# Patient Record
Sex: MALE | Race: WHITE | NOT HISPANIC OR LATINO | Employment: OTHER | URBAN - METROPOLITAN AREA
[De-identification: names, ages, dates, MRNs, and addresses within clinical notes are randomized per-mention and may not be internally consistent; named-entity substitution may affect disease eponyms.]

---

## 2017-02-21 ENCOUNTER — ALLSCRIPTS OFFICE VISIT (OUTPATIENT)
Dept: OTHER | Facility: OTHER | Age: 77
End: 2017-02-21

## 2017-04-11 ENCOUNTER — ALLSCRIPTS OFFICE VISIT (OUTPATIENT)
Dept: OTHER | Facility: OTHER | Age: 77
End: 2017-04-11

## 2017-04-11 DIAGNOSIS — I10 ESSENTIAL (PRIMARY) HYPERTENSION: ICD-10-CM

## 2017-04-11 DIAGNOSIS — E03.9 HYPOTHYROIDISM: ICD-10-CM

## 2017-04-11 DIAGNOSIS — E78.5 HYPERLIPIDEMIA: ICD-10-CM

## 2017-08-18 ENCOUNTER — ALLSCRIPTS OFFICE VISIT (OUTPATIENT)
Dept: OTHER | Facility: OTHER | Age: 77
End: 2017-08-18

## 2017-12-20 ENCOUNTER — ALLSCRIPTS OFFICE VISIT (OUTPATIENT)
Dept: OTHER | Facility: OTHER | Age: 77
End: 2017-12-20

## 2017-12-20 DIAGNOSIS — E78.5 HYPERLIPIDEMIA: ICD-10-CM

## 2017-12-20 DIAGNOSIS — E03.9 HYPOTHYROIDISM: ICD-10-CM

## 2017-12-20 DIAGNOSIS — C61 MALIGNANT NEOPLASM OF PROSTATE (HCC): ICD-10-CM

## 2017-12-20 DIAGNOSIS — F11.90 UNCOMPLICATED OPIOID USE: ICD-10-CM

## 2017-12-20 DIAGNOSIS — I10 ESSENTIAL (PRIMARY) HYPERTENSION: ICD-10-CM

## 2017-12-21 NOTE — PROGRESS NOTES
Assessment   1  Hypertension (401 9) (I10)   2  Chronic narcotic use (305 50) (F11 90)   3  Generalized anxiety disorder (300 02) (F41 1)   4  Hyperlipidemia (272 4) (E78 5)   5  Hypothyroidism (244 9) (E03 9)   6  Osteoarthritis (715 90) (M19 90)   7  Prostate cancer (185) (C61)    Plan   Chronic shoulder pain, Osteoarthritis    · Hydrocodone-Acetaminophen 7 5-325 MG Oral Tablet; take 1 tablet every 6 hours as    needed for pain  Hyperlipidemia    · (1) LIPID PANEL FASTING W DIRECT LDL REFLEX; Status:Active; Requested for:45Sns7975;    · Eat a low fat and low cholesterol diet ; Status:Complete;   Done: 47ION1641  Hypertension    · (1) CBC/PLT/DIFF; Status:Active; Requested for:66Kqw1204;    · (1) COMPREHENSIVE METABOLIC PANEL; Status:Active; Requested for:77Ajf1648;    · (1) MICROALBUMIN CREATININE RATIO, RANDOM URINE; Status:Active; Requested for:10Oze2692;    · Follow-up visit in 4 Months Evaluation and Treatment  Follow-up  Status: Hold For - Scheduling     Requested for: 25Grd3100   · A diet low in sodium and high in potassium, magnesium, and calcium can help your blood pressure ;    Status:Complete;   Done: 82DAH0387   · Begin a limited exercise program ; Status:Complete;   Done: 67TLG3517   · Begin or continue regular aerobic exercise  Gradually work up to at least {count1} sessions of    {dur1} of exercise a week ; Status:Complete;   Done: 68HGK1247  Hypothyroidism    · (1) TSH; Status:Active; Requested for:87Szx1167;   Prostate cancer    · (1) PSA FREE & TOTAL; Status:Active; Requested for:07Tlj6811;   Screening for genitourinary condition    · *VB - Urinary Incontinence Screen (Dx Z13 89 Screen for UI); Status:Complete;   Done: 80PBR3724    11:17AM  SocHx: Chronic narcotic use    · (1) DRUG ABUSE SCREEN, URINE ROUTINE; Status:Active; Requested for:01Phs4902;    · (1) HYDROCODONE AND METABOLITES, URINE; Status:Active;  Requested for:22Vuy0882;     Discussion/Summary   Discussion Summary:    Doing well and will check labs  Continue current treatment  Check UDT  Medication SE Review and Pt Understands Tx: Possible side effects of new medications were reviewed with the patient/guardian today  The treatment plan was reviewed with the patient/guardian  The patient/guardian understands and agrees with the treatment plan      Chief Complaint   Chief Complaint Free Text Note Form: Patient is being seen today for a RTN visit  Patient states he would like a refill on some medications  Chief Complaint Chronic Condition [facility]: Patient is here today for follow up of chronic conditions described in HPI  History of Present Illness   HPI: WM RTC for f/u htn, hyperlipidemia, etc  Doing well and no c/o's  Remains active w/o difficulty  Due for labs  Flu and colon cancer screening up to date  Memory reportedly has been good  Anxiety Disorder (Follow-Up): The patient is being seen for follow-up of generalized anxiety disorder  The patient reports doing well  He has no comorbid illnesses  He has had no significant interval events  Interval symptoms:  stable anxiety,-- stable sleep disruption-- and-- denies depression  Medication(s): benzodiazepines  Medications:  the patient is adherent to his medication regimen, but-- he denies medication side effects  Disease management:  the patient is doing well with his goals  Hypothyroidism (Follow-Up): The patient is being seen for follow-up of Hashimoto's thyroiditis  The patient reports doing well  He has had no significant interval events  The patient is currently asymptomatic  Medications include levothyroxine  Medications:  the patient is adherent to his medication regimen, but-- he denies medication side effects  Disease management:  the patient is doing well with his goals  Due for: thyroid stimulating hormone and lipid profile  Hyperlipidemia (Follow-Up):  The patient states his hyperlipidemia has been under good control since the last visit  Comorbid Illnesses: hypertension  He has no significant interval events  Symptoms: The patient is currently asymptomatic  Associated symptoms include no focal neurologic deficits-- and-- no memory loss  Medications: the patient is adherent with his medication regimen  -- He denies medication side effects  Medication(s): a statin  The patient is doing well with his hyperlipidemia goals  The patient is due for a lipid panel-- and-- liver function tests  Hypertension (Follow-Up): The patient presents for follow-up of essential hypertension  The patient states he has been doing well with his blood pressure control since the last visit  He has no comorbid illnesses  He has no significant interval events  Symptoms: The patient is currently asymptomatic  Associated symptoms include no headache-- and-- no focal neurologic deficits  Home monitoring: The patient checks his blood pressure sporadically  Blood pressure control has been good  Medications: the patient is adherent with his medication regimen  -- He denies medication side effects  Medication(s): a diuretic-- and-- an ACE inhibitor  Disease Management: the patient is doing well with his blood pressure goals  The patient is due for a lipid panel,-- a serum creatinine-- and-- a urine microalbumin  Osteoarthritis (Follow-Up): The patient states his osteoarthritis has been stable since the last visit  He has no comorbid illnesses  He has no complications from osteoarthritis  He has no significant interval events  Symptoms: The patient is currently asymptomatic  Associated symptoms include joint stiffness, but-- no localized joint swelling  Activities: no limitations  Medications: the patient is adherent with his medication regimen  -- He denies medication side effects  Medication(s): acetaminophen,-- an opioid narcotic-- and-- a non-steroidal antiinflammatory agent        Disease Management: the patient is doing well with his osteoarthritis goals  Review of Systems   Complete-Male:      Constitutional: no fever,-- not feeling poorly,-- no chills-- and-- not feeling tired  Cardiovascular: no chest pain,-- no intermittent leg claudication,-- no palpitations-- and-- no extremity edema  Respiratory: no shortness of breath,-- no cough,-- no orthopnea,-- no wheezing,-- no shortness of breath during exertion-- and-- no PND  Gastrointestinal: no abdominal pain,-- no nausea,-- no constipation-- and-- no diarrhea  Genitourinary: no dysuria  Musculoskeletal: No complaints of arthralgia, no myalgias, no joint swelling or stiffness, no limb pain or swelling  Integumentary: No complaints of skin rash or skin lesions, no itching, no skin wound, no dry skin  Neurological: no headache,-- no confusion-- and-- no convulsions  Psychiatric: anxiety-- and-- sleep disturbances, but-- not suicidal-- and-- no depression  Endocrine: No complaints of proptosis, no hot flashes, no muscle weakness, no erectile dysfunction, no deepening of the voice, no feelings of weakness  Hematologic/Lymphatic: No complaints of swollen glands, no swollen glands in the neck, does not bleed easily, no easy bruising  Active Problems   1  Alzheimers disease (331 0) (G30 9)   2  Chronic narcotic use (305 50) (F11 90)   3  Chronic shoulder pain (719 41,338 29) (M25 519,G89 29)   4  Dyshidrosis (705 81) (L30 1)   5  Eczema (692 9) (L30 9)   6  Generalized anxiety disorder (300 02) (F41 1)   7  Hyperlipidemia (272 4) (E78 5)   8  Hypertension (401 9) (I10)   9  Hypothyroidism (244 9) (E03 9)   10  Osteoarthritis (715 90) (M19 90)   11  Prostate cancer (185) (C61)   12  Screening for depression (V79 0) (Z13 89)   13  Screening for genitourinary condition (V81 6) (Z13 89)   14  Screening for neurological condition (V80 09) (Z13 89)    Past Medical History   1  History of Acute frontal sinusitis (461 1) (J01 10)   2   History of Acute upper respiratory infection (465 9) (J06 9)   3  History of Calcaneal spur (726 73) (M77 30)   4  History of bronchitis (V12 69) (Z87 09)   5  History of dermatitis (V13 3) (Z87 2)   6  History of influenza vaccination (V49 89) (Z92 29)   7  History of skin disorder (V13 3) (Z87 2)   8  History of Skin rash (782 1) (R21)  Active Problems And Past Medical History Reviewed: The active problems and past medical history were reviewed and updated today  Surgical History   1  History of Cataract Surgery   2  History of Eye Surgery   3  History of Knee Surgery   4  History of Preventive Medicine Estab Patient Checkup Adult Over 64 (V70 0)   5  History of Prostate Surgery  Surgical History Reviewed: The surgical history was reviewed and updated today  Family History   Brother    1  Family history of Stroke Syndrome (V17 1)  Family History    2  Family history of alcoholism (V17 0) (Z81 1)  Family History Reviewed: The family history was reviewed and updated today  Social History    · Denied: Alcohol Use (History)   · Caffeine Use   · Chronic narcotic use (305 50) (F11 90)   · Former smoker (F08 25) (Z76 895)  Social History Reviewed: The social history was reviewed and updated today  The social history was reviewed and is unchanged  Current Meds    1  ALPRAZolam 0 5 MG Oral Tablet; TAKE 1 TABLET 3 TIMES DAILY AS NEEDED; Last Rx:72Him2073     Ordered   2  Aspirin 81 MG TABS Recorded   3  Hydrocodone-Acetaminophen 7 5-325 MG Oral Tablet; take 1 tablet every 6 hours as needed for     pain; Therapy: 31Edh3025 to (Evaluate:19Swy1822); Last Rx:43Ltt4015 Ordered   4  Levothyroxine Sodium 88 MCG Oral Tablet; TAKE 1 TABLET DAILY; Therapy: 90SMX8540 to (Evaluate:20Llv6148)  Requested for: 99TRV7901; Last Rx:20Uzq0959     Ordered   5  Lisinopril-Hydrochlorothiazide 20-25 MG Oral Tablet; 1 q d;      Therapy: 97CRI8833 to (Evaluate:78Dpo8199)  Requested for: 34QEF9443; Last Rx:19Wfq0231 Ordered   6  Mometasone Furoate 0 1 % External Ointment; APPLY SPARINGLY TO AFFECTED AREA(S) ONCE     DAILY; Therapy: 35Efk0876 to (Evaluate:94Fds9043)  Requested for: 78Vwa8311; Last Rx:22Apr2016     Ordered   7  Multi Vitamin/Minerals Oral Tablet; TAKE 1 TABLET DAILY; Therapy: 11PIY4397 to (Evaluate:48Hrg0197) Recorded   8  Pravastatin Sodium 40 MG Oral Tablet; 1 q d; Therapy: 03LHC9994 to (Evaluate:13Nft1357)  Requested for: 53PPS4059; Last Rx:21Xsj1778     Ordered   9  Triamcinolone Acetonide 0 1 % External Ointment; apply sparingly to affected area tid; Therapy: 64VYC8228 to (Evaluate:39Zci3069) Recorded  Medication List Reviewed: The medication list was reviewed and updated today  Allergies   1  No Known Drug Allergies    Vitals   Vital Signs    Recorded: 20Dec2017 11:12AM   Temperature 97 6 F, Tympanic   Heart Rate 46   Respiration 16   Systolic 913, Sitting   Diastolic 70, Sitting   Height 5 ft 9 in   Weight 196 lb 8 0 oz   BMI Calculated 29 02   BSA Calculated 2 05   O2 Saturation 96     Physical Exam        Constitutional      General appearance: No acute distress, well appearing and well nourished  Eyes      Conjunctiva and lids: No swelling, erythema, or discharge  Pupils and irises: Equal, round and reactive to light  Ears, Nose, Mouth, and Throat      Oropharynx: Normal with no erythema, edema, exudate or lesions  Pulmonary      Respiratory effort: No increased work of breathing or signs of respiratory distress  Auscultation of lungs: Clear to auscultation, equal breath sounds bilaterally, no wheezes, no rales, no rhonci  Cardiovascular      Auscultation of heart: Normal rate and rhythm, normal S1 and S2, without murmurs  Examination of extremities for edema and/or varicosities: Normal        Carotid pulses: Normal        Abdomen      Abdomen: Non-tender, no masses         Musculoskeletal      Gait and station: Normal        Psychiatric Orientation to person, place and time: Normal        Mood and affect: Normal           Results/Data   *VB - Urinary Incontinence Screen (Dx Z13 89 Screen for UI) 59ZHE9930 11:17AM [place]ore[place] Night      Test Name Result Flag Reference   Urinary Incontinence Assessment No UI        Falls Risk Assessment (Dx Z13 89 Screen for Neurologic Disorder) 70UFZ9161 11:16AM User, Ahs      Test Name Result Flag Reference   Falls Risk      No falls in the past year        Health Management   Prostate cancer   (1) PSA (SCREEN) (Dx V76 44 Screen for Prostate Cancer); every 1 year; Last 07ENS8749; Next    Due: 01Jun2016; Overdue  (1) PSA, DIAGNOSTIC (FOLLOW-UP); every 1 year; Last 07EZG1914; Next Due: 03GZW8497; Overdue  Screening for depression   *VB-Depression Screening; every 1 year; Last 99IBN7090; Next Due: 35Mte6014; Active  Health Maintenance   (1) PSA (SCREEN) (Dx V76 44 Screen for Prostate Cancer); every 1 year; Last 97EDK1827; Next    Due: 01Jun2016; Overdue  Medicare Annual Wellness Visit; every 1 year; Next Due: 98MGR2928;  Overdue    Signatures    Electronically signed by : MARY Marrero ; Dec 20 2017 11:46AM EST                       (Author)

## 2018-01-13 VITALS
RESPIRATION RATE: 18 BRPM | SYSTOLIC BLOOD PRESSURE: 130 MMHG | TEMPERATURE: 97.1 F | HEIGHT: 69 IN | WEIGHT: 197 LBS | DIASTOLIC BLOOD PRESSURE: 60 MMHG | BODY MASS INDEX: 29.18 KG/M2 | HEART RATE: 70 BPM

## 2018-01-14 VITALS
BODY MASS INDEX: 28.73 KG/M2 | HEART RATE: 74 BPM | HEIGHT: 69 IN | WEIGHT: 194 LBS | TEMPERATURE: 98.1 F | RESPIRATION RATE: 14 BRPM | SYSTOLIC BLOOD PRESSURE: 134 MMHG | DIASTOLIC BLOOD PRESSURE: 68 MMHG

## 2018-01-15 VITALS
HEART RATE: 68 BPM | BODY MASS INDEX: 29.18 KG/M2 | RESPIRATION RATE: 16 BRPM | DIASTOLIC BLOOD PRESSURE: 70 MMHG | SYSTOLIC BLOOD PRESSURE: 114 MMHG | WEIGHT: 197 LBS | HEIGHT: 69 IN

## 2018-01-23 VITALS
DIASTOLIC BLOOD PRESSURE: 70 MMHG | OXYGEN SATURATION: 96 % | TEMPERATURE: 97.6 F | WEIGHT: 196.5 LBS | BODY MASS INDEX: 29.1 KG/M2 | HEART RATE: 46 BPM | HEIGHT: 69 IN | RESPIRATION RATE: 16 BRPM | SYSTOLIC BLOOD PRESSURE: 110 MMHG

## 2018-03-20 ENCOUNTER — OFFICE VISIT (OUTPATIENT)
Dept: INTERNAL MEDICINE CLINIC | Facility: CLINIC | Age: 78
End: 2018-03-20
Payer: COMMERCIAL

## 2018-03-20 ENCOUNTER — TELEPHONE (OUTPATIENT)
Dept: INTERNAL MEDICINE CLINIC | Facility: CLINIC | Age: 78
End: 2018-03-20

## 2018-03-20 VITALS
HEIGHT: 69 IN | SYSTOLIC BLOOD PRESSURE: 138 MMHG | HEART RATE: 62 BPM | TEMPERATURE: 97.8 F | WEIGHT: 200 LBS | BODY MASS INDEX: 29.62 KG/M2 | RESPIRATION RATE: 16 BRPM | DIASTOLIC BLOOD PRESSURE: 86 MMHG

## 2018-03-20 DIAGNOSIS — F41.9 ANXIETY: Primary | ICD-10-CM

## 2018-03-20 DIAGNOSIS — L30.9 DERMATITIS: ICD-10-CM

## 2018-03-20 PROCEDURE — 99212 OFFICE O/P EST SF 10 MIN: CPT | Performed by: INTERNAL MEDICINE

## 2018-03-20 PROCEDURE — 1101F PT FALLS ASSESS-DOCD LE1/YR: CPT | Performed by: INTERNAL MEDICINE

## 2018-03-20 RX ORDER — ALPRAZOLAM 0.5 MG/1
0.5 TABLET ORAL 3 TIMES DAILY PRN
Qty: 90 TABLET | Refills: 0 | Status: SHIPPED | OUTPATIENT
Start: 2018-03-20 | End: 2018-04-11 | Stop reason: SDUPTHER

## 2018-03-20 RX ORDER — CLOTRIMAZOLE 1 %
CREAM (GRAM) TOPICAL
Qty: 60 G | Refills: 1 | Status: SHIPPED | OUTPATIENT
Start: 2018-03-20 | End: 2018-03-20 | Stop reason: SDUPTHER

## 2018-03-20 RX ORDER — LISINOPRIL AND HYDROCHLOROTHIAZIDE 25; 20 MG/1; MG/1
TABLET ORAL DAILY
COMMUNITY
Start: 2011-07-18 | End: 2018-04-11 | Stop reason: SDUPTHER

## 2018-03-20 RX ORDER — PRAVASTATIN SODIUM 40 MG
TABLET ORAL DAILY
COMMUNITY
Start: 2011-07-18 | End: 2018-04-11 | Stop reason: SDUPTHER

## 2018-03-20 RX ORDER — CLOTRIMAZOLE 1 %
CREAM (GRAM) TOPICAL
Qty: 60 G | Refills: 0 | Status: SHIPPED | OUTPATIENT
Start: 2018-03-20 | End: 2018-04-11

## 2018-03-20 RX ORDER — HYDROCODONE BITARTRATE AND ACETAMINOPHEN 7.5; 325 MG/1; MG/1
1 TABLET ORAL EVERY 6 HOURS PRN
COMMUNITY
Start: 2017-02-21 | End: 2018-04-11 | Stop reason: SDUPTHER

## 2018-03-20 RX ORDER — ALPRAZOLAM 0.5 MG/1
1 TABLET ORAL 3 TIMES DAILY PRN
COMMUNITY
End: 2018-03-20 | Stop reason: SDUPTHER

## 2018-03-20 RX ORDER — LEVOTHYROXINE SODIUM 88 UG/1
1 TABLET ORAL DAILY
COMMUNITY
Start: 2011-07-18 | End: 2018-04-11 | Stop reason: SDUPTHER

## 2018-03-20 NOTE — PATIENT INSTRUCTIONS
Dermatitis   AMBULATORY CARE:   Dermatitis  is skin inflammation  You may have an itchy rash, redness, or swelling  You may also have bumps or blisters that crust over or ooze clear fluid  Call 911 if you have any of the following symptoms of anaphylaxis:   · Sudden trouble breathing    · Throat swelling and tightness    · Dizziness, lightheadedness, fainting, or confusion  Seek care immediately if:   · You develop a fever or have red streaks going up your arm or leg  · Your rash gets more swollen, red, or hot  Contact your healthcare provider if:   · Your skin blisters, oozes white or yellow pus, or has a foul-smelling discharge  · Your rash spreads or does not get better, even after treatment  · You have questions or concerns about your condition or care  Treatment for dermatitis  depends on the cause of your rash  You may need medicines to help decrease itching and inflammation or treat a bacterial infection  They may be given as a topical cream, shot, or a pill  Manage dermatitis:   · Apply a cool compress to your rash  This will help soothe your skin  · Keep your skin moist   Rub unscented cream or lotion on your skin to prevent dryness and itching  Do this right after a lukewarm bath or shower when your skin is still damp  · Avoid skin irritants  Do not use skin irritants, such as makeup, hair products, soaps, and cleansers  Use products that do not contain fragrances or dye  Follow up with your healthcare provider as directed:  Write down your questions so you remember to ask them during your visits  © 2017 [employer] Information is for End User's use only and may not be sold, redistributed or otherwise used for commercial purposes  All illustrations and images included in CareNotes® are the copyrighted property of [employer]  or [address]  The above information is an  only   It is not intended as medical advice for individual conditions or treatments  Talk to your doctor, nurse or pharmacist before following any medical regimen to see if it is safe and effective for you

## 2018-03-20 NOTE — PROGRESS NOTES
Assessment/Plan:    No problem-specific Assessment & Plan notes found for this encounter  Diagnoses and all orders for this visit:    Anxiety  -     ALPRAZolam (XANAX) 0 5 mg tablet; Take 1 tablet (0 5 mg total) by mouth 3 (three) times a day as needed (PRN)    Dermatitis  -     clotrimazole (LOTRIMIN) 1 % cream; Apply to affected area TID  Other orders  -     Discontinue: ALPRAZolam (XANAX) 0 5 mg tablet; Take 1 tablet by mouth 3 (three) times a day as needed  -     aspirin 81 MG tablet; Take by mouth  -     HYDROcodone-acetaminophen (NORCO) 7 5-325 mg per tablet; Take 1 tablet by mouth every 6 (six) hours as needed  -     levothyroxine 88 mcg tablet; Take 1 tablet by mouth daily  -     lisinopril-hydrochlorothiazide (PRINZIDE,ZESTORETIC) 20-25 MG per tablet; Take by mouth daily  -     triamcinolone (KENALOG) 0 1 % ointment; Apply topically  -     Multiple Vitamin (MULTI-VITAMIN DAILY PO); Take 1 tablet by mouth daily  -     pravastatin (PRAVACHOL) 40 mg tablet; Take by mouth daily      A/P: ??cause  Appears more fungal  Will switch to lotrimin and otc moisturizer TID  Reeval in several weeks with his routine  If persists, consider oral sterid wean, bx, or referral back to derm  Subjective:      Patient ID: Jeremy Duong is a 76 y o  male  WM presents with a one year h/o of itching rash that is spreading  Pt states it started on his left hand/thumb  Seen by derm and told to put TCM cream on it  Had been doing that, but remains itchy and always scratching it  Now has lesions on his right leg, feet, etc  No new meds, dietary changes, or exposures  No h/o psoriasis etc        Rash   Pertinent negatives include no cough, diarrhea, fatigue, fever, shortness of breath or vomiting  The following portions of the patient's history were reviewed and updated as appropriate:   He  has no past medical history on file  He There are no active problems to display for this patient      He  has a past surgical history that includes Knee surgery; Prostate surgery; and Vasectomy  His family history includes Alcohol abuse in his father and mother  He  reports that he has quit smoking  He has never used smokeless tobacco  He reports that he does not drink alcohol or use drugs  Current Outpatient Prescriptions   Medication Sig Dispense Refill    ALPRAZolam (XANAX) 0 5 mg tablet Take 1 tablet (0 5 mg total) by mouth 3 (three) times a day as needed (PRN) 90 tablet 0    aspirin 81 MG tablet Take by mouth      HYDROcodone-acetaminophen (NORCO) 7 5-325 mg per tablet Take 1 tablet by mouth every 6 (six) hours as needed      levothyroxine 88 mcg tablet Take 1 tablet by mouth daily      lisinopril-hydrochlorothiazide (PRINZIDE,ZESTORETIC) 20-25 MG per tablet Take by mouth daily      Multiple Vitamin (MULTI-VITAMIN DAILY PO) Take 1 tablet by mouth daily      pravastatin (PRAVACHOL) 40 mg tablet Take by mouth daily      triamcinolone (KENALOG) 0 1 % ointment Apply topically      clotrimazole (LOTRIMIN) 1 % cream Apply to affected area TID  60 g 1     No current facility-administered medications for this visit  No current outpatient prescriptions on file prior to visit  No current facility-administered medications on file prior to visit  He has No Known Allergies       Review of Systems   Constitutional: Negative for activity change, chills, diaphoresis, fatigue and fever  Respiratory: Negative for cough, chest tightness, shortness of breath and wheezing  Cardiovascular: Negative for chest pain, palpitations and leg swelling  Gastrointestinal: Negative for abdominal pain, constipation, diarrhea, nausea and vomiting  Genitourinary: Negative for difficulty urinating, dysuria and frequency  Musculoskeletal: Negative for arthralgias, gait problem and myalgias  Skin: Positive for rash  Neurological: Negative for light-headedness and headaches  Psychiatric/Behavioral: Negative for confusion  The patient is not nervous/anxious  Objective:      /86   Pulse 62   Temp 97 8 °F (36 6 °C) (Tympanic)   Resp 16   Ht 5' 9" (1 753 m)   Wt 90 7 kg (200 lb)   BMI 29 53 kg/m²          Physical Exam   Constitutional: He appears well-developed and well-nourished  No distress  HENT:   Head: Normocephalic and atraumatic  Mouth/Throat: Oropharynx is clear and moist    Eyes: Conjunctivae and EOM are normal  Pupils are equal, round, and reactive to light  Skin: Rash (Semi circular lestions with rough surface, erythema, and papular  No vesicles  Dry ? ?scaly appearence  Involving the  feet,  right thigh, and left hand  ) noted  Nursing note and vitals reviewed

## 2018-03-20 NOTE — TELEPHONE ENCOUNTER
Pt called, Lotrimone cream was sent to [facility] pharm, pt asking if you will resend to [facility]

## 2018-03-26 ENCOUNTER — TELEPHONE (OUTPATIENT)
Dept: INTERNAL MEDICINE CLINIC | Facility: CLINIC | Age: 78
End: 2018-03-26

## 2018-03-26 DIAGNOSIS — L30.9 ECZEMA, UNSPECIFIED TYPE: Primary | ICD-10-CM

## 2018-03-26 RX ORDER — CLOBETASOL PROPIONATE 0.5 MG/G
OINTMENT TOPICAL 2 TIMES DAILY
Qty: 60 G | Refills: 3 | Status: SHIPPED | OUTPATIENT
Start: 2018-03-26 | End: 2018-04-11

## 2018-04-09 PROBLEM — M25.519 CHRONIC SHOULDER PAIN: Status: ACTIVE | Noted: 2017-02-21

## 2018-04-09 PROBLEM — G89.29 CHRONIC SHOULDER PAIN: Status: ACTIVE | Noted: 2017-02-21

## 2018-04-11 ENCOUNTER — OFFICE VISIT (OUTPATIENT)
Dept: INTERNAL MEDICINE CLINIC | Facility: CLINIC | Age: 78
End: 2018-04-11
Payer: COMMERCIAL

## 2018-04-11 VITALS
DIASTOLIC BLOOD PRESSURE: 72 MMHG | HEIGHT: 69 IN | HEART RATE: 55 BPM | OXYGEN SATURATION: 92 % | TEMPERATURE: 97.8 F | SYSTOLIC BLOOD PRESSURE: 128 MMHG | WEIGHT: 202 LBS | RESPIRATION RATE: 18 BRPM | BODY MASS INDEX: 29.92 KG/M2

## 2018-04-11 DIAGNOSIS — L30.9 ECZEMA, UNSPECIFIED TYPE: Primary | ICD-10-CM

## 2018-04-11 DIAGNOSIS — I10 ESSENTIAL HYPERTENSION: ICD-10-CM

## 2018-04-11 DIAGNOSIS — F41.9 ANXIETY: ICD-10-CM

## 2018-04-11 DIAGNOSIS — G89.29 CHRONIC SHOULDER PAIN, UNSPECIFIED LATERALITY: ICD-10-CM

## 2018-04-11 DIAGNOSIS — E03.9 HYPOTHYROIDISM, UNSPECIFIED TYPE: ICD-10-CM

## 2018-04-11 DIAGNOSIS — F41.1 GENERALIZED ANXIETY DISORDER: ICD-10-CM

## 2018-04-11 DIAGNOSIS — M25.519 CHRONIC SHOULDER PAIN, UNSPECIFIED LATERALITY: ICD-10-CM

## 2018-04-11 DIAGNOSIS — E78.5 HYPERLIPIDEMIA, UNSPECIFIED HYPERLIPIDEMIA TYPE: ICD-10-CM

## 2018-04-11 DIAGNOSIS — C61 PROSTATE CANCER (HCC): ICD-10-CM

## 2018-04-11 PROCEDURE — 3074F SYST BP LT 130 MM HG: CPT | Performed by: INTERNAL MEDICINE

## 2018-04-11 PROCEDURE — 99214 OFFICE O/P EST MOD 30 MIN: CPT | Performed by: INTERNAL MEDICINE

## 2018-04-11 PROCEDURE — 3078F DIAST BP <80 MM HG: CPT | Performed by: INTERNAL MEDICINE

## 2018-04-11 RX ORDER — PRAVASTATIN SODIUM 40 MG
40 TABLET ORAL DAILY
Qty: 90 TABLET | Refills: 3 | Status: SHIPPED | OUTPATIENT
Start: 2018-04-11 | End: 2018-11-15 | Stop reason: SDUPTHER

## 2018-04-11 RX ORDER — ALPRAZOLAM 0.5 MG/1
0.5 TABLET ORAL 3 TIMES DAILY PRN
Qty: 90 TABLET | Refills: 0 | Status: SHIPPED | OUTPATIENT
Start: 2018-04-11 | End: 2018-05-29 | Stop reason: SDUPTHER

## 2018-04-11 RX ORDER — LISINOPRIL AND HYDROCHLOROTHIAZIDE 25; 20 MG/1; MG/1
1 TABLET ORAL DAILY
Qty: 90 TABLET | Refills: 3 | Status: SHIPPED | OUTPATIENT
Start: 2018-04-11 | End: 2018-11-15 | Stop reason: SDUPTHER

## 2018-04-11 RX ORDER — HYDROCODONE BITARTRATE AND ACETAMINOPHEN 7.5; 325 MG/1; MG/1
1 TABLET ORAL EVERY 6 HOURS PRN
Qty: 120 TABLET | Refills: 0 | Status: SHIPPED | OUTPATIENT
Start: 2018-04-11 | End: 2018-07-16

## 2018-04-11 RX ORDER — LEVOTHYROXINE SODIUM 88 UG/1
88 TABLET ORAL DAILY
Qty: 90 TABLET | Refills: 3 | Status: SHIPPED | OUTPATIENT
Start: 2018-04-11 | End: 2018-11-15 | Stop reason: SDUPTHER

## 2018-04-11 NOTE — PROGRESS NOTES
Assessment/Plan:    No problem-specific Assessment & Plan notes found for this encounter  Diagnoses and all orders for this visit:    Eczema, unspecified type  -     HYDROcodone-acetaminophen (NORCO) 7 5-325 mg per tablet; Take 1 tablet by mouth every 6 (six) hours as needed for pain Max Daily Amount: 4 tablets    Essential hypertension  -     Comprehensive metabolic panel; Future  -     CBC and differential; Future  -     Microalbumin / creatinine urine ratio; Future  -     lisinopril-hydrochlorothiazide (PRINZIDE,ZESTORETIC) 20-25 MG per tablet; Take 1 tablet by mouth daily    Anxiety  -     ALPRAZolam (XANAX) 0 5 mg tablet; Take 1 tablet (0 5 mg total) by mouth 3 (three) times a day as needed (PRN)    Hypothyroidism, unspecified type  -     TSH, 3rd generation; Future  -     levothyroxine 88 mcg tablet; Take 1 tablet (88 mcg total) by mouth daily    Hyperlipidemia, unspecified hyperlipidemia type  -     LDL cholesterol, direct; Future  -     pravastatin (PRAVACHOL) 40 mg tablet; Take 1 tablet (40 mg total) by mouth daily    Prostate cancer (HCC)  -     PSA, total and free; Future    Generalized anxiety disorder    Chronic shoulder pain, unspecified laterality  -     Toxicology screen, urine    Other orders  -     Cancel: PSA Total, Diagnostic; Future      A/P: Doing well and will check labs  Continue current treatment and continue to decline meds for SDAT  RTC four months for routine  Subjective:      Patient ID: Imani Bryant is a 76 y o  male  WM RTC for f/u htn, hypothyroidism, etc  Doing well and no c/o's  Remains active w/o difficulty and no falls reported  Reports the memory is no worse and is having no problems with ADL's  Pain is controlled with meds  No problems with the prostate cancer  Due for labs, including UDT  Vaccines and colon cancer screen is up to date           The following portions of the patient's history were reviewed and updated as appropriate:   He  has a past medical history of Cancer ([facility] ); Dementia; Disease of thyroid gland; Hypertension; and Skin disorder  He   Patient Active Problem List    Diagnosis Date Noted    Chronic shoulder pain 02/21/2017    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    Eczema 02/11/2015    Alzheimers disease 11/15/2012    Hypothyroidism 08/08/2012    Generalized anxiety disorder 06/05/2012    Hyperlipidemia 06/05/2012    Hypertension 06/05/2012    Osteoarthritis 06/05/2012    Prostate cancer ([facility] ) 06/05/2012     He  has a past surgical history that includes Knee surgery; Prostate surgery; Vasectomy; Cataract extraction; and Eye surgery  His family history includes Alcohol abuse in his family, father, and mother; No Known Problems in his maternal grandfather, maternal grandmother, paternal grandfather, paternal grandmother, and sister; Stroke in his brother  He  reports that he has quit smoking  He has never used smokeless tobacco  He reports that he does not drink alcohol or use drugs  Current Outpatient Prescriptions   Medication Sig Dispense Refill    ALPRAZolam (XANAX) 0 5 mg tablet Take 1 tablet (0 5 mg total) by mouth 3 (three) times a day as needed (PRN) 90 tablet 0    aspirin 81 MG tablet Take by mouth      HYDROcodone-acetaminophen (NORCO) 7 5-325 mg per tablet Take 1 tablet by mouth every 6 (six) hours as needed for pain Max Daily Amount: 4 tablets 120 tablet 0    levothyroxine 88 mcg tablet Take 1 tablet (88 mcg total) by mouth daily 90 tablet 3    lisinopril-hydrochlorothiazide (PRINZIDE,ZESTORETIC) 20-25 MG per tablet Take 1 tablet by mouth daily 90 tablet 3    Multiple Vitamin (MULTI-VITAMIN DAILY PO) Take 1 tablet by mouth daily      pravastatin (PRAVACHOL) 40 mg tablet Take 1 tablet (40 mg total) by mouth daily 90 tablet 3    triamcinolone (KENALOG) 0 1 % ointment Apply topically       No current facility-administered medications for this visit        Current Outpatient Prescriptions on File Prior to Visit Medication Sig    aspirin 81 MG tablet Take by mouth    Multiple Vitamin (MULTI-VITAMIN DAILY PO) Take 1 tablet by mouth daily    triamcinolone (KENALOG) 0 1 % ointment Apply topically    [DISCONTINUED] ALPRAZolam (XANAX) 0 5 mg tablet Take 1 tablet (0 5 mg total) by mouth 3 (three) times a day as needed (PRN)    [DISCONTINUED] clobetasol (TEMOVATE) 0 05 % ointment Apply topically 2 (two) times a day    [DISCONTINUED] clotrimazole (LOTRIMIN) 1 % cream Apply to affected area TID   [DISCONTINUED] HYDROcodone-acetaminophen (NORCO) 7 5-325 mg per tablet Take 1 tablet by mouth every 6 (six) hours as needed    [DISCONTINUED] levothyroxine 88 mcg tablet Take 1 tablet by mouth daily    [DISCONTINUED] lisinopril-hydrochlorothiazide (PRINZIDE,ZESTORETIC) 20-25 MG per tablet Take by mouth daily    [DISCONTINUED] pravastatin (PRAVACHOL) 40 mg tablet Take by mouth daily     No current facility-administered medications on file prior to visit  He has No Known Allergies       Review of Systems   Constitutional: Negative for activity change, chills, diaphoresis, fatigue and fever  Eyes: Negative for visual disturbance  Respiratory: Negative for cough, chest tightness, shortness of breath and wheezing  Cardiovascular: Negative for chest pain, palpitations and leg swelling  Gastrointestinal: Negative for abdominal pain, constipation, diarrhea, nausea and vomiting  Endocrine: Negative for cold intolerance and heat intolerance  Genitourinary: Negative for difficulty urinating, dysuria and frequency  Musculoskeletal: Negative for arthralgias, gait problem and myalgias  Neurological: Negative for dizziness, seizures, syncope, light-headedness and headaches  Psychiatric/Behavioral: Negative for confusion, dysphoric mood and sleep disturbance  The patient is not nervous/anxious            Objective:      /72 (BP Location: Left arm, Patient Position: Sitting, Cuff Size: Adult)   Pulse 55   Temp 97 8 °F (36 6 °C) (Tympanic)   Resp 18   Ht 5' 9" (1 753 m)   Wt 91 6 kg (202 lb)   SpO2 92%   BMI 29 83 kg/m²          Physical Exam   Constitutional: He is oriented to person, place, and time  He appears well-developed and well-nourished  No distress  HENT:   Head: Normocephalic and atraumatic  Mouth/Throat: Oropharynx is clear and moist    Eyes: Conjunctivae and EOM are normal  Pupils are equal, round, and reactive to light  Neck: Neck supple  No JVD present  Cardiovascular: Normal rate and regular rhythm  No murmur heard  Pulmonary/Chest: Effort normal and breath sounds normal  No respiratory distress  He has no wheezes  Abdominal: Soft  Bowel sounds are normal  He exhibits no distension  There is no tenderness  Musculoskeletal: He exhibits no edema  Neurological: He is alert and oriented to person, place, and time  Psychiatric: He has a normal mood and affect  His behavior is normal  Judgment and thought content normal    Nursing note and vitals reviewed

## 2018-04-11 NOTE — PATIENT INSTRUCTIONS
Hypertension   AMBULATORY CARE:   Hypertension  is high blood pressure (BP)  Your BP is the force of your blood moving against the walls of your arteries  Normal BP is less than 120/80  Prehypertension is between 120/80 and 139/89  Hypertension is 140/90 or higher  Hypertension causes your BP to get so high that your heart has to work much harder than normal  This can damage your heart  You can control hypertension with a healthy lifestyle or medicines  A controlled blood pressure helps protect your organs, such as your heart, lungs, brain, and kidneys  Common symptoms include the following:   · Headache     · Blurred vision     · Chest pain     · Dizziness or weakness     · Trouble breathing    · Nosebleeds  Call 911 for any of the following:   · You have discomfort in your chest that feels like squeezing, pressure, fullness, or pain  · You become confused or have difficulty speaking  · You suddenly feel lightheaded or have trouble breathing  · You have pain or discomfort in your back, neck, jaw, stomach, or arm  Seek care immediately if:   · You have a severe headache or vision loss  · You have weakness in an arm or leg  Contact your healthcare provider if:   · You feel faint, dizzy, confused, or drowsy  · You have been taking your BP medicine and your BP is still higher than your healthcare provider says it should be  · You have questions or concerns about your condition or care  Treatment for hypertension  may include medicine to lower your BP and lower your cholesterol level  A low cholesterol level helps prevent heart disease and makes it easier to control your blood pressure  You may also need to make lifestyle changes  Take your medicine exactly as directed  Manage hypertension:  Talk with your healthcare provider about these and other ways to manage hypertension:  · Check your BP at home  Sit and rest for 5 minutes before you take your BP   Extend your arm and support it on a flat surface  Your arm should be at the same level as your heart  Follow the directions that came with your BP monitor  If possible, take at least 2 BP readings each time  Take your BP at least twice a day at the same times each day, such as morning and evening  Keep a record of your BP readings and bring it to your follow-up visits  Ask your healthcare provider what your BP should be  · Limit sodium (salt) as directed  Too much sodium can affect your fluid balance  Check labels to find low-sodium or no-salt-added foods  Some low-sodium foods use potassium salts for flavor  Too much potassium can also cause health problems  Your healthcare provider will tell you how much sodium and potassium are safe for you to have in a day  He or she may recommend that you limit sodium to 2,300 mg a day  · Follow the meal plan recommended by your healthcare provider  A dietitian or your provider can give you more information on low-sodium plans or the DASH (Dietary Approaches to Stop Hypertension) eating plan  The DASH plan is low in sodium, unhealthy fats, and total fat  It is high in potassium, calcium, and fiber  · Exercise to maintain a healthy weight  Exercise at least 30 minutes per day, on most days of the week  This will help decrease your blood pressure  Ask your healthcare provider about the best exercise plan for you  · Decrease stress  This may help lower your BP  Learn ways to relax, such as deep breathing or listening to music  · Limit alcohol  Women should limit alcohol to 1 drink a day  Men should limit alcohol to 2 drinks a day  A drink of alcohol is 12 ounces of beer, 5 ounces of wine, or 1½ ounces of liquor  · Do not smoke  Nicotine and other chemicals in cigarettes and cigars can increase your BP and also cause lung damage  Ask your healthcare provider for information if you currently smoke and need help to quit  E-cigarettes or smokeless tobacco still contain nicotine  Talk to your healthcare provider before you use these products  · Manage any other health conditions you have  Health conditions such as diabetes can increase your risk for hypertension  Follow your healthcare provider's instructions and take all your medicines as directed  Follow up with your healthcare provider as directed: You will need to return to have your BP checked and to have other lab tests done  Write down your questions so you remember to ask them during your visits  © 2017 [employer] Information is for End User's use only and may not be sold, redistributed or otherwise used for commercial purposes  All illustrations and images included in CareNotes® are the copyrighted property of [employer]  or [employer]  The above information is an  only  It is not intended as medical advice for individual conditions or treatments  Talk to your doctor, nurse or pharmacist before following any medical regimen to see if it is safe and effective for you

## 2018-04-12 ENCOUNTER — TRANSCRIBE ORDERS (OUTPATIENT)
Dept: URGENT CARE | Facility: CLINIC | Age: 78
End: 2018-04-12

## 2018-04-12 ENCOUNTER — APPOINTMENT (OUTPATIENT)
Dept: LAB | Facility: CLINIC | Age: 78
End: 2018-04-12
Payer: COMMERCIAL

## 2018-04-12 DIAGNOSIS — C61 PROSTATE CANCER (HCC): ICD-10-CM

## 2018-04-12 DIAGNOSIS — E03.9 HYPOTHYROIDISM, UNSPECIFIED TYPE: ICD-10-CM

## 2018-04-12 DIAGNOSIS — I10 ESSENTIAL HYPERTENSION: ICD-10-CM

## 2018-04-12 DIAGNOSIS — E78.5 HYPERLIPIDEMIA, UNSPECIFIED HYPERLIPIDEMIA TYPE: ICD-10-CM

## 2018-04-12 LAB
ALBUMIN SERPL BCP-MCNC: 3.8 G/DL (ref 3.5–5)
ALP SERPL-CCNC: 64 U/L (ref 46–116)
ALT SERPL W P-5'-P-CCNC: 33 U/L (ref 12–78)
ANION GAP SERPL CALCULATED.3IONS-SCNC: 7 MMOL/L (ref 4–13)
AST SERPL W P-5'-P-CCNC: 25 U/L (ref 5–45)
BASOPHILS # BLD AUTO: 0.03 THOUSANDS/ΜL (ref 0–0.1)
BASOPHILS NFR BLD AUTO: 0 % (ref 0–1)
BILIRUB SERPL-MCNC: 0.74 MG/DL (ref 0.2–1)
BUN SERPL-MCNC: 25 MG/DL (ref 5–25)
CALCIUM SERPL-MCNC: 9.4 MG/DL
CHLORIDE SERPL-SCNC: 112 MMOL/L (ref 100–108)
CO2 SERPL-SCNC: 22 MMOL/L (ref 21–32)
CREAT SERPL-MCNC: 1.23 MG/DL (ref 0.6–1.3)
CREAT UR-MCNC: 162 MG/DL
EOSINOPHIL # BLD AUTO: 0.42 THOUSAND/ΜL (ref 0–0.61)
EOSINOPHIL NFR BLD AUTO: 5 % (ref 0–6)
ERYTHROCYTE [DISTWIDTH] IN BLOOD BY AUTOMATED COUNT: 13.5 % (ref 11.6–15.1)
GFR SERPL CREATININE-BSD FRML MDRD: 55 ML/MIN/1.73SQ M
GLUCOSE P FAST SERPL-MCNC: 91 MG/DL (ref 65–99)
HCT VFR BLD AUTO: 42 % (ref 36.5–49.3)
HGB BLD-MCNC: 14.8 G/DL (ref 12–17)
LDLC SERPL DIRECT ASSAY-MCNC: 99 MG/DL (ref 0–100)
LYMPHOCYTES # BLD AUTO: 2.23 THOUSANDS/ΜL (ref 0.6–4.47)
LYMPHOCYTES NFR BLD AUTO: 27 % (ref 14–44)
MCH RBC QN AUTO: 31.2 PG (ref 26.8–34.3)
MCHC RBC AUTO-ENTMCNC: 35.2 G/DL (ref 31.4–37.4)
MCV RBC AUTO: 89 FL (ref 82–98)
MICROALBUMIN UR-MCNC: 5.8 MG/L (ref 0–20)
MICROALBUMIN/CREAT 24H UR: 4 MG/G CREATININE (ref 0–30)
MONOCYTES # BLD AUTO: 0.63 THOUSAND/ΜL (ref 0.17–1.22)
MONOCYTES NFR BLD AUTO: 8 % (ref 4–12)
NEUTROPHILS # BLD AUTO: 4.84 THOUSANDS/ΜL (ref 1.85–7.62)
NEUTS SEG NFR BLD AUTO: 60 % (ref 43–75)
NRBC BLD AUTO-RTO: 0 /100 WBCS
PLATELET # BLD AUTO: 223 THOUSANDS/UL (ref 149–390)
PMV BLD AUTO: 10.6 FL (ref 8.9–12.7)
POTASSIUM SERPL-SCNC: 4.2 MMOL/L (ref 3.5–5.3)
PROT SERPL-MCNC: 7.3 G/DL (ref 6.4–8.2)
RBC # BLD AUTO: 4.74 MILLION/UL (ref 3.88–5.62)
SODIUM SERPL-SCNC: 141 MMOL/L (ref 136–145)
TSH SERPL DL<=0.05 MIU/L-ACNC: 1.63 UIU/ML (ref 0.36–3.74)
WBC # BLD AUTO: 8.16 THOUSAND/UL (ref 4.31–10.16)

## 2018-04-12 PROCEDURE — 82043 UR ALBUMIN QUANTITATIVE: CPT

## 2018-04-12 PROCEDURE — 36415 COLL VENOUS BLD VENIPUNCTURE: CPT

## 2018-04-12 PROCEDURE — 84443 ASSAY THYROID STIM HORMONE: CPT

## 2018-04-12 PROCEDURE — 84154 ASSAY OF PSA FREE: CPT

## 2018-04-12 PROCEDURE — 82570 ASSAY OF URINE CREATININE: CPT

## 2018-04-12 PROCEDURE — 84153 ASSAY OF PSA TOTAL: CPT

## 2018-04-12 PROCEDURE — 85025 COMPLETE CBC W/AUTO DIFF WBC: CPT

## 2018-04-12 PROCEDURE — 80053 COMPREHEN METABOLIC PANEL: CPT

## 2018-04-12 PROCEDURE — 83721 ASSAY OF BLOOD LIPOPROTEIN: CPT

## 2018-04-13 LAB
PSA FREE MFR SERPL: 6.4 %
PSA FREE SERPL-MCNC: 0.09 NG/ML
PSA SERPL-MCNC: 1.4 NG/ML (ref 0–4)

## 2018-04-19 DIAGNOSIS — R97.20 ELEVATED PSA: Primary | ICD-10-CM

## 2018-05-29 DIAGNOSIS — F41.9 ANXIETY: ICD-10-CM

## 2018-05-29 RX ORDER — ALPRAZOLAM 0.5 MG/1
0.5 TABLET ORAL 3 TIMES DAILY PRN
Qty: 90 TABLET | Refills: 0 | Status: SHIPPED | OUTPATIENT
Start: 2018-05-29 | End: 2018-06-26 | Stop reason: SDUPTHER

## 2018-06-20 ENCOUNTER — OFFICE VISIT (OUTPATIENT)
Dept: UROLOGY | Facility: CLINIC | Age: 78
End: 2018-06-20
Payer: COMMERCIAL

## 2018-06-20 VITALS
HEART RATE: 80 BPM | DIASTOLIC BLOOD PRESSURE: 80 MMHG | BODY MASS INDEX: 29.77 KG/M2 | WEIGHT: 201 LBS | SYSTOLIC BLOOD PRESSURE: 130 MMHG | HEIGHT: 69 IN

## 2018-06-20 DIAGNOSIS — C61 PROSTATE CANCER (HCC): Primary | ICD-10-CM

## 2018-06-20 PROCEDURE — 99204 OFFICE O/P NEW MOD 45 MIN: CPT | Performed by: UROLOGY

## 2018-06-20 NOTE — PROGRESS NOTES
UROLOGY NEW CONSULT NOTE     CHIEF COMPLAINT   Diana Ribera is a 76 y o  male with a complaint of   Chief Complaint   Patient presents with    Prostate Cancer     Prostate Removed in 1997    Elevated PSA     PSA= 1 4 (4/12/18)     History of Present Illness:     76 y o  male s/p prostatectomy in the 1997 in [place]  I have the luxury of the patient's pathology report from his biopsy which demonstrated [place] 6 disease  I do not have his final pathologic evaluation for review  The patient reports he was told that the cancer was confined  Lymph nodes were sampled by his report and were negative as well  Patient had undetectable PSAs following surgery  He did developed urethral stricture disease and required dilations  He has not had any issues in the last 12 years  Patient also developed severe erectile dysfunction and underwent placement of a 3 piece penile implant  He only utilize this device once as he was unhappy with the desired rigidity  PSAs have been followed by the primary care team in recent years  4/12/18 PSA value 1 4  11/22/16 PSA 0 29  10/1/15 PSA 0 13  4/30/14 PSA 0 12  3/29/13 PSA 0 04      Past Medical History:     Past Medical History:   Diagnosis Date    Cancer ([address] )     Dementia     Disease of thyroid gland     Hypertension     Skin disorder     suspect benign, but will need removal and due to location, refer   Last assessed: April 18, 2013       PAST SURGICAL HISTORY:     Past Surgical History:   Procedure Laterality Date    CATARACT EXTRACTION      EYE SURGERY      KNEE SURGERY      PROSTATE SURGERY      VASECTOMY         CURRENT MEDICATIONS:     Current Outpatient Prescriptions   Medication Sig Dispense Refill    ALPRAZolam (XANAX) 0 5 mg tablet Take 1 tablet (0 5 mg total) by mouth 3 (three) times a day as needed (PRN) 90 tablet 0    aspirin 81 MG tablet Take by mouth      levothyroxine 88 mcg tablet Take 1 tablet (88 mcg total) by mouth daily 90 tablet 3    lisinopril-hydrochlorothiazide (PRINZIDE,ZESTORETIC) 20-25 MG per tablet Take 1 tablet by mouth daily 90 tablet 3    Multiple Vitamin (MULTI-VITAMIN DAILY PO) Take 1 tablet by mouth daily      pravastatin (PRAVACHOL) 40 mg tablet Take 1 tablet (40 mg total) by mouth daily 90 tablet 3    triamcinolone (KENALOG) 0 1 % ointment Apply topically      HYDROcodone-acetaminophen (NORCO) 7 5-325 mg per tablet Take 1 tablet by mouth every 6 (six) hours as needed for pain Max Daily Amount: 4 tablets 120 tablet 0     No current facility-administered medications for this visit  ALLERGIES:   No Known Allergies    SOCIAL HISTORY:     Social History     Social History    Marital status: /Civil Union     Spouse name: N/A    Number of children: N/A    Years of education: N/A     Social History Main Topics    Smoking status: Former Smoker    Smokeless tobacco: Never Used    Alcohol use No    Drug use: No      Comment: Chronic Narcotic use noted in "allscripts"     Sexual activity: Not Asked     Other Topics Concern    None     Social History Narrative    Caffeine use        SOCIAL HISTORY:     Family History   Problem Relation Age of Onset    Alcohol abuse Mother     Alcohol abuse Father     No Known Problems Sister     Stroke Brother         syndrome     No Known Problems Maternal Grandmother     No Known Problems Maternal Grandfather     No Known Problems Paternal Grandmother     No Known Problems Paternal Grandfather     Alcohol abuse Family        REVIEW OF SYSTEMS:     Review of Systems   Constitutional: Negative  Respiratory: Negative  Cardiovascular: Negative  Gastrointestinal: Negative  Genitourinary: Negative  Musculoskeletal: Negative  Skin: Negative  Neurological: Negative  Psychiatric/Behavioral: Negative            PHYSICAL EXAM:     /80   Pulse 80   Ht 5' 8 5" (1 74 m)   Wt 91 2 kg (201 lb)   BMI 30 12 kg/m²     General:  Healthy appearing male in no acute distress  They have a normal affect  There is not appear to be any gross neurologic defects or abnormalities  HEENT:  Normocephalic, atraumatic  Neck is supple without any palpable lymphadenopathy  Cardiovascular:  Patient has normal palpable distal radial pulses  There is no significant peripheral edema  No JVD is noted  Respiratory:  Patient has unlabored respirations  There is no audible wheeze or rhonchi  Abdomen:  Abdomen with healed RRP surgical scars  Abdomen is soft and nontender  There is no tympany  Inguinal and umbilical hernia are not appreciated  Genitourinary: Three piece penile implant in place with corporal cylinders palpable and scrotal pump palpable  There is no pain or inflammation around these devices  Musculoskeletal:  Patient does not have significant CVA tenderness in the  flank with palpation or percussion  They full range of motion in all 4 extremities  Strength in all 4 extremities appears congruent  Patient is able to ambulate without assistance or difficulty  Dermatologic:  Patient has no skin abnormalities or rashes  LABS:     CBC:   Lab Results   Component Value Date    WBC 8 16 04/12/2018    HGB 14 8 04/12/2018    HCT 42 0 04/12/2018    MCV 89 04/12/2018     04/12/2018       BMP:   Lab Results   Component Value Date    CALCIUM 9 4 04/12/2018     04/12/2018    K 4 2 04/12/2018    CO2 22 04/12/2018     (H) 04/12/2018    BUN 25 04/12/2018    CREATININE 1 23 04/12/2018 4/12/18 PSA value 1 4  11/22/16 PSA 0 29  10/1/15 PSA 0 13  4/30/14 PSA 0 12  3/29/13 PSA 0 04    IMAGING:     No  imaging    PATHOLOGY:     1997 Report of [place] 6 CaP on biopsy pathology      ASSESSMENT:     76 y o  male with prostate cancer s/p prior prostatectomy with PSA 1 4    PLAN:     The patient has biochemical recurrence of his prostate cancer which has been slowly increasing since 2013 when the PSA was 0 04      There is a question as to whether the recurrence is localized in the deep pelvis where his surgery was performed or has more distant at this time  Given the question of the site of recurrence and the possibility for either localized salvage or systemic therapy, I recommended an AXIUM PET scan  Once the scan is complete, the patient and I will sit down to discuss options for treatment  We briefly discussed salvage radiotherapy for pelvic recurrence or potentially systemic androgen deprivation therapy if the cancer is widespread  We will have to consider the patient's age and comorbidities before determining whether or not proceed with any additional treatment however given the rapid rise between 2016 and now, I am concerned that the prostate cancer would be impact full in the next 2-5 years of his life  Patient will return to clinic in the near future once his PET scan is completed

## 2018-06-26 ENCOUNTER — TELEPHONE (OUTPATIENT)
Dept: INTERNAL MEDICINE CLINIC | Facility: CLINIC | Age: 78
End: 2018-06-26

## 2018-06-26 DIAGNOSIS — F41.9 ANXIETY: ICD-10-CM

## 2018-06-26 RX ORDER — ALPRAZOLAM 0.5 MG/1
0.5 TABLET ORAL 3 TIMES DAILY PRN
Qty: 90 TABLET | Refills: 0 | Status: SHIPPED | OUTPATIENT
Start: 2018-06-26 | End: 2018-07-30 | Stop reason: SDUPTHER

## 2018-07-05 ENCOUNTER — HOSPITAL ENCOUNTER (OUTPATIENT)
Dept: RADIOLOGY | Age: 78
Discharge: HOME | End: 2018-07-05
Payer: COMMERCIAL

## 2018-07-05 VITALS — WEIGHT: 198 LBS | BODY MASS INDEX: 29.67 KG/M2

## 2018-07-05 DIAGNOSIS — C61 PROSTATE CANCER (HCC): ICD-10-CM

## 2018-07-05 PROCEDURE — A9588 FLUCICLOVINE F-18: HCPCS

## 2018-07-05 PROCEDURE — 78815 PET IMAGE W/CT SKULL-THIGH: CPT

## 2018-07-10 ENCOUNTER — OFFICE VISIT (OUTPATIENT)
Dept: UROLOGY | Facility: CLINIC | Age: 78
End: 2018-07-10
Payer: COMMERCIAL

## 2018-07-10 VITALS
WEIGHT: 199 LBS | SYSTOLIC BLOOD PRESSURE: 140 MMHG | RESPIRATION RATE: 20 BRPM | HEART RATE: 60 BPM | DIASTOLIC BLOOD PRESSURE: 70 MMHG | BODY MASS INDEX: 30.16 KG/M2 | HEIGHT: 68 IN

## 2018-07-10 DIAGNOSIS — C61 PROSTATE CANCER (HCC): Primary | ICD-10-CM

## 2018-07-10 DIAGNOSIS — C61 MALIGNANT NEOPLASM OF PROSTATE METASTATIC TO INTRAPELVIC LYMPH NODE (HCC): ICD-10-CM

## 2018-07-10 DIAGNOSIS — C77.5 MALIGNANT NEOPLASM OF PROSTATE METASTATIC TO INTRAPELVIC LYMPH NODE (HCC): ICD-10-CM

## 2018-07-10 PROCEDURE — 99214 OFFICE O/P EST MOD 30 MIN: CPT | Performed by: UROLOGY

## 2018-07-10 NOTE — PROGRESS NOTES
UROLOGY FOLLOW UP NOTE     CHIEF COMPLAINT   Allan Diego is a 76 y o  male with a complaint of   Chief Complaint   Patient presents with    Prostate Cancer     Review Pet Scan     History of Present Illness:     76 y o  male s/p prostatectomy in the 1997 in [place]  I have the luxury of the patient's pathology report from his biopsy which demonstrated [place] 6 disease  I do not have his final pathologic evaluation for review  The patient reports he was told that the cancer was confined  Lymph nodes were sampled by his report and were negative as well  Patient had undetectable PSAs following surgery  He did developed urethral stricture disease and required dilations  He has not had any issues in the last 12 years  Patient also developed severe erectile dysfunction and underwent placement of a 3 piece penile implant  He only utilize this device once as he was unhappy with the desired rigidity  PSAs have been followed by the primary care team in recent years  4/12/18 PSA value 1 4  11/22/16 PSA 0 29  10/1/15 PSA 0 13  4/30/14 PSA 0 12  3/29/13 PSA 0 04    Returns to discuss PET scan  Past Medical History:     Past Medical History:   Diagnosis Date    Cancer ([address] )     Dementia     Disease of thyroid gland     Hypertension     Skin disorder     suspect benign, but will need removal and due to location, refer   Last assessed: April 18, 2013       PAST SURGICAL HISTORY:     Past Surgical History:   Procedure Laterality Date    CATARACT EXTRACTION      EYE SURGERY      KNEE SURGERY      PROSTATE SURGERY      VASECTOMY         CURRENT MEDICATIONS:     Current Outpatient Prescriptions   Medication Sig Dispense Refill    ALPRAZolam (XANAX) 0 5 mg tablet Take 1 tablet (0 5 mg total) by mouth 3 (three) times a day as needed (PRN) 90 tablet 0    aspirin 81 MG tablet Take by mouth      levothyroxine 88 mcg tablet Take 1 tablet (88 mcg total) by mouth daily 90 tablet 3    lisinopril-hydrochlorothiazide (PRINZIDE,ZESTORETIC) 20-25 MG per tablet Take 1 tablet by mouth daily 90 tablet 3    Multiple Vitamin (MULTI-VITAMIN DAILY PO) Take 1 tablet by mouth daily      pravastatin (PRAVACHOL) 40 mg tablet Take 1 tablet (40 mg total) by mouth daily 90 tablet 3    HYDROcodone-acetaminophen (NORCO) 7 5-325 mg per tablet Take 1 tablet by mouth every 6 (six) hours as needed for pain Max Daily Amount: 4 tablets 120 tablet 0    triamcinolone (KENALOG) 0 1 % ointment Apply topically       No current facility-administered medications for this visit  ALLERGIES:   No Known Allergies    SOCIAL HISTORY:     Social History     Social History    Marital status: /Civil Union     Spouse name: N/A    Number of children: N/A    Years of education: N/A     Social History Main Topics    Smoking status: Former Smoker    Smokeless tobacco: Never Used    Alcohol use No    Drug use: No      Comment: Chronic Narcotic use noted in "allscripts"     Sexual activity: Not Asked     Other Topics Concern    None     Social History Narrative    Caffeine use        SOCIAL HISTORY:     Family History   Problem Relation Age of Onset    Alcohol abuse Mother     Alcohol abuse Father     No Known Problems Sister     Stroke Brother         syndrome     No Known Problems Maternal Grandmother     No Known Problems Maternal Grandfather     No Known Problems Paternal Grandmother     No Known Problems Paternal Grandfather     Alcohol abuse Family        REVIEW OF SYSTEMS:     Review of Systems   Constitutional: Negative  Respiratory: Negative  Cardiovascular: Negative  Gastrointestinal: Negative  Genitourinary: Negative  Musculoskeletal: Negative  Skin: Positive for rash (Intrigenous rash in the groin)  Neurological: Negative  Psychiatric/Behavioral: Negative            PHYSICAL EXAM:     /70   Pulse 60   Resp 20   Ht 5' 8" (1 727 m)   Wt 90 3 kg (199 lb)   BMI 30 26 kg/m²     General:  Healthy appearing male in no acute distress  They have a normal affect  There is not appear to be any gross neurologic defects or abnormalities  HEENT:  Normocephalic, atraumatic  Neck is supple without any palpable lymphadenopathy  Cardiovascular:  Patient has normal palpable distal radial pulses  There is no significant peripheral edema  No JVD is noted  Respiratory:  Patient has unlabored respirations  There is no audible wheeze or rhonchi  Abdomen:  Abdomen with healed RRP surgical scars  Abdomen is soft and nontender  There is no tympany  Inguinal and umbilical hernia are not appreciated  Genitourinary: Three piece penile implant in place with corporal cylinders palpable and scrotal pump palpable  There is no pain or inflammation around these devices  I do not palpate any significant lymphadenopathy in the groin bilaterally    Musculoskeletal:  Patient does not have significant CVA tenderness in the  flank with palpation or percussion  They full range of motion in all 4 extremities  Strength in all 4 extremities appears congruent  Patient is able to ambulate without assistance or difficulty  Dermatologic:  Patient has no skin abnormalities or rashes  LABS:     CBC:   Lab Results   Component Value Date    WBC 8 16 2018    HGB 14 8 2018    HCT 42 0 2018    MCV 89 2018     2018       BMP:   Lab Results   Component Value Date    CALCIUM 9 4 2018     2018    K 4 2 2018    CO2 22 2018     (H) 2018    BUN 25 2018    CREATININE 1 23 2018 PSA value 1 4  16 PSA 0 29  10/1/15 PSA 0 13  14 PSA 0 12  3/29/13 PSA 0 04    IMAGIN/5/18  AXUMIN PET/CT SCAN     INDICATION: Recurrent prostate cancer, increasing PSA  C61:  Malignant neoplasm of prostate     MODIFIER: PS      COMPARISON: None     CELL TYPE:  Prostate adenocarcinoma, prostate biopsy [place] 6     TECHNIQUE: 10 8 mCi F-18 Axumin administered IV  Multiplanar attenuation corrected and non-attenuation corrected PET images are available for interpretation, and contiguous, low dose, axial CT sections were obtained from the skull base through the   femurs  Intravenous contrast material was not utilized       FINDINGS:      VISUALIZED BRAIN:   No acute abnormalities are seen      HEAD/NECK:   There is a physiologic distribution of the radiotracer      CT images:  Depressed right lamina papyracea may be related to prior trauma      CHEST:   There is a physiologic distribution of the radiotracer  CT images: Scattered coronary artery calcifications  Heart is enlarged      ABDOMEN:   There is a physiologic distribution of the radiotracer  CT images:  A few small renal cysts      PELVIS:      Focal radiotracer uptake noted in a right pelvic sidewall lymph node, SUV max of 6 5  This lymph node measures 1 5 x 0 9 cm, image 261 series 3      Mild radiotracer uptake in a small left inguinal lymph node, SUV max of 1 7  This lymph node measures 1 3 x 0 6 cm, image 284 series 3      No focal radiotracer uptake at the prostate bed      CT images: Penile prosthesis noted with reservoir  Multiple surgical clips at the prostate bed and along the pelvic sidewalls      OSSEOUS STRUCTURES:  Small focus of radiotracer uptake in the right posterior acetabulum, SUV max of 2 7 cm, see image 268 series 12  No definite lesion seen on limited CT      Otherwise unremarkable  CT images: No significant findings      IMPRESSION:     1  Focal radiotracer uptake in a right pelvic sidewall lymph node compatible with metastasis  2  Mild radiotracer uptake in a small left inguinal lymph node, nonspecific  Recommend continued follow up  3  Small focus of radiotracer uptake in the right posterior acetabulum  No definite lesion here on CT but findings would be concerning for early osseous metastasis      PATHOLOGY:     1997 Report of [place] [facility] on biopsy pathology    ASSESSMENT:     76 y o  male with prostate cancer s/p prior prostatectomy with PSA 1 4    PLAN:     The patient has biochemical recurrence of his prostate cancer which has been slowly increasing since 2013 when the PSA was 0 04  PET scan demonstrates bobby positive and possible early bone metastasis  These appear pelvic  I would be interested to see if the patient would be a candidate for salvage radiotherapy to the pelvis which may include the pelvic lymph node as well as the bony pelvis  To further evaluate this bony lesion, I have ordered a formal bone scan  I have referred the patient to Radiation Oncology  Should the patient not be a candidate for radiation, we will consider androgen deprivation therapy        I will see the patient back in 4-6 weeks

## 2018-07-16 ENCOUNTER — CLINICAL SUPPORT (OUTPATIENT)
Dept: RADIATION ONCOLOGY | Facility: CLINIC | Age: 78
End: 2018-07-16
Payer: COMMERCIAL

## 2018-07-16 ENCOUNTER — RADIATION ONCOLOGY CONSULT (OUTPATIENT)
Dept: RADIATION ONCOLOGY | Facility: CLINIC | Age: 78
End: 2018-07-16
Attending: RADIOLOGY
Payer: COMMERCIAL

## 2018-07-16 VITALS
HEIGHT: 68 IN | TEMPERATURE: 98.7 F | RESPIRATION RATE: 18 BRPM | DIASTOLIC BLOOD PRESSURE: 78 MMHG | OXYGEN SATURATION: 97 % | BODY MASS INDEX: 30.37 KG/M2 | HEART RATE: 55 BPM | SYSTOLIC BLOOD PRESSURE: 142 MMHG | WEIGHT: 200.4 LBS

## 2018-07-16 DIAGNOSIS — C77.5 MALIGNANT NEOPLASM OF PROSTATE METASTATIC TO INTRAPELVIC LYMPH NODE (HCC): Primary | ICD-10-CM

## 2018-07-16 DIAGNOSIS — C61 PROSTATE CANCER (HCC): ICD-10-CM

## 2018-07-16 DIAGNOSIS — C61 MALIGNANT NEOPLASM OF PROSTATE METASTATIC TO INTRAPELVIC LYMPH NODE (HCC): Primary | ICD-10-CM

## 2018-07-16 PROCEDURE — 99215 OFFICE O/P EST HI 40 MIN: CPT | Performed by: RADIOLOGY

## 2018-07-16 NOTE — PROGRESS NOTES
Kell Gaspar  1940  Mr Sharp is a 76 y o  male    Chief Complaint   Patient presents with    Consult     radiation oncology       Cancer Staging  No matching staging information was found for the patient  Patient presents today for radiation oncology consult for recurrent prostate cancer  History of prostatectomy in 1997 in [place], [place] 6 disease  Undetectable PSA's following surgery  PSA's have been followed by his PCP  PSA Trends:  12/10/12  PSA 0 04  2/4/14      PSA 0 12  6/1/15      PSA 0 13  11/21/16  PSA 0 29  4/12/18    PSA 1 4      6/20/18 Urology consult with Dr Magaly Wild  Treatment options discussed, salvage radiotherapy for pelvic recurrence or potentially systemic androgen deprivation therapy if the cancer is widespread  7/5/18 AXUMIN PET/CT SCAN  IMPRESSION:   1  Focal radiotracer uptake in a right pelvic sidewall lymph node compatible with metastasis  2  Mild radiotracer uptake in a small left inguinal lymph node, nonspecific   Recommend continued follow up  3  Small focus of radiotracer uptake in the right posterior acetabulum   No definite lesion here on CT but findings would be concerning for early osseous metastasis  7/10/18 Dr Rajani Robbins f/u  Refer to radiation oncology for evaluation of salvage radiotherapy to the pelvis which may include the pelvic lymph node as well as the bony pelvis  Consider androgen deprivation therapy if not a candidate for radiation therapy  Bone scan ordered  Intermittent left groin soreness over the last 6-9 months        Future Appointments:  7/17/18 Bone scan  8/14/18 Dr Rajani Robbins         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 [facility]    1997 Initial Diagnosis     Prostate cancer         1997 Surgery     Prostatectomy in [place], [place] 6 disease         7/5/2018 Initial Diagnosis     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 Additional evaluation for depression, suicide risk assesment, referral to PCP or psychiatry, medication started:  n/a    Advanced Care Planning for Patients >65 years  Advanced Care Planning Discussed:  yes  Patient named surrogate decision maker or care plan in chart: yes      Health Maintenance   Topic Date Due    SLP PLAN OF CARE  1940    GLAUCOMA SCREENING 65 + YR  10/19/2005    INFLUENZA VACCINE  09/01/2018    Fall Risk  03/20/2019    Depression Screening PHQ-9  03/20/2019    DTaP,Tdap,and Td Vaccines (2 - Td) 05/01/2024    PNEUMOCOCCAL POLYSACCHARIDE VACCINE AGE 72 AND OVER  Completed       Patient Active Problem List   Diagnosis    Alzheimers disease    Chronic shoulder pain    Vesicular palmoplantar eczema    Eczema    Generalized anxiety disorder    Hyperlipidemia    Hypertension    Hypothyroidism    Osteoarthritis    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 ([address] )     Past Medical History:   Diagnosis Date    Cancer ([address] )     Dementia     Disease of thyroid gland     Eczema     Hypertension     Prostate cancer ([address] )     Skin disorder     suspect benign, but will need removal and due to location, refer   Last assessed: April 18, 2013     Past Surgical History:   Procedure Laterality Date    CATARACT EXTRACTION      COLONOSCOPY      EYE SURGERY      KNEE SURGERY      PROSTATE SURGERY      VASECTOMY       Family History   Problem Relation Age of Onset    Alcohol abuse Mother     Alcohol abuse Father     Depression Father     No Known Problems Sister     Stroke Brother         syndrome     No Known Problems Maternal Grandmother     No Known Problems Maternal Grandfather     No Known Problems Paternal Grandmother     No Known Problems Paternal Grandfather     Alcohol abuse Family     Colon cancer Maternal Aunt      Social History     Social History    Marital status: /Civil Union     Spouse name: N/A    Number of children: N/A    Years of education: N/A     Occupational History    Not on file  Social History Main Topics    Smoking status: Former Smoker     Types: Cigars    Smokeless tobacco: Never Used      Comment: smokes 1 cigar every 3 months    Alcohol use No    Drug use: No      Comment: Chronic Narcotic use noted in "allscripts"     Sexual activity: Not on file     Other Topics Concern    Not on file     Social History Narrative    Caffeine use        Current Outpatient Prescriptions:     ALPRAZolam (XANAX) 0 5 mg tablet, Take 1 tablet (0 5 mg total) by mouth 3 (three) times a day as needed (PRN), Disp: 90 tablet, Rfl: 0    aspirin 81 MG tablet, Take by mouth, Disp: , Rfl:     levothyroxine 88 mcg tablet, Take 1 tablet (88 mcg total) by mouth daily, Disp: 90 tablet, Rfl: 3    lisinopril-hydrochlorothiazide (PRINZIDE,ZESTORETIC) 20-25 MG per tablet, Take 1 tablet by mouth daily, Disp: 90 tablet, Rfl: 3    Multiple Vitamin (MULTI-VITAMIN DAILY PO), Take 1 tablet by mouth daily, Disp: , Rfl:     pravastatin (PRAVACHOL) 40 mg tablet, Take 1 tablet (40 mg total) by mouth daily, Disp: 90 tablet, Rfl: 3    triamcinolone (KENALOG) 0 1 % ointment, Apply topically, Disp: , Rfl:     No Known Allergies    Review of Systems:  Review of Systems   Constitutional: Negative  HENT: Negative  Eyes: Negative  Respiratory: Negative  Cardiovascular: Negative  Gastrointestinal: Negative  Endocrine: Negative  Genitourinary: Positive for frequency (every 2 hours)  Nocturia x1   Penile implant  Musculoskeletal: Negative  Skin: Negative  Allergic/Immunologic: Negative  Neurological: Negative  Hematological: Bruises/bleeds easily  Psychiatric/Behavioral: Negative  Vitals:    07/16/18 1341   BP: 142/78   Pulse: 55   Resp: 18   Temp: 98 7 °F (37 1 °C)   TempSrc: Temporal   SpO2: 97%   Weight: 90 9 kg (200 lb 6 4 oz)   Height: 5' 8" (1 727 m)       Pain Score: 0-No pain    Imaging:Nm Pet Ct Skull Base To Mid Thigh    Result Date: 7/5/2018  Narrative: AXUMIN PET/CT SCAN INDICATION: Recurrent prostate cancer, increasing PSA  C61: Malignant neoplasm of prostate MODIFIER: PS COMPARISON: None CELL TYPE:  Prostate adenocarcinoma, prostate biopsy [place] TECHNIQUE: 10 8 mCi F-18 Axumin administered IV  Multiplanar attenuation corrected and non-attenuation corrected PET images are available for interpretation, and contiguous, low dose, axial CT sections were obtained from the skull base through the femurs  Intravenous contrast material was not utilized  FINDINGS: VISUALIZED BRAIN: No acute abnormalities are seen  HEAD/NECK: There is a physiologic distribution of the radiotracer  CT images:  Depressed right lamina papyracea may be related to prior trauma  CHEST: There is a physiologic distribution of the radiotracer  CT images: Scattered coronary artery calcifications  Heart is enlarged  ABDOMEN: There is a physiologic distribution of the radiotracer  CT images:  A few small renal cysts  PELVIS: Focal radiotracer uptake noted in a right pelvic sidewall lymph node, SUV max of 6 5  This lymph node measures 1 5 x 0 9 cm, image 261 series 3  Mild radiotracer uptake in a small left inguinal lymph node, SUV max of 1 7  This lymph node measures 1 3 x 0 6 cm, image 284 series 3  No focal radiotracer uptake at the prostate bed  CT images: Penile prosthesis noted with reservoir  Multiple surgical clips at the prostate bed and along the pelvic sidewalls   OSSEOUS STRUCTURES: Small focus of radiotracer uptake in the right posterior acetabulum, SUV max of 2 7 cm, see image 268 series 12  No definite lesion seen on limited CT  Otherwise unremarkable  CT images: No significant findings  Impression: 1  Focal radiotracer uptake in a right pelvic sidewall lymph node compatible with metastasis  2  Mild radiotracer uptake in a small left inguinal lymph node, nonspecific  Recommend continued follow up  3  Small focus of radiotracer uptake in the right posterior acetabulum  No definite lesion here on CT but findings would be concerning for early osseous metastasis  Workstation performed: [id number]       Teaching:  [facility] RT packet with RT to pelvis education provided, including simulation and daily radiation therapy prep sheet

## 2018-07-16 NOTE — PROGRESS NOTES
Consultation - Radiation Oncology     [facility]:[id number] : 1940  Encounter: [id number]  Patient Information: Jabari Merchant      CHIEF COMPLAINT  Chief Complaint   Patient presents with    Consult     radiation oncology     Cancer Staging  Stage IV, T2 N1 M1b, [place]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         History of Present Illness   Jabari Merchant is a 76y o  year old male who presents today for radiation oncology consult for recurrent prostate cancer       History of prostatectomy in  in [place] by Dr Monica Flores, [place] 6 disease  Undetectable PSA's following surgery  He did not require nor receive any postoperative radiation therapy  He did have a penile prosthesis placed in  and the pump stopped working about 8 years ago  He moved from [place] to [place] 13 years ago and has been following with Dr Abigail Hoffman  PSA's have been followed by Dr Abigail Hoffman       PSA Trends:  12/10/12  PSA 0 04  14      PSA 0 12  6/1/15      PSA 0 13  16  PSA 0 29  18    PSA 1 4     18 Urology consult with Dr Zacarias Hernandez  Treatment options discussed, salvage radiotherapy for pelvic recurrence or potentially systemic androgen deprivation therapy if the cancer is widespread       18 AXUMIN PET/CT SCAN  IMPRESSION:   1  Focal radiotracer uptake in a right pelvic sidewall lymph node compatible with metastasis  2  Mild radiotracer uptake in a small left inguinal lymph node, nonspecific   Recommend continued follow up  3  Small focus of radiotracer uptake in the right posterior acetabulum   No definite lesion here on CT but findings would be concerning for early osseous metastasis      7/10/18 Dr Beck Salcedo f/u  Refer to radiation oncology for evaluation of salvage radiotherapy to the pelvis which may include the pelvic lymph node as well as the bony pelvis     Consider androgen deprivation therapy if not a candidate for radiation therapy     Bone scan ordered      Intermittent left groin soreness over the last 6-9 months  He denies any history of hernia  He denies any trauma or falls  He remains active walking 3 miles a day with his dog and also takes care of his yard including mowing his grass  He is seen with his wife today  He has nocturia once each night  He has no difficulty with any urinary incontinence  He does not have to wear any incontinence garments  He has urgency every 2 hours but this is normal for him because he drinks quite a bit of water daily  He has 5 children with 3 sons who are over 48 and 2 daughters  All of his children are healthy and his sons have had their prostate's checked  He has a twin sister who is alive and well  He has a step brother who  of a CVA  His father  at the age of 45 from suicide  His mother  in her 68Z from complications of alcohol abuse        Future Appointments:  18 Bone scan  18 Dr Dori Lopez      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 ([facility] [address] )     Initial Diagnosis     Prostate cancer          Surgery     Prostatectomy in [place], [place] 6 disease         2018 Initial Diagnosis     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    Additional evaluation for depression, suicide risk assesment, referral to PCP or psychiatry, medication started:  n/a     Advanced Care Planning for Patients >65 years  Advanced Care Planning Discussed:  yes  Patient named surrogate decision maker or care plan in chart: yes    Past Medical History:   Diagnosis Date    Cancer ([facility] [address] [facility]     Dementia     Disease of thyroid gland     Eczema     Hypertension     Prostate cancer ([facility] [address] [facility]     Skin disorder     suspect benign, but will need removal and due to location, refer   Last assessed: April 18, 2013     Past Surgical History:   Procedure Laterality Date    CATARACT EXTRACTION      COLONOSCOPY      EYE SURGERY      KNEE SURGERY      PROSTATE SURGERY      VASECTOMY         Family History   Problem Relation Age of Onset    Alcohol abuse Mother     Alcohol abuse Father     Depression Father     No Known Problems Sister     Stroke Brother         syndrome     No Known Problems Maternal Grandmother     No Known Problems Maternal Grandfather     No Known Problems Paternal Grandmother     No Known Problems Paternal Grandfather     Alcohol abuse Family     Colon cancer Maternal Aunt        Social History   History   Alcohol Use No     History   Drug Use No     Comment: Chronic Narcotic use noted in "allscripts"      History   Smoking Status    Former Smoker    Types: Cigars   Smokeless Tobacco    Never Used     Comment: smokes 1 cigar every 3 months     Meds/Allergies     Current Outpatient Prescriptions:     ALPRAZolam (XANAX) 0 5 mg tablet, Take 1 tablet (0 5 mg total) by mouth 3 (three) times a day as needed (PRN), Disp: 90 tablet, Rfl: 0    aspirin 81 MG tablet, Take by mouth, Disp: , Rfl:     levothyroxine 88 mcg tablet, Take 1 tablet (88 mcg total) by mouth daily, Disp: 90 tablet, Rfl: 3    lisinopril-hydrochlorothiazide (PRINZIDE,ZESTORETIC) 20-25 MG per tablet, Take 1 tablet by mouth daily, Disp: 90 tablet, Rfl: 3    Multiple Vitamin (MULTI-VITAMIN DAILY PO), Take 1 tablet by mouth daily, Disp: , Rfl:     pravastatin (PRAVACHOL) 40 mg tablet, Take 1 tablet (40 mg total) by mouth daily, Disp: 90 tablet, Rfl: 3    triamcinolone (KENALOG) 0 1 % ointment, Apply topically, Disp: , Rfl:   No Known Allergies     Review of Systems  Constitutional: Negative  HENT: Negative  Eyes: Negative  Respiratory: Negative  Cardiovascular: Negative  Gastrointestinal: Negative  Endocrine: Negative  Genitourinary: Positive for frequency (every 2 hours)  Nocturia x1  Penile implant  Musculoskeletal: Negative  Skin: Negative  Allergic/Immunologic: Negative  Neurological: Negative  Hematological: Bruises/bleeds easily  Psychiatric/Behavioral: Negative  OBJECTIVE:   /78   Pulse 55   Temp 98 7 °F (37 1 °C) (Temporal)   Resp 18   Ht 5' 8" (1 727 m)   Wt 90 9 kg (200 lb 6 4 oz)   SpO2 97%   BMI 30 47 kg/m²   Pain Assessment:  0  Performance Status: ECOG/Zubrod/WHO: 0 - Asymptomatic    Physical Exam   Constitutional: He is oriented to person, place, and time  He appears well-developed and well-nourished  No distress  HENT:   Head: Normocephalic and atraumatic  Mouth/Throat: No oropharyngeal exudate  Eyes: Conjunctivae and EOM are normal  Pupils are equal, round, and reactive to light  No scleral icterus  Neck: Normal range of motion  Neck supple  No tracheal deviation present  No thyromegaly present  Cardiovascular: Normal rate, regular rhythm and normal heart sounds  Pulmonary/Chest: Effort normal and breath sounds normal  No respiratory distress  He has no wheezes  He has no rales  He exhibits no tenderness  Abdominal: Soft  Bowel sounds are normal  He exhibits no distension and no mass  There is no tenderness  There is no rebound and no guarding  Genitourinary: Rectum normal  No penile tenderness  Genitourinary Comments: Prostate bed without any nodules  Penile prosthesis intact with scrotal pump/valve and reservior in left inguinal/ lower abdominal wall area  Musculoskeletal: Normal range of motion  He exhibits no edema or tenderness  Lymphadenopathy:     He has no cervical adenopathy  He has no axillary adenopathy  Right: No inguinal and no supraclavicular adenopathy present  Left: No inguinal and no supraclavicular adenopathy present  Neurological: He is alert and oriented to person, place, and time  No cranial nerve deficit  Coordination normal    Skin: Skin is warm and dry  No rash noted  He is not diaphoretic  No erythema  No pallor  Psychiatric: He has a normal mood and affect  His behavior is normal  Judgment and thought content normal    Nursing note and vitals reviewed  RESULTS  Lab Results  No results found for: PSA      Imaging Studies  Nm Pet Ct Skull Base To Mid Thigh    Result Date: 7/5/2018  Narrative: AXUMIN PET/CT SCAN INDICATION: Recurrent prostate cancer, increasing PSA  C61: Malignant neoplasm of prostate MODIFIER: PS COMPARISON: None CELL TYPE:  Prostate adenocarcinoma, prostate biopsy [place] 6 TECHNIQUE: 10 8 mCi F-18 Axumin administered IV  Multiplanar attenuation corrected and non-attenuation corrected PET images are available for interpretation, and contiguous, low dose, axial CT sections were obtained from the skull base through the femurs  Intravenous contrast material was not utilized  FINDINGS: VISUALIZED BRAIN: No acute abnormalities are seen  HEAD/NECK: There is a physiologic distribution of the radiotracer  CT images:  Depressed right lamina papyracea may be related to prior trauma  CHEST: There is a physiologic distribution of the radiotracer  CT images: Scattered coronary artery calcifications  Heart is enlarged  ABDOMEN: There is a physiologic distribution of the radiotracer  CT images:  A few small renal cysts  PELVIS: Focal radiotracer uptake noted in a right pelvic sidewall lymph node, SUV max of 6 5  This lymph node measures 1 5 x 0 9 cm, image 261 series 3  Mild radiotracer uptake in a small left inguinal lymph node, SUV max of 1 7  This lymph node measures 1 3 x 0 6 cm, image 284 series 3  No focal radiotracer uptake at the prostate bed   CT images: Penile prosthesis noted with reservoir  Multiple surgical clips at the prostate bed and along the pelvic sidewalls  OSSEOUS STRUCTURES: Small focus of radiotracer uptake in the right posterior acetabulum, SUV max of 2 7 cm, see image 268 series 12  No definite lesion seen on limited CT  Otherwise unremarkable  CT images: No significant findings  Impression: 1  Focal radiotracer uptake in a right pelvic sidewall lymph node compatible with metastasis  2  Mild radiotracer uptake in a small left inguinal lymph node, nonspecific  Recommend continued follow up  3  Small focus of radiotracer uptake in the right posterior acetabulum  No definite lesion here on CT but findings would be concerning for early osseous metastasis  Workstation performed: [id number]     Pathology: See Above    ASSESSMENT  1  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)  Radiation Simulation Treatment     Cancer Staging  Stage IV, T2 N1 M1b, [place]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      Risk Category: moderate  Bone Scan: yes  Androgen Deprivation Therapy: no      PLAN/DISCUSSION  Orders Placed This Encounter   Procedures    Radiation Simulation Treatment          Ciara Dorado is a 76y o  year old male who presents today for radiation oncology consult for recurrent prostate cancer  He has a history of of prostatectomy in 1997 in [place] by Dr Jose Chavez, for [place] 6 disease  He had undetectable PSA's following surgery  He did not require nor receive any postoperative radiation therapy  He did have a penile prosthesis placed in 2000 and the pump stopped working about 8 years ago  He has had a slowly rising PSA level over the last several years with PSA now 1 4 NG/mL as of April 12, 2018    He was seen by Dr Annika Hawkins who ordered an [address] [address]  [facility] PET-CT scan July 5, 2018 that shows increased uptake in the right pelvic sidewall lymph node consistent with metastatic disease in addition to a small focus of uptake in the right posterior acetabulum that is suspicious for metastasis without any definite lesions seen on CT scan  He has bone scan scheduled for July 17, 2018 for further evaluation  He appears to have 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 involving the right pelvic sidewall lymph node and a suspicious focus in the right posterior acetabulum  There does not appear to be any specific areas of recurrence within the prostate bed and there were no distant sites  He is a good candidate for salvage radiation therapy because all of his disease can be encompassed in a radiation field which would include his whole pelvis including the right pelvic lymphadenopathy, prostate bed and right acetabular region  This would be assuming his bone scan is positive for metastatic disease in the posterior right acetabulum as I suspect this will be  He will return July 19, 2018 for simulation and treatment planning purposes  We also did discussed androgen deprivation therapy but will plan to use this treatment later should he fail radiation therapy  Valerie Sebastian MD  7/16/2018,2:57 PM      Portions of the record may have been created with voice recognition software   Occasional wrong word or "sound a like" substitutions may have occurred due to the inherent limitations of voice recognition software   Read the chart carefully and recognize, using context, where substitutions have occurred

## 2018-07-17 ENCOUNTER — HOSPITAL ENCOUNTER (OUTPATIENT)
Dept: NUCLEAR MEDICINE | Facility: HOSPITAL | Age: 78
Discharge: HOME | End: 2018-07-17
Attending: UROLOGY
Payer: COMMERCIAL

## 2018-07-17 DIAGNOSIS — C61 PROSTATE CANCER (HCC): ICD-10-CM

## 2018-07-17 PROCEDURE — A9503 TC99M MEDRONATE: HCPCS

## 2018-07-17 PROCEDURE — 78306 BONE IMAGING WHOLE BODY: CPT

## 2018-07-19 ENCOUNTER — APPOINTMENT (OUTPATIENT)
Dept: RADIATION ONCOLOGY | Facility: CLINIC | Age: 78
End: 2018-07-19
Attending: RADIOLOGY
Payer: COMMERCIAL

## 2018-07-19 PROCEDURE — 77334 RADIATION TREATMENT AID(S): CPT | Performed by: RADIOLOGY

## 2018-07-30 DIAGNOSIS — F41.9 ANXIETY: ICD-10-CM

## 2018-07-31 ENCOUNTER — TELEPHONE (OUTPATIENT)
Dept: INTERNAL MEDICINE CLINIC | Facility: CLINIC | Age: 78
End: 2018-07-31

## 2018-07-31 PROCEDURE — 77301 RADIOTHERAPY DOSE PLAN IMRT: CPT | Performed by: RADIOLOGY

## 2018-07-31 PROCEDURE — 77300 RADIATION THERAPY DOSE PLAN: CPT | Performed by: RADIOLOGY

## 2018-07-31 PROCEDURE — 77338 DESIGN MLC DEVICE FOR IMRT: CPT | Performed by: RADIOLOGY

## 2018-07-31 RX ORDER — ALPRAZOLAM 0.5 MG/1
0.5 TABLET ORAL 3 TIMES DAILY PRN
Qty: 90 TABLET | Refills: 0 | Status: SHIPPED | OUTPATIENT
Start: 2018-07-31 | End: 2018-11-08

## 2018-07-31 RX ORDER — ALPRAZOLAM 0.5 MG/1
0.5 TABLET ORAL 3 TIMES DAILY PRN
Qty: 90 TABLET | Refills: 0 | Status: SHIPPED | OUTPATIENT
Start: 2018-07-31 | End: 2018-08-27 | Stop reason: SDUPTHER

## 2018-07-31 NOTE — TELEPHONE ENCOUNTER
From: Sally Tidwell  Sent: 7/30/2018 5:03 PM EDT  Subject: Medication Renewal Request    Dexter Sharp would like a refill of the following medications:     ALPRAZolam (XANAX) 0 5 mg tablet STACEY Caal    Preferred pharmacy: [id number]

## 2018-07-31 NOTE — TELEPHONE ENCOUNTER
----- Message from Yousuf Sharp sent at 7/30/2018  5:07 PM EDT -----  Regarding: Prescription Question  Contact: [phone]  Can I get alprazolam 90 pills of  5  at [facility]

## 2018-07-31 NOTE — TELEPHONE ENCOUNTER
From: Diana Ribera  Sent: 7/30/2018 5:02 PM EDT  Subject: Medication Renewal Request    Dexter Sharp would like a refill of the following medications:     ALPRAZolam (XANAX) 0 5 mg tablet Didi Orozco DO]   Patient Comment: Karyle Query    Preferred pharmacy: [id number]

## 2018-08-01 ENCOUNTER — APPOINTMENT (OUTPATIENT)
Dept: RADIATION ONCOLOGY | Facility: CLINIC | Age: 78
End: 2018-08-01
Attending: RADIOLOGY
Payer: COMMERCIAL

## 2018-08-01 PROCEDURE — 77280 THER RAD SIMULAJ FIELD SMPL: CPT | Performed by: RADIOLOGY

## 2018-08-02 DIAGNOSIS — C61 MALIGNANT NEOPLASM OF PROSTATE (HCC): Primary | ICD-10-CM

## 2018-08-02 PROCEDURE — 77385 HB NTSTY MODUL RAD TX DLVR SMPL: CPT | Performed by: RADIOLOGY

## 2018-08-03 PROCEDURE — 77385 HB NTSTY MODUL RAD TX DLVR SMPL: CPT | Performed by: RADIOLOGY

## 2018-08-06 ENCOUNTER — APPOINTMENT (OUTPATIENT)
Dept: LAB | Facility: CLINIC | Age: 78
End: 2018-08-06
Attending: RADIOLOGY
Payer: COMMERCIAL

## 2018-08-06 DIAGNOSIS — C61 MALIGNANT NEOPLASM OF PROSTATE (HCC): ICD-10-CM

## 2018-08-06 LAB
ERYTHROCYTE [DISTWIDTH] IN BLOOD BY AUTOMATED COUNT: 13.9 % (ref 11.6–15.1)
GRANULOCYTES NFR BLD AUTO: 62.3 % (ref 47–80)
GRANULOCYTES NFR BLD: 4.5 THOUSAND/ΜL (ref 1.85–7.82)
HCT VFR BLD AUTO: 43.1 % (ref 36.5–49.3)
HGB BLD-MCNC: 14.4 G/DL (ref 12–17)
LYMPHOCYTES # BLD AUTO: 2.2 THOUSANDS/ΜL (ref 0.6–4.47)
LYMPHOCYTES NFR BLD AUTO: 30 % (ref 14–44)
MCH RBC QN AUTO: 31.5 PG (ref 26.8–34.3)
MCHC RBC AUTO-ENTMCNC: 33.3 G/DL (ref 31.4–37.4)
MCV RBC AUTO: 95 FL (ref 82–98)
MONOCYTES # BLD AUTO: 0.5 THOUSAND/ΜL (ref 0.17–1.22)
MONOCYTES NFR BLD AUTO: 7 % (ref 4–12)
PLATELET # BLD AUTO: 247 THOUSANDS/UL (ref 149–390)
PMV BLD AUTO: 9.5 FL (ref 8.9–12.7)
PSA SERPL-MCNC: 1.7 NG/ML (ref 0–4)
RBC # BLD AUTO: 4.56 MILLION/UL (ref 3.88–5.62)
WBC # BLD AUTO: 7.2 THOUSAND/UL (ref 4.31–10.16)
WBC NRBC COR # BLD: 7.2 THOUSAND/UL (ref 4.31–10.16)

## 2018-08-06 PROCEDURE — 84153 ASSAY OF PSA TOTAL: CPT

## 2018-08-06 PROCEDURE — 85025 COMPLETE CBC W/AUTO DIFF WBC: CPT

## 2018-08-06 PROCEDURE — 77385 HB NTSTY MODUL RAD TX DLVR SMPL: CPT | Performed by: RADIOLOGY

## 2018-08-06 PROCEDURE — 36415 COLL VENOUS BLD VENIPUNCTURE: CPT

## 2018-08-07 PROCEDURE — 77385 HB NTSTY MODUL RAD TX DLVR SMPL: CPT | Performed by: RADIOLOGY

## 2018-08-08 PROCEDURE — 77385 HB NTSTY MODUL RAD TX DLVR SMPL: CPT | Performed by: RADIOLOGY

## 2018-08-08 PROCEDURE — 77336 RADIATION PHYSICS CONSULT: CPT | Performed by: RADIOLOGY

## 2018-08-09 PROCEDURE — 77385 HB NTSTY MODUL RAD TX DLVR SMPL: CPT | Performed by: RADIOLOGY

## 2018-08-10 PROCEDURE — 77385 HB NTSTY MODUL RAD TX DLVR SMPL: CPT | Performed by: RADIOLOGY

## 2018-08-13 PROCEDURE — 77385 HB NTSTY MODUL RAD TX DLVR SMPL: CPT | Performed by: RADIOLOGY

## 2018-08-14 PROCEDURE — 77385 HB NTSTY MODUL RAD TX DLVR SMPL: CPT | Performed by: RADIOLOGY

## 2018-08-15 PROCEDURE — 77336 RADIATION PHYSICS CONSULT: CPT | Performed by: RADIOLOGY

## 2018-08-15 PROCEDURE — 77385 HB NTSTY MODUL RAD TX DLVR SMPL: CPT | Performed by: RADIOLOGY

## 2018-08-16 PROCEDURE — 77385 HB NTSTY MODUL RAD TX DLVR SMPL: CPT | Performed by: RADIOLOGY

## 2018-08-17 PROCEDURE — 77385 HB NTSTY MODUL RAD TX DLVR SMPL: CPT | Performed by: RADIOLOGY

## 2018-08-20 ENCOUNTER — APPOINTMENT (OUTPATIENT)
Dept: LAB | Facility: CLINIC | Age: 78
End: 2018-08-20
Attending: RADIOLOGY
Payer: COMMERCIAL

## 2018-08-20 PROCEDURE — 77385 HB NTSTY MODUL RAD TX DLVR SMPL: CPT | Performed by: RADIOLOGY

## 2018-08-21 PROCEDURE — 77385 HB NTSTY MODUL RAD TX DLVR SMPL: CPT | Performed by: RADIOLOGY

## 2018-08-22 PROCEDURE — 77385 HB NTSTY MODUL RAD TX DLVR SMPL: CPT | Performed by: RADIOLOGY

## 2018-08-22 PROCEDURE — 77336 RADIATION PHYSICS CONSULT: CPT | Performed by: RADIOLOGY

## 2018-08-23 PROCEDURE — 77385 HB NTSTY MODUL RAD TX DLVR SMPL: CPT | Performed by: RADIOLOGY

## 2018-08-24 PROCEDURE — 77385 HB NTSTY MODUL RAD TX DLVR SMPL: CPT | Performed by: RADIOLOGY

## 2018-08-27 DIAGNOSIS — F41.9 ANXIETY: ICD-10-CM

## 2018-08-27 RX ORDER — ALPRAZOLAM 0.5 MG/1
0.5 TABLET ORAL 3 TIMES DAILY PRN
Qty: 90 TABLET | Refills: 0 | Status: SHIPPED | OUTPATIENT
Start: 2018-08-27 | End: 2018-09-28 | Stop reason: SDUPTHER

## 2018-08-28 PROCEDURE — 77385 HB NTSTY MODUL RAD TX DLVR SMPL: CPT | Performed by: RADIOLOGY

## 2018-08-29 PROCEDURE — 77385 HB NTSTY MODUL RAD TX DLVR SMPL: CPT | Performed by: RADIOLOGY

## 2018-08-30 PROCEDURE — 77385 HB NTSTY MODUL RAD TX DLVR SMPL: CPT | Performed by: RADIOLOGY

## 2018-08-30 PROCEDURE — 77336 RADIATION PHYSICS CONSULT: CPT | Performed by: RADIOLOGY

## 2018-08-31 PROCEDURE — 77385 HB NTSTY MODUL RAD TX DLVR SMPL: CPT | Performed by: RADIOLOGY

## 2018-09-04 ENCOUNTER — APPOINTMENT (OUTPATIENT)
Dept: LAB | Facility: CLINIC | Age: 78
End: 2018-09-04
Attending: RADIOLOGY
Payer: COMMERCIAL

## 2018-09-04 ENCOUNTER — RADIATION THERAPY TREATMENT (OUTPATIENT)
Dept: RADIATION ONCOLOGY | Facility: CLINIC | Age: 78
End: 2018-09-04
Attending: RADIOLOGY
Payer: COMMERCIAL

## 2018-09-04 PROCEDURE — 77338 DESIGN MLC DEVICE FOR IMRT: CPT | Performed by: RADIOLOGY

## 2018-09-04 PROCEDURE — 77300 RADIATION THERAPY DOSE PLAN: CPT | Performed by: RADIOLOGY

## 2018-09-04 PROCEDURE — 77385 HB NTSTY MODUL RAD TX DLVR SMPL: CPT | Performed by: RADIOLOGY

## 2018-09-05 PROCEDURE — 77385 HB NTSTY MODUL RAD TX DLVR SMPL: CPT | Performed by: RADIOLOGY

## 2018-09-06 PROCEDURE — 77385 HB NTSTY MODUL RAD TX DLVR SMPL: CPT | Performed by: RADIOLOGY

## 2018-09-07 PROCEDURE — 77336 RADIATION PHYSICS CONSULT: CPT | Performed by: RADIOLOGY

## 2018-09-07 PROCEDURE — 77385 HB NTSTY MODUL RAD TX DLVR SMPL: CPT | Performed by: RADIOLOGY

## 2018-09-10 PROCEDURE — 77385 HB NTSTY MODUL RAD TX DLVR SMPL: CPT | Performed by: RADIOLOGY

## 2018-09-11 PROCEDURE — 77385 HB NTSTY MODUL RAD TX DLVR SMPL: CPT | Performed by: RADIOLOGY

## 2018-09-12 PROCEDURE — 77385 HB NTSTY MODUL RAD TX DLVR SMPL: CPT | Performed by: RADIOLOGY

## 2018-09-13 PROCEDURE — 77385 HB NTSTY MODUL RAD TX DLVR SMPL: CPT | Performed by: RADIOLOGY

## 2018-09-14 PROCEDURE — 77336 RADIATION PHYSICS CONSULT: CPT | Performed by: RADIOLOGY

## 2018-09-14 PROCEDURE — 77385 HB NTSTY MODUL RAD TX DLVR SMPL: CPT | Performed by: RADIOLOGY

## 2018-09-17 ENCOUNTER — APPOINTMENT (OUTPATIENT)
Dept: LAB | Facility: CLINIC | Age: 78
End: 2018-09-17
Attending: RADIOLOGY
Payer: COMMERCIAL

## 2018-09-17 ENCOUNTER — TRANSCRIBE ORDERS (OUTPATIENT)
Dept: LAB | Facility: CLINIC | Age: 78
End: 2018-09-17

## 2018-09-17 DIAGNOSIS — C61 MALIGNANT NEOPLASM OF PROSTATE (HCC): ICD-10-CM

## 2018-09-17 DIAGNOSIS — C61 MALIGNANT NEOPLASM OF PROSTATE (HCC): Primary | ICD-10-CM

## 2018-09-17 LAB
ERYTHROCYTE [DISTWIDTH] IN BLOOD BY AUTOMATED COUNT: 15.1 % (ref 11.6–15.1)
GRANULOCYTES NFR BLD AUTO: 76.3 % (ref 47–80)
GRANULOCYTES NFR BLD: 4.6 THOUSAND/ΜL (ref 1.85–7.82)
HCT VFR BLD AUTO: 41 % (ref 36.5–49.3)
HGB BLD-MCNC: 13 G/DL (ref 12–17)
LYMPHOCYTES # BLD AUTO: 0.9 THOUSANDS/ΜL (ref 0.6–4.47)
LYMPHOCYTES NFR BLD AUTO: 15 % (ref 14–44)
MCH RBC QN AUTO: 30.4 PG (ref 26.8–34.3)
MCHC RBC AUTO-ENTMCNC: 31.8 G/DL (ref 31.4–37.4)
MCV RBC AUTO: 96 FL (ref 82–98)
MONOCYTES # BLD AUTO: 0.5 THOUSAND/ΜL (ref 0.17–1.22)
MONOCYTES NFR BLD AUTO: 9 % (ref 4–12)
PLATELET # BLD AUTO: 197 THOUSANDS/UL (ref 149–390)
PMV BLD AUTO: 7.7 FL (ref 8.9–12.7)
RBC # BLD AUTO: 4.29 MILLION/UL (ref 3.88–5.62)
WBC # BLD AUTO: 6 THOUSAND/UL (ref 4.31–10.16)
WBC NRBC COR # BLD: 6 THOUSAND/UL (ref 4.31–10.16)

## 2018-09-17 PROCEDURE — 36415 COLL VENOUS BLD VENIPUNCTURE: CPT

## 2018-09-17 PROCEDURE — 77385 HB NTSTY MODUL RAD TX DLVR SMPL: CPT | Performed by: RADIOLOGY

## 2018-09-17 PROCEDURE — 85025 COMPLETE CBC W/AUTO DIFF WBC: CPT

## 2018-09-18 PROCEDURE — 77385 HB NTSTY MODUL RAD TX DLVR SMPL: CPT | Performed by: RADIOLOGY

## 2018-09-19 PROCEDURE — 77385 HB NTSTY MODUL RAD TX DLVR SMPL: CPT | Performed by: RADIOLOGY

## 2018-09-20 DIAGNOSIS — C61 MALIGNANT NEOPLASM OF PROSTATE (HCC): Primary | ICD-10-CM

## 2018-09-20 PROCEDURE — 77385 HB NTSTY MODUL RAD TX DLVR SMPL: CPT | Performed by: RADIOLOGY

## 2018-09-21 PROCEDURE — 77385 HB NTSTY MODUL RAD TX DLVR SMPL: CPT | Performed by: RADIOLOGY

## 2018-09-21 PROCEDURE — 77336 RADIATION PHYSICS CONSULT: CPT | Performed by: RADIOLOGY

## 2018-09-24 PROCEDURE — 77385 HB NTSTY MODUL RAD TX DLVR SMPL: CPT | Performed by: RADIOLOGY

## 2018-09-25 PROCEDURE — 77385 HB NTSTY MODUL RAD TX DLVR SMPL: CPT | Performed by: RADIOLOGY

## 2018-09-28 DIAGNOSIS — F41.9 ANXIETY: ICD-10-CM

## 2018-09-28 RX ORDER — ALPRAZOLAM 0.5 MG/1
0.5 TABLET ORAL 3 TIMES DAILY PRN
Qty: 90 TABLET | Refills: 0 | Status: SHIPPED | OUTPATIENT
Start: 2018-09-28 | End: 2018-10-25 | Stop reason: SDUPTHER

## 2018-09-28 NOTE — TELEPHONE ENCOUNTER
From: Stanislaw Pennington  Sent: 9/28/2018 7:29 AM EDT  Subject: Medication Renewal Request    Dexter Sharp would like a refill of the following medications:     ALPRAZolam (XANAX) 0 5 mg tablet Luis Nolasco, DO]    Preferred pharmacy: [id number]

## 2018-10-08 ENCOUNTER — TELEPHONE (OUTPATIENT)
Dept: RADIATION ONCOLOGY | Facility: CLINIC | Age: 78
End: 2018-10-08

## 2018-10-25 DIAGNOSIS — F41.9 ANXIETY: ICD-10-CM

## 2018-10-25 RX ORDER — ALPRAZOLAM 0.5 MG/1
0.5 TABLET ORAL 3 TIMES DAILY PRN
Qty: 90 TABLET | Refills: 0 | Status: SHIPPED | OUTPATIENT
Start: 2018-10-25 | End: 2018-11-25 | Stop reason: SDUPTHER

## 2018-10-25 NOTE — TELEPHONE ENCOUNTER
From: Hudson Gu  Sent: 10/25/2018 9:59 AM EDT  Subject: Medication Renewal Request    Dexter Sharp would like a refill of the following medications:     ALPRAZolam (XANAX) 0 5 mg tablet Onewilfred Timmons DO]   Patient Comment: 90 pills at  05    Preferred pharmacy: [id number]

## 2018-11-01 DIAGNOSIS — L30.9 ECZEMA, UNSPECIFIED TYPE: Primary | ICD-10-CM

## 2018-11-01 RX ORDER — TRIAMCINOLONE ACETONIDE 5 MG/G
CREAM TOPICAL 3 TIMES DAILY
Qty: 454 G | Refills: 0 | Status: SHIPPED | OUTPATIENT
Start: 2018-11-01 | End: 2019-10-14 | Stop reason: SDUPTHER

## 2018-11-05 PROBLEM — Z92.3 HISTORY OF RADIATION THERAPY: Status: ACTIVE | Noted: 2018-11-05

## 2018-11-08 ENCOUNTER — APPOINTMENT (OUTPATIENT)
Dept: LAB | Facility: CLINIC | Age: 78
End: 2018-11-08
Attending: RADIOLOGY
Payer: COMMERCIAL

## 2018-11-08 ENCOUNTER — CLINICAL SUPPORT (OUTPATIENT)
Dept: RADIATION ONCOLOGY | Facility: CLINIC | Age: 78
End: 2018-11-08
Payer: COMMERCIAL

## 2018-11-08 ENCOUNTER — RADIATION ONCOLOGY FOLLOW-UP (OUTPATIENT)
Dept: RADIATION ONCOLOGY | Facility: CLINIC | Age: 78
End: 2018-11-08
Attending: RADIOLOGY
Payer: COMMERCIAL

## 2018-11-08 VITALS
RESPIRATION RATE: 18 BRPM | DIASTOLIC BLOOD PRESSURE: 58 MMHG | BODY MASS INDEX: 30.01 KG/M2 | OXYGEN SATURATION: 96 % | TEMPERATURE: 98.6 F | WEIGHT: 198 LBS | HEIGHT: 68 IN | HEART RATE: 56 BPM | SYSTOLIC BLOOD PRESSURE: 118 MMHG

## 2018-11-08 DIAGNOSIS — C61 MALIGNANT NEOPLASM OF PROSTATE (HCC): ICD-10-CM

## 2018-11-08 DIAGNOSIS — C61 MALIGNANT NEOPLASM OF PROSTATE METASTATIC TO INTRAPELVIC LYMPH NODE (HCC): Primary | ICD-10-CM

## 2018-11-08 DIAGNOSIS — C77.5 MALIGNANT NEOPLASM OF PROSTATE METASTATIC TO INTRAPELVIC LYMPH NODE (HCC): Primary | ICD-10-CM

## 2018-11-08 LAB — PSA SERPL-MCNC: 1.2 NG/ML (ref 0–4)

## 2018-11-08 PROCEDURE — 99214 OFFICE O/P EST MOD 30 MIN: CPT | Performed by: RADIOLOGY

## 2018-11-08 PROCEDURE — 84153 ASSAY OF PSA TOTAL: CPT

## 2018-11-08 NOTE — PROGRESS NOTES
Follow-up - Radiation Oncology   Shaila Lindsey 1940 [de-identified] y o  male [id number]      History of Present Illness   Cancer Staging  Stage IV, T2 N1 M1b, [place]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  Shaila Lindsey is a [de-identified]y o  year old male with a history of recurrent prostate adenocarcinoma  He is status post prostatectomy in 1997 when he lived in [place] and did not require any postoperative radiation therapy  He more recently has had a rising PSA level that went up to 1 4 in April 2018  Axumin PET-CT scan July 5, 2018 showed increased uptake in the right pelvic sidewall lymph node in addition the right posterior acetabulum consistent with metastatic disease  He has a 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 involving a right pelvic lymph node and within the bone  Recommendations were made salvage radiation therapy to the whole pelvis including his right pelvic lymphadenopathy, prostate bed, and right acetabular region  He completed treatment on September 25, 2018 and returns today for follow-up examination  Interval History:  He has no pelvic pain  Fatigue is improving  He feels urination is back to his baseline prior to RT  No diarrhea or loose stools  Urgency with BM's continue, having 2-3 normal stools daily  No incontinence or blood in urine or stools  Patient states radiation treatments worsened eczema on the bilateral upper inner thighs  This has been slowly improving and is now nearly resolved  He has been applying triamcinolone ointment and Eucerin cream daily to rash areas              Lab Results   Component Value Date     PSA 1 7 08/06/2018     PSA 1 4 04/12/2018      Future:  11/12/18 Dr Tiffanie Schwartz  11/15/18  Dr Joana Diego      Clinical Trial: no      Screening  Tobacco  Current tobacco user: yes  If yes, brief counseling provided: No     Hypertension  Hypertension screening performed: yes  Normotensive:  yes  If no, referred to PCP: n/a     Depression Screening  Screened for depression using PHQ-2: yes     Screened for depression using PHQ-9:  no  Screening positive or negative:  negative  If score >4, was any of the following actions taken? 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 ([facility] [address] )         8/2/2018 - 9/25/2018 Radiation     Treatment:  Course: C1 toPelvis, Rt acetabulum & prostate bed     Plan ID Energy Fractions Dose per Fraction (cGy) Dose Correction (cGy) Total Dose Delivered (cGy) Elapsed Days   CD P Bed 10X 12 / 12 180 0 2,160 15   WP_R Acetab 10X 25 / 25 180 0 4,500 36      Treatment dates:  C1: 8/2/2018 - 9/25/2018              Past Medical History:   Diagnosis Date    Cancer ([facility] [address] )     Dementia     Disease of thyroid gland     Eczema     Hypertension     Prostate cancer ([facility] [address] [facility]     Skin disorder     suspect benign, but will need removal and due to location, refer   Last assessed: April 18, 2013     Past Surgical History:   Procedure Laterality Date    CATARACT EXTRACTION      COLONOSCOPY      EYE SURGERY      KNEE SURGERY      PROSTATE SURGERY      VASECTOMY         Social History   History   Alcohol Use No     History   Drug Use No     Comment: Chronic Narcotic use noted in "allscripts"      History   Smoking Status    Former Smoker    Types: Cigars   Smokeless Tobacco    Never Used     Comment: smokes 1 cigar a couple times a month     Meds/Allergies     Current Outpatient Prescriptions:     ALPRAZolam (XANAX) 0 5 mg tablet, Take 1 tablet (0 5 mg total) by mouth 3 (three) times a day as needed (PRN), Disp: 90 tablet, Rfl: 0    aspirin 81 MG tablet, Take by mouth, Disp: , Rfl:     levothyroxine 88 mcg tablet, Take 1 tablet (88 mcg total) by mouth daily, Disp: 90 tablet, Rfl: 3    lisinopril-hydrochlorothiazide (PRINZIDE,ZESTORETIC) 20-25 MG per tablet, Take 1 tablet by mouth daily, Disp: 90 tablet, Rfl: 3    Multiple Vitamin (MULTI-VITAMIN DAILY PO), Take 1 tablet by mouth daily, Disp: , Rfl:     pravastatin (PRAVACHOL) 40 mg tablet, Take 1 tablet (40 mg total) by mouth daily, Disp: 90 tablet, Rfl: 3    triamcinolone (KENALOG) 0 5 % cream, Apply topically 3 (three) times a day, Disp: 454 g, Rfl: 0  No Known Allergies    Review of Systems  Constitutional: Negative  HENT: Negative  Eyes: Negative  Respiratory: Negative  Cardiovascular: Negative  Gastrointestinal:        2-3 stools daily, + urgency with BM's; occasional gas pains prior to having BM   Endocrine: Negative  Genitourinary: Positive for frequency  Nocturia 1-2x  Musculoskeletal: Negative  Skin: Positive for rash (b/l upper inner thighs, with some itching and minor peeling of skin on right upper inner thigh)  Allergic/Immunologic: Negative  Neurological: Positive for headaches (seldom)  Hematological: Bruises/bleeds easily  Psychiatric/Behavioral: Negative  OBJECTIVE:   /58   Pulse 56   Temp 98 6 °F (37 °C)   Resp 18   Ht 5' 8" (1 727 m)   Wt 89 8 kg (198 lb)   SpO2 96%   BMI 30 11 kg/m²   Pain Assessment:  0  ECOG/Zubrod/WHO: 0 - Asymptomatic    Physical Exam   Constitutional: He is oriented to person, place, and time  He appears well-developed and well-nourished  No distress  HENT:   Head: Normocephalic and atraumatic  Mouth/Throat: No oropharyngeal exudate  Eyes: Pupils are equal, round, and reactive to light   Conjunctivae and EOM are normal  No scleral icterus  Neck: Normal range of motion  Neck supple  No tracheal deviation present  No thyromegaly present  Cardiovascular: Normal rate, regular rhythm and normal heart sounds  Pulmonary/Chest: Effort normal and breath sounds normal  No respiratory distress  He has no wheezes  He has no rales  He exhibits no tenderness  Abdominal: Soft  Bowel sounds are normal  He exhibits no distension and no mass  There is no tenderness  Genitourinary:   Genitourinary Comments: Rectal examination deferred at his request since he has an appointment next week with Dr Petra Heredia  Musculoskeletal: Normal range of motion  He exhibits no edema or tenderness  Lymphadenopathy:     He has no cervical adenopathy  Right: No inguinal and no supraclavicular adenopathy present  Left: No inguinal and no supraclavicular adenopathy present  Neurological: He is alert and oriented to person, place, and time  No cranial nerve deficit  Coordination normal    Skin: Skin is warm and dry  No rash noted  He is not diaphoretic  No erythema  No pallor  Psychiatric: He has a normal mood and affect  His behavior is normal  Judgment and thought content normal    Nursing note and vitals reviewed  RESULTS    Lab Results: No results found for this or any previous visit (from the past 672 hour(s))  Imaging Studies:No results found  Nm Bone Scan Whole Body    Result Date: 7/17/2018  BONE SCAN  WHOLE BODY INDICATION:  Elevated PSA  C61: Malignant neoplasm of prostate PREVIOUS FILM CORRELATION:    PET/CT Axumin scan of 7/5/2018 TECHNIQUE:   This study was performed following the intravenous administration of 27 0 mCi Tc-99m labeled MDP  Delayed, anterior and posterior whole body images were acquired, 2-3 hours after radiopharmaceutical administration  Additional delayed static images acquired of the lumbar spine, pelvis and feet   FINDINGS:  Mild fairly symmetric radiotracer uptake at the L4-L5 level likely related to facet arthritic changes  Mild asymmetric focus radiotracer uptake at the right posterior acetabulum  This is in the region of focal radiotracer uptake on prior Axumin PET/CT  This is better noted on the delayed posterior static image of the pelvis rather than the whole body image  Additional scattered foci of uptake along the mid to lower thoracic spine, likely degenerative  Scattered foci of increased radio tracer uptake at the bilateral manubrioclavicular joints, knees, ankles and feet likely related to degenerative changes given the distribution  The renal activity is fairly symmetric  1   Mild asymmetric focus of radiotracer uptake at the right posterior acetabulum  This corresponds to the are of uptake on PET/CT  Findings again are concerning for osseous metastasis  Workstation performed: [id number]     Assessment/Plan:  No orders of the defined types were placed in this encounter  Bryon Ro is a [de-identified]y o  year old male with a history of recurrent prostate adenocarcinoma  He is status post prostatectomy in 1997 when he lived in [place] and did not require any postoperative radiation therapy  He more recently has had a rising PSA level that went up to 1 4 in April 2018  Axumin PET-CT scan July 5, 2018 showed increased uptake in the right pelvic sidewall lymph node in addition the right posterior acetabulum consistent with metastatic disease  This was confirmed on subsequent bone scan performed July 17, 2018  He has a 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 involving a right pelvic lymph node and within the bone  Recommendations were made salvage radiation therapy to the whole pelvis including his right pelvic lymphadenopathy, prostate bed, and right acetabular region  He completed treatment on September 25, 2018 and returns today for follow-up examination  He has recovered well from radiation therapy    He is having no significant gastrointestinal complaints  He has formed bowel movements with 2-3 normal bowel movements a day  PSA level August 6, 2018 was 1 7 NG/mL and this was repeated again today and is pending  He has an appointment next week follow-up with Dr Marlon Durán and will return here follow-up in 6 months  Temi Lehman MD  11/8/2018,3:21 PM    Portions of the record may have been created with voice recognition software   Occasional wrong word or "sound a like" substitutions may have occurred due to the inherent limitations of voice recognition software   Read the chart carefully and recognize, using context, where substitutions have occurred

## 2018-11-08 NOTE — PROGRESS NOTES
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 ([facility] [address] )         8/2/2018 - 9/25/2018 Radiation     Treatment:  Course: C1 toPelvis, Rt acetabulum & prostate bed     Plan ID Energy Fractions Dose per Fraction (cGy) Dose Correction (cGy) Total Dose Delivered (cGy) Elapsed Days   CD P Bed 10X 12 / 12 180 0 2,160 15   WP_R Acetab 10X 25 / 25 180 0 4,500 36      Treatment dates:  C1: 8/2/2018 - 9/25/2018              Clinical Trial: no        Interval History:  Patient returns for first follow up after completing radiation to pelvis, right acetabulum, & prostate bed on 9/25/18  No pelvic pain  Fatigue is improving  He feels urination is back to his baseline prior to RT  No diarrhea or loose stools  Urgency with BM's continue, having 2-3 stools daily  No incontinence or blood in urine or stools  Patient states radiation treatments worsened eczema on b/l upper inner thighs  This has been slowly improving  He has been applying triamcinolone ointment and Eucerin cream daily to rash areas  Lab Results   Component Value Date    PSA 1 7 08/06/2018    PSA 1 4 04/12/2018         Future:  11/15/18  Dr Petra Heredia        Screening  Tobacco  Current tobacco user: yes  If yes, brief counseling provided: No    Hypertension  Hypertension screening performed: yes  Normotensive:  yes  If no, referred to PCP: n/a    Depression Screening  Screened for depression using PHQ-2: yes    Screened for depression using PHQ-9:  no  Screening positive or negative:  negative  If score >4, was any of the following actions taken? Additional evaluation for depression, suicide risk assesment, referral to PCP or psychiatry, medication started:  n/a    Advanced Care Planning for Patients >65 years  Advanced Care Planning Discussed:  yes  Patient named surrogate decision maker or care plan in chart: yes      Health Maintenance   Topic Date Due    SLP PLAN OF CARE  1940    Fall Risk  04/11/2019    Depression Screening PHQ  07/16/2019    DTaP,Tdap,and Td Vaccines (2 - Td) 05/01/2024    INFLUENZA VACCINE  Completed    Pneumococcal PPSV23/PCV13 65+ Years / High and Highest Risk  Completed       Patient Active Problem List   Diagnosis    Chronic shoulder pain    Vesicular palmoplantar eczema    Eczema    Generalized anxiety disorder    Hyperlipidemia    Hypertension    Hypothyroidism    Osteoarthritis    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 ([facility] [address] )    History of radiation therapy     Past Medical History:   Diagnosis Date    Cancer ([facility] [address] )     Dementia     Disease of thyroid gland     Eczema     Hypertension     Prostate cancer ([facility] [address] [facility]     Skin disorder     suspect benign, but will need removal and due to location, refer   Last assessed: April 18, 2013     Past Surgical History:   Procedure Laterality Date    CATARACT EXTRACTION      COLONOSCOPY      EYE SURGERY      KNEE SURGERY      PROSTATE SURGERY      VASECTOMY       Family History   Problem Relation Age of Onset    Alcohol abuse Mother     Alcohol abuse Father     Depression Father     No Known Problems Sister     Stroke Brother         syndrome     No Known Problems Maternal Grandmother     No Known Problems Maternal Grandfather     No Known Problems Paternal Grandmother     No Known Problems Paternal Grandfather     Alcohol abuse Family     Colon cancer Maternal Aunt      Social History     Social History    Marital status: /Civil Union Spouse name: N/A    Number of children: N/A    Years of education: N/A     Occupational History    Not on file  Social History Main Topics    Smoking status: Former Smoker     Types: Cigars    Smokeless tobacco: Never Used      Comment: smokes 1 cigar a couple times a month    Alcohol use No    Drug use: No      Comment: Chronic Narcotic use noted in "allscripts"     Sexual activity: Not on file     Other Topics Concern    Not on file     Social History Narrative    Caffeine use        Current Outpatient Prescriptions:     ALPRAZolam (XANAX) 0 5 mg tablet, Take 1 tablet (0 5 mg total) by mouth 3 (three) times a day as needed (PRN), Disp: 90 tablet, Rfl: 0    aspirin 81 MG tablet, Take by mouth, Disp: , Rfl:     levothyroxine 88 mcg tablet, Take 1 tablet (88 mcg total) by mouth daily, Disp: 90 tablet, Rfl: 3    lisinopril-hydrochlorothiazide (PRINZIDE,ZESTORETIC) 20-25 MG per tablet, Take 1 tablet by mouth daily, Disp: 90 tablet, Rfl: 3    Multiple Vitamin (MULTI-VITAMIN DAILY PO), Take 1 tablet by mouth daily, Disp: , Rfl:     pravastatin (PRAVACHOL) 40 mg tablet, Take 1 tablet (40 mg total) by mouth daily, Disp: 90 tablet, Rfl: 3    triamcinolone (KENALOG) 0 5 % cream, Apply topically 3 (three) times a day, Disp: 454 g, Rfl: 0  No Known Allergies    Review of Systems:  Review of Systems   Constitutional: Negative  HENT: Negative  Eyes: Negative  Respiratory: Negative  Cardiovascular: Negative  Gastrointestinal:        2-3 stools daily, + urgency with BM's; occasional gas pains prior to having BM   Endocrine: Negative  Genitourinary: Positive for frequency  Nocturia 1-2x  Musculoskeletal: Negative  Skin: Positive for rash (b/l upper inner thighs, with some itching and minor peeling of skin on right upper inner thigh)  Allergic/Immunologic: Negative  Neurological: Positive for headaches (seldom)  Hematological: Bruises/bleeds easily     Psychiatric/Behavioral: Negative  Vitals:    11/08/18 1429   BP: 118/58   Pulse: 56   Resp: 18   Temp: 98 6 °F (37 °C)   SpO2: 96%   Weight: 89 8 kg (198 lb)   Height: 5' 8" (1 727 m)            Imaging:No results found

## 2018-11-12 ENCOUNTER — OFFICE VISIT (OUTPATIENT)
Dept: INTERNAL MEDICINE CLINIC | Facility: CLINIC | Age: 78
End: 2018-11-12
Payer: COMMERCIAL

## 2018-11-12 VITALS
HEIGHT: 68 IN | RESPIRATION RATE: 18 BRPM | TEMPERATURE: 98.3 F | SYSTOLIC BLOOD PRESSURE: 122 MMHG | WEIGHT: 198 LBS | BODY MASS INDEX: 30.01 KG/M2 | HEART RATE: 70 BPM | DIASTOLIC BLOOD PRESSURE: 60 MMHG

## 2018-11-12 DIAGNOSIS — M19.91 PRIMARY OSTEOARTHRITIS, UNSPECIFIED SITE: ICD-10-CM

## 2018-11-12 DIAGNOSIS — Z00.00 MEDICARE ANNUAL WELLNESS VISIT, SUBSEQUENT: ICD-10-CM

## 2018-11-12 DIAGNOSIS — C61 MALIGNANT NEOPLASM OF PROSTATE METASTATIC TO INTRAPELVIC LYMPH NODE (HCC): ICD-10-CM

## 2018-11-12 DIAGNOSIS — E03.9 ACQUIRED HYPOTHYROIDISM: ICD-10-CM

## 2018-11-12 DIAGNOSIS — Z13.1 SCREENING FOR DIABETES MELLITUS (DM): ICD-10-CM

## 2018-11-12 DIAGNOSIS — I10 ESSENTIAL HYPERTENSION: Primary | ICD-10-CM

## 2018-11-12 DIAGNOSIS — E78.2 MIXED HYPERLIPIDEMIA: ICD-10-CM

## 2018-11-12 DIAGNOSIS — F41.1 GENERALIZED ANXIETY DISORDER: ICD-10-CM

## 2018-11-12 DIAGNOSIS — C77.5 MALIGNANT NEOPLASM OF PROSTATE METASTATIC TO INTRAPELVIC LYMPH NODE (HCC): ICD-10-CM

## 2018-11-12 PROCEDURE — 1170F FXNL STATUS ASSESSED: CPT | Performed by: INTERNAL MEDICINE

## 2018-11-12 PROCEDURE — 3078F DIAST BP <80 MM HG: CPT | Performed by: INTERNAL MEDICINE

## 2018-11-12 PROCEDURE — 3074F SYST BP LT 130 MM HG: CPT | Performed by: INTERNAL MEDICINE

## 2018-11-12 PROCEDURE — G0439 PPPS, SUBSEQ VISIT: HCPCS | Performed by: INTERNAL MEDICINE

## 2018-11-12 PROCEDURE — 1125F AMNT PAIN NOTED PAIN PRSNT: CPT | Performed by: INTERNAL MEDICINE

## 2018-11-12 PROCEDURE — 3008F BODY MASS INDEX DOCD: CPT | Performed by: INTERNAL MEDICINE

## 2018-11-12 PROCEDURE — 99214 OFFICE O/P EST MOD 30 MIN: CPT | Performed by: INTERNAL MEDICINE

## 2018-11-12 NOTE — PROGRESS NOTES
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)       Other    Generalized anxiety disorder    Hyperlipidemia    Relevant Orders    Lipid Panel with Direct LDL reflex    TSH, 3rd generation with Free T4 reflex      Other Visit Diagnoses     Medicare annual wellness visit, subsequent        Screening for diabetes mellitus (DM)        Relevant Orders    Hemoglobin A1C        Health Maintenance Due   Topic Date Due    SLP PLAN OF CARE  1940         HPI:  Stu Ferreira is a [de-identified] y o  male here for his Subsequent Wellness Visit  Patient Active Problem List   Diagnosis    Chronic shoulder pain    Vesicular palmoplantar eczema    Eczema    Generalized anxiety disorder    Hyperlipidemia    Hypertension    Hypothyroidism    Osteoarthritis    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)    History of radiation therapy     Past Medical History:   Diagnosis Date    Cancer ([facility] [address]     Dementia     Disease of thyroid gland     Eczema     Generalized anxiety disorder     Hypertension     Prostate cancer ([facility] [address] [facility]     Skin disorder     suspect benign, but will need removal and due to location, refer   Last assessed: April 18, 2013     Past Surgical History:   Procedure Laterality Date    CATARACT EXTRACTION      COLONOSCOPY      EYE SURGERY      KNEE SURGERY      PROSTATE SURGERY      VASECTOMY       Family History   Problem Relation Age of Onset    Alcohol abuse Mother     Alcohol abuse Father     Depression Father     No Known Problems Sister     Stroke Brother         syndrome     No Known Problems Maternal Grandmother     No Known Problems Maternal Grandfather     No Known Problems Paternal Grandmother     No Known Problems Paternal Grandfather     Alcohol abuse Family     Colon cancer Maternal Aunt      History   Smoking Status    Former Smoker    Types: Cigars   Smokeless Tobacco    Never Used     Comment: smokes 1 cigar a couple times a month     History   Alcohol Use No      History   Drug Use No     Comment: Chronic Narcotic use noted in "[employer]"        Current Outpatient Prescriptions   Medication Sig Dispense Refill    ALPRAZolam (XANAX) 0 5 mg tablet Take 1 tablet (0 5 mg total) by mouth 3 (three) times a day as needed (PRN) 90 tablet 0    aspirin 81 MG tablet Take by mouth      levothyroxine 88 mcg tablet Take 1 tablet (88 mcg total) by mouth daily 90 tablet 3    lisinopril-hydrochlorothiazide (PRINZIDE,ZESTORETIC) 20-25 MG per tablet Take 1 tablet by mouth daily 90 tablet 3    Multiple Vitamin (MULTI-VITAMIN DAILY PO) Take 1 tablet by mouth daily      pravastatin (PRAVACHOL) 40 mg tablet Take 1 tablet (40 mg total) by mouth daily 90 tablet 3    triamcinolone (KENALOG) 0 5 % cream Apply topically 3 (three) times a day 454 g 0     No current facility-administered medications for this visit  No Known Allergies  Immunization History   Administered Date(s) Administered    Influenza 10/01/2017    Influenza Split High Dose Preservative Free IM 10/18/2016    Influenza TIV (IM) 10/01/2017    Pneumococcal Conjugate 13-Valent 04/11/2017    Pneumococcal Polysaccharide PPV23 01/01/2006    Td (adult), adsorbed 01/01/2000    Tdap 05/01/2014       Patient Care Team:  Brittaney Allen DO as PCP - Brina De La Cruz MD (Radiation Oncology)  Naif Milligan MD (Urology)    Medicare Screening Tests and Risk Assessments:  Geovanni Hensley is here for his Subsequent Wellness visit  Last Medicare Wellness visit information reviewed, patient interviewed and updates made to the record today       Health Risk Assessment:  Patient rates overall health as fair  Patient feels that their physical health rating is Slightly worse  Eyesight was rated as Same  Hearing was rated as Same  Patient feels that their emotional and mental health rating is Same  Pain experienced by patient in the last 7 days has been Some  Patient's pain rating has been 6/10  Patient states that he has experienced no weight loss or gain in last 6 months  Emotional/Mental Health:  Patient has been feeling nervous/anxious  PHQ-9 Depression Screening:    Frequency of the following problems over the past two weeks:      1  Little interest or pleasure in doing things: 0 - not at all      2  Feeling down, depressed, or hopeless: 0 - not at all  PHQ-2 Score: 0          Broken Bones/Falls: Fall Risk Assessment:    In the past year, patient has experienced: No history of falling in past year          Bladder/Bowel:  Patient has not leaked urine accidently in the last six months  Patient reports no loss of bowel control  Immunizations:  Patient has had a flu vaccination within the last year  Patient has received a pneumonia shot  Patient has not received a shingles shot  Patient has received tetanus/diphtheria shot  Home Safety:  Patient does not have trouble with stairs inside or outside of their home  Patient currently reports that there are no safety hazards present in home, working smoke alarms, no working carbon monoxide detectors  Preventative Screenings:   prostate cancer screen performed, colon cancer screen completed, cholesterol screen completed, glaucoma eye exam completed,     Nutrition:  Current diet: Regular and Limited junk food with servings of the following:    Medications:  Patient is currently taking over-the-counter supplements  List of OTC medications includes: Multivitamin Aspirin    Lifestyle Choices:  Patient reports no tobacco use  Patient has smoked or used tobacco in the past   Patient has stopped his tobacco use  Tobacco use quit date: 1986  Patient reports no alcohol use  Patient drives a vehicle  Patient wears seat belt  Current level of exercise of physical activity described by patient as: Walks 2 miles daily, and yard work  Activities of Daily Living:  Can get out of bed by his or her self, able to dress self, able to make own meals, able to do own shopping, able to bathe self, can do own laundry/housekeeping, can manage own money, pay bills and track expenses    Previous Hospitalizations:  No hospitalization or ED visit in past 12 months        Advanced Directives:  Patient has decided on a power of   Patient has spoken to designated power of   Patient has completed advanced directive  Preventative Screening/Counseling:      Cardiovascular:      General: Screening Current      Counseling: Healthy Diet, Healthy Weight, Improve Cholesterol, Improve Blood Pressure and Improve Exercise Tolerance     Due for Labs/Analytes/Optional EKG: Lipid Panel          Diabetes:      General: Risks and Benefits Discussed      Counseling: Healthy Diet, Healthy Weight and Improve Physical Activity      Due for labs: Blood Glucose          Colorectal Cancer:      General: Screening Current      Counseling: high fiber diet          Prostate Cancer:      General: Screening Current          Osteoporosis:      General: Screening Not Indicated          AAA:      General: Screening Not Indicated          Glaucoma:      General: Screening Current          HIV:      General: Screening Not Indicated          Hepatitis C:      General: Screening Not Indicated        Advanced Directives:   Patient has living will for healthcare, has durable POA for healthcare, patient has an advanced directive  Information on ACP and/or AD provided  No 5 wishes given  End of life assessment reviewed with patient  Provider agrees with end of life decisions        Immunizations:      Influenza: Influenza UTD This Year Pneumococcal: Lifetime Vaccine Completed      TDAP: Tdap Vaccine UTD      Other Preventative Counseling (Non-Medicare): Fall Prevention, Increase physical activity, Nutrition Counseling and Car/seat belt/driving safety reviewed      A/P: Doing well and denies any depression despite the recent cancer dx  Feels safe at home and no falls  Continues do drive and uses a seatbelt  Now driving at night again since his cataracts addressed  Diverse diet, but could use more  Fruits and veggies  Has a living will and POA  No DME or referrals needed today  RTC one year for medicare wellness

## 2018-11-12 NOTE — PATIENT INSTRUCTIONS
Chronic Hypertension   AMBULATORY CARE:   Hypertension  is high blood pressure (BP)  Your BP is the force of your blood moving against the walls of your arteries  Normal BP is less than 120/80  Prehypertension is between 120/80 and 139/89  Hypertension is 140/90 or higher  Hypertension causes your BP to get so high that your heart has to work much harder than normal  This can damage your heart  Chronic hypertension is a long-term condition that you can control with a healthy lifestyle or medicines  A controlled blood pressure helps protect your organs, such as your heart, lungs, brain, and kidneys  Common symptoms include the following:   · Headache     · Blurred vision    · Chest pain     · Dizziness or weakness     · Trouble breathing     · Nosebleeds  Call 911 for any of the following:   · You have discomfort in your chest that feels like squeezing, pressure, fullness, or pain  · You become confused or have difficulty speaking  · You suddenly feel lightheaded or have trouble breathing  · You have pain or discomfort in your back, neck, jaw, stomach, or arm  Seek care immediately if:   · You have a severe headache or vision loss  · You have weakness in an arm or leg  Contact your healthcare provider if:   · You feel faint, dizzy, confused, or drowsy  · You have been taking your BP medicine and your BP is still higher than your healthcare provider says it should be  · You have questions or concerns about your condition or care  Treatment for chronic hypertension  may include medicine to lower your BP and lower your cholesterol level  A low cholesterol level helps prevent heart disease and makes it easier to control your blood pressure  Heart disease can make your blood pressure harder to control  You may also need to make lifestyle changes  Take your medicine exactly as directed    Manage chronic hypertension:  Talk with your healthcare provider about these and other ways to manage hypertension:  · Take your BP at home  Sit and rest for 5 minutes before you take your BP  Extend your arm and support it on a flat surface  Your arm should be at the same level as your heart  Follow the directions that came with your BP monitor  If possible, take at least 2 BP readings each time  Take your BP at least twice a day at the same times each day, such as morning and evening  Keep a record of your BP readings and bring it to your follow-up visits  Ask your healthcare provider what your blood pressure should be  · Limit sodium (salt) as directed  Too much sodium can affect your fluid balance  Check labels to find low-sodium or no-salt-added foods  Some low-sodium foods use potassium salts for flavor  Too much potassium can also cause health problems  Your healthcare provider will tell you how much sodium and potassium are safe for you to have in a day  He or she may recommend that you limit sodium to 2,300 mg a day  · Follow the meal plan recommended by your healthcare provider  A dietitian or your provider can give you more information on low-sodium plans or the DASH (Dietary Approaches to Stop Hypertension) eating plan  The DASH plan is low in sodium, unhealthy fats, and total fat  It is high in potassium, calcium, and fiber  · Exercise to maintain a healthy weight  Exercise at least 30 minutes per day, on most days of the week  This will help decrease your blood pressure  Ask about the best exercise plan for you  · Decrease stress  This may help lower your BP  Learn ways to relax, such as deep breathing or listening to music  · Limit alcohol  Women should limit alcohol to 1 drink a day  Men should limit alcohol to 2 drinks a day  A drink of alcohol is 12 ounces of beer, 5 ounces of wine, or 1½ ounces of liquor  · Do not smoke  Nicotine and other chemicals in cigarettes and cigars can increase your BP and also cause lung damage   Ask your healthcare provider for information if you currently smoke and need help to quit  E-cigarettes or smokeless tobacco still contain nicotine  Talk to your healthcare provider before you use these products  Follow up with your healthcare provider as directed: You will need to return to have your BP checked and to have other lab tests done  Write down your questions so you remember to ask them during your visits  © 2017 [facility] Information is for End User's use only and may not be sold, redistributed or otherwise used for commercial purposes  All illustrations and images included in CareNotes® are the copyrighted property of [employer]  or Ramesh Craig  The above information is an  only  It is not intended as medical advice for individual conditions or treatments  Talk to your doctor, nurse or pharmacist before following any medical regimen to see if it is safe and effective for you  Obesity   AMBULATORY CARE:   Obesity  is when your body mass index (BMI) is greater than 30  Your healthcare provider will use your height and weight to measure your BMI  The risks of obesity include  many health problems, such as injuries or physical disability  You may need tests to check for the following:  · Diabetes     · High blood pressure or high cholesterol     · Heart disease     · Gallbladder or liver disease     · Cancer of the colon, breast, prostate, liver, or kidney     · Sleep apnea     · Arthritis or gout  Seek care immediately if:   · You have a severe headache, confusion, or difficulty speaking  · You have weakness on one side of your body  · You have chest pain, sweating, or shortness of breath  Contact your healthcare provider if:   · You have symptoms of gallbladder or liver disease, such as pain in your upper abdomen  · You have knee or hip pain and discomfort while walking       · You have symptoms of diabetes, such as intense hunger and thirst, and frequent urination  · You have symptoms of sleep apnea, such as snoring or daytime sleepiness  · You have questions or concerns about your condition or care  Treatment for obesity  focuses on helping you lose weight to improve your health  Even a small decrease in BMI can reduce the risk for many health problems  Your healthcare provider will help you set a weight-loss goal   · Lifestyle changes  are the first step in treating obesity  These include making healthy food choices and getting regular physical activity  Your healthcare provider may suggest a weight-loss program that involves coaching, education, and therapy  · Medicine  may help you lose weight when it is used with a healthy diet and physical activity  · Surgery  can help you lose weight if you are very obese and have other health problems  There are several types of weight-loss surgery  Ask your healthcare provider for more information  Be successful losing weight:   · Set small, realistic goals  An example of a small goal is to walk for 20 minutes 5 days a week  Anther goal is to lose 5% of your body weight  · Tell friends, family members, and coworkers about your goals  and ask for their support  Ask a friend to lose weight with you, or join a weight-loss support group  · Identify foods or triggers that may cause you to overeat , and find ways to avoid them  Remove tempting high-calorie foods from your home and workplace  Place a bowl of fresh fruit on your kitchen counter  If stress causes you to eat, then find other ways to cope with stress  · Keep a diary to track what you eat and drink  Also write down how many minutes of physical activity you do each day  Weigh yourself once a week and record it in your diary  Eating changes: You will need to eat 500 to 1,000 fewer calories each day than you currently eat to lose 1 to 2 pounds a week  The following changes will help you cut calories:  · Eat smaller portions    Use small plates, no larger than 9 inches in diameter  Fill your plate half full of fruits and vegetables  Measure your food using measuring cups until you know what a serving size looks like  · Eat 3 meals and 1 or 2 snacks each day  Plan your meals in advance  Lisy Laughter and eat at home most of the time  Eat slowly  · Eat fruits and vegetables at every meal   They are low in calories and high in fiber, which makes you feel full  Do not add butter, margarine, or cream sauce to vegetables  Use herbs to season steamed vegetables  · Eat less fat and fewer fried foods  Eat more baked or grilled chicken and fish  These protein sources are lower in calories and fat than red meat  Limit fast food  Dress your salads with olive oil and vinegar instead of bottled dressing  · Limit the amount of sugar you eat  Do not drink sugary beverages  Limit alcohol  Activity changes:  Physical activity is good for your body in many ways  It helps you burn calories and build strong muscles  It decreases stress and depression, and improves your mood  It can also help you sleep better  Talk to your healthcare provider before you begin an exercise program   · Exercise for at least 30 minutes 5 days a week  Start slowly  Set aside time each day for physical activity that you enjoy and that is convenient for you  It is best to do both weight training and an activity that increases your heart rate, such as walking, bicycling, or swimming  · Find ways to be more active  Do yard work and housecleaning  Walk up the stairs instead of using elevators  Spend your leisure time going to events that require walking, such as outdoor festivals or fairs  This extra physical activity can help you lose weight and keep it off  Follow up with your healthcare provider as directed: You may need to meet with a dietitian  Write down your questions so you remember to ask them during your visits     © 2017 [address] Information is for End User's use only and may not be sold, redistributed or otherwise used for commercial purposes  All illustrations and images included in CareNotes® are the copyrighted property of A D A M , Inc  or Ramesh Craig  The above information is an  only  It is not intended as medical advice for individual conditions or treatments  Talk to your doctor, nurse or pharmacist before following any medical regimen to see if it is safe and effective for you  Urinary Incontinence   WHAT YOU NEED TO KNOW:   What is urinary incontinence? Urinary incontinence (UI) is when you lose control of your bladder  What causes UI? UI occurs because your bladder cannot store or empty urine properly  The following are the most common types of UI:  · Stress incontinence  is when you leak urine due to increased bladder pressure  This may happen when you cough, sneeze, or exercise  · Urge incontinence  is when you feel the need to urinate right away and leak urine accidentally  · Mixed incontinence  is when you have both stress and urge UI  What are the signs and symptoms of UI?   · You feel like your bladder does not empty completely when you urinate  · You urinate often and need to urinate immediately  · You leak urine when you sleep, or you wake up with the urge to urinate  · You leak urine when you cough, sneeze, exercise, or laugh  How is UI diagnosed? Your healthcare provider will ask how often you leak urine and whether you have stress or urge symptoms  Tell him which medicines you take, how often you urinate, and how much liquid you drink each day  You may need any of the following tests:  · Urine tests  may show infection or kidney function  · A pelvic exam  may be done to check for blockages  A pelvic exam will also show if your bladder, uterus, or other organs have moved out of place  · An x-ray, ultrasound, or CT  may show problems with parts of your urinary system   You may be given contrast liquid to help your organs show up better in the pictures  Tell the healthcare provider if you have ever had an allergic reaction to contrast liquid  Do not enter the MRI room with anything metal  Metal can cause serious injury  Tell the healthcare provider if you have any metal in or on your body  · A bladder scan  will show how much urine is left in your bladder after you urinate  You will be asked to urinate and then healthcare providers will use a small ultrasound machine to check the urine left in your bladder  · Cystometry  is used to check the function of your urinary system  Your healthcare provider checks the pressure in your bladder while filling it with fluid  Your bladder pressure may also be tested when your bladder is full and while you urinate  How is UI treated? · Medicines  can help strengthen your bladder control  · Electrical stimulation  is used to send a small amount of electrical energy to your pelvic floor muscles  This helps control your bladder function  Electrodes may be placed outside your body or in your rectum  For women, the electrodes may be placed in the vagina  · A bulking agent  may be injected into the wall of your urethra to make it thicker  This helps keep your urethra closed and decreases urine leakage  · Devices  such as a clamp, pessary, or tampon may help stop urine leaks  Ask your healthcare provider for more information about these and other devices  · Surgery  may be needed if other treatments do not work  Several types of surgery can help improve your bladder control  Ask your healthcare provider for more information about the surgery you may need  How can I manage my symptoms? · Do pelvic muscle exercises often  Your pelvic muscles help you stop urinating  Squeeze these muscles tight for 5 seconds, then relax for 5 seconds  Gradually work up to squeezing for 10 seconds  Do 3 sets of 15 repetitions a day, or as directed   This will help strengthen your pelvic muscles and improve bladder control  · A catheter  may be used to help empty your bladder  A catheter is a tiny, plastic tube that is put into your bladder to drain your urine  Your healthcare provider may tell you to use a catheter to prevent your bladder from getting too full and leaking urine  · Keep a UI record  Write down how often you leak urine and how much you leak  Make a note of what you were doing when you leaked urine  · Train your bladder  Go to the bathroom at set times, such as every 2 hours, even if you do not feel the urge to go  You can also try to hold your urine when you feel the urge to go  For example, hold your urine for 5 minutes when you feel the urge to go  As that becomes easier, hold your urine for 10 minutes  · Drink liquids as directed  Ask your healthcare provider how much liquid to drink each day and which liquids are best for you  You may need to limit the amount of liquid you drink to help control your urine leakage  Limit or do not have drinks that contain caffeine or alcohol  Do not drink any liquid right before you go to bed  · Prevent constipation  Eat a variety of high-fiber foods  Good examples are high-fiber cereals, beans, vegetables, and whole-grain breads  Prune juice may help make your bowel movement softer  Walking is the best way to trigger your intestines to have a bowel movement  · Exercise regularly and maintain a healthy weight  Ask your healthcare provider how much you should weigh and about the best exercise plan for you  Weight loss and exercise will decrease pressure on your bladder and help you control your leakage  Ask him to help you create a weight loss plan if you are overweight  When should I seek immediate care? · You have severe pain  · You are confused or cannot think clearly  When should I contact my healthcare provider? · You have a fever  · You see blood in your urine      · You have pain when you urinate  · You have new or worse pain, even after treatment  · Your mouth feels dry or you have vision changes  · Your urine is cloudy or smells bad  · You have questions or concerns about your condition or care  CARE AGREEMENT:   You have the right to help plan your care  Learn about your health condition and how it may be treated  Discuss treatment options with your caregivers to decide what care you want to receive  You always have the right to refuse treatment  The above information is an  only  It is not intended as medical advice for individual conditions or treatments  Talk to your doctor, nurse or pharmacist before following any medical regimen to see if it is safe and effective for you  © 2017 [address] Information is for End User's use only and may not be sold, redistributed or otherwise used for commercial purposes  All illustrations and images included in CareNotes® are the copyrighted property of [employer]  or [employer]  Cigarette Smoking and Your Health   AMBULATORY CARE:   Risks to your health if you smoke:  Nicotine and other chemicals found in tobacco damage every cell in your body  Even if you are a light smoker, you have an increased risk for cancer, heart disease, and lung disease  If you are pregnant or have diabetes, smoking increases your risk for complications  Benefits to your health if you stop smoking:   · You decrease respiratory symptoms such as coughing, wheezing, and shortness of breath  · You reduce your risk for cancers of the lung, mouth, throat, kidney, bladder, pancreas, stomach, and cervix  If you already have cancer, you increase the benefits of chemotherapy  You also reduce your risk for cancer returning or a second cancer from developing  · You reduce your risk for heart disease, blood clots, heart attack, and stroke       · You reduce your risk for lung infections, and diseases such as pneumonia, asthma, chronic bronchitis, and emphysema  · Your circulation improves  More oxygen can be delivered to your body  If you have diabetes, you lower your risk for complications, such as kidney, artery, and eye diseases  You also lower your risk for nerve damage  Nerve damage can lead to amputations, poor vision, and blindness  · You improve your body's ability to heal and to fight infections  Benefits to the health of others if you stop smoking:  Tobacco is harmful to nonsmokers who breathe in your secondhand smoke  The following are ways the health of others around you may improve when you stop smoking:  · You lower the risks for lung cancer and heart disease in nonsmoking adults  · If you are pregnant, you lower the risk for miscarriage, early delivery, low birth weight, and stillbirth  You also lower your baby's risk for SIDS, obesity, developmental delay, and neurobehavioral problems, such as ADHD  · If you have children, you lower their risk for ear infections, colds, pneumonia, bronchitis, and asthma  For more information and support to stop smoking:   · [facility]  Phone: [phone] 598 [phone]  Web Address: www [employer]  Follow up with your healthcare provider as directed:  Write down your questions so you remember to ask them during your visits  © 2017 [address] Information is for End User's use only and may not be sold, redistributed or otherwise used for commercial purposes  All illustrations and images included in CareNotes® are the copyrighted property of [employer]  or Ramesh Craig  The above information is an  only  It is not intended as medical advice for individual conditions or treatments  Talk to your doctor, nurse or pharmacist before following any medical regimen to see if it is safe and effective for you  Fall Prevention   WHAT YOU NEED TO KNOW:   What is fall prevention?   Fall prevention includes ways to make your home and other areas safer  It also includes ways you can move more carefully to prevent a fall  What increases my risk for falls? · Lack of vitamin D    · Not getting enough sleep each night    · Trouble walking or keeping your balance, or foot problems    · Health conditions that cause changes in your blood pressure, vision, or muscle strength and coordination    · Medicines that make you dizzy, weak, or sleepy    · Problems seeing clearly    · Shoes that have high heels or are not supportive    · Tripping hazards, such as items left on the floor, no handrails on the stairs, or broken steps  How can I help protect myself from falls? · Stand or sit up slowly  This may help you keep your balance and prevent falls  If you need to get up during the night, sit up first  Be sure you are fully awake before you stand  Turn on the light before you start walking  Go slowly in case you are still sleepy  Make sure you will not trip over any pets sleeping in the bedroom  · Use assistive devices as directed  Your healthcare provider may suggest that you use a cane or walker to help you keep your balance  You may need to have grab bars put in your bathroom near the toilet or in the shower  · Wear shoes that fit well and have soles that   Wear shoes both inside and outside  Use slippers with good   Do not wear shoes with high heels  · Wear a personal alarm  This is a device that allows you to call 911 if you fall and need help  Ask your healthcare provider for more information  · Stay active  Exercise can help strengthen your muscles and improve your balance  Your healthcare provider may recommend water aerobics or walking  He or she may also recommend physical therapy to improve your coordination  Never start an exercise program without talking to your healthcare provider first      · Manage medical conditions  Keep all appointments with your healthcare providers   Visit your eye doctor as directed  How can I make my home safer? · Add items to prevent falls in the bathroom  Put nonslip strips on your bath or shower floor to prevent you from slipping  Use a bath mat if you do not have carpet in the bathroom  This will prevent you from falling when you step out of the bath or shower  Use a shower seat so you do not need to stand while you shower  Sit on the toilet or a chair in your bathroom to dry yourself and put on clothing  This will prevent you from losing your balance from drying or dressing yourself while you are standing  · Keep paths clear  Remove books, shoes, and other objects from walkways and stairs  Place cords for telephones and lamps out of the way so that you do not need to walk over them  Tape them down if you cannot move them  Remove small rugs  If you cannot remove a rug, secure it with double-sided tape  This will prevent you from tripping  · Install bright lights in your home  Use night lights to help light paths to the bathroom or kitchen  Always turn on the light before you start walking  · Keep items you use often on shelves within reach  Do not use a step stool to help you reach an item  · Paint or place reflective tape on the edges of your stairs  This will help you see the stairs better  Call 911 or have someone else call if:   · You have fallen and are unconscious  · You have fallen and cannot move part of your body  Contact your healthcare provider if:   · You have fallen and have pain or a headache  · You have questions or concerns about your condition or care  CARE AGREEMENT:   You have the right to help plan your care  Learn about your health condition and how it may be treated  Discuss treatment options with your caregivers to decide what care you want to receive  You always have the right to refuse treatment  The above information is an  only   It is not intended as medical advice for individual conditions or treatments  Talk to your doctor, nurse or pharmacist before following any medical regimen to see if it is safe and effective for you  © 2017 [address] Information is for End User's use only and may not be sold, redistributed or otherwise used for commercial purposes  All illustrations and images included in CareNotes® are the copyrighted property of [employer]  or Ramesh Craig  Advance Directives   WHAT YOU NEED TO KNOW:   What are advance directives? Advance directives are legal documents that state your wishes and plans for medical care  These plans are made ahead of time in case you lose your ability to make decisions for yourself  Advance directives can apply to any medical decision, such as the treatments you want, and if you want to donate organs  What are the types of advance directives? There are many types of advance directives, and each state has rules about how to use them  You may choose a combination of any of the following:  · Living will: This is a written record of the treatment you want  You can also choose which treatments you do not want, which to limit, and which to stop at a certain time  This includes surgery, medicine, IV fluid, and tube feedings  · Durable power of  for healthcare [place] [facility]): This is a written record that states who you want to make healthcare choices for you when you are unable to make them for yourself  This person, called a proxy, is usually a family member or a friend  You may choose more than 1 proxy  · Do not resuscitate (DNR) order:  A DNR order is used in case your heart stops beating or you stop breathing  It is a request not to have certain forms of treatment, such as CPR  A DNR order may be included in other types of advance directives  · Medical directive: This covers the care that you want if you are in a coma, near death, or unable to make decisions for yourself   You can list the treatments you want for each condition  Treatment may include pain medicine, surgery, blood transfusions, dialysis, IV or tube feedings, and a ventilator (breathing machine)  · Values history: This document has questions about your views, beliefs, and how you feel and think about life  This information can help others choose the care that you would choose  Why are advance directives important? An advance directive helps you control your care  Although spoken wishes may be used, it is better to have your wishes written down  Spoken wishes can be misunderstood, or not followed  Treatments may be given even if you do not want them  An advance directive may make it easier for your family to make difficult choices about your care  How do I decide what to put in my advance directives? · Make informed decisions:  Make sure you fully understand treatments or care you may receive  Think about the benefits and problems your decisions could cause for you or your family  Talk to healthcare providers if you have concerns or questions before you write down your wishes  You may also want to talk with your Sikhism or , or a   Check your state laws to make sure that what you put in your advance directive is legal      · Sign all forms:  Sign and date your advance directive when you have finished  You may also need 2 witnesses to sign the forms  Witnesses cannot be your doctor or his staff, your spouse, heirs or beneficiaries, people you owe money to, or your chosen proxy  Talk to your family, proxy, and healthcare providers about your advance directive  Give each person a copy, and keep one for yourself in a place you can get to easily  Do not keep it hidden or locked away  · Review and revise your plans: You can revise your advance directive at any time, as long as you are able to make decisions  Review your plan every year, and when there are changes in your life, or your health   When you make changes, let your family, proxy, and healthcare providers know  Give each a new copy  Where can I find more information? · [employer]  [address] , [place] [address]  Phone: [phone]  Phone: [phone]  Web Address: http://www  aafp org  · [address] [facility])  [address], [address]  Phone: [phone]  Phone: [phone]  Web Address: Allison moraes  CARE AGREEMENT:   You have the right to help plan your care  To help with this plan, you must learn about your health condition and treatment options  You must also learn about advance directives and how they are used  Work with your healthcare providers to decide what care will be used to treat you  You always have the right to refuse treatment  The above information is an  only  It is not intended as medical advice for individual conditions or treatments  Talk to your doctor, nurse or pharmacist before following any medical regimen to see if it is safe and effective for you  © 2017 [address] [facility] Information is for End User's use only and may not be sold, redistributed or otherwise used for commercial purposes  All illustrations and images included in CareNotes® are the copyrighted property of A D A M , Inc  or Ramesh Craig

## 2018-11-12 NOTE — PROGRESS NOTES
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)    Mixed hyperlipidemia  -     Lipid Panel with Direct LDL reflex; Future  -     TSH, 3rd generation with Free T4 reflex; Future    Medicare annual wellness visit, subsequent    Screening for diabetes mellitus (DM)  -     Hemoglobin A1C; Future    Other orders  -     Cancel: Comprehensive metabolic panel; Future  -     Cancel: Hemoglobin A1C; Future  -     Cancel: LDL cholesterol, direct; Future  -     Cancel: Triglycerides; Future      A/P: Doing well and tolerating XRT  Will check labs  Placard forms filled out  Continue current treatment and RTC four months for routine  Subjective:      Patient ID: Richardson Farah is a [de-identified] y o  male  WM RTC for f/u Prostate Ca, htn, etc  Doing well and no new issues  Just finished a round of XRT and reports things are going well with PSA falling  Remains active w/o difficulty and no falls  NADIA is good  Denies depression  Some stool frequency due to the XRT  Due for labs and just had a flu vaccine  Needs a parking placard alicia filled out  The following portions of the patient's history were reviewed and updated as appropriate:   He  has a past medical history of Cancer ([address] ); Dementia; Disease of thyroid gland; Eczema; Generalized anxiety disorder; Hypertension; Prostate cancer ([address] ); and Skin disorder    He   Patient Active Problem List    Diagnosis Date Noted    History of radiation therapy 11/05/2018    Chronic shoulder pain 02/21/2017    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    Eczema 02/11/2015    Hypothyroidism 08/08/2012    Generalized anxiety disorder 06/05/2012    Hyperlipidemia 06/05/2012    Hypertension 06/05/2012    Osteoarthritis 06/05/2012    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 ([facility] [address] ) 06/05/2012     He  has a past surgical history that includes Knee surgery; Prostate surgery; Vasectomy; Cataract extraction; Eye surgery; and Colonoscopy  His family history includes Alcohol abuse in his family, father, and mother; Colon cancer in his maternal aunt; Depression in his father; No Known Problems in his maternal grandfather, maternal grandmother, paternal grandfather, paternal grandmother, and sister; Stroke in his brother  He  reports that he has quit smoking  His smoking use included Cigars  He has never used smokeless tobacco  He reports that he does not drink alcohol or use drugs  Current Outpatient Prescriptions   Medication Sig Dispense Refill    ALPRAZolam (XANAX) 0 5 mg tablet Take 1 tablet (0 5 mg total) by mouth 3 (three) times a day as needed (PRN) 90 tablet 0    aspirin 81 MG tablet Take by mouth      levothyroxine 88 mcg tablet Take 1 tablet (88 mcg total) by mouth daily 90 tablet 3    lisinopril-hydrochlorothiazide (PRINZIDE,ZESTORETIC) 20-25 MG per tablet Take 1 tablet by mouth daily 90 tablet 3    Multiple Vitamin (MULTI-VITAMIN DAILY PO) Take 1 tablet by mouth daily      pravastatin (PRAVACHOL) 40 mg tablet Take 1 tablet (40 mg total) by mouth daily 90 tablet 3    triamcinolone (KENALOG) 0 5 % cream Apply topically 3 (three) times a day 454 g 0     No current facility-administered medications for this visit        Current Outpatient Prescriptions on File Prior to Visit   Medication Sig    ALPRAZolam (XANAX) 0 5 mg tablet Take 1 tablet (0 5 mg total) by mouth 3 (three) times a day as needed (PRN)    aspirin 81 MG tablet Take by mouth    levothyroxine 88 mcg tablet Take 1 tablet (88 mcg total) by mouth daily    lisinopril-hydrochlorothiazide (PRINZIDE,ZESTORETIC) 20-25 MG per tablet Take 1 tablet by mouth daily    Multiple Vitamin (MULTI-VITAMIN DAILY PO) Take 1 tablet by mouth daily    pravastatin (PRAVACHOL) 40 mg tablet Take 1 tablet (40 mg total) by mouth daily    triamcinolone (KENALOG) 0 5 % cream Apply topically 3 (three) times a day     No current facility-administered medications on file prior to visit  He has No Known Allergies       Review of Systems   Constitutional: Negative for activity change, chills, diaphoresis, fatigue and fever  HENT: Negative  Eyes: Negative for visual disturbance  Respiratory: Negative for cough, chest tightness, shortness of breath and wheezing  Cardiovascular: Negative for chest pain, palpitations and leg swelling  Gastrointestinal: Negative for abdominal distention, abdominal pain, anal bleeding, blood in stool, constipation, diarrhea, nausea, rectal pain and vomiting  Stool frequency   Endocrine: Negative for cold intolerance and heat intolerance  Genitourinary: Negative for difficulty urinating, dysuria and frequency  Musculoskeletal: Negative for arthralgias, gait problem and myalgias  Neurological: Negative for dizziness, tremors, seizures, syncope, weakness, light-headedness, numbness and headaches  Hematological: Does not bruise/bleed easily  Psychiatric/Behavioral: Negative for confusion and dysphoric mood  The patient is not nervous/anxious  Objective:      /60   Pulse 70   Temp 98 3 °F (36 8 °C) (Tympanic)   Resp 18   Ht 5' 8" (1 727 m)   Wt 89 8 kg (198 lb)   BMI 30 11 kg/m²          Physical Exam   Constitutional: He is oriented to person, place, and time  He appears well-developed and well-nourished  No distress  HENT:   Head: Normocephalic and atraumatic  Mouth/Throat: Oropharynx is clear and moist    Eyes: Pupils are equal, round, and reactive to light  Conjunctivae and EOM are normal    Neck: Neck supple  No JVD present  Cardiovascular: Normal rate, regular rhythm and normal heart sounds      No murmur heard   Pulmonary/Chest: Effort normal and breath sounds normal  No respiratory distress  He has no wheezes  Abdominal: Soft  Bowel sounds are normal  He exhibits no distension  There is no tenderness  Musculoskeletal: He exhibits no edema  Neurological: He is alert and oriented to person, place, and time  Psychiatric: He has a normal mood and affect  His behavior is normal  Judgment and thought content normal    Nursing note and vitals reviewed

## 2018-11-14 ENCOUNTER — APPOINTMENT (OUTPATIENT)
Dept: LAB | Facility: CLINIC | Age: 78
End: 2018-11-14
Payer: COMMERCIAL

## 2018-11-14 DIAGNOSIS — E78.2 MIXED HYPERLIPIDEMIA: ICD-10-CM

## 2018-11-14 DIAGNOSIS — I10 ESSENTIAL HYPERTENSION: ICD-10-CM

## 2018-11-14 DIAGNOSIS — Z13.1 SCREENING FOR DIABETES MELLITUS (DM): ICD-10-CM

## 2018-11-14 LAB
ALBUMIN SERPL BCP-MCNC: 3.6 G/DL (ref 3.5–5)
ALP SERPL-CCNC: 61 U/L (ref 46–116)
ALT SERPL W P-5'-P-CCNC: 37 U/L (ref 12–78)
ANION GAP SERPL CALCULATED.3IONS-SCNC: 6 MMOL/L (ref 4–13)
AST SERPL W P-5'-P-CCNC: 21 U/L (ref 5–45)
BILIRUB SERPL-MCNC: 0.59 MG/DL (ref 0.2–1)
BUN SERPL-MCNC: 20 MG/DL (ref 5–25)
CALCIUM SERPL-MCNC: 8.4 MG/DL (ref 8.3–10.1)
CHLORIDE SERPL-SCNC: 107 MMOL/L (ref 100–108)
CHOLEST SERPL-MCNC: 155 MG/DL (ref 50–200)
CO2 SERPL-SCNC: 26 MMOL/L (ref 21–32)
CREAT SERPL-MCNC: 1.18 MG/DL (ref 0.6–1.3)
EST. AVERAGE GLUCOSE BLD GHB EST-MCNC: 117 MG/DL
GFR SERPL CREATININE-BSD FRML MDRD: 58 ML/MIN/1.73SQ M
GLUCOSE P FAST SERPL-MCNC: 91 MG/DL (ref 65–99)
HBA1C MFR BLD: 5.7 % (ref 4.2–6.3)
HDLC SERPL-MCNC: 45 MG/DL (ref 40–60)
LDLC SERPL CALC-MCNC: 84 MG/DL (ref 0–100)
POTASSIUM SERPL-SCNC: 3.7 MMOL/L (ref 3.5–5.3)
PROT SERPL-MCNC: 7 G/DL (ref 6.4–8.2)
SODIUM SERPL-SCNC: 139 MMOL/L (ref 136–145)
TRIGL SERPL-MCNC: 131 MG/DL
TSH SERPL DL<=0.05 MIU/L-ACNC: 1.3 UIU/ML (ref 0.36–3.74)

## 2018-11-14 PROCEDURE — 36415 COLL VENOUS BLD VENIPUNCTURE: CPT

## 2018-11-14 PROCEDURE — 84443 ASSAY THYROID STIM HORMONE: CPT

## 2018-11-14 PROCEDURE — 80053 COMPREHEN METABOLIC PANEL: CPT

## 2018-11-14 PROCEDURE — 83036 HEMOGLOBIN GLYCOSYLATED A1C: CPT

## 2018-11-14 PROCEDURE — 80061 LIPID PANEL: CPT

## 2018-11-15 ENCOUNTER — OFFICE VISIT (OUTPATIENT)
Dept: UROLOGY | Facility: CLINIC | Age: 78
End: 2018-11-15
Payer: COMMERCIAL

## 2018-11-15 VITALS
HEART RATE: 64 BPM | WEIGHT: 202 LBS | BODY MASS INDEX: 30.71 KG/M2 | DIASTOLIC BLOOD PRESSURE: 60 MMHG | SYSTOLIC BLOOD PRESSURE: 120 MMHG

## 2018-11-15 DIAGNOSIS — C77.5 MALIGNANT NEOPLASM OF PROSTATE METASTATIC TO INTRAPELVIC LYMPH NODE (HCC): Primary | ICD-10-CM

## 2018-11-15 DIAGNOSIS — Z92.3 HISTORY OF RADIATION THERAPY: ICD-10-CM

## 2018-11-15 DIAGNOSIS — C61 MALIGNANT NEOPLASM OF PROSTATE METASTATIC TO INTRAPELVIC LYMPH NODE (HCC): Primary | ICD-10-CM

## 2018-11-15 DIAGNOSIS — E03.9 HYPOTHYROIDISM, UNSPECIFIED TYPE: ICD-10-CM

## 2018-11-15 DIAGNOSIS — E78.5 HYPERLIPIDEMIA, UNSPECIFIED HYPERLIPIDEMIA TYPE: ICD-10-CM

## 2018-11-15 DIAGNOSIS — I10 ESSENTIAL HYPERTENSION: ICD-10-CM

## 2018-11-15 PROCEDURE — 99213 OFFICE O/P EST LOW 20 MIN: CPT | Performed by: UROLOGY

## 2018-11-15 PROCEDURE — 4040F PNEUMOC VAC/ADMIN/RCVD: CPT | Performed by: UROLOGY

## 2018-11-15 NOTE — PROGRESS NOTES
UROLOGY FOLLOW UP NOTE     CHIEF COMPLAINT   Stu Ferreira is a [de-identified] y o  male with a complaint of   Chief Complaint   Patient presents with    Prostate Cancer     PSA 11/8/18  1 2     History of Present Illness:     [de-identified] y o  male s/p prostatectomy in the 1997 in [place]  I have the luxury of the patient's pathology report from his biopsy which demonstrated [place] 6 disease  I do not have his final pathologic evaluation for review  The patient reports he was told that the cancer was confined  Lymph nodes were sampled by his report and were negative as well  Patient had undetectable PSAs following surgery  He did developed urethral stricture disease and required dilations  He has not had any issues in the last 12 years  Patient also developed severe erectile dysfunction and underwent placement of a 3 piece penile implant  He only utilize this device once as he was unhappy with the desired rigidity  PSAs have been followed by the primary care team in recent years  Lab Results   Component Value Date    PSA 1 2 11/08/2018    PSA 1 7 08/06/2018    PSA 1 4 04/12/2018 4/12/18 PSA value 1 4  11/22/16 PSA 0 29  10/1/15 PSA 0 13  4/30/14 PSA 0 12  3/29/13 PSA 0 04    Axumin PET scan was positive for pelvic disease  Patient underwent salvage radiotherapy of the pelvis  This completed 9/25/2018  His PSA trend above  Past Medical History:     Past Medical History:   Diagnosis Date    Cancer ([address] )     Dementia     Disease of thyroid gland     Eczema     Generalized anxiety disorder     Hypertension     Prostate cancer ([address] )     Skin disorder     suspect benign, but will need removal and due to location, refer   Last assessed: April 18, 2013       PAST SURGICAL HISTORY:     Past Surgical History:   Procedure Laterality Date    CATARACT EXTRACTION      COLONOSCOPY      EYE SURGERY      KNEE SURGERY      PROSTATE SURGERY      VASECTOMY         CURRENT MEDICATIONS:     Current Outpatient Prescriptions   Medication Sig Dispense Refill    ALPRAZolam (XANAX) 0 5 mg tablet Take 1 tablet (0 5 mg total) by mouth 3 (three) times a day as needed (PRN) 90 tablet 0    levothyroxine 88 mcg tablet Take 1 tablet (88 mcg total) by mouth daily 90 tablet 3    lisinopril-hydrochlorothiazide (PRINZIDE,ZESTORETIC) 20-25 MG per tablet Take 1 tablet by mouth daily 90 tablet 3    Multiple Vitamin (MULTI-VITAMIN DAILY PO) Take 1 tablet by mouth daily      pravastatin (PRAVACHOL) 40 mg tablet Take 1 tablet (40 mg total) by mouth daily 90 tablet 3    triamcinolone (KENALOG) 0 5 % cream Apply topically 3 (three) times a day 454 g 0    aspirin 81 MG tablet Take by mouth       No current facility-administered medications for this visit  ALLERGIES:   No Known Allergies    SOCIAL HISTORY:     Social History     Social History    Marital status: /Civil Union     Spouse name: N/A    Number of children: N/A    Years of education: N/A     Occupational History    self employed  floor covering      Social History Main Topics    Smoking status: Former Smoker     Types: Cigars    Smokeless tobacco: Never Used      Comment: smokes 1 cigar a couple times a month    Alcohol use No    Drug use: No      Comment: Chronic Narcotic use noted in "allscripts"     Sexual activity: Not Asked     Other Topics Concern    None     Social History Narrative    Caffeine use        SOCIAL HISTORY:     Family History   Problem Relation Age of Onset    Alcohol abuse Mother     Alcohol abuse Father     Depression Father     No Known Problems Sister     Stroke Brother         syndrome     No Known Problems Maternal Grandmother     No Known Problems Maternal Grandfather     No Known Problems Paternal Grandmother     No Known Problems Paternal Grandfather     Alcohol abuse Family     Colon cancer Maternal Aunt        REVIEW OF SYSTEMS:     Review of Systems   Constitutional: Negative      Respiratory: Negative  Cardiovascular: Negative  Gastrointestinal: Negative  Genitourinary: Negative  Musculoskeletal: Negative  Skin: Positive for rash (Intrigenous rash in the groin)  Neurological: Negative  Psychiatric/Behavioral: Negative  PHYSICAL EXAM:     /60   Pulse 64   Wt 91 6 kg (202 lb)   BMI 30 71 kg/m²     General:  Healthy appearing male in no acute distress  They have a normal affect  There is not appear to be any gross neurologic defects or abnormalities  HEENT:  Normocephalic, atraumatic  Neck is supple without any palpable lymphadenopathy  Cardiovascular:  Patient has normal palpable distal radial pulses  There is no significant peripheral edema  No JVD is noted  Respiratory:  Patient has unlabored respirations  There is no audible wheeze or rhonchi  Abdomen:  Abdomen with healed RRP surgical scars  Abdomen is soft and nontender  There is no tympany  Inguinal and umbilical hernia are not appreciated  Genitourinary: Three piece penile implant in place with corporal cylinders palpable and scrotal pump palpable  There is no pain or inflammation around these devices  I do not palpate any significant lymphadenopathy in the groin bilaterally    Musculoskeletal:  Patient does not have significant CVA tenderness in the  flank with palpation or percussion  They full range of motion in all 4 extremities  Strength in all 4 extremities appears congruent  Patient is able to ambulate without assistance or difficulty  Dermatologic:  Patient has no skin abnormalities or rashes        LABS:     CBC:   Lab Results   Component Value Date    WBC 6 00 09/17/2018    HGB 13 0 09/17/2018    HCT 41 0 09/17/2018    MCV 96 09/17/2018     09/17/2018       BMP:   Lab Results   Component Value Date    CALCIUM 8 4 11/14/2018    K 3 7 11/14/2018    CO2 26 11/14/2018     11/14/2018    BUN 20 11/14/2018    CREATININE 1 18 11/14/2018     Lab Results   Component Value Date PSA 1 2 2018    PSA 1 7 2018    PSA 1 4 2018 PSA value 1 4  16 PSA 0 29  10/1/15 PSA 0 13  14 PSA 0 12  3/29/13 PSA 0 04    IMAGIN/5/18  AXUMIN PET/CT SCAN     INDICATION: Recurrent prostate cancer, increasing PSA  C61: Malignant neoplasm of prostate     MODIFIER: PS      COMPARISON: None     CELL TYPE:  Prostate adenocarcinoma, prostate biopsy [place] [facility]     TECHNIQUE: 10 8 mCi F-18 Axumin administered IV  Multiplanar attenuation corrected and non-attenuation corrected PET images are available for interpretation, and contiguous, low dose, axial CT sections were obtained from the skull base through the   femurs  Intravenous contrast material was not utilized       FINDINGS:      VISUALIZED BRAIN:   No acute abnormalities are seen      HEAD/NECK:   There is a physiologic distribution of the radiotracer      CT images:  Depressed right lamina papyracea may be related to prior trauma      CHEST:   There is a physiologic distribution of the radiotracer  CT images: Scattered coronary artery calcifications  Heart is enlarged      ABDOMEN:   There is a physiologic distribution of the radiotracer  CT images:  A few small renal cysts      PELVIS:      Focal radiotracer uptake noted in a right pelvic sidewall lymph node, SUV max of 6 5  This lymph node measures 1 5 x 0 9 cm, image 261 series 3      Mild radiotracer uptake in a small left inguinal lymph node, SUV max of 1 7  This lymph node measures 1 3 x 0 6 cm, image 284 series 3      No focal radiotracer uptake at the prostate bed      CT images: Penile prosthesis noted with reservoir  Multiple surgical clips at the prostate bed and along the pelvic sidewalls      OSSEOUS STRUCTURES:  Small focus of radiotracer uptake in the right posterior acetabulum, SUV max of 2 7 cm, see image 268 series 12  No definite lesion seen on limited CT      Otherwise unremarkable    CT images: No significant findings      IMPRESSION:     1  Focal radiotracer uptake in a right pelvic sidewall lymph node compatible with metastasis  2  Mild radiotracer uptake in a small left inguinal lymph node, nonspecific  Recommend continued follow up  3  Small focus of radiotracer uptake in the right posterior acetabulum  No definite lesion here on CT but findings would be concerning for early osseous metastasis  PATHOLOGY:     1997 Report of [place] [facility] CaP on biopsy pathology    ASSESSMENT:     [de-identified] y o  male with prostate cancer s/p prior prostatectomy with PSA 1 4 and positive PET scan, now s/p salvage XRT to pelvis    PLAN:     Patient is now approximately 6 weeks out from his salvage radiation therapy  We will continue to follow for a more dramatic decrease in the patient's PSA  Patient may ultimately need additional imaging if the PSA does not respond as we had hoped  May have to consider androgen deprivation therapy in the future      Return with PSA in 3 months

## 2018-11-19 RX ORDER — PRAVASTATIN SODIUM 40 MG
40 TABLET ORAL DAILY
Qty: 90 TABLET | Refills: 0 | Status: SHIPPED | OUTPATIENT
Start: 2018-11-19 | End: 2019-02-22 | Stop reason: SDUPTHER

## 2018-11-19 RX ORDER — LISINOPRIL AND HYDROCHLOROTHIAZIDE 25; 20 MG/1; MG/1
1 TABLET ORAL DAILY
Qty: 90 TABLET | Refills: 0 | Status: SHIPPED | OUTPATIENT
Start: 2018-11-19 | End: 2019-02-22 | Stop reason: SDUPTHER

## 2018-11-19 RX ORDER — LEVOTHYROXINE SODIUM 88 UG/1
88 TABLET ORAL DAILY
Qty: 90 TABLET | Refills: 0 | Status: SHIPPED | OUTPATIENT
Start: 2018-11-19 | End: 2019-02-22 | Stop reason: SDUPTHER

## 2018-11-19 NOTE — TELEPHONE ENCOUNTER
From: Berto Yen  Sent: 11/15/2018 7:30 AM EST  Subject: Medication Renewal Request    Dexter Sharp would like a refill of the following medications:     levothyroxine 88 mcg tablet Giovanna Ambrose DO]     lisinopril-hydrochlorothiazide (PRINZIDE,ZESTORETIC) 20-25 MG per tablet Giovanna Ambrose DO]     pravastatin (PRAVACHOL) 40 mg tablet Giovanna Ambrose DO]    Preferred pharmacy: [id number]

## 2018-11-25 DIAGNOSIS — F41.9 ANXIETY: ICD-10-CM

## 2018-11-26 RX ORDER — ALPRAZOLAM 0.5 MG/1
0.5 TABLET ORAL 3 TIMES DAILY PRN
Qty: 90 TABLET | Refills: 0 | Status: SHIPPED | OUTPATIENT
Start: 2018-11-26 | End: 2018-12-22 | Stop reason: SDUPTHER

## 2018-11-26 NOTE — TELEPHONE ENCOUNTER
From: Juanjose Hernandez  Sent: 11/25/2018 4:40 PM EST  Subject: Medication Renewal Request    Dexter Sharp would like a refill of the following medications:     ALPRAZolam (XANAX) 0 5 mg tablet Jl Mariano DO]    Preferred pharmacy: [id number]

## 2018-12-22 DIAGNOSIS — F41.9 ANXIETY: ICD-10-CM

## 2018-12-24 RX ORDER — ALPRAZOLAM 0.5 MG/1
0.5 TABLET ORAL 3 TIMES DAILY PRN
Qty: 90 TABLET | Refills: 0 | Status: SHIPPED | OUTPATIENT
Start: 2018-12-24 | End: 2019-01-24 | Stop reason: SDUPTHER

## 2018-12-24 NOTE — TELEPHONE ENCOUNTER
From: Sy Lincoln  Sent: 12/22/2018 11:08 AM EST  Subject: Medication Renewal Request    Dexter Sharp would like a refill of the following medications:     ALPRAZolam (XANAX) 0 5 mg tablet STACEY Fisher    Preferred pharmacy: [facility]

## 2019-01-17 ENCOUNTER — TRANSCRIBE ORDERS (OUTPATIENT)
Dept: LAB | Facility: CLINIC | Age: 79
End: 2019-01-17

## 2019-01-17 ENCOUNTER — APPOINTMENT (OUTPATIENT)
Dept: LAB | Facility: CLINIC | Age: 79
End: 2019-01-17
Payer: COMMERCIAL

## 2019-01-17 DIAGNOSIS — C61 MALIGNANT NEOPLASM OF PROSTATE METASTATIC TO INTRAPELVIC LYMPH NODE (HCC): ICD-10-CM

## 2019-01-17 DIAGNOSIS — C77.5 MALIGNANT NEOPLASM OF PROSTATE METASTATIC TO INTRAPELVIC LYMPH NODE (HCC): ICD-10-CM

## 2019-01-17 LAB — PSA SERPL-MCNC: 0.4 NG/ML (ref 0–4)

## 2019-01-17 PROCEDURE — 84153 ASSAY OF PSA TOTAL: CPT

## 2019-01-17 PROCEDURE — 36415 COLL VENOUS BLD VENIPUNCTURE: CPT

## 2019-01-24 DIAGNOSIS — F41.9 ANXIETY: ICD-10-CM

## 2019-01-24 RX ORDER — ALPRAZOLAM 0.5 MG/1
0.5 TABLET ORAL 3 TIMES DAILY PRN
Qty: 90 TABLET | Refills: 0 | Status: SHIPPED | OUTPATIENT
Start: 2019-01-24 | End: 2019-02-22 | Stop reason: SDUPTHER

## 2019-01-24 NOTE — TELEPHONE ENCOUNTER
From: Socorro Branch  Sent: 1/24/2019 8:21 AM EST  Subject: Medication Renewal Request    Dexter Sharp would like a refill of the following medications:     ALPRAZolam (XANAX) 0 5 mg tablet Brittaney Allen, DO]    Preferred pharmacy: [id number]

## 2019-02-07 ENCOUNTER — APPOINTMENT (OUTPATIENT)
Dept: RADIOLOGY | Facility: CLINIC | Age: 79
End: 2019-02-07
Payer: COMMERCIAL

## 2019-02-07 ENCOUNTER — TRANSCRIBE ORDERS (OUTPATIENT)
Dept: RADIOLOGY | Facility: CLINIC | Age: 79
End: 2019-02-07

## 2019-02-07 ENCOUNTER — OFFICE VISIT (OUTPATIENT)
Dept: INTERNAL MEDICINE CLINIC | Facility: CLINIC | Age: 79
End: 2019-02-07
Payer: COMMERCIAL

## 2019-02-07 VITALS
DIASTOLIC BLOOD PRESSURE: 56 MMHG | HEART RATE: 50 BPM | WEIGHT: 203 LBS | HEIGHT: 68 IN | BODY MASS INDEX: 30.77 KG/M2 | SYSTOLIC BLOOD PRESSURE: 130 MMHG | TEMPERATURE: 98.6 F | OXYGEN SATURATION: 97 % | RESPIRATION RATE: 16 BRPM

## 2019-02-07 DIAGNOSIS — M25.572 ACUTE LEFT ANKLE PAIN: ICD-10-CM

## 2019-02-07 DIAGNOSIS — M25.572 ACUTE LEFT ANKLE PAIN: Primary | ICD-10-CM

## 2019-02-07 PROCEDURE — 99214 OFFICE O/P EST MOD 30 MIN: CPT | Performed by: PHYSICIAN ASSISTANT

## 2019-02-07 PROCEDURE — 73610 X-RAY EXAM OF ANKLE: CPT

## 2019-02-07 PROCEDURE — 1160F RVW MEDS BY RX/DR IN RCRD: CPT | Performed by: PHYSICIAN ASSISTANT

## 2019-02-07 RX ORDER — DICLOFENAC SODIUM 75 MG/1
75 TABLET, DELAYED RELEASE ORAL 2 TIMES DAILY
Qty: 60 TABLET | Refills: 0 | Status: SHIPPED | OUTPATIENT
Start: 2019-02-07 | End: 2019-11-15

## 2019-02-07 NOTE — PROGRESS NOTES
Assessment/Plan:    Acute left ankle pain  Xray ordered  Diclofenac given  Change tx prn xray results       Diagnoses and all orders for this visit:    Acute left ankle pain  -     XR ankle 3+ vw left; Future  -     diclofenac (VOLTAREN) 75 mg EC tablet; Take 1 tablet (75 mg total) by mouth 2 (two) times a day          Subjective:      Patient ID: Richardson Farah is a [de-identified] y o  male  Ankle Injury    The incident occurred more than 1 week ago  The injury mechanism was an inversion injury (Pt fell on the ice last week)  The pain is present in the left ankle  The pain is at a severity of 6/10  The pain is moderate  The pain has been constant since onset  Associated symptoms include an inability to bear weight  Pertinent negatives include no loss of motion, loss of sensation, numbness or tingling  He reports no foreign bodies present  The symptoms are aggravated by weight bearing  He has tried acetaminophen for the symptoms  The treatment provided no relief  The following portions of the patient's history were reviewed and updated as appropriate:   He  has a past medical history of Cancer ([address] ); Dementia; Disease of thyroid gland; Eczema; Generalized anxiety disorder; Hypertension; Prostate cancer ([address] ); and Skin disorder  He   Patient Active Problem List    Diagnosis Date Noted    Acute left ankle pain 02/07/2019    History of radiation therapy 11/05/2018    Chronic shoulder pain 02/21/2017    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    Eczema 02/11/2015    Hypothyroidism 08/08/2012    Generalized anxiety disorder 06/05/2012    Hyperlipidemia 06/05/2012    Hypertension 06/05/2012    Osteoarthritis 06/05/2012    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 ([address] ) 06/05/2012     He  has a past surgical history that includes Knee surgery; Prostate surgery; Vasectomy; Cataract extraction; Eye surgery; and Colonoscopy    His family history includes Alcohol abuse in his family, father, and mother; Colon cancer in his maternal aunt; Depression in his father; No Known Problems in his maternal grandfather, maternal grandmother, paternal grandfather, paternal grandmother, and sister; Stroke in his brother  He  reports that he has quit smoking  His smoking use included Cigars  He has never used smokeless tobacco  He reports that he does not drink alcohol or use drugs  Current Outpatient Prescriptions   Medication Sig Dispense Refill    ALPRAZolam (XANAX) 0 5 mg tablet Take 1 tablet (0 5 mg total) by mouth 3 (three) times a day as needed (PRN) 90 tablet 0    levothyroxine 88 mcg tablet Take 1 tablet (88 mcg total) by mouth daily 90 tablet 0    lisinopril-hydrochlorothiazide (PRINZIDE,ZESTORETIC) 20-25 MG per tablet Take 1 tablet by mouth daily 90 tablet 0    Multiple Vitamin (MULTI-VITAMIN DAILY PO) Take 1 tablet by mouth daily      pravastatin (PRAVACHOL) 40 mg tablet Take 1 tablet (40 mg total) by mouth daily 90 tablet 0    triamcinolone (KENALOG) 0 5 % cream Apply topically 3 (three) times a day 454 g 0    diclofenac (VOLTAREN) 75 mg EC tablet Take 1 tablet (75 mg total) by mouth 2 (two) times a day 60 tablet 0     No current facility-administered medications for this visit        Current Outpatient Prescriptions on File Prior to Visit   Medication Sig    ALPRAZolam (XANAX) 0 5 mg tablet Take 1 tablet (0 5 mg total) by mouth 3 (three) times a day as needed (PRN)    levothyroxine 88 mcg tablet Take 1 tablet (88 mcg total) by mouth daily    lisinopril-hydrochlorothiazide (PRINZIDE,ZESTORETIC) 20-25 MG per tablet Take 1 tablet by mouth daily    Multiple Vitamin (MULTI-VITAMIN DAILY PO) Take 1 tablet by mouth daily    pravastatin (PRAVACHOL) 40 mg tablet Take 1 tablet (40 mg total) by mouth daily    triamcinolone (KENALOG) 0 5 % cream Apply topically 3 (three) times a day    [DISCONTINUED] aspirin 81 MG tablet Take by mouth     No current facility-administered medications on file prior to visit  He has No Known Allergies       Review of Systems   Constitutional: Negative for chills and fever  HENT: Negative for congestion, ear pain, hearing loss, postnasal drip, rhinorrhea, sinus pain, sinus pressure, sore throat and trouble swallowing  Eyes: Negative for pain and visual disturbance  Respiratory: Negative for cough, chest tightness, shortness of breath and wheezing  Cardiovascular: Negative  Negative for chest pain, palpitations and leg swelling  Gastrointestinal: Negative for abdominal pain, blood in stool, constipation, diarrhea, nausea and vomiting  Endocrine: Negative for cold intolerance, heat intolerance, polydipsia, polyphagia and polyuria  Genitourinary: Negative for difficulty urinating, dysuria, flank pain and urgency  Musculoskeletal: Positive for arthralgias  Negative for back pain, gait problem and myalgias  Skin: Negative for rash  Allergic/Immunologic: Negative  Neurological: Negative for dizziness, tingling, weakness, light-headedness, numbness and headaches  Hematological: Negative  Psychiatric/Behavioral: Negative for behavioral problems, dysphoric mood and sleep disturbance  The patient is not nervous/anxious  Objective:      /56 (BP Location: Left arm, Patient Position: Sitting, Cuff Size: Large)   Pulse (!) 50   Temp 98 6 °F (37 °C)   Resp 16   Ht 5' 8" (1 727 m)   Wt 92 1 kg (203 lb)   SpO2 97%   BMI 30 87 kg/m²          Physical Exam   Constitutional: He is oriented to person, place, and time  He appears well-developed and well-nourished  No distress  HENT:   Head: Normocephalic and atraumatic  Right Ear: External ear normal    Left Ear: External ear normal    Nose: Nose normal    Mouth/Throat: Oropharynx is clear and moist  No oropharyngeal exudate  Eyes: Pupils are equal, round, and reactive to light  Conjunctivae and EOM are normal  Right eye exhibits no discharge  Left eye exhibits no discharge   No scleral icterus  Neck: Normal range of motion  Neck supple  No thyromegaly present  Cardiovascular: Normal rate, regular rhythm and normal heart sounds  Exam reveals no gallop and no friction rub  No murmur heard  Pulmonary/Chest: Effort normal and breath sounds normal  No respiratory distress  He has no wheezes  He has no rales  Abdominal: Soft  Bowel sounds are normal  He exhibits no distension  There is no tenderness  Musculoskeletal: Normal range of motion  He exhibits no edema or deformity  Left ankle: Tenderness  Neurological: He is alert and oriented to person, place, and time  No cranial nerve deficit  Skin: Skin is warm and dry  He is not diaphoretic  Psychiatric: He has a normal mood and affect   His behavior is normal  Judgment and thought content normal

## 2019-02-22 DIAGNOSIS — I10 ESSENTIAL HYPERTENSION: ICD-10-CM

## 2019-02-22 DIAGNOSIS — E03.9 HYPOTHYROIDISM, UNSPECIFIED TYPE: ICD-10-CM

## 2019-02-22 DIAGNOSIS — F41.9 ANXIETY: ICD-10-CM

## 2019-02-22 DIAGNOSIS — E78.5 HYPERLIPIDEMIA, UNSPECIFIED HYPERLIPIDEMIA TYPE: ICD-10-CM

## 2019-02-25 RX ORDER — PRAVASTATIN SODIUM 40 MG
40 TABLET ORAL DAILY
Qty: 90 TABLET | Refills: 1 | Status: SHIPPED | OUTPATIENT
Start: 2019-02-25 | End: 2019-03-21 | Stop reason: SDUPTHER

## 2019-02-25 RX ORDER — ALPRAZOLAM 0.5 MG/1
0.5 TABLET ORAL 3 TIMES DAILY PRN
Qty: 90 TABLET | Refills: 0 | Status: SHIPPED | OUTPATIENT
Start: 2019-02-25 | End: 2019-03-21 | Stop reason: SDUPTHER

## 2019-02-25 RX ORDER — LISINOPRIL AND HYDROCHLOROTHIAZIDE 25; 20 MG/1; MG/1
1 TABLET ORAL DAILY
Qty: 90 TABLET | Refills: 1 | Status: SHIPPED | OUTPATIENT
Start: 2019-02-25 | End: 2019-03-21 | Stop reason: SDUPTHER

## 2019-02-25 RX ORDER — LEVOTHYROXINE SODIUM 88 UG/1
88 TABLET ORAL DAILY
Qty: 90 TABLET | Refills: 1 | Status: SHIPPED | OUTPATIENT
Start: 2019-02-25 | End: 2019-03-21 | Stop reason: SDUPTHER

## 2019-03-12 ENCOUNTER — OFFICE VISIT (OUTPATIENT)
Dept: INTERNAL MEDICINE CLINIC | Facility: CLINIC | Age: 79
End: 2019-03-12
Payer: COMMERCIAL

## 2019-03-12 VITALS
TEMPERATURE: 98.7 F | DIASTOLIC BLOOD PRESSURE: 60 MMHG | HEART RATE: 70 BPM | WEIGHT: 199 LBS | RESPIRATION RATE: 14 BRPM | SYSTOLIC BLOOD PRESSURE: 122 MMHG | HEIGHT: 68 IN | BODY MASS INDEX: 30.16 KG/M2

## 2019-03-12 DIAGNOSIS — E78.2 MIXED HYPERLIPIDEMIA: ICD-10-CM

## 2019-03-12 DIAGNOSIS — M19.91 PRIMARY OSTEOARTHRITIS, UNSPECIFIED SITE: ICD-10-CM

## 2019-03-12 DIAGNOSIS — E03.9 ACQUIRED HYPOTHYROIDISM: ICD-10-CM

## 2019-03-12 DIAGNOSIS — E66.9 OBESITY (BMI 30.0-34.9): ICD-10-CM

## 2019-03-12 DIAGNOSIS — I10 ESSENTIAL HYPERTENSION: Primary | ICD-10-CM

## 2019-03-12 DIAGNOSIS — F41.1 GENERALIZED ANXIETY DISORDER: ICD-10-CM

## 2019-03-12 PROBLEM — M25.572 ACUTE LEFT ANKLE PAIN: Status: RESOLVED | Noted: 2019-02-07 | Resolved: 2019-03-12

## 2019-03-12 PROCEDURE — 1036F TOBACCO NON-USER: CPT | Performed by: INTERNAL MEDICINE

## 2019-03-12 PROCEDURE — 3078F DIAST BP <80 MM HG: CPT | Performed by: INTERNAL MEDICINE

## 2019-03-12 PROCEDURE — 3074F SYST BP LT 130 MM HG: CPT | Performed by: INTERNAL MEDICINE

## 2019-03-12 PROCEDURE — 99214 OFFICE O/P EST MOD 30 MIN: CPT | Performed by: INTERNAL MEDICINE

## 2019-03-12 NOTE — PROGRESS NOTES
Assessment/Plan:    No problem-specific Assessment & Plan notes found for this encounter  Diagnoses and all orders for this visit:    Essential hypertension    Acquired hypothyroidism    Primary osteoarthritis, unspecified site    Generalized anxiety disorder    Mixed hyperlipidemia    Obesity (BMI 30 0-34  9)      A/P: Doing well except for the stools  No diarrhea, but more frequency and most likely a complication of the XRT  Will continue current treatment and pt encouraged to call onc or  about the stools  Continue current treatment and RTC four months for routine  Subjective:      Patient ID: Marylee Perking is a [de-identified] y o  male  WM RTC for f/u htn, hyperlipidemia, etc  Doing well except frequent stooling since receiving XRT for prostate cancer  Remains active otherwise and no falls  NADIA and DJD pain is controlled  Labs and vaccines are up to date  The following portions of the patient's history were reviewed and updated as appropriate:   He  has a past medical history of Cancer ([facility] [address] ), Dementia, Disease of thyroid gland, Eczema, Generalized anxiety disorder, Hypertension, Prostate cancer ([facility] [address] ), and Skin disorder  He   Patient Active Problem List    Diagnosis Date Noted    History of radiation therapy 11/05/2018    Chronic shoulder pain 02/21/2017    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    Eczema 02/11/2015    Hypothyroidism 08/08/2012    Generalized anxiety disorder 06/05/2012    Hyperlipidemia 06/05/2012    Hypertension 06/05/2012    Osteoarthritis 06/05/2012    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 ([facility] [address] ) 06/05/2012     He  has a past surgical history that includes Knee surgery; Prostate surgery; Vasectomy; Cataract extraction; Eye surgery; and Colonoscopy    His family history includes Alcohol abuse in his family, father, and mother; Colon cancer in his maternal aunt; Depression in his father; No Known Problems in his maternal grandfather, maternal grandmother, paternal grandfather, paternal grandmother, and sister; Stroke in his brother  He  reports that he has quit smoking  His smoking use included cigars  He has never used smokeless tobacco  He reports that he does not drink alcohol or use drugs  Current Outpatient Medications   Medication Sig Dispense Refill    ALPRAZolam (XANAX) 0 5 mg tablet Take 1 tablet (0 5 mg total) by mouth 3 (three) times a day as needed (PRN) 90 tablet 0    diclofenac (VOLTAREN) 75 mg EC tablet Take 1 tablet (75 mg total) by mouth 2 (two) times a day 60 tablet 0    levothyroxine 88 mcg tablet Take 1 tablet (88 mcg total) by mouth daily 90 tablet 1    lisinopril-hydrochlorothiazide (PRINZIDE,ZESTORETIC) 20-25 MG per tablet Take 1 tablet by mouth daily 90 tablet 1    Multiple Vitamin (MULTI-VITAMIN DAILY PO) Take 1 tablet by mouth daily      pravastatin (PRAVACHOL) 40 mg tablet Take 1 tablet (40 mg total) by mouth daily 90 tablet 1    triamcinolone (KENALOG) 0 5 % cream Apply topically 3 (three) times a day 454 g 0     No current facility-administered medications for this visit  Current Outpatient Medications on File Prior to Visit   Medication Sig    ALPRAZolam (XANAX) 0 5 mg tablet Take 1 tablet (0 5 mg total) by mouth 3 (three) times a day as needed (PRN)    diclofenac (VOLTAREN) 75 mg EC tablet Take 1 tablet (75 mg total) by mouth 2 (two) times a day    levothyroxine 88 mcg tablet Take 1 tablet (88 mcg total) by mouth daily    lisinopril-hydrochlorothiazide (PRINZIDE,ZESTORETIC) 20-25 MG per tablet Take 1 tablet by mouth daily    Multiple Vitamin (MULTI-VITAMIN DAILY PO) Take 1 tablet by mouth daily    pravastatin (PRAVACHOL) 40 mg tablet Take 1 tablet (40 mg total) by mouth daily    triamcinolone (KENALOG) 0 5 % cream Apply topically 3 (three) times a day     No current facility-administered medications on file prior to visit  He has No Known Allergies       Review of Systems   Constitutional: Negative for activity change, chills, diaphoresis, fatigue and fever  HENT: Negative  Eyes: Negative for visual disturbance  Respiratory: Negative for cough, chest tightness, shortness of breath and wheezing  Cardiovascular: Negative for chest pain, palpitations and leg swelling  Gastrointestinal: Negative for abdominal pain, constipation, diarrhea, nausea and vomiting  Frequent stools   Endocrine: Negative for cold intolerance and heat intolerance  Genitourinary: Negative for difficulty urinating, dysuria and frequency  Musculoskeletal: Positive for arthralgias  Negative for gait problem and myalgias  Neurological: Negative for dizziness, seizures, syncope, weakness, light-headedness and headaches  Psychiatric/Behavioral: Negative for confusion, dysphoric mood and sleep disturbance  The patient is nervous/anxious  Objective:      /60   Pulse 70   Temp 98 7 °F (37 1 °C) (Tympanic)   Resp 14   Ht 5' 8" (1 727 m)   Wt 90 3 kg (199 lb)   BMI 30 26 kg/m²          Physical Exam   Constitutional: He is oriented to person, place, and time  He appears well-developed and well-nourished  No distress  HENT:   Head: Normocephalic and atraumatic  Mouth/Throat: Oropharynx is clear and moist    Eyes: Pupils are equal, round, and reactive to light  Conjunctivae and EOM are normal    Neck: Normal range of motion  Neck supple  No JVD present  Cardiovascular: Normal rate, regular rhythm and normal heart sounds  Pulmonary/Chest: Effort normal and breath sounds normal  No respiratory distress  He has no wheezes  He has no rales  Abdominal: Soft  He exhibits no distension  There is no tenderness  Musculoskeletal: He exhibits no edema  Neurological: He is alert and oriented to person, place, and time  Psychiatric: He has a normal mood and affect  His behavior is normal  Judgment and thought content normal    Nursing note and vitals reviewed  BMI Counseling:  Body mass index is 30 26 kg/m²  Discussed the patient's BMI with him  The BMI is above average  BMI counseling and education was provided to the patient  Nutrition recommendations include decreasing overall calorie intake and 3-5 servings of fruits/vegetables daily  Exercise recommendations include moderate aerobic physical activity for 150 minutes/week

## 2019-03-12 NOTE — PATIENT INSTRUCTIONS
Chronic Hypertension   AMBULATORY CARE:   Hypertension  is high blood pressure (BP)  Your BP is the force of your blood moving against the walls of your arteries  Normal BP is less than 120/80  Prehypertension is between 120/80 and 139/89  Hypertension is 140/90 or higher  Hypertension causes your BP to get so high that your heart has to work much harder than normal  This can damage your heart  Chronic hypertension is a long-term condition that you can control with a healthy lifestyle or medicines  A controlled blood pressure helps protect your organs, such as your heart, lungs, brain, and kidneys  Common symptoms include the following:   · Headache     · Blurred vision    · Chest pain     · Dizziness or weakness     · Trouble breathing     · Nosebleeds  Call 911 for any of the following:   · You have discomfort in your chest that feels like squeezing, pressure, fullness, or pain  · You become confused or have difficulty speaking  · You suddenly feel lightheaded or have trouble breathing  · You have pain or discomfort in your back, neck, jaw, stomach, or arm  Seek care immediately if:   · You have a severe headache or vision loss  · You have weakness in an arm or leg  Contact your healthcare provider if:   · You feel faint, dizzy, confused, or drowsy  · You have been taking your BP medicine and your BP is still higher than your healthcare provider says it should be  · You have questions or concerns about your condition or care  Treatment for chronic hypertension  may include medicine to lower your BP and lower your cholesterol level  A low cholesterol level helps prevent heart disease and makes it easier to control your blood pressure  Heart disease can make your blood pressure harder to control  You may also need to make lifestyle changes  Take your medicine exactly as directed    Manage chronic hypertension:  Talk with your healthcare provider about these and other ways to manage hypertension:  · Take your BP at home  Sit and rest for 5 minutes before you take your BP  Extend your arm and support it on a flat surface  Your arm should be at the same level as your heart  Follow the directions that came with your BP monitor  If possible, take at least 2 BP readings each time  Take your BP at least twice a day at the same times each day, such as morning and evening  Keep a record of your BP readings and bring it to your follow-up visits  Ask your healthcare provider what your blood pressure should be  · Limit sodium (salt) as directed  Too much sodium can affect your fluid balance  Check labels to find low-sodium or no-salt-added foods  Some low-sodium foods use potassium salts for flavor  Too much potassium can also cause health problems  Your healthcare provider will tell you how much sodium and potassium are safe for you to have in a day  He or she may recommend that you limit sodium to 2,300 mg a day  · Follow the meal plan recommended by your healthcare provider  A dietitian or your provider can give you more information on low-sodium plans or the DASH (Dietary Approaches to Stop Hypertension) eating plan  The DASH plan is low in sodium, unhealthy fats, and total fat  It is high in potassium, calcium, and fiber  · Exercise to maintain a healthy weight  Exercise at least 30 minutes per day, on most days of the week  This will help decrease your blood pressure  Ask about the best exercise plan for you  · Decrease stress  This may help lower your BP  Learn ways to relax, such as deep breathing or listening to music  · Limit alcohol  Women should limit alcohol to 1 drink a day  Men should limit alcohol to 2 drinks a day  A drink of alcohol is 12 ounces of beer, 5 ounces of wine, or 1½ ounces of liquor  · Do not smoke  Nicotine and other chemicals in cigarettes and cigars can increase your BP and also cause lung damage   Ask your healthcare provider for information if you currently smoke and need help to quit  E-cigarettes or smokeless tobacco still contain nicotine  Talk to your healthcare provider before you use these products  Follow up with your healthcare provider as directed: You will need to return to have your BP checked and to have other lab tests done  Write down your questions so you remember to ask them during your visits  © 2017 [employer] Information is for End User's use only and may not be sold, redistributed or otherwise used for commercial purposes  All illustrations and images included in CareNotes® are the copyrighted property of [employer]  or Ramesh Craig  The above information is an  only  It is not intended as medical advice for individual conditions or treatments  Talk to your doctor, nurse or pharmacist before following any medical regimen to see if it is safe and effective for you  Obesity   AMBULATORY CARE:   Obesity  is when your body mass index (BMI) is greater than 30  Your healthcare provider will use your height and weight to measure your BMI  The risks of obesity include  many health problems, such as injuries or physical disability  You may need tests to check for the following:  · Diabetes     · High blood pressure or high cholesterol     · Heart disease     · Gallbladder or liver disease     · Cancer of the colon, breast, prostate, liver, or kidney     · Sleep apnea     · Arthritis or gout  Seek care immediately if:   · You have a severe headache, confusion, or difficulty speaking  · You have weakness on one side of your body  · You have chest pain, sweating, or shortness of breath  Contact your healthcare provider if:   · You have symptoms of gallbladder or liver disease, such as pain in your upper abdomen  · You have knee or hip pain and discomfort while walking  · You have symptoms of diabetes, such as intense hunger and thirst, and frequent urination  · You have symptoms of sleep apnea, such as snoring or daytime sleepiness  · You have questions or concerns about your condition or care  Treatment for obesity  focuses on helping you lose weight to improve your health  Even a small decrease in BMI can reduce the risk for many health problems  Your healthcare provider will help you set a weight-loss goal   · Lifestyle changes  are the first step in treating obesity  These include making healthy food choices and getting regular physical activity  Your healthcare provider may suggest a weight-loss program that involves coaching, education, and therapy  · Medicine  may help you lose weight when it is used with a healthy diet and physical activity  · Surgery  can help you lose weight if you are very obese and have other health problems  There are several types of weight-loss surgery  Ask your healthcare provider for more information  Be successful losing weight:   · Set small, realistic goals  An example of a small goal is to walk for 20 minutes 5 days a week  Anther goal is to lose 5% of your body weight  · Tell friends, family members, and coworkers about your goals  and ask for their support  Ask a friend to lose weight with you, or join a weight-loss support group  · Identify foods or triggers that may cause you to overeat , and find ways to avoid them  Remove tempting high-calorie foods from your home and workplace  Place a bowl of fresh fruit on your kitchen counter  If stress causes you to eat, then find other ways to cope with stress  · Keep a diary to track what you eat and drink  Also write down how many minutes of physical activity you do each day  Weigh yourself once a week and record it in your diary  Eating changes: You will need to eat 500 to 1,000 fewer calories each day than you currently eat to lose 1 to 2 pounds a week  The following changes will help you cut calories:  · Eat smaller portions    Use small plates, no larger than 9 inches in diameter  Fill your plate half full of fruits and vegetables  Measure your food using measuring cups until you know what a serving size looks like  · Eat 3 meals and 1 or 2 snacks each day  Plan your meals in advance  Sudhir Blankenship and eat at home most of the time  Eat slowly  · Eat fruits and vegetables at every meal   They are low in calories and high in fiber, which makes you feel full  Do not add butter, margarine, or cream sauce to vegetables  Use herbs to season steamed vegetables  · Eat less fat and fewer fried foods  Eat more baked or grilled chicken and fish  These protein sources are lower in calories and fat than red meat  Limit fast food  Dress your salads with olive oil and vinegar instead of bottled dressing  · Limit the amount of sugar you eat  Do not drink sugary beverages  Limit alcohol  Activity changes:  Physical activity is good for your body in many ways  It helps you burn calories and build strong muscles  It decreases stress and depression, and improves your mood  It can also help you sleep better  Talk to your healthcare provider before you begin an exercise program   · Exercise for at least 30 minutes 5 days a week  Start slowly  Set aside time each day for physical activity that you enjoy and that is convenient for you  It is best to do both weight training and an activity that increases your heart rate, such as walking, bicycling, or swimming  · Find ways to be more active  Do yard work and housecleaning  Walk up the stairs instead of using elevators  Spend your leisure time going to events that require walking, such as outdoor festivals or fairs  This extra physical activity can help you lose weight and keep it off  Follow up with your healthcare provider as directed: You may need to meet with a dietitian  Write down your questions so you remember to ask them during your visits     © 2017 [employer] Information is for End User's use only and may not be sold, redistributed or otherwise used for commercial purposes  All illustrations and images included in CareNotes® are the copyrighted property of [employer]  or [employer]amesh [employer]  The above information is an  only  It is not intended as medical advice for individual conditions or treatments  Talk to your doctor, nurse or pharmacist before following any medical regimen to see if it is safe and effective for you  Low Fat Diet   AMBULATORY CARE:   A low-fat diet  is an eating plan that is low in total fat, unhealthy fat, and cholesterol  You may need to follow a low-fat diet if you have trouble digesting or absorbing fat  You may also need to follow this diet if you have high cholesterol  You can also lower your cholesterol by increasing the amount of fiber in your diet  Soluble fiber is a type of fiber that helps to decrease cholesterol levels  Different types of fat in food:   · Limit unhealthy fats  A diet that is high in cholesterol, saturated fat, and trans fat may cause unhealthy cholesterol levels  Unhealthy cholesterol levels increase your risk of heart disease  ¨ Cholesterol:  Limit intake of cholesterol to less than 200 mg per day  Cholesterol is found in meat, eggs, and dairy  ¨ Saturated fat:  Limit saturated fat to less than 7% of your total daily calories  Ask your dietitian how many calories you need each day  Saturated fat is found in butter, cheese, ice cream, whole milk, and palm oil  Saturated fat is also found in meat, such as beef, pork, chicken skin, and processed meats  Processed meats include sausage, hot dogs, and bologna  ¨ Trans fat:  Avoid trans fat as much as possible  Trans fat is used in fried and baked foods  Foods that say trans fat free on the label may still have up to 0 5 grams of trans fat per serving  · Include healthy fats  Replace foods that are high in saturated and trans fat with foods high in healthy fats  This may help to decrease high cholesterol levels  ¨ Monounsaturated fats: These are found in avocados, nuts, and vegetable oils, such as olive, canola, and sunflower oil  ¨ Polyunsaturated fats: These can be found in vegetable oils, such as soybean or corn oil  Omega-3 fats can help to decrease the risk of heart disease  Omega-3 fats are found in fish, such as salmon, herring, trout, and tuna  Omega-3 fats can also be found in plant foods, such as walnuts, flaxseed, soybeans, and canola oil    Foods to limit or avoid:   · Grains:      ¨ Snacks that are made with partially hydrogenated oils, such as chips, regular crackers, and butter-flavored popcorn    ¨ High-fat baked goods, such as biscuits, croissants, doughnuts, pies, cookies, and pastries    · Dairy:      ¨ Whole milk, 2% milk, and yogurt and ice cream made with whole milk    ¨ Half and half creamer, heavy cream, and whipping cream    ¨ Cheese, cream cheese, and sour cream    · Meats and proteins:      ¨ High-fat cuts of meat (T-bone steak, regular hamburger, and ribs)    ¨ Fried meat, poultry (turkey and chicken), and fish    ¨ Poultry (chicken and turkey) with skin    ¨ Cold cuts (salami or bologna), hot dogs, pineda, and sausage    ¨ Whole eggs and egg yolks    · Vegetables and fruits with added fat:      ¨ Fried vegetables or vegetables in butter or high-fat sauces, such as cream or cheese sauces    ¨ Fried fruit or fruit served with butter or cream    · Fats:      ¨ Butter, stick margarine, and shortening    ¨ Coconut, palm oil, and palm kernel oil  Foods to include:   · Grains:      ¨ Whole-grain breads, cereals, pasta, and brown rice    ¨ Low-fat crackers and pretzels    · Vegetables and fruits:      ¨ Fresh, frozen, or canned vegetables (no salt or low-sodium)    ¨ Fresh, frozen, dried, or canned fruit (canned in light syrup or fruit juice)    ¨ Avocado    · Low-fat dairy products:      ¨ Nonfat (skim) or 1% milk    ¨ Nonfat or low-fat cheese, yogurt, and cottage cheese    · Meats and proteins:      ¨ Chicken or turkey with no skin    ¨ Baked or broiled fish    ¨ Lean beef and pork (loin, round, extra lean hamburger)    ¨ Beans and peas, unsalted nuts, soy products    ¨ Egg whites and substitutes    ¨ Seeds and nuts    · Fats:      ¨ Unsaturated oil, such as canola, olive, peanut, soybean, or sunflower oil    ¨ Soft or liquid margarine and vegetable oil spread    ¨ Low-fat salad dressing  Other ways to decrease fat:   · Read food labels before you buy foods  Choose foods that have less than 30% of calories from fat  Choose low-fat or fat-free dairy products  Remember that fat free does not mean calorie free  These foods still contain calories, and too many calories can lead to weight gain  · Trim fat from meat and avoid fried food  Trim all visible fat from meat before you cook it  Remove the skin from poultry  Do not wang meat, fish, or poultry  Bake, roast, boil, or broil these foods instead  Avoid fried foods  Eat a baked potato instead of [place] fries  Steam vegetables instead of sautéing them in butter  · Add less fat to foods  Use imitation pineda bits on salads and baked potatoes instead of regular pineda bits  Use fat-free or low-fat salad dressings instead of regular dressings  Use low-fat or nonfat butter-flavored topping instead of regular butter or margarine on popcorn and other foods  Ways to decrease fat in recipes:  Replace high-fat ingredients with low-fat or nonfat ones  This may cause baked goods to be drier than usual  You may need to use nonfat cooking spray on pans to prevent food from sticking  You also may need to change the amount of other ingredients, such as water, in the recipe  Try the following:  · Use low-fat or light margarine instead of regular margarine or shortening  · Use lean ground turkey breast or chicken, or lean ground beef (less than 5% fat) instead of hamburger       · Add 1 teaspoon of canola oil to 8 ounces of skim milk instead of using cream or half and half  · Use grated zucchini, carrots, or apples in breads instead of coconut  · Use blenderized, low-fat cottage cheese, plain tofu, or low-fat ricotta cheese instead of cream cheese  · Use 1 egg white and 1 teaspoon of canola oil, or use ¼ cup (2 ounces) of fat-free egg substitute instead of a whole egg  · Replace half of the oil that is called for in a recipe with applesauce when you bake  Use 3 tablespoons of cocoa powder and 1 tablespoon of canola oil instead of a square of baking chocolate  How to increase fiber:  Eat enough high-fiber foods to get 20 to 30 grams of fiber every day  Slowly increase your fiber intake to avoid stomach cramps, gas, and other problems  · Eat 3 ounces of whole-grain foods each day  An ounce is about 1 slice of bread  Eat whole-grain breads, such as whole-wheat bread  Whole wheat, whole-wheat flour, or other whole grains should be listed as the first ingredient on the food label  Replace white flour with whole-grain flour or use half of each in recipes  Whole-grain flour is heavier than white flour, so you may have to add more yeast or baking powder  · Eat a high-fiber cereal for breakfast   Oatmeal is a good source of soluble fiber  Look for cereals that have bran or fiber in the name  Choose whole-grain products, such as brown rice, barley, and whole-wheat pasta  · Eat more beans, peas, and lentils  For example, add beans to soups or salads  Eat at least 5 cups of fruits and vegetables each day  Eat fruits and vegetables with the peel because the peel is high in fiber  © 2017 [employer]  Information is for End User's use only and may not be sold, redistributed or otherwise used for commercial purposes  All illustrations and images included in CareNotes® are the copyrighted property of [employer]  or Ramesh Craig  The above information is an  only   It is not intended as medical advice for individual conditions or treatments  Talk to your doctor, nurse or pharmacist before following any medical regimen to see if it is safe and effective for you  Heart Healthy Diet   AMBULATORY CARE:   A heart healthy diet  is an eating plan low in total fat, unhealthy fats, and sodium (salt)  A heart healthy diet helps decrease your risk for heart disease and stroke  Limit the amount of fat you eat to 25% to 35% of your total daily calories  Limit sodium to less than 2,300 mg each day  Healthy fats:  Healthy fats can help improve cholesterol levels  The risk for heart disease is decreased when cholesterol levels are normal  Choose healthy fats, such as the following:  · Unsaturated fat  is found in foods such as soybean, canola, olive, corn, and safflower oils  It is also found in soft tub margarine that is made with liquid vegetable oil  · Omega-3 fat  is found in certain fish, such as salmon, tuna, and trout, and in walnuts and flaxseed  Unhealthy fats:  Unhealthy fats can cause unhealthy cholesterol levels in your blood and increase your risk of heart disease  Limit unhealthy fats, such as the following:  · Cholesterol  is found in animal foods, such as eggs and lobster, and in dairy products made from whole milk  Limit cholesterol to less than 300 milligrams (mg) each day  You may need to limit cholesterol to 200 mg each day if you have heart disease  · Saturated fat  is found in meats, such as pineda and hamburger  It is also found in chicken or turkey skin, whole milk, and butter  Limit saturated fat to less than 7% of your total daily calories  Limit saturated fat to less than 6% if you have heart disease or are at increased risk for it  · Trans fat  is found in packaged foods, such as potato chips and cookies  It is also in hard margarine, some fried foods, and shortening  Avoid trans fats as much as possible    Heart healthy foods and drinks to include:  Ask your dietitian or healthcare provider how many servings to have from each of the following food groups:  · Grains:      ¨ Whole-wheat breads, cereals, and pastas, and brown rice    ¨ Low-fat, low-sodium crackers and chips    · Vegetables:      ¨ Broccoli, green beans, green peas, and spinach    ¨ Collards, kale, and lima beans    ¨ Carrots, sweet potatoes, tomatoes, and peppers    ¨ Canned vegetables with no salt added    · Fruits:      ¨ Bananas, peaches, pears, and pineapple    ¨ Grapes, raisins, and dates    ¨ Oranges, tangerines, grapefruit, orange juice, and grapefruit juice    ¨ Apricots, mangoes, melons, and papaya    ¨ Raspberries and strawberries    ¨ Canned fruit with no added sugar    · Low-fat dairy products:      ¨ Nonfat (skim) milk, 1% milk, and low-fat almond, cashew, or soy milks fortified with calcium    ¨ Low-fat cheese, regular or frozen yogurt, and cottage cheese    · Meats and proteins , such as lean cuts of beef and pork (loin, leg, round), skinless chicken and turkey, legumes, soy products, egg whites, and nuts  Foods and drinks to limit or avoid:  Ask your dietitian or healthcare provider about these and other foods that are high in unhealthy fat, sodium, and sugar:  · Snack or packaged foods , such as frozen dinners, cookies, macaroni and cheese, and cereals with more than 300 mg of sodium per serving    · Canned or dry mixes  for cakes, soups, sauces, or gravies    · Vegetables with added sodium , such as instant potatoes, vegetables with added sauces, or regular canned vegetables    · Other foods high in sodium , such as ketchup, barbecue sauce, salad dressing, pickles, olives, soy sauce, and miso    · High-fat dairy foods  such as whole or 2% milk, cream cheese, or sour cream, and cheeses     · High-fat protein foods  such as high-fat cuts of beef (T-bone steaks, ribs), chicken or turkey with skin, and organ meats, such as liver    · Cured or smoked meats , such as hot dogs, pineda, and sausage    · Unhealthy fats and oils , such as butter, stick margarine, shortening, and cooking oils such as coconut or palm oil    · Food and drinks high in sugar , such as soft drinks (soda), sports drinks, sweetened tea, candy, cake, cookies, pies, and doughnuts  Other diet guidelines to follow:   · Eat more foods containing omega-3 fats  Eat fish high in omega-3 fats at least 2 times a week  · Limit alcohol  Too much alcohol can damage your heart and raise your blood pressure  Women should limit alcohol to 1 drink a day  Men should limit alcohol to 2 drinks a day  A drink of alcohol is 12 ounces of beer, 5 ounces of wine, or 1½ ounces of liquor  · Choose low-sodium foods  High-sodium foods can lead to high blood pressure  Add little or no salt to food you prepare  Use herbs and spices in place of salt  · Eat more fiber  to help lower cholesterol levels  Eat at least 5 servings of fruits and vegetables each day  Eat 3 ounces of whole-grain foods each day  Legumes (beans) are also a good source of fiber  Lifestyle guidelines:   · Do not smoke  Nicotine and other chemicals in cigarettes and cigars can cause lung and heart damage  Ask your healthcare provider for information if you currently smoke and need help to quit  E-cigarettes or smokeless tobacco still contain nicotine  Talk to your healthcare provider before you use these products  · Exercise regularly  to help you maintain a healthy weight and improve your blood pressure and cholesterol levels  Ask your healthcare provider about the best exercise plan for you  Do not start an exercise program without asking your healthcare provider  Follow up with your healthcare provider as directed:  Write down your questions so you remember to ask them during your visits  © 2017 [employer] Information is for End User's use only and may not be sold, redistributed or otherwise used for commercial purposes   All illustrations and images included in CareNotes® are the copyrighted property of [employer]  or Ramesh Craig  The above information is an  only  It is not intended as medical advice for individual conditions or treatments  Talk to your doctor, nurse or pharmacist before following any medical regimen to see if it is safe and effective for you

## 2019-03-19 ENCOUNTER — TELEPHONE (OUTPATIENT)
Dept: INTERNAL MEDICINE CLINIC | Facility: CLINIC | Age: 79
End: 2019-03-19

## 2019-03-21 DIAGNOSIS — F41.9 ANXIETY: ICD-10-CM

## 2019-03-21 DIAGNOSIS — E78.5 HYPERLIPIDEMIA, UNSPECIFIED HYPERLIPIDEMIA TYPE: ICD-10-CM

## 2019-03-21 DIAGNOSIS — I10 ESSENTIAL HYPERTENSION: ICD-10-CM

## 2019-03-21 DIAGNOSIS — E03.9 HYPOTHYROIDISM, UNSPECIFIED TYPE: ICD-10-CM

## 2019-03-21 RX ORDER — ALPRAZOLAM 0.5 MG/1
0.5 TABLET ORAL 3 TIMES DAILY PRN
Qty: 90 TABLET | Refills: 0 | Status: SHIPPED | OUTPATIENT
Start: 2019-03-21 | End: 2019-04-22 | Stop reason: SDUPTHER

## 2019-03-21 RX ORDER — PRAVASTATIN SODIUM 40 MG
40 TABLET ORAL DAILY
Qty: 90 TABLET | Refills: 1 | Status: SHIPPED | OUTPATIENT
Start: 2019-03-21 | End: 2019-08-17 | Stop reason: SDUPTHER

## 2019-03-21 RX ORDER — LISINOPRIL AND HYDROCHLOROTHIAZIDE 25; 20 MG/1; MG/1
1 TABLET ORAL DAILY
Qty: 90 TABLET | Refills: 1 | Status: SHIPPED | OUTPATIENT
Start: 2019-03-21 | End: 2019-08-17 | Stop reason: SDUPTHER

## 2019-03-21 RX ORDER — LEVOTHYROXINE SODIUM 88 UG/1
88 TABLET ORAL DAILY
Qty: 90 TABLET | Refills: 1 | Status: SHIPPED | OUTPATIENT
Start: 2019-03-21 | End: 2019-08-17 | Stop reason: SDUPTHER

## 2019-04-22 DIAGNOSIS — F41.9 ANXIETY: ICD-10-CM

## 2019-04-22 RX ORDER — ALPRAZOLAM 0.5 MG/1
0.5 TABLET ORAL 3 TIMES DAILY PRN
Qty: 90 TABLET | Refills: 0 | Status: SHIPPED | OUTPATIENT
Start: 2019-04-22 | End: 2019-05-19 | Stop reason: SDUPTHER

## 2019-05-16 ENCOUNTER — APPOINTMENT (OUTPATIENT)
Dept: LAB | Facility: CLINIC | Age: 79
End: 2019-05-16
Attending: RADIOLOGY
Payer: COMMERCIAL

## 2019-05-16 ENCOUNTER — CLINICAL SUPPORT (OUTPATIENT)
Dept: RADIATION ONCOLOGY | Facility: CLINIC | Age: 79
End: 2019-05-16
Attending: RADIOLOGY
Payer: COMMERCIAL

## 2019-05-16 VITALS
BODY MASS INDEX: 29.86 KG/M2 | HEART RATE: 54 BPM | DIASTOLIC BLOOD PRESSURE: 66 MMHG | HEIGHT: 68 IN | TEMPERATURE: 99 F | WEIGHT: 197 LBS | SYSTOLIC BLOOD PRESSURE: 124 MMHG | RESPIRATION RATE: 18 BRPM

## 2019-05-16 DIAGNOSIS — C77.5 MALIGNANT NEOPLASM OF PROSTATE METASTATIC TO INTRAPELVIC LYMPH NODE (HCC): Primary | ICD-10-CM

## 2019-05-16 DIAGNOSIS — C77.5 MALIGNANT NEOPLASM OF PROSTATE METASTATIC TO INTRAPELVIC LYMPH NODE (HCC): ICD-10-CM

## 2019-05-16 DIAGNOSIS — C61 MALIGNANT NEOPLASM OF PROSTATE METASTATIC TO INTRAPELVIC LYMPH NODE (HCC): Primary | ICD-10-CM

## 2019-05-16 DIAGNOSIS — C61 MALIGNANT NEOPLASM OF PROSTATE METASTATIC TO INTRAPELVIC LYMPH NODE (HCC): ICD-10-CM

## 2019-05-16 LAB — PSA SERPL-MCNC: 0.2 NG/ML (ref 0–4)

## 2019-05-16 PROCEDURE — 77300 RADIATION THERAPY DOSE PLAN: CPT | Performed by: RADIOLOGY

## 2019-05-16 PROCEDURE — 84153 ASSAY OF PSA TOTAL: CPT

## 2019-05-16 PROCEDURE — 99211 OFF/OP EST MAY X REQ PHY/QHP: CPT | Performed by: RADIOLOGY

## 2019-05-16 PROCEDURE — 77338 DESIGN MLC DEVICE FOR IMRT: CPT | Performed by: RADIOLOGY

## 2019-05-16 PROCEDURE — 77301 RADIOTHERAPY DOSE PLAN IMRT: CPT | Performed by: RADIOLOGY

## 2019-05-19 DIAGNOSIS — F41.9 ANXIETY: ICD-10-CM

## 2019-05-20 RX ORDER — ALPRAZOLAM 0.5 MG/1
0.5 TABLET ORAL 3 TIMES DAILY PRN
Qty: 90 TABLET | Refills: 0 | Status: SHIPPED | OUTPATIENT
Start: 2019-05-20 | End: 2019-06-20 | Stop reason: SDUPTHER

## 2019-06-20 DIAGNOSIS — F41.9 ANXIETY: ICD-10-CM

## 2019-06-20 RX ORDER — ALPRAZOLAM 0.5 MG/1
0.5 TABLET ORAL 3 TIMES DAILY PRN
Qty: 90 TABLET | Refills: 0 | Status: SHIPPED | OUTPATIENT
Start: 2019-06-20 | End: 2019-07-18 | Stop reason: SDUPTHER

## 2019-06-26 ENCOUNTER — OFFICE VISIT (OUTPATIENT)
Dept: INTERNAL MEDICINE CLINIC | Facility: CLINIC | Age: 79
End: 2019-06-26
Payer: COMMERCIAL

## 2019-06-26 VITALS
DIASTOLIC BLOOD PRESSURE: 66 MMHG | HEIGHT: 68 IN | RESPIRATION RATE: 16 BRPM | BODY MASS INDEX: 29.83 KG/M2 | HEART RATE: 44 BPM | OXYGEN SATURATION: 97 % | SYSTOLIC BLOOD PRESSURE: 138 MMHG | TEMPERATURE: 98.8 F | WEIGHT: 196.8 LBS

## 2019-06-26 DIAGNOSIS — Z92.3 HISTORY OF RADIATION THERAPY: ICD-10-CM

## 2019-06-26 DIAGNOSIS — E03.9 ACQUIRED HYPOTHYROIDISM: ICD-10-CM

## 2019-06-26 DIAGNOSIS — F41.1 GENERALIZED ANXIETY DISORDER: ICD-10-CM

## 2019-06-26 DIAGNOSIS — C61 MALIGNANT NEOPLASM OF PROSTATE METASTATIC TO INTRAPELVIC LYMPH NODE (HCC): ICD-10-CM

## 2019-06-26 DIAGNOSIS — I10 ESSENTIAL HYPERTENSION: Primary | ICD-10-CM

## 2019-06-26 DIAGNOSIS — C77.5 MALIGNANT NEOPLASM OF PROSTATE METASTATIC TO INTRAPELVIC LYMPH NODE (HCC): ICD-10-CM

## 2019-06-26 DIAGNOSIS — M19.91 PRIMARY OSTEOARTHRITIS, UNSPECIFIED SITE: ICD-10-CM

## 2019-06-26 DIAGNOSIS — R15.9 INCONTINENCE OF FECES, UNSPECIFIED FECAL INCONTINENCE TYPE: ICD-10-CM

## 2019-06-26 DIAGNOSIS — E78.2 MIXED HYPERLIPIDEMIA: ICD-10-CM

## 2019-06-26 PROCEDURE — 1036F TOBACCO NON-USER: CPT | Performed by: INTERNAL MEDICINE

## 2019-06-26 PROCEDURE — 99213 OFFICE O/P EST LOW 20 MIN: CPT | Performed by: INTERNAL MEDICINE

## 2019-06-26 PROCEDURE — 3075F SYST BP GE 130 - 139MM HG: CPT | Performed by: INTERNAL MEDICINE

## 2019-06-26 PROCEDURE — 3078F DIAST BP <80 MM HG: CPT | Performed by: INTERNAL MEDICINE

## 2019-06-26 PROCEDURE — 1160F RVW MEDS BY RX/DR IN RCRD: CPT | Performed by: INTERNAL MEDICINE

## 2019-07-01 ENCOUNTER — APPOINTMENT (OUTPATIENT)
Dept: LAB | Facility: CLINIC | Age: 79
End: 2019-07-01
Payer: COMMERCIAL

## 2019-07-01 DIAGNOSIS — I10 ESSENTIAL HYPERTENSION: ICD-10-CM

## 2019-07-01 DIAGNOSIS — E78.2 MIXED HYPERLIPIDEMIA: ICD-10-CM

## 2019-07-01 DIAGNOSIS — E03.9 ACQUIRED HYPOTHYROIDISM: ICD-10-CM

## 2019-07-01 LAB
ALBUMIN SERPL BCP-MCNC: 4 G/DL (ref 3.5–5)
ALP SERPL-CCNC: 64 U/L (ref 46–116)
ALT SERPL W P-5'-P-CCNC: 32 U/L (ref 12–78)
ANION GAP SERPL CALCULATED.3IONS-SCNC: 4 MMOL/L (ref 4–13)
AST SERPL W P-5'-P-CCNC: 18 U/L (ref 5–45)
BILIRUB SERPL-MCNC: 0.64 MG/DL (ref 0.2–1)
BUN SERPL-MCNC: 22 MG/DL (ref 5–25)
CALCIUM SERPL-MCNC: 9.1 MG/DL (ref 8.3–10.1)
CHLORIDE SERPL-SCNC: 108 MMOL/L (ref 100–108)
CO2 SERPL-SCNC: 29 MMOL/L (ref 21–32)
CREAT SERPL-MCNC: 1.18 MG/DL (ref 0.6–1.3)
CREAT UR-MCNC: 83.3 MG/DL
GFR SERPL CREATININE-BSD FRML MDRD: 58 ML/MIN/1.73SQ M
GLUCOSE P FAST SERPL-MCNC: 95 MG/DL (ref 65–99)
LDLC SERPL DIRECT ASSAY-MCNC: 91 MG/DL (ref 0–100)
MICROALBUMIN UR-MCNC: 8.9 MG/L (ref 0–20)
MICROALBUMIN/CREAT 24H UR: 11 MG/G CREATININE (ref 0–30)
POTASSIUM SERPL-SCNC: 4.6 MMOL/L (ref 3.5–5.3)
PROT SERPL-MCNC: 7.3 G/DL (ref 6.4–8.2)
SODIUM SERPL-SCNC: 141 MMOL/L (ref 136–145)
TRIGL SERPL-MCNC: 102 MG/DL
TSH SERPL DL<=0.05 MIU/L-ACNC: 3.06 UIU/ML (ref 0.36–3.74)

## 2019-07-01 PROCEDURE — 82043 UR ALBUMIN QUANTITATIVE: CPT | Performed by: INTERNAL MEDICINE

## 2019-07-01 PROCEDURE — 80053 COMPREHEN METABOLIC PANEL: CPT

## 2019-07-01 PROCEDURE — 83721 ASSAY OF BLOOD LIPOPROTEIN: CPT

## 2019-07-01 PROCEDURE — 84443 ASSAY THYROID STIM HORMONE: CPT

## 2019-07-01 PROCEDURE — 82570 ASSAY OF URINE CREATININE: CPT | Performed by: INTERNAL MEDICINE

## 2019-07-01 PROCEDURE — 84478 ASSAY OF TRIGLYCERIDES: CPT

## 2019-07-01 PROCEDURE — 36415 COLL VENOUS BLD VENIPUNCTURE: CPT

## 2019-07-18 ENCOUNTER — OFFICE VISIT (OUTPATIENT)
Dept: UROLOGY | Facility: CLINIC | Age: 79
End: 2019-07-18
Payer: COMMERCIAL

## 2019-07-18 VITALS
SYSTOLIC BLOOD PRESSURE: 122 MMHG | BODY MASS INDEX: 30.31 KG/M2 | HEIGHT: 68 IN | HEART RATE: 52 BPM | WEIGHT: 200 LBS | DIASTOLIC BLOOD PRESSURE: 70 MMHG

## 2019-07-18 DIAGNOSIS — F41.9 ANXIETY: ICD-10-CM

## 2019-07-18 DIAGNOSIS — R32 URINARY INCONTINENCE, UNSPECIFIED TYPE: Primary | ICD-10-CM

## 2019-07-18 LAB — POST-VOID RESIDUAL VOLUME, ML POC: 46 ML

## 2019-07-18 PROCEDURE — 51798 US URINE CAPACITY MEASURE: CPT | Performed by: PHYSICIAN ASSISTANT

## 2019-07-18 PROCEDURE — 99214 OFFICE O/P EST MOD 30 MIN: CPT | Performed by: PHYSICIAN ASSISTANT

## 2019-07-18 RX ORDER — ALPRAZOLAM 0.5 MG/1
0.5 TABLET ORAL 3 TIMES DAILY PRN
Qty: 90 TABLET | Refills: 0 | Status: SHIPPED | OUTPATIENT
Start: 2019-07-18 | End: 2019-08-17 | Stop reason: SDUPTHER

## 2019-07-18 RX ORDER — OXYBUTYNIN CHLORIDE 10 MG/1
10 TABLET, EXTENDED RELEASE ORAL
Qty: 30 TABLET | Refills: 2 | Status: SHIPPED | OUTPATIENT
Start: 2019-07-18 | End: 2019-11-15

## 2019-07-18 NOTE — PROGRESS NOTES
7/18/2019      Chief Complaint   Patient presents with    Prostate Cancer       Assessment and Plan    [de-identified] y o  male managed by Dr Robert Medina    1  Prostate Cancer [place] 6  - s/p prostatectomy 1997  - PSA 1 4 last year, positive Auximin PET scan 2018  - now s/p salvage XRT to pelvis completed September 2018  - PSA now 0 2 (5/16/19)  - repeat PSA in 6 months (oncology ordering q 6 months)    2  Urinary incontinence  - new development few months s/p radiation  - nighttime incontinence  - daytime frequency q2 hours  - fecal urgency w/wo incontinence at times  - PVR 46 mL  - start oxybutynin 10mg ER dosing and side effect profile reviewed  - FU 2 months with PVR        History of Present Illness  Darío Evans is a [de-identified] y o  male here for evaluation of prostate cancer follow-up  Diagnosis of [place] six prostate cancer status post prostatectomy 1997  His PSAs were undetectable for many years postoperatively  More recently in 2018 PSA had risen between 1 2-1 7, it ultimately underwent Auximen PET scan last fall which was positive for pelvic disease  Patient underwent salvage radiation therapy to the pelvis which he completed September 2018  He was seen November in follow-up with a PSA of 1 2  Presents today for follow-up with a PSA now 0 2 showing good response  Postprostatectomy erectile dysfunction status post IPP  Patient is not utilize the device  Urethral stricture disease status post dilatations and past with no recurrent issues over the past 12 years  Starting few months following completion of radiation he has in his urinary urgency, frequency q 2 hours during the day and few episodes of urinary incontinence overnight  No daytime incontinence  He also reports chronic diarrhea and rare fecal incontinence  He has reviewed these with Radiation Oncology and is aware that these were potential adverse effects from the radiation  He is interested in OAB treatment options      5/16/19 PSA 0 2  11/8/18 PSA 1 2  Salvage XRT pelvis September 2018 8/6/18 PSA 1 7  4/12/18 PSA 1 4  11/22/16 PSA 0 29  10/1/15 PSA 0 13  4/30/14 PSA 0 12  3/29/13 PSA 0 04  Prostatectomy 1997    Review of Systems   Constitutional: Negative for activity change, appetite change, chills, fever and unexpected weight change  HENT: Negative  Respiratory: Negative  Negative for shortness of breath  Cardiovascular: Negative  Negative for chest pain  Gastrointestinal: Positive for diarrhea  Negative for abdominal pain, constipation, nausea, rectal pain and vomiting  Endocrine: Negative  Genitourinary: Positive for frequency and urgency  Negative for decreased urine volume, difficulty urinating, dysuria, flank pain, hematuria and testicular pain  Musculoskeletal: Negative for back pain and gait problem  Skin: Negative  Negative for rash and wound  Allergic/Immunologic: Negative  Neurological: Negative  Hematological: Negative for adenopathy  Does not bruise/bleed easily  Past Medical History  Past Medical History:   Diagnosis Date    [facility] )     Dementia     Disease of thyroid gland     Eczema     Generalized anxiety disorder     Hypertension     Prostate cancer ([facility] )     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)     Skin disorder     suspect benign, but will need removal and due to location, refer   Last assessed: April 18, 2013     Past Social History  Past Surgical History:   Procedure Laterality Date    CATARACT EXTRACTION      COLONOSCOPY      EYE SURGERY      KNEE SURGERY      PROSTATE SURGERY      VASECTOMY       Social History     Tobacco Use   Smoking Status Former Smoker    Types: Cigars   Smokeless Tobacco Never Used   Tobacco Comment    smokes 1 cigar a couple times a month     Past Family History  Family History   Problem Relation Age of Onset    Alcohol abuse Mother     Alcohol abuse Father     Depression Father     No Known Problems Sister  Stroke Brother         syndrome     No Known Problems Maternal Grandmother     No Known Problems Maternal Grandfather     No Known Problems Paternal Grandmother     No Known Problems Paternal Grandfather     Alcohol abuse Family     Colon cancer Maternal Aunt      Past Social history  Social History     Socioeconomic History    Marital status: /Civil Union     Spouse name: Not on file    Number of children: Not on file    Years of education: Not on file    Highest education level: Not on file   Occupational History    Occupation: self employed  floor covering   Social Needs    Financial resource strain: Not on file    Food insecurity:     Worry: Not on file     Inability: Not on file    Transportation needs:     Medical: Not on file     Non-medical: Not on file   Tobacco Use    Smoking status: Former Smoker     Types: Cigars    Smokeless tobacco: Never Used    Tobacco comment: smokes 1 cigar a couple times a month   Substance and Sexual Activity    Alcohol use: No    Drug use: No     Comment: Chronic Narcotic use noted in "allscripts"     Sexual activity: Not on file   Lifestyle    Physical activity:     Days per week: Not on file     Minutes per session: Not on file    Stress: Not on file   Relationships    Social connections:     Talks on phone: Not on file     Gets together: Not on file     Attends [employer] service: Not on file     Active member of club or organization: Not on file     Attends meetings of clubs or organizations: Not on file     Relationship status: Not on file    Intimate partner violence:     Fear of current or ex partner: Not on file     Emotionally abused: Not on file     Physically abused: Not on file     Forced sexual activity: Not on file   Other Topics Concern    Not on file   Social History Narrative    Caffeine use      Current Medications  Current Outpatient Medications   Medication Sig Dispense Refill    ALPRAZolam (XANAX) 0 5 mg tablet Take 1 tablet (0 5 mg total) by mouth 3 (three) times a day as needed (PRN) 90 tablet 0    diclofenac (VOLTAREN) 75 mg EC tablet Take 1 tablet (75 mg total) by mouth 2 (two) times a day 60 tablet 0    levothyroxine 88 mcg tablet Take 1 tablet (88 mcg total) by mouth daily 90 tablet 1    lisinopril-hydrochlorothiazide (PRINZIDE,ZESTORETIC) 20-25 MG per tablet Take 1 tablet by mouth daily 90 tablet 1    Multiple Vitamin (MULTI-VITAMIN DAILY PO) Take 1 tablet by mouth daily      pravastatin (PRAVACHOL) 40 mg tablet Take 1 tablet (40 mg total) by mouth daily 90 tablet 1    triamcinolone (KENALOG) 0 5 % cream Apply topically 3 (three) times a day 454 g 0    oxybutynin (DITROPAN-XL) 10 MG 24 hr tablet Take 1 tablet (10 mg total) by mouth daily at bedtime for 30 days 30 tablet 2     No current facility-administered medications for this visit  Allergies  No Known Allergies      The following portions of the patient's history were reviewed and updated as appropriate: allergies, current medications, past medical history, past social history, past surgical history and problem list       Vitals  Vitals:    07/18/19 1259   BP: 122/70   Pulse: (!) 52   Weight: 90 7 kg (200 lb)   Height: 5' 8" (1 727 m)       Physical Exam   Constitutional: He is oriented to person, place, and time  He appears well-developed and well-nourished  No distress  HENT:   Head: Normocephalic and atraumatic  Pulmonary/Chest: Effort normal    Musculoskeletal: He exhibits no edema  Neurological: He is alert and oriented to person, place, and time  Gait normal    Skin: Skin is warm and dry  He is not diaphoretic  Psychiatric: He has a normal mood and affect  His speech is normal and behavior is normal    Nursing note and vitals reviewed          Results  Recent Results (from the past 1 hour(s))   POCT Measure PVR    Collection Time: 07/18/19  1:20 PM   Result Value Ref Range    POST-VOID RESIDUAL VOLUME, ML POC 46 mL   ]  Lab Results   Component Value Date    PSA 0 2 05/16/2019    PSA 0 4 01/17/2019    PSA 1 2 11/08/2018    PSA 1 7 08/06/2018     Lab Results   Component Value Date    CALCIUM 9 1 07/01/2019    K 4 6 07/01/2019    CO2 29 07/01/2019     07/01/2019    BUN 22 07/01/2019    CREATININE 1 18 07/01/2019     Lab Results   Component Value Date    WBC 6 00 09/17/2018    HGB 13 0 09/17/2018    HCT 41 0 09/17/2018    MCV 96 09/17/2018     09/17/2018         Orders  Orders Placed This Encounter   Procedures    POCT Measure PVR

## 2019-08-17 DIAGNOSIS — I10 ESSENTIAL HYPERTENSION: ICD-10-CM

## 2019-08-17 DIAGNOSIS — E78.5 HYPERLIPIDEMIA, UNSPECIFIED HYPERLIPIDEMIA TYPE: ICD-10-CM

## 2019-08-17 DIAGNOSIS — F41.9 ANXIETY: ICD-10-CM

## 2019-08-17 DIAGNOSIS — E03.9 HYPOTHYROIDISM, UNSPECIFIED TYPE: ICD-10-CM

## 2019-08-19 ENCOUNTER — TELEPHONE (OUTPATIENT)
Dept: INTERNAL MEDICINE CLINIC | Facility: CLINIC | Age: 79
End: 2019-08-19

## 2019-08-19 DIAGNOSIS — E03.9 HYPOTHYROIDISM, UNSPECIFIED TYPE: ICD-10-CM

## 2019-08-19 RX ORDER — ALPRAZOLAM 0.5 MG/1
0.5 TABLET ORAL 3 TIMES DAILY PRN
Qty: 90 TABLET | Refills: 0 | Status: SHIPPED | OUTPATIENT
Start: 2019-08-19 | End: 2019-09-16 | Stop reason: SDUPTHER

## 2019-08-19 RX ORDER — LISINOPRIL AND HYDROCHLOROTHIAZIDE 25; 20 MG/1; MG/1
1 TABLET ORAL DAILY
Qty: 90 TABLET | Refills: 1 | Status: SHIPPED | OUTPATIENT
Start: 2019-08-19 | End: 2019-11-13 | Stop reason: SDUPTHER

## 2019-08-19 RX ORDER — LEVOTHYROXINE SODIUM 88 UG/1
88 TABLET ORAL DAILY
Qty: 90 TABLET | Refills: 1 | Status: SHIPPED | OUTPATIENT
Start: 2019-08-19 | End: 2019-08-19 | Stop reason: SDUPTHER

## 2019-08-19 RX ORDER — PRAVASTATIN SODIUM 40 MG
40 TABLET ORAL DAILY
Qty: 90 TABLET | Refills: 1 | Status: SHIPPED | OUTPATIENT
Start: 2019-08-19 | End: 2019-11-13 | Stop reason: SDUPTHER

## 2019-08-19 RX ORDER — LEVOTHYROXINE SODIUM 88 UG/1
88 TABLET ORAL DAILY
Qty: 90 TABLET | Refills: 1 | Status: SHIPPED | OUTPATIENT
Start: 2019-08-19 | End: 2020-05-13 | Stop reason: SDUPTHER

## 2019-08-19 NOTE — TELEPHONE ENCOUNTER
Pt states he called in for levothyroxine and it went to [facility] but would like for it to go to [facility] because it is cheaper there  Could you please redo for pt?  Thanks

## 2019-09-03 ENCOUNTER — APPOINTMENT (EMERGENCY)
Dept: CT IMAGING | Facility: HOSPITAL | Age: 79
End: 2019-09-03
Payer: COMMERCIAL

## 2019-09-03 ENCOUNTER — HOSPITAL ENCOUNTER (EMERGENCY)
Facility: HOSPITAL | Age: 79
Discharge: HOME | End: 2019-09-03
Attending: EMERGENCY MEDICINE | Admitting: EMERGENCY MEDICINE
Payer: COMMERCIAL

## 2019-09-03 ENCOUNTER — APPOINTMENT (EMERGENCY)
Dept: NON INVASIVE DIAGNOSTICS | Facility: HOSPITAL | Age: 79
End: 2019-09-03
Payer: COMMERCIAL

## 2019-09-03 VITALS
TEMPERATURE: 98.9 F | HEIGHT: 69 IN | DIASTOLIC BLOOD PRESSURE: 65 MMHG | HEART RATE: 66 BPM | SYSTOLIC BLOOD PRESSURE: 136 MMHG | OXYGEN SATURATION: 93 % | WEIGHT: 195.94 LBS | RESPIRATION RATE: 17 BRPM | BODY MASS INDEX: 29.02 KG/M2

## 2019-09-03 DIAGNOSIS — M54.16 LUMBAR RADICULOPATHY, ACUTE: Primary | ICD-10-CM

## 2019-09-03 DIAGNOSIS — R26.9 GAIT ABNORMALITY: ICD-10-CM

## 2019-09-03 LAB
ALBUMIN SERPL BCP-MCNC: 3.9 G/DL (ref 3.5–5)
ALP SERPL-CCNC: 64 U/L (ref 46–116)
ALT SERPL W P-5'-P-CCNC: 35 U/L (ref 12–78)
ANION GAP SERPL CALCULATED.3IONS-SCNC: 8 MMOL/L (ref 4–13)
AST SERPL W P-5'-P-CCNC: 41 U/L (ref 5–45)
BACTERIA UR QL AUTO: ABNORMAL /HPF
BASOPHILS # BLD AUTO: 0.04 THOUSANDS/ΜL (ref 0–0.1)
BASOPHILS NFR BLD AUTO: 1 % (ref 0–1)
BILIRUB SERPL-MCNC: 0.7 MG/DL (ref 0.2–1)
BILIRUB UR QL STRIP: NEGATIVE
BUN SERPL-MCNC: 19 MG/DL (ref 5–25)
CALCIUM SERPL-MCNC: 9.3 MG/DL (ref 8.3–10.1)
CHLORIDE SERPL-SCNC: 103 MMOL/L (ref 100–108)
CLARITY UR: CLEAR
CO2 SERPL-SCNC: 30 MMOL/L (ref 21–32)
COLOR UR: YELLOW
CREAT SERPL-MCNC: 1.26 MG/DL (ref 0.6–1.3)
EOSINOPHIL # BLD AUTO: 0.12 THOUSAND/ΜL (ref 0–0.61)
EOSINOPHIL NFR BLD AUTO: 2 % (ref 0–6)
ERYTHROCYTE [DISTWIDTH] IN BLOOD BY AUTOMATED COUNT: 13.9 % (ref 11.6–15.1)
GFR SERPL CREATININE-BSD FRML MDRD: 54 ML/MIN/1.73SQ M
GLUCOSE SERPL-MCNC: 107 MG/DL (ref 65–140)
GLUCOSE UR STRIP-MCNC: NEGATIVE MG/DL
HCT VFR BLD AUTO: 43 % (ref 36.5–49.3)
HGB BLD-MCNC: 14.3 G/DL (ref 12–17)
HGB UR QL STRIP.AUTO: ABNORMAL
IMM GRANULOCYTES # BLD AUTO: 0.03 THOUSAND/UL (ref 0–0.2)
IMM GRANULOCYTES NFR BLD AUTO: 0 % (ref 0–2)
KETONES UR STRIP-MCNC: NEGATIVE MG/DL
LEUKOCYTE ESTERASE UR QL STRIP: NEGATIVE
LYMPHOCYTES # BLD AUTO: 0.91 THOUSANDS/ΜL (ref 0.6–4.47)
LYMPHOCYTES NFR BLD AUTO: 12 % (ref 14–44)
MCH RBC QN AUTO: 31.4 PG (ref 26.8–34.3)
MCHC RBC AUTO-ENTMCNC: 33.3 G/DL (ref 31.4–37.4)
MCV RBC AUTO: 95 FL (ref 82–98)
MONOCYTES # BLD AUTO: 0.6 THOUSAND/ΜL (ref 0.17–1.22)
MONOCYTES NFR BLD AUTO: 8 % (ref 4–12)
NEUTROPHILS # BLD AUTO: 5.88 THOUSANDS/ΜL (ref 1.85–7.62)
NEUTS SEG NFR BLD AUTO: 77 % (ref 43–75)
NITRITE UR QL STRIP: NEGATIVE
NON-SQ EPI CELLS URNS QL MICRO: ABNORMAL /HPF
NRBC BLD AUTO-RTO: 0 /100 WBCS
PH UR STRIP.AUTO: 6 [PH]
PLATELET # BLD AUTO: 228 THOUSANDS/UL (ref 149–390)
PMV BLD AUTO: 10.2 FL (ref 8.9–12.7)
POTASSIUM SERPL-SCNC: 5 MMOL/L (ref 3.5–5.3)
PROT SERPL-MCNC: 7.6 G/DL (ref 6.4–8.2)
PROT UR STRIP-MCNC: NEGATIVE MG/DL
RBC # BLD AUTO: 4.55 MILLION/UL (ref 3.88–5.62)
RBC #/AREA URNS AUTO: ABNORMAL /HPF
SODIUM SERPL-SCNC: 141 MMOL/L (ref 136–145)
SP GR UR STRIP.AUTO: 1.01 (ref 1–1.03)
UROBILINOGEN UR QL STRIP.AUTO: 0.2 E.U./DL
WBC # BLD AUTO: 7.58 THOUSAND/UL (ref 4.31–10.16)
WBC #/AREA URNS AUTO: ABNORMAL /HPF

## 2019-09-03 PROCEDURE — 99285 EMERGENCY DEPT VISIT HI MDM: CPT | Performed by: EMERGENCY MEDICINE

## 2019-09-03 PROCEDURE — 81001 URINALYSIS AUTO W/SCOPE: CPT | Performed by: EMERGENCY MEDICINE

## 2019-09-03 PROCEDURE — 74177 CT ABD & PELVIS W/CONTRAST: CPT

## 2019-09-03 PROCEDURE — G8978 MOBILITY CURRENT STATUS: HCPCS | Performed by: PHYSICAL THERAPIST

## 2019-09-03 PROCEDURE — 96374 THER/PROPH/DIAG INJ IV PUSH: CPT

## 2019-09-03 PROCEDURE — 97162 PT EVAL MOD COMPLEX 30 MIN: CPT | Performed by: PHYSICAL THERAPIST

## 2019-09-03 PROCEDURE — 85025 COMPLETE CBC W/AUTO DIFF WBC: CPT | Performed by: EMERGENCY MEDICINE

## 2019-09-03 PROCEDURE — G8980 MOBILITY D/C STATUS: HCPCS | Performed by: PHYSICAL THERAPIST

## 2019-09-03 PROCEDURE — 99284 EMERGENCY DEPT VISIT MOD MDM: CPT

## 2019-09-03 PROCEDURE — 93971 EXTREMITY STUDY: CPT

## 2019-09-03 PROCEDURE — 36415 COLL VENOUS BLD VENIPUNCTURE: CPT | Performed by: EMERGENCY MEDICINE

## 2019-09-03 PROCEDURE — G8979 MOBILITY GOAL STATUS: HCPCS | Performed by: PHYSICAL THERAPIST

## 2019-09-03 PROCEDURE — 80053 COMPREHEN METABOLIC PANEL: CPT | Performed by: EMERGENCY MEDICINE

## 2019-09-03 PROCEDURE — 96375 TX/PRO/DX INJ NEW DRUG ADDON: CPT

## 2019-09-03 PROCEDURE — 97140 MANUAL THERAPY 1/> REGIONS: CPT | Performed by: PHYSICAL THERAPIST

## 2019-09-03 PROCEDURE — 93971 EXTREMITY STUDY: CPT | Performed by: SURGERY

## 2019-09-03 RX ORDER — FAMOTIDINE 20 MG/1
20 TABLET, FILM COATED ORAL ONCE
Status: COMPLETED | OUTPATIENT
Start: 2019-09-03 | End: 2019-09-03

## 2019-09-03 RX ORDER — METHYLPREDNISOLONE 4 MG/1
TABLET ORAL
Qty: 21 TABLET | Refills: 0 | Status: SHIPPED | OUTPATIENT
Start: 2019-09-03 | End: 2019-11-15

## 2019-09-03 RX ORDER — ONDANSETRON 2 MG/ML
4 INJECTION INTRAMUSCULAR; INTRAVENOUS ONCE
Status: COMPLETED | OUTPATIENT
Start: 2019-09-03 | End: 2019-09-03

## 2019-09-03 RX ORDER — PREDNISONE 20 MG/1
40 TABLET ORAL ONCE
Status: COMPLETED | OUTPATIENT
Start: 2019-09-03 | End: 2019-09-03

## 2019-09-03 RX ORDER — KETOROLAC TROMETHAMINE 30 MG/ML
15 INJECTION, SOLUTION INTRAMUSCULAR; INTRAVENOUS ONCE
Status: COMPLETED | OUTPATIENT
Start: 2019-09-03 | End: 2019-09-03

## 2019-09-03 RX ORDER — FENTANYL CITRATE 50 UG/ML
25 INJECTION, SOLUTION INTRAMUSCULAR; INTRAVENOUS ONCE
Status: COMPLETED | OUTPATIENT
Start: 2019-09-03 | End: 2019-09-03

## 2019-09-03 RX ORDER — FAMOTIDINE 40 MG/1
40 TABLET, FILM COATED ORAL DAILY
Qty: 30 TABLET | Refills: 0 | Status: SHIPPED | OUTPATIENT
Start: 2019-09-03 | End: 2019-11-15

## 2019-09-03 RX ADMIN — IOHEXOL 100 ML: 350 INJECTION, SOLUTION INTRAVENOUS at 09:42

## 2019-09-03 RX ADMIN — ONDANSETRON 4 MG: 2 INJECTION INTRAMUSCULAR; INTRAVENOUS at 09:10

## 2019-09-03 RX ADMIN — FENTANYL CITRATE 25 MCG: 50 INJECTION, SOLUTION INTRAMUSCULAR; INTRAVENOUS at 09:13

## 2019-09-03 RX ADMIN — PREDNISONE 40 MG: 20 TABLET ORAL at 11:42

## 2019-09-03 RX ADMIN — KETOROLAC TROMETHAMINE 15 MG: 30 INJECTION, SOLUTION INTRAMUSCULAR; INTRAVENOUS at 11:22

## 2019-09-03 RX ADMIN — FAMOTIDINE 20 MG: 20 TABLET ORAL at 11:42

## 2019-09-03 NOTE — PHYSICAL THERAPY NOTE
Physical Therapy Evaluation and Treatment    Patient Name: Nu BURGESS Date: 9/3/2019     Problem List  Patient Active Problem List   Diagnosis    Chronic shoulder pain    Vesicular palmoplantar eczema    Eczema    Generalized anxiety disorder    Hyperlipidemia    Hypertension    Hypothyroidism    Osteoarthritis    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 ([facility] )    History of radiation therapy        Past Medical History  Past Medical History:   Diagnosis Date    Cancer ([facility] )     Dementia     Disease of thyroid gland     Eczema     Generalized anxiety disorder     Hypertension     Prostate cancer ([facility] )     Prostate cancer metastatic to intrapelvic lymph node ([facility] )     Skin disorder     suspect benign, but will need removal and due to location, refer  Last assessed: April 18, 2013        Past Surgical History  Past Surgical History:   Procedure Laterality Date    CATARACT EXTRACTION      COLONOSCOPY      EYE SURGERY      KNEE SURGERY      PROSTATE SURGERY      VASECTOMY             09/03/19 1345   Note Type   Note type Eval/Treat   Pain Assessment   Pain Score Worst Possible Pain   Pain Location Groin  (radiating to foot)   Pain Orientation Left   Home Living   Type of Home House   Home Layout Two level;Bed/bath upstairs;Stairs to enter without rails  (3 YE, 13 steps with HR to 2nd, full bath 1st)   Bathroom Accessibility Accessible   Home Equipment Cane   Prior Function   Level of [place] Independent with ADLs and functional mobility  ((I) ambulation no A  D )   Lives With Spouse   ADL Assistance Independent   IADLs Independent   Falls in the last 6 months 1 to 4   Comments (I) driving   Restrictions/Precautions   Weight Bearing Precautions Per Order No   Other Precautions Pain; Fall Risk   General   Family/Caregiver Present No   Cognition   Arousal/Participation Alert   Orientation Level Oriented X4   Following Commands Follows all commands and directions without difficulty   RLE Assessment   RLE Assessment WFL  (hip flex 4/5, knee 4+/5 ankle 4+/5)   LLE Assessment   LLE Assessment WFL  (hip flex 4- limited by pain, knee 4+/5 ankle 4+/5)   Coordination   Movements are Fluid and Coordinated 1  (forward flexed posture in standing)   Sensation WFL   Light Touch   RLE Light Touch Grossly intact   LLE Light Touch Grossly intact   Bed Mobility   Supine to Sit 7  Independent   Sit to Supine 7  Independent   Additional Comments Pt with leg length discrepancy and pelvic malrotation L LE greater than R  Pt with forward flexed posture in standing and in sitting  Transfers   Sit to Stand 7  Independent   Stand to Sit 7  Independent   Stand pivot 7  Independent   Additional Comments no difficulty with transfers or ambulation despite pain  Gait is with limp but no L LE buckling present   Ambulation/Elevation   Gait pattern Antalgic;Decreased L stance   Gait Assistance 7  Independent   Assistive Device None   Distance 200ft no A D (I) no LOB or L LE buckling but gait is with limp   Balance   Static Sitting Good   Dynamic Sitting Good   Static Standing Good   Dynamic Standing Fair +   Ambulatory Fair +   Endurance Deficit   Endurance Deficit Yes   Endurance Deficit Description limited ambulation and activity tolerance due to radicular symptoms   Activity Tolerance   Activity Tolerance Patient limited by pain   Nurse Made Aware nurse made aware of recommendation of outpatient PT  Pt and nurse provided with phone number for Physical Therapy at [phone]   Assessment   Prognosis Good   Problem List Decreased strength;Decreased endurance; Impaired balance;Decreased mobility;Pain   Assessment Pt is an [de-identified]year old male presenting to [address] [place] [address] with onset of L groin pain with radicular symptoms to L foot with 2 falls in the last 2 days   Pt seen for moderate complexity PT evaluation and found to have a leg length discrepancy and pelvis malrotation  Pt also with radicular pain to L foot which responds to extension exercises  Pt was able to perform all bed mobility transfers and ambulation at an (I) level without L LE buckling or LOB  Pt will be safe to return home but is in need of continued PT in Outpatient PT for lumbar radiculopathy and to fully  correct leg length discrepancy  Goals   Patient Goals To decrease pain   STG Expiration Date 09/03/19   Short Term Goal #1 Correct leg length discrepany to decrease pain to 2/10 at worst   Short Term Goal #2 Pt will ambulate 450ft no A  D  with normal gait pattern (I) and will ascend/descend 11 steps with HR modified (I) with reciprocal pattern   Treatment Day 1   Plan   Treatment/Interventions LE strengthening/ROM; Therapeutic exercise; Endurance training;Patient/family training;Bed mobility;Gait training;Elevations; Functional transfer training   PT Frequency One time visit   Recommendation   Recommendation Outpatient PT   PT - OK to Discharge Yes     PHYSICAL THERAPY TREATMENT NOTE    Time In: 13:25    Time Out: 13:40  Total Time: 15 min  MRN: [id number]    S: Pt notes pain is 10/10 at rest and during ambulation L groin radiating down to foot  O: Therapeutic Activities:   Muscle Energy Technique for leg length correction: x3, 1/2 inch leg length discrepancy decreased to 1/8 inch  Gait: 200ft no A D  (I) with decreased limp and more upright posture post leg length correction        A: Pt with decreased pain following muscle energy technique and standing extension x 15-20 reps  Improve posture during ambulation following leg length correction  Instructed pt in prone prop exercise and to continue standing extension to centralize symptoms  Pt being discharged home but recommend outpatient PT      P: Pt being discharged  Instructed pt to call family physician for script for outpatient PT   Pt provided with phone number for physical therapy at [phone] [place] office  Patient seated at edge of stretcher with bell in reach; patient positioned with all needs, including call bell, within reach      Esthela Campos PT, MPT

## 2019-09-03 NOTE — ED NOTES
Dr Ordaz notified of pt's increased pain at this time   Medications to be ordered      Michelle Joshua RN  09/03/19 [id number]

## 2019-09-03 NOTE — ED PROVIDER NOTES
History  Chief Complaint   Patient presents with    Groin Pain     left groin pain since sunday  Left leg has become week fell on buttocks this am       49-year-old male presents with 2 day history of left posterior iliac crest pain wrapping to the left inguinal region and down his anterior leg he denies prior history of this problem he woke up with it; He has sustained 2 falls to his buttocks once socks he was sitting on a low stool he stood up his left leg gave out he fell to his buttocks and again this morning while walking his dog  His left leg gave out and he landed on this buttock sitting on a curb  Patient is not anticoagulated  He denies any hip knee or ankle pain  He has had no lower extremity edema no prior history of DVT or PE  He denies low back pain he has no testicular pain or flank pain he has had no fever or chills  He felt a little constipated 2 days ago any took some milk of magnesia which cleared amount  Ever since radiation therapy for his prostate cancer last September he has occasional incontinence of urine and on rare occasion bowel incontinence but he has had no escalation of symptoms  He denies any numbness or paresthesias  He is most bothered by pain radiating from the left inguinal crease anteriorly down to his foot patient denies any dysuria increased urinary frequency or gross hematuria he has had no chest pain shortness of breath or lightheadedness  No headache or neck pain  Prior to Admission Medications   Prescriptions Last Dose Informant Patient Reported? Taking?    ALPRAZolam (XANAX) 0 5 mg tablet 9/2/2019 at Unknown time  No Yes   Sig: Take 1 tablet (0 5 mg total) by mouth 3 (three) times a day as needed (PRN)   Multiple Vitamin (MULTI-VITAMIN DAILY PO) 9/2/2019 at Unknown time Self Yes Yes   Sig: Take 1 tablet by mouth daily   diclofenac (VOLTAREN) 75 mg EC tablet   No No   Sig: Take 1 tablet (75 mg total) by mouth 2 (two) times a day   levothyroxine 88 mcg tablet 9/2/2019 at Unknown time  No Yes   Sig: Take 1 tablet (88 mcg total) by mouth daily   lisinopril-hydrochlorothiazide (PRINZIDE,ZESTORETIC) 20-25 MG per tablet 9/2/2019 at Unknown time  No Yes   Sig: Take 1 tablet by mouth daily   oxybutynin (DITROPAN-XL) 10 MG 24 hr tablet   No No   Sig: Take 1 tablet (10 mg total) by mouth daily at bedtime for 30 days   pravastatin (PRAVACHOL) 40 mg tablet 9/2/2019 at Unknown time  No Yes   Sig: Take 1 tablet (40 mg total) by mouth daily   triamcinolone (KENALOG) 0 5 % cream 9/3/2019 at Unknown time Self No Yes   Sig: Apply topically 3 (three) times a day      Facility-Administered Medications: None       Past Medical History:   Diagnosis Date    Cancer ([facility] )     Dementia     Disease of thyroid gland     Eczema     Generalized anxiety disorder     Hypertension     Prostate cancer ([facility] )     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)     Skin disorder     suspect benign, but will need removal and due to location, refer  Last assessed: April 18, 2013       Past Surgical History:   Procedure Laterality Date    CATARACT EXTRACTION      COLONOSCOPY      EYE SURGERY      KNEE SURGERY      PROSTATE SURGERY      VASECTOMY         Family History   Problem Relation Age of Onset    Alcohol abuse Mother     Alcohol abuse Father     Depression Father     No Known Problems Sister     Stroke Brother         syndrome     No Known Problems Maternal Grandmother     No Known Problems Maternal Grandfather     No Known Problems Paternal Grandmother     No Known Problems Paternal Grandfather     Alcohol abuse Family     Colon cancer Maternal Aunt      I have reviewed and agree with the history as documented      Social History     Tobacco Use    Smoking status: Former Smoker     Types: Cigars    Smokeless tobacco: Never Used    Tobacco comment: smokes 1 cigar a couple times a month   Substance Use Topics    Alcohol use: No    Drug use: No     Comment: Chronic Narcotic use noted in "[employer]"         Review of Systems   Constitutional: Positive for activity change  Negative for appetite change, chills, diaphoresis and fever  HENT: Negative for congestion, ear pain, rhinorrhea, sneezing and sore throat  Eyes: Negative for discharge  Respiratory: Negative for cough and shortness of breath  Cardiovascular: Negative for chest pain and leg swelling  Gastrointestinal: Negative for abdominal pain, blood in stool, diarrhea, nausea and vomiting  Endocrine: Negative for polyuria  Genitourinary: Negative for difficulty urinating, dysuria, frequency and urgency  Musculoskeletal: Positive for arthralgias (posterior illiac crest wrapping to left inguinal region)  Negative for back pain, myalgias, neck pain and neck stiffness  Skin: Negative for rash  Neurological: Positive for weakness (leg leg)  Negative for dizziness, speech difficulty and numbness  Hematological: Negative for adenopathy  Psychiatric/Behavioral: Negative for confusion  All other systems reviewed and are negative  Physical Exam  Physical Exam   Constitutional: He is oriented to person, place, and time  He appears well-developed and well-nourished  No distress  HENT:   Head: Normocephalic and atraumatic  Right Ear: External ear normal    Left Ear: External ear normal    Nose: Nose normal    Mouth/Throat: Oropharynx is clear and moist    Eyes: Pupils are equal, round, and reactive to light  Conjunctivae and EOM are normal  Right eye exhibits no discharge  Left eye exhibits no discharge  No scleral icterus  3 mm equal   Neck: Normal range of motion  Neck supple  Cardiovascular: Normal rate, regular rhythm, normal heart sounds and intact distal pulses  Pulmonary/Chest: Effort normal and breath sounds normal  No respiratory distress  He exhibits no tenderness  Abdominal: Soft  Bowel sounds are normal  He exhibits no distension and no mass  There is no tenderness   There is no rebound and no guarding  Back: no mildline or CVA tenderness   Genitourinary: Rectal exam shows guaiac negative stool  Genitourinary Comments:  exam chaperoned by Mary Kay ANDERSON; circumcised male  There is no testicular tenderness patient does have an implant in the left hemiscrotum there is no inguinal hernia bilaterally rectal normal tone perianal sensation intact prostate is not palpated brown stool on glove heme-negative controls intact   Musculoskeletal: Normal range of motion  He exhibits no edema, tenderness or deformity  Lymphadenopathy:     He has no cervical adenopathy  Neurological: He is alert and oriented to person, place, and time  He displays normal reflexes  No cranial nerve deficit or sensory deficit  He exhibits normal muscle tone  Coordination normal    Straight leg raise and contralateral leg raise negative bilaterally DTRs 2+ patellar 1+ ankle jerk toes are downgoing bilaterally light touch is intact throughout the lower extremities; motor 5/5 to the proximal distal musculature great toe dorsiflexion and plantar and dorsiflexion are intact and symmetric bilaterally   Skin: Skin is warm and dry  Capillary refill takes less than 2 seconds  He is not diaphoretic  Psychiatric: He has a normal mood and affect  Nursing note and vitals reviewed        Vital Signs  ED Triage Vitals   Temperature Pulse Respirations Blood Pressure SpO2   09/03/19 0830 09/03/19 0830 09/03/19 0830 09/03/19 0833 09/03/19 0830   98 9 °F (37 2 °C) 60 18 (!) 180/79 94 %      Temp Source Heart Rate Source Patient Position - Orthostatic VS BP Location FiO2 (%)   09/03/19 0830 09/03/19 0830 09/03/19 0830 09/03/19 0830 --   Temporal Monitor Sitting Left arm       Pain Score       09/03/19 0830       Worst Possible Pain           Vitals:    09/03/19 1130 09/03/19 1200 09/03/19 1300 09/03/19 1430   BP: (!) 174/83 (!) 175/77 (!) 171/80 136/65   Pulse: 60 63 61 66   Patient Position - Orthostatic VS: Sitting Sitting Sitting Sitting         Visual Acuity      ED Medications  Medications   ondansetron (ZOFRAN) injection 4 mg (4 mg Intravenous Given 9/3/19 0910)   fentanyl citrate (PF) 100 MCG/2ML 25 mcg (25 mcg Intravenous Given 9/3/19 0913)   iohexol (OMNIPAQUE) 350 MG/ML injection (SINGLE-DOSE) 100 mL (100 mL Intravenous Given 9/3/19 0942)   ketorolac (TORADOL) injection 15 mg (15 mg Intravenous Given 9/3/19 1122)   famotidine (PEPCID) tablet 20 mg (20 mg Oral Given 9/3/19 1142)   predniSONE tablet 40 mg (40 mg Oral Given 9/3/19 1142)       Diagnostic Studies  Results Reviewed     Procedure Component Value Units Date/Time    Comprehensive metabolic panel [[id number]] Collected:  09/03/19 0900    Lab Status:  Final result Specimen:  Blood from Arm, Left Updated:  09/03/19 0930     Sodium 141 mmol/L      Potassium 5 0 mmol/L      Chloride 103 mmol/L      CO2 30 mmol/L      ANION GAP 8 mmol/L      BUN 19 mg/dL      Creatinine 1 26 mg/dL      Glucose 107 mg/dL      Calcium 9 3 mg/dL      AST 41 U/L      ALT 35 U/L      Alkaline Phosphatase 64 U/L      Total Protein 7 6 g/dL      Albumin 3 9 g/dL      Total Bilirubin 0 70 mg/dL      eGFR 54 ml/min/1 73sq m     Narrative:       [place] guidelines for Chronic Kidney Disease (CKD):     Stage 1 with normal or high GFR (GFR > 90 mL/min/1 73 square meters)    Stage 2 Mild CKD (GFR = 60-89 mL/min/1 73 square meters)    Stage 3A Moderate CKD (GFR = 45-59 mL/min/1 73 square meters)    Stage 3B Moderate CKD (GFR = 30-44 mL/min/1 73 square meters)    Stage 4 Severe CKD (GFR = 15-29 mL/min/1 73 square meters)    Stage 5 End Stage CKD (GFR <15 mL/min/1 73 square meters)  Note: GFR calculation is accurate only with a steady state creatinine    Urine Microscopic [[id number]]  (Abnormal) Collected:  09/03/19 0903    Lab Status:  Final result Specimen:  Urine, Clean Catch Updated:  09/03/19 0928     RBC, UA 1-2 /hpf      WBC, UA None Seen /hpf      Epithelial Cells None Seen /hpf      Bacteria, UA None Seen /hpf     CBC and differential [[id number]]  (Abnormal) Collected:  09/03/19 0900    Lab Status:  Final result Specimen:  Blood from Arm, Left Updated:  09/03/19 0919     WBC 7 58 Thousand/uL      RBC 4 55 Million/uL      Hemoglobin 14 3 g/dL      Hematocrit 43 0 %      MCV 95 fL      MCH 31 4 pg      MCHC 33 3 g/dL      RDW 13 9 %      MPV 10 2 fL      Platelets 880 Thousands/uL      nRBC 0 /100 WBCs      Neutrophils Relative 77 %      Immat GRANS % 0 %      Lymphocytes Relative 12 %      Monocytes Relative 8 %      Eosinophils Relative 2 %      Basophils Relative 1 %      Neutrophils Absolute 5 88 Thousands/µL      Immature Grans Absolute 0 03 Thousand/uL      Lymphocytes Absolute 0 91 Thousands/µL      Monocytes Absolute 0 60 Thousand/µL      Eosinophils Absolute 0 12 Thousand/µL      Basophils Absolute 0 04 Thousands/µL     UA w Reflex to Microscopic w Reflex to Culture [[id number]]  (Abnormal) Collected:  09/03/19 0903    Lab Status:  Final result Specimen:  Urine, Clean Catch Updated:  09/03/19 0917     Color, UA Yellow     Clarity, UA Clear     Specific Gravity, UA 1 015     pH, UA 6 0     Leukocytes, UA Negative     Nitrite, UA Negative     Protein, UA Negative mg/dl      Glucose, UA Negative mg/dl      Ketones, UA Negative mg/dl      Urobilinogen, UA 0 2 E U /dl      Bilirubin, UA Negative     Blood, UA Moderate                 VAS lower limb venous duplex study, unilateral/limited   Final Result by Kaitlin Sullivan MD (09/03 1045)      CT abdomen pelvis with contrast   Final Result by Harjit Adler MD (09/03 1031)      No evidence of acute abnormality in the abdomen or pelvis              Workstation performed: [id number]                    Procedures  Procedures       ED Course  ED Course as of Sep 03 1757   Tue Sep 03, 2019   1014 Prelime venous doppler u/s Left lower extremtity negative for DVT      1135 Reviewed CT studies recommend follow up with pain mangement/spine for further eval      1255 PT/OT contacted by RN will be down after lunch      1311 PT evaluating patient      1422 PT recommended cane that he has at home as needed and outpt PT  Will have patient followup with spine and pain mangement                                  [facility] [place]  Number of Diagnoses or Management Options  Gait abnormality:   Lumbar radiculopathy, acute:   Diagnosis management comments: Mdm:  78-year-old male with prior history of metastatic prostate cancer presents with new onset left leg weakness and and radicular pain L1-l3; no clinical evidence of a cauda equina syndrome  Will proceed with CT abdomen pelvis to evaluate for fracture;  treat pain  eval for DVT the left lower extremity  Disposition  Final diagnoses:   Lumbar radiculopathy, acute   Gait abnormality     Time reflects when diagnosis was documented in both MDM as applicable and the Disposition within this note     Time User Action Codes Description Comment    9/3/2019 11:39 AM Geovanna Elidia Add [M54 16] Lumbar radiculopathy, acute     9/3/2019 11:39 AM Geovanna Elidia Add [R26 9] Gait abnormality       ED Disposition     ED Disposition Condition Date/Time Comment    Discharge Stable Tue Sep 3, 2019  2:27 PM Dexter Sharp discharge to home/self care              Follow-up Information     Follow up With Specialties Details Why Contact Info    Aria Orozco MD Pain Medicine Call in 1 day recheck of leg pain and weakness followp within 1 week [address] [place] [address] [phone] [id number] [facility] DO Internal Medicine Go in 1 week recheck of symptoms [address] [facility] [address] [facility]  [phone]      call Magui Ferguson [phone] to set up appt              Discharge Medication List as of 9/3/2019  2:33 PM      START taking these medications    Details   famotidine (PEPCID) 40 MG tablet Take 1 tablet (40 mg total) by mouth daily, Starting Tue 9/3/2019, Print methylprednisolone (MEDROL) 4 mg tablet Take as directed with food, Print         CONTINUE these medications which have NOT CHANGED    Details   ALPRAZolam (XANAX) 0 5 mg tablet Take 1 tablet (0 5 mg total) by mouth 3 (three) times a day as needed (PRN), Starting Mon 8/19/2019, Normal      levothyroxine 88 mcg tablet Take 1 tablet (88 mcg total) by mouth daily, Starting Mon 8/19/2019, Normal      lisinopril-hydrochlorothiazide (PRINZIDE,ZESTORETIC) 20-25 MG per tablet Take 1 tablet by mouth daily, Starting Mon 8/19/2019, Normal      Multiple Vitamin (MULTI-VITAMIN DAILY PO) Take 1 tablet by mouth daily, Starting Thu 11/15/2012, Historical Med      pravastatin (PRAVACHOL) 40 mg tablet Take 1 tablet (40 mg total) by mouth daily, Starting Mon 8/19/2019, Normal      triamcinolone (KENALOG) 0 5 % cream Apply topically 3 (three) times a day, Starting Th 11/1/2018, Normal      diclofenac (VOLTAREN) 75 mg EC tablet Take 1 tablet (75 mg total) by mouth 2 (two) times a day, Starting Thu 2/7/2019, Normal      oxybutynin (DITROPAN-XL) 10 MG 24 hr tablet Take 1 tablet (10 mg total) by mouth daily at bedtime for 30 days, Starting u 7/18/2019, Until Sat 8/17/2019, Normal           Outpatient Discharge Orders   PT eval and treat   Standing Status: Future Standing Exp   Date: 09/03/20       ED Provider  Electronically Signed by           Tim Ryan MD  09/03/19 [id number]

## 2019-09-03 NOTE — ED NOTES
Inpatient PT/OT at bedside and recommending outpatient rehab  Janisin Reusing notified       Lexi Mullins RN  09/03/19 1725

## 2019-09-03 NOTE — DISCHARGE INSTRUCTIONS
Aleve 1-2 tabs twice daily with food OR ibuprofen 200-800mg every 8 hours with food as needed for pain   Famotidine (pecid) 2-20mg daily OR ranitidine (Zantac) 2-150mg twice daily to cut stomach acid or fill script  Medrol Dose Moy: take entire row of steriods for a particular day at one sitting  take with food   finish entire course   Use cane as directed by PT  Call PT to set up appointment

## 2019-09-16 DIAGNOSIS — F41.9 ANXIETY: ICD-10-CM

## 2019-09-16 RX ORDER — ALPRAZOLAM 0.5 MG/1
0.5 TABLET ORAL 3 TIMES DAILY PRN
Qty: 90 TABLET | Refills: 0 | Status: SHIPPED | OUTPATIENT
Start: 2019-09-16 | End: 2019-10-14 | Stop reason: SDUPTHER

## 2019-09-16 NOTE — TELEPHONE ENCOUNTER
Scheduled Medication Review:  Pt's scheduled medication use was reviewed by myself/staff via the [employer] website  Pt's use has been found to be appropriate w/o any concerns for misuse by the patient  Pt's current conditions require continued scheduled medication use at this time  Future review for continued appropriate medication use and misuse will continue

## 2019-10-14 ENCOUNTER — TELEPHONE (OUTPATIENT)
Dept: INTERNAL MEDICINE CLINIC | Facility: CLINIC | Age: 79
End: 2019-10-14

## 2019-10-14 DIAGNOSIS — F41.9 ANXIETY: ICD-10-CM

## 2019-10-14 DIAGNOSIS — L30.9 ECZEMA, UNSPECIFIED TYPE: ICD-10-CM

## 2019-10-14 RX ORDER — TRIAMCINOLONE ACETONIDE 5 MG/G
CREAM TOPICAL 3 TIMES DAILY
Qty: 454 G | Refills: 0 | Status: SHIPPED | OUTPATIENT
Start: 2019-10-14 | End: 2019-10-17

## 2019-10-14 RX ORDER — ALPRAZOLAM 0.5 MG/1
0.5 TABLET ORAL 3 TIMES DAILY PRN
Qty: 90 TABLET | Refills: 0 | Status: SHIPPED | OUTPATIENT
Start: 2019-10-14 | End: 2019-11-13 | Stop reason: SDUPTHER

## 2019-10-14 NOTE — TELEPHONE ENCOUNTER
Scheduled Medication Review:  Pt's scheduled medication use was reviewed by myself/staff via the [employer] website  Pt's use has been found to be appropriate w/o any concerns for misuse by the patient  Pt's current conditions require continued scheduled medication use at this time  Future review for continued appropriate medication use and misuse will continue

## 2019-10-16 DIAGNOSIS — L30.9 ECZEMA, UNSPECIFIED TYPE: Primary | ICD-10-CM

## 2019-10-16 RX ORDER — TRIAMCINOLONE ACETONIDE 1 MG/G
CREAM TOPICAL 2 TIMES DAILY
Qty: 454 G | Refills: 1 | Status: SHIPPED | OUTPATIENT
Start: 2019-10-16 | End: 2019-10-17 | Stop reason: SDUPTHER

## 2019-10-17 DIAGNOSIS — L30.9 ECZEMA, UNSPECIFIED TYPE: ICD-10-CM

## 2019-10-17 RX ORDER — TRIAMCINOLONE ACETONIDE 1 MG/G
CREAM TOPICAL 2 TIMES DAILY
Qty: 454 G | Refills: 1 | Status: SHIPPED | OUTPATIENT
Start: 2019-10-17 | End: 2020-12-14 | Stop reason: SDUPTHER

## 2019-11-13 DIAGNOSIS — F41.9 ANXIETY: ICD-10-CM

## 2019-11-13 DIAGNOSIS — I10 ESSENTIAL HYPERTENSION: ICD-10-CM

## 2019-11-13 DIAGNOSIS — E78.5 HYPERLIPIDEMIA, UNSPECIFIED HYPERLIPIDEMIA TYPE: ICD-10-CM

## 2019-11-13 RX ORDER — PRAVASTATIN SODIUM 40 MG
40 TABLET ORAL DAILY
Qty: 90 TABLET | Refills: 0 | Status: SHIPPED | OUTPATIENT
Start: 2019-11-13 | End: 2020-01-14 | Stop reason: SDUPTHER

## 2019-11-13 RX ORDER — LISINOPRIL AND HYDROCHLOROTHIAZIDE 25; 20 MG/1; MG/1
1 TABLET ORAL DAILY
Qty: 90 TABLET | Refills: 0 | Status: SHIPPED | OUTPATIENT
Start: 2019-11-13 | End: 2020-01-27 | Stop reason: SDUPTHER

## 2019-11-13 RX ORDER — ALPRAZOLAM 0.5 MG/1
0.5 TABLET ORAL 3 TIMES DAILY PRN
Qty: 90 TABLET | Refills: 0 | Status: SHIPPED | OUTPATIENT
Start: 2019-11-13 | End: 2019-12-14 | Stop reason: SDUPTHER

## 2019-11-13 NOTE — TELEPHONE ENCOUNTER
Scheduled Medication Review:  Pt's scheduled medication use was reviewed by myself/staff via the [employer] website  Pt's use has been found to be appropriate w/o any concerns for misuse by the patient  Pt's current conditions require continued scheduled medication use at this time  Future review for continued appropriate medication use and misuse will continue

## 2019-11-15 ENCOUNTER — OFFICE VISIT (OUTPATIENT)
Dept: INTERNAL MEDICINE CLINIC | Facility: CLINIC | Age: 79
End: 2019-11-15
Payer: COMMERCIAL

## 2019-11-15 VITALS
TEMPERATURE: 97.9 F | WEIGHT: 200 LBS | DIASTOLIC BLOOD PRESSURE: 64 MMHG | BODY MASS INDEX: 29.62 KG/M2 | SYSTOLIC BLOOD PRESSURE: 126 MMHG | HEART RATE: 60 BPM | HEIGHT: 69 IN

## 2019-11-15 DIAGNOSIS — M19.91 PRIMARY OSTEOARTHRITIS, UNSPECIFIED SITE: ICD-10-CM

## 2019-11-15 DIAGNOSIS — R15.2 RECTAL URGENCY: ICD-10-CM

## 2019-11-15 DIAGNOSIS — Z13.31 NEGATIVE DEPRESSION SCREENING: ICD-10-CM

## 2019-11-15 DIAGNOSIS — C61 MALIGNANT NEOPLASM OF PROSTATE METASTATIC TO INTRAPELVIC LYMPH NODE (HCC): ICD-10-CM

## 2019-11-15 DIAGNOSIS — E03.9 ACQUIRED HYPOTHYROIDISM: ICD-10-CM

## 2019-11-15 DIAGNOSIS — E78.2 MIXED HYPERLIPIDEMIA: ICD-10-CM

## 2019-11-15 DIAGNOSIS — F41.1 GENERALIZED ANXIETY DISORDER: ICD-10-CM

## 2019-11-15 DIAGNOSIS — K62.5 BRBPR (BRIGHT RED BLOOD PER RECTUM): ICD-10-CM

## 2019-11-15 DIAGNOSIS — I10 ESSENTIAL HYPERTENSION: Primary | ICD-10-CM

## 2019-11-15 DIAGNOSIS — Z92.3 HISTORY OF RADIATION THERAPY: ICD-10-CM

## 2019-11-15 DIAGNOSIS — C77.5 MALIGNANT NEOPLASM OF PROSTATE METASTATIC TO INTRAPELVIC LYMPH NODE (HCC): ICD-10-CM

## 2019-11-15 DIAGNOSIS — Z00.00 MEDICARE ANNUAL WELLNESS VISIT, SUBSEQUENT: ICD-10-CM

## 2019-11-15 PROCEDURE — 1101F PT FALLS ASSESS-DOCD LE1/YR: CPT | Performed by: INTERNAL MEDICINE

## 2019-11-15 PROCEDURE — 99214 OFFICE O/P EST MOD 30 MIN: CPT | Performed by: INTERNAL MEDICINE

## 2019-11-15 PROCEDURE — 3725F SCREEN DEPRESSION PERFORMED: CPT | Performed by: INTERNAL MEDICINE

## 2019-11-15 PROCEDURE — 1160F RVW MEDS BY RX/DR IN RCRD: CPT | Performed by: INTERNAL MEDICINE

## 2019-11-15 PROCEDURE — G0439 PPPS, SUBSEQ VISIT: HCPCS | Performed by: INTERNAL MEDICINE

## 2019-11-15 NOTE — PROGRESS NOTES
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)       Other    Generalized anxiety disorder    Hyperlipidemia    History of radiation therapy    Relevant Orders    Ambulatory referral to Gastroenterology      Other Visit Diagnoses     Negative depression screening        Medicare annual wellness visit, subsequent        BRBPR (bright red blood per rectum)        Relevant Orders    CBC and differential    Comprehensive metabolic panel    Protime-INR    APTT    Ambulatory referral to Gastroenterology    Rectal urgency        Relevant Orders    CBC and differential    Comprehensive metabolic panel    Protime-INR    APTT    Ambulatory referral to Gastroenterology           Preventive health issues were discussed with patient, and age appropriate screening tests were ordered as noted in patient's After Visit Summary  Personalized health advice and appropriate referrals for health education or preventive services given if needed, as noted in patient's After Visit Summary       History of Present Illness:     Patient presents for Medicare Annual Wellness visit    Patient Care Team:  Tammie Fisher DO as PCP - Kristy Grady MD (Radiation Oncology)  Shani Soto MD (Urology)     Problem List:     Patient Active Problem List   Diagnosis    Chronic shoulder pain    Vesicular palmoplantar eczema    Eczema    Generalized anxiety disorder    Hyperlipidemia    Hypertension    Hypothyroidism    Osteoarthritis    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     Disease of thyroid gland     Eczema     Generalized anxiety disorder     Hypertension     Prostate cancer ([facility] )     Prostate cancer metastatic to intrapelvic lymph node ([facility] )     Skin disorder     suspect benign, but will need removal and due to location, refer   Last assessed: April 18, 2013     Past Surgical History:   Procedure Laterality Date    CATARACT EXTRACTION      COLONOSCOPY      EYE SURGERY      KNEE SURGERY      PROSTATE SURGERY      VASECTOMY        Family History:     Family History   Problem Relation Age of Onset    Alcohol abuse Mother     Alcohol abuse Father     Depression Father     No Known Problems Sister     Stroke Brother         syndrome     No Known Problems Maternal Grandmother     No Known Problems Maternal Grandfather     No Known Problems Paternal Grandmother     No Known Problems Paternal Grandfather     Alcohol abuse Family     Colon cancer Maternal Aunt       Social History:     Social History     Socioeconomic History    Marital status: /Civil Union     Spouse name: None    Number of children: None    Years of education: None    Highest education level: None   Occupational History    Occupation: self employed  floor covering   Social Needs    Financial resource strain: None    Food insecurity:     Worry: None     Inability: None    Transportation needs:     Medical: None     Non-medical: None   Tobacco Use    Smoking status: Former Smoker     Types: Cigars    Smokeless tobacco: Never Used    Tobacco comment: smokes 1 cigar a couple times a month   Substance and Sexual Activity    Alcohol use: No    Drug use: No     Comment: Chronic Narcotic use noted in "[employer]scripts"     Sexual activity: None   Lifestyle    Physical activity:     Days per week: None     Minutes per session: None    Stress: None   Relationships    Social connections:     Talks on phone: None     Gets together: None     Attends Anabaptist service: None     Active member of club or organization: None     Attends meetings of clubs or organizations: None     Relationship status: None    Intimate partner violence:     Fear of current or ex partner: None     Emotionally abused: None     Physically abused: None     Forced sexual activity: None   Other Topics Concern    None   Social History Narrative    Caffeine use        Medications and Allergies:     Current Outpatient Medications   Medication Sig Dispense Refill    ALPRAZolam (XANAX) 0 5 mg tablet Take 1 tablet (0 5 mg total) by mouth 3 (three) times a day as needed (PRN) 90 tablet 0    levothyroxine 88 mcg tablet Take 1 tablet (88 mcg total) by mouth daily 90 tablet 1    lisinopril-hydrochlorothiazide (PRINZIDE,ZESTORETIC) 20-25 MG per tablet Take 1 tablet by mouth daily 90 tablet 0    Multiple Vitamin (MULTI-VITAMIN DAILY PO) Take 1 tablet by mouth daily      pravastatin (PRAVACHOL) 40 mg tablet Take 1 tablet (40 mg total) by mouth daily 90 tablet 0    triamcinolone (KENALOG) 0 1 % cream Apply topically 2 (two) times a day 454 g 1     No current facility-administered medications for this visit  No Known Allergies   Immunizations:     Immunization History   Administered Date(s) Administered    INFLUENZA 10/01/2017, 10/08/2018, 10/15/2019    Influenza Split High Dose Preservative Free IM 10/18/2016    Influenza TIV (IM) 10/01/2017    Pneumococcal Conjugate 13-Valent 04/11/2017    Pneumococcal Polysaccharide PPV23 01/01/2006    Td (adult), adsorbed 01/01/2000    Tdap 05/01/2014      Health Maintenance: There are no preventive care reminders to display for this patient  There are no preventive care reminders to display for this patient  Medicare Health Risk Assessment:     /64   Pulse 60   Temp 97 9 °F (36 6 °C) (Tympanic)   Ht 5' 8 5" (1 74 m)   Wt 90 7 kg (200 lb)   BMI 29 97 kg/m²      Tom Mera is here for his Subsequent Wellness visit   Last Medicare Wellness visit information reviewed, patient interviewed and updates made to the record today  Health Risk Assessment:   Patient rates overall health as good  Patient feels that their physical health rating is same  Eyesight was rated as same  Hearing was rated as same  Patient feels that their emotional and mental health rating is same  Pain experienced in the last 7 days has been none  Patient states that he has experienced no weight loss or gain in last 6 months  Depression Screening:   PHQ-2 Score: 0      Fall Risk Screening: In the past year, patient has experienced: no history of falling in past year      Home Safety:  Patient does not have trouble with stairs inside or outside of their home  Patient has working smoke alarms and has no working carbon monoxide detector  Home safety hazards include: none  Nutrition:   Current diet is Regular and Limited junk food  Medications:   Patient is currently taking over-the-counter supplements  OTC medications include: see medication list  Patient is able to manage medications  Activities of Daily Living (ADLs)/Instrumental Activities of Daily Living (IADLs):   Walk and transfer into and out of bed and chair?: Yes  Dress and groom yourself?: Yes    Bathe or shower yourself?: Yes    Feed yourself? Yes  Do your laundry/housekeeping?: Yes  Manage your money, pay your bills and track your expenses?: Yes  Make your own meals?: Yes    Do your own shopping?: Yes    Previous Hospitalizations:   Any hospitalizations or ED visits within the last 12 months?: No      Advance Care Planning:   Living will: Yes    Durable POA for healthcare:  Yes    Advanced directive: Yes    Advanced directive counseling given: Yes    Five wishes given: No    Patient declined ACP directive: No    End of Life Decisions reviewed with patient: Yes    Provider agrees with end of life decisions: Yes      Cognitive Screening:   Provider or family/friend/caregiver concerned regarding cognition?: No    PREVENTIVE SCREENINGS      Cardiovascular Screening:    General: History Lipid Disorder and Screening Current      Diabetes Screening:     General: Screening Current      Colorectal Cancer Screening:     General: Screening Current      Prostate Cancer Screening:    General: History Prostate Cancer and Screening Not Indicated      Osteoporosis Screening:    General: Screening Not Indicated      Abdominal Aortic Aneurysm (AAA) Screening:    Risk factors include: tobacco use        General: Screening Not Indicated      Lung Cancer Screening:     General: Screening Not Indicated      Hepatitis C Screening:    General: Screening Not Indicated    Other Counseling Topics:   Car/seat belt/driving safety and regular weightbearing exercise  A/P: Doing well and no falls reported  Denies depression and feels safe at home  Diverse diet  No problems operating a MV and uses seat belts  Has a living will and POA  No DME or referrals needed today  RTC one year for medicare wellness       Sue Ocampo, DO

## 2019-11-15 NOTE — PATIENT INSTRUCTIONS

## 2019-11-15 NOTE — PROGRESS NOTES
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)       Other    Generalized anxiety disorder    Hyperlipidemia    History of radiation therapy    Relevant Orders    Ambulatory referral to Gastroenterology      Other Visit Diagnoses     Negative depression screening        Medicare annual wellness visit, subsequent        BRBPR (bright red blood per rectum)        Relevant Orders    CBC and differential    Comprehensive metabolic panel    Protime-INR    APTT    Ambulatory referral to Gastroenterology    Rectal urgency        Relevant Orders    CBC and differential    Comprehensive metabolic panel    Protime-INR    APTT    Ambulatory referral to Gastroenterology           Diagnoses and all orders for this visit:    Essential hypertension    Acquired hypothyroidism    Primary osteoarthritis, unspecified site    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)    Generalized anxiety disorder    Mixed hyperlipidemia    Negative depression screening    Medicare annual wellness visit, subsequent    BRBPR (bright red blood per rectum)  -     CBC and differential; Future  -     Comprehensive metabolic panel; Future  -     Protime-INR; Future  -     APTT; Future  -     Ambulatory referral to Gastroenterology; Future    Rectal urgency  -     CBC and differential; Future  -     Comprehensive metabolic panel; Future  -     Protime-INR; Future  -     APTT; Future  -     Ambulatory referral to Gastroenterology; Future    History of radiation therapy  -     Ambulatory referral to Gastroenterology; Future        No problem-specific Assessment & Plan notes found for this encounter  A/P: Doing ok except for the rectal issues   Most likely radiation proctitis, but since worsening, need to r/o other causes and most likely needs a colonoscopy and will check labs  ?trial of rectal steroids of any benefit  Continue current treatment otherwise  RTC three weeks for f/u  Subjective:      Patient ID: Yenni Rico is a 78 y o  male  WM RTC with his wife for f/u HTN, prostate cancer s/p XRT, etc  Doing ok, but c/o 14 months of BRBPR  Started since taking XRT  Now notes increase frequency of episodes with more severe  No CP, SOB, palpitations, or lightheadedness  No pain  No melena, hematochezia, etc No mucus  Some rectal urgency  Remains active otherwise and no falls  NO stroke like events  NADIA is good as long as he takes his medicine  Labs are up to date and already had his flu shot  The following portions of the patient's history were reviewed and updated as appropriate:   He has a past medical history of Cancer ([facility] [address] ), Dementia ([facility] [address] ), Disease of thyroid gland, Eczema, Generalized anxiety disorder, Hypertension, Prostate cancer ([facility] [address] ), Prostate cancer metastatic to intrapelvic lymph node ([facility] [address] ), and Skin disorder  ,  does not have any pertinent problems on file  ,   has a past surgical history that includes Knee surgery; Prostate surgery; Vasectomy; Cataract extraction; Eye surgery; and Colonoscopy  ,  family history includes Alcohol abuse in his family, father, and mother; Colon cancer in his maternal aunt; Depression in his father; No Known Problems in his maternal grandfather, maternal grandmother, paternal grandfather, paternal grandmother, and sister; Stroke in his brother  ,   reports that he has quit smoking  His smoking use included cigars  He has never used smokeless tobacco  He reports that he does not drink alcohol or use drugs  ,  has No Known Allergies     Current Outpatient Medications   Medication Sig Dispense Refill    ALPRAZolam (XANAX) 0 5 mg tablet Take 1 tablet (0 5 mg total) by mouth 3 (three) times a day as needed (PRN) 90 tablet 0    levothyroxine 88 mcg tablet Take 1 tablet (88 mcg total) by mouth daily 90 tablet 1    lisinopril-hydrochlorothiazide (PRINZIDE,ZESTORETIC) 20-25 MG per tablet Take 1 tablet by mouth daily 90 tablet 0    Multiple Vitamin (MULTI-VITAMIN DAILY PO) Take 1 tablet by mouth daily      pravastatin (PRAVACHOL) 40 mg tablet Take 1 tablet (40 mg total) by mouth daily 90 tablet 0    triamcinolone (KENALOG) 0 1 % cream Apply topically 2 (two) times a day 454 g 1     No current facility-administered medications for this visit  Review of Systems   Constitutional: Negative for activity change, chills, diaphoresis, fatigue and fever  HENT: Negative  Eyes: Negative for visual disturbance  Respiratory: Negative for cough, chest tightness, shortness of breath and wheezing  Cardiovascular: Negative for chest pain, palpitations and leg swelling  Gastrointestinal: Positive for anal bleeding  Negative for abdominal distention, abdominal pain, blood in stool, constipation, diarrhea, nausea, rectal pain and vomiting  Endocrine: Negative for cold intolerance and heat intolerance  Genitourinary: Negative for difficulty urinating, dysuria and frequency  Musculoskeletal: Negative for arthralgias, gait problem and myalgias  Neurological: Negative for dizziness, seizures, syncope, weakness, light-headedness and headaches  Psychiatric/Behavioral: Negative for confusion, dysphoric mood and sleep disturbance  The patient is nervous/anxious  PHQ-9 Depression Screening    PHQ-9:    Frequency of the following problems over the past two weeks:       Little interest or pleasure in doing things:  0 - not at all  Feeling down, depressed, or hopeless:  0 - not at all  PHQ-2 Score:  0        Objective:  Vitals:    11/15/19 1531   BP: 126/64   Pulse: 60   Temp: 97 9 °F (36 6 °C)   TempSrc: Tympanic   Weight: 90 7 kg (200 lb)   Height: 5' 8 5" (1 74 m)     Body mass index is 29 97 kg/m²       Physical Exam Constitutional: He is oriented to person, place, and time  He appears well-developed and well-nourished  No distress  HENT:   Head: Normocephalic and atraumatic  Mouth/Throat: Oropharynx is clear and moist    Eyes: Pupils are equal, round, and reactive to light  Conjunctivae and EOM are normal    Neck: Neck supple  No JVD present  Cardiovascular: Normal rate, regular rhythm and normal heart sounds  Pulmonary/Chest: Effort normal and breath sounds normal  No respiratory distress  He has no wheezes  He has no rales  Abdominal: Soft  Bowel sounds are normal  He exhibits no distension  There is no tenderness  Musculoskeletal: He exhibits no edema  Neurological: He is alert and oriented to person, place, and time  Psychiatric: He has a normal mood and affect  His behavior is normal  Judgment and thought content normal    Nursing note and vitals reviewed

## 2019-11-18 ENCOUNTER — APPOINTMENT (OUTPATIENT)
Dept: LAB | Facility: CLINIC | Age: 79
End: 2019-11-18
Payer: COMMERCIAL

## 2019-11-18 DIAGNOSIS — R15.2 RECTAL URGENCY: ICD-10-CM

## 2019-11-18 DIAGNOSIS — K62.5 BRBPR (BRIGHT RED BLOOD PER RECTUM): ICD-10-CM

## 2019-11-18 LAB
ALBUMIN SERPL BCP-MCNC: 3.8 G/DL (ref 3.5–5)
ALP SERPL-CCNC: 62 U/L (ref 46–116)
ALT SERPL W P-5'-P-CCNC: 31 U/L (ref 12–78)
ANION GAP SERPL CALCULATED.3IONS-SCNC: 6 MMOL/L (ref 4–13)
APTT PPP: 32 SECONDS (ref 23–37)
AST SERPL W P-5'-P-CCNC: 22 U/L (ref 5–45)
BASOPHILS # BLD AUTO: 0.03 THOUSANDS/ΜL (ref 0–0.1)
BASOPHILS NFR BLD AUTO: 0 % (ref 0–1)
BILIRUB SERPL-MCNC: 0.73 MG/DL (ref 0.2–1)
BUN SERPL-MCNC: 20 MG/DL (ref 5–25)
CALCIUM SERPL-MCNC: 9.1 MG/DL (ref 8.3–10.1)
CHLORIDE SERPL-SCNC: 109 MMOL/L (ref 100–108)
CO2 SERPL-SCNC: 25 MMOL/L (ref 21–32)
CREAT SERPL-MCNC: 1.21 MG/DL (ref 0.6–1.3)
EOSINOPHIL # BLD AUTO: 0.13 THOUSAND/ΜL (ref 0–0.61)
EOSINOPHIL NFR BLD AUTO: 2 % (ref 0–6)
ERYTHROCYTE [DISTWIDTH] IN BLOOD BY AUTOMATED COUNT: 13.2 % (ref 11.6–15.1)
GFR SERPL CREATININE-BSD FRML MDRD: 56 ML/MIN/1.73SQ M
GLUCOSE P FAST SERPL-MCNC: 107 MG/DL (ref 65–99)
HCT VFR BLD AUTO: 42.6 % (ref 36.5–49.3)
HGB BLD-MCNC: 14.2 G/DL (ref 12–17)
IMM GRANULOCYTES # BLD AUTO: 0.02 THOUSAND/UL (ref 0–0.2)
IMM GRANULOCYTES NFR BLD AUTO: 0 % (ref 0–2)
INR PPP: 1.08 (ref 0.84–1.19)
LYMPHOCYTES # BLD AUTO: 0.85 THOUSANDS/ΜL (ref 0.6–4.47)
LYMPHOCYTES NFR BLD AUTO: 12 % (ref 14–44)
MCH RBC QN AUTO: 30.9 PG (ref 26.8–34.3)
MCHC RBC AUTO-ENTMCNC: 33.3 G/DL (ref 31.4–37.4)
MCV RBC AUTO: 93 FL (ref 82–98)
MONOCYTES # BLD AUTO: 0.6 THOUSAND/ΜL (ref 0.17–1.22)
MONOCYTES NFR BLD AUTO: 9 % (ref 4–12)
NEUTROPHILS # BLD AUTO: 5.25 THOUSANDS/ΜL (ref 1.85–7.62)
NEUTS SEG NFR BLD AUTO: 77 % (ref 43–75)
NRBC BLD AUTO-RTO: 0 /100 WBCS
PLATELET # BLD AUTO: 244 THOUSANDS/UL (ref 149–390)
PMV BLD AUTO: 10.5 FL (ref 8.9–12.7)
POTASSIUM SERPL-SCNC: 3.8 MMOL/L (ref 3.5–5.3)
PROT SERPL-MCNC: 7.2 G/DL (ref 6.4–8.2)
PROTHROMBIN TIME: 13.6 SECONDS (ref 11.6–14.5)
RBC # BLD AUTO: 4.6 MILLION/UL (ref 3.88–5.62)
SODIUM SERPL-SCNC: 140 MMOL/L (ref 136–145)
WBC # BLD AUTO: 6.88 THOUSAND/UL (ref 4.31–10.16)

## 2019-11-18 PROCEDURE — 85730 THROMBOPLASTIN TIME PARTIAL: CPT

## 2019-11-18 PROCEDURE — 85610 PROTHROMBIN TIME: CPT

## 2019-11-18 PROCEDURE — 36415 COLL VENOUS BLD VENIPUNCTURE: CPT

## 2019-11-18 PROCEDURE — 80053 COMPREHEN METABOLIC PANEL: CPT

## 2019-11-18 PROCEDURE — 85025 COMPLETE CBC W/AUTO DIFF WBC: CPT

## 2019-11-21 ENCOUNTER — CLINICAL SUPPORT (OUTPATIENT)
Dept: RADIATION ONCOLOGY | Facility: CLINIC | Age: 79
End: 2019-11-21
Attending: RADIOLOGY
Payer: COMMERCIAL

## 2019-11-21 ENCOUNTER — APPOINTMENT (OUTPATIENT)
Dept: LAB | Facility: CLINIC | Age: 79
End: 2019-11-21
Attending: RADIOLOGY
Payer: COMMERCIAL

## 2019-11-21 VITALS
RESPIRATION RATE: 18 BRPM | WEIGHT: 203.4 LBS | DIASTOLIC BLOOD PRESSURE: 60 MMHG | BODY MASS INDEX: 30.13 KG/M2 | HEART RATE: 64 BPM | TEMPERATURE: 98.5 F | OXYGEN SATURATION: 97 % | SYSTOLIC BLOOD PRESSURE: 140 MMHG | HEIGHT: 69 IN

## 2019-11-21 DIAGNOSIS — C77.5 MALIGNANT NEOPLASM OF PROSTATE METASTATIC TO INTRAPELVIC LYMPH NODE (HCC): ICD-10-CM

## 2019-11-21 DIAGNOSIS — C77.5 MALIGNANT NEOPLASM OF PROSTATE METASTATIC TO INTRAPELVIC LYMPH NODE (HCC): Primary | ICD-10-CM

## 2019-11-21 DIAGNOSIS — C61 MALIGNANT NEOPLASM OF PROSTATE (HCC): ICD-10-CM

## 2019-11-21 DIAGNOSIS — C61 MALIGNANT NEOPLASM OF PROSTATE METASTATIC TO INTRAPELVIC LYMPH NODE (HCC): Primary | ICD-10-CM

## 2019-11-21 DIAGNOSIS — C61 MALIGNANT NEOPLASM OF PROSTATE METASTATIC TO INTRAPELVIC LYMPH NODE (HCC): ICD-10-CM

## 2019-11-21 LAB — PSA SERPL-MCNC: <0.1 NG/ML (ref 0–4)

## 2019-11-21 PROCEDURE — 99211 OFF/OP EST MAY X REQ PHY/QHP: CPT | Performed by: RADIOLOGY

## 2019-11-21 PROCEDURE — 84153 ASSAY OF PSA TOTAL: CPT

## 2019-11-21 NOTE — PROGRESS NOTES
Follow-up - Radiation Oncology   Grazyna Velasco 1940 78 y o  male [id number]      History of Present Illness   Cancer Staging  See Below    Grazyna Velasco is a 78y o  year old male with a history of recurrent prostate adenocarcinoma  He is status post prostatectomy in 1997 when he lived in [place] and did not require any postoperative radiation therapy  He more recently has had a rising PSA level that went up to 1 4 in April 2018   Axumin PET-CT scan July 5, 2018 showed increased uptake in the right pelvic sidewall lymph node in addition the right posterior acetabulum consistent with metastatic disease  This was confirmed on subsequent bone scan performed July 17, 2018    He has a 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 involving a right pelvic lymph node and within the bone  Recommendations were made salvage radiation therapy to the whole pelvis including his right pelvic lymphadenopathy, prostate bed, and right acetabular region  [facility] completed treatment on September 25, 2018 and returns today for follow-up  He was last seen 5/16/2019, and at that time he was doing well, and PSA was 0 2    Interval History:   7/18/19 he returned to f/u with urology  He had reported recent development of nighttime incontinence, daytime frequent q2hrs, and fecal urgency w incontinence at times  He was prescribed Oxybutynin  He was scheduled for f/u with Urology on 9/20/19, but pt cancelled his appt, and has not rescheduled  No new PSA's have been done      Component      Latest Ref Rng & Units 11/8/2018 1/17/2019 5/16/2019   PSA, Total      0 0 - 4 0 ng/mL       1 2       0 4        0 2     He is seen today with his wife  He reports normal bowel movements other than he has been seeing a small amount of blood when he wipes over the last several weeks  He had a negative colonoscopy per his report 10 years ago  He reports a good urinary stream   He has stable nocturia x2    He is active and goes for a daily morning walk for 2 miles  11/27/19 Gastroenterology consultation to consider colonoscopy  Historical Information      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 ([facility] [address] )    1997 Initial Diagnosis     Prostate cancer      1997 Surgery     [place]statectomy in [place], [place] 6 disease      7/5/2018 Initial Diagnosis     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 ([facility] [address] )      8/2/2018 - 9/25/2018 Radiation     Treatment:  Course: C1 toPelvis, Rt acetabulum & prostate bed     Plan ID Energy Fractions Dose per Fraction (cGy) Dose Correction (cGy) Total Dose Delivered (cGy) Elapsed Days   CD P Bed 10X 12 / 12 180 0 2,160 15   WP_R Acetab 10X 25 / 25 180 0 4,500 36      Treatment dates:  C1: 8/2/2018 - 9/25/2018           Past Medical History:   Diagnosis Date    Cancer [facility] [address] [facility]     Dementia ([facility] [address] [facility]     Disease of thyroid gland     Eczema     Generalized anxiety disorder     Hypertension     [facility]     Prostate cancer metastatic to intrapelvic lymph node ([facility]     Skin disorder     suspect benign, but will need removal and due to location, refer   Last assessed: April 18, 2013     Past Surgical History:   Procedure Laterality Date    CATARACT EXTRACTION      COLONOSCOPY      EYE SURGERY      KNEE SURGERY      PROSTATE SURGERY      VASECTOMY         Social History   Social History     Substance and Sexual Activity   Alcohol Use No     Social History     Substance and Sexual Activity   Drug Use No    Comment: Chronic Narcotic use noted in "allscripts"      Social History     Tobacco Use   Smoking Status Former Smoker    Types: Cigars   Smokeless Tobacco Never Used   Tobacco Comment    smokes 1 cigar a couple times a month       Meds/Allergies     Current Outpatient Medications:     ALPRAZolam (XANAX) 0 5 mg tablet, Take 1 tablet (0 5 mg total) by mouth 3 (three) times a day as needed (PRN), Disp: 90 tablet, Rfl: 0   levothyroxine 88 mcg tablet, Take 1 tablet (88 mcg total) by mouth daily, Disp: 90 tablet, Rfl: 1    lisinopril-hydrochlorothiazide (PRINZIDE,ZESTORETIC) 20-25 MG per tablet, Take 1 tablet by mouth daily, Disp: 90 tablet, Rfl: 0    Multiple Vitamin (MULTI-VITAMIN DAILY PO), Take 1 tablet by mouth daily, Disp: , Rfl:     pravastatin (PRAVACHOL) 40 mg tablet, Take 1 tablet (40 mg total) by mouth daily, Disp: 90 tablet, Rfl: 0    triamcinolone (KENALOG) 0 1 % cream, Apply topically 2 (two) times a day, Disp: 454 g, Rfl: 1  No Known Allergies    Review of Systems   Constitutional: Negative  HENT: Negative  Eyes: Negative  Respiratory: Negative  Cardiovascular: Negative  Gastrointestinal: Positive for blood in stool (reports 1-2 x week blood on toliet paper, occasionally in toliet), constipation (occasional) and diarrhea (occasional)  Urge incontinence with bowels, colonoscopy 11/27/19   Endocrine: Negative  Genitourinary: Positive for urgency  Nocturia x 2, reports good stream   Musculoskeletal: Negative  Skin: Negative          eczema   Allergic/Immunologic: Negative  Neurological: Negative  Hematological: Negative  Psychiatric/Behavioral: Negative         OBJECTIVE:   /60   Pulse 64   Temp 98 5 °F (36 9 °C)   Resp 18   Ht 5' 8 5" (1 74 m)   Wt 92 3 kg (203 lb 6 4 oz)   SpO2 97%   BMI 30 48 kg/m²   Pain Assessment:  0  ECOG/Zubrod/WHO: 0 - Asymptomatic    Physical Exam  Constitutional: He is oriented to person, place, and time  He appears well-developed and well-nourished  No distress  HENT:   Head: Normocephalic and atraumatic  Mouth/Throat: No oropharyngeal exudate  Eyes: Pupils are equal, round, and reactive to light  Conjunctivae and EOM are normal  No scleral icterus  Neck: Normal range of motion  Neck supple  No tracheal deviation present  No thyromegaly present  Cardiovascular: Normal rate, regular rhythm and normal heart sounds     Pulmonary/Chest: Effort normal and breath sounds normal  No respiratory distress  He has no wheezes  He has no rales  He exhibits no tenderness  Abdominal: Soft  Bowel sounds are normal  He exhibits no distension and no mass  There is no tenderness  Genitourinary:   Genitourinary Comments: Rectal examination deferred    Musculoskeletal: Normal range of motion  He exhibits no edema or tenderness  Lymphadenopathy:     He has no cervical adenopathy         Right: No inguinal and no supraclavicular adenopathy present         Left: No inguinal and no supraclavicular adenopathy present  Neurological: He is alert and oriented to person, place, and time  No cranial nerve deficit  Coordination normal    Skin: Skin is warm and dry  No rash noted  He is not diaphoretic  No erythema  No pallor  Psychiatric: He has a normal mood and affect   His behavior is normal  Judgment and thought content normal    Nursing note and vitals reviewed      RESULTS    Lab Results:   Recent Results (from the past 672 hour(s))   CBC and differential    Collection Time: 11/18/19  8:14 AM   Result Value Ref Range    WBC 6 88 4 31 - 10 16 Thousand/uL    RBC 4 60 3 88 - 5 62 Million/uL    Hemoglobin 14 2 12 0 - 17 0 g/dL    Hematocrit 42 6 36 5 - 49 3 %    MCV 93 82 - 98 fL    MCH 30 9 26 8 - 34 3 pg    MCHC 33 3 31 4 - 37 4 g/dL    RDW 13 2 11 6 - 15 1 %    MPV 10 5 8 9 - 12 7 fL    Platelets 998 690 - 166 Thousands/uL    nRBC 0 /100 WBCs    Neutrophils Relative 77 (H) 43 - 75 %    Immat GRANS % 0 0 - 2 %    Lymphocytes Relative 12 (L) 14 - 44 %    Monocytes Relative 9 4 - 12 %    Eosinophils Relative 2 0 - 6 %    Basophils Relative 0 0 - 1 %    Neutrophils Absolute 5 25 1 85 - 7 62 Thousands/µL    Immature Grans Absolute 0 02 0 00 - 0 20 Thousand/uL    Lymphocytes Absolute 0 85 0 60 - 4 47 Thousands/µL    Monocytes Absolute 0 60 0 17 - 1 22 Thousand/µL    Eosinophils Absolute 0 13 0 00 - 0 61 Thousand/µL    Basophils Absolute 0 03 0 00 - 0 10 Thousands/µL   Comprehensive metabolic panel    Collection Time: 11/18/19  8:14 AM   Result Value Ref Range    Sodium 140 136 - 145 mmol/L    Potassium 3 8 3 5 - 5 3 mmol/L    Chloride 109 (H) 100 - 108 mmol/L    CO2 25 21 - 32 mmol/L    ANION GAP 6 4 - 13 mmol/L    BUN 20 5 - 25 mg/dL    Creatinine 1 21 0 60 - 1 30 mg/dL    Glucose, Fasting 107 (H) 65 - 99 mg/dL    Calcium 9 1 8 3 - 10 1 mg/dL    AST 22 5 - 45 U/L    ALT 31 12 - 78 U/L    Alkaline Phosphatase 62 46 - 116 U/L    Total Protein 7 2 6 4 - 8 2 g/dL    Albumin 3 8 3 5 - 5 0 g/dL    Total Bilirubin 0 73 0 20 - 1 00 mg/dL    eGFR 56 ml/min/1 73sq m   Protime-INR    Collection Time: 11/18/19  8:14 AM   Result Value Ref Range    Protime 13 6 11 6 - 14 5 seconds    INR 1 08 0 84 - 1 19   APTT    Collection Time: 11/18/19  8:14 AM   Result Value Ref Range    PTT 32 23 - 37 seconds       Imaging Studies:No results found  Assessment/Plan:  Orders Placed This Encounter   Procedures    PSA Total, Diagnostic         Mari is a 78y o  year old male with a history of recurrent prostate Darreldianna Gonsalezmendel is status post prostatectomy in 1997 when he lived in [place] and did not require any postoperative radiation therapy  Christen Hunter more recently has had a rising PSA level that went up to 1 4 in April 2018   Axumin PET-CT scan July 5, 2018 showed increased uptake in the right pelvic sidewall lymph node in addition the right posterior acetabulum consistent with metastatic disease  This was confirmed on subsequent bone scan performed July 17, 2018    He has a 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 involving a right pelvic lymph node and within the bone  Recommendations were made salvage radiation therapy to the whole pelvis including his right pelvic lymphadenopathy, prostate bed, and right acetabular region  Christen Hunter completed treatment on September 25, 2018 and returns today for follow-up examination       He is doing well  He has no clinical or biochemical evidence of any recurrent disease  PSA level was 0 4 NG/mL on January 17, 2019  PSA level decreased further to 0 2 NG/mL on May 16, 2019  His PSA level performed today was less than 0 1 NG/mL  We are pleased with his good clinical and biochemical response to treatment  He is having a small amount of rectal bleeding with wiping and is scheduled for consultation next week with gastroenterology to consider repeat colonoscopy which was last done 10 years ago  He will return for follow-up in 6 months with a repeat PSA level  Manasa Terrazas MD  11/21/2019,3:08 PM    Portions of the record may have been created with voice recognition software   Occasional wrong word or "sound a like" substitutions may have occurred due to the inherent limitations of voice recognition software   Read the chart carefully and recognize, using context, where substitutions have occurred

## 2019-11-21 NOTE — PROGRESS NOTES
Tomy Lee 1940 is a 78 y o  male     Follow up visit     Tomy Lee is a 78 y o  male with a history of recurrent prostate adenocarcinoma  He is status post prostatectomy in 1997 when he lived in [place] and did not require any postoperative radiation therapy  He more recently has had a rising PSA level that went up to 1 4 in April 2018   Axumin PET-CT scan July 5, 2018 showed increased uptake in the right pelvic sidewall lymph node in addition the right posterior acetabulum consistent with metastatic disease  This was confirmed on subsequent bone scan performed July 17, 2018    He has a 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 involving a right pelvic lymph node and within the bone  Recommendations were made salvage radiation therapy to the whole pelvis including his right pelvic lymphadenopathy, prostate bed, and right acetabular region  [facility] Ovens completed treatment on September 25, 2018 and returns today for follow-up  He was last seen 5/16/2019, and at that time he was doing well, and PSA was 0 2    7/18/19 he returned to f/u with urology  He had reported recent development of nighttime incontinence, daytime frequent q2hrs, and fecal urgency w incontinence at times  He was prescribed Oxybutynin  he was scheduled for f/u with Urology on 9/20/19, but pt cancelled his appt, and has not rescheduled  No new PSA's have been done      Component      Latest Ref Rng & Units 11/8/2018 1/17/2019 5/16/2019   PSA, Total      0 0 - 4 0 ng/mL 1 2 0 4 0 2            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 ([facility] [address] )    1997 Initial Diagnosis     Prostate cancer      1997 Surgery     Prostatectomy in [place], [place] 6 disease      7/5/2018 Initial Diagnosis     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 ([facility] [address] )      8/2/2018 - 9/25/2018 Radiation     Treatment:  Course: C1 toPelvis, Rt acetabulum & prostate bed     Plan ID Energy Fractions Dose per Fraction (cGy) Dose Correction (cGy) Total Dose Delivered (cGy) Elapsed Days   CD P Bed 10X 12 / 12 180 0 2,160 15   WP_R Acetab 10X 25 / 25 180 0 4,500 36      Treatment dates:  C1: 8/2/2018 - 9/25/2018           Clinical Trial: no      Health Maintenance   Topic Date Due    SLP PLAN OF CARE  1940    BMI: Followup Plan  03/12/2020    Fall Risk  11/15/2020    Depression Screening PHQ  11/15/2020    Medicare Annual Wellness Visit (AWV)  11/15/2020    BMI: Adult  11/15/2020    DTaP,Tdap,and Td Vaccines (2 - Td) 05/01/2024    Influenza Vaccine  Completed    Pneumococcal Vaccine: 65+ Years  Completed    Pneumococcal Vaccine: Pediatrics (0 to 5 Years) and At-Risk Patients (6 to 59 Years)  Aged Out    HIB Vaccine  Aged Out    Hepatitis B Vaccine  Aged Out    IPV Vaccine  Aged Out    Hepatitis A Vaccine  Aged Out    Meningococcal ACWY Vaccine  Aged Out    HPV Vaccine  Aged Out       Patient Active Problem List   Diagnosis    Chronic shoulder pain    Vesicular palmoplantar eczema    Eczema    Generalized anxiety disorder    Hyperlipidemia    Hypertension    Hypothyroidism    Osteoarthritis    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 ([address]    History of radiation therapy     Past Medical History:   Diagnosis Date    Cancer ([address]     Dementia ([address]     Disease of thyroid gland     Eczema     Generalized anxiety disorder     Hypertension     Prostate cancer ([address]     Prostate cancer metastatic to intrapelvic lymph node ([address]     Skin disorder     suspect benign, but will need removal and due to location, refer   Last assessed: April 18, 2013     Past Surgical History:   Procedure Laterality Date    CATARACT EXTRACTION      COLONOSCOPY      EYE SURGERY      KNEE SURGERY      PROSTATE SURGERY      VASECTOMY       Family History   Problem Relation Age of Onset    Alcohol abuse Mother     Alcohol abuse Father     Depression Father    Esequiel No Known Problems Sister     Stroke Brother         syndrome     No Known Problems Maternal Grandmother     No Known Problems Maternal Grandfather     No Known Problems Paternal Grandmother     No Known Problems Paternal Grandfather     Alcohol abuse Family     Colon cancer Maternal Aunt      Social History     Socioeconomic History    Marital status: /Civil Union     Spouse name: Not on file    Number of children: Not on file    Years of education: Not on file    Highest education level: Not on file   Occupational History    Occupation: self employed  floor covering   Social Needs    Financial resource strain: Not on file    Food insecurity:     Worry: Not on file     Inability: Not on file    Transportation needs:     Medical: Not on file     Non-medical: Not on file   Tobacco Use    Smoking status: Former Smoker     Types: Cigars    Smokeless tobacco: Never Used    Tobacco comment: smokes 1 cigar a couple times a month   Substance and Sexual Activity    Alcohol use: No    Drug use: No     Comment: Chronic Narcotic use noted in "allscripts"     Sexual activity: Not on file   Lifestyle    Physical activity:     Days per week: Not on file     Minutes per session: Not on file    Stress: Not on file   Relationships    Social connections:     Talks on phone: Not on file     Gets together: Not on file     Attends [employer]ical service: Not on file     Active member of club or organization: Not on file     Attends meetings of clubs or organizations: Not on file     Relationship status: Not on file    Intimate partner violence:     Fear of current or ex partner: Not on file     Emotionally abused: Not on file     Physically abused: Not on file     Forced sexual activity: Not on file   Other Topics Concern    Not on file   Social History Narrative    Caffeine use        Current Outpatient Medications:     ALPRAZolam (XANAX) 0 5 mg tablet, Take 1 tablet (0 5 mg total) by mouth 3 (three) times a day as needed (PRN), Disp: 90 tablet, Rfl: 0    levothyroxine 88 mcg tablet, Take 1 tablet (88 mcg total) by mouth daily, Disp: 90 tablet, Rfl: 1    lisinopril-hydrochlorothiazide (PRINZIDE,ZESTORETIC) 20-25 MG per tablet, Take 1 tablet by mouth daily, Disp: 90 tablet, Rfl: 0    Multiple Vitamin (MULTI-VITAMIN DAILY PO), Take 1 tablet by mouth daily, Disp: , Rfl:     pravastatin (PRAVACHOL) 40 mg tablet, Take 1 tablet (40 mg total) by mouth daily, Disp: 90 tablet, Rfl: 0    triamcinolone (KENALOG) 0 1 % cream, Apply topically 2 (two) times a day, Disp: 454 g, Rfl: 1  No Known Allergies    Review of Systems:  Review of Systems   Constitutional: Negative  HENT: Negative  Eyes: Negative  Respiratory: Negative  Cardiovascular: Negative  Gastrointestinal: Positive for blood in stool (reports 1-2 x week blood on toliet paper, occasionally in toliet), constipation (occasional) and diarrhea (occasional)  Urge incontinence with bowels, colonoscopy 11/27/19   Endocrine: Negative  Genitourinary: Positive for urgency  Nocturia x 2, reports good stream   Musculoskeletal: Negative  Skin: Negative          eczema   Allergic/Immunologic: Negative  Neurological: Negative  Hematological: Negative  Psychiatric/Behavioral: Negative  Vitals:    11/21/19 1405   BP: 140/60   Pulse: 64   Resp: 18   Temp: 98 5 °F (36 9 °C)   SpO2: 97%   Weight: 92 3 kg (203 lb 6 4 oz)   Height: 5' 8 5" (1 74 m)        Pain assessment:0    Pain Score: 0-No pain      Imaging:No results found

## 2019-12-14 DIAGNOSIS — F41.9 ANXIETY: ICD-10-CM

## 2019-12-16 RX ORDER — ALPRAZOLAM 0.5 MG/1
0.5 TABLET ORAL 3 TIMES DAILY PRN
Qty: 90 TABLET | Refills: 0 | Status: SHIPPED | OUTPATIENT
Start: 2019-12-16 | End: 2020-01-14 | Stop reason: SDUPTHER

## 2019-12-16 NOTE — TELEPHONE ENCOUNTER
Scheduled Medication Review:  Pt's scheduled medication use was reviewed by myself/staff via the [employer] website  Pt's use has been found to be appropriate w/o any concerns for misuse by the patient  Pt's current conditions require continued scheduled medication use at this time  Future review for continued appropriate medication use and misuse will continue

## 2020-01-06 ENCOUNTER — OFFICE VISIT (OUTPATIENT)
Dept: INTERNAL MEDICINE CLINIC | Facility: CLINIC | Age: 80
End: 2020-01-06
Payer: COMMERCIAL

## 2020-01-06 VITALS
DIASTOLIC BLOOD PRESSURE: 60 MMHG | OXYGEN SATURATION: 97 % | HEIGHT: 69 IN | HEART RATE: 52 BPM | SYSTOLIC BLOOD PRESSURE: 130 MMHG | TEMPERATURE: 97.9 F | WEIGHT: 200 LBS | BODY MASS INDEX: 29.62 KG/M2 | RESPIRATION RATE: 16 BRPM

## 2020-01-06 DIAGNOSIS — R15.2 RECTAL URGENCY: ICD-10-CM

## 2020-01-06 DIAGNOSIS — D12.6 TUBULAR ADENOMA OF COLON: ICD-10-CM

## 2020-01-06 DIAGNOSIS — K62.5 BRBPR (BRIGHT RED BLOOD PER RECTUM): Primary | ICD-10-CM

## 2020-01-06 PROCEDURE — 1160F RVW MEDS BY RX/DR IN RCRD: CPT | Performed by: INTERNAL MEDICINE

## 2020-01-06 PROCEDURE — 99213 OFFICE O/P EST LOW 20 MIN: CPT | Performed by: INTERNAL MEDICINE

## 2020-01-06 RX ORDER — MESALAMINE 1000 MG/1
1000 SUPPOSITORY RECTAL AS NEEDED
Refills: 1 | COMMUNITY
Start: 2019-11-26

## 2020-01-06 NOTE — PROGRESS NOTES
Assessment/Plan:  Problem List Items Addressed This Visit     None      Visit Diagnoses     BRBPR (bright red blood per rectum)    -  Primary    Rectal urgency        Tubular adenoma of colon               Diagnoses and all orders for this visit:    BRBPR (bright red blood per rectum)    Rectal urgency    Tubular adenoma of colon    Other orders  -     mesalamine (CANASA) 1,000 mg suppository        No problem-specific Assessment & Plan notes found for this encounter  A/P: Doing little better  Appreciate GI input  Will continue current treatment and monitor  RTC in three months for routine  Subjective:      Patient ID: Leah Mariscal is a 78 y o  male  WM RTC for f/u BRBPR and rectal frequency felt to be due to XRT  Was started on suppositories and seen by GI  Underwent successful colonoscopy and found to have tubular adenoma and proctitis  Now on meds and feeling a little better  No more bleeding and urgency slightly better  No c/o's otherwise  The following portions of the patient's history were reviewed and updated as appropriate:   He has a past medical history of Cancer ([facility] [address] Dementia [address] [address] [facility] Disease of thyroid gland, Eczema, Generalized anxiety disorder, Hypertension, Prostate cancer ([facility] [address] Prostate cancer metastatic to intrapelvic lymph node ([facility] [address] and Skin disorder  ,  does not have any pertinent problems on file  ,   has a past surgical history that includes Knee surgery; Prostate surgery; Vasectomy; Cataract extraction; Eye surgery; and Colonoscopy  ,  family history includes Alcohol abuse in his family, father, and mother; Colon cancer in his maternal aunt; Depression in his father; No Known Problems in his maternal grandfather, maternal grandmother, paternal grandfather, paternal grandmother, and sister; Stroke in his brother  ,   reports that he has quit smoking  His smoking use included cigars   He has never used smokeless tobacco  He reports that he does not drink alcohol or use drugs ,  has No Known Allergies     Current Outpatient Medications   Medication Sig Dispense Refill    ALPRAZolam (XANAX) 0 5 mg tablet Take 1 tablet (0 5 mg total) by mouth 3 (three) times a day as needed (PRN) 90 tablet 0    levothyroxine 88 mcg tablet Take 1 tablet (88 mcg total) by mouth daily 90 tablet 1    lisinopril-hydrochlorothiazide (PRINZIDE,ZESTORETIC) 20-25 MG per tablet Take 1 tablet by mouth daily 90 tablet 0    mesalamine (CANASA) 1,000 mg suppository   1    Multiple Vitamin (MULTI-VITAMIN DAILY PO) Take 1 tablet by mouth daily      pravastatin (PRAVACHOL) 40 mg tablet Take 1 tablet (40 mg total) by mouth daily 90 tablet 0    triamcinolone (KENALOG) 0 1 % cream Apply topically 2 (two) times a day 454 g 1     No current facility-administered medications for this visit  Review of Systems   Constitutional: Negative for activity change, chills, diaphoresis, fatigue and fever  Respiratory: Negative for cough, chest tightness, shortness of breath and wheezing  Cardiovascular: Negative for chest pain, palpitations and leg swelling  Gastrointestinal: Negative for abdominal pain, constipation, diarrhea, nausea and vomiting  Genitourinary: Negative for difficulty urinating, dysuria and frequency  Musculoskeletal: Negative for arthralgias, gait problem and myalgias  Neurological: Negative for dizziness, seizures, syncope, weakness, light-headedness and headaches  Psychiatric/Behavioral: Negative for confusion, dysphoric mood and sleep disturbance  The patient is not nervous/anxious  PHQ-9 Depression Screening    PHQ-9:    Frequency of the following problems over the past two weeks:             Objective:  Vitals:    01/06/20 1413   BP: 130/60   Pulse: (!) 52   Resp: 16   Temp: 97 9 °F (36 6 °C)   TempSrc: Tympanic   SpO2: 97%   Weight: 90 7 kg (200 lb)   Height: 5' 8 5" (1 74 m)     Body mass index is 29 97 kg/m²       Physical Exam   Constitutional: He is oriented to person, place, and time  He appears well-developed and well-nourished  No distress  HENT:   Head: Normocephalic and atraumatic  Mouth/Throat: Oropharynx is clear and moist    Eyes: Pupils are equal, round, and reactive to light  Conjunctivae and EOM are normal    Cardiovascular: Normal rate, regular rhythm and normal heart sounds  Pulmonary/Chest: Effort normal and breath sounds normal  No respiratory distress  He has no wheezes  He has no rales  Abdominal: Soft  Bowel sounds are normal  He exhibits no distension  There is no tenderness  Neurological: He is alert and oriented to person, place, and time  Psychiatric: He has a normal mood and affect  His behavior is normal  Judgment and thought content normal    Nursing note and vitals reviewed

## 2020-01-14 DIAGNOSIS — F41.9 ANXIETY: ICD-10-CM

## 2020-01-14 DIAGNOSIS — E78.5 HYPERLIPIDEMIA, UNSPECIFIED HYPERLIPIDEMIA TYPE: ICD-10-CM

## 2020-01-14 RX ORDER — ALPRAZOLAM 0.5 MG/1
0.5 TABLET ORAL 3 TIMES DAILY PRN
Qty: 90 TABLET | Refills: 0 | Status: SHIPPED | OUTPATIENT
Start: 2020-01-14 | End: 2020-01-14 | Stop reason: SDUPTHER

## 2020-01-14 RX ORDER — PRAVASTATIN SODIUM 40 MG
40 TABLET ORAL DAILY
Qty: 90 TABLET | Refills: 1 | Status: SHIPPED | OUTPATIENT
Start: 2020-01-14 | End: 2020-04-14 | Stop reason: SDUPTHER

## 2020-01-14 RX ORDER — ALPRAZOLAM 0.5 MG/1
0.5 TABLET ORAL 3 TIMES DAILY PRN
Qty: 90 TABLET | Refills: 0 | Status: SHIPPED | OUTPATIENT
Start: 2020-01-14 | End: 2020-02-14 | Stop reason: SDUPTHER

## 2020-01-14 NOTE — TELEPHONE ENCOUNTER
Scheduled Medication Review:  Pt's scheduled medication use was reviewed by myself/staff via the [employer] website  Pt's use has been found to be appropriate w/o any concerns for misuse by the patient  Pt's current conditions require continued scheduled medication use at this time  Future review for continued appropriate medication use and misuse will continue

## 2020-01-21 ENCOUNTER — OFFICE VISIT (OUTPATIENT)
Dept: INTERNAL MEDICINE CLINIC | Facility: CLINIC | Age: 80
End: 2020-01-21
Payer: COMMERCIAL

## 2020-01-21 VITALS
HEART RATE: 54 BPM | OXYGEN SATURATION: 98 % | HEIGHT: 69 IN | BODY MASS INDEX: 29.38 KG/M2 | RESPIRATION RATE: 18 BRPM | TEMPERATURE: 99 F | SYSTOLIC BLOOD PRESSURE: 136 MMHG | DIASTOLIC BLOOD PRESSURE: 70 MMHG | WEIGHT: 198.4 LBS

## 2020-01-21 DIAGNOSIS — E66.3 OVERWEIGHT (BMI 25.0-29.9): ICD-10-CM

## 2020-01-21 DIAGNOSIS — M54.42 ACUTE LEFT-SIDED LOW BACK PAIN WITH LEFT-SIDED SCIATICA: Primary | ICD-10-CM

## 2020-01-21 PROCEDURE — 96372 THER/PROPH/DIAG INJ SC/IM: CPT | Performed by: INTERNAL MEDICINE

## 2020-01-21 PROCEDURE — 1036F TOBACCO NON-USER: CPT | Performed by: INTERNAL MEDICINE

## 2020-01-21 PROCEDURE — 1160F RVW MEDS BY RX/DR IN RCRD: CPT | Performed by: INTERNAL MEDICINE

## 2020-01-21 PROCEDURE — 99213 OFFICE O/P EST LOW 20 MIN: CPT | Performed by: INTERNAL MEDICINE

## 2020-01-21 RX ORDER — PREDNISONE 10 MG/1
TABLET ORAL
Qty: 50 TABLET | Refills: 0 | Status: SHIPPED | OUTPATIENT
Start: 2020-01-21 | End: 2020-07-08

## 2020-01-21 RX ORDER — DEXAMETHASONE SODIUM PHOSPHATE 4 MG/ML
4 INJECTION, SOLUTION INTRA-ARTICULAR; INTRALESIONAL; INTRAMUSCULAR; INTRAVENOUS; SOFT TISSUE ONCE
Status: COMPLETED | OUTPATIENT
Start: 2020-01-21 | End: 2020-01-21

## 2020-01-21 RX ORDER — CYCLOBENZAPRINE HCL 10 MG
10 TABLET ORAL 3 TIMES DAILY PRN
Qty: 30 TABLET | Refills: 0 | Status: SHIPPED | OUTPATIENT
Start: 2020-01-21 | End: 2020-07-08

## 2020-01-21 RX ADMIN — DEXAMETHASONE SODIUM PHOSPHATE 4 MG: 4 INJECTION, SOLUTION INTRA-ARTICULAR; INTRALESIONAL; INTRAMUSCULAR; INTRAVENOUS; SOFT TISSUE at 15:34

## 2020-01-21 NOTE — PATIENT INSTRUCTIONS
Sciatica   WHAT YOU NEED TO KNOW:   What is sciatica? Sciatica is a condition that causes pain along your sciatic nerve  The sciatic nerve runs from your spine through both sides of your buttocks  It then runs down the back of your thigh, into your lower leg and foot  Any place along your sciatic nerve may be compressed, inflamed, irritated, or stretched and cause symptoms  What causes sciatica? Sciatica may be related to certain activities, poor posture, and physical or psychological stress  Any of the following may cause or increase your risk of sciatica:  · Disc problems:  A slipped disc (soft cushion in between the bones of the spine) is the most common cause of sciatica  The disc may press on the sciatic nerve  One bone in your spine may slip over another, or you may have narrowing of the spinal column  · Muscle injury: This may happen after you twist or lift a heavy object  Swelling from sprained or irritated muscles in the buttocks, thighs, or legs press on the sciatic nerve  · Obesity or pregnancy:  Extra weight increases pressure on your back and legs  · Trauma:  Direct blows on the buttocks, thighs, or legs, car accidents, or falls may injure the sciatic nerve  · Diseases of the spine:  Arthritis, osteoporosis, cancer, or infection of the spine may also affect the sciatic nerve  What are the signs and symptoms of sciatica? The symptoms of sciatic may be short-term or long-term:  · Pain that goes from the lower back into your buttocks and down the back of your thigh    · Numbness or tingling in your buttocks and legs    · Muscle weakness, difficulty moving or controlling your leg or foot    · Leg pain that increases with standing, sitting, or squatting  How is sciatica diagnosed? Your healthcare provider will ask about other health conditions you may have  He may ask you about your job, history of back pain, diseases, or surgeries you have had   He will examine you and move your legs to see what increases pain  You may also need any of the following:  · X-rays: This is a picture of the bones and tissues in your back, hip, thigh, or leg  This test may show other problems, such as fractures (broken bones)  · CT scan: This test is also called a CAT scan  An x-ray machine uses a computer to take pictures of your hips, thighs, and legs  The pictures may show your sciatic nerve, muscles, and blood vessels  You may be given a dye before the pictures are taken to help healthcare providers see the pictures better  Tell the healthcare provider if you have ever had an allergic reaction to contrast dye  · MRI:  This scan uses powerful magnets and a computer to take pictures of your hips, thighs, and legs  An MRI may show damaged nerves, muscles, bones, and blood vessels  You may be given dye to help the pictures show up better  Tell the healthcare provider if you have ever had an allergic reaction to contrast dye  Do not enter the MRI room with anything metal  Metal can cause serious injury  Tell the healthcare provider if you have any metal in or on your body  · An electromyography (EMG)  test measures the electrical activity of your muscles at rest and with movement  · Nerve conduction tests: These tests check how surface nerves and related muscles respond to stimulation  Electrodes with wires or tiny needles are placed on certain areas, such as the buttocks and legs  How is sciatica treated? · NSAIDs:  These medicines decrease swelling and pain  NSAIDs are available without a doctor's order  Ask your healthcare provider which medicine is right for you  Ask how much to take and when to take it  Take as directed  NSAIDs can cause stomach bleeding or kidney problems if not taken correctly  · Acetaminophen: This medicine decreases pain  Acetaminophen is available without a doctor's order  Ask how much to take and how often to take it  Follow directions   Acetaminophen can cause liver damage if not taken correctly  · Muscle relaxers  help decrease pain and muscle spasms  · Epidural steroid medicine: This may include both an anesthetic (numbing medicine) and a steroid, which may decrease swelling and relieve pain  It is given as a shot close to the spine in the area where you have pain  · Chemonucleolysis: This is an injection given into the damaged disc to soften or shrink the disc  · Surgery: This may be done to correct problems such as a damaged disc, or a tumor in your spine  It may be done to decrease the pressure on the sciatic nerve  Healthcare providers may also release the muscle that may be pressing into your sciatic nerve  How can I help manage sciatica? · Ultrasound therapy: This is a machine that uses sound waves to decrease pain  Topical medicines may be added to help decrease pain and inflammation  · Physical therapy:  A physical therapist teaches you exercises to help improve movement and strength, and to decrease pain  An occupational therapist teaches you skills to help with your daily activities  · Assistive devices: You may need to wear back support, such as a back brace  You may need crutches, a cane, or a walker to decrease stress on your lower back and leg muscles  Ask your healthcare provider for more information about assistive devices and how to use them correctly  How can sciatica be prevented? · Avoid pressure on your back and legs:  Do not  lift heavy objects, or stand or sit for long periods of time  · Lift objects safely:  Keep your back straight and bend your knees when you  an object  Do not bend or twist your back when you lift  · Maintain a healthy weight:  Ask your healthcare provider how much you should weigh  Ask him to help you create a weight loss plan if you are overweight  · Exercise:  Ask your healthcare provider about the best stretching, warmup, and exercise plan for you    What are the risks of sciatica? An epidural steroid injection can lead to pain disorders or paralysis if it is placed incorrectly  It may also cause headaches, leg pain, and blockage of blood flow to the spinal cord  Surgery may cause you to bleed or get an infection  If not treated, your muscles and nerves may become damaged permanently  You may have decreased strength  You may not be able to move your leg or control when you urinate or have bowel movements  When should I contact my healthcare provider? · You have pain in your lower back at night or when resting  · You have pain in your lower back with numbness below the knee  · You have weakness in one leg only  · You have questions or concerns about your condition or care  When should I seek immediate care or call 911? · You have trouble holding back your urine or bowel movements  · You have weakness in both legs  · You have numbness in your groin or buttocks  CARE AGREEMENT:   You have the right to help plan your care  Learn about your health condition and how it may be treated  Discuss treatment options with your caregivers to decide what care you want to receive  You always have the right to refuse treatment  The above information is an  only  It is not intended as medical advice for individual conditions or treatments  Talk to your doctor, nurse or pharmacist before following any medical regimen to see if it is safe and effective for you  © 2017 [facility] [facility] Information is for End User's use only and may not be sold, redistributed or otherwise used for commercial purposes  All illustrations and images included in CareNotes® are the copyrighted property of [employer]  or Ramesh Craig      Weight Management   AMBULATORY CARE:   Why it is important to manage your weight:  Being overweight increases your risk of health conditions such as heart disease, high blood pressure, type 2 diabetes, and certain types of cancer  It can also increase your risk for osteoarthritis, sleep apnea, and other respiratory problems  Aim for a slow, steady weight loss  Even a small amount of weight loss can lower your risk of health problems  How to lose weight safely:  A safe and healthy way to lose weight is to eat fewer calories and get regular exercise  You can lose up about 1 pound a week by decreasing the number of calories you eat by 500 calories each day  You can decrease calories by eating smaller portion sizes or by cutting out high-calorie foods  Read labels to find out how many calories are in the foods you eat  You can also burn calories with exercise such as walking, swimming, or biking  You will be more likely to keep weight off if you make these changes part of your lifestyle  Healthy meal plan for weight management:  A healthy meal plan includes a variety of foods, contains fewer calories, and helps you stay healthy  A healthy meal plan includes the following:  · Eat whole-grain foods more often  A healthy meal plan should contain fiber  Fiber is the part of grains, fruits, and vegetables that is not broken down by your body  Whole-grain foods are healthy and provide extra fiber in your diet  Some examples of whole-grain foods are whole-wheat breads and pastas, oatmeal, brown rice, and bulgur  · Eat a variety of vegetables every day  Include dark, leafy greens such as spinach, kale, michel greens, and mustard greens  Eat yellow and orange vegetables such as carrots, sweet potatoes, and winter squash  · Eat a variety of fruits every day  Choose fresh or canned fruit (canned in its own juice or light syrup) instead of juice  Fruit juice has very little or no fiber  · Eat low-fat dairy foods  Drink fat-free (skim) milk or 1% milk  Eat fat-free yogurt and low-fat cottage cheese  Try low-fat cheeses such as mozzarella and other reduced-fat cheeses  · Choose meat and other protein foods that are low in fat  Choose beans or other legumes such as split peas or lentils  Choose fish, skinless poultry (chicken or turkey), or lean cuts of red meat (beef or pork)  Before you cook meat or poultry, cut off any visible fat  · Use less fat and oil  Try baking foods instead of frying them  Add less fat, such as margarine, sour cream, regular salad dressing and mayonnaise to foods  Eat fewer high-fat foods  Some examples of high-fat foods include french fries, doughnuts, ice cream, and cakes  · Eat fewer sweets  Limit foods and drinks that are high in sugar  This includes candy, cookies, regular soda, and sweetened drinks  Ways to decrease calories:   · Eat smaller portions  ¨ Use a small plate with smaller servings  ¨ Do not eat second helpings  ¨ When you eat at a restaurant, ask for a box and place half of your meal in the box before you eat  ¨ Share an entrée with someone else  · Replace high-calorie snacks with healthy, low-calorie snacks  ¨ Choose fresh fruit, vegetables, fat-free rice cakes, or air-popped popcorn instead of potato chips, nuts, or chocolate  ¨ Choose water or calorie-free drinks instead of soda or sweetened drinks  · Eat regular meals  Skipping meals can lead to overeating later in the day  Eat a healthy snack in place of a meal if you do not have time to eat a regular meal      · Do not shop for groceries when you are hungry  You may be more likely to make unhealthy food choices  Take a grocery list of healthy foods and shop after you have eaten  Exercise:  Exercise at least 30 minutes per day on most days of the week  Some examples of exercise include walking, biking, dancing, and swimming  You can also fit in more physical activity by taking the stairs instead of the elevator or parking farther away from stores  Ask your healthcare provider about the best exercise plan for you     Other things to consider as you try to lose weight:   · Be aware of situations that may give you the urge to overeat, such as eating while watching television  Find ways to avoid these situations  For example, read a book, go for a walk, or do crafts  · Meet with a weight loss support group or friends who are also trying to lose weight  This may help you stay motivated to continue working on your weight loss goals  © 2017 [employer] [employer] Information is for End User's use only and may not be sold, redistributed or otherwise used for commercial purposes  All illustrations and images included in CareNotes® are the copyrighted property of [employer]  or Ramesh Craig  The above information is an  only  It is not intended as medical advice for individual conditions or treatments  Talk to your doctor, nurse or pharmacist before following any medical regimen to see if it is safe and effective for you  Low Fat Diet   AMBULATORY CARE:   A low-fat diet  is an eating plan that is low in total fat, unhealthy fat, and cholesterol  You may need to follow a low-fat diet if you have trouble digesting or absorbing fat  You may also need to follow this diet if you have high cholesterol  You can also lower your cholesterol by increasing the amount of fiber in your diet  Soluble fiber is a type of fiber that helps to decrease cholesterol levels  Different types of fat in food:   · Limit unhealthy fats  A diet that is high in cholesterol, saturated fat, and trans fat may cause unhealthy cholesterol levels  Unhealthy cholesterol levels increase your risk of heart disease  ¨ Cholesterol:  Limit intake of cholesterol to less than 200 mg per day  Cholesterol is found in meat, eggs, and dairy  ¨ Saturated fat:  Limit saturated fat to less than 7% of your total daily calories  Ask your dietitian how many calories you need each day  Saturated fat is found in butter, cheese, ice cream, whole milk, and palm oil   Saturated fat is also found in meat, such as beef, pork, chicken skin, and processed meats  Processed meats include sausage, hot dogs, and bologna  ¨ Trans fat:  Avoid trans fat as much as possible  Trans fat is used in fried and baked foods  Foods that say trans fat free on the label may still have up to 0 5 grams of trans fat per serving  · Include healthy fats  Replace foods that are high in saturated and trans fat with foods high in healthy fats  This may help to decrease high cholesterol levels  ¨ Monounsaturated fats: These are found in avocados, nuts, and vegetable oils, such as olive, canola, and sunflower oil  ¨ Polyunsaturated fats: These can be found in vegetable oils, such as soybean or corn oil  Omega-3 fats can help to decrease the risk of heart disease  Omega-3 fats are found in fish, such as salmon, herring, trout, and tuna  Omega-3 fats can also be found in plant foods, such as walnuts, flaxseed, soybeans, and canola oil    Foods to limit or avoid:   · Grains:      ¨ Snacks that are made with partially hydrogenated oils, such as chips, regular crackers, and butter-flavored popcorn    ¨ High-fat baked goods, such as biscuits, croissants, doughnuts, pies, cookies, and pastries    · Dairy:      ¨ Whole milk, 2% milk, and yogurt and ice cream made with whole milk    ¨ Half and half creamer, heavy cream, and whipping cream    ¨ Cheese, cream cheese, and sour cream    · Meats and proteins:      ¨ High-fat cuts of meat (T-bone steak, regular hamburger, and ribs)    ¨ Fried meat, poultry (turkey and chicken), and fish    ¨ Poultry (chicken and turkey) with skin    ¨ Cold cuts (salami or bologna), hot dogs, pineda, and sausage    ¨ Whole eggs and egg yolks    · Vegetables and fruits with added fat:      ¨ Fried vegetables or vegetables in butter or high-fat sauces, such as cream or cheese sauces    ¨ Fried fruit or fruit served with butter or cream    · Fats:      ¨ Butter, stick margarine, and shortening    ¨ Coconut, palm oil, and palm kernel oil  Foods to include:   · Grains:      ¨ Whole-grain breads, cereals, pasta, and brown rice    ¨ Low-fat crackers and pretzels    · Vegetables and fruits:      ¨ Fresh, frozen, or canned vegetables (no salt or low-sodium)    ¨ Fresh, frozen, dried, or canned fruit (canned in light syrup or fruit juice)    ¨ Avocado    · Low-fat dairy products:      ¨ Nonfat (skim) or 1% milk    ¨ Nonfat or low-fat cheese, yogurt, and cottage cheese    · Meats and proteins:      ¨ Chicken or turkey with no skin    ¨ Baked or broiled fish    ¨ Lean beef and pork (loin, round, extra lean hamburger)    ¨ Beans and peas, unsalted nuts, soy products    ¨ Egg whites and substitutes    ¨ Seeds and nuts    · Fats:      ¨ Unsaturated oil, such as canola, olive, peanut, soybean, or sunflower oil    ¨ Soft or liquid margarine and vegetable oil spread    ¨ Low-fat salad dressing  Other ways to decrease fat:   · Read food labels before you buy foods  Choose foods that have less than 30% of calories from fat  Choose low-fat or fat-free dairy products  Remember that fat free does not mean calorie free  These foods still contain calories, and too many calories can lead to weight gain  · Trim fat from meat and avoid fried food  Trim all visible fat from meat before you cook it  Remove the skin from poultry  Do not wang meat, fish, or poultry  Bake, roast, boil, or broil these foods instead  Avoid fried foods  Eat a baked potato instead of [place] fries  Steam vegetables instead of sautéing them in butter  · Add less fat to foods  Use imitation pineda bits on salads and baked potatoes instead of regular pineda bits  Use fat-free or low-fat salad dressings instead of regular dressings  Use low-fat or nonfat butter-flavored topping instead of regular butter or margarine on popcorn and other foods  Ways to decrease fat in recipes:  Replace high-fat ingredients with low-fat or nonfat ones   This may cause baked goods to be drier than usual  You may need to use nonfat cooking spray on pans to prevent food from sticking  You also may need to change the amount of other ingredients, such as water, in the recipe  Try the following:  · Use low-fat or light margarine instead of regular margarine or shortening  · Use lean ground turkey breast or chicken, or lean ground beef (less than 5% fat) instead of hamburger  · Add 1 teaspoon of canola oil to 8 ounces of skim milk instead of using cream or half and half  · Use grated zucchini, carrots, or apples in breads instead of coconut  · Use blenderized, low-fat cottage cheese, plain tofu, or low-fat ricotta cheese instead of cream cheese  · Use 1 egg white and 1 teaspoon of canola oil, or use ¼ cup (2 ounces) of fat-free egg substitute instead of a whole egg  · Replace half of the oil that is called for in a recipe with applesauce when you bake  Use 3 tablespoons of cocoa powder and 1 tablespoon of canola oil instead of a square of baking chocolate  How to increase fiber:  Eat enough high-fiber foods to get 20 to 30 grams of fiber every day  Slowly increase your fiber intake to avoid stomach cramps, gas, and other problems  · Eat 3 ounces of whole-grain foods each day  An ounce is about 1 slice of bread  Eat whole-grain breads, such as whole-wheat bread  Whole wheat, whole-wheat flour, or other whole grains should be listed as the first ingredient on the food label  Replace white flour with whole-grain flour or use half of each in recipes  Whole-grain flour is heavier than white flour, so you may have to add more yeast or baking powder  · Eat a high-fiber cereal for breakfast   Oatmeal is a good source of soluble fiber  Look for cereals that have bran or fiber in the name  Choose whole-grain products, such as brown rice, barley, and whole-wheat pasta  · Eat more beans, peas, and lentils  For example, add beans to soups or salads  Eat at least 5 cups of fruits and vegetables each day   Eat fruits and vegetables with the peel because the peel is high in fiber  © 2017 [address] [employer] Information is for End User's use only and may not be sold, redistributed or otherwise used for commercial purposes  All illustrations and images included in CareNotes® are the copyrighted property of [employer]  or Ramesh Craig  The above information is an  only  It is not intended as medical advice for individual conditions or treatments  Talk to your doctor, nurse or pharmacist before following any medical regimen to see if it is safe and effective for you  Heart Healthy Diet   AMBULATORY CARE:   A heart healthy diet  is an eating plan low in total fat, unhealthy fats, and sodium (salt)  A heart healthy diet helps decrease your risk for heart disease and stroke  Limit the amount of fat you eat to 25% to 35% of your total daily calories  Limit sodium to less than 2,300 mg each day  Healthy fats:  Healthy fats can help improve cholesterol levels  The risk for heart disease is decreased when cholesterol levels are normal  Choose healthy fats, such as the following:  · Unsaturated fat  is found in foods such as soybean, canola, olive, corn, and safflower oils  It is also found in soft tub margarine that is made with liquid vegetable oil  · Omega-3 fat  is found in certain fish, such as salmon, tuna, and trout, and in walnuts and flaxseed  Unhealthy fats:  Unhealthy fats can cause unhealthy cholesterol levels in your blood and increase your risk of heart disease  Limit unhealthy fats, such as the following:  · Cholesterol  is found in animal foods, such as eggs and lobster, and in dairy products made from whole milk  Limit cholesterol to less than 300 milligrams (mg) each day  You may need to limit cholesterol to 200 mg each day if you have heart disease  · Saturated fat  is found in meats, such as pineda and hamburger   It is also found in chicken or turkey skin, whole milk, and butter  Limit saturated fat to less than 7% of your total daily calories  Limit saturated fat to less than 6% if you have heart disease or are at increased risk for it  · Trans fat  is found in packaged foods, such as potato chips and cookies  It is also in hard margarine, some fried foods, and shortening  Avoid trans fats as much as possible    Heart healthy foods and drinks to include:  Ask your dietitian or healthcare provider how many servings to have from each of the following food groups:  · Grains:      ¨ Whole-wheat breads, cereals, and pastas, and brown rice    ¨ Low-fat, low-sodium crackers and chips    · Vegetables:      ¨ Broccoli, green beans, green peas, and spinach    ¨ Collards, kale, and lima beans    ¨ Carrots, sweet potatoes, tomatoes, and peppers    ¨ Canned vegetables with no salt added    · Fruits:      ¨ Bananas, peaches, pears, and pineapple    ¨ Grapes, raisins, and dates    ¨ Oranges, tangerines, grapefruit, orange juice, and grapefruit juice    ¨ Apricots, mangoes, melons, and papaya    ¨ Raspberries and strawberries    ¨ Canned fruit with no added sugar    · Low-fat dairy products:      ¨ Nonfat (skim) milk, 1% milk, and low-fat almond, cashew, or soy milks fortified with calcium    ¨ Low-fat cheese, regular or frozen yogurt, and cottage cheese    · Meats and proteins , such as lean cuts of beef and pork (loin, leg, round), skinless chicken and turkey, legumes, soy products, egg whites, and nuts  Foods and drinks to limit or avoid:  Ask your dietitian or healthcare provider about these and other foods that are high in unhealthy fat, sodium, and sugar:  · Snack or packaged foods , such as frozen dinners, cookies, macaroni and cheese, and cereals with more than 300 mg of sodium per serving    · Canned or dry mixes  for cakes, soups, sauces, or gravies    · Vegetables with added sodium , such as instant potatoes, vegetables with added sauces, or regular canned vegetables    · Other foods high in sodium , such as ketchup, barbecue sauce, salad dressing, pickles, olives, soy sauce, and miso    · High-fat dairy foods  such as whole or 2% milk, cream cheese, or sour cream, and cheeses     · High-fat protein foods  such as high-fat cuts of beef (T-bone steaks, ribs), chicken or turkey with skin, and organ meats, such as liver    · Cured or smoked meats , such as hot dogs, pineda, and sausage    · Unhealthy fats and oils , such as butter, stick margarine, shortening, and cooking oils such as coconut or palm oil    · Food and drinks high in sugar , such as soft drinks (soda), sports drinks, sweetened tea, candy, cake, cookies, pies, and doughnuts  Other diet guidelines to follow:   · Eat more foods containing omega-3 fats  Eat fish high in omega-3 fats at least 2 times a week  · Limit alcohol  Too much alcohol can damage your heart and raise your blood pressure  Women should limit alcohol to 1 drink a day  Men should limit alcohol to 2 drinks a day  A drink of alcohol is 12 ounces of beer, 5 ounces of wine, or 1½ ounces of liquor  · Choose low-sodium foods  High-sodium foods can lead to high blood pressure  Add little or no salt to food you prepare  Use herbs and spices in place of salt  · Eat more fiber  to help lower cholesterol levels  Eat at least 5 servings of fruits and vegetables each day  Eat 3 ounces of whole-grain foods each day  Legumes (beans) are also a good source of fiber  Lifestyle guidelines:   · Do not smoke  Nicotine and other chemicals in cigarettes and cigars can cause lung and heart damage  Ask your healthcare provider for information if you currently smoke and need help to quit  E-cigarettes or smokeless tobacco still contain nicotine  Talk to your healthcare provider before you use these products  · Exercise regularly  to help you maintain a healthy weight and improve your blood pressure and cholesterol levels   Ask your healthcare provider about the best exercise plan for you  Do not start an exercise program without asking your healthcare provider  Follow up with your healthcare provider as directed:  Write down your questions so you remember to ask them during your visits  © 2017 [address] [employer] Information is for End User's use only and may not be sold, redistributed or otherwise used for commercial purposes  All illustrations and images included in CareNotes® are the copyrighted property of [employer]  or Ramesh Craig  The above information is an  only  It is not intended as medical advice for individual conditions or treatments  Talk to your doctor, nurse or pharmacist before following any medical regimen to see if it is safe and effective for you  Weight Management   AMBULATORY CARE:   Why it is important to manage your weight:  Being overweight increases your risk of health conditions such as heart disease, high blood pressure, type 2 diabetes, and certain types of cancer  It can also increase your risk for osteoarthritis, sleep apnea, and other respiratory problems  Aim for a slow, steady weight loss  Even a small amount of weight loss can lower your risk of health problems  How to lose weight safely:  A safe and healthy way to lose weight is to eat fewer calories and get regular exercise  You can lose up about 1 pound a week by decreasing the number of calories you eat by 500 calories each day  You can decrease calories by eating smaller portion sizes or by cutting out high-calorie foods  Read labels to find out how many calories are in the foods you eat  You can also burn calories with exercise such as walking, swimming, or biking  You will be more likely to keep weight off if you make these changes part of your lifestyle  Healthy meal plan for weight management:  A healthy meal plan includes a variety of foods, contains fewer calories, and helps you stay healthy   A healthy meal plan includes the following:  · Eat whole-grain foods more often  A healthy meal plan should contain fiber  Fiber is the part of grains, fruits, and vegetables that is not broken down by your body  Whole-grain foods are healthy and provide extra fiber in your diet  Some examples of whole-grain foods are whole-wheat breads and pastas, oatmeal, brown rice, and bulgur  · Eat a variety of vegetables every day  Include dark, leafy greens such as spinach, kale, michel greens, and mustard greens  Eat yellow and orange vegetables such as carrots, sweet potatoes, and winter squash  · Eat a variety of fruits every day  Choose fresh or canned fruit (canned in its own juice or light syrup) instead of juice  Fruit juice has very little or no fiber  · Eat low-fat dairy foods  Drink fat-free (skim) milk or 1% milk  Eat fat-free yogurt and low-fat cottage cheese  Try low-fat cheeses such as mozzarella and other reduced-fat cheeses  · Choose meat and other protein foods that are low in fat  Choose beans or other legumes such as split peas or lentils  Choose fish, skinless poultry (chicken or turkey), or lean cuts of red meat (beef or pork)  Before you cook meat or poultry, cut off any visible fat  · Use less fat and oil  Try baking foods instead of frying them  Add less fat, such as margarine, sour cream, regular salad dressing and mayonnaise to foods  Eat fewer high-fat foods  Some examples of high-fat foods include french fries, doughnuts, ice cream, and cakes  · Eat fewer sweets  Limit foods and drinks that are high in sugar  This includes candy, cookies, regular soda, and sweetened drinks  Ways to decrease calories:   · Eat smaller portions  ¨ Use a small plate with smaller servings  ¨ Do not eat second helpings  ¨ When you eat at a restaurant, ask for a box and place half of your meal in the box before you eat  ¨ Share an entrée with someone else      · Replace high-calorie snacks with healthy, low-calorie snacks  ¨ Choose fresh fruit, vegetables, fat-free rice cakes, or air-popped popcorn instead of potato chips, nuts, or chocolate  ¨ Choose water or calorie-free drinks instead of soda or sweetened drinks  · Eat regular meals  Skipping meals can lead to overeating later in the day  Eat a healthy snack in place of a meal if you do not have time to eat a regular meal      · Do not shop for groceries when you are hungry  You may be more likely to make unhealthy food choices  Take a grocery list of healthy foods and shop after you have eaten  Exercise:  Exercise at least 30 minutes per day on most days of the week  Some examples of exercise include walking, biking, dancing, and swimming  You can also fit in more physical activity by taking the stairs instead of the elevator or parking farther away from stores  Ask your healthcare provider about the best exercise plan for you  Other things to consider as you try to lose weight:   · Be aware of situations that may give you the urge to overeat, such as eating while watching television  Find ways to avoid these situations  For example, read a book, go for a walk, or do crafts  · Meet with a weight loss support group or friends who are also trying to lose weight  This may help you stay motivated to continue working on your weight loss goals  © 2017 [address] [employer] Information is for End User's use only and may not be sold, redistributed or otherwise used for commercial purposes  All illustrations and images included in CareNotes® are the copyrighted property of [employer]  or Ramesh Craig  The above information is an  only  It is not intended as medical advice for individual conditions or treatments  Talk to your doctor, nurse or pharmacist before following any medical regimen to see if it is safe and effective for you      Low Fat Diet   AMBULATORY CARE:   A low-fat diet  is an eating plan that is low in total fat, unhealthy fat, and cholesterol  You may need to follow a low-fat diet if you have trouble digesting or absorbing fat  You may also need to follow this diet if you have high cholesterol  You can also lower your cholesterol by increasing the amount of fiber in your diet  Soluble fiber is a type of fiber that helps to decrease cholesterol levels  Different types of fat in food:   · Limit unhealthy fats  A diet that is high in cholesterol, saturated fat, and trans fat may cause unhealthy cholesterol levels  Unhealthy cholesterol levels increase your risk of heart disease  ¨ Cholesterol:  Limit intake of cholesterol to less than 200 mg per day  Cholesterol is found in meat, eggs, and dairy  ¨ Saturated fat:  Limit saturated fat to less than 7% of your total daily calories  Ask your dietitian how many calories you need each day  Saturated fat is found in butter, cheese, ice cream, whole milk, and palm oil  Saturated fat is also found in meat, such as beef, pork, chicken skin, and processed meats  Processed meats include sausage, hot dogs, and bologna  ¨ Trans fat:  Avoid trans fat as much as possible  Trans fat is used in fried and baked foods  Foods that say trans fat free on the label may still have up to 0 5 grams of trans fat per serving  · Include healthy fats  Replace foods that are high in saturated and trans fat with foods high in healthy fats  This may help to decrease high cholesterol levels  ¨ Monounsaturated fats: These are found in avocados, nuts, and vegetable oils, such as olive, canola, and sunflower oil  ¨ Polyunsaturated fats: These can be found in vegetable oils, such as soybean or corn oil  Omega-3 fats can help to decrease the risk of heart disease  Omega-3 fats are found in fish, such as salmon, herring, trout, and tuna  Omega-3 fats can also be found in plant foods, such as walnuts, flaxseed, soybeans, and canola oil    Foods to limit or avoid:   · Grains: ¨ Snacks that are made with partially hydrogenated oils, such as chips, regular crackers, and butter-flavored popcorn    ¨ High-fat baked goods, such as biscuits, croissants, doughnuts, pies, cookies, and pastries    · Dairy:      ¨ Whole milk, 2% milk, and yogurt and ice cream made with whole milk    ¨ Half and half creamer, heavy cream, and whipping cream    ¨ Cheese, cream cheese, and sour cream    · Meats and proteins:      ¨ High-fat cuts of meat (T-bone steak, regular hamburger, and ribs)    ¨ Fried meat, poultry (turkey and chicken), and fish    ¨ Poultry (chicken and turkey) with skin    ¨ Cold cuts (salami or bologna), hot dogs, pineda, and sausage    ¨ Whole eggs and egg yolks    · Vegetables and fruits with added fat:      ¨ Fried vegetables or vegetables in butter or high-fat sauces, such as cream or cheese sauces    ¨ Fried fruit or fruit served with butter or cream    · Fats:      ¨ Butter, stick margarine, and shortening    ¨ Coconut, palm oil, and palm kernel oil  Foods to include:   · Grains:      ¨ Whole-grain breads, cereals, pasta, and brown rice    ¨ Low-fat crackers and pretzels    · Vegetables and fruits:      ¨ Fresh, frozen, or canned vegetables (no salt or low-sodium)    ¨ Fresh, frozen, dried, or canned fruit (canned in light syrup or fruit juice)    ¨ Avocado    · Low-fat dairy products:      ¨ Nonfat (skim) or 1% milk    ¨ Nonfat or low-fat cheese, yogurt, and cottage cheese    · Meats and proteins:      ¨ Chicken or turkey with no skin    ¨ Baked or broiled fish    ¨ Lean beef and pork (loin, round, extra lean hamburger)    ¨ Beans and peas, unsalted nuts, soy products    ¨ Egg whites and substitutes    ¨ Seeds and nuts    · Fats:      ¨ Unsaturated oil, such as canola, olive, peanut, soybean, or sunflower oil    ¨ Soft or liquid margarine and vegetable oil spread    ¨ Low-fat salad dressing  Other ways to decrease fat:   · Read food labels before you buy foods    Choose foods that have less than 30% of calories from fat  Choose low-fat or fat-free dairy products  Remember that fat free does not mean calorie free  These foods still contain calories, and too many calories can lead to weight gain  · Trim fat from meat and avoid fried food  Trim all visible fat from meat before you cook it  Remove the skin from poultry  Do not wang meat, fish, or poultry  Bake, roast, boil, or broil these foods instead  Avoid fried foods  Eat a baked potato instead of [place] fries  Steam vegetables instead of sautéing them in butter  · Add less fat to foods  Use imitation pineda bits on salads and baked potatoes instead of regular pineda bits  Use fat-free or low-fat salad dressings instead of regular dressings  Use low-fat or nonfat butter-flavored topping instead of regular butter or margarine on popcorn and other foods  Ways to decrease fat in recipes:  Replace high-fat ingredients with low-fat or nonfat ones  This may cause baked goods to be drier than usual  You may need to use nonfat cooking spray on pans to prevent food from sticking  You also may need to change the amount of other ingredients, such as water, in the recipe  Try the following:  · Use low-fat or light margarine instead of regular margarine or shortening  · Use lean ground turkey breast or chicken, or lean ground beef (less than 5% fat) instead of hamburger  · Add 1 teaspoon of canola oil to 8 ounces of skim milk instead of using cream or half and half  · Use grated zucchini, carrots, or apples in breads instead of coconut  · Use blenderized, low-fat cottage cheese, plain tofu, or low-fat ricotta cheese instead of cream cheese  · Use 1 egg white and 1 teaspoon of canola oil, or use ¼ cup (2 ounces) of fat-free egg substitute instead of a whole egg  · Replace half of the oil that is called for in a recipe with applesauce when you bake   Use 3 tablespoons of cocoa powder and 1 tablespoon of canola oil instead of a square of baking chocolate  How to increase fiber:  Eat enough high-fiber foods to get 20 to 30 grams of fiber every day  Slowly increase your fiber intake to avoid stomach cramps, gas, and other problems  · Eat 3 ounces of whole-grain foods each day  An ounce is about 1 slice of bread  Eat whole-grain breads, such as whole-wheat bread  Whole wheat, whole-wheat flour, or other whole grains should be listed as the first ingredient on the food label  Replace white flour with whole-grain flour or use half of each in recipes  Whole-grain flour is heavier than white flour, so you may have to add more yeast or baking powder  · Eat a high-fiber cereal for breakfast   Oatmeal is a good source of soluble fiber  Look for cereals that have bran or fiber in the name  Choose whole-grain products, such as brown rice, barley, and whole-wheat pasta  · Eat more beans, peas, and lentils  For example, add beans to soups or salads  Eat at least 5 cups of fruits and vegetables each day  Eat fruits and vegetables with the peel because the peel is high in fiber  © 2017 [employer] [address]  Information is for End User's use only and may not be sold, redistributed or otherwise used for commercial purposes  All illustrations and images included in CareNotes® are the copyrighted property of [employer]  or Ramesh Craig  The above information is an  only  It is not intended as medical advice for individual conditions or treatments  Talk to your doctor, nurse or pharmacist before following any medical regimen to see if it is safe and effective for you  Heart Healthy Diet   AMBULATORY CARE:   A heart healthy diet  is an eating plan low in total fat, unhealthy fats, and sodium (salt)  A heart healthy diet helps decrease your risk for heart disease and stroke  Limit the amount of fat you eat to 25% to 35% of your total daily calories  Limit sodium to less than 2,300 mg each day     Healthy fats: Healthy fats can help improve cholesterol levels  The risk for heart disease is decreased when cholesterol levels are normal  Choose healthy fats, such as the following:  · Unsaturated fat  is found in foods such as soybean, canola, olive, corn, and safflower oils  It is also found in soft tub margarine that is made with liquid vegetable oil  · Omega-3 fat  is found in certain fish, such as salmon, tuna, and trout, and in walnuts and flaxseed  Unhealthy fats:  Unhealthy fats can cause unhealthy cholesterol levels in your blood and increase your risk of heart disease  Limit unhealthy fats, such as the following:  · Cholesterol  is found in animal foods, such as eggs and lobster, and in dairy products made from whole milk  Limit cholesterol to less than 300 milligrams (mg) each day  You may need to limit cholesterol to 200 mg each day if you have heart disease  · Saturated fat  is found in meats, such as pineda and hamburger  It is also found in chicken or turkey skin, whole milk, and butter  Limit saturated fat to less than 7% of your total daily calories  Limit saturated fat to less than 6% if you have heart disease or are at increased risk for it  · Trans fat  is found in packaged foods, such as potato chips and cookies  It is also in hard margarine, some fried foods, and shortening  Avoid trans fats as much as possible    Heart healthy foods and drinks to include:  Ask your dietitian or healthcare provider how many servings to have from each of the following food groups:  · Grains:      ¨ Whole-wheat breads, cereals, and pastas, and brown rice    ¨ Low-fat, low-sodium crackers and chips    · Vegetables:      ¨ Broccoli, green beans, green peas, and spinach    ¨ Collards, kale, and lima beans    ¨ Carrots, sweet potatoes, tomatoes, and peppers    ¨ Canned vegetables with no salt added    · Fruits:      ¨ Bananas, peaches, pears, and pineapple    ¨ Grapes, raisins, and dates    ¨ Oranges, tangerines, grapefruit, orange juice, and grapefruit juice    ¨ Apricots, mangoes, melons, and papaya    ¨ Raspberries and strawberries    ¨ Canned fruit with no added sugar    · Low-fat dairy products:      ¨ Nonfat (skim) milk, 1% milk, and low-fat almond, cashew, or soy milks fortified with calcium    ¨ Low-fat cheese, regular or frozen yogurt, and cottage cheese    · Meats and proteins , such as lean cuts of beef and pork (loin, leg, round), skinless chicken and turkey, legumes, soy products, egg whites, and nuts  Foods and drinks to limit or avoid:  Ask your dietitian or healthcare provider about these and other foods that are high in unhealthy fat, sodium, and sugar:  · Snack or packaged foods , such as frozen dinners, cookies, macaroni and cheese, and cereals with more than 300 mg of sodium per serving    · Canned or dry mixes  for cakes, soups, sauces, or gravies    · Vegetables with added sodium , such as instant potatoes, vegetables with added sauces, or regular canned vegetables    · Other foods high in sodium , such as ketchup, barbecue sauce, salad dressing, pickles, olives, soy sauce, and miso    · High-fat dairy foods  such as whole or 2% milk, cream cheese, or sour cream, and cheeses     · High-fat protein foods  such as high-fat cuts of beef (T-bone steaks, ribs), chicken or turkey with skin, and organ meats, such as liver    · Cured or smoked meats , such as hot dogs, pineda, and sausage    · Unhealthy fats and oils , such as butter, stick margarine, shortening, and cooking oils such as coconut or palm oil    · Food and drinks high in sugar , such as soft drinks (soda), sports drinks, sweetened tea, candy, cake, cookies, pies, and doughnuts  Other diet guidelines to follow:   · Eat more foods containing omega-3 fats  Eat fish high in omega-3 fats at least 2 times a week  · Limit alcohol  Too much alcohol can damage your heart and raise your blood pressure  Women should limit alcohol to 1 drink a day   Men should limit alcohol to 2 drinks a day  A drink of alcohol is 12 ounces of beer, 5 ounces of wine, or 1½ ounces of liquor  · Choose low-sodium foods  High-sodium foods can lead to high blood pressure  Add little or no salt to food you prepare  Use herbs and spices in place of salt  · Eat more fiber  to help lower cholesterol levels  Eat at least 5 servings of fruits and vegetables each day  Eat 3 ounces of whole-grain foods each day  Legumes (beans) are also a good source of fiber  Lifestyle guidelines:   · Do not smoke  Nicotine and other chemicals in cigarettes and cigars can cause lung and heart damage  Ask your healthcare provider for information if you currently smoke and need help to quit  E-cigarettes or smokeless tobacco still contain nicotine  Talk to your healthcare provider before you use these products  · Exercise regularly  to help you maintain a healthy weight and improve your blood pressure and cholesterol levels  Ask your healthcare provider about the best exercise plan for you  Do not start an exercise program without asking your healthcare provider  Follow up with your healthcare provider as directed:  Write down your questions so you remember to ask them during your visits  © 2017 [employer]  Information is for End User's use only and may not be sold, redistributed or otherwise used for commercial purposes  All illustrations and images included in CareNotes® are the copyrighted property of [employer]  or Ramesh Craig  The above information is an  only  It is not intended as medical advice for individual conditions or treatments  Talk to your doctor, nurse or pharmacist before following any medical regimen to see if it is safe and effective for you

## 2020-01-21 NOTE — PROGRESS NOTES
BMI Counseling: Body mass index is 29 73 kg/m²  The BMI is above normal  Nutrition recommendations include decreasing portion sizes, increasing intake of lean protein and reducing intake of saturated and trans fat  Exercise recommendations include moderate physical activity 150 minutes/week  No pharmacotherapy was ordered  Assessment/Plan:  Problem List Items Addressed This Visit     None      Visit Diagnoses     Acute left-sided low back pain with left-sided sciatica    -  Primary    Relevant Medications    cyclobenzaprine (FLEXERIL) 10 mg tablet    predniSONE 10 mg tablet    dexamethasone (DECADRON) injection 4 mg (Completed) (Start on 1/21/2020  3:45 PM)    Overweight (BMI 25 0-29  9)        BMI 29 0-29 9,adult               Diagnoses and all orders for this visit:    Acute left-sided low back pain with left-sided sciatica  -     cyclobenzaprine (FLEXERIL) 10 mg tablet; Take 1 tablet (10 mg total) by mouth 3 (three) times a day as needed for muscle spasms  -     predniSONE 10 mg tablet; 40mg po q d x 5, then 30mg po q d x 5, then 20mg po q d x 5, then 10mg po q d x 5, then d/c   -     dexamethasone (DECADRON) injection 4 mg    Overweight (BMI 25 0-29  9)    BMI 29 0-29 9,adult        No problem-specific Assessment & Plan notes found for this encounter  A/P: Rest and warm moist heat  Will stop the NSAID's and start steroid wean and muscle relaxants  RTC in one week for f/u and may need PT  Will try to track down his imaging studies  Subjective:      Patient ID: Luca Villeda is a 78 y o  male  WM presents with a three day h/o acute onset left LBP with radiation into the left buttock  Pain is a 10/10  Similar event several weeks ago and seen in the ER  S/s resolved and was doing well until three days ago when he went to put on his socks and pain came on  No bowel or bladder changes, but also has XRT colitis currently  Taking six motrin/day w/o any relief   Reports having xrays in the er, but unable to view  The following portions of the patient's history were reviewed and updated as appropriate:   He has a past medical history of Cancer ([facility] [address] ), Dementia ([facility] [address] ), Disease of thyroid gland, Eczema, Generalized anxiety disorder, Hypertension, Prostate cancer ([facility] [address] ), Prostate cancer metastatic to intrapelvic lymph node ([facility] [address] ), and Skin disorder  ,  does not have any pertinent problems on file  ,   has a past surgical history that includes Knee surgery; Prostate surgery; Vasectomy; Cataract extraction; Eye surgery; Colonoscopy; and Colonoscopy (11/26/2019)  ,  family history includes Alcohol abuse in his family, father, and mother; Colon cancer in his maternal aunt; Depression in his father; No Known Problems in his maternal grandfather, maternal grandmother, paternal grandfather, paternal grandmother, and sister; Stroke in his brother  ,   reports that he has quit smoking  His smoking use included cigars  He has never used smokeless tobacco  He reports that he does not drink alcohol or use drugs  ,  has No Known Allergies     Current Outpatient Medications   Medication Sig Dispense Refill    ALPRAZolam (XANAX) 0 5 mg tablet Take 1 tablet (0 5 mg total) by mouth 3 (three) times a day as needed (PRN) 90 tablet 0    levothyroxine 88 mcg tablet Take 1 tablet (88 mcg total) by mouth daily 90 tablet 1    lisinopril-hydrochlorothiazide (PRINZIDE,ZESTORETIC) 20-25 MG per tablet Take 1 tablet by mouth daily 90 tablet 0    mesalamine (CANASA) 1,000 mg suppository   1    Multiple Vitamin (MULTI-VITAMIN DAILY PO) Take 1 tablet by mouth daily      pravastatin (PRAVACHOL) 40 mg tablet Take 1 tablet (40 mg total) by mouth daily 90 tablet 1    triamcinolone (KENALOG) 0 1 % cream Apply topically 2 (two) times a day 454 g 1    cyclobenzaprine (FLEXERIL) 10 mg tablet Take 1 tablet (10 mg total) by mouth 3 (three) times a day as needed for muscle spasms 30 tablet 0    predniSONE 10 mg tablet 40mg po q d x 5, then 30mg po q d x 5, then 20mg po q d x 5, then 10mg po q d x 5, then d/c  50 tablet 0     No current facility-administered medications for this visit  Review of Systems   Constitutional: Positive for activity change  Negative for chills, diaphoresis, fatigue and fever  Respiratory: Negative for cough, chest tightness, shortness of breath and wheezing  Cardiovascular: Negative for chest pain, palpitations and leg swelling  Gastrointestinal: Negative for abdominal pain, constipation, diarrhea, nausea and vomiting  Genitourinary: Negative for difficulty urinating, dysuria and frequency  Musculoskeletal: Positive for back pain and gait problem  Negative for arthralgias, joint swelling and myalgias  Neurological: Positive for numbness  Negative for dizziness, seizures, syncope, weakness, light-headedness and headaches  Psychiatric/Behavioral: Negative for confusion  The patient is not nervous/anxious  PHQ-9 Depression Screening    PHQ-9:    Frequency of the following problems over the past two weeks:             Objective:  Vitals:    01/21/20 1510   BP: 136/70   BP Location: Left arm   Patient Position: Sitting   Cuff Size: Large   Pulse: (!) 54   Resp: 18   Temp: 99 °F (37 2 °C)   SpO2: 98%   Weight: 90 kg (198 lb 6 4 oz)   Height: 5' 8 5" (1 74 m)     Body mass index is 29 73 kg/m²  Physical Exam   Constitutional: He is oriented to person, place, and time  He appears well-developed and well-nourished  No distress  HENT:   Head: Normocephalic and atraumatic  Mouth/Throat: Oropharynx is clear and moist    Eyes: Pupils are equal, round, and reactive to light  Conjunctivae and EOM are normal    Musculoskeletal: He exhibits tenderness  He exhibits no edema or deformity  LS Spine w/o gross deformities, increase temp, erythema, swelling, or lesions  Tenderness to the left of L3-S1 and over the left SI  ROM wnl, but increase discomfort  Spasms noted  LE strength 5/5 with tone/ROM WNL  DTR 2/4   Difficulty walking on his toes  Neurological: He is alert and oriented to person, place, and time  Psychiatric: He has a normal mood and affect  His behavior is normal  Judgment and thought content normal    Nursing note and vitals reviewed  BMI Counseling: Body mass index is 29 73 kg/m²  The BMI is above normal  Nutrition recommendations include reducing portion sizes, decreasing overall calorie intake, reducing intake of saturated fat and trans fat and reducing intake of cholesterol  Exercise recommendations include moderate aerobic physical activity for 150 minutes/week

## 2020-01-27 DIAGNOSIS — I10 ESSENTIAL HYPERTENSION: ICD-10-CM

## 2020-01-27 RX ORDER — LISINOPRIL AND HYDROCHLOROTHIAZIDE 25; 20 MG/1; MG/1
1 TABLET ORAL DAILY
Qty: 90 TABLET | Refills: 1 | Status: SHIPPED | OUTPATIENT
Start: 2020-01-27 | End: 2020-04-16 | Stop reason: SDUPTHER

## 2020-02-14 DIAGNOSIS — F41.9 ANXIETY: ICD-10-CM

## 2020-02-14 RX ORDER — ALPRAZOLAM 0.5 MG/1
0.5 TABLET ORAL 3 TIMES DAILY PRN
Qty: 90 TABLET | Refills: 0 | Status: SHIPPED | OUTPATIENT
Start: 2020-02-14 | End: 2020-03-14 | Stop reason: SDUPTHER

## 2020-03-14 DIAGNOSIS — F41.9 ANXIETY: ICD-10-CM

## 2020-03-16 RX ORDER — ALPRAZOLAM 0.5 MG/1
0.5 TABLET ORAL 3 TIMES DAILY PRN
Qty: 90 TABLET | Refills: 0 | Status: SHIPPED | OUTPATIENT
Start: 2020-03-16 | End: 2020-04-14 | Stop reason: SDUPTHER

## 2020-03-16 NOTE — TELEPHONE ENCOUNTER
Scheduled Medication Review:  Pt's scheduled medication use was reviewed by myself/staff via the [employer] website  Pt's use has been found to be appropriate w/o any concerns for misuse by the patient  Pt's current conditions require continued scheduled medication use at this time  Future review for continued appropriate medication use and misuse will continue

## 2020-04-14 DIAGNOSIS — F41.9 ANXIETY: ICD-10-CM

## 2020-04-14 DIAGNOSIS — E78.5 HYPERLIPIDEMIA, UNSPECIFIED HYPERLIPIDEMIA TYPE: ICD-10-CM

## 2020-04-14 RX ORDER — PRAVASTATIN SODIUM 40 MG
40 TABLET ORAL DAILY
Qty: 90 TABLET | Refills: 1 | Status: SHIPPED | OUTPATIENT
Start: 2020-04-14 | End: 2020-07-14 | Stop reason: SDUPTHER

## 2020-04-14 RX ORDER — ALPRAZOLAM 0.5 MG/1
0.5 TABLET ORAL 3 TIMES DAILY PRN
Qty: 90 TABLET | Refills: 0 | Status: SHIPPED | OUTPATIENT
Start: 2020-04-14 | End: 2020-05-13 | Stop reason: SDUPTHER

## 2020-04-16 DIAGNOSIS — I10 ESSENTIAL HYPERTENSION: ICD-10-CM

## 2020-04-16 RX ORDER — LISINOPRIL AND HYDROCHLOROTHIAZIDE 25; 20 MG/1; MG/1
1 TABLET ORAL DAILY
Qty: 90 TABLET | Refills: 1 | Status: SHIPPED | OUTPATIENT
Start: 2020-04-16 | End: 2020-11-14 | Stop reason: SDUPTHER

## 2020-05-13 DIAGNOSIS — E03.9 HYPOTHYROIDISM, UNSPECIFIED TYPE: ICD-10-CM

## 2020-05-13 DIAGNOSIS — F41.9 ANXIETY: ICD-10-CM

## 2020-05-13 RX ORDER — LEVOTHYROXINE SODIUM 88 UG/1
88 TABLET ORAL DAILY
Qty: 90 TABLET | Refills: 1 | Status: SHIPPED | OUTPATIENT
Start: 2020-05-13 | End: 2020-06-11 | Stop reason: SDUPTHER

## 2020-05-13 RX ORDER — ALPRAZOLAM 0.5 MG/1
0.5 TABLET ORAL 3 TIMES DAILY PRN
Qty: 90 TABLET | Refills: 0 | Status: SHIPPED | OUTPATIENT
Start: 2020-05-13 | End: 2020-06-14 | Stop reason: SDUPTHER

## 2020-06-11 DIAGNOSIS — E03.9 HYPOTHYROIDISM, UNSPECIFIED TYPE: ICD-10-CM

## 2020-06-11 RX ORDER — LEVOTHYROXINE SODIUM 88 UG/1
88 TABLET ORAL DAILY
Qty: 90 TABLET | Refills: 0 | Status: SHIPPED | OUTPATIENT
Start: 2020-06-11 | End: 2021-02-06

## 2020-06-14 DIAGNOSIS — F41.9 ANXIETY: ICD-10-CM

## 2020-06-15 RX ORDER — ALPRAZOLAM 0.5 MG/1
0.5 TABLET ORAL 3 TIMES DAILY PRN
Qty: 90 TABLET | Refills: 0 | Status: SHIPPED | OUTPATIENT
Start: 2020-06-15 | End: 2020-07-14 | Stop reason: SDUPTHER

## 2020-07-08 ENCOUNTER — OFFICE VISIT (OUTPATIENT)
Dept: INTERNAL MEDICINE CLINIC | Facility: CLINIC | Age: 80
End: 2020-07-08
Payer: COMMERCIAL

## 2020-07-08 VITALS
BODY MASS INDEX: 29.92 KG/M2 | DIASTOLIC BLOOD PRESSURE: 62 MMHG | HEART RATE: 56 BPM | TEMPERATURE: 97.3 F | SYSTOLIC BLOOD PRESSURE: 122 MMHG | OXYGEN SATURATION: 96 % | HEIGHT: 69 IN | WEIGHT: 202 LBS | RESPIRATION RATE: 18 BRPM

## 2020-07-08 DIAGNOSIS — E78.2 MIXED HYPERLIPIDEMIA: ICD-10-CM

## 2020-07-08 DIAGNOSIS — C77.5 MALIGNANT NEOPLASM OF PROSTATE METASTATIC TO INTRAPELVIC LYMPH NODE (HCC): ICD-10-CM

## 2020-07-08 DIAGNOSIS — M19.91 PRIMARY OSTEOARTHRITIS, UNSPECIFIED SITE: ICD-10-CM

## 2020-07-08 DIAGNOSIS — I10 ESSENTIAL HYPERTENSION: Primary | ICD-10-CM

## 2020-07-08 DIAGNOSIS — E03.9 ACQUIRED HYPOTHYROIDISM: ICD-10-CM

## 2020-07-08 DIAGNOSIS — C61 MALIGNANT NEOPLASM OF PROSTATE METASTATIC TO INTRAPELVIC LYMPH NODE (HCC): ICD-10-CM

## 2020-07-08 DIAGNOSIS — Z12.5 SCREENING FOR PROSTATE CANCER: ICD-10-CM

## 2020-07-08 DIAGNOSIS — F41.1 GENERALIZED ANXIETY DISORDER: ICD-10-CM

## 2020-07-08 PROCEDURE — 3074F SYST BP LT 130 MM HG: CPT | Performed by: INTERNAL MEDICINE

## 2020-07-08 PROCEDURE — 1036F TOBACCO NON-USER: CPT | Performed by: INTERNAL MEDICINE

## 2020-07-08 PROCEDURE — 99214 OFFICE O/P EST MOD 30 MIN: CPT | Performed by: INTERNAL MEDICINE

## 2020-07-08 PROCEDURE — 1160F RVW MEDS BY RX/DR IN RCRD: CPT | Performed by: INTERNAL MEDICINE

## 2020-07-08 PROCEDURE — 3008F BODY MASS INDEX DOCD: CPT | Performed by: INTERNAL MEDICINE

## 2020-07-08 PROCEDURE — 3078F DIAST BP <80 MM HG: CPT | Performed by: INTERNAL MEDICINE

## 2020-07-08 PROCEDURE — 4040F PNEUMOC VAC/ADMIN/RCVD: CPT | Performed by: INTERNAL MEDICINE

## 2020-07-08 NOTE — PROGRESS NOTES
Assessment/Plan:  Problem List Items Addressed This Visit        Endocrine    Hypothyroidism    Relevant Orders    TSH, 3rd generation       Cardiovascular and Mediastinum    Hypertension - Primary    Relevant Orders    Comprehensive metabolic panel    CBC and differential    Microalbumin / creatinine urine ratio       Musculoskeletal and Integument    Osteoarthritis       Immune and Lymphatic    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)       Other    Generalized anxiety disorder    Relevant Orders    Benzodiazepines Conf (GC/MS)    Toxicology screen, urine    Hyperlipidemia    Relevant Orders    Lipid Panel with Direct LDL reflex      Other Visit Diagnoses     Screening for prostate cancer        Relevant Orders    PSA Total, Diagnostic           Diagnoses and all orders for this visit:    Essential hypertension  -     Comprehensive metabolic panel; Future  -     CBC and differential; Future  -     Microalbumin / creatinine urine ratio    Acquired hypothyroidism  -     TSH, 3rd generation; Future    Primary osteoarthritis, unspecified site    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)    Generalized anxiety disorder  -     Benzodiazepines Conf (GC/MS)  -     Toxicology screen, urine    Mixed hyperlipidemia  -     Lipid Panel with Direct LDL reflex; Future    Screening for prostate cancer  -     PSA Total, Diagnostic; Future        No problem-specific Assessment & Plan notes found for this encounter  A/P: Doing well and will check labs  Continue current treatment and RTC four months for routine  Subjective:      Patient ID: Jacob Coffey is a 78 y o  male  WM RTC for f/u htn, hypothyroidism, etc  Doing well and no new issues or concerns  Remains active w/o difficulty and no falls  DJD pain is controlled  NADIA is controlled as long as he takes his meds  Prostate cancer is in remission  Due for labs         The following portions of the patient's history were reviewed and updated as appropriate:   He has a past medical history of Cancer ([facility] [address] ), Dementia ([facility] [address] ), Disease of thyroid gland, Eczema, Generalized anxiety disorder, Hypertension, Pro[facility] ([facility] ), Prostate cancer metastatic to intrapelvic lymph node ([facility] ), and Skin disorder  ,  does not have any pertinent problems on file  ,   has a past surgical history that includes Knee surgery; Prostate surgery; Vasectomy; Cataract extraction; Eye surgery; Colonoscopy; and Colonoscopy (11/26/2019)  ,  family history includes Alcohol abuse in his family, father, and mother; Colon cancer in his maternal aunt; Depression in his father; No Known Problems in his maternal grandfather, maternal grandmother, paternal grandfather, paternal grandmother, and sister; Stroke in his brother  ,   reports that he has quit smoking  His smoking use included cigars  He has never used smokeless tobacco  He reports that he does not drink alcohol or use drugs  ,  has No Known Allergies     Current Outpatient Medications   Medication Sig Dispense Refill    ALPRAZolam (XANAX) 0 5 mg tablet Take 1 tablet (0 5 mg total) by mouth 3 (three) times a day as needed (PRN) 90 tablet 0    levothyroxine 88 mcg tablet Take 1 tablet (88 mcg total) by mouth daily 90 tablet 0    lisinopril-hydrochlorothiazide (PRINZIDE,ZESTORETIC) 20-25 MG per tablet Take 1 tablet by mouth daily 90 tablet 1    mesalamine (CANASA) 1,000 mg suppository   1    Multiple Vitamin (MULTI-VITAMIN DAILY PO) Take 1 tablet by mouth daily      pravastatin (PRAVACHOL) 40 mg tablet Take 1 tablet (40 mg total) by mouth daily 90 tablet 1    triamcinolone (KENALOG) 0 1 % cream Apply topically 2 (two) times a day 454 g 1     No current facility-administered medications for this visit  Review of Systems   Constitutional: Negative for activity change, chills, diaphoresis, fatigue and fever  HENT: Negative  Eyes: Negative for visual disturbance     Respiratory: Negative for cough, chest tightness, shortness of breath and wheezing  Cardiovascular: Negative for chest pain, palpitations and leg swelling  Gastrointestinal: Negative for abdominal pain, constipation, diarrhea, nausea and vomiting  Endocrine: Negative for cold intolerance and heat intolerance  Genitourinary: Negative for difficulty urinating, dysuria and frequency  Musculoskeletal: Negative for arthralgias, gait problem and myalgias  Neurological: Negative for dizziness, seizures, syncope, weakness, light-headedness and headaches  Psychiatric/Behavioral: Negative for confusion, dysphoric mood and sleep disturbance  The patient is not nervous/anxious  PHQ-9 Depression Screening    PHQ-9:    Frequency of the following problems over the past two weeks:             Objective:  Vitals:    07/08/20 1503   BP: 122/62   BP Location: Left arm   Patient Position: Sitting   Cuff Size: Adult   Pulse: 56   Resp: 18   Temp: (!) 97 3 °F (36 3 °C)   TempSrc: Tympanic   SpO2: 96%   Weight: 91 6 kg (202 lb)   Height: 5' 8 5" (1 74 m)     Body mass index is 30 27 kg/m²  Physical Exam   Constitutional: He is oriented to person, place, and time  He appears well-developed and well-nourished  No distress  HENT:   Head: Normocephalic and atraumatic  Mouth/Throat: Oropharynx is clear and moist    Eyes: Pupils are equal, round, and reactive to light  Conjunctivae and EOM are normal    Neck: Neck supple  No JVD present  Cardiovascular: Normal rate, regular rhythm and normal heart sounds  Pulmonary/Chest: Effort normal and breath sounds normal  No respiratory distress  He has no wheezes  He has no rales  Abdominal: Soft  Bowel sounds are normal  He exhibits no distension  There is no tenderness  Musculoskeletal: He exhibits no edema  Neurological: He is alert and oriented to person, place, and time  Psychiatric: He has a normal mood and affect   His behavior is normal  Judgment and thought content normal    Nursing note and vitals reviewed

## 2020-07-08 NOTE — PATIENT INSTRUCTIONS

## 2020-07-09 ENCOUNTER — APPOINTMENT (OUTPATIENT)
Dept: LAB | Facility: CLINIC | Age: 80
End: 2020-07-09
Payer: COMMERCIAL

## 2020-07-09 ENCOUNTER — TRANSCRIBE ORDERS (OUTPATIENT)
Dept: LAB | Facility: CLINIC | Age: 80
End: 2020-07-09

## 2020-07-09 DIAGNOSIS — Z12.5 SCREENING FOR PROSTATE CANCER: ICD-10-CM

## 2020-07-09 DIAGNOSIS — E03.9 ACQUIRED HYPOTHYROIDISM: ICD-10-CM

## 2020-07-09 DIAGNOSIS — I10 ESSENTIAL HYPERTENSION: ICD-10-CM

## 2020-07-09 DIAGNOSIS — E78.2 MIXED HYPERLIPIDEMIA: ICD-10-CM

## 2020-07-09 LAB
ALBUMIN SERPL BCP-MCNC: 3.9 G/DL (ref 3.5–5)
ALP SERPL-CCNC: 64 U/L (ref 46–116)
ALT SERPL W P-5'-P-CCNC: 33 U/L (ref 12–78)
ANION GAP SERPL CALCULATED.3IONS-SCNC: 7 MMOL/L (ref 4–13)
AST SERPL W P-5'-P-CCNC: 23 U/L (ref 5–45)
BASOPHILS # BLD AUTO: 0.04 THOUSANDS/ΜL (ref 0–0.1)
BASOPHILS NFR BLD AUTO: 1 % (ref 0–1)
BILIRUB SERPL-MCNC: 0.71 MG/DL (ref 0.2–1)
BUN SERPL-MCNC: 23 MG/DL (ref 5–25)
CALCIUM SERPL-MCNC: 9 MG/DL (ref 8.3–10.1)
CHLORIDE SERPL-SCNC: 109 MMOL/L (ref 100–108)
CHOLEST SERPL-MCNC: 153 MG/DL (ref 50–200)
CO2 SERPL-SCNC: 25 MMOL/L (ref 21–32)
CREAT SERPL-MCNC: 1.2 MG/DL (ref 0.6–1.3)
CREAT UR-MCNC: 147 MG/DL
EOSINOPHIL # BLD AUTO: 0.27 THOUSAND/ΜL (ref 0–0.61)
EOSINOPHIL NFR BLD AUTO: 5 % (ref 0–6)
ERYTHROCYTE [DISTWIDTH] IN BLOOD BY AUTOMATED COUNT: 13.5 % (ref 11.6–15.1)
GFR SERPL CREATININE-BSD FRML MDRD: 56 ML/MIN/1.73SQ M
GLUCOSE P FAST SERPL-MCNC: 96 MG/DL (ref 65–99)
HCT VFR BLD AUTO: 41.6 % (ref 36.5–49.3)
HDLC SERPL-MCNC: 44 MG/DL
HGB BLD-MCNC: 14 G/DL (ref 12–17)
IMM GRANULOCYTES # BLD AUTO: 0.02 THOUSAND/UL (ref 0–0.2)
IMM GRANULOCYTES NFR BLD AUTO: 0 % (ref 0–2)
LDLC SERPL CALC-MCNC: 92 MG/DL (ref 0–100)
LYMPHOCYTES # BLD AUTO: 0.98 THOUSANDS/ΜL (ref 0.6–4.47)
LYMPHOCYTES NFR BLD AUTO: 17 % (ref 14–44)
MCH RBC QN AUTO: 31.4 PG (ref 26.8–34.3)
MCHC RBC AUTO-ENTMCNC: 33.7 G/DL (ref 31.4–37.4)
MCV RBC AUTO: 93 FL (ref 82–98)
MICROALBUMIN UR-MCNC: 13.2 MG/L (ref 0–20)
MICROALBUMIN/CREAT 24H UR: 9 MG/G CREATININE (ref 0–30)
MONOCYTES # BLD AUTO: 0.55 THOUSAND/ΜL (ref 0.17–1.22)
MONOCYTES NFR BLD AUTO: 9 % (ref 4–12)
NEUTROPHILS # BLD AUTO: 4.04 THOUSANDS/ΜL (ref 1.85–7.62)
NEUTS SEG NFR BLD AUTO: 68 % (ref 43–75)
NRBC BLD AUTO-RTO: 0 /100 WBCS
PLATELET # BLD AUTO: 205 THOUSANDS/UL (ref 149–390)
PMV BLD AUTO: 11.3 FL (ref 8.9–12.7)
POTASSIUM SERPL-SCNC: 4.2 MMOL/L (ref 3.5–5.3)
PROT SERPL-MCNC: 7.2 G/DL (ref 6.4–8.2)
PSA SERPL-MCNC: <0.1 NG/ML (ref 0–4)
RBC # BLD AUTO: 4.46 MILLION/UL (ref 3.88–5.62)
SODIUM SERPL-SCNC: 141 MMOL/L (ref 136–145)
TRIGL SERPL-MCNC: 83 MG/DL
TSH SERPL DL<=0.05 MIU/L-ACNC: 1.16 UIU/ML (ref 0.36–3.74)
WBC # BLD AUTO: 5.9 THOUSAND/UL (ref 4.31–10.16)

## 2020-07-09 PROCEDURE — 85025 COMPLETE CBC W/AUTO DIFF WBC: CPT

## 2020-07-09 PROCEDURE — 36415 COLL VENOUS BLD VENIPUNCTURE: CPT

## 2020-07-09 PROCEDURE — 80307 DRUG TEST PRSMV CHEM ANLYZR: CPT | Performed by: INTERNAL MEDICINE

## 2020-07-09 PROCEDURE — 80061 LIPID PANEL: CPT

## 2020-07-09 PROCEDURE — 82570 ASSAY OF URINE CREATININE: CPT | Performed by: INTERNAL MEDICINE

## 2020-07-09 PROCEDURE — 84443 ASSAY THYROID STIM HORMONE: CPT

## 2020-07-09 PROCEDURE — 84153 ASSAY OF PSA TOTAL: CPT

## 2020-07-09 PROCEDURE — 80053 COMPREHEN METABOLIC PANEL: CPT

## 2020-07-09 PROCEDURE — 82043 UR ALBUMIN QUANTITATIVE: CPT | Performed by: INTERNAL MEDICINE

## 2020-07-14 DIAGNOSIS — E78.5 HYPERLIPIDEMIA, UNSPECIFIED HYPERLIPIDEMIA TYPE: ICD-10-CM

## 2020-07-14 DIAGNOSIS — F41.9 ANXIETY: ICD-10-CM

## 2020-07-14 LAB
AMPHETAMINES UR QL SCN: NEGATIVE NG/ML
BARBITURATES UR QL SCN: NEGATIVE NG/ML
BENZODIAZ UR QL: POSITIVE
BZE UR QL: NEGATIVE NG/ML
CANNABINOIDS UR QL SCN: NEGATIVE NG/ML
METHADONE UR QL SCN: NEGATIVE NG/ML
OPIATES UR QL: NEGATIVE NG/ML
PCP UR QL: NEGATIVE NG/ML
PROPOXYPH UR QL SCN: NEGATIVE NG/ML

## 2020-07-14 RX ORDER — PRAVASTATIN SODIUM 40 MG
40 TABLET ORAL DAILY
Qty: 90 TABLET | Refills: 1 | Status: SHIPPED | OUTPATIENT
Start: 2020-07-14 | End: 2020-10-14 | Stop reason: SDUPTHER

## 2020-07-14 RX ORDER — ALPRAZOLAM 0.5 MG/1
0.5 TABLET ORAL 3 TIMES DAILY PRN
Qty: 90 TABLET | Refills: 0 | Status: SHIPPED | OUTPATIENT
Start: 2020-07-14 | End: 2020-08-14 | Stop reason: SDUPTHER

## 2020-07-14 NOTE — TELEPHONE ENCOUNTER
Scheduled Medication Review:  Pt's scheduled medication use was reviewed by myself/staff via the [employer] website  Pt's use has been found to be appropriate w/o any concerns for misuse by the patient  Pt's current conditions require continued scheduled medication use at this time  Future review for continued appropriate medication use and misuse will continue

## 2020-08-14 DIAGNOSIS — F41.9 ANXIETY: ICD-10-CM

## 2020-08-14 RX ORDER — ALPRAZOLAM 0.5 MG/1
0.5 TABLET ORAL 3 TIMES DAILY PRN
Qty: 90 TABLET | Refills: 0 | Status: SHIPPED | OUTPATIENT
Start: 2020-08-14 | End: 2020-09-15 | Stop reason: SDUPTHER

## 2020-08-27 ENCOUNTER — APPOINTMENT (OUTPATIENT)
Dept: RADIATION ONCOLOGY | Facility: CLINIC | Age: 80
End: 2020-08-27
Attending: RADIOLOGY
Payer: COMMERCIAL

## 2020-08-27 VITALS
TEMPERATURE: 98.5 F | OXYGEN SATURATION: 95 % | DIASTOLIC BLOOD PRESSURE: 63 MMHG | HEART RATE: 56 BPM | SYSTOLIC BLOOD PRESSURE: 143 MMHG | RESPIRATION RATE: 16 BRPM | BODY MASS INDEX: 30.14 KG/M2 | WEIGHT: 203.48 LBS | HEIGHT: 69 IN

## 2020-08-27 DIAGNOSIS — C77.5 MALIGNANT NEOPLASM OF PROSTATE METASTATIC TO INTRAPELVIC LYMPH NODE (HCC): Primary | ICD-10-CM

## 2020-08-27 DIAGNOSIS — C61 MALIGNANT NEOPLASM OF PROSTATE METASTATIC TO INTRAPELVIC LYMPH NODE (HCC): Primary | ICD-10-CM

## 2020-08-27 PROCEDURE — 99211 OFF/OP EST MAY X REQ PHY/QHP: CPT | Performed by: RADIOLOGY

## 2020-08-27 NOTE — PROGRESS NOTES
Jennifer Lindsey 1940 is a 78 y o  male       Follow up visit     Jennifer Lindsey is a 78y o  year old male with a history of recurrent prostate adenocarcinoma  He is status post prostatectomy in 1997 when he lived in [place] and did not require any postoperative radiation therapy  He more recently has had a rising PSA level that went up to 1 4 in April 2018  Axumin PET-CT scan July 5, 2018 showed increased uptake in the right pelvic sidewall lymph node in addition the right posterior acetabulum consistent with metastatic disease  This was confirmed on subsequent bone scan performed July 17, 2018  He has a 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 involving a right pelvic lymph node and within the bone  Recommendations were made salvage radiation therapy to the whole pelvis including his right pelvic lymphadenopathy, prostate bed, and right acetabular region  He completed treatment on September 25, 2018 and returns today for follow-up examination  he was last seen 11/21/19  At that time he was doing well  He had no clinical or biochemical evidence of any recurrent disease  He was having a small amount of rectal bleeding with wiping and was scheduled for consultation next week with gastroenterology to consider repeat colonoscopy which was last done 10 years ago  11/19/19 He saw [facility] gastro[facility]ology  ? Bleeding due to radiation proctitis       11/26/19 colonoscopy  Instructions: canasa suppositories, metamucil 2 caps BID, if bleeding persists- APC at hospital with full colon prep, high fiber diet  Radiation proctitis  Polypectomy: tubular adenoma    7/9/2020 PSA was <0 1    Pt does not have f/u with Urology    Component      Latest Ref Rng & Units 11/8/2018 1/17/2019 5/16/2019 11/21/2019   PSA, Total      0 0 - 4 0 ng/mL 1 2 0 4 0 2 <0 1     Component      Latest Ref Rng & Units 7/9/2020   PSA, Total      0 0 - 4 0 ng/mL <0 1       Today has f/u in Radiation oncology      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 ([address] )   1997 Initial Diagnosis    Prostate cancer     1997 Surgery    Prostatectomy in [place], [place] 6 disease     7/5/2018 Initial Diagnosis    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 ([address] )     8/2/2018 - 9/25/2018 Radiation    Treatment:  Course: C1 toPelvis, Rt acetabulum & prostate bed     Plan ID Energy Fractions Dose per Fraction (cGy) Dose Correction (cGy) Total Dose Delivered (cGy) Elapsed Days   CD P Bed 10X 12 / 12 180 0 2,160 15   WP_R Acetab 10X 25 / 25 180 0 4,500 36      Treatment dates:  C1: 8/2/2018 - 9/25/2018           Clinical Trial: no      Health Maintenance   Topic Date Due    SLP PLAN OF CARE  1940    Influenza Vaccine  07/01/2020    Fall Risk  11/15/2020    Medicare Annual Wellness Visit (AWV)  11/15/2020    Depression Screening PHQ  11/21/2020    BMI: Followup Plan  01/21/2021    BMI: Adult  07/08/2021    DTaP,Tdap,and Td Vaccines (2 - Td) 05/01/2024    Pneumococcal Vaccine: 65+ Years  Completed    HIB Vaccine  Aged Out    Hepatitis B Vaccine  Aged Out    IPV Vaccine  Aged Out    Hepatitis A Vaccine  Aged Out    Meningococcal ACWY Vaccine  Aged Out    HPV Vaccine  Aged Out       Patient Active Problem List   Diagnosis    Chronic shoulder pain    Vesicular palmoplantar eczema    Eczema    Generalized anxiety disorder    Hyperlipidemia    Hypertension    Hypothyroidism    Osteoarthritis    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 ([address] )    History of radiation therapy     Past Medical History:   Diagnosis Date    Cancer ([facility] [address] [facility]     Dementia ([address] )     Disease of thyroid gland     Eczema     Generalized anxiety disorder     Hypertension     Prostate cancer ([facility] [address] )     Prostate cancer metastatic to intrapelvic lymph node ([address] )     Skin disorder     suspect benign, but will need removal and due to location, refer   Last assessed: April 18, 2013     Past Surgical History:   Procedure Laterality Date    CATARACT EXTRACTION      COLONOSCOPY      COLONOSCOPY  11/26/2019    EYE SURGERY      KNEE SURGERY      PROSTATE SURGERY      VASECTOMY       Family History   Problem Relation Age of Onset    Alcohol abuse Mother     Alcohol abuse Father     Depression Father     No Known Problems Sister     Stroke Brother         syndrome     No Known Problems Maternal Grandmother     No Known Problems Maternal Grandfather     No Known Problems Paternal Grandmother     No Known Problems Paternal Grandfather     Alcohol abuse Family     Colon cancer Maternal Aunt      Social History     Socioeconomic History    Marital status: /Civil Union     Spouse name: Not on file    Number of children: Not on file    Years of education: Not on file    Highest education level: Not on file   Occupational History    Occupation: self employed  floor covering   Social Needs    Financial resource strain: Not on file    Food insecurity     Worry: Not on file     Inability: Not on file   [employer] needs     Medical: Not on file     Non-medical: Not on file   Tobacco Use    Smoking status: Former Smoker     Types: Cigars    Smokeless tobacco: Never Used    Tobacco comment: smokes 1 cigar a couple times a month   Substance and Sexual Activity    Alcohol use: No    Drug use: No     Comment: Chronic Narcotic use noted in "[employer]"     Sexual activity: Not on file   Lifestyle    Physical activity     Days per week: Not on file     Minutes per session: Not on file    Stress: Not on file   Relationships    Social connections     Talks on phone: Not on file     Gets together: Not on file     Attends Taoist service: Not on file     Active member of club or organization: Not on file     Attends meetings of clubs or organizations: Not on file     Relationship status: Not on file    Intimate partner violence     Fear of current or ex partner: Not on file     Emotionally abused: Not on file     Physically abused: Not on file     Forced sexual activity: Not on file   Other Topics Concern    Not on file   Social History Narrative    Caffeine use        Current Outpatient Medications:     ALPRAZolam (XANAX) 0 5 mg tablet, Take 1 tablet (0 5 mg total) by mouth 3 (three) times a day as needed (PRN), Disp: 90 tablet, Rfl: 0    levothyroxine 88 mcg tablet, Take 1 tablet (88 mcg total) by mouth daily, Disp: 90 tablet, Rfl: 0    lisinopril-hydrochlorothiazide (PRINZIDE,ZESTORETIC) 20-25 MG per tablet, Take 1 tablet by mouth daily, Disp: 90 tablet, Rfl: 1    Multiple Vitamin (MULTI-VITAMIN DAILY PO), Take 1 tablet by mouth daily, Disp: , Rfl:     pravastatin (PRAVACHOL) 40 mg tablet, Take 1 tablet (40 mg total) by mouth daily, Disp: 90 tablet, Rfl: 1    triamcinolone (KENALOG) 0 1 % cream, Apply topically 2 (two) times a day (Patient taking differently: Apply topically 2 (two) times a day as needed ), Disp: 454 g, Rfl: 1    mesalamine (CANASA) 1,000 mg suppository, , Disp: , Rfl: 1  No Known Allergies    Review of Systems:  Review of Systems   Constitutional: Negative  HENT: Negative  Eyes: Negative  Respiratory: Negative  Cardiovascular: Negative  Gastrointestinal: Positive for blood in stool (once in awhile, much improved)  Endocrine: Negative  Genitourinary: Positive for urgency (at times)  Nocturia x 1-2, reports stream is good   Musculoskeletal: Negative  Skin: Negative          eczema   Allergic/Immunologic: Negative  Neurological: Negative  Hematological: Negative  Psychiatric/Behavioral: Negative  Vitals:    08/27/20 1340   BP: 143/63   Pulse: 56   Resp: 16   Temp: 98 5 °F (36 9 °C)   SpO2: 95%   Weight: 92 3 kg (203 lb 7 8 oz)   Height: 5' 8 5" (1 74 m)        Pain Score: 0-No pain      Imaging:No results found

## 2020-08-27 NOTE — PROGRESS NOTES
Follow-up - Radiation Oncology   Vesna Winston 1940 78 y o  male [id number]      History of Present Illness       Vesna Winston is a 78y o  year old male with a history of recurrent prostate Guerline Garcia is status post prostatectomy in 1997 when he lived in [place] and did not require any postoperative radiation therapy  Quan Gu more recently has had a rising PSA level that went up to 1 4 in April 2018   Axumin PET-CT scan July 5, 2018 showed increased uptake in the right pelvic sidewall lymph node in addition the right posterior acetabulum consistent with metastatic disease  This was confirmed on subsequent bone scan performed July 17, 2018    He has a 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 involving a right pelvic lymph node and within the bone  Recommendations were made salvage radiation therapy to the whole pelvis including his right pelvic lymphadenopathy, prostate bed, and right acetabular region  Quan Gu completed treatment on September 25, 2018 and returns today for follow-up examination  He was last seen 11/21/19  At that time, he was doing well  He had no clinical or biochemical evidence of any recurrent disease  He was having a small amount of rectal bleeding with wiping and was scheduled for consultation with gastroenterology to consider repeat colonoscopy which was last done 10 years ago  Interval History:   11/19/19 He saw  gastroenterology   He was diagnosed with bleeding due to radiation proctitis       11/26/19 colonoscopy  Instructions: canasa suppositories, metamucil 2 caps BID, if bleeding persists- APC at hospital with full colon prep, high fiber diet  Radiation proctitis  Polypectomy: tubular adenoma     7/9/2020 PSA was <0 1     Pt does not have f/u with Urology     Component      Latest Ref Rng & Units 11/8/2018 1/17/2019 5/16/2019 11/21/2019   PSA, Total      0 0 - 4 0 ng/mL 1 2 0 4 0 2 <0 1      Component      Latest Ref Rng & Units 7/9/2020 PSA, Total      0 0 - 4 0 ng/mL <0 1      He reports normal bowel movements other than he has been seeing a small amount of blood when he wipes about once per week  He had a negative colonoscopy per his report 10 years ago  repeat colonoscopy November 26, 2019 revealed 1 tubular adenoma and some radiation proctitis as outlined above  He only sees let about once per week and rarely uses the Canasa suppositories  He does continue to take Metamucil pallor twice a day  He reports a good urinary stream   He has stable nocturia x2  He is active and goes for a daily morning walk for 2 miles  He denies any right hip nor pelvic pains  Historical Information   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 ([facility] [address] )   1997 Initial Diagnosis    Prostate cancer     1997 Surgery    Prostatectomy in [place], [place] 6 disease     7/5/2018 Initial Diagnosis    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 ([facility] [address] )     8/2/2018 - 9/25/2018 Radiation    Treatment:  Course: C1 toPelvis, Rt acetabulum & prostate bed     Plan ID Energy Fractions Dose per Fraction (cGy) Dose Correction (cGy) Total Dose Delivered (cGy) Elapsed Days   CD P Bed 10X 12 / 12 180 0 2,160 15   WP_R Acetab 10X 25 / 25 180 0 4,500 36      Treatment dates:  C1: 8/2/2018 - 9/25/2018           Past Medical History:   Diagnosis Date    Cancer ([facility] [address] [facility]     Dementia ([facility] [address] )     Disease of thyroid gland     Eczema     Generalized anxiety disorder     Hypertension     Prostate cancer ([facility] [address] [facility]     Prostate cancer metastatic to intrapelvic lymph node ([facility] [address] )     Skin disorder     suspect benign, but will need removal and due to location, refer   Last assessed: April 18, 2013     Past Surgical History:   Procedure Laterality Date    CATARACT EXTRACTION      COLONOSCOPY      COLONOSCOPY  11/26/2019    EYE SURGERY      KNEE SURGERY      PROSTATE SURGERY      VASECTOMY         Social History   Social History     Substance and Sexual Activity   Alcohol Use No     Social History     Substance and Sexual Activity   Drug Use No    Comment: Chronic Narcotic use noted in "[employer]scripts"      Social History     Tobacco Use   Smoking Status Former Smoker    Types: Cigars   Smokeless Tobacco Never Used   Tobacco Comment    smokes 1 cigar a couple times a month     Meds/Allergies     Current Outpatient Medications:     ALPRAZolam (XANAX) 0 5 mg tablet, Take 1 tablet (0 5 mg total) by mouth 3 (three) times a day as needed (PRN), Disp: 90 tablet, Rfl: 0    levothyroxine 88 mcg tablet, Take 1 tablet (88 mcg total) by mouth daily, Disp: 90 tablet, Rfl: 0    lisinopril-hydrochlorothiazide (PRINZIDE,ZESTORETIC) 20-25 MG per tablet, Take 1 tablet by mouth daily, Disp: 90 tablet, Rfl: 1    Multiple Vitamin (MULTI-VITAMIN DAILY PO), Take 1 tablet by mouth daily, Disp: , Rfl:     pravastatin (PRAVACHOL) 40 mg tablet, Take 1 tablet (40 mg total) by mouth daily, Disp: 90 tablet, Rfl: 1    triamcinolone (KENALOG) 0 1 % cream, Apply topically 2 (two) times a day (Patient taking differently: Apply topically 2 (two) times a day as needed ), Disp: 454 g, Rfl: 1    mesalamine (CANASA) 1,000 mg suppository, , Disp: , Rfl: 1  No Known Allergies    Review of Systems   Constitutional: He is oriented to person, place, and time  He appears well-developed and well-nourished  No distress  HENT:   Head: Normocephalic and atraumatic  Mouth/Throat: No oropharyngeal exudate  Eyes: Pupils are equal, round, and reactive to light  Conjunctivae and EOM are normal  No scleral icterus  Neck: Normal range of motion  Neck supple  No tracheal deviation present  No thyromegaly present  Cardiovascular: Normal rate, regular rhythm and normal heart sounds     Pulmonary/Chest: Effort normal and breath sounds normal  No respiratory distress  He has no wheezes  He has no rales  He exhibits no tenderness     Abdominal: Soft  Bowel sounds are normal  He exhibits no distension and no mass  There is no tenderness  Genitourinary:   Genitourinary Comments: Rectal examination deferred    Musculoskeletal: Normal range of motion  He exhibits no edema or tenderness  Lymphadenopathy:     He has no cervical adenopathy         Right: No inguinal and no supraclavicular adenopathy present         Left: No inguinal and no supraclavicular adenopathy present  Neurological: He is alert and oriented to person, place, and time  No cranial nerve deficit  Coordination normal    Skin: Skin is warm and dry  No rash noted  He is not diaphoretic  No erythema  No pallor  Psychiatric: He has a normal mood and affect  His behavior is normal  Judgment and thought content normal    Nursing note and vitals reviewed      OBJECTIVE:   /63   Pulse 56   Temp 98 5 °F (36 9 °C)   Resp 16   Ht 5' 8 5" (1 74 m)   Wt 92 3 kg (203 lb 7 8 oz)   SpO2 95%   BMI 30 49 kg/m²   Pain Assessment:  0  ECOG/Zubrod/WHO: 1 - Symptomatic but completely ambulatory    Physical Exam   Constitutional: He is oriented to person, place, and time  He appears well-developed and well-nourished  No distress  HENT:   Head: Normocephalic and atraumatic  Mouth/Throat: No oropharyngeal exudate  Eyes: Pupils are equal, round, and reactive to light  Conjunctivae and EOM are normal  No scleral icterus  Neck: Normal range of motion  Neck supple  No tracheal deviation present  No thyromegaly present  Cardiovascular: Normal rate, regular rhythm and normal heart sounds     Pulmonary/Chest: Effort normal and breath sounds normal  No respiratory distress  He has no wheezes  He has no rales  He exhibits no tenderness  Abdominal: Soft  Bowel sounds are normal  He exhibits no distension and no mass  There is no tenderness  Genitourinary:   Genitourinary Comments: Rectal examination deferred    Musculoskeletal: Normal range of motion  He exhibits no edema or tenderness     Lymphadenopathy:     He has no cervical adenopathy         Right: No inguinal and no supraclavicular adenopathy present         Left: No inguinal and no supraclavicular adenopathy present  Neurological: He is alert and oriented to person, place, and time  No cranial nerve deficit  Coordination normal    Skin: Skin is warm and dry  No rash noted  He is not diaphoretic  No erythema  No pallor  Psychiatric: He has a normal mood and affect  His behavior is normal  Judgment and thought content normal    Nursing note and vitals reviewed      RESULTS    Lab Results: No results found for this or any previous visit (from the past 672 hour(s))  Imaging Studies:No results found  Assessment/Plan:  Orders Placed This Encounter   Procedures    PSA Total, Diagnostic        Marylee Perking is a 78y o  year old male with a history of recurrent prostate Navi Cola is status post prostatectomy in 1997 when he lived in [place] and did not require any postoperative radiation therapy  Nikki Worthy had a rising PSA level that went up to 1 4 in April 2018   Axumin PET-CT scan July 5, 2018 showed increased uptake in the right pelvic sidewall lymph node in addition the right posterior acetabulum consistent with metastatic disease  This was confirmed on subsequent bone scan performed July 17, 2018    He has a 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 involving a right pelvic lymph node and within the bone  Recommendations were made salvage radiation therapy to the whole pelvis including his right pelvic lymphadenopathy, prostate bed, and right acetabular region  Nikki Worthy completed treatment on September 25, 2018 and returns today for follow-up examination       He is doing well  Nikki Worthy has no clinical or biochemical evidence of any recurrent disease   PSA level was 0 4 NG/mL on January 17, 2019  PSA level decreased further to 0 2 NG/mL on May 16, 2019    His PSA level November 21, 2019 and July 9, 2020 was less than 0 1 NG/mL    We are pleased with his good clinical and biochemical response to treatment    He had a repeat colonoscopy November 26, 2019 and was found to have some radiation proctitis and a benign tubular adenoma  He has been on Metamucil powder twice a day and sees a small amount of blood on average once a week  He rarely uses Canasa suppositories  Instructed him to continue on Metamucil twice a day  He will return for follow-up in 6 months with a repeat PSA level          Winston Albert MD  8/27/2020,2:35 PM    Portions of the record may have been created with voice recognition software   Occasional wrong word or "sound a like" substitutions may have occurred due to the inherent limitations of voice recognition software   Read the chart carefully and recognize, using context, where substitutions have occurred

## 2020-09-15 DIAGNOSIS — F41.9 ANXIETY: ICD-10-CM

## 2020-09-15 RX ORDER — ALPRAZOLAM 0.5 MG/1
0.5 TABLET ORAL 3 TIMES DAILY PRN
Qty: 90 TABLET | Refills: 0 | Status: SHIPPED | OUTPATIENT
Start: 2020-09-15 | End: 2020-10-14 | Stop reason: SDUPTHER

## 2020-10-14 DIAGNOSIS — E78.5 HYPERLIPIDEMIA, UNSPECIFIED HYPERLIPIDEMIA TYPE: ICD-10-CM

## 2020-10-14 DIAGNOSIS — F41.9 ANXIETY: ICD-10-CM

## 2020-10-14 RX ORDER — PRAVASTATIN SODIUM 40 MG
40 TABLET ORAL DAILY
Qty: 90 TABLET | Refills: 1 | Status: SHIPPED | OUTPATIENT
Start: 2020-10-14 | End: 2021-01-13 | Stop reason: SDUPTHER

## 2020-10-14 RX ORDER — ALPRAZOLAM 0.5 MG/1
0.5 TABLET ORAL 3 TIMES DAILY PRN
Qty: 90 TABLET | Refills: 0 | Status: SHIPPED | OUTPATIENT
Start: 2020-10-14 | End: 2020-11-14 | Stop reason: SDUPTHER

## 2020-11-14 DIAGNOSIS — I10 ESSENTIAL HYPERTENSION: ICD-10-CM

## 2020-11-14 DIAGNOSIS — F41.9 ANXIETY: ICD-10-CM

## 2020-11-14 RX ORDER — ALPRAZOLAM 0.5 MG/1
0.5 TABLET ORAL 3 TIMES DAILY PRN
Qty: 90 TABLET | Refills: 0 | Status: SHIPPED | OUTPATIENT
Start: 2020-11-14 | End: 2020-12-14 | Stop reason: SDUPTHER

## 2020-11-14 RX ORDER — LISINOPRIL AND HYDROCHLOROTHIAZIDE 25; 20 MG/1; MG/1
1 TABLET ORAL DAILY
Qty: 90 TABLET | Refills: 0 | Status: SHIPPED | OUTPATIENT
Start: 2020-11-14 | End: 2021-02-14 | Stop reason: SDUPTHER

## 2020-12-14 DIAGNOSIS — F41.9 ANXIETY: ICD-10-CM

## 2020-12-14 DIAGNOSIS — L30.9 ECZEMA, UNSPECIFIED TYPE: ICD-10-CM

## 2020-12-14 RX ORDER — TRIAMCINOLONE ACETONIDE 1 MG/G
CREAM TOPICAL 2 TIMES DAILY
Qty: 454 G | Refills: 5 | Status: SHIPPED | OUTPATIENT
Start: 2020-12-14

## 2020-12-14 RX ORDER — ALPRAZOLAM 0.5 MG/1
0.5 TABLET ORAL 3 TIMES DAILY PRN
Qty: 90 TABLET | Refills: 0 | Status: SHIPPED | OUTPATIENT
Start: 2020-12-14 | End: 2021-01-13 | Stop reason: SDUPTHER

## 2021-01-13 DIAGNOSIS — F41.9 ANXIETY: ICD-10-CM

## 2021-01-13 DIAGNOSIS — E78.5 HYPERLIPIDEMIA, UNSPECIFIED HYPERLIPIDEMIA TYPE: ICD-10-CM

## 2021-01-13 RX ORDER — PRAVASTATIN SODIUM 40 MG
40 TABLET ORAL DAILY
Qty: 90 TABLET | Refills: 1 | Status: SHIPPED | OUTPATIENT
Start: 2021-01-13 | End: 2021-04-29 | Stop reason: SDUPTHER

## 2021-01-13 RX ORDER — ALPRAZOLAM 0.5 MG/1
0.5 TABLET ORAL 3 TIMES DAILY PRN
Qty: 90 TABLET | Refills: 0 | Status: SHIPPED | OUTPATIENT
Start: 2021-01-13 | End: 2021-02-04

## 2021-01-13 RX ORDER — PRAVASTATIN SODIUM 40 MG
40 TABLET ORAL DAILY
Qty: 90 TABLET | Refills: 1 | Status: SHIPPED | OUTPATIENT
Start: 2021-01-13 | End: 2021-02-04

## 2021-01-20 DIAGNOSIS — Z23 ENCOUNTER FOR IMMUNIZATION: ICD-10-CM

## 2021-02-04 ENCOUNTER — OFFICE VISIT (OUTPATIENT)
Dept: INTERNAL MEDICINE CLINIC | Facility: CLINIC | Age: 81
End: 2021-02-04
Payer: COMMERCIAL

## 2021-02-04 VITALS
HEART RATE: 65 BPM | DIASTOLIC BLOOD PRESSURE: 60 MMHG | BODY MASS INDEX: 29.03 KG/M2 | SYSTOLIC BLOOD PRESSURE: 120 MMHG | TEMPERATURE: 97.6 F | OXYGEN SATURATION: 95 % | HEIGHT: 69 IN | WEIGHT: 196 LBS

## 2021-02-04 DIAGNOSIS — Z13.31 NEGATIVE DEPRESSION SCREENING: ICD-10-CM

## 2021-02-04 DIAGNOSIS — M19.91 PRIMARY OSTEOARTHRITIS, UNSPECIFIED SITE: ICD-10-CM

## 2021-02-04 DIAGNOSIS — I10 ESSENTIAL HYPERTENSION: Primary | ICD-10-CM

## 2021-02-04 DIAGNOSIS — E78.2 MIXED HYPERLIPIDEMIA: ICD-10-CM

## 2021-02-04 DIAGNOSIS — E03.9 ACQUIRED HYPOTHYROIDISM: ICD-10-CM

## 2021-02-04 DIAGNOSIS — C77.5 MALIGNANT NEOPLASM OF PROSTATE METASTATIC TO INTRAPELVIC LYMPH NODE (HCC): ICD-10-CM

## 2021-02-04 DIAGNOSIS — Z00.00 MEDICARE ANNUAL WELLNESS VISIT, SUBSEQUENT: ICD-10-CM

## 2021-02-04 DIAGNOSIS — F41.1 GENERALIZED ANXIETY DISORDER: ICD-10-CM

## 2021-02-04 DIAGNOSIS — C61 MALIGNANT NEOPLASM OF PROSTATE METASTATIC TO INTRAPELVIC LYMPH NODE (HCC): ICD-10-CM

## 2021-02-04 PROCEDURE — 1036F TOBACCO NON-USER: CPT | Performed by: INTERNAL MEDICINE

## 2021-02-04 PROCEDURE — 3288F FALL RISK ASSESSMENT DOCD: CPT | Performed by: INTERNAL MEDICINE

## 2021-02-04 PROCEDURE — 3725F SCREEN DEPRESSION PERFORMED: CPT | Performed by: INTERNAL MEDICINE

## 2021-02-04 PROCEDURE — 3074F SYST BP LT 130 MM HG: CPT | Performed by: INTERNAL MEDICINE

## 2021-02-04 PROCEDURE — 1125F AMNT PAIN NOTED PAIN PRSNT: CPT | Performed by: INTERNAL MEDICINE

## 2021-02-04 PROCEDURE — 99214 OFFICE O/P EST MOD 30 MIN: CPT | Performed by: INTERNAL MEDICINE

## 2021-02-04 PROCEDURE — 1160F RVW MEDS BY RX/DR IN RCRD: CPT | Performed by: INTERNAL MEDICINE

## 2021-02-04 PROCEDURE — 3078F DIAST BP <80 MM HG: CPT | Performed by: INTERNAL MEDICINE

## 2021-02-04 PROCEDURE — 1170F FXNL STATUS ASSESSED: CPT | Performed by: INTERNAL MEDICINE

## 2021-02-04 PROCEDURE — G0439 PPPS, SUBSEQ VISIT: HCPCS | Performed by: INTERNAL MEDICINE

## 2021-02-04 NOTE — PATIENT INSTRUCTIONS
Medicare Preventive Visit Patient Instructions  Thank you for completing your Welcome to Medicare Visit or Medicare Annual Wellness Visit today  Your next wellness visit will be due in one year (2/4/2022)  The screening/preventive services that you may require over the next 5-10 years are detailed below  Some tests may not apply to you based off risk factors and/or age  Screening tests ordered at today's visit but not completed yet may show as past due  Also, please note that scanned in results may not display below  Preventive Screenings:  Service Recommendations Previous Testing/Comments   Colorectal Cancer Screening  · Colonoscopy    · Fecal Occult Blood Test (FOBT)/Fecal Immunochemical Test (FIT)  · Fecal DNA/Cologuard Test  · Flexible Sigmoidoscopy Age: 54-65 years old   Colonoscopy: every 10 years (May be performed more frequently if at higher risk)  OR  FOBT/FIT: every 1 year  OR  Cologuard: every 3 years  OR  Sigmoidoscopy: every 5 years  Screening may be recommended earlier than age 48 if at higher risk for colorectal cancer  Also, an individualized decision between you and your healthcare provider will decide whether screening between the ages of 74-80 would be appropriate   Colonoscopy: 11/26/2019  FOBT/FIT: Not on file  Cologuard: Not on file  Sigmoidoscopy: Not on file         Prostate Cancer Screening Individualized decision between patient and health care provider in men between ages of 53-78   Medicare will cover every 12 months beginning on the day after your 50th birthday PSA: <0 1 ng/mL     History Prostate Cancer  Screening Not Indicated     Hepatitis C Screening Once for adults born between 1945 and 1965  More frequently in patients at high risk for Hepatitis C Hep C Antibody: Not on file       Diabetes Screening 1-2 times per year if you're at risk for diabetes or have pre-diabetes Fasting glucose: 96 mg/dL   A1C: 5 7 %    Screening Current   Cholesterol Screening Once every 5 years if you don't have a lipid disorder  May order more often based on risk factors  Lipid panel: 07/09/2020    Screening Not Indicated  History Lipid Disorder      Other Preventive Screenings Covered by Medicare:  1  Abdominal Aortic Aneurysm (AAA) Screening: covered once if your at risk  You're considered to be at risk if you have a family history of AAA or a male between the age of 73-68 who smoking at least 100 cigarettes in your lifetime  2  Lung Cancer Screening: covers low dose CT scan once per year if you meet all of the following conditions: (1) Age 50-69; (2) No signs or symptoms of lung cancer; (3) Current smoker or have quit smoking within the last 15 years; (4) You have a tobacco smoking history of at least 30 pack years (packs per day x number of years you smoked); (5) You get a written order from a healthcare provider  3  Glaucoma Screening: covered annually if you're considered high risk: (1) You have diabetes OR (2) Family history of glaucoma OR (3)  aged 48 and older OR (3)  American aged 72 and older  3  Osteoporosis Screening: covered every 2 years if you meet one of the following conditions: (1) Have a vertebral abnormality; (2) On glucocorticoid therapy for more than 3 months; (3) Have primary hyperparathyroidism; (4) On osteoporosis medications and need to assess response to drug therapy  5  HIV Screening: covered annually if you're between the age of 12-76  Also covered annually if you are younger than 13 and older than 72 with risk factors for HIV infection  For pregnant patients, it is covered up to 3 times per pregnancy      Immunizations:  Immunization Recommendations   Influenza Vaccine Annual influenza vaccination during flu season is recommended for all persons aged >= 6 months who do not have contraindications   Pneumococcal Vaccine (Prevnar and Pneumovax)  * Prevnar = PCV13  * Pneumovax = PPSV23 Adults 25-60 years old: 1-3 doses may be recommended based on certain risk factors  Adults 72 years old: Prevnar (PCV13) vaccine recommended followed by Pneumovax (PPSV23) vaccine  If already received PPSV23 since turning 65, then PCV13 recommended at least one year after PPSV23 dose  Hepatitis B Vaccine 3 dose series if at intermediate or high risk (ex: diabetes, end stage renal disease, liver disease)   Tetanus (Td) Vaccine - COST NOT COVERED BY MEDICARE PART B Following completion of primary series, a booster dose should be given every 10 years to maintain immunity against tetanus  Td may also be given as tetanus wound prophylaxis  Tdap Vaccine - COST NOT COVERED BY MEDICARE PART B Recommended at least once for all adults  For pregnant patients, recommended with each pregnancy  Shingles Vaccine (Shingrix) - COST NOT COVERED BY MEDICARE PART B  2 shot series recommended in those aged 48 and above     Health Maintenance Due:  There are no preventive care reminders to display for this patient  Immunizations Due:  There are no preventive care reminders to display for this patient  Advance Directives   What are advance directives? Advance directives are legal documents that state your wishes and plans for medical care  These plans are made ahead of time in case you lose your ability to make decisions for yourself  Advance directives can apply to any medical decision, such as the treatments you want, and if you want to donate organs  What are the types of advance directives? There are many types of advance directives, and each state has rules about how to use them  You may choose a combination of any of the following:  · Living will: This is a written record of the treatment you want  You can also choose which treatments you do not want, which to limit, and which to stop at a certain time  This includes surgery, medicine, IV fluid, and tube feedings  · Durable power of  for healthcare [facility]):   This is a written record that states who you want to make healthcare choices for you when you are unable to make them for yourself  This person, called a proxy, is usually a family member or a friend  You may choose more than 1 proxy  · Do not resuscitate (DNR) order:  A DNR order is used in case your heart stops beating or you stop breathing  It is a request not to have certain forms of treatment, such as CPR  A DNR order may be included in other types of advance directives  · Medical directive: This covers the care that you want if you are in a coma, near death, or unable to make decisions for yourself  You can list the treatments you want for each condition  Treatment may include pain medicine, surgery, blood transfusions, dialysis, IV or tube feedings, and a ventilator (breathing machine)  · Values history: This document has questions about your views, beliefs, and how you feel and think about life  This information can help others choose the care that you would choose  Why are advance directives important? An advance directive helps you control your care  Although spoken wishes may be used, it is better to have your wishes written down  Spoken wishes can be misunderstood, or not followed  Treatments may be given even if you do not want them  An advance directive may make it easier for your family to make difficult choices about your care  Fall Prevention    Fall prevention  includes ways to make your home and other areas safer  It also includes ways you can move more carefully to prevent a fall  Health conditions that cause changes in your blood pressure, vision, or muscle strength and coordination may increase your risk for falls  Medicines may also increase your risk for falls if they make you dizzy, weak, or sleepy  Fall prevention tips:   · Stand or sit up slowly  · Use assistive devices as directed  · Wear shoes that fit well and have soles that   · Wear a personal alarm  · Stay active  · Manage your medical conditions      Home Safety Tips:  · Add items to prevent falls in the bathroom  · Keep paths clear  · Install bright lights in your home  · Keep items you use often on shelves within reach  · Paint or place reflective tape on the edges of your stairs  Weight Management   Why it is important to manage your weight:  Being overweight increases your risk of health conditions such as heart disease, high blood pressure, type 2 diabetes, and certain types of cancer  It can also increase your risk for osteoarthritis, sleep apnea, and other respiratory problems  Aim for a slow, steady weight loss  Even a small amount of weight loss can lower your risk of health problems  How to lose weight safely:  A safe and healthy way to lose weight is to eat fewer calories and get regular exercise  You can lose up about 1 pound a week by decreasing the number of calories you eat by 500 calories each day  Healthy meal plan for weight management:  A healthy meal plan includes a variety of foods, contains fewer calories, and helps you stay healthy  A healthy meal plan includes the following:  · Eat whole-grain foods more often  A healthy meal plan should contain fiber  Fiber is the part of grains, fruits, and vegetables that is not broken down by your body  Whole-grain foods are healthy and provide extra fiber in your diet  Some examples of whole-grain foods are whole-wheat breads and pastas, oatmeal, brown rice, and bulgur  · Eat a variety of vegetables every day  Include dark, leafy greens such as spinach, kale, michel greens, and mustard greens  Eat yellow and orange vegetables such as carrots, sweet potatoes, and winter squash  · Eat a variety of fruits every day  Choose fresh or canned fruit (canned in its own juice or light syrup) instead of juice  Fruit juice has very little or no fiber  · Eat low-fat dairy foods  Drink fat-free (skim) milk or 1% milk  Eat fat-free yogurt and low-fat cottage cheese   Try low-fat cheeses such as mozzarella and other reduced-fat cheeses  · Choose meat and other protein foods that are low in fat  Choose beans or other legumes such as split peas or lentils  Choose fish, skinless poultry (chicken or turkey), or lean cuts of red meat (beef or pork)  Before you cook meat or poultry, cut off any visible fat  · Use less fat and oil  Try baking foods instead of frying them  Add less fat, such as margarine, sour cream, regular salad dressing and mayonnaise to foods  Eat fewer high-fat foods  Some examples of high-fat foods include french fries, doughnuts, ice cream, and cakes  · Eat fewer sweets  Limit foods and drinks that are high in sugar  This includes candy, cookies, regular soda, and sweetened drinks  Exercise:  Exercise at least 30 minutes per day on most days of the week  Some examples of exercise include walking, biking, dancing, and swimming  You can also fit in more physical activity by taking the stairs instead of the elevator or parking farther away from stores  Ask your healthcare provider about the best exercise plan for you  © Copyright [employer] 2018 Information is for End User's use only and may not be sold, redistributed or otherwise used for commercial purposes  All illustrations and images included in CareNotes® are the copyrighted property of A D A M , Inc  or [facility] Hypertension   AMBULATORY CARE:   Hypertension  is high blood pressure  Your blood pressure is the force of your blood moving against the walls of your arteries  Hypertension causes your blood pressure to get so high that your heart has to work much harder than normal  This can damage your heart  Even if you have hypertension for years, lifestyle changes, medicines, or both can help bring your blood pressure to normal   Call 911 for any of the following:   · You have chest pain  · You have any of the following signs of a heart attack:      ?  Squeezing, pressure, or pain in your chest    ? You may  also have any of the following:     § Discomfort or pain in your back, neck, jaw, stomach, or arm    § Shortness of breath    § Nausea or vomiting    § Lightheadedness or a sudden cold sweat    · You become confused or have difficulty speaking  · You suddenly feel lightheaded or have trouble breathing  Seek care immediately if:   · You have a severe headache or vision loss  · You have weakness in an arm or leg  Contact your healthcare provider if:   · You feel faint, dizzy, confused, or drowsy  · You have been taking your blood pressure medicine but your pressure is higher than your provider says it should be  · You have questions or concerns about your condition or care  Treatment for chronic hypertension  may include medicine to lower your blood pressure and cholesterol levels  A low cholesterol level helps prevent heart disease and makes it easier to control your blood pressure  Heart disease can make your blood pressure harder to control  You may also need to make lifestyle changes  What you need to know about the stages of hypertension:       · Normal blood pressure is 119/79 or lower   Your healthcare provider may only check your blood pressure each year if it stays at a normal level  · Elevated blood pressure is 120/79 to 129/79   This is sometimes called prehypertension  Your healthcare provider may suggest lifestyle changes to help lower your blood pressure to a normal level  He or she may then check it again in 3 to 6 months  · Stage 1 hypertension is 130/80  to 139/89   Your provider may recommend lifestyle changes, medication, and checks every 3 to 6 months until your blood pressure is controlled  · Stage 2 hypertension is 140/90 or higher   Your provider will recommend lifestyle changes and have you take 2 kinds of hypertension medicines  You will also need to have your blood pressure checked monthly until it is controlled    Manage chronic hypertension:   · Check your blood pressure at home  Avoid smoking, caffeine, and exercise at least 30 minutes before checking your blood pressure  Sit and rest for 5 minutes before you take your blood pressure  Extend your arm and support it on a flat surface  Your arm should be at the same level as your heart  Follow the directions that came with your blood pressure monitor  Check your blood pressure 2 times, 1 minute apart, before you take your medicine in the morning  Also check your blood pressure before your evening meal  Keep a record of your readings and bring it to your follow-up visits  Ask your healthcare provider what your blood pressure should be  · Manage any other health conditions you have  Health conditions such as diabetes can increase your risk for hypertension  Follow your healthcare provider's instructions and take all your medicines as directed  Talk to your healthcare provider about any new health conditions you have recently developed  · Ask about all medicines  Certain medicines can increase your blood pressure  Examples include oral birth control pills, decongestants, herbal supplements, and NSAIDs, such as ibuprofen  Your healthcare provider can tell you which medicines are safe for you to take  This includes prescription and over-the-counter medicines  Lifestyle changes you can make to lower your blood pressure: Your provider may want you to make more lifestyle changes if you are having trouble controlling your blood pressure  This may feel difficult over time, especially if you think you are making good changes but your pressure is still high  It might help to focus on one new change at a time  For example, try to add 1 more day of exercise, or exercise for an extra 10 minutes on 2 days  Small changes can make a big difference  Your healthcare provider can also refer you to specialists such as a dietitian who can help you make small changes  · Limit sodium (salt) as directed    Too much sodium can affect your fluid balance  Check labels to find low-sodium or no-salt-added foods  Some low-sodium foods use potassium salts for flavor  Too much potassium can also cause health problems  Your healthcare provider will tell you how much sodium and potassium are safe for you to have in a day  He or she may recommend that you limit sodium to 2,300 mg a day  · Follow the meal plan recommended by your healthcare provider  A dietitian or your provider can give you more information on low-sodium plans or the DASH (Dietary Approaches to Stop Hypertension) eating plan  The DASH plan is low in sodium, unhealthy fats, and total fat  It is high in potassium, calcium, and fiber  · Exercise to maintain a healthy weight  Exercise at least 30 minutes per day, on most days of the week  This will help decrease your blood pressure  Ask your healthcare provider about the best exercise plan for you  · Decrease stress  This may help lower your blood pressure  Learn ways to relax, such as deep breathing or listening to music  · Limit alcohol as directed  Alcohol can increase your blood pressure  A drink of alcohol is 12 ounces of beer, 5 ounces of wine, or 1½ ounces of liquor  · Do not smoke  Nicotine and other chemicals in cigarettes and cigars can increase your blood pressure and also cause lung damage  Ask your healthcare provider for information if you currently smoke and need help to quit  E-cigarettes or smokeless tobacco still contain nicotine  Talk to your healthcare provider before you use these products  Follow up with your healthcare provider as directed: You will need to return to have your blood pressure checked and to have other lab tests done  Write down your questions so you remember to ask them during your visits  © Copyright [address] Information is for End User's use only and may not be sold, redistributed or otherwise used for commercial purposes   All illustrations and images included in CareNotes® are the copyrighted property of A D A M , Inc  or [employer]ati S[employer]   The above information is an  only  It is not intended as medical advice for individual conditions or treatments  Talk to your doctor, nurse or pharmacist before following any medical regimen to see if it is safe and effective for you  Medicare Preventive Visit Patient Instructions  Thank you for completing your Welcome to Medicare Visit or Medicare Annual Wellness Visit today  Your next wellness visit will be due in one year (2/4/2022)  The screening/preventive services that you may require over the next 5-10 years are detailed below  Some tests may not apply to you based off risk factors and/or age  Screening tests ordered at today's visit but not completed yet may show as past due  Also, please note that scanned in results may not display below  Preventive Screenings:  Service Recommendations Previous Testing/Comments   Colorectal Cancer Screening  · Colonoscopy    · Fecal Occult Blood Test (FOBT)/Fecal Immunochemical Test (FIT)  · Fecal DNA/Cologuard Test  · Flexible Sigmoidoscopy Age: 54-65 years old   Colonoscopy: every 10 years (May be performed more frequently if at higher risk)  OR  FOBT/FIT: every 1 year  OR  Cologuard: every 3 years  OR  Sigmoidoscopy: every 5 years  Screening may be recommended earlier than age 48 if at higher risk for colorectal cancer  Also, an individualized decision between you and your healthcare provider will decide whether screening between the ages of 74-80 would be appropriate   Colonoscopy: 11/26/2019  FOBT/FIT: Not on file  Cologuard: Not on file  Sigmoidoscopy: Not on file         Prostate Cancer Screening Individualized decision between patient and health care provider in men between ages of 53-78   Medicare will cover every 12 months beginning on the day after your 50th birthday PSA: <0 1 ng/mL     History Prostate Cancer  Screening Not Indicated     Hepatitis C Screening Once for adults born between 1945 and 1965  More frequently in patients at high risk for Hepatitis C Hep C Antibody: Not on file       Diabetes Screening 1-2 times per year if you're at risk for diabetes or have pre-diabetes Fasting glucose: 96 mg/dL   A1C: 5 7 %    Screening Current   Cholesterol Screening Once every 5 years if you don't have a lipid disorder  May order more often based on risk factors  Lipid panel: 07/09/2020    Screening Not Indicated  History Lipid Disorder      Other Preventive Screenings Covered by Medicare:  6  Abdominal Aortic Aneurysm (AAA) Screening: covered once if your at risk  You're considered to be at risk if you have a family history of AAA or a male between the age of 73-68 who smoking at least 100 cigarettes in your lifetime  7  Lung Cancer Screening: covers low dose CT scan once per year if you meet all of the following conditions: (1) Age 50-69; (2) No signs or symptoms of lung cancer; (3) Current smoker or have quit smoking within the last 15 years; (4) You have a tobacco smoking history of at least 30 pack years (packs per day x number of years you smoked); (5) You get a written order from a healthcare provider  8  Glaucoma Screening: covered annually if you're considered high risk: (1) You have diabetes OR (2) Family history of glaucoma OR (3)  aged 48 and older OR (3)  American aged 72 and older  5  Osteoporosis Screening: covered every 2 years if you meet one of the following conditions: (1) Have a vertebral abnormality; (2) On glucocorticoid therapy for more than 3 months; (3) Have primary hyperparathyroidism; (4) On osteoporosis medications and need to assess response to drug therapy  10  HIV Screening: covered annually if you're between the age of 12-76  Also covered annually if you are younger than 13 and older than 72 with risk factors for HIV infection   For pregnant patients, it is covered up to 3 times per pregnancy  Immunizations:  Immunization Recommendations   Influenza Vaccine Annual influenza vaccination during flu season is recommended for all persons aged >= 6 months who do not have contraindications   Pneumococcal Vaccine (Prevnar and Pneumovax)  * Prevnar = PCV13  * Pneumovax = PPSV23 Adults 25-60 years old: 1-3 doses may be recommended based on certain risk factors  Adults 72 years old: Prevnar (PCV13) vaccine recommended followed by Pneumovax (PPSV23) vaccine  If already received PPSV23 since turning 65, then PCV13 recommended at least one year after PPSV23 dose  Hepatitis B Vaccine 3 dose series if at intermediate or high risk (ex: diabetes, end stage renal disease, liver disease)   Tetanus (Td) Vaccine - COST NOT COVERED BY MEDICARE PART B Following completion of primary series, a booster dose should be given every 10 years to maintain immunity against tetanus  Td may also be given as tetanus wound prophylaxis  Tdap Vaccine - COST NOT COVERED BY MEDICARE PART B Recommended at least once for all adults  For pregnant patients, recommended with each pregnancy  Shingles Vaccine (Shingrix) - COST NOT COVERED BY MEDICARE PART B  2 shot series recommended in those aged 48 and above     Health Maintenance Due:  There are no preventive care reminders to display for this patient  Immunizations Due:  There are no preventive care reminders to display for this patient  Advance Directives   What are advance directives? Advance directives are legal documents that state your wishes and plans for medical care  These plans are made ahead of time in case you lose your ability to make decisions for yourself  Advance directives can apply to any medical decision, such as the treatments you want, and if you want to donate organs  What are the types of advance directives? There are many types of advance directives, and each state has rules about how to use them   You may choose a combination of any of the following:  · Living will: This is a written record of the treatment you want  You can also choose which treatments you do not want, which to limit, and which to stop at a certain time  This includes surgery, medicine, IV fluid, and tube feedings  · Durable power of  for healthcare [facility]): This is a written record that states who you want to make healthcare choices for you when you are unable to make them for yourself  This person, called a proxy, is usually a family member or a friend  You may choose more than 1 proxy  · Do not resuscitate (DNR) order:  A DNR order is used in case your heart stops beating or you stop breathing  It is a request not to have certain forms of treatment, such as CPR  A DNR order may be included in other types of advance directives  · Medical directive: This covers the care that you want if you are in a coma, near death, or unable to make decisions for yourself  You can list the treatments you want for each condition  Treatment may include pain medicine, surgery, blood transfusions, dialysis, IV or tube feedings, and a ventilator (breathing machine)  · Values history: This document has questions about your views, beliefs, and how you feel and think about life  This information can help others choose the care that you would choose  Why are advance directives important? An advance directive helps you control your care  Although spoken wishes may be used, it is better to have your wishes written down  Spoken wishes can be misunderstood, or not followed  Treatments may be given even if you do not want them  An advance directive may make it easier for your family to make difficult choices about your care  Fall Prevention    Fall prevention  includes ways to make your home and other areas safer  It also includes ways you can move more carefully to prevent a fall   Health conditions that cause changes in your blood pressure, vision, or muscle strength and coordination may increase your risk for falls  Medicines may also increase your risk for falls if they make you dizzy, weak, or sleepy  Fall prevention tips:   · Stand or sit up slowly  · Use assistive devices as directed  · Wear shoes that fit well and have soles that   · Wear a personal alarm  · Stay active  · Manage your medical conditions  Home Safety Tips:  · Add items to prevent falls in the bathroom  · Keep paths clear  · Install bright lights in your home  · Keep items you use often on shelves within reach  · Paint or place reflective tape on the edges of your stairs  Weight Management   Why it is important to manage your weight:  Being overweight increases your risk of health conditions such as heart disease, high blood pressure, type 2 diabetes, and certain types of cancer  It can also increase your risk for osteoarthritis, sleep apnea, and other respiratory problems  Aim for a slow, steady weight loss  Even a small amount of weight loss can lower your risk of health problems  How to lose weight safely:  A safe and healthy way to lose weight is to eat fewer calories and get regular exercise  You can lose up about 1 pound a week by decreasing the number of calories you eat by 500 calories each day  Healthy meal plan for weight management:  A healthy meal plan includes a variety of foods, contains fewer calories, and helps you stay healthy  A healthy meal plan includes the following:  · Eat whole-grain foods more often  A healthy meal plan should contain fiber  Fiber is the part of grains, fruits, and vegetables that is not broken down by your body  Whole-grain foods are healthy and provide extra fiber in your diet  Some examples of whole-grain foods are whole-wheat breads and pastas, oatmeal, brown rice, and bulgur  · Eat a variety of vegetables every day  Include dark, leafy greens such as spinach, kale, michel greens, and mustard greens   Eat yellow and orange vegetables such as carrots, sweet potatoes, and winter squash  · Eat a variety of fruits every day  Choose fresh or canned fruit (canned in its own juice or light syrup) instead of juice  Fruit juice has very little or no fiber  · Eat low-fat dairy foods  Drink fat-free (skim) milk or 1% milk  Eat fat-free yogurt and low-fat cottage cheese  Try low-fat cheeses such as mozzarella and other reduced-fat cheeses  · Choose meat and other protein foods that are low in fat  Choose beans or other legumes such as split peas or lentils  Choose fish, skinless poultry (chicken or turkey), or lean cuts of red meat (beef or pork)  Before you cook meat or poultry, cut off any visible fat  · Use less fat and oil  Try baking foods instead of frying them  Add less fat, such as margarine, sour cream, regular salad dressing and mayonnaise to foods  Eat fewer high-fat foods  Some examples of high-fat foods include french fries, doughnuts, ice cream, and cakes  · Eat fewer sweets  Limit foods and drinks that are high in sugar  This includes candy, cookies, regular soda, and sweetened drinks  Exercise:  Exercise at least 30 minutes per day on most days of the week  Some examples of exercise include walking, biking, dancing, and swimming  You can also fit in more physical activity by taking the stairs instead of the elevator or parking farther away from stores  Ask your healthcare provider about the best exercise plan for you  © Copyright [employer] 2018 Information is for End User's use only and may not be sold, redistributed or otherwise used for commercial purposes  All illustrations and images included in CareNotes® are the copyrighted property of [employer] D A M [employer] Inc  or Tati Aguilar  Weight Management   AMBULATORY CARE:   Why it is important to manage your weight:  Being overweight increases your risk of health conditions such as heart disease, high blood pressure, type 2 diabetes, and certain types of cancer   It can also increase your risk for osteoarthritis, sleep apnea, and other respiratory problems  Aim for a slow, steady weight loss  Even a small amount of weight loss can lower your risk of health problems  How to lose weight safely:  A safe and healthy way to lose weight is to eat fewer calories and get regular exercise  · You can lose up about 1 pound a week by decreasing the number of calories you eat by 500 calories each day  You can decrease calories by eating smaller portion sizes or by cutting out high-calorie foods  Read labels to find out how many calories are in the foods you eat  · You can also burn calories with exercise such as walking, swimming, or biking  You will be more likely to keep weight off if you make these changes part of your lifestyle  Exercise at least 30 minutes per day on most days of the week  You can also fit in more physical activity by taking the stairs instead of the elevator or parking farther away from stores  Ask your healthcare provider about the best exercise plan for you  Healthy meal plan for weight management:  A healthy meal plan includes a variety of foods, contains fewer calories, and helps you stay healthy  A healthy meal plan includes the following:     · Eat whole-grain foods more often  A healthy meal plan should contain fiber  Fiber is the part of grains, fruits, and vegetables that is not broken down by your body  Whole-grain foods are healthy and provide extra fiber in your diet  Some examples of whole-grain foods are whole-wheat breads and pastas, oatmeal, brown rice, and bulgur  · Eat a variety of vegetables every day  Include dark, leafy greens such as spinach, kale, michel greens, and mustard greens  Eat yellow and orange vegetables such as carrots, sweet potatoes, and winter squash  · Eat a variety of fruits every day  Choose fresh or canned fruit (canned in its own juice or light syrup) instead of juice   Fruit juice has very little or no fiber     · Eat low-fat dairy foods  Drink fat-free (skim) milk or 1% milk  Eat fat-free yogurt and low-fat cottage cheese  Try low-fat cheeses such as mozzarella and other reduced-fat cheeses  · Choose meat and other protein foods that are low in fat  Choose beans or other legumes such as split peas or lentils  Choose fish, skinless poultry (chicken or turkey), or lean cuts of red meat (beef or pork)  Before you cook meat or poultry, cut off any visible fat  · Use less fat and oil  Try baking foods instead of frying them  Add less fat, such as margarine, sour cream, regular salad dressing and mayonnaise to foods  Eat fewer high-fat foods  Some examples of high-fat foods include french fries, doughnuts, ice cream, and cakes  · Eat fewer sweets  Limit foods and drinks that are high in sugar  This includes candy, cookies, regular soda, and sweetened drinks  Ways to decrease calories:   · Eat smaller portions  ? Use a small plate with smaller servings  ? Do not eat second helpings  ? When you eat at a restaurant, ask for a box and place half of your meal in the box before you eat  ? Share an entrée with someone else  · Replace high-calorie snacks with healthy, low-calorie snacks  ? Choose fresh fruit, vegetables, fat-free rice cakes, or air-popped popcorn instead of potato chips, nuts, or chocolate  ? Choose water or calorie-free drinks instead of soda or sweetened drinks  · Do not shop for groceries when you are hungry  You may be more likely to make unhealthy food choices  Take a grocery list of healthy foods and shop after you have eaten  · Eat regular meals  Do not skip meals  Skipping meals can lead to overeating later in the day  This can make it harder for you to lose weight  Eat a healthy snack in place of a meal if you do not have time to eat a regular meal  Talk with a dietitian to help you create a meal plan and schedule that is right for you      Other things to consider as you try to lose weight:   · Be aware of situations that may give you the urge to overeat, such as eating while watching television  Find ways to avoid these situations  For example, read a book, go for a walk, or do crafts  · Meet with a weight loss support group or friends who are also trying to lose weight  This may help you stay motivated to continue working on your weight loss goals  © Copyright [address] Information is for End User's use only and may not be sold, redistributed or otherwise used for commercial purposes  All illustrations and images included in CareNotes® are the copyrighted property of A D A M , Inc  or [address] [place] [address]  The above information is an  only  It is not intended as medical advice for individual conditions or treatments  Talk to your doctor, nurse or pharmacist before following any medical regimen to see if it is safe and effective for you  Low Fat Diet   AMBULATORY CARE:   A low-fat diet  is an eating plan that is low in total fat, unhealthy fat, and cholesterol  You may need to follow a low-fat diet if you have trouble digesting or absorbing fat  You may also need to follow this diet if you have high cholesterol  You can also lower your cholesterol by increasing the amount of fiber in your diet  Soluble fiber is a type of fiber that helps to decrease cholesterol levels  Different types of fat in food:   · Limit unhealthy fats  A diet that is high in cholesterol, saturated fat, and trans fat may cause unhealthy cholesterol levels  Unhealthy cholesterol levels increase your risk of heart disease  ? Cholesterol:  Limit intake of cholesterol to less than 200 mg per day  Cholesterol is found in meat, eggs, and dairy  ? Saturated fat:  Limit saturated fat to less than 7% of your total daily calories  Ask your dietitian how many calories you need each day  Saturated fat is found in butter, cheese, ice cream, whole milk, and palm oil  Saturated fat is also found in meat, such as beef, pork, chicken skin, and processed meats  Processed meats include sausage, hot dogs, and bologna  ? Trans fat:  Avoid trans fat as much as possible  Trans fat is used in fried and baked foods  Foods that say trans fat free on the label may still have up to 0 5 grams of trans fat per serving  · Include healthy fats  Replace foods that are high in saturated and trans fat with foods high in healthy fats  This may help to decrease high cholesterol levels  ? Monounsaturated fats: These are found in avocados, nuts, and vegetable oils, such as olive, canola, and sunflower oil  ? Polyunsaturated fats: These can be found in vegetable oils, such as soybean or corn oil  Omega-3 fats can help to decrease the risk of heart disease  Omega-3 fats are found in fish, such as salmon, herring, trout, and tuna  Omega-3 fats can also be found in plant foods, such as walnuts, flaxseed, soybeans, and canola oil  Foods to limit or avoid:   · Grains:      ? Snacks that are made with partially hydrogenated oils, such as chips, regular crackers, and butter-flavored popcorn    ? High-fat baked goods, such as biscuits, croissants, doughnuts, pies, cookies, and pastries    · Dairy:      ? Whole milk, 2% milk, and yogurt and ice cream made with whole milk    ? Half and half creamer, heavy cream, and whipping cream    ? Cheese, cream cheese, and sour cream    · Meats and proteins:      ? High-fat cuts of meat (T-bone steak, regular hamburger, and ribs)    ? Fried meat, poultry (turkey and chicken), and fish    ? Poultry (chicken and turkey) with skin    ? Cold cuts (salami or bologna), hot dogs, pineda, and sausage    ? Whole eggs and egg yolks    · Vegetables and fruits with added fat:      ? Fried vegetables or vegetables in butter or high-fat sauces, such as cream or cheese sauces    ? Fried fruit or fruit served with butter or cream    · Fats:      ?  Butter, stick margarine, and shortening    ? Coconut, palm oil, and palm kernel oil    Foods to include:   · Grains:      ? Whole-grain breads, cereals, pasta, and brown rice    ? Low-fat crackers and pretzels    · Vegetables and fruits:      ? Fresh, frozen, or canned vegetables (no salt or low-sodium)    ? Fresh, frozen, dried, or canned fruit (canned in light syrup or fruit juice)    ? Avocado    · Low-fat dairy products:      ? Nonfat (skim) or 1% milk    ? Nonfat or low-fat cheese, yogurt, and cottage cheese    · Meats and proteins:      ? Chicken or turkey with no skin    ? Baked or broiled fish    ? Lean beef and pork (loin, round, extra lean hamburger)    ? Beans and peas, unsalted nuts, soy products    ? Egg whites and substitutes    ? Seeds and nuts    · Fats:      ? Unsaturated oil, such as canola, olive, peanut, soybean, or sunflower oil    ? Soft or liquid margarine and vegetable oil spread    ? Low-fat salad dressing    Other ways to decrease fat:   · Read food labels before you buy foods  Choose foods that have less than 30% of calories from fat  Choose low-fat or fat-free dairy products  Remember that fat free does not mean calorie free  These foods still contain calories, and too many calories can lead to weight gain  · Trim fat from meat and avoid fried food  Trim all visible fat from meat before you cook it  Remove the skin from poultry  Do not wang meat, fish, or poultry  Bake, roast, boil, or broil these foods instead  Avoid fried foods  Eat a baked potato instead of [place] fries  Steam vegetables instead of sautéing them in butter  · Add less fat to foods  Use imitation pineda bits on salads and baked potatoes instead of regular pineda bits  Use fat-free or low-fat salad dressings instead of regular dressings  Use low-fat or nonfat butter-flavored topping instead of regular butter or margarine on popcorn and other foods      Ways to decrease fat in recipes:  Replace high-fat ingredients with low-fat or nonfat ones  This may cause baked goods to be drier than usual  You may need to use nonfat cooking spray on pans to prevent food from sticking  You also may need to change the amount of other ingredients, such as water, in the recipe  Try the following:  · Use low-fat or light margarine instead of regular margarine or shortening  · Use lean ground turkey breast or chicken, or lean ground beef (less than 5% fat) instead of hamburger  · Add 1 teaspoon of canola oil to 8 ounces of skim milk instead of using cream or half and half  · Use grated zucchini, carrots, or apples in breads instead of coconut  · Use blenderized, low-fat cottage cheese, plain tofu, or low-fat ricotta cheese instead of cream cheese  · Use 1 egg white and 1 teaspoon of canola oil, or use ¼ cup (2 ounces) of fat-free egg substitute instead of a whole egg  · Replace half of the oil that is called for in a recipe with applesauce when you bake  Use 3 tablespoons of cocoa powder and 1 tablespoon of canola oil instead of a square of baking chocolate  How to increase fiber:  Eat enough high-fiber foods to get 20 to 30 grams of fiber every day  Slowly increase your fiber intake to avoid stomach cramps, gas, and other problems  · Eat 3 ounces of whole-grain foods each day  An ounce is about 1 slice of bread  Eat whole-grain breads, such as whole-wheat bread  Whole wheat, whole-wheat flour, or other whole grains should be listed as the first ingredient on the food label  Replace white flour with whole-grain flour or use half of each in recipes  Whole-grain flour is heavier than white flour, so you may have to add more yeast or baking powder  · Eat a high-fiber cereal for breakfast   Oatmeal is a good source of soluble fiber  Look for cereals that have bran or fiber in the name  Choose whole-grain products, such as brown rice, barley, and whole-wheat pasta  · Eat more beans, peas, and lentils    For example, add beans to soups or salads  Eat at least 5 cups of fruits and vegetables each day  Eat fruits and vegetables with the peel because the peel is high in fiber  © Copyright [address] Information is for End User's use only and may not be sold, redistributed or otherwise used for commercial purposes  All illustrations and images included in CareNotes® are the copyrighted property of A D A M , Inc  or [facility] Cristopher Sapp   The above information is an  only  It is not intended as medical advice for individual conditions or treatments  Talk to your doctor, nurse or pharmacist before following any medical regimen to see if it is safe and effective for you  Heart Healthy Diet   AMBULATORY CARE:   A heart healthy diet  is an eating plan low in unhealthy fats and sodium (salt)  The plan is high in healthy fats and fiber  A heart healthy diet helps improve your cholesterol levels and lowers your risk for heart disease and stroke  A dietitian will teach you how to read and understand food labels  Heart healthy diet guidelines to follow:   · Choose foods that contain healthy fats  ? Unsaturated fats  include monounsaturated and polyunsaturated fats  Unsaturated fat is found in foods such as soybean, canola, olive, corn, and safflower oils  It is also found in soft tub margarine that is made with liquid vegetable oil  ? Omega-3 fat  is found in certain fish, such as salmon, tuna, and trout, and in walnuts and flaxseed  Eat fish high in omega-3 fats at least 2 times a week  · Get 20 to 30 grams of fiber each day  Fruits, vegetables, whole-grain foods, and legumes (cooked beans) are good sources of fiber  · Limit or do not have unhealthy fats  ? Cholesterol  is found in animal foods, such as eggs and lobster, and in dairy products made from whole milk  Limit cholesterol to less than 200 mg each day  ? Saturated fat  is found in meats, such as pineda and hamburger   It is also found in chicken or turkey skin, whole milk, and butter  Limit saturated fat to less than 7% of your total daily calories  ? Trans fat  is found in packaged foods, such as potato chips and cookies  It is also in hard margarine, some fried foods, and shortening  Do not eat foods that contain trans fats  · Limit sodium as directed  You may be told to limit sodium to 2,000 to 2,300 mg each day  Choose low-sodium or no-salt-added foods  Add little or no salt to food you prepare  Use herbs and spices in place of salt  Include the following in your heart healthy plan:  Ask your dietitian or healthcare provider how many servings to have from each of the following food groups:  · Grains:      ? Whole-wheat breads, cereals, and pastas, and brown rice    ? Low-fat, low-sodium crackers and chips    · Vegetables:      ? Broccoli, green beans, green peas, and spinach    ? Collards, kale, and lima beans    ? Carrots, sweet potatoes, tomatoes, and peppers    ? Canned vegetables with no salt added    · Fruits:      ? Bananas, peaches, pears, and pineapple    ? Grapes, raisins, and dates    ? Oranges, tangerines, grapefruit, orange juice, and grapefruit juice    ? Apricots, mangoes, melons, and papaya    ? Raspberries and strawberries    ? Canned fruit with no added sugar    · Low-fat dairy:      ? Nonfat (skim) milk, 1% milk, and low-fat almond, cashew, or soy milks fortified with calcium    ? Low-fat cheese, regular or frozen yogurt, and cottage cheese    · Meats and proteins:      ? Lean cuts of beef and pork (loin, leg, round), skinless chicken and turkey    ? Legumes, soy products, egg whites, or nuts    Limit or do not include the following in your heart healthy plan:   · Unhealthy fats and oils:      ? Whole or 2% milk, cream cheese, sour cream, or cheese    ? High-fat cuts of beef (T-bone steaks, ribs), chicken or turkey with skin, and organ meats such as liver    ?  Butter, stick margarine, shortening, and cooking oils such as coconut or palm oil    · Foods and liquids high in sodium:      ? Packaged foods, such as frozen dinners, cookies, macaroni and cheese, and cereals with more than 300 mg of sodium per serving    ? Vegetables with added sodium, such as instant potatoes, vegetables with added sauces, or regular canned vegetables    ? Cured or smoked meats, such as hot dogs, pineda, and sausage    ? High-sodium ketchup, barbecue sauce, salad dressing, pickles, olives, soy sauce, or miso    · Foods and liquids high in sugar:      ? Candy, cake, cookies, pies, or doughnuts    ? Soft drinks (soda), sports drinks, or sweetened tea    ? Canned or dry mixes for cakes, soups, sauces, or gravies    Other healthy heart guidelines:   · Do not smoke  Nicotine and other chemicals in cigarettes and cigars can cause lung and heart damage  Ask your healthcare provider for information if you currently smoke and need help to quit  E-cigarettes or smokeless tobacco still contain nicotine  Talk to your healthcare provider before you use these products  · Limit or do not drink alcohol as directed  Alcohol can damage your heart and raise your blood pressure  Your healthcare provider may give you specific daily and weekly limits  The general recommended limit is 1 drink a day for women 21 or older and for men 72 or older  Do not have more than 3 drinks in a day or 7 in a week  The recommended limit is 2 drinks a day for men 24to 59years of age  Do not have more than 4 drinks in a day or 14 in a week  A drink of alcohol is 12 ounces of beer, 5 ounces of wine, or 1½ ounces of liquor  · Exercise regularly  Exercise can help you maintain a healthy weight and improve your blood pressure and cholesterol levels  Regular exercise can also decrease your risk for heart problems  Ask your healthcare provider about the best exercise plan for you  Do not start an exercise program without asking your healthcare provider         Follow up with your doctor or cardiologist as directed:  Write down your questions so you remember to ask them during your visits  © Copyright [address] Information is for End User's use only and may not be sold, redistributed or otherwise used for commercial purposes  All illustrations and images included in CareNotes® are the copyrighted property of A [employer] A M , Inc  or [facility] Cristopher Sapp   The above information is an  only  It is not intended as medical advice for individual conditions or treatments  Talk to your doctor, nurse or pharmacist before following any medical regimen to see if it is safe and effective for you
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
## 2021-02-06 DIAGNOSIS — E03.9 HYPOTHYROIDISM, UNSPECIFIED TYPE: ICD-10-CM

## 2021-02-06 RX ORDER — LEVOTHYROXINE SODIUM 88 UG/1
TABLET ORAL
Qty: 90 TABLET | Refills: 1 | Status: SHIPPED | OUTPATIENT
Start: 2021-02-06 | End: 2021-10-05 | Stop reason: SDUPTHER

## 2021-02-12 ENCOUNTER — TRANSCRIBE ORDERS (OUTPATIENT)
Dept: LAB | Facility: CLINIC | Age: 81
End: 2021-02-12

## 2021-02-12 ENCOUNTER — LAB (OUTPATIENT)
Dept: LAB | Facility: CLINIC | Age: 81
End: 2021-02-12
Payer: COMMERCIAL

## 2021-02-12 DIAGNOSIS — C77.5 MALIGNANT NEOPLASM OF PROSTATE METASTATIC TO INTRAPELVIC LYMPH NODE (HCC): ICD-10-CM

## 2021-02-12 DIAGNOSIS — E78.2 MIXED HYPERLIPIDEMIA: ICD-10-CM

## 2021-02-12 DIAGNOSIS — E03.9 ACQUIRED HYPOTHYROIDISM: ICD-10-CM

## 2021-02-12 DIAGNOSIS — I10 ESSENTIAL HYPERTENSION: ICD-10-CM

## 2021-02-12 DIAGNOSIS — C61 MALIGNANT NEOPLASM OF PROSTATE METASTATIC TO INTRAPELVIC LYMPH NODE (HCC): ICD-10-CM

## 2021-02-12 LAB
ALBUMIN SERPL BCP-MCNC: 4.1 G/DL (ref 3.5–5)
ALP SERPL-CCNC: 67 U/L (ref 46–116)
ALT SERPL W P-5'-P-CCNC: 39 U/L (ref 12–78)
ANION GAP SERPL CALCULATED.3IONS-SCNC: 7 MMOL/L (ref 4–13)
AST SERPL W P-5'-P-CCNC: 25 U/L (ref 5–45)
BILIRUB SERPL-MCNC: 0.66 MG/DL (ref 0.2–1)
BUN SERPL-MCNC: 26 MG/DL (ref 5–25)
CALCIUM SERPL-MCNC: 9.1 MG/DL (ref 8.3–10.1)
CHLORIDE SERPL-SCNC: 109 MMOL/L (ref 100–108)
CO2 SERPL-SCNC: 27 MMOL/L (ref 21–32)
CREAT SERPL-MCNC: 1.21 MG/DL (ref 0.6–1.3)
GFR SERPL CREATININE-BSD FRML MDRD: 55 ML/MIN/1.73SQ M
GLUCOSE P FAST SERPL-MCNC: 104 MG/DL (ref 65–99)
LDLC SERPL DIRECT ASSAY-MCNC: 100 MG/DL (ref 0–100)
POTASSIUM SERPL-SCNC: 4.5 MMOL/L (ref 3.5–5.3)
PROT SERPL-MCNC: 7.5 G/DL (ref 6.4–8.2)
PSA SERPL-MCNC: <0.1 NG/ML (ref 0–4)
SODIUM SERPL-SCNC: 143 MMOL/L (ref 136–145)
TRIGL SERPL-MCNC: 89 MG/DL
TSH SERPL DL<=0.05 MIU/L-ACNC: 1.74 UIU/ML (ref 0.36–3.74)

## 2021-02-12 PROCEDURE — 36415 COLL VENOUS BLD VENIPUNCTURE: CPT

## 2021-02-12 PROCEDURE — 84153 ASSAY OF PSA TOTAL: CPT

## 2021-02-12 PROCEDURE — 84443 ASSAY THYROID STIM HORMONE: CPT

## 2021-02-12 PROCEDURE — 80307 DRUG TEST PRSMV CHEM ANLYZR: CPT | Performed by: INTERNAL MEDICINE

## 2021-02-12 PROCEDURE — 80053 COMPREHEN METABOLIC PANEL: CPT

## 2021-02-12 PROCEDURE — 83721 ASSAY OF BLOOD LIPOPROTEIN: CPT

## 2021-02-12 PROCEDURE — 84478 ASSAY OF TRIGLYCERIDES: CPT

## 2021-02-14 DIAGNOSIS — I10 ESSENTIAL HYPERTENSION: ICD-10-CM

## 2021-02-15 DIAGNOSIS — I10 ESSENTIAL HYPERTENSION: ICD-10-CM

## 2021-02-15 RX ORDER — LISINOPRIL AND HYDROCHLOROTHIAZIDE 25; 20 MG/1; MG/1
1 TABLET ORAL DAILY
Qty: 90 TABLET | Refills: 1 | Status: SHIPPED | OUTPATIENT
Start: 2021-02-15 | End: 2021-02-17 | Stop reason: SDUPTHER

## 2021-02-15 RX ORDER — LISINOPRIL AND HYDROCHLOROTHIAZIDE 25; 20 MG/1; MG/1
1 TABLET ORAL DAILY
Qty: 90 TABLET | Refills: 1 | Status: SHIPPED | OUTPATIENT
Start: 2021-02-15 | End: 2021-02-16

## 2021-02-16 DIAGNOSIS — F41.1 GENERALIZED ANXIETY DISORDER: Primary | ICD-10-CM

## 2021-02-16 LAB
AMPHETAMINES UR QL SCN: NEGATIVE NG/ML
BARBITURATES UR QL SCN: NEGATIVE NG/ML
BENZODIAZ UR QL: NEGATIVE
BZE UR QL: NEGATIVE NG/ML
CANNABINOIDS UR QL SCN: NEGATIVE NG/ML
METHADONE UR QL SCN: NEGATIVE NG/ML
OPIATES UR QL: NEGATIVE NG/ML
PCP UR QL: NEGATIVE NG/ML
PROPOXYPH UR QL SCN: NEGATIVE NG/ML

## 2021-02-16 RX ORDER — ALPRAZOLAM 0.5 MG/1
0.5 TABLET ORAL
Qty: 90 TABLET | Refills: 0
Start: 2021-02-16 | End: 2021-02-17 | Stop reason: SDUPTHER

## 2021-02-17 DIAGNOSIS — F41.1 GENERALIZED ANXIETY DISORDER: ICD-10-CM

## 2021-02-17 DIAGNOSIS — I10 ESSENTIAL HYPERTENSION: ICD-10-CM

## 2021-02-17 RX ORDER — ALPRAZOLAM 0.5 MG/1
0.5 TABLET ORAL
Qty: 90 TABLET | Refills: 0 | Status: SHIPPED | OUTPATIENT
Start: 2021-02-17 | End: 2021-04-01 | Stop reason: SDUPTHER

## 2021-02-17 RX ORDER — LISINOPRIL AND HYDROCHLOROTHIAZIDE 25; 20 MG/1; MG/1
1 TABLET ORAL DAILY
Qty: 90 TABLET | Refills: 1 | Status: SHIPPED | OUTPATIENT
Start: 2021-02-17 | End: 2021-04-29 | Stop reason: SDUPTHER

## 2021-02-17 NOTE — TELEPHONE ENCOUNTER
Requested medication(s) are due for refill today: Yes  Patient has already received a courtesy refill: No  Other reason request has been forwarded to provider: Was set at no print just record

## 2021-04-01 DIAGNOSIS — F41.1 GENERALIZED ANXIETY DISORDER: ICD-10-CM

## 2021-04-01 RX ORDER — ALPRAZOLAM 0.5 MG/1
0.5 TABLET ORAL
Qty: 90 TABLET | Refills: 0 | Status: SHIPPED | OUTPATIENT
Start: 2021-04-01 | End: 2021-04-29 | Stop reason: SDUPTHER

## 2021-04-07 ENCOUNTER — CLINICAL SUPPORT (OUTPATIENT)
Dept: RADIATION ONCOLOGY | Facility: CLINIC | Age: 81
End: 2021-04-07
Attending: RADIOLOGY
Payer: COMMERCIAL

## 2021-04-07 VITALS
WEIGHT: 201.94 LBS | TEMPERATURE: 97.6 F | RESPIRATION RATE: 18 BRPM | OXYGEN SATURATION: 95 % | HEIGHT: 69 IN | SYSTOLIC BLOOD PRESSURE: 140 MMHG | HEART RATE: 51 BPM | DIASTOLIC BLOOD PRESSURE: 70 MMHG | BODY MASS INDEX: 29.91 KG/M2

## 2021-04-07 DIAGNOSIS — C61 MALIGNANT NEOPLASM OF PROSTATE METASTATIC TO INTRAPELVIC LYMPH NODE (HCC): Primary | ICD-10-CM

## 2021-04-07 DIAGNOSIS — C77.5 MALIGNANT NEOPLASM OF PROSTATE METASTATIC TO INTRAPELVIC LYMPH NODE (HCC): Primary | ICD-10-CM

## 2021-04-07 PROCEDURE — 99211 OFF/OP EST MAY X REQ PHY/QHP: CPT | Performed by: RADIOLOGY

## 2021-04-07 NOTE — PROGRESS NOTES
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 and a benign tubular adenoma  He had been on Metamucil powder twice a day and reported seeing a small amount of blood on average once a week  Lab Results  Component Value Date   PSA <0 1 02/12/2021   PSA <0 1 07/09/2020   PSA <0 1 11/21/2019 9/30/20 [facility]  Radiation proctitis treating as needed with Metamucil and Canasa  Due to expense of Canasa, may check insurance for alternative when what he has runs out  Using one suppository per month due to having rectal bleeding    Will repeat colonoscopy only if needed for symptoms      Follow up visit       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 ([facility] [address] )   1997 Initial Diagnosis    Prostate cancer     1997 Surgery    Prostatectomy in [place], [place] 6 disease     7/5/2018 Initial Diagnosis    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 ([facility] [address] )     8/2/2018 - 9/25/2018 Radiation    Treatment:  Course: C1 toPelvis, Rt acetabulum & prostate bed     Plan ID Energy Fractions Dose per Fraction (cGy) Dose Correction (cGy) Total Dose Delivered (cGy) Elapsed Days   CD P Bed 10X 12 / 12 180 0 2,160 15   WP_R Acetab 10X 25 / 25 180 0 4,500 36      Treatment dates:  C1: 8/2/2018 - 9/25/2018           Clinical Trial: no      Health Maintenance   Topic Date Due    SLP PLAN OF CARE  Never done    COVID-19 Vaccine (2 - Pfizer 2-dose series) 02/26/2021    Fall Risk  02/04/2022    Depression Screening PHQ  02/04/2022    Medicare Annual Wellness Visit (AWV)  02/04/2022    BMI: Followup Plan  02/04/2022    BMI: Adult  02/04/2022    DTaP,Tdap,and Td Vaccines (2 - Td) 05/01/2024    Pneumococcal Vaccine: 65+ Years  Completed    Influenza Vaccine  Completed    HIB Vaccine  Aged Out    Hepatitis B Vaccine  Aged Out    IPV Vaccine  Aged Out    Hepatitis A Vaccine  Aged Out    Meningococcal ACWY Vaccine  Aged Out    HPV Vaccine  Aged Out       Patient Active Problem List   Diagnosis    Chronic shoulder pain    Vesicular palmoplantar eczema    Eczema    Generalized anxiety disorder    Hyperlipidemia    Hypertension    Hypothyroidism    Osteoarthritis    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 ([facility] [address] )    History of radiation therapy     Past Medical History:   Diagnosis Date    Cancer ([facility] [address] )     Dementia ([facility] [address] )     Disease of thyroid gland     Eczema     Generalized anxiety disorder     Hypertension     Prostate cancer ([facility] [address] )     Prostate cancer metastatic to intrapelvic lymph node ([facility] [address] )     Skin disorder     suspect benign, but will need removal and due to location, refer   Last assessed: April 18, 2013     Past Surgical History:   Procedure Laterality Date    CATARACT EXTRACTION      COLONOSCOPY      COLONOSCOPY  11/26/2019    EYE SURGERY      KNEE SURGERY      PROSTATE SURGERY      VASECTOMY       Family History   Problem Relation Age of Onset    Alcohol abuse Mother     Alcohol abuse Father     Depression Father     No Known Problems Sister     Stroke Brother         syndrome     No Known Problems Maternal Grandmother     No Known Problems Maternal Grandfather     No Known Problems Paternal Grandmother     No Known Problems Paternal Grandfather     Alcohol abuse Family     Colon cancer Maternal Aunt      Social History     Socioeconomic History    Marital status: /Civil Union     Spouse name: Not on file    Number of children: Not on file    Years of education: Not on file    Highest education level: Not on file   Occupational History    Occupation: self employed  floor covering   [address] resource strain: Not on file    Food insecurity     Worry: Not on file     Inability: Not on file   [employer] needs     Medical: Not on file     Non-medical: Not on file   Tobacco Use    Smoking status: Former Smoker     Types: Cigars    Smokeless tobacco: Never Used    Tobacco comment: smokes 1 cigar a couple times a month   Substance and Sexual Activity    Alcohol use: No    Drug use: No     Comment: Chronic Narcotic use noted in "allscripts"     Sexual activity: Not on file   Lifestyle    Physical activity     Days per week: Not on file     Minutes per session: Not on file    Stress: Not on file   Relationships    Social connections     Talks on phone: Not on file     Gets together: Not on file     Attends Yazidi service: Not on file     Active member of club or organization: Not on file     Attends meetings of clubs or organizations: Not on file     Relationship status: Not on file    Intimate partner violence     Fear of current or ex partner: Not on file     Emotionally abused: Not on file     Physically abused: Not on file     Forced sexual activity: Not on file   Other Topics Concern    Not on file   Social History Narrative    Caffeine use        Current Outpatient Medications:     ALPRAZolam (XANAX) 0 5 mg tablet, Take 1 tablet (0 5 mg total) by mouth daily at bedtime as needed for anxiety, Disp: 90 tablet, Rfl: 0    levothyroxine 88 mcg tablet, Take 1 tablet by mouth once daily, Disp: 90 tablet, Rfl: 1    lisinopril-hydrochlorothiazide (PRINZIDE,ZESTORETIC) 20-25 MG per tablet, Take 1 tablet by mouth daily, Disp: 90 tablet, Rfl: 1    mesalamine (CANASA) 1,000 mg suppository, as needed , Disp: , Rfl: 1    Multiple Vitamin (MULTI-VITAMIN DAILY PO), Take 1 tablet by mouth daily, Disp: , Rfl:     pravastatin (PRAVACHOL) 40 mg tablet, Take 1 tablet (40 mg total) by mouth daily, Disp: 90 tablet, Rfl: 1    triamcinolone (KENALOG) 0 1 % cream, Apply topically 2 (two) times a day (Patient taking differently: Apply topically as needed ), Disp: 454 g, Rfl: 5  No Known Allergies    Review of Systems:  Review of Systems   Constitutional: Negative  HENT: Negative  Eyes: Negative  Respiratory: Negative  Cardiovascular: Negative  Gastrointestinal: Positive for diarrhea (once in awhile)  Negative for blood in stool  Urgency with bowel movements with no improvement   Endocrine: Negative  Genitourinary: Negative  Negative for dysuria, frequency and urgency  Nocturia x 1-2, stream is fair   Musculoskeletal: Negative  Skin: Negative          eczema   Allergic/Immunologic: Negative  Neurological: Negative  Hematological: Negative  Psychiatric/Behavioral: Negative  Vitals:    04/07/21 1402   BP: 140/70   Pulse: (!) 51   Resp: 18   Temp: 97 6 °F (36 4 °C)   SpO2: 95%   Weight: 91 6 kg (201 lb 15 1 oz)   Height: 5' 8 5" (1 74 m)        Pain Score: 0-No pain      Imaging:No results found

## 2021-04-08 NOTE — PROGRESS NOTES
Follow-up - Radiation Oncology   Benji Mcgarry 1940 78 y o  male [id number]      History of Present Illness   Cancer Staging  See Below      REQUIRED DOCUMENTATION:     1  This service was provided via Telemedicine  2  Provider located at  [address] [place] [address] Way  3  TeleMed provider: Laura Rebolledo MD   4  Identify all parties in room with patient during tele consult:   the patient and myself  5  After connecting through [employer], patient was identified by name and date of birth and assistant checked wristband  Patient was then informed that this was a Telemedicine visit and that the exam was being conducted confidentially over secure lines  My office door was closed  No one else was in the room  Patient acknowledged consent and understanding of privacy and security of the Telemedicine visit, and gave us permission to have the assistant stay in the room in order to assist with the history and to conduct the exam   I informed the patient that I have reviewed their record in Epic and presented the opportunity for them to ask any questions regarding the visit today  The patient agreed to participate  It was my intent to perform this visit via video technology but the patient was not able to do a video connection so the visit was completed via audio telephone only  Benji Mcgarry is a 78y o  year old male with a history of recurrent prostate adenocarcinoma   He is status post prostatectomy in 1997  He developed 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 involving a right pelvic lymph node and within the bone  He completed radiation therapy to the whole pelvis including his right pelvic lymphadenopathy, prostate bed, and right acetabular region on September 25, 2018      He returns today for follow-up examination  He was last seen 8/27/20   He had a repeat colonoscopy November 26, 2019 and was found to have some radiation proctitis and a benign tubular adenoma  He had been on Metamucil powder twice a day and reported seeing a small amount of blood on average once a week  Interval History:   Lab Results  Component     Value   Date              PSA     <0 1     02/12/2021              PSA     <0 1     07/09/2020              PSA     <0 1     11/21/2019 9/30/20 [facility]  Radiation proctitis treating as needed with Metamucil and Canasa  Due to expense of Canasa, may check insurance for alternative when what he has runs out  Using one suppository per month due to having rectal bleeding  Will repeat colonoscopy only if needed for symptoms    He reports normal bowel movements with no diarrhea  He was seeing a small amount of blood when he wipes about once per week, but this has improved now to about once per month  Cony Cordon had a negative colonoscopy per his report 10 years ago  He had a repeat colonoscopy November 26, 2019 revealed 1 tubular adenoma and some radiation proctitis  Since he rarely sees any rectal bleeding, he rarely uses the Canasa suppositories usually 1 every 1-2 months  He does continue to take Metamucil powder twice a day  He has some bowel urgency at times  He has not been performing Kegel exercises and will start them  He reports a good urinary stream   He has stable nocturia x2  Cony Cordon is active and goes for a daily morning walk for 2 miles  He denies any right hip nor pelvic pains  Both the patient and his wife have had both of their COVID vaccines in February      Historical Information   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 ([facility] [address] )   1997 Initial Diagnosis    Prostate cancer     1997 Surgery    Prostatectomy in [place], [place] 6 disease     7/5/2018 Initial Diagnosis    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 ([address] )     8/2/2018 - 9/25/2018 Radiation    Treatment:  Course: C1 toPelvis, Rt acetabulum & prostate bed     Plan ID Energy Fractions Dose per Fraction (cGy) Dose Correction (cGy) Total Dose Delivered (cGy) Elapsed Days   CD P Bed 10X 12 / 12 180 0 2,160 15   WP_R Acetab 10X 25 / 25 180 0 4,500 36      Treatment dates:  C1: 8/2/2018 - 9/25/2018         Past Medical History:   Diagnosis Date    Cancer ([facility] )     Dementia ([place] [address] )     Disease of thyroid gland     Eczema     Generalized anxiety disorder     Hypertension     Prostate cancer ([facility] )     Prostate cancer metastatic to intrapelvic lymph node ([place] [address] )     Skin disorder     suspect benign, but will need removal and due to location, refer   Last assessed: April 18, 2013     Past Surgical History:   Procedure Laterality Date    CATARACT EXTRACTION      COLONOSCOPY      COLONOSCOPY  11/26/2019    EYE SURGERY      KNEE SURGERY      PROSTATE SURGERY      VASECTOMY       Social History   Social History     Substance and Sexual Activity   Alcohol Use No     Social History     Substance and Sexual Activity   Drug Use No    Comment: Chronic Narcotic use noted in "allscripts"      Social History     Tobacco Use   Smoking Status Former Smoker    Types: Cigars   Smokeless Tobacco Never Used   Tobacco Comment    smokes 1 cigar a couple times a month     Meds/Allergies     Current Outpatient Medications:     ALPRAZolam (XANAX) 0 5 mg tablet, Take 1 tablet (0 5 mg total) by mouth daily at bedtime as needed for anxiety, Disp: 90 tablet, Rfl: 0    levothyroxine 88 mcg tablet, Take 1 tablet by mouth once daily, Disp: 90 tablet, Rfl: 1    lisinopril-hydrochlorothiazide (PRINZIDE,ZESTORETIC) 20-25 MG per tablet, Take 1 tablet by mouth daily, Disp: 90 tablet, Rfl: 1    mesalamine (CANASA) 1,000 mg suppository, as needed , Disp: , Rfl: 1    Multiple Vitamin (MULTI-VITAMIN DAILY PO), Take 1 tablet by mouth daily, Disp: , Rfl:     pravastatin (PRAVACHOL) 40 mg tablet, Take 1 tablet (40 mg total) by mouth daily, Disp: 90 tablet, Rfl: 1    triamcinolone (KENALOG) 0 1 % cream, Apply topically 2 (two) times a day (Patient taking differently: Apply topically as needed ), Disp: 454 g, Rfl: 5  No Known Allergies    Review of Systems  Constitutional: Negative  HENT: Negative  Eyes: Negative  Respiratory: Negative  Cardiovascular: Negative  Gastrointestinal: Positive for diarrhea (once in awhile)  Negative for blood in stool  Urgency with bowel movements with no improvement   Endocrine: Negative  Genitourinary: Negative  Negative for dysuria, frequency and urgency  Nocturia x 1-2, stream is fair   Musculoskeletal: Negative  Skin: Negative    eczema   Allergic/Immunologic: Negative  Neurological: Negative  Hematological: Negative  Psychiatric/Behavioral: Negative  OBJECTIVE:   /70   Pulse (!) 51   Temp 97 6 °F (36 4 °C)   Resp 18   Ht 5' 8 5" (1 74 m)   Wt 91 6 kg (201 lb 15 1 oz)   SpO2 95%   BMI 30 26 kg/m²   Pain Assessment:  0  ECOG/Zubrod/WHO: 1 - Symptomatic but completely ambulatory    Physical Exam   Tele medicine phone follow-up, so no physical examination  RESULTS    Lab Results: No results found for this or any previous visit (from the past 672 hour(s))  Imaging Studies:No results found  Assessment/Plan:  Orders Placed This Encounter   Procedures    PSA Total, Diagnostic        Jeremiah Polanco is a 78y o  year old male with a history of recurrent prostate Krystina Fresh is status post prostatectomy in 1997 when he lived in [place] and did not require any postoperative radiation therapy  Ami Oseguera had a rising PSA level that went up to 1 4 in April 2018   Axumin PET-CT scan July 5, 2018 showed increased uptake in the right pelvic sidewall lymph node in addition the right posterior acetabulum consistent with metastatic disease  This was confirmed on subsequent bone scan performed July 17, 2018    He has a 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 involving a right pelvic lymph node and within the bone  Recommendations were made salvage radiation therapy to the whole pelvis including his right pelvic lymphadenopathy, prostate bed, and right acetabular region  Deepa Ochoa completed treatment on September 25, 2018 and returns today for follow-up examination       He is doing well  Deepa Ochoa has no clinical or biochemical evidence of any recurrent disease   PSA level was 0 4 NG/mL on January 17, 2019  PSA level decreased further to 0 2 NG/mL on May 16, 2019    His PSA level November 21, 2019, July 9, 2020, and February 12, 2021 was less than 0 1 NG/mL   We are pleased with his good clinical and biochemical response to treatment    He had a repeat colonoscopy November 26, 2019 and was found to have some radiation proctitis and a benign tubular adenoma  He has been on Metamucil powder twice a day and sees a small amount of blood on average once a  month which has improved  He rarely uses Canasa suppositories  He will continue on Metamucil twice a day   He is having some bowel urgency at times and recommended he start on Kegel exercises  He will return for follow-up in 6 months with a repeat PSA level  Bry Vale MD  4/8/2021,9:12 AM    Portions of the record may have been created with voice recognition software   Occasional wrong word or "sound a like" substitutions may have occurred due to the inherent limitations of voice recognition software   Read the chart carefully and recognize, using context, where substitutions have occurred

## 2021-04-29 DIAGNOSIS — F41.1 GENERALIZED ANXIETY DISORDER: ICD-10-CM

## 2021-04-29 DIAGNOSIS — E78.5 HYPERLIPIDEMIA, UNSPECIFIED HYPERLIPIDEMIA TYPE: ICD-10-CM

## 2021-04-29 DIAGNOSIS — I10 ESSENTIAL HYPERTENSION: ICD-10-CM

## 2021-04-29 RX ORDER — ALPRAZOLAM 0.5 MG/1
0.5 TABLET ORAL
Qty: 90 TABLET | Refills: 0 | Status: SHIPPED | OUTPATIENT
Start: 2021-04-29 | End: 2021-07-07 | Stop reason: SDUPTHER

## 2021-04-29 RX ORDER — LISINOPRIL AND HYDROCHLOROTHIAZIDE 25; 20 MG/1; MG/1
1 TABLET ORAL DAILY
Qty: 90 TABLET | Refills: 0 | Status: SHIPPED | OUTPATIENT
Start: 2021-04-29 | End: 2021-10-05 | Stop reason: SDUPTHER

## 2021-04-29 RX ORDER — PRAVASTATIN SODIUM 40 MG
40 TABLET ORAL DAILY
Qty: 90 TABLET | Refills: 0 | Status: SHIPPED | OUTPATIENT
Start: 2021-04-29 | End: 2021-10-05 | Stop reason: SDUPTHER

## 2021-05-19 ENCOUNTER — VBI (OUTPATIENT)
Dept: ADMINISTRATIVE | Facility: OTHER | Age: 81
End: 2021-05-19

## 2021-06-01 ENCOUNTER — RA CDI HCC (OUTPATIENT)
Dept: OTHER | Facility: HOSPITAL | Age: 81
End: 2021-06-01

## 2021-06-05 PROBLEM — N18.31 CHRONIC KIDNEY DISEASE, STAGE 3A (HCC): Status: ACTIVE | Noted: 2021-06-05

## 2021-07-07 DIAGNOSIS — F41.1 GENERALIZED ANXIETY DISORDER: ICD-10-CM

## 2021-07-07 RX ORDER — ALPRAZOLAM 0.5 MG/1
0.5 TABLET ORAL
Qty: 90 TABLET | Refills: 0 | Status: SHIPPED | OUTPATIENT
Start: 2021-07-07 | End: 2021-07-14

## 2021-07-14 ENCOUNTER — OFFICE VISIT (OUTPATIENT)
Dept: INTERNAL MEDICINE CLINIC | Facility: CLINIC | Age: 81
End: 2021-07-14
Payer: COMMERCIAL

## 2021-07-14 VITALS
SYSTOLIC BLOOD PRESSURE: 120 MMHG | DIASTOLIC BLOOD PRESSURE: 80 MMHG | HEART RATE: 67 BPM | TEMPERATURE: 98.3 F | BODY MASS INDEX: 29.36 KG/M2 | WEIGHT: 198.25 LBS | HEIGHT: 69 IN | OXYGEN SATURATION: 98 %

## 2021-07-14 DIAGNOSIS — E03.9 ACQUIRED HYPOTHYROIDISM: ICD-10-CM

## 2021-07-14 DIAGNOSIS — C61 MALIGNANT NEOPLASM OF PROSTATE METASTATIC TO INTRAPELVIC LYMPH NODE (HCC): ICD-10-CM

## 2021-07-14 DIAGNOSIS — I10 ESSENTIAL HYPERTENSION: Primary | ICD-10-CM

## 2021-07-14 DIAGNOSIS — F41.1 GENERALIZED ANXIETY DISORDER: ICD-10-CM

## 2021-07-14 DIAGNOSIS — C77.5 MALIGNANT NEOPLASM OF PROSTATE METASTATIC TO INTRAPELVIC LYMPH NODE (HCC): ICD-10-CM

## 2021-07-14 DIAGNOSIS — N18.31 CHRONIC KIDNEY DISEASE, STAGE 3A (HCC): ICD-10-CM

## 2021-07-14 DIAGNOSIS — E78.2 MIXED HYPERLIPIDEMIA: ICD-10-CM

## 2021-07-14 DIAGNOSIS — G47.9 SLEEP DISTURBANCE: ICD-10-CM

## 2021-07-14 DIAGNOSIS — M19.91 PRIMARY OSTEOARTHRITIS, UNSPECIFIED SITE: ICD-10-CM

## 2021-07-14 PROCEDURE — 3725F SCREEN DEPRESSION PERFORMED: CPT | Performed by: INTERNAL MEDICINE

## 2021-07-14 PROCEDURE — 99214 OFFICE O/P EST MOD 30 MIN: CPT | Performed by: INTERNAL MEDICINE

## 2021-07-14 PROCEDURE — 1036F TOBACCO NON-USER: CPT | Performed by: INTERNAL MEDICINE

## 2021-07-14 PROCEDURE — 1160F RVW MEDS BY RX/DR IN RCRD: CPT | Performed by: INTERNAL MEDICINE

## 2021-07-14 PROCEDURE — 3074F SYST BP LT 130 MM HG: CPT | Performed by: INTERNAL MEDICINE

## 2021-07-14 PROCEDURE — 3079F DIAST BP 80-89 MM HG: CPT | Performed by: INTERNAL MEDICINE

## 2021-07-14 RX ORDER — TEMAZEPAM 15 MG/1
15 CAPSULE ORAL
Qty: 30 CAPSULE | Refills: 0 | Status: SHIPPED | OUTPATIENT
Start: 2021-07-14 | End: 2021-08-18 | Stop reason: SDUPTHER

## 2021-07-14 NOTE — PATIENT INSTRUCTIONS

## 2021-07-14 NOTE — PROGRESS NOTES
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)    Relevant Orders    CBC and differential       Genitourinary    Chronic kidney disease, stage 3a (HCC)       Other    Hyperlipidemia    Generalized anxiety disorder    Relevant Medications    temazepam (RESTORIL) 15 mg capsule      Other Visit Diagnoses     Sleep disturbance        Relevant Medications    temazepam (RESTORIL) 15 mg capsule           Diagnoses and all orders for this visit:    Essential hypertension  -     Basic metabolic panel; Future  -     CBC and differential; Future  -     Microalbumin / creatinine urine ratio    Acquired hypothyroidism    Primary osteoarthritis, unspecified site    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)  -     CBC and differential; Future    Chronic kidney disease, stage 3a (HCC)    Generalized anxiety disorder    Mixed hyperlipidemia    Sleep disturbance  -     temazepam (RESTORIL) 15 mg capsule; Take 1 capsule (15 mg total) by mouth daily at bedtime as needed for sleep        No problem-specific Assessment & Plan notes found for this encounter  A/P: Doing well and will check labs  Discussed xanax not a good option for sleep and since he needs an increase, should stop it and will try restoril  Should consider trazodone or seroquel in the future  Continue current treatment and RTC four months for routine  Subjective:      Patient ID: Ronel Soto is a 78 y o  male  WM RTC for f/u htn, hyperlipidemia, etc  Doing well and no new issues  ,but reports only using benzo for sleep and now two tabs work better than the one    Remains active w/o difficulty and no falls  CHronic pain is controlled  Prostate cancer is in remission  NADIA is manageable  Due for labs  The following portions of the patient's history were reviewed and updated as appropriate:   He has a past medical history of Cancer ([facility] ), Chronic kidney disease, stage 3a ([facility] ) (6/5/2021), Dementia ([place] [address] ), Disease of thyroid gland, Eczema, Generalized anxiety disorder, Hypertension, Prostate cancer [facility]), Prostate cancer metastatic to intrapelvic lymph node ([place] [address] ), and Skin disorder  ,  does not have any pertinent problems on file  ,   has a past surgical history that includes Knee surgery; Prostate surgery; Vasectomy; Cataract extraction; Eye surgery; Colonoscopy; and Colonoscopy (11/26/2019)  ,  family history includes Alcohol abuse in his family, father, and mother; Colon cancer in his maternal aunt; Depression in his father; No Known Problems in his maternal grandfather, maternal grandmother, paternal grandfather, paternal grandmother, and sister; Stroke in his brother  ,   reports that he has quit smoking  His smoking use included cigars  He has never used smokeless tobacco  He reports that he does not drink alcohol and does not use drugs  ,  has No Known Allergies     Current Outpatient Medications   Medication Sig Dispense Refill    levothyroxine 88 mcg tablet Take 1 tablet by mouth once daily 90 tablet 1    lisinopril-hydrochlorothiazide (PRINZIDE,ZESTORETIC) 20-25 MG per tablet Take 1 tablet by mouth daily 90 tablet 0    mesalamine (CANASA) 1,000 mg suppository as needed   1    Multiple Vitamin (MULTI-VITAMIN DAILY PO) Take 1 tablet by mouth daily      pravastatin (PRAVACHOL) 40 mg tablet Take 1 tablet (40 mg total) by mouth daily 90 tablet 0    triamcinolone (KENALOG) 0 1 % cream Apply topically 2 (two) times a day (Patient taking differently: Apply topically as needed ) 454 g 5    temazepam (RESTORIL) 15 mg capsule Take 1 capsule (15 mg total) by mouth daily at bedtime as needed for sleep 30 capsule 0     No current facility-administered medications for this visit  Review of Systems   Constitutional: Negative for activity change, chills, diaphoresis, fatigue and fever  HENT: Negative  Eyes: Negative for visual disturbance  Respiratory: Negative for cough, chest tightness, shortness of breath and wheezing  Cardiovascular: Negative for chest pain, palpitations and leg swelling  Gastrointestinal: Negative for abdominal pain, constipation, diarrhea, nausea and vomiting  Endocrine: Negative for cold intolerance and heat intolerance  Genitourinary: Negative for difficulty urinating, dysuria and frequency  Musculoskeletal: Negative for arthralgias, gait problem and myalgias  Neurological: Negative for dizziness, seizures, syncope, weakness, light-headedness and headaches  Psychiatric/Behavioral: Negative for confusion, dysphoric mood and sleep disturbance  The patient is not nervous/anxious  PHQ-9 Depression Screening    PHQ-9:   Frequency of the following problems over the past two weeks:      Little interest or pleasure in doing things: 0 - not at all  Feeling down, depressed, or hopeless: 0 - not at all  PHQ-2 Score: 0        Objective:  Vitals:    07/14/21 1434   BP: 120/80   Pulse: 67   Temp: 98 3 °F (36 8 °C)   SpO2: 98%   Weight: 89 9 kg (198 lb 4 oz)   Height: 5' 8 5" (1 74 m)     Body mass index is 29 71 kg/m²  Physical Exam  Vitals and nursing note reviewed  Constitutional:       General: He is not in acute distress  Appearance: Normal appearance  He is not ill-appearing  HENT:      Head: Normocephalic and atraumatic  Mouth/Throat:      Mouth: Mucous membranes are moist    Eyes:      Extraocular Movements: Extraocular movements intact  Conjunctiva/sclera: Conjunctivae normal       Pupils: Pupils are equal, round, and reactive to light  Neck:      Vascular: No carotid bruit     Cardiovascular:      Rate and Rhythm: Normal rate and regular rhythm  Heart sounds: Normal heart sounds  Pulmonary:      Effort: Pulmonary effort is normal  No respiratory distress  Breath sounds: Normal breath sounds  No wheezing or rales  Abdominal:      General: Bowel sounds are normal  There is no distension  Palpations: Abdomen is soft  Tenderness: There is no abdominal tenderness  Musculoskeletal:      Cervical back: Neck supple  Right lower leg: No edema  Left lower leg: No edema  Neurological:      General: No focal deficit present  Mental Status: He is alert and oriented to person, place, and time  Mental status is at baseline  Psychiatric:         Mood and Affect: Mood normal          Behavior: Behavior normal          Thought Content:  Thought content normal          Judgment: Judgment normal

## 2021-08-18 DIAGNOSIS — G47.9 SLEEP DISTURBANCE: ICD-10-CM

## 2021-08-18 RX ORDER — TEMAZEPAM 15 MG/1
15 CAPSULE ORAL
Qty: 30 CAPSULE | Refills: 0 | Status: SHIPPED | OUTPATIENT
Start: 2021-08-18 | End: 2021-09-16 | Stop reason: SDUPTHER

## 2021-08-18 RX ORDER — TEMAZEPAM 15 MG/1
15 CAPSULE ORAL
Qty: 30 CAPSULE | Refills: 0 | Status: SHIPPED | OUTPATIENT
Start: 2021-08-18 | End: 2021-11-18

## 2021-08-18 NOTE — TELEPHONE ENCOUNTER
Pt needs refill on temazepam (RESTORIL) 15 mg capsule  Please send to [facility] on [address] in [place]  Pt is in [place] until October

## 2021-09-16 DIAGNOSIS — G47.9 SLEEP DISTURBANCE: ICD-10-CM

## 2021-09-16 RX ORDER — TEMAZEPAM 15 MG/1
15 CAPSULE ORAL
Qty: 30 CAPSULE | Refills: 0 | Status: SHIPPED | OUTPATIENT
Start: 2021-09-16 | End: 2021-10-26 | Stop reason: SDUPTHER

## 2021-10-05 DIAGNOSIS — E03.9 HYPOTHYROIDISM, UNSPECIFIED TYPE: ICD-10-CM

## 2021-10-05 DIAGNOSIS — I10 ESSENTIAL HYPERTENSION: ICD-10-CM

## 2021-10-05 DIAGNOSIS — E78.5 HYPERLIPIDEMIA, UNSPECIFIED HYPERLIPIDEMIA TYPE: ICD-10-CM

## 2021-10-07 RX ORDER — PRAVASTATIN SODIUM 40 MG
40 TABLET ORAL DAILY
Qty: 90 TABLET | Refills: 0 | Status: SHIPPED | OUTPATIENT
Start: 2021-10-07 | End: 2021-11-23 | Stop reason: ALTCHOICE

## 2021-10-07 RX ORDER — LISINOPRIL AND HYDROCHLOROTHIAZIDE 25; 20 MG/1; MG/1
1 TABLET ORAL DAILY
Qty: 90 TABLET | Refills: 0 | Status: SHIPPED | OUTPATIENT
Start: 2021-10-07 | End: 2022-01-19 | Stop reason: SDUPTHER

## 2021-10-07 RX ORDER — LEVOTHYROXINE SODIUM 88 UG/1
88 TABLET ORAL DAILY
Qty: 90 TABLET | Refills: 0 | Status: SHIPPED | OUTPATIENT
Start: 2021-10-07 | End: 2021-12-19 | Stop reason: SDUPTHER

## 2021-10-07 RX ORDER — LEVOTHYROXINE SODIUM 88 UG/1
TABLET ORAL
Qty: 90 TABLET | Refills: 0 | Status: SHIPPED | OUTPATIENT
Start: 2021-10-07 | End: 2021-11-18

## 2021-10-18 ENCOUNTER — RADIATION ONCOLOGY FOLLOW-UP (OUTPATIENT)
Dept: RADIATION ONCOLOGY | Facility: CLINIC | Age: 81
End: 2021-10-18
Attending: RADIOLOGY
Payer: COMMERCIAL

## 2021-10-18 ENCOUNTER — CLINICAL SUPPORT (OUTPATIENT)
Dept: RADIATION ONCOLOGY | Facility: CLINIC | Age: 81
End: 2021-10-18
Attending: RADIOLOGY

## 2021-10-18 VITALS
WEIGHT: 195.11 LBS | RESPIRATION RATE: 16 BRPM | DIASTOLIC BLOOD PRESSURE: 74 MMHG | OXYGEN SATURATION: 98 % | BODY MASS INDEX: 28.9 KG/M2 | TEMPERATURE: 98.6 F | HEIGHT: 69 IN | HEART RATE: 86 BPM | SYSTOLIC BLOOD PRESSURE: 150 MMHG

## 2021-10-18 DIAGNOSIS — C61 MALIGNANT NEOPLASM OF PROSTATE METASTATIC TO INTRAPELVIC LYMPH NODE (HCC): Primary | ICD-10-CM

## 2021-10-18 DIAGNOSIS — C77.5 MALIGNANT NEOPLASM OF PROSTATE METASTATIC TO INTRAPELVIC LYMPH NODE (HCC): Primary | ICD-10-CM

## 2021-10-18 PROCEDURE — 99213 OFFICE O/P EST LOW 20 MIN: CPT | Performed by: RADIOLOGY

## 2021-10-25 DIAGNOSIS — G47.9 SLEEP DISTURBANCE: ICD-10-CM

## 2021-10-26 DIAGNOSIS — G47.9 SLEEP DISTURBANCE: ICD-10-CM

## 2021-10-26 RX ORDER — TEMAZEPAM 15 MG/1
15 CAPSULE ORAL
Qty: 30 CAPSULE | Refills: 0 | Status: SHIPPED | OUTPATIENT
Start: 2021-10-26 | End: 2021-11-09 | Stop reason: HOSPADM

## 2021-11-06 ENCOUNTER — APPOINTMENT (EMERGENCY)
Dept: CT IMAGING | Facility: HOSPITAL | Age: 81
DRG: 177 | End: 2021-11-06
Payer: COMMERCIAL

## 2021-11-06 ENCOUNTER — HOSPITAL ENCOUNTER (INPATIENT)
Facility: HOSPITAL | Age: 81
LOS: 3 days | Discharge: HOME | DRG: 177 | End: 2021-11-09
Attending: EMERGENCY MEDICINE | Admitting: INTERNAL MEDICINE
Payer: COMMERCIAL

## 2021-11-06 DIAGNOSIS — E87.8 HYPOCHLOREMIA: ICD-10-CM

## 2021-11-06 DIAGNOSIS — R53.1 WEAKNESS: ICD-10-CM

## 2021-11-06 DIAGNOSIS — I25.10 CORONARY ARTERY CALCIFICATION SEEN ON CT SCAN: ICD-10-CM

## 2021-11-06 DIAGNOSIS — R19.7 DIARRHEA: ICD-10-CM

## 2021-11-06 DIAGNOSIS — R42 DIZZINESS: ICD-10-CM

## 2021-11-06 DIAGNOSIS — E86.0 DEHYDRATION: ICD-10-CM

## 2021-11-06 DIAGNOSIS — N17.9 AKI (ACUTE KIDNEY INJURY) (HCC): Primary | ICD-10-CM

## 2021-11-06 DIAGNOSIS — E87.1 HYPONATREMIA: ICD-10-CM

## 2021-11-06 PROBLEM — U07.1 COVID-19: Status: ACTIVE | Noted: 2021-11-06

## 2021-11-06 PROBLEM — R79.89 ELEVATED D-DIMER: Status: ACTIVE | Noted: 2021-11-06

## 2021-11-06 LAB
ALBUMIN SERPL BCP-MCNC: 3.6 G/DL (ref 3.5–5)
ALP SERPL-CCNC: 70 U/L (ref 46–116)
ALT SERPL W P-5'-P-CCNC: 51 U/L (ref 12–78)
ANION GAP SERPL CALCULATED.3IONS-SCNC: 12 MMOL/L (ref 4–13)
AST SERPL W P-5'-P-CCNC: 58 U/L (ref 5–45)
BASOPHILS # BLD AUTO: 0.01 THOUSANDS/ΜL (ref 0–0.1)
BASOPHILS NFR BLD AUTO: 0 % (ref 0–1)
BILIRUB SERPL-MCNC: 0.6 MG/DL (ref 0.2–1)
BUN SERPL-MCNC: 27 MG/DL (ref 5–25)
CALCIUM SERPL-MCNC: 8.4 MG/DL (ref 8.3–10.1)
CHLORIDE SERPL-SCNC: 91 MMOL/L (ref 100–108)
CO2 SERPL-SCNC: 27 MMOL/L (ref 21–32)
CREAT SERPL-MCNC: 1.41 MG/DL (ref 0.6–1.3)
D DIMER PPP FEU-MCNC: 0.86 UG/ML FEU
EOSINOPHIL # BLD AUTO: 0 THOUSAND/ΜL (ref 0–0.61)
EOSINOPHIL NFR BLD AUTO: 0 % (ref 0–6)
ERYTHROCYTE [DISTWIDTH] IN BLOOD BY AUTOMATED COUNT: 13.2 % (ref 11.6–15.1)
FLUAV RNA RESP QL NAA+PROBE: NEGATIVE
FLUBV RNA RESP QL NAA+PROBE: NEGATIVE
GFR SERPL CREATININE-BSD FRML MDRD: 46 ML/MIN/1.73SQ M
GLUCOSE SERPL-MCNC: 105 MG/DL (ref 65–140)
HCT VFR BLD AUTO: 42 % (ref 36.5–49.3)
HGB BLD-MCNC: 14.3 G/DL (ref 12–17)
IMM GRANULOCYTES # BLD AUTO: 0.02 THOUSAND/UL (ref 0–0.2)
IMM GRANULOCYTES NFR BLD AUTO: 0 % (ref 0–2)
LACTATE SERPL-SCNC: 1.2 MMOL/L (ref 0.5–2)
LIPASE SERPL-CCNC: 136 U/L (ref 73–393)
LYMPHOCYTES # BLD AUTO: 0.58 THOUSANDS/ΜL (ref 0.6–4.47)
LYMPHOCYTES NFR BLD AUTO: 9 % (ref 14–44)
MCH RBC QN AUTO: 31.2 PG (ref 26.8–34.3)
MCHC RBC AUTO-ENTMCNC: 34 G/DL (ref 31.4–37.4)
MCV RBC AUTO: 92 FL (ref 82–98)
MONOCYTES # BLD AUTO: 0.78 THOUSAND/ΜL (ref 0.17–1.22)
MONOCYTES NFR BLD AUTO: 12 % (ref 4–12)
NEUTROPHILS # BLD AUTO: 5.39 THOUSANDS/ΜL (ref 1.85–7.62)
NEUTS SEG NFR BLD AUTO: 79 % (ref 43–75)
NRBC BLD AUTO-RTO: 0 /100 WBCS
PLATELET # BLD AUTO: 193 THOUSANDS/UL (ref 149–390)
PMV BLD AUTO: 10.4 FL (ref 8.9–12.7)
POTASSIUM SERPL-SCNC: 3.8 MMOL/L (ref 3.5–5.3)
PROT SERPL-MCNC: 7.5 G/DL (ref 6.4–8.2)
RBC # BLD AUTO: 4.59 MILLION/UL (ref 3.88–5.62)
RSV RNA RESP QL NAA+PROBE: NEGATIVE
RV AG STL QL: NEGATIVE
SARS-COV-2 RNA RESP QL NAA+PROBE: POSITIVE
SODIUM SERPL-SCNC: 130 MMOL/L (ref 136–145)
TROPONIN I SERPL-MCNC: 0.02 NG/ML
TROPONIN I SERPL-MCNC: 0.04 NG/ML
WBC # BLD AUTO: 6.78 THOUSAND/UL (ref 4.31–10.16)

## 2021-11-06 PROCEDURE — 0241U HB NFCT DS VIR RESP RNA 4 TRGT: CPT | Performed by: INTERNAL MEDICINE

## 2021-11-06 PROCEDURE — 87493 C DIFF AMPLIFIED PROBE: CPT | Performed by: EMERGENCY MEDICINE

## 2021-11-06 PROCEDURE — 87505 NFCT AGENT DETECTION GI: CPT | Performed by: INTERNAL MEDICINE

## 2021-11-06 PROCEDURE — 99223 1ST HOSP IP/OBS HIGH 75: CPT | Performed by: INTERNAL MEDICINE

## 2021-11-06 PROCEDURE — 84484 ASSAY OF TROPONIN QUANT: CPT | Performed by: EMERGENCY MEDICINE

## 2021-11-06 PROCEDURE — 85025 COMPLETE CBC W/AUTO DIFF WBC: CPT | Performed by: EMERGENCY MEDICINE

## 2021-11-06 PROCEDURE — 83690 ASSAY OF LIPASE: CPT | Performed by: EMERGENCY MEDICINE

## 2021-11-06 PROCEDURE — 74177 CT ABD & PELVIS W/CONTRAST: CPT

## 2021-11-06 PROCEDURE — 89055 LEUKOCYTE ASSESSMENT FECAL: CPT | Performed by: INTERNAL MEDICINE

## 2021-11-06 PROCEDURE — 87209 SMEAR COMPLEX STAIN: CPT | Performed by: INTERNAL MEDICINE

## 2021-11-06 PROCEDURE — 70450 CT HEAD/BRAIN W/O DYE: CPT

## 2021-11-06 PROCEDURE — 96374 THER/PROPH/DIAG INJ IV PUSH: CPT

## 2021-11-06 PROCEDURE — 36415 COLL VENOUS BLD VENIPUNCTURE: CPT | Performed by: EMERGENCY MEDICINE

## 2021-11-06 PROCEDURE — 93005 ELECTROCARDIOGRAM TRACING: CPT

## 2021-11-06 PROCEDURE — 87177 OVA AND PARASITES SMEARS: CPT | Performed by: INTERNAL MEDICINE

## 2021-11-06 PROCEDURE — 80053 COMPREHEN METABOLIC PANEL: CPT | Performed by: EMERGENCY MEDICINE

## 2021-11-06 PROCEDURE — 99285 EMERGENCY DEPT VISIT HI MDM: CPT | Performed by: EMERGENCY MEDICINE

## 2021-11-06 PROCEDURE — 83605 ASSAY OF LACTIC ACID: CPT | Performed by: EMERGENCY MEDICINE

## 2021-11-06 PROCEDURE — XW033E5 INTRODUCTION OF REMDESIVIR ANTI-INFECTIVE INTO PERIPHERAL VEIN, PERCUTANEOUS APPROACH, NEW TECHNOLOGY GROUP 5: ICD-10-PCS | Performed by: INTERNAL MEDICINE

## 2021-11-06 PROCEDURE — 99285 EMERGENCY DEPT VISIT HI MDM: CPT

## 2021-11-06 PROCEDURE — 84484 ASSAY OF TROPONIN QUANT: CPT | Performed by: INTERNAL MEDICINE

## 2021-11-06 PROCEDURE — 85379 FIBRIN DEGRADATION QUANT: CPT | Performed by: INTERNAL MEDICINE

## 2021-11-06 PROCEDURE — 87329 GIARDIA AG IA: CPT | Performed by: INTERNAL MEDICINE

## 2021-11-06 PROCEDURE — 96361 HYDRATE IV INFUSION ADD-ON: CPT

## 2021-11-06 PROCEDURE — 87425 ROTAVIRUS AG IA: CPT | Performed by: INTERNAL MEDICINE

## 2021-11-06 RX ORDER — LEVOTHYROXINE SODIUM 88 UG/1
88 TABLET ORAL
Status: DISCONTINUED | OUTPATIENT
Start: 2021-11-07 | End: 2021-11-09 | Stop reason: HOSPADM

## 2021-11-06 RX ORDER — ONDANSETRON 2 MG/ML
4 INJECTION INTRAMUSCULAR; INTRAVENOUS EVERY 6 HOURS PRN
Status: DISCONTINUED | OUTPATIENT
Start: 2021-11-06 | End: 2021-11-06

## 2021-11-06 RX ORDER — ONDANSETRON 2 MG/ML
4 INJECTION INTRAMUSCULAR; INTRAVENOUS ONCE
Status: COMPLETED | OUTPATIENT
Start: 2021-11-06 | End: 2021-11-06

## 2021-11-06 RX ORDER — LOPERAMIDE HYDROCHLORIDE 2 MG/1
2 CAPSULE ORAL ONCE
Status: COMPLETED | OUTPATIENT
Start: 2021-11-07 | End: 2021-11-07

## 2021-11-06 RX ORDER — ONDANSETRON 2 MG/ML
4 INJECTION INTRAMUSCULAR; INTRAVENOUS EVERY 4 HOURS PRN
Status: DISCONTINUED | OUTPATIENT
Start: 2021-11-06 | End: 2021-11-06

## 2021-11-06 RX ORDER — PRAVASTATIN SODIUM 40 MG
40 TABLET ORAL DAILY
Status: DISCONTINUED | OUTPATIENT
Start: 2021-11-06 | End: 2021-11-07

## 2021-11-06 RX ORDER — GINSENG 100 MG
1 CAPSULE ORAL ONCE
Status: COMPLETED | OUTPATIENT
Start: 2021-11-06 | End: 2021-11-06

## 2021-11-06 RX ORDER — ONDANSETRON 2 MG/ML
4 INJECTION INTRAMUSCULAR; INTRAVENOUS EVERY 6 HOURS PRN
Status: DISCONTINUED | OUTPATIENT
Start: 2021-11-06 | End: 2021-11-09 | Stop reason: HOSPADM

## 2021-11-06 RX ORDER — TEMAZEPAM 15 MG/1
15 CAPSULE ORAL
Status: DISCONTINUED | OUTPATIENT
Start: 2021-11-06 | End: 2021-11-06

## 2021-11-06 RX ORDER — ONDANSETRON 4 MG/1
4 TABLET, ORALLY DISINTEGRATING ORAL EVERY 8 HOURS PRN
Status: DISCONTINUED | OUTPATIENT
Start: 2021-11-06 | End: 2021-11-06

## 2021-11-06 RX ORDER — SODIUM CHLORIDE 9 MG/ML
100 INJECTION, SOLUTION INTRAVENOUS CONTINUOUS
Status: DISCONTINUED | OUTPATIENT
Start: 2021-11-06 | End: 2021-11-07

## 2021-11-06 RX ORDER — ACETAMINOPHEN 325 MG/1
650 TABLET ORAL EVERY 6 HOURS PRN
Status: DISCONTINUED | OUTPATIENT
Start: 2021-11-06 | End: 2021-11-09 | Stop reason: HOSPADM

## 2021-11-06 RX ORDER — LEVOTHYROXINE SODIUM 88 UG/1
88 TABLET ORAL DAILY
Status: DISCONTINUED | OUTPATIENT
Start: 2021-11-07 | End: 2021-11-06

## 2021-11-06 RX ORDER — SACCHAROMYCES BOULARDII 250 MG
250 CAPSULE ORAL 2 TIMES DAILY
Status: DISCONTINUED | OUTPATIENT
Start: 2021-11-06 | End: 2021-11-09 | Stop reason: HOSPADM

## 2021-11-06 RX ORDER — AMLODIPINE BESYLATE 5 MG/1
5 TABLET ORAL DAILY
Status: DISCONTINUED | OUTPATIENT
Start: 2021-11-06 | End: 2021-11-09 | Stop reason: HOSPADM

## 2021-11-06 RX ADMIN — AMLODIPINE BESYLATE 5 MG: 5 TABLET ORAL at 16:37

## 2021-11-06 RX ADMIN — PRAVASTATIN SODIUM 40 MG: 40 TABLET ORAL at 16:37

## 2021-11-06 RX ADMIN — ENOXAPARIN SODIUM 30 MG: 30 INJECTION SUBCUTANEOUS at 22:00

## 2021-11-06 RX ADMIN — MULTIPLE VITAMINS W/ MINERALS TAB 1 TABLET: TAB at 16:37

## 2021-11-06 RX ADMIN — SODIUM CHLORIDE 100 ML/HR: 0.9 INJECTION, SOLUTION INTRAVENOUS at 11:32

## 2021-11-06 RX ADMIN — Medication 250 MG: at 17:23

## 2021-11-07 ENCOUNTER — APPOINTMENT (INPATIENT)
Dept: CT IMAGING | Facility: HOSPITAL | Age: 81
DRG: 177 | End: 2021-11-07
Payer: COMMERCIAL

## 2021-11-07 PROBLEM — I25.10 CORONARY ARTERY CALCIFICATION SEEN ON CT SCAN: Status: ACTIVE | Noted: 2021-11-07

## 2021-11-07 PROBLEM — M47.9 DEGENERATIVE ARTHRITIS OF SPINE: Status: ACTIVE | Noted: 2021-11-07

## 2021-11-07 PROBLEM — J12.82 PNEUMONIA DUE TO COVID-19 VIRUS: Status: ACTIVE | Noted: 2021-11-07

## 2021-11-07 PROBLEM — R50.9 FEVER: Status: ACTIVE | Noted: 2021-11-07

## 2021-11-07 PROBLEM — R77.8 ELEVATED TROPONIN LEVEL: Status: ACTIVE | Noted: 2021-11-07

## 2021-11-07 PROBLEM — R59.1 LYMPHADENOPATHY: Status: ACTIVE | Noted: 2021-11-07

## 2021-11-07 PROBLEM — R79.89 ELEVATED TROPONIN LEVEL: Status: ACTIVE | Noted: 2021-11-07

## 2021-11-07 PROBLEM — R74.01 TRANSAMINITIS: Status: ACTIVE | Noted: 2021-11-07

## 2021-11-07 PROBLEM — U07.1 PNEUMONIA DUE TO COVID-19 VIRUS: Status: ACTIVE | Noted: 2021-11-07

## 2021-11-07 LAB
ALBUMIN SERPL BCP-MCNC: 3 G/DL (ref 3.5–5)
ALP SERPL-CCNC: 61 U/L (ref 46–116)
ALT SERPL W P-5'-P-CCNC: 46 U/L (ref 12–78)
ANION GAP SERPL CALCULATED.3IONS-SCNC: 10 MMOL/L (ref 4–13)
AST SERPL W P-5'-P-CCNC: 58 U/L (ref 5–45)
BACTERIA UR QL AUTO: ABNORMAL /HPF
BASOPHILS # BLD AUTO: 0.01 THOUSANDS/ΜL (ref 0–0.1)
BASOPHILS NFR BLD AUTO: 0 % (ref 0–1)
BILIRUB SERPL-MCNC: 0.46 MG/DL (ref 0.2–1)
BILIRUB UR QL STRIP: NEGATIVE
BUN SERPL-MCNC: 24 MG/DL (ref 5–25)
C DIFF TOX GENS STL QL NAA+PROBE: NEGATIVE
CALCIUM ALBUM COR SERPL-MCNC: 8.7 MG/DL (ref 8.3–10.1)
CALCIUM SERPL-MCNC: 7.9 MG/DL (ref 8.3–10.1)
CAMPYLOBACTER DNA SPEC NAA+PROBE: NORMAL
CHLORIDE SERPL-SCNC: 95 MMOL/L (ref 100–108)
CLARITY UR: CLEAR
CO2 SERPL-SCNC: 24 MMOL/L (ref 21–32)
COLOR UR: YELLOW
CREAT SERPL-MCNC: 1.18 MG/DL (ref 0.6–1.3)
CRP SERPL QL: 129.8 MG/L
D DIMER PPP FEU-MCNC: 0.86 UG/ML FEU
EOSINOPHIL # BLD AUTO: 0 THOUSAND/ΜL (ref 0–0.61)
EOSINOPHIL NFR BLD AUTO: 0 % (ref 0–6)
ERYTHROCYTE [DISTWIDTH] IN BLOOD BY AUTOMATED COUNT: 13 % (ref 11.6–15.1)
GFR SERPL CREATININE-BSD FRML MDRD: 57 ML/MIN/1.73SQ M
GLUCOSE SERPL-MCNC: 92 MG/DL (ref 65–140)
GLUCOSE UR STRIP-MCNC: NEGATIVE MG/DL
HCT VFR BLD AUTO: 37.7 % (ref 36.5–49.3)
HGB BLD-MCNC: 12.8 G/DL (ref 12–17)
HGB UR QL STRIP.AUTO: ABNORMAL
IMM GRANULOCYTES # BLD AUTO: 0.03 THOUSAND/UL (ref 0–0.2)
IMM GRANULOCYTES NFR BLD AUTO: 0 % (ref 0–2)
KETONES UR STRIP-MCNC: NEGATIVE MG/DL
LACTATE SERPL-SCNC: 0.9 MMOL/L (ref 0.5–2)
LEUKOCYTE ESTERASE UR QL STRIP: NEGATIVE
LYMPHOCYTES # BLD AUTO: 0.78 THOUSANDS/ΜL (ref 0.6–4.47)
LYMPHOCYTES NFR BLD AUTO: 11 % (ref 14–44)
MAGNESIUM SERPL-MCNC: 2.2 MG/DL (ref 1.6–2.6)
MCH RBC QN AUTO: 30.5 PG (ref 26.8–34.3)
MCHC RBC AUTO-ENTMCNC: 34 G/DL (ref 31.4–37.4)
MCV RBC AUTO: 90 FL (ref 82–98)
MONOCYTES # BLD AUTO: 0.7 THOUSAND/ΜL (ref 0.17–1.22)
MONOCYTES NFR BLD AUTO: 10 % (ref 4–12)
NEUTROPHILS # BLD AUTO: 5.36 THOUSANDS/ΜL (ref 1.85–7.62)
NEUTS SEG NFR BLD AUTO: 79 % (ref 43–75)
NITRITE UR QL STRIP: NEGATIVE
NON-SQ EPI CELLS URNS QL MICRO: ABNORMAL /HPF
NRBC BLD AUTO-RTO: 0 /100 WBCS
NT-PROBNP SERPL-MCNC: 359 PG/ML
PH UR STRIP.AUTO: 6 [PH]
PHOSPHATE SERPL-MCNC: 3.2 MG/DL (ref 2.3–4.1)
PLATELET # BLD AUTO: 156 THOUSANDS/UL (ref 149–390)
PMV BLD AUTO: 10.2 FL (ref 8.9–12.7)
POTASSIUM SERPL-SCNC: 3.6 MMOL/L (ref 3.5–5.3)
PROCALCITONIN SERPL-MCNC: <0.05 NG/ML
PROT SERPL-MCNC: 6.4 G/DL (ref 6.4–8.2)
PROT UR STRIP-MCNC: NEGATIVE MG/DL
RBC # BLD AUTO: 4.19 MILLION/UL (ref 3.88–5.62)
RBC #/AREA URNS AUTO: ABNORMAL /HPF
SALMONELLA DNA SPEC QL NAA+PROBE: NORMAL
SHIGA TOXIN STX GENE SPEC NAA+PROBE: NORMAL
SHIGELLA DNA SPEC QL NAA+PROBE: NORMAL
SODIUM SERPL-SCNC: 129 MMOL/L (ref 136–145)
SP GR UR STRIP.AUTO: 1.01 (ref 1–1.03)
TROPONIN I SERPL-MCNC: 0.05 NG/ML
TSH SERPL DL<=0.05 MIU/L-ACNC: 0.9 UIU/ML (ref 0.36–3.74)
UROBILINOGEN UR QL STRIP.AUTO: 0.2 E.U./DL
WBC # BLD AUTO: 6.88 THOUSAND/UL (ref 4.31–10.16)
WBC #/AREA URNS AUTO: ABNORMAL /HPF

## 2021-11-07 PROCEDURE — 86140 C-REACTIVE PROTEIN: CPT | Performed by: INTERNAL MEDICINE

## 2021-11-07 PROCEDURE — 80053 COMPREHEN METABOLIC PANEL: CPT | Performed by: INTERNAL MEDICINE

## 2021-11-07 PROCEDURE — 71275 CT ANGIOGRAPHY CHEST: CPT

## 2021-11-07 PROCEDURE — 84100 ASSAY OF PHOSPHORUS: CPT | Performed by: INTERNAL MEDICINE

## 2021-11-07 PROCEDURE — 84443 ASSAY THYROID STIM HORMONE: CPT

## 2021-11-07 PROCEDURE — 84484 ASSAY OF TROPONIN QUANT: CPT | Performed by: INTERNAL MEDICINE

## 2021-11-07 PROCEDURE — 85379 FIBRIN DEGRADATION QUANT: CPT | Performed by: INTERNAL MEDICINE

## 2021-11-07 PROCEDURE — 83605 ASSAY OF LACTIC ACID: CPT | Performed by: INTERNAL MEDICINE

## 2021-11-07 PROCEDURE — 87040 BLOOD CULTURE FOR BACTERIA: CPT | Performed by: INTERNAL MEDICINE

## 2021-11-07 PROCEDURE — 87086 URINE CULTURE/COLONY COUNT: CPT | Performed by: INTERNAL MEDICINE

## 2021-11-07 PROCEDURE — G1004 CDSM NDSC: HCPCS

## 2021-11-07 PROCEDURE — 99232 SBSQ HOSP IP/OBS MODERATE 35: CPT | Performed by: INTERNAL MEDICINE

## 2021-11-07 PROCEDURE — 85025 COMPLETE CBC W/AUTO DIFF WBC: CPT

## 2021-11-07 PROCEDURE — 83880 ASSAY OF NATRIURETIC PEPTIDE: CPT | Performed by: INTERNAL MEDICINE

## 2021-11-07 PROCEDURE — 81001 URINALYSIS AUTO W/SCOPE: CPT | Performed by: INTERNAL MEDICINE

## 2021-11-07 PROCEDURE — 84145 PROCALCITONIN (PCT): CPT | Performed by: INTERNAL MEDICINE

## 2021-11-07 PROCEDURE — 83735 ASSAY OF MAGNESIUM: CPT | Performed by: INTERNAL MEDICINE

## 2021-11-07 RX ORDER — DEXAMETHASONE SODIUM PHOSPHATE 4 MG/ML
6 INJECTION, SOLUTION INTRA-ARTICULAR; INTRALESIONAL; INTRAMUSCULAR; INTRAVENOUS; SOFT TISSUE EVERY 24 HOURS
Status: DISCONTINUED | OUTPATIENT
Start: 2021-11-07 | End: 2021-11-09 | Stop reason: HOSPADM

## 2021-11-07 RX ORDER — SODIUM CHLORIDE, SODIUM GLUCONATE, SODIUM ACETATE, POTASSIUM CHLORIDE, MAGNESIUM CHLORIDE, SODIUM PHOSPHATE, DIBASIC, AND POTASSIUM PHOSPHATE .53; .5; .37; .037; .03; .012; .00082 G/100ML; G/100ML; G/100ML; G/100ML; G/100ML; G/100ML; G/100ML
75 INJECTION, SOLUTION INTRAVENOUS CONTINUOUS
Status: DISCONTINUED | OUTPATIENT
Start: 2021-11-07 | End: 2021-11-09 | Stop reason: HOSPADM

## 2021-11-07 RX ORDER — POTASSIUM CHLORIDE 20 MEQ/1
40 TABLET, EXTENDED RELEASE ORAL ONCE
Status: COMPLETED | OUTPATIENT
Start: 2021-11-07 | End: 2021-11-07

## 2021-11-07 RX ORDER — DIPHENOXYLATE HYDROCHLORIDE AND ATROPINE SULFATE 2.5; .025 MG/1; MG/1
1 TABLET ORAL 2 TIMES DAILY
Status: DISCONTINUED | OUTPATIENT
Start: 2021-11-07 | End: 2021-11-09 | Stop reason: HOSPADM

## 2021-11-07 RX ADMIN — AMLODIPINE BESYLATE 5 MG: 5 TABLET ORAL at 11:57

## 2021-11-07 RX ADMIN — ENOXAPARIN SODIUM 30 MG: 30 INJECTION SUBCUTANEOUS at 22:45

## 2021-11-07 RX ADMIN — POTASSIUM CHLORIDE 40 MEQ: 1500 TABLET, EXTENDED RELEASE ORAL at 11:56

## 2021-11-07 RX ADMIN — Medication 250 MG: at 17:09

## 2021-11-07 RX ADMIN — ENOXAPARIN SODIUM 30 MG: 30 INJECTION SUBCUTANEOUS at 11:58

## 2021-11-07 RX ADMIN — SODIUM CHLORIDE, SODIUM GLUCONATE, SODIUM ACETATE, POTASSIUM CHLORIDE, MAGNESIUM CHLORIDE, SODIUM PHOSPHATE, DIBASIC, AND POTASSIUM PHOSPHATE 75 ML/HR: .53; .5; .37; .037; .03; .012; .00082 INJECTION, SOLUTION INTRAVENOUS at 12:00

## 2021-11-07 RX ADMIN — SODIUM CHLORIDE 100 ML/HR: 0.9 INJECTION, SOLUTION INTRAVENOUS at 03:15

## 2021-11-07 RX ADMIN — REMDESIVIR 100 MG: 100 INJECTION, POWDER, LYOPHILIZED, FOR SOLUTION INTRAVENOUS at 17:09

## 2021-11-07 RX ADMIN — Medication 250 MG: at 11:56

## 2021-11-07 RX ADMIN — IOHEXOL 100 ML: 350 INJECTION, SOLUTION INTRAVENOUS at 10:41

## 2021-11-07 RX ADMIN — MULTIPLE VITAMINS W/ MINERALS TAB 1 TABLET: TAB at 11:57

## 2021-11-07 RX ADMIN — LOPERAMIDE HYDROCHLORIDE 2 MG: 2 CAPSULE ORAL at 11:57

## 2021-11-07 RX ADMIN — DIPHENOXYLATE HYDROCHLORIDE AND ATROPINE SULFATE 1 TABLET: 2.5; .025 TABLET ORAL at 17:09

## 2021-11-07 RX ADMIN — LEVOTHYROXINE SODIUM 88 MCG: 88 TABLET ORAL at 05:20

## 2021-11-07 RX ADMIN — DEXAMETHASONE SODIUM PHOSPHATE 6 MG: 4 INJECTION, SOLUTION INTRAMUSCULAR; INTRAVENOUS at 11:58

## 2021-11-08 PROBLEM — I5A NONISCHEMIC NONTRAUMATIC MYOCARDIAL INJURY: Status: ACTIVE | Noted: 2021-11-08

## 2021-11-08 PROBLEM — E87.1 HYPONATREMIA: Status: ACTIVE | Noted: 2021-11-08

## 2021-11-08 LAB
ALBUMIN SERPL BCP-MCNC: 2.9 G/DL (ref 3.5–5)
ALP SERPL-CCNC: 58 U/L (ref 46–116)
ALT SERPL W P-5'-P-CCNC: 43 U/L (ref 12–78)
ANION GAP SERPL CALCULATED.3IONS-SCNC: 10 MMOL/L (ref 4–13)
AST SERPL W P-5'-P-CCNC: 42 U/L (ref 5–45)
BACTERIA UR CULT: NORMAL
BASOPHILS # BLD AUTO: 0 THOUSANDS/ΜL (ref 0–0.1)
BASOPHILS NFR BLD AUTO: 0 % (ref 0–1)
BILIRUB SERPL-MCNC: 0.37 MG/DL (ref 0.2–1)
BUN SERPL-MCNC: 21 MG/DL (ref 5–25)
CA-I BLD-SCNC: 1.09 MMOL/L (ref 1.12–1.32)
CALCIUM ALBUM COR SERPL-MCNC: 9.1 MG/DL (ref 8.3–10.1)
CALCIUM SERPL-MCNC: 8.2 MG/DL (ref 8.3–10.1)
CHLORIDE SERPL-SCNC: 100 MMOL/L (ref 100–108)
CK MB SERPL-MCNC: 2 % (ref 0–2.5)
CK MB SERPL-MCNC: 7.4 NG/ML (ref 0–5)
CK SERPL-CCNC: 366 U/L (ref 39–308)
CO2 SERPL-SCNC: 25 MMOL/L (ref 21–32)
CREAT SERPL-MCNC: 1.16 MG/DL (ref 0.6–1.3)
CRP SERPL QL: 115.4 MG/L
D DIMER PPP FEU-MCNC: 0.84 UG/ML FEU
EOSINOPHIL # BLD AUTO: 0 THOUSAND/ΜL (ref 0–0.61)
EOSINOPHIL NFR BLD AUTO: 0 % (ref 0–6)
ERYTHROCYTE [DISTWIDTH] IN BLOOD BY AUTOMATED COUNT: 13 % (ref 11.6–15.1)
GFR SERPL CREATININE-BSD FRML MDRD: 58 ML/MIN/1.73SQ M
GLUCOSE SERPL-MCNC: 130 MG/DL (ref 65–140)
HAV IGM SER QL: NORMAL
HBV CORE IGM SER QL: NORMAL
HBV SURFACE AG SER QL: NORMAL
HCT VFR BLD AUTO: 38.2 % (ref 36.5–49.3)
HCV AB SER QL: NORMAL
HGB BLD-MCNC: 13.1 G/DL (ref 12–17)
IMM GRANULOCYTES # BLD AUTO: 0.01 THOUSAND/UL (ref 0–0.2)
IMM GRANULOCYTES NFR BLD AUTO: 0 % (ref 0–2)
LACTATE SERPL-SCNC: 1.3 MMOL/L (ref 0.5–2)
LYMPHOCYTES # BLD AUTO: 0.4 THOUSANDS/ΜL (ref 0.6–4.47)
LYMPHOCYTES NFR BLD AUTO: 12 % (ref 14–44)
MAGNESIUM SERPL-MCNC: 2.6 MG/DL (ref 1.6–2.6)
MCH RBC QN AUTO: 31 PG (ref 26.8–34.3)
MCHC RBC AUTO-ENTMCNC: 34.3 G/DL (ref 31.4–37.4)
MCV RBC AUTO: 90 FL (ref 82–98)
MONOCYTES # BLD AUTO: 0.33 THOUSAND/ΜL (ref 0.17–1.22)
MONOCYTES NFR BLD AUTO: 10 % (ref 4–12)
NEUTROPHILS # BLD AUTO: 2.66 THOUSANDS/ΜL (ref 1.85–7.62)
NEUTS SEG NFR BLD AUTO: 78 % (ref 43–75)
NRBC BLD AUTO-RTO: 0 /100 WBCS
PHOSPHATE SERPL-MCNC: 3.4 MG/DL (ref 2.3–4.1)
PLATELET # BLD AUTO: 193 THOUSANDS/UL (ref 149–390)
PMV BLD AUTO: 10.3 FL (ref 8.9–12.7)
POTASSIUM SERPL-SCNC: 4.4 MMOL/L (ref 3.5–5.3)
PROCALCITONIN SERPL-MCNC: <0.05 NG/ML
PROT SERPL-MCNC: 6.6 G/DL (ref 6.4–8.2)
RBC # BLD AUTO: 4.23 MILLION/UL (ref 3.88–5.62)
SODIUM SERPL-SCNC: 135 MMOL/L (ref 136–145)
TROPONIN I SERPL-MCNC: 0.02 NG/ML
WBC # BLD AUTO: 3.4 THOUSAND/UL (ref 4.31–10.16)

## 2021-11-08 PROCEDURE — 84484 ASSAY OF TROPONIN QUANT: CPT | Performed by: INTERNAL MEDICINE

## 2021-11-08 PROCEDURE — 83735 ASSAY OF MAGNESIUM: CPT | Performed by: INTERNAL MEDICINE

## 2021-11-08 PROCEDURE — 99232 SBSQ HOSP IP/OBS MODERATE 35: CPT | Performed by: FAMILY MEDICINE

## 2021-11-08 PROCEDURE — 82553 CREATINE MB FRACTION: CPT | Performed by: INTERNAL MEDICINE

## 2021-11-08 PROCEDURE — 82330 ASSAY OF CALCIUM: CPT | Performed by: INTERNAL MEDICINE

## 2021-11-08 PROCEDURE — 82550 ASSAY OF CK (CPK): CPT | Performed by: INTERNAL MEDICINE

## 2021-11-08 PROCEDURE — 86140 C-REACTIVE PROTEIN: CPT | Performed by: INTERNAL MEDICINE

## 2021-11-08 PROCEDURE — 80074 ACUTE HEPATITIS PANEL: CPT | Performed by: INTERNAL MEDICINE

## 2021-11-08 PROCEDURE — 85379 FIBRIN DEGRADATION QUANT: CPT | Performed by: INTERNAL MEDICINE

## 2021-11-08 PROCEDURE — 80053 COMPREHEN METABOLIC PANEL: CPT | Performed by: INTERNAL MEDICINE

## 2021-11-08 PROCEDURE — 84100 ASSAY OF PHOSPHORUS: CPT | Performed by: INTERNAL MEDICINE

## 2021-11-08 PROCEDURE — 84145 PROCALCITONIN (PCT): CPT | Performed by: INTERNAL MEDICINE

## 2021-11-08 PROCEDURE — 85025 COMPLETE CBC W/AUTO DIFF WBC: CPT | Performed by: INTERNAL MEDICINE

## 2021-11-08 PROCEDURE — 83605 ASSAY OF LACTIC ACID: CPT | Performed by: INTERNAL MEDICINE

## 2021-11-08 RX ADMIN — DIPHENOXYLATE HYDROCHLORIDE AND ATROPINE SULFATE 1 TABLET: 2.5; .025 TABLET ORAL at 10:30

## 2021-11-08 RX ADMIN — Medication 250 MG: at 18:14

## 2021-11-08 RX ADMIN — MULTIPLE VITAMINS W/ MINERALS TAB 1 TABLET: TAB at 10:30

## 2021-11-08 RX ADMIN — SODIUM CHLORIDE, SODIUM GLUCONATE, SODIUM ACETATE, POTASSIUM CHLORIDE, MAGNESIUM CHLORIDE, SODIUM PHOSPHATE, DIBASIC, AND POTASSIUM PHOSPHATE 75 ML/HR: .53; .5; .37; .037; .03; .012; .00082 INJECTION, SOLUTION INTRAVENOUS at 00:15

## 2021-11-08 RX ADMIN — LEVOTHYROXINE SODIUM 88 MCG: 88 TABLET ORAL at 05:31

## 2021-11-08 RX ADMIN — DEXAMETHASONE SODIUM PHOSPHATE 6 MG: 4 INJECTION, SOLUTION INTRAMUSCULAR; INTRAVENOUS at 10:30

## 2021-11-08 RX ADMIN — ENOXAPARIN SODIUM 30 MG: 30 INJECTION SUBCUTANEOUS at 10:30

## 2021-11-08 RX ADMIN — REMDESIVIR 100 MG: 100 INJECTION, POWDER, LYOPHILIZED, FOR SOLUTION INTRAVENOUS at 18:14

## 2021-11-08 RX ADMIN — DIPHENOXYLATE HYDROCHLORIDE AND ATROPINE SULFATE 1 TABLET: 2.5; .025 TABLET ORAL at 18:14

## 2021-11-08 RX ADMIN — Medication 250 MG: at 10:30

## 2021-11-08 RX ADMIN — AMLODIPINE BESYLATE 5 MG: 5 TABLET ORAL at 10:30

## 2021-11-08 RX ADMIN — ENOXAPARIN SODIUM 30 MG: 30 INJECTION SUBCUTANEOUS at 20:05

## 2021-11-09 VITALS
HEART RATE: 56 BPM | HEIGHT: 68 IN | WEIGHT: 190.7 LBS | OXYGEN SATURATION: 97 % | BODY MASS INDEX: 28.9 KG/M2 | DIASTOLIC BLOOD PRESSURE: 96 MMHG | RESPIRATION RATE: 16 BRPM | SYSTOLIC BLOOD PRESSURE: 112 MMHG | TEMPERATURE: 97.9 F

## 2021-11-09 LAB
ANION GAP SERPL CALCULATED.3IONS-SCNC: 7 MMOL/L (ref 4–13)
ATRIAL RATE: 57 BPM
BASOPHILS # BLD AUTO: 0.01 THOUSANDS/ΜL (ref 0–0.1)
BASOPHILS NFR BLD AUTO: 0 % (ref 0–1)
BUN SERPL-MCNC: 22 MG/DL (ref 5–25)
CALCIUM SERPL-MCNC: 8 MG/DL (ref 8.3–10.1)
CHLORIDE SERPL-SCNC: 103 MMOL/L (ref 100–108)
CO2 SERPL-SCNC: 26 MMOL/L (ref 21–32)
CREAT SERPL-MCNC: 1.02 MG/DL (ref 0.6–1.3)
EOSINOPHIL # BLD AUTO: 0 THOUSAND/ΜL (ref 0–0.61)
EOSINOPHIL NFR BLD AUTO: 0 % (ref 0–6)
ERYTHROCYTE [DISTWIDTH] IN BLOOD BY AUTOMATED COUNT: 13 % (ref 11.6–15.1)
GFR SERPL CREATININE-BSD FRML MDRD: 68 ML/MIN/1.73SQ M
GLUCOSE SERPL-MCNC: 139 MG/DL (ref 65–140)
HCT VFR BLD AUTO: 36 % (ref 36.5–49.3)
HGB BLD-MCNC: 12.1 G/DL (ref 12–17)
IMM GRANULOCYTES # BLD AUTO: 0.03 THOUSAND/UL (ref 0–0.2)
IMM GRANULOCYTES NFR BLD AUTO: 0 % (ref 0–2)
LYMPHOCYTES # BLD AUTO: 0.4 THOUSANDS/ΜL (ref 0.6–4.47)
LYMPHOCYTES NFR BLD AUTO: 5 % (ref 14–44)
MCH RBC QN AUTO: 30.3 PG (ref 26.8–34.3)
MCHC RBC AUTO-ENTMCNC: 33.6 G/DL (ref 31.4–37.4)
MCV RBC AUTO: 90 FL (ref 82–98)
MONOCYTES # BLD AUTO: 0.45 THOUSAND/ΜL (ref 0.17–1.22)
MONOCYTES NFR BLD AUTO: 6 % (ref 4–12)
NEUTROPHILS # BLD AUTO: 6.51 THOUSANDS/ΜL (ref 1.85–7.62)
NEUTS SEG NFR BLD AUTO: 89 % (ref 43–75)
NRBC BLD AUTO-RTO: 0 /100 WBCS
P AXIS: 41 DEGREES
PLATELET # BLD AUTO: 224 THOUSANDS/UL (ref 149–390)
PMV BLD AUTO: 10.2 FL (ref 8.9–12.7)
POTASSIUM SERPL-SCNC: 4.1 MMOL/L (ref 3.5–5.3)
PR INTERVAL: 170 MS
QRS AXIS: -27 DEGREES
QRSD INTERVAL: 102 MS
QT INTERVAL: 416 MS
QTC INTERVAL: 404 MS
RBC # BLD AUTO: 3.99 MILLION/UL (ref 3.88–5.62)
SODIUM SERPL-SCNC: 136 MMOL/L (ref 136–145)
T WAVE AXIS: 24 DEGREES
VENTRICULAR RATE: 57 BPM
WBC # BLD AUTO: 7.4 THOUSAND/UL (ref 4.31–10.16)

## 2021-11-09 PROCEDURE — RECHECK: Performed by: FAMILY MEDICINE

## 2021-11-09 PROCEDURE — 93010 ELECTROCARDIOGRAM REPORT: CPT | Performed by: INTERNAL MEDICINE

## 2021-11-09 PROCEDURE — 99239 HOSP IP/OBS DSCHRG MGMT >30: CPT | Performed by: FAMILY MEDICINE

## 2021-11-09 PROCEDURE — 97163 PT EVAL HIGH COMPLEX 45 MIN: CPT

## 2021-11-09 PROCEDURE — 97166 OT EVAL MOD COMPLEX 45 MIN: CPT

## 2021-11-09 PROCEDURE — 80048 BASIC METABOLIC PNL TOTAL CA: CPT

## 2021-11-09 PROCEDURE — 85025 COMPLETE CBC W/AUTO DIFF WBC: CPT

## 2021-11-09 RX ADMIN — LEVOTHYROXINE SODIUM 88 MCG: 88 TABLET ORAL at 05:23

## 2021-11-09 RX ADMIN — AMLODIPINE BESYLATE 5 MG: 5 TABLET ORAL at 08:02

## 2021-11-09 RX ADMIN — SODIUM CHLORIDE, SODIUM GLUCONATE, SODIUM ACETATE, POTASSIUM CHLORIDE, MAGNESIUM CHLORIDE, SODIUM PHOSPHATE, DIBASIC, AND POTASSIUM PHOSPHATE 75 ML/HR: .53; .5; .37; .037; .03; .012; .00082 INJECTION, SOLUTION INTRAVENOUS at 02:09

## 2021-11-09 RX ADMIN — MULTIPLE VITAMINS W/ MINERALS TAB 1 TABLET: TAB at 08:03

## 2021-11-09 RX ADMIN — DEXAMETHASONE SODIUM PHOSPHATE 6 MG: 4 INJECTION, SOLUTION INTRAMUSCULAR; INTRAVENOUS at 08:02

## 2021-11-09 RX ADMIN — ENOXAPARIN SODIUM 30 MG: 30 INJECTION SUBCUTANEOUS at 08:03

## 2021-11-09 RX ADMIN — DIPHENOXYLATE HYDROCHLORIDE AND ATROPINE SULFATE 1 TABLET: 2.5; .025 TABLET ORAL at 08:02

## 2021-11-09 RX ADMIN — Medication 250 MG: at 08:03

## 2021-11-10 ENCOUNTER — TRANSITIONAL CARE MANAGEMENT (OUTPATIENT)
Dept: INTERNAL MEDICINE CLINIC | Facility: CLINIC | Age: 81
End: 2021-11-10

## 2021-11-11 LAB
G LAMBLIA AG STL QL IA: NEGATIVE
O+P STL CONC: NORMAL

## 2021-11-12 LAB
BACTERIA BLD CULT: NORMAL
BACTERIA BLD CULT: NORMAL
WBC SPEC QL GRAM STN: NORMAL

## 2021-11-16 ENCOUNTER — TELEMEDICINE (OUTPATIENT)
Dept: INTERNAL MEDICINE CLINIC | Facility: CLINIC | Age: 81
End: 2021-11-16
Payer: COMMERCIAL

## 2021-11-16 DIAGNOSIS — N18.31 CHRONIC KIDNEY DISEASE, STAGE 3A (HCC): ICD-10-CM

## 2021-11-16 DIAGNOSIS — R79.89 ELEVATED D-DIMER: ICD-10-CM

## 2021-11-16 DIAGNOSIS — U07.1 PNEUMONIA DUE TO COVID-19 VIRUS: Primary | ICD-10-CM

## 2021-11-16 DIAGNOSIS — I10 PRIMARY HYPERTENSION: ICD-10-CM

## 2021-11-16 DIAGNOSIS — E83.51 HYPOCALCEMIA: ICD-10-CM

## 2021-11-16 DIAGNOSIS — R19.7 DIARRHEA, UNSPECIFIED TYPE: ICD-10-CM

## 2021-11-16 DIAGNOSIS — I5A NONISCHEMIC NONTRAUMATIC MYOCARDIAL INJURY: ICD-10-CM

## 2021-11-16 DIAGNOSIS — E87.1 HYPONATREMIA: ICD-10-CM

## 2021-11-16 DIAGNOSIS — R74.01 TRANSAMINITIS: ICD-10-CM

## 2021-11-16 DIAGNOSIS — J12.82 PNEUMONIA DUE TO COVID-19 VIRUS: Primary | ICD-10-CM

## 2021-11-16 DIAGNOSIS — R59.1 LYMPHADENOPATHY: ICD-10-CM

## 2021-11-16 PROBLEM — R77.8 ELEVATED TROPONIN LEVEL: Status: RESOLVED | Noted: 2021-11-07 | Resolved: 2021-11-16

## 2021-11-16 PROBLEM — R50.9 FEVER: Status: RESOLVED | Noted: 2021-11-07 | Resolved: 2021-11-16

## 2021-11-16 PROCEDURE — 1111F DSCHRG MED/CURRENT MED MERGE: CPT | Performed by: INTERNAL MEDICINE

## 2021-11-16 PROCEDURE — 99496 TRANSJ CARE MGMT HIGH F2F 7D: CPT | Performed by: INTERNAL MEDICINE

## 2021-11-18 ENCOUNTER — TELEMEDICINE (OUTPATIENT)
Dept: INTERNAL MEDICINE CLINIC | Facility: CLINIC | Age: 81
End: 2021-11-18
Payer: COMMERCIAL

## 2021-11-18 VITALS — WEIGHT: 185 LBS | HEIGHT: 68 IN | BODY MASS INDEX: 28.04 KG/M2

## 2021-11-18 DIAGNOSIS — U07.1 PNEUMONIA DUE TO COVID-19 VIRUS: Primary | ICD-10-CM

## 2021-11-18 DIAGNOSIS — J12.82 PNEUMONIA DUE TO COVID-19 VIRUS: Primary | ICD-10-CM

## 2021-11-18 PROCEDURE — 99213 OFFICE O/P EST LOW 20 MIN: CPT | Performed by: INTERNAL MEDICINE

## 2021-11-18 PROCEDURE — 1111F DSCHRG MED/CURRENT MED MERGE: CPT | Performed by: INTERNAL MEDICINE

## 2021-11-23 ENCOUNTER — OFFICE VISIT (OUTPATIENT)
Dept: CARDIOLOGY CLINIC | Facility: CLINIC | Age: 81
End: 2021-11-23
Payer: COMMERCIAL

## 2021-11-23 VITALS
DIASTOLIC BLOOD PRESSURE: 72 MMHG | HEART RATE: 68 BPM | WEIGHT: 189 LBS | BODY MASS INDEX: 28.64 KG/M2 | SYSTOLIC BLOOD PRESSURE: 124 MMHG | HEIGHT: 68 IN

## 2021-11-23 DIAGNOSIS — I25.10 CORONARY ARTERY DISEASE INVOLVING NATIVE CORONARY ARTERY OF NATIVE HEART, UNSPECIFIED WHETHER ANGINA PRESENT: ICD-10-CM

## 2021-11-23 DIAGNOSIS — E78.5 HYPERLIPIDEMIA, UNSPECIFIED HYPERLIPIDEMIA TYPE: ICD-10-CM

## 2021-11-23 DIAGNOSIS — I10 PRIMARY HYPERTENSION: ICD-10-CM

## 2021-11-23 DIAGNOSIS — I25.10 CORONARY ARTERY CALCIFICATION SEEN ON CT SCAN: Primary | ICD-10-CM

## 2021-11-23 DIAGNOSIS — U07.1 COVID-19: ICD-10-CM

## 2021-11-23 PROCEDURE — 1111F DSCHRG MED/CURRENT MED MERGE: CPT | Performed by: INTERNAL MEDICINE

## 2021-11-23 PROCEDURE — 1160F RVW MEDS BY RX/DR IN RCRD: CPT | Performed by: INTERNAL MEDICINE

## 2021-11-23 PROCEDURE — 1036F TOBACCO NON-USER: CPT | Performed by: INTERNAL MEDICINE

## 2021-11-23 PROCEDURE — 99204 OFFICE O/P NEW MOD 45 MIN: CPT | Performed by: INTERNAL MEDICINE

## 2021-11-23 PROCEDURE — 3074F SYST BP LT 130 MM HG: CPT | Performed by: INTERNAL MEDICINE

## 2021-11-23 PROCEDURE — 3078F DIAST BP <80 MM HG: CPT | Performed by: INTERNAL MEDICINE

## 2021-11-23 RX ORDER — ATORVASTATIN CALCIUM 40 MG/1
40 TABLET, FILM COATED ORAL DAILY
Qty: 30 TABLET | Refills: 6 | Status: SHIPPED | OUTPATIENT
Start: 2021-11-23 | End: 2022-03-05 | Stop reason: SDUPTHER

## 2021-11-26 DIAGNOSIS — G47.9 SLEEP DISTURBANCE: Primary | ICD-10-CM

## 2021-11-26 RX ORDER — TEMAZEPAM 15 MG/1
15 CAPSULE ORAL
Qty: 30 CAPSULE | Refills: 0 | Status: SHIPPED | OUTPATIENT
Start: 2021-11-26 | End: 2021-12-19 | Stop reason: SDUPTHER

## 2021-12-13 ENCOUNTER — APPOINTMENT (OUTPATIENT)
Dept: LAB | Facility: CLINIC | Age: 81
End: 2021-12-13
Payer: COMMERCIAL

## 2021-12-13 DIAGNOSIS — C77.5 MALIGNANT NEOPLASM OF PROSTATE METASTATIC TO INTRAPELVIC LYMPH NODE (HCC): ICD-10-CM

## 2021-12-13 DIAGNOSIS — I10 ESSENTIAL HYPERTENSION: ICD-10-CM

## 2021-12-13 DIAGNOSIS — C61 MALIGNANT NEOPLASM OF PROSTATE METASTATIC TO INTRAPELVIC LYMPH NODE (HCC): ICD-10-CM

## 2021-12-13 LAB
ANION GAP SERPL CALCULATED.3IONS-SCNC: 3 MMOL/L (ref 4–13)
BASOPHILS # BLD AUTO: 0.05 THOUSANDS/ΜL (ref 0–0.1)
BASOPHILS NFR BLD AUTO: 1 % (ref 0–1)
BUN SERPL-MCNC: 20 MG/DL (ref 5–25)
CALCIUM SERPL-MCNC: 9.8 MG/DL (ref 8.3–10.1)
CHLORIDE SERPL-SCNC: 108 MMOL/L (ref 100–108)
CO2 SERPL-SCNC: 28 MMOL/L (ref 21–32)
CREAT SERPL-MCNC: 1.13 MG/DL (ref 0.6–1.3)
CREAT UR-MCNC: 77.6 MG/DL
EOSINOPHIL # BLD AUTO: 0.11 THOUSAND/ΜL (ref 0–0.61)
EOSINOPHIL NFR BLD AUTO: 1 % (ref 0–6)
ERYTHROCYTE [DISTWIDTH] IN BLOOD BY AUTOMATED COUNT: 14.5 % (ref 11.6–15.1)
GFR SERPL CREATININE-BSD FRML MDRD: 60 ML/MIN/1.73SQ M
GLUCOSE P FAST SERPL-MCNC: 113 MG/DL (ref 65–99)
HCT VFR BLD AUTO: 43.1 % (ref 36.5–49.3)
HGB BLD-MCNC: 14 G/DL (ref 12–17)
IMM GRANULOCYTES # BLD AUTO: 0.02 THOUSAND/UL (ref 0–0.2)
IMM GRANULOCYTES NFR BLD AUTO: 0 % (ref 0–2)
LYMPHOCYTES # BLD AUTO: 1.18 THOUSANDS/ΜL (ref 0.6–4.47)
LYMPHOCYTES NFR BLD AUTO: 14 % (ref 14–44)
MCH RBC QN AUTO: 31.3 PG (ref 26.8–34.3)
MCHC RBC AUTO-ENTMCNC: 32.5 G/DL (ref 31.4–37.4)
MCV RBC AUTO: 96 FL (ref 82–98)
MICROALBUMIN UR-MCNC: 7.5 MG/L (ref 0–20)
MICROALBUMIN/CREAT 24H UR: 10 MG/G CREATININE (ref 0–30)
MONOCYTES # BLD AUTO: 0.67 THOUSAND/ΜL (ref 0.17–1.22)
MONOCYTES NFR BLD AUTO: 8 % (ref 4–12)
NEUTROPHILS # BLD AUTO: 6.66 THOUSANDS/ΜL (ref 1.85–7.62)
NEUTS SEG NFR BLD AUTO: 76 % (ref 43–75)
NRBC BLD AUTO-RTO: 0 /100 WBCS
PLATELET # BLD AUTO: 276 THOUSANDS/UL (ref 149–390)
PMV BLD AUTO: 10.7 FL (ref 8.9–12.7)
POTASSIUM SERPL-SCNC: 4.6 MMOL/L (ref 3.5–5.3)
PSA SERPL-MCNC: <0.1 NG/ML (ref 0–4)
RBC # BLD AUTO: 4.48 MILLION/UL (ref 3.88–5.62)
SODIUM SERPL-SCNC: 139 MMOL/L (ref 136–145)
WBC # BLD AUTO: 8.69 THOUSAND/UL (ref 4.31–10.16)

## 2021-12-13 PROCEDURE — 36415 COLL VENOUS BLD VENIPUNCTURE: CPT

## 2021-12-13 PROCEDURE — 82043 UR ALBUMIN QUANTITATIVE: CPT | Performed by: INTERNAL MEDICINE

## 2021-12-13 PROCEDURE — 84153 ASSAY OF PSA TOTAL: CPT

## 2021-12-13 PROCEDURE — 85025 COMPLETE CBC W/AUTO DIFF WBC: CPT

## 2021-12-13 PROCEDURE — 82570 ASSAY OF URINE CREATININE: CPT | Performed by: INTERNAL MEDICINE

## 2021-12-13 PROCEDURE — 80048 BASIC METABOLIC PNL TOTAL CA: CPT

## 2021-12-15 PROBLEM — M25.519 CHRONIC SHOULDER PAIN: Status: RESOLVED | Noted: 2017-02-21 | Resolved: 2021-12-15

## 2021-12-15 PROBLEM — G89.29 CHRONIC SHOULDER PAIN: Status: RESOLVED | Noted: 2017-02-21 | Resolved: 2021-12-15

## 2021-12-15 PROBLEM — R42 DIZZINESS, NONSPECIFIC: Status: RESOLVED | Noted: 2021-11-06 | Resolved: 2021-12-15

## 2021-12-19 DIAGNOSIS — G47.9 SLEEP DISTURBANCE: ICD-10-CM

## 2021-12-19 DIAGNOSIS — E03.9 HYPOTHYROIDISM, UNSPECIFIED TYPE: ICD-10-CM

## 2021-12-20 ENCOUNTER — OFFICE VISIT (OUTPATIENT)
Dept: INTERNAL MEDICINE CLINIC | Facility: CLINIC | Age: 81
End: 2021-12-20
Payer: COMMERCIAL

## 2021-12-20 VITALS
TEMPERATURE: 97.8 F | SYSTOLIC BLOOD PRESSURE: 120 MMHG | BODY MASS INDEX: 28.34 KG/M2 | OXYGEN SATURATION: 94 % | HEART RATE: 60 BPM | HEIGHT: 68 IN | WEIGHT: 187 LBS | DIASTOLIC BLOOD PRESSURE: 70 MMHG

## 2021-12-20 DIAGNOSIS — E78.5 HYPERLIPIDEMIA, UNSPECIFIED HYPERLIPIDEMIA TYPE: ICD-10-CM

## 2021-12-20 DIAGNOSIS — I25.10 CORONARY ARTERY CALCIFICATION SEEN ON CT SCAN: ICD-10-CM

## 2021-12-20 DIAGNOSIS — C77.5 MALIGNANT NEOPLASM OF PROSTATE METASTATIC TO INTRAPELVIC LYMPH NODE (HCC): ICD-10-CM

## 2021-12-20 DIAGNOSIS — N18.31 CHRONIC KIDNEY DISEASE, STAGE 3A (HCC): ICD-10-CM

## 2021-12-20 DIAGNOSIS — Z23 ENCOUNTER FOR VACCINATION: ICD-10-CM

## 2021-12-20 DIAGNOSIS — M19.91 PRIMARY OSTEOARTHRITIS, UNSPECIFIED SITE: ICD-10-CM

## 2021-12-20 DIAGNOSIS — I10 PRIMARY HYPERTENSION: Primary | ICD-10-CM

## 2021-12-20 DIAGNOSIS — E03.9 HYPOTHYROIDISM, UNSPECIFIED TYPE: ICD-10-CM

## 2021-12-20 DIAGNOSIS — F41.1 GENERALIZED ANXIETY DISORDER: ICD-10-CM

## 2021-12-20 DIAGNOSIS — C61 MALIGNANT NEOPLASM OF PROSTATE METASTATIC TO INTRAPELVIC LYMPH NODE (HCC): ICD-10-CM

## 2021-12-20 PROBLEM — Z86.16 HISTORY OF COVID-19: Status: ACTIVE | Noted: 2021-11-06

## 2021-12-20 PROCEDURE — 3074F SYST BP LT 130 MM HG: CPT | Performed by: INTERNAL MEDICINE

## 2021-12-20 PROCEDURE — 1160F RVW MEDS BY RX/DR IN RCRD: CPT | Performed by: INTERNAL MEDICINE

## 2021-12-20 PROCEDURE — 1036F TOBACCO NON-USER: CPT | Performed by: INTERNAL MEDICINE

## 2021-12-20 PROCEDURE — 3078F DIAST BP <80 MM HG: CPT | Performed by: INTERNAL MEDICINE

## 2021-12-20 PROCEDURE — 99214 OFFICE O/P EST MOD 30 MIN: CPT | Performed by: INTERNAL MEDICINE

## 2021-12-20 RX ORDER — LEVOTHYROXINE SODIUM 88 UG/1
88 TABLET ORAL DAILY
Qty: 90 TABLET | Refills: 0 | Status: SHIPPED | OUTPATIENT
Start: 2021-12-20 | End: 2022-03-17 | Stop reason: SDUPTHER

## 2021-12-20 RX ORDER — TEMAZEPAM 15 MG/1
15 CAPSULE ORAL
Qty: 30 CAPSULE | Refills: 0 | Status: SHIPPED | OUTPATIENT
Start: 2021-12-20 | End: 2022-01-19 | Stop reason: SDUPTHER

## 2022-01-19 DIAGNOSIS — I10 ESSENTIAL HYPERTENSION: ICD-10-CM

## 2022-01-19 DIAGNOSIS — G47.9 SLEEP DISTURBANCE: ICD-10-CM

## 2022-01-19 RX ORDER — LISINOPRIL AND HYDROCHLOROTHIAZIDE 25; 20 MG/1; MG/1
1 TABLET ORAL DAILY
Qty: 90 TABLET | Refills: 0 | Status: SHIPPED | OUTPATIENT
Start: 2022-01-19 | End: 2022-04-18 | Stop reason: SDUPTHER

## 2022-01-19 RX ORDER — TEMAZEPAM 15 MG/1
15 CAPSULE ORAL
Qty: 30 CAPSULE | Refills: 0 | Status: SHIPPED | OUTPATIENT
Start: 2022-01-19 | End: 2022-02-21 | Stop reason: SDUPTHER

## 2022-02-21 DIAGNOSIS — G47.9 SLEEP DISTURBANCE: ICD-10-CM

## 2022-02-22 RX ORDER — TEMAZEPAM 15 MG/1
15 CAPSULE ORAL
Qty: 30 CAPSULE | Refills: 0 | Status: SHIPPED | OUTPATIENT
Start: 2022-02-22 | End: 2022-03-17 | Stop reason: SDUPTHER

## 2022-03-05 DIAGNOSIS — I25.10 CORONARY ARTERY CALCIFICATION SEEN ON CT SCAN: ICD-10-CM

## 2022-03-07 RX ORDER — ATORVASTATIN CALCIUM 40 MG/1
40 TABLET, FILM COATED ORAL DAILY
Qty: 90 TABLET | Refills: 3 | Status: SHIPPED | OUTPATIENT
Start: 2022-03-07

## 2022-03-17 DIAGNOSIS — E03.9 HYPOTHYROIDISM, UNSPECIFIED TYPE: ICD-10-CM

## 2022-03-17 DIAGNOSIS — G47.9 SLEEP DISTURBANCE: ICD-10-CM

## 2022-03-17 RX ORDER — LEVOTHYROXINE SODIUM 88 UG/1
88 TABLET ORAL DAILY
Qty: 90 TABLET | Refills: 0 | Status: SHIPPED | OUTPATIENT
Start: 2022-03-17 | End: 2022-06-14 | Stop reason: SDUPTHER

## 2022-03-17 RX ORDER — TEMAZEPAM 15 MG/1
15 CAPSULE ORAL
Qty: 30 CAPSULE | Refills: 0 | Status: SHIPPED | OUTPATIENT
Start: 2022-03-17 | End: 2022-04-18 | Stop reason: SDUPTHER

## 2022-04-05 DIAGNOSIS — R23.8 SKIN IRRITATION: ICD-10-CM

## 2022-04-05 DIAGNOSIS — D22.9 NUMEROUS MOLES: Primary | ICD-10-CM

## 2022-04-14 ENCOUNTER — RA CDI HCC (OUTPATIENT)
Dept: OTHER | Facility: HOSPITAL | Age: 82
End: 2022-04-14

## 2022-04-14 NOTE — PROGRESS NOTES
[facility]  coding opportunities       Chart reviewed, no opportunity found:   [address]        Patients Insurance     Medicare Insurance: [employer]

## 2022-04-18 DIAGNOSIS — I10 ESSENTIAL HYPERTENSION: ICD-10-CM

## 2022-04-18 DIAGNOSIS — G47.9 SLEEP DISTURBANCE: ICD-10-CM

## 2022-04-18 RX ORDER — LISINOPRIL AND HYDROCHLOROTHIAZIDE 25; 20 MG/1; MG/1
1 TABLET ORAL DAILY
Qty: 90 TABLET | Refills: 0 | Status: SHIPPED | OUTPATIENT
Start: 2022-04-18 | End: 2022-07-13 | Stop reason: SDUPTHER

## 2022-04-18 RX ORDER — TEMAZEPAM 15 MG/1
15 CAPSULE ORAL
Qty: 30 CAPSULE | Refills: 0 | Status: SHIPPED | OUTPATIENT
Start: 2022-04-18 | End: 2022-04-20

## 2022-04-20 ENCOUNTER — OFFICE VISIT (OUTPATIENT)
Dept: INTERNAL MEDICINE CLINIC | Facility: CLINIC | Age: 82
End: 2022-04-20
Payer: COMMERCIAL

## 2022-04-20 VITALS
BODY MASS INDEX: 30.08 KG/M2 | HEIGHT: 68 IN | HEART RATE: 64 BPM | TEMPERATURE: 98.4 F | WEIGHT: 198.5 LBS | OXYGEN SATURATION: 94 % | SYSTOLIC BLOOD PRESSURE: 120 MMHG | DIASTOLIC BLOOD PRESSURE: 80 MMHG

## 2022-04-20 DIAGNOSIS — M19.91 PRIMARY OSTEOARTHRITIS, UNSPECIFIED SITE: ICD-10-CM

## 2022-04-20 DIAGNOSIS — N18.31 CHRONIC KIDNEY DISEASE, STAGE 3A (HCC): ICD-10-CM

## 2022-04-20 DIAGNOSIS — C61 MALIGNANT NEOPLASM OF PROSTATE METASTATIC TO INTRAPELVIC LYMPH NODE (HCC): ICD-10-CM

## 2022-04-20 DIAGNOSIS — E03.9 HYPOTHYROIDISM, UNSPECIFIED TYPE: Primary | ICD-10-CM

## 2022-04-20 DIAGNOSIS — E78.5 HYPERLIPIDEMIA, UNSPECIFIED HYPERLIPIDEMIA TYPE: ICD-10-CM

## 2022-04-20 DIAGNOSIS — I25.10 CORONARY ARTERY CALCIFICATION SEEN ON CT SCAN: ICD-10-CM

## 2022-04-20 DIAGNOSIS — C77.5 MALIGNANT NEOPLASM OF PROSTATE METASTATIC TO INTRAPELVIC LYMPH NODE (HCC): ICD-10-CM

## 2022-04-20 DIAGNOSIS — Z00.00 MEDICARE ANNUAL WELLNESS VISIT, SUBSEQUENT: ICD-10-CM

## 2022-04-20 DIAGNOSIS — F41.1 GENERALIZED ANXIETY DISORDER: ICD-10-CM

## 2022-04-20 PROCEDURE — 3725F SCREEN DEPRESSION PERFORMED: CPT | Performed by: INTERNAL MEDICINE

## 2022-04-20 PROCEDURE — 3074F SYST BP LT 130 MM HG: CPT | Performed by: INTERNAL MEDICINE

## 2022-04-20 PROCEDURE — 3079F DIAST BP 80-89 MM HG: CPT | Performed by: INTERNAL MEDICINE

## 2022-04-20 PROCEDURE — 1036F TOBACCO NON-USER: CPT | Performed by: INTERNAL MEDICINE

## 2022-04-20 PROCEDURE — 1170F FXNL STATUS ASSESSED: CPT | Performed by: INTERNAL MEDICINE

## 2022-04-20 PROCEDURE — 3288F FALL RISK ASSESSMENT DOCD: CPT | Performed by: INTERNAL MEDICINE

## 2022-04-20 PROCEDURE — 1160F RVW MEDS BY RX/DR IN RCRD: CPT | Performed by: INTERNAL MEDICINE

## 2022-04-20 PROCEDURE — 1125F AMNT PAIN NOTED PAIN PRSNT: CPT | Performed by: INTERNAL MEDICINE

## 2022-04-20 PROCEDURE — G0439 PPPS, SUBSEQ VISIT: HCPCS | Performed by: INTERNAL MEDICINE

## 2022-04-20 PROCEDURE — 99214 OFFICE O/P EST MOD 30 MIN: CPT | Performed by: INTERNAL MEDICINE

## 2022-04-20 RX ORDER — ALPRAZOLAM 0.5 MG/1
0.5 TABLET ORAL
Qty: 30 TABLET | Refills: 0 | Status: SHIPPED | OUTPATIENT
Start: 2022-04-20 | End: 2022-05-18 | Stop reason: SDUPTHER

## 2022-04-20 NOTE — PROGRESS NOTES
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)       Genitourinary    Chronic kidney disease, stage 3a (HCC)       Other    Hyperlipidemia    Relevant Orders    Lipid Panel with Direct LDL reflex    Generalized anxiety disorder    Relevant Medications    ALPRAZolam (XANAX) 0 5 mg tablet    Other Relevant Orders    [facility] All Prescribed Meds    Amphetamines, Methamphetamines    Butalbital    Phenobarbital    Secobarbital    Temazepam    Alprazolam    Clonazepam    Diazepam    Lorazepam    Oxazepam    Gabapentin    Pregabalin    Cocaine    Heroin    Buprenorphine    Levorphanol    Meperidine    Naltrexone    Fentanyl    Methadone    Oxycodone    Oxymorphone    Tapentadol    Tramadol    Codeine, Hydrocodone, Hydropmorphone, Morphine    Bath Salts    Kratom    Spice    Methylphenidate    Phentermine    Validity Oxidant    Validity Creatinine    Validity pH    Validity Specific      Other Visit Diagnoses     Medicare annual wellness visit, subsequent               Preventive health issues were discussed with patient, and age appropriate screening tests were ordered as noted in patient's After Visit Summary  Personalized health advice and appropriate referrals for health education or preventive services given if needed, as noted in patient's After Visit Summary       History of Present Illness:     Patient presents for Medicare Annual Wellness visit    Patient Care Team:  Cris Mcadams DO as PCP - General (Internal Medicine)  Cris Mcadams DO as PCP - [address] [place] [address] (RT)  Cris Mcadams DO as PCP - [facility] (RTE)  Gerardo Nelson MD (Radiation Oncology)  Danyell Beasley MD (Urology)     Problem List:     Patient Active Problem List   Diagnosis    Vesicular palmoplantar eczema    Eczema    Generalized anxiety disorder    Hyperlipidemia    Hypertension    Hypothyroidism    Osteoarthritis    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 ([facility]    History of radiation therapy    Chronic kidney disease, stage 3a ([facility]    History of COVID-19    Degenerative arthritis of spine    Coronary artery calcification seen on CT scan      Past Medical and Surgical History:     Past Medical History:   Diagnosis Date    Cancer [facility])     Chronic kidney disease, stage 3a ([facility] ) 06/05/2021    Coronary artery calcification seen on CT scan 11/07/2021    COVID-19 11/06/2021    Dementia ([place] [id number] )     Disease of thyroid gland     Eczema     Generalized anxiety disorder     Hypertension     [facility]     Prostate cancer metastatic to intrapelvic lymph node ([facility] )     Skin disorder     suspect benign, but will need removal and due to location, refer   Last assessed: April 18, 2013     Past Surgical History:   Procedure Laterality Date    CATARACT EXTRACTION      COLONOSCOPY      COLONOSCOPY  11/26/2019    EYE SURGERY      KNEE SURGERY      PROSTATE SURGERY      VASECTOMY        Family History:     Family History   Problem Relation Age of Onset    Alcohol abuse Mother     Alcohol abuse Father     Depression Father     No Known Problems Sister     Stroke Brother         syndrome     No Known Problems Maternal Grandmother     No Known Problems Maternal Grandfather     No Known Problems Paternal Grandmother     No Known Problems Paternal Grandfather     Alcohol abuse Family     Colon cancer Maternal Aunt       Social History:     Social History     Socioeconomic History    Marital status: /Civil Union     Spouse name: None    Number of children: None    Years of education: None    Highest education level: None   Occupational History    Occupation: self employed  floor covering   Tobacco Use    Smoking status: Former Smoker     Types: Cigars    Smokeless tobacco: Never Used    Tobacco comment: smokes 1 cigar a couple times a month   Vaping Use    Vaping Use: Never used   Substance and Sexual Activity    Alcohol use: No    Drug use: No     Comment: Chronic Narcotic use noted in "allscripts"     Sexual activity: None   Other Topics Concern    None   Social History Narrative    Caffeine use      Social Determinants of Health     Financial Resource Strain: Not on file   Food Insecurity: No Food Insecurity    Worried About Running Out of Food in the Last Year: Never true    Suad of Food in the Last Year: Never true   Transportation Needs: No Transportation Needs    Lack of Transportation (Medical): No    Lack of Transportation (Non-Medical):  No   Physical Activity: Not on file   Stress: Not on file   Social Connections: Not on file   Intimate Partner Violence: Not on file   Housing Stability: Low Risk     Unable to Pay for Housing in the Last Year: No    Number of Places Lived in the Last Year: 1    Unstable Housing in the Last Year: No      Medications and Allergies:     Current Outpatient Medications   Medication Sig Dispense Refill    atorvastatin (LIPITOR) 40 mg tablet Take 1 tablet (40 mg total) by mouth daily 90 tablet 3    levothyroxine 88 mcg tablet Take 1 tablet (88 mcg total) by mouth daily 90 tablet 0    lisinopril-hydrochlorothiazide (PRINZIDE,ZESTORETIC) 20-25 MG per tablet Take 1 tablet by mouth daily 90 tablet 0    mesalamine (CANASA) 1,000 mg suppository 1,000 mg as needed   1    Multiple Vitamin (MULTI-VITAMIN DAILY PO) Take 1 tablet by mouth daily      triamcinolone (KENALOG) 0 1 % cream Apply topically 2 (two) times a day (Patient taking differently: Apply topically as needed ) 454 g 5    ALPRAZolam (XANAX) 0 5 mg tablet Take 1 tablet (0 5 mg total) by mouth daily at bedtime as needed for anxiety 30 tablet 0     No current facility-administered medications for this visit  No Known Allergies   Immunizations:     Immunization History   Administered Date(s) Administered    COVID-19 PFIZER VACCINE 0 3 ML IM 02/05/2021, 02/26/2021    INFLUENZA 10/01/2017, 10/08/2018, 10/15/2019, 10/19/2020, 09/15/2021    Influenza Split High Dose Preservative Free IM 10/18/2016    Influenza, seasonal, injectable 10/01/2017    Pneumococcal Conjugate 13-Valent 04/11/2017    Pneumococcal Polysaccharide PPV23 01/01/2006    Td (adult), adsorbed 01/01/2000    Tdap 05/01/2014    influenza, trivalent, adjuvanted 10/26/2019      Health Maintenance:         Topic Date Due    Hepatitis C Screening  Completed         Topic Date Due    COVID-19 Vaccine (3 - Pfizer risk 4-dose series) 03/26/2021      Medicare Health Risk Assessment:     /80   Pulse 64   Temp 98 4 °F (36 9 °C)   Ht 5' 8" (1 727 m)   Wt 90 kg (198 lb 8 oz)   SpO2 94%   BMI 30 18 kg/m²      Ghassan Ng is here for his Subsequent Wellness visit  Last Medicare Wellness visit information reviewed, patient interviewed and updates made to the record today  Health Risk Assessment:   Patient rates overall health as good  Patient feels that their physical health rating is same  Patient is satisfied with their life  Eyesight was rated as same  Hearing was rated as same  Patient feels that their emotional and mental health rating is same  Patients states they are never, rarely angry  Patient states they are never, rarely unusually tired/fatigued  Pain experienced in the last 7 days has been none  Patient states that he has experienced no weight loss or gain in last 6 months  Depression Screening:   PHQ-2 Score: 0      Fall Risk Screening:    In the past year, patient has experienced: no history of falling in past year      Home Safety:  Patient does not have trouble with stairs inside or outside of their home  Patient has working smoke alarms and has working carbon monoxide detector  Home safety hazards include: none  Nutrition:   Current diet is Regular  Medications:   Patient is currently taking over-the-counter supplements  OTC medications include: see medication list  Patient is able to manage medications  Activities of Daily Living (ADLs)/Instrumental Activities of Daily Living (IADLs):   Walk and transfer into and out of bed and chair?: Yes  Dress and groom yourself?: Yes    Bathe or shower yourself?: Yes    Feed yourself? Yes  Do your laundry/housekeeping?: Yes  Manage your money, pay your bills and track your expenses?: Yes  Make your own meals?: Yes    Do your own shopping?: Yes    Previous Hospitalizations:   Any hospitalizations or ED visits within the last 12 months?: No      Advance Care Planning:   Living will: Yes    Durable POA for healthcare:  Yes    Advanced directive: Yes    Advanced directive counseling given: Yes    Five wishes given: No    Patient declined ACP directive: No    End of Life Decisions reviewed with patient: Yes    Provider agrees with end of life decisions: Yes      Cognitive Screening:   Provider or family/friend/caregiver concerned regarding cognition?: No    PREVENTIVE SCREENINGS      Cardiovascular Screening:    General: Screening Not Indicated and History Lipid Disorder    Due for: Lipid Panel      Diabetes Screening:     General: Screening Current    Due for: Blood Glucose      Colorectal Cancer Screening:     General: Screening Not Indicated      Prostate Cancer Screening:    General: History Prostate Cancer and Screening Not Indicated      Osteoporosis Screening:    General: Risks and Benefits Discussed and Patient Declines      Abdominal Aortic Aneurysm (AAA) Screening:    Risk factors include: tobacco use        General: Screening Not Indicated      Lung Cancer Screening:     General: Screening Not Indicated Hepatitis C Screening:    General: Screening Current    Screening, Brief Intervention, and Referral to Treatment (SBIRT)    Screening  Typical number of drinks in a day: 0  Typical number of drinks in a week: 0  Interpretation: Low risk drinking behavior  Single Item Drug Screening:  How often have you used an illegal drug (including marijuana) or a prescription medication for non-medical reasons in the past year? never    Single Item Drug Screen Score: 0  Interpretation: Negative screen for possible drug use disorder    Other Counseling Topics:   Car/seat belt/driving safety, sunscreen and calcium and vitamin D intake and regular weightbearing exercise  A/P: Doing well and no falls reported  Denies depression and feels safe at home  Diverse diet  No problems operating a MV and uses seat belts  Has a living will and POA  No DME or referrals needed today  RTC one year for medicare wellness       Clinton Aver, DO

## 2022-04-20 NOTE — PATIENT INSTRUCTIONS
Chronic Hypertension   AMBULATORY CARE:   Hypertension  is high blood pressure  Your blood pressure is the force of your blood moving against the walls of your arteries  Hypertension causes your blood pressure to get so high that your heart has to work much harder than normal  This can damage your heart  Even if you have hypertension for years, lifestyle changes, medicines, or both can help bring your blood pressure to normal   Call your local emergency number (911 in the [address] [facility]) or have someone call if:   · You have chest pain  · You have any of the following signs of a heart attack:      ? Squeezing, pressure, or pain in your chest    ? You may  also have any of the following:     § Discomfort or pain in your back, neck, jaw, stomach, or arm    § Shortness of breath    § Nausea or vomiting    § Lightheadedness or a sudden cold sweat    · You become confused or have difficulty speaking  · You suddenly feel lightheaded or have trouble breathing  Seek care immediately if:   · You have a severe headache or vision loss  · You have weakness in an arm or leg  Call your doctor or cardiologist if:   · You feel faint, dizzy, confused, or drowsy  · You have been taking your blood pressure medicine but your pressure is higher than your provider says it should be  · You have questions or concerns about your condition or care  Treatment for chronic hypertension  may include medicine to lower your blood pressure and cholesterol levels  A low cholesterol level helps prevent heart disease and makes it easier to control your blood pressure  Heart disease can make your blood pressure harder to control  You may also need to make lifestyle changes  What you need to know about the stages of hypertension:       · Normal blood pressure is 119/79 or lower   Your healthcare provider may only check your blood pressure each year if it stays at a normal level  · Elevated blood pressure is 120/79 to 129/79    This is sometimes called prehypertension  Your healthcare provider may suggest lifestyle changes to help lower your blood pressure to a normal level  He or she may then check it again in 3 to 6 months  · Stage 1 hypertension is 130/80  to 139/89   Your provider may recommend lifestyle changes, medication, and checks every 3 to 6 months until your blood pressure is controlled  · Stage 2 hypertension is 140/90 or higher   Your provider will recommend lifestyle changes and have you take 2 kinds of hypertension medicines  You will also need to have your blood pressure checked monthly until it is controlled  Manage chronic hypertension:   · Check your blood pressure at home  Avoid smoking, caffeine, and exercise at least 30 minutes before checking your blood pressure  Sit and rest for 5 minutes before you take your blood pressure  Extend your arm and support it on a flat surface  Your arm should be at the same level as your heart  Follow the directions that came with your blood pressure monitor  Check your blood pressure 2 times, 1 minute apart, before you take your medicine in the morning  Also check your blood pressure before your evening meal  Keep a record of your readings and bring it to your follow-up visits  Ask your healthcare provider what your blood pressure should be  · Manage any other health conditions you have  Health conditions such as diabetes can increase your risk for hypertension  Follow your healthcare provider's instructions and take all your medicines as directed  Talk to your healthcare provider about any new health conditions you have recently developed  · Ask about all medicines  Certain medicines can increase your blood pressure  Examples include oral birth control pills, decongestants, herbal supplements, and NSAIDs, such as ibuprofen  Your healthcare provider can tell you which medicines are safe for you to take   This includes prescription and over-the-counter medicines  Lifestyle changes you can make to lower your blood pressure: Your provider may want you to make more lifestyle changes if you are having trouble controlling your blood pressure  This may feel difficult over time, especially if you think you are making good changes but your pressure is still high  It might help to focus on one new change at a time  For example, try to add 1 more day of exercise, or exercise for an extra 10 minutes on 2 days  Small changes can make a big difference  Your healthcare provider can also refer you to specialists such as a dietitian who can help you make small changes  Your family members may be included in helping you learn to create lifestyle changes, such as the following:     · Limit sodium (salt) as directed  Too much sodium can affect your fluid balance  Check labels to find low-sodium or no-salt-added foods  Some low-sodium foods use potassium salts for flavor  Too much potassium can also cause health problems  Your healthcare provider will tell you how much sodium and potassium are safe for you to have in a day  He or she may recommend that you limit sodium to 2,300 mg a day  · Follow the meal plan recommended by your healthcare provider  A dietitian or your provider can give you more information on low-sodium plans or the DASH (Dietary Approaches to Stop Hypertension) eating plan  The DASH plan is low in sodium, processed sugar, unhealthy fats, and total fat  It is high in potassium, calcium, and fiber  These can be found in vegetables, fruit, and whole-grain foods  · Be physically active throughout the day  Physical activity, such as exercise, can help control your blood pressure and your weight  Be physically active for at least 30 minutes per day, on most days of the week  Include aerobic activity, such as walking or riding a bicycle  Also include strength training at least 2 times each week   Your healthcare providers can help you create a physical activity plan  · Decrease stress  This may help lower your blood pressure  Learn ways to relax, such as deep breathing or listening to music  · Limit alcohol as directed  Alcohol can increase your blood pressure  A drink of alcohol is 12 ounces of beer, 5 ounces of wine, or 1½ ounces of liquor  · Do not smoke  Nicotine and other chemicals in cigarettes and cigars can increase your blood pressure and also cause lung damage  Ask your healthcare provider for information if you currently smoke and need help to quit  E-cigarettes or smokeless tobacco still contain nicotine  Talk to your healthcare provider before you use these products  Follow up with your doctor or cardiologist as directed: You will need to return to have your blood pressure checked and to have other lab tests done  Write down your questions so you remember to ask them during your visits  © Copyright [employer] 2022 Information is for End User's use only and may not be sold, redistributed or otherwise used for commercial purposes  All illustrations and images included in CareNotes® are the copyrighted property of A D A M , Inc  or [facility] Cristopher Sapp   The above information is an  only  It is not intended as medical advice for individual conditions or treatments  Talk to your doctor, nurse or pharmacist before following any medical regimen to see if it is safe and effective for you  Weight Management   AMBULATORY CARE:   Why it is important to manage your weight:  Being overweight increases your risk of health conditions such as heart disease, high blood pressure, type 2 diabetes, and certain types of cancer  It can also increase your risk for osteoarthritis, sleep apnea, and other respiratory problems  Aim for a slow, steady weight loss  Even a small amount of weight loss can lower your risk of health problems    Risks of being overweight:  Extra weight can cause many health problems, including the following:  · Diabetes (high blood sugar level)    · High blood pressure or high cholesterol    · Heart disease    · Stroke    · Gallbladder or liver disease    · Cancer of the colon, breast, prostate, liver, or kidney    · Sleep apnea    · Arthritis or gout    Screening  is done to check for health conditions before you have signs or symptoms  If you are 28to 79years old, your blood sugar level may be checked every 3 years for signs of prediabetes or diabetes  Your healthcare provider will check your blood pressure at each visit  High blood pressure can lead to a stroke or other problems  Your provider may check for signs of heart disease, cancer, or other health problems  How to lose weight safely:  A safe and healthy way to lose weight is to eat fewer calories and get regular exercise  · You can lose up about 1 pound a week by decreasing the number of calories you eat by 500 calories each day  You can decrease calories by eating smaller portion sizes or by cutting out high-calorie foods  Read labels to find out how many calories are in the foods you eat  · You can also burn calories with exercise such as walking, swimming, or biking  You will be more likely to keep weight off if you make these changes part of your lifestyle  Exercise at least 30 minutes per day on most days of the week  You can also fit in more physical activity by taking the stairs instead of the elevator or parking farther away from stores  Ask your healthcare provider about the best exercise plan for you  Healthy meal plan for weight management:  A healthy meal plan includes a variety of foods, contains fewer calories, and helps you stay healthy  A healthy meal plan includes the following:     · Eat whole-grain foods more often  A healthy meal plan should contain fiber  Fiber is the part of grains, fruits, and vegetables that is not broken down by your body   Whole-grain foods are healthy and provide extra fiber in your diet  Some examples of whole-grain foods are whole-wheat breads and pastas, oatmeal, brown rice, and bulgur  · Eat a variety of vegetables every day  Include dark, leafy greens such as spinach, kale, michel greens, and mustard greens  Eat yellow and orange vegetables such as carrots, sweet potatoes, and winter squash  · Eat a variety of fruits every day  Choose fresh or canned fruit (canned in its own juice or light syrup) instead of juice  Fruit juice has very little or no fiber  · Eat low-fat dairy foods  Drink fat-free (skim) milk or 1% milk  Eat fat-free yogurt and low-fat cottage cheese  Try low-fat cheeses such as mozzarella and other reduced-fat cheeses  · Choose meat and other protein foods that are low in fat  Choose beans or other legumes such as split peas or lentils  Choose fish, skinless poultry (chicken or turkey), or lean cuts of red meat (beef or pork)  Before you cook meat or poultry, cut off any visible fat  · Use less fat and oil  Try baking foods instead of frying them  Add less fat, such as margarine, sour cream, regular salad dressing and mayonnaise to foods  Eat fewer high-fat foods  Some examples of high-fat foods include french fries, doughnuts, ice cream, and cakes  · Eat fewer sweets  Limit foods and drinks that are high in sugar  This includes candy, cookies, regular soda, and sweetened drinks  Ways to decrease calories:   · Eat smaller portions  ? Use a small plate with smaller servings  ? Do not eat second helpings  ? When you eat at a restaurant, ask for a box and place half of your meal in the box before you eat  ? Share an entrée with someone else  · Replace high-calorie snacks with healthy, low-calorie snacks  ? Choose fresh fruit, vegetables, fat-free rice cakes, or air-popped popcorn instead of potato chips, nuts, or chocolate  ? Choose water or calorie-free drinks instead of soda or sweetened drinks      · Do not shop for groceries when you are hungry  You may be more likely to make unhealthy food choices  Take a grocery list of healthy foods and shop after you have eaten  · Eat regular meals  Do not skip meals  Skipping meals can lead to overeating later in the day  This can make it harder for you to lose weight  Eat a healthy snack in place of a meal if you do not have time to eat a regular meal  Talk with a dietitian to help you create a meal plan and schedule that is right for you  Other things to consider as you try to lose weight:   · Be aware of situations that may give you the urge to overeat, such as eating while watching television  Find ways to avoid these situations  For example, read a book, go for a walk, or do crafts  · Meet with a weight loss support group or friends who are also trying to lose weight  This may help you stay motivated to continue working on your weight loss goals  © Copyright [employer] 2022 Information is for End User's use only and may not be sold, redistributed or otherwise used for commercial purposes  All illustrations and images included in CareNotes® are the copyrighted property of A D A M , Inc  or [address] [place] [address] [facility]  The above information is an  only  It is not intended as medical advice for individual conditions or treatments  Talk to your doctor, nurse or pharmacist before following any medical regimen to see if it is safe and effective for you  Low Fat Diet   AMBULATORY CARE:   A low-fat diet  is an eating plan that is low in total fat, unhealthy fat, and cholesterol  You may need to follow a low-fat diet if you have trouble digesting or absorbing fat  You may also need to follow this diet if you have high cholesterol  You can also lower your cholesterol by increasing the amount of fiber in your diet  Soluble fiber is a type of fiber that helps to decrease cholesterol levels  Different types of fat in food:   · Limit unhealthy fats    A diet that is high in cholesterol, saturated fat, and trans fat may cause unhealthy cholesterol levels  Unhealthy cholesterol levels increase your risk of heart disease  ? Cholesterol:  Limit intake of cholesterol to less than 200 mg per day  Cholesterol is found in meat, eggs, and dairy  ? Saturated fat:  Limit saturated fat to less than 7% of your total daily calories  Ask your dietitian how many calories you need each day  Saturated fat is found in butter, cheese, ice cream, whole milk, and palm oil  Saturated fat is also found in meat, such as beef, pork, chicken skin, and processed meats  Processed meats include sausage, hot dogs, and bologna  ? Trans fat:  Avoid trans fat as much as possible  Trans fat is used in fried and baked foods  Foods that say trans fat free on the label may still have up to 0 5 grams of trans fat per serving  · Include healthy fats  Replace foods that are high in saturated and trans fat with foods high in healthy fats  This may help to decrease high cholesterol levels  ? Monounsaturated fats: These are found in avocados, nuts, and vegetable oils, such as olive, canola, and sunflower oil  ? Polyunsaturated fats: These can be found in vegetable oils, such as soybean or corn oil  Omega-3 fats can help to decrease the risk of heart disease  Omega-3 fats are found in fish, such as salmon, herring, trout, and tuna  Omega-3 fats can also be found in plant foods, such as walnuts, flaxseed, soybeans, and canola oil  Foods to limit or avoid:   · Grains:      ? Snacks that are made with partially hydrogenated oils, such as chips, regular crackers, and butter-flavored popcorn    ? High-fat baked goods, such as biscuits, croissants, doughnuts, pies, cookies, and pastries    · Dairy:      ? Whole milk, 2% milk, and yogurt and ice cream made with whole milk    ?  Half and half creamer, heavy cream, and whipping cream    ? Cheese, cream cheese, and sour cream    · Meats and proteins: ? High-fat cuts of meat (T-bone steak, regular hamburger, and ribs)    ? Fried meat, poultry (turkey and chicken), and fish    ? Poultry (chicken and turkey) with skin    ? Cold cuts (salami or bologna), hot dogs, pineda, and sausage    ? Whole eggs and egg yolks    · Vegetables and fruits with added fat:      ? Fried vegetables or vegetables in butter or high-fat sauces, such as cream or cheese sauces    ? Fried fruit or fruit served with butter or cream    · Fats:      ? Butter, stick margarine, and shortening    ? Coconut, palm oil, and palm kernel oil    Foods to include:   · Grains:      ? Whole-grain breads, cereals, pasta, and brown rice    ? Low-fat crackers and pretzels    · Vegetables and fruits:      ? Fresh, frozen, or canned vegetables (no salt or low-sodium)    ? Fresh, frozen, dried, or canned fruit (canned in light syrup or fruit juice)    ? Avocado    · Low-fat dairy products:      ? Nonfat (skim) or 1% milk    ? Nonfat or low-fat cheese, yogurt, and cottage cheese    · Meats and proteins:      ? Chicken or turkey with no skin    ? Baked or broiled fish    ? Lean beef and pork (loin, round, extra lean hamburger)    ? Beans and peas, unsalted nuts, soy products    ? Egg whites and substitutes    ? Seeds and nuts    · Fats:      ? Unsaturated oil, such as canola, olive, peanut, soybean, or sunflower oil    ? Soft or liquid margarine and vegetable oil spread    ? Low-fat salad dressing    Other ways to decrease fat:   · Read food labels before you buy foods  Choose foods that have less than 30% of calories from fat  Choose low-fat or fat-free dairy products  Remember that fat free does not mean calorie free  These foods still contain calories, and too many calories can lead to weight gain  · Trim fat from meat and avoid fried food  Trim all visible fat from meat before you cook it  Remove the skin from poultry  Do not wang meat, fish, or poultry  Bake, roast, boil, or broil these foods instead  Avoid fried foods  Eat a baked potato instead of [place] fries  Steam vegetables instead of sautéing them in butter  · Add less fat to foods  Use imitation pineda bits on salads and baked potatoes instead of regular pineda bits  Use fat-free or low-fat salad dressings instead of regular dressings  Use low-fat or nonfat butter-flavored topping instead of regular butter or margarine on popcorn and other foods  Ways to decrease fat in recipes:  Replace high-fat ingredients with low-fat or nonfat ones  This may cause baked goods to be drier than usual  You may need to use nonfat cooking spray on pans to prevent food from sticking  You also may need to change the amount of other ingredients, such as water, in the recipe  Try the following:  · Use low-fat or light margarine instead of regular margarine or shortening  · Use lean ground turkey breast or chicken, or lean ground beef (less than 5% fat) instead of hamburger  · Add 1 teaspoon of canola oil to 8 ounces of skim milk instead of using cream or half and half  · Use grated zucchini, carrots, or apples in breads instead of coconut  · Use blenderized, low-fat cottage cheese, plain tofu, or low-fat ricotta cheese instead of cream cheese  · Use 1 egg white and 1 teaspoon of canola oil, or use ¼ cup (2 ounces) of fat-free egg substitute instead of a whole egg  · Replace half of the oil that is called for in a recipe with applesauce when you bake  Use 3 tablespoons of cocoa powder and 1 tablespoon of canola oil instead of a square of baking chocolate  How to increase fiber:  Eat enough high-fiber foods to get 20 to 30 grams of fiber every day  Slowly increase your fiber intake to avoid stomach cramps, gas, and other problems  · Eat 3 ounces of whole-grain foods each day  An ounce is about 1 slice of bread  Eat whole-grain breads, such as whole-wheat bread   Whole wheat, whole-wheat flour, or other whole grains should be listed as the first ingredient on the food label  Replace white flour with whole-grain flour or use half of each in recipes  Whole-grain flour is heavier than white flour, so you may have to add more yeast or baking powder  · Eat a high-fiber cereal for breakfast   Oatmeal is a good source of soluble fiber  Look for cereals that have bran or fiber in the name  Choose whole-grain products, such as brown rice, barley, and whole-wheat pasta  · Eat more beans, peas, and lentils  For example, add beans to soups or salads  Eat at least 5 cups of fruits and vegetables each day  Eat fruits and vegetables with the peel because the peel is high in fiber  © Copyright [employer] 2022 Information is for End User's use only and may not be sold, redistributed or otherwise used for commercial purposes  All illustrations and images included in CareNotes® are the copyrighted property of A D A M , Inc  or [address] [place] [address]  The above information is an  only  It is not intended as medical advice for individual conditions or treatments  Talk to your doctor, nurse or pharmacist before following any medical regimen to see if it is safe and effective for you  Heart Healthy Diet   AMBULATORY CARE:   A heart healthy diet  is an eating plan low in unhealthy fats and sodium (salt)  The plan is high in healthy fats and fiber  A heart healthy diet helps improve your cholesterol levels and lowers your risk for heart disease and stroke  A dietitian will teach you how to read and understand food labels  Heart healthy diet guidelines to follow:   · Choose foods that contain healthy fats  ? Unsaturated fats  include monounsaturated and polyunsaturated fats  Unsaturated fat is found in foods such as soybean, canola, olive, corn, and safflower oils  It is also found in soft tub margarine that is made with liquid vegetable oil  ? Omega-3 fat  is found in certain fish, such as salmon, tuna, and trout, and in walnuts and flaxseed  Eat fish high in omega-3 fats at least 2 times a week  · Get 20 to 30 grams of fiber each day  Fruits, vegetables, whole-grain foods, and legumes (cooked beans) are good sources of fiber  · Limit or do not have unhealthy fats  ? Cholesterol  is found in animal foods, such as eggs and lobster, and in dairy products made from whole milk  Limit cholesterol to less than 200 mg each day  ? Saturated fat  is found in meats, such as pineda and hamburger  It is also found in chicken or turkey skin, whole milk, and butter  Limit saturated fat to less than 7% of your total daily calories  ? Trans fat  is found in packaged foods, such as potato chips and cookies  It is also in hard margarine, some fried foods, and shortening  Do not eat foods that contain trans fats  · Limit sodium as directed  You may be told to limit sodium to 2,000 to 2,300 mg each day  Choose low-sodium or no-salt-added foods  Add little or no salt to food you prepare  Use herbs and spices in place of salt  Include the following in your heart healthy plan:  Ask your dietitian or healthcare provider how many servings to have from each of the following food groups:  · Grains:      ? Whole-wheat breads, cereals, and pastas, and brown rice    ? Low-fat, low-sodium crackers and chips    · Vegetables:      ? Broccoli, green beans, green peas, and spinach    ? Collards, kale, and lima beans    ? Carrots, sweet potatoes, tomatoes, and peppers    ? Canned vegetables with no salt added    · Fruits:      ? Bananas, peaches, pears, and pineapple    ? Grapes, raisins, and dates    ? Oranges, tangerines, grapefruit, orange juice, and grapefruit juice    ? Apricots, mangoes, melons, and papaya    ? Raspberries and strawberries    ? Canned fruit with no added sugar    · Low-fat dairy:      ? Nonfat (skim) milk, 1% milk, and low-fat almond, cashew, or soy milks fortified with calcium    ?  Low-fat cheese, regular or frozen yogurt, and cottage cheese    · Meats and proteins:      ? Lean cuts of beef and pork (loin, leg, round), skinless chicken and turkey    ? Legumes, soy products, egg whites, or nuts    Limit or do not include the following in your heart healthy plan:   · Unhealthy fats and oils:      ? Whole or 2% milk, cream cheese, sour cream, or cheese    ? High-fat cuts of beef (T-bone steaks, ribs), chicken or turkey with skin, and organ meats such as liver    ? Butter, stick margarine, shortening, and cooking oils such as coconut or palm oil    · Foods and liquids high in sodium:      ? Packaged foods, such as frozen dinners, cookies, macaroni and cheese, and cereals with more than 300 mg of sodium per serving    ? Vegetables with added sodium, such as instant potatoes, vegetables with added sauces, or regular canned vegetables    ? Cured or smoked meats, such as hot dogs, pineda, and sausage    ? High-sodium ketchup, barbecue sauce, salad dressing, pickles, olives, soy sauce, or miso    · Foods and liquids high in sugar:      ? Candy, cake, cookies, pies, or doughnuts    ? Soft drinks (soda), sports drinks, or sweetened tea    ? Canned or dry mixes for cakes, soups, sauces, or gravies    Other healthy heart guidelines:   · Do not smoke  Nicotine and other chemicals in cigarettes and cigars can cause lung and heart damage  Ask your healthcare provider for information if you currently smoke and need help to quit  E-cigarettes or smokeless tobacco still contain nicotine  Talk to your healthcare provider before you use these products  · Limit or do not drink alcohol as directed  Alcohol can damage your heart and raise your blood pressure  Your healthcare provider may give you specific daily and weekly limits  The general recommended limit is 1 drink a day for women 21 or older and for men 72 or older  Do not have more than 3 drinks in a day or 7 in a week  The recommended limit is 2 drinks a day for men 24to 59years of age   Do not have more than 4 drinks in a day or 14 in a week  A drink of alcohol is 12 ounces of beer, 5 ounces of wine, or 1½ ounces of liquor  · Exercise regularly  Exercise can help you maintain a healthy weight and improve your blood pressure and cholesterol levels  Regular exercise can also decrease your risk for heart problems  Ask your healthcare provider about the best exercise plan for you  Do not start an exercise program without asking your healthcare provider  Follow up with your doctor or cardiologist as directed:  Write down your questions so you remember to ask them during your visits  © Copyright [employer] 2022 Information is for End User's use only and may not be sold, redistributed or otherwise used for commercial purposes  All illustrations and images included in CareNotes® are the copyrighted property of A D A M , Inc  or [facility] Cristopher Sapp   The above information is an  only  It is not intended as medical advice for individual conditions or treatments  Talk to your doctor, nurse or pharmacist before following any medical regimen to see if it is safe and effective for you  Medicare Preventive Visit Patient Instructions  Thank you for completing your Welcome to Medicare Visit or Medicare Annual Wellness Visit today  Your next wellness visit will be due in one year (4/21/2023)  The screening/preventive services that you may require over the next 5-10 years are detailed below  Some tests may not apply to you based off risk factors and/or age  Screening tests ordered at today's visit but not completed yet may show as past due  Also, please note that scanned in results may not display below    Preventive Screenings:  Service Recommendations Previous Testing/Comments   Colorectal Cancer Screening  · Colonoscopy    · Fecal Occult Blood Test (FOBT)/Fecal Immunochemical Test (FIT)  · Fecal DNA/Cologuard Test  · Flexible Sigmoidoscopy Age: 54-65 years old   Colonoscopy: every 10 years (May be performed more frequently if at higher risk)  OR  FOBT/FIT: every 1 year  OR  Cologuard: every 3 years  OR  Sigmoidoscopy: every 5 years  Screening may be recommended earlier than age 48 if at higher risk for colorectal cancer  Also, an individualized decision between you and your healthcare provider will decide whether screening between the ages of 74-80 would be appropriate  Colonoscopy: 11/26/2019  FOBT/FIT: Not on file  Cologuard: Not on file  Sigmoidoscopy: Not on file          Prostate Cancer Screening Individualized decision between patient and health care provider in men between ages of 53-78   Medicare will cover every 12 months beginning on the day after your 50th birthday PSA: <0 1 ng/mL     History Prostate Cancer  Screening Not Indicated     Hepatitis C Screening Once for adults born between 1945 and 1965  More frequently in patients at high risk for Hepatitis C Hep C Antibody: 11/08/2021    Screening Current   Diabetes Screening 1-2 times per year if you're at risk for diabetes or have pre-diabetes Fasting glucose: 113 mg/dL   A1C: 5 7 %    Screening Current   Cholesterol Screening Once every 5 years if you don't have a lipid disorder  May order more often based on risk factors  Lipid panel: 07/09/2020    Screening Not Indicated  History Lipid Disorder      Other Preventive Screenings Covered by Medicare:  1  Abdominal Aortic Aneurysm (AAA) Screening: covered once if your at risk  You're considered to be at risk if you have a family history of AAA or a male between the age of 73-68 who smoking at least 100 cigarettes in your lifetime    2  Lung Cancer Screening: covers low dose CT scan once per year if you meet all of the following conditions: (1) Age 50-69; (2) No signs or symptoms of lung cancer; (3) Current smoker or have quit smoking within the last 15 years; (4) You have a tobacco smoking history of at least 30 pack years (packs per day x number of years you smoked); (5) You get a written order from a healthcare provider  3  Glaucoma Screening: covered annually if you're considered high risk: (1) You have diabetes OR (2) Family history of glaucoma OR (3)  aged 48 and older OR (3)  American aged 72 and older  3  Osteoporosis Screening: covered every 2 years if you meet one of the following conditions: (1) Have a vertebral abnormality; (2) On glucocorticoid therapy for more than 3 months; (3) Have primary hyperparathyroidism; (4) On osteoporosis medications and need to assess response to drug therapy  5  HIV Screening: covered annually if you're between the age of 12-76  Also covered annually if you are younger than 13 and older than 72 with risk factors for HIV infection  For pregnant patients, it is covered up to 3 times per pregnancy  Immunizations:  Immunization Recommendations   Influenza Vaccine Annual influenza vaccination during flu season is recommended for all persons aged >= 6 months who do not have contraindications   Pneumococcal Vaccine (Prevnar and Pneumovax)  * Prevnar = PCV13  * Pneumovax = PPSV23 Adults 25-60 years old: 1-3 doses may be recommended based on certain risk factors  Adults 72 years old: Prevnar (PCV13) vaccine recommended followed by Pneumovax (PPSV23) vaccine  If already received PPSV23 since turning 65, then PCV13 recommended at least one year after PPSV23 dose  Hepatitis B Vaccine 3 dose series if at intermediate or high risk (ex: diabetes, end stage renal disease, liver disease)   Tetanus (Td) Vaccine - COST NOT COVERED BY MEDICARE PART B Following completion of primary series, a booster dose should be given every 10 years to maintain immunity against tetanus  Td may also be given as tetanus wound prophylaxis  Tdap Vaccine - COST NOT COVERED BY MEDICARE PART B Recommended at least once for all adults  For pregnant patients, recommended with each pregnancy     Shingles Vaccine (Shingrix) - COST NOT COVERED BY MEDICARE PART B  2 shot series recommended in those aged 48 and above     Health Maintenance Due:      Topic Date Due    Hepatitis C Screening  Completed     Immunizations Due:      Topic Date Due    COVID-19 Vaccine (3 - Pfizer risk 4-dose series) 03/26/2021     Advance Directives   What are advance directives? Advance directives are legal documents that state your wishes and plans for medical care  These plans are made ahead of time in case you lose your ability to make decisions for yourself  Advance directives can apply to any medical decision, such as the treatments you want, and if you want to donate organs  What are the types of advance directives? There are many types of advance directives, and each state has rules about how to use them  You may choose a combination of any of the following:  · Living will: This is a written record of the treatment you want  You can also choose which treatments you do not want, which to limit, and which to stop at a certain time  This includes surgery, medicine, IV fluid, and tube feedings  · Durable power of  for healthcare [facility]): This is a written record that states who you want to make healthcare choices for you when you are unable to make them for yourself  This person, called a proxy, is usually a family member or a friend  You may choose more than 1 proxy  · Do not resuscitate (DNR) order:  A DNR order is used in case your heart stops beating or you stop breathing  It is a request not to have certain forms of treatment, such as CPR  A DNR order may be included in other types of advance directives  · Medical directive: This covers the care that you want if you are in a coma, near death, or unable to make decisions for yourself  You can list the treatments you want for each condition  Treatment may include pain medicine, surgery, blood transfusions, dialysis, IV or tube feedings, and a ventilator (breathing machine)  · Values history:   This document has questions about your views, beliefs, and how you feel and think about life  This information can help others choose the care that you would choose  Why are advance directives important? An advance directive helps you control your care  Although spoken wishes may be used, it is better to have your wishes written down  Spoken wishes can be misunderstood, or not followed  Treatments may be given even if you do not want them  An advance directive may make it easier for your family to make difficult choices about your care  Weight Management   Why it is important to manage your weight:  Being overweight increases your risk of health conditions such as heart disease, high blood pressure, type 2 diabetes, and certain types of cancer  It can also increase your risk for osteoarthritis, sleep apnea, and other respiratory problems  Aim for a slow, steady weight loss  Even a small amount of weight loss can lower your risk of health problems  How to lose weight safely:  A safe and healthy way to lose weight is to eat fewer calories and get regular exercise  You can lose up about 1 pound a week by decreasing the number of calories you eat by 500 calories each day  Healthy meal plan for weight management:  A healthy meal plan includes a variety of foods, contains fewer calories, and helps you stay healthy  A healthy meal plan includes the following:  · Eat whole-grain foods more often  A healthy meal plan should contain fiber  Fiber is the part of grains, fruits, and vegetables that is not broken down by your body  Whole-grain foods are healthy and provide extra fiber in your diet  Some examples of whole-grain foods are whole-wheat breads and pastas, oatmeal, brown rice, and bulgur  · Eat a variety of vegetables every day  Include dark, leafy greens such as spinach, kale, michel greens, and mustard greens  Eat yellow and orange vegetables such as carrots, sweet potatoes, and winter squash  · Eat a variety of fruits every day    Choose fresh or canned fruit (canned in its own juice or light syrup) instead of juice  Fruit juice has very little or no fiber  · Eat low-fat dairy foods  Drink fat-free (skim) milk or 1% milk  Eat fat-free yogurt and low-fat cottage cheese  Try low-fat cheeses such as mozzarella and other reduced-fat cheeses  · Choose meat and other protein foods that are low in fat  Choose beans or other legumes such as split peas or lentils  Choose fish, skinless poultry (chicken or turkey), or lean cuts of red meat (beef or pork)  Before you cook meat or poultry, cut off any visible fat  · Use less fat and oil  Try baking foods instead of frying them  Add less fat, such as margarine, sour cream, regular salad dressing and mayonnaise to foods  Eat fewer high-fat foods  Some examples of high-fat foods include french fries, doughnuts, ice cream, and cakes  · Eat fewer sweets  Limit foods and drinks that are high in sugar  This includes candy, cookies, regular soda, and sweetened drinks  Exercise:  Exercise at least 30 minutes per day on most days of the week  Some examples of exercise include walking, biking, dancing, and swimming  You can also fit in more physical activity by taking the stairs instead of the elevator or parking farther away from stores  Ask your healthcare provider about the best exercise plan for you  © Copyright [employer] 2018 Information is for End User's use only and may not be sold, redistributed or otherwise used for commercial purposes  All illustrations and images included in CareNotes® are the copyrighted property of A D A M , Inc  or [facility] Preventive Visit Patient Instructions  Thank you for completing your Welcome to Medicare Visit or Medicare Annual Wellness Visit today  Your next wellness visit will be due in one year (4/21/2023)  The screening/preventive services that you may require over the next 5-10 years are detailed below   Some tests may not apply to you based off risk factors and/or age  Screening tests ordered at today's visit but not completed yet may show as past due  Also, please note that scanned in results may not display below  Preventive Screenings:  Service Recommendations Previous Testing/Comments   Colorectal Cancer Screening  · Colonoscopy    · Fecal Occult Blood Test (FOBT)/Fecal Immunochemical Test (FIT)  · Fecal DNA/Cologuard Test  · Flexible Sigmoidoscopy Age: 54-65 years old   Colonoscopy: every 10 years (May be performed more frequently if at higher risk)  OR  FOBT/FIT: every 1 year  OR  Cologuard: every 3 years  OR  Sigmoidoscopy: every 5 years  Screening may be recommended earlier than age 48 if at higher risk for colorectal cancer  Also, an individualized decision between you and your healthcare provider will decide whether screening between the ages of 74-80 would be appropriate  Colonoscopy: 11/26/2019  FOBT/FIT: Not on file  Cologuard: Not on file  Sigmoidoscopy: Not on file          Prostate Cancer Screening Individualized decision between patient and health care provider in men between ages of 53-78   Medicare will cover every 12 months beginning on the day after your 50th birthday PSA: <0 1 ng/mL     History Prostate Cancer  Screening Not Indicated     Hepatitis C Screening Once for adults born between 1945 and 1965  More frequently in patients at high risk for Hepatitis C Hep C Antibody: 11/08/2021    Screening Current   Diabetes Screening 1-2 times per year if you're at risk for diabetes or have pre-diabetes Fasting glucose: 113 mg/dL   A1C: 5 7 %    Screening Current   Cholesterol Screening Once every 5 years if you don't have a lipid disorder  May order more often based on risk factors  Lipid panel: 07/09/2020    Screening Not Indicated  History Lipid Disorder      Other Preventive Screenings Covered by Medicare:  6  Abdominal Aortic Aneurysm (AAA) Screening: covered once if your at risk   You're considered to be at risk if you have a family history of AAA or a male between the age of 73-68 who smoking at least 100 cigarettes in your lifetime  7  Lung Cancer Screening: covers low dose CT scan once per year if you meet all of the following conditions: (1) Age 50-69; (2) No signs or symptoms of lung cancer; (3) Current smoker or have quit smoking within the last 15 years; (4) You have a tobacco smoking history of at least 30 pack years (packs per day x number of years you smoked); (5) You get a written order from a healthcare provider  8  Glaucoma Screening: covered annually if you're considered high risk: (1) You have diabetes OR (2) Family history of glaucoma OR (3)  aged 48 and older OR (3)  American aged 72 and older  5  Osteoporosis Screening: covered every 2 years if you meet one of the following conditions: (1) Have a vertebral abnormality; (2) On glucocorticoid therapy for more than 3 months; (3) Have primary hyperparathyroidism; (4) On osteoporosis medications and need to assess response to drug therapy  10  HIV Screening: covered annually if you're between the age of 12-76  Also covered annually if you are younger than 13 and older than 72 with risk factors for HIV infection  For pregnant patients, it is covered up to 3 times per pregnancy  Immunizations:  Immunization Recommendations   Influenza Vaccine Annual influenza vaccination during flu season is recommended for all persons aged >= 6 months who do not have contraindications   Pneumococcal Vaccine (Prevnar and Pneumovax)  * Prevnar = PCV13  * Pneumovax = PPSV23 Adults 25-60 years old: 1-3 doses may be recommended based on certain risk factors  Adults 72 years old: Prevnar (PCV13) vaccine recommended followed by Pneumovax (PPSV23) vaccine  If already received PPSV23 since turning 65, then PCV13 recommended at least one year after PPSV23 dose     Hepatitis B Vaccine 3 dose series if at intermediate or high risk (ex: diabetes, end stage renal disease, liver disease)   Tetanus (Td) Vaccine - COST NOT COVERED BY MEDICARE PART B Following completion of primary series, a booster dose should be given every 10 years to maintain immunity against tetanus  Td may also be given as tetanus wound prophylaxis  Tdap Vaccine - COST NOT COVERED BY MEDICARE PART B Recommended at least once for all adults  For pregnant patients, recommended with each pregnancy  Shingles Vaccine (Shingrix) - COST NOT COVERED BY MEDICARE PART B  2 shot series recommended in those aged 48 and above     Health Maintenance Due:      Topic Date Due    Hepatitis C Screening  Completed     Immunizations Due:      Topic Date Due    COVID-19 Vaccine (3 - Pfizer risk 4-dose series) 03/26/2021     Advance Directives   What are advance directives? Advance directives are legal documents that state your wishes and plans for medical care  These plans are made ahead of time in case you lose your ability to make decisions for yourself  Advance directives can apply to any medical decision, such as the treatments you want, and if you want to donate organs  What are the types of advance directives? There are many types of advance directives, and each state has rules about how to use them  You may choose a combination of any of the following:  · Living will: This is a written record of the treatment you want  You can also choose which treatments you do not want, which to limit, and which to stop at a certain time  This includes surgery, medicine, IV fluid, and tube feedings  · Durable power of  for healthcare [facility]): This is a written record that states who you want to make healthcare choices for you when you are unable to make them for yourself  This person, called a proxy, is usually a family member or a friend  You may choose more than 1 proxy  · Do not resuscitate (DNR) order:  A DNR order is used in case your heart stops beating or you stop breathing   It is a request not to have certain forms of treatment, such as CPR  A DNR order may be included in other types of advance directives  · Medical directive: This covers the care that you want if you are in a coma, near death, or unable to make decisions for yourself  You can list the treatments you want for each condition  Treatment may include pain medicine, surgery, blood transfusions, dialysis, IV or tube feedings, and a ventilator (breathing machine)  · Values history: This document has questions about your views, beliefs, and how you feel and think about life  This information can help others choose the care that you would choose  Why are advance directives important? An advance directive helps you control your care  Although spoken wishes may be used, it is better to have your wishes written down  Spoken wishes can be misunderstood, or not followed  Treatments may be given even if you do not want them  An advance directive may make it easier for your family to make difficult choices about your care  Weight Management   Why it is important to manage your weight:  Being overweight increases your risk of health conditions such as heart disease, high blood pressure, type 2 diabetes, and certain types of cancer  It can also increase your risk for osteoarthritis, sleep apnea, and other respiratory problems  Aim for a slow, steady weight loss  Even a small amount of weight loss can lower your risk of health problems  How to lose weight safely:  A safe and healthy way to lose weight is to eat fewer calories and get regular exercise  You can lose up about 1 pound a week by decreasing the number of calories you eat by 500 calories each day  Healthy meal plan for weight management:  A healthy meal plan includes a variety of foods, contains fewer calories, and helps you stay healthy  A healthy meal plan includes the following:  · Eat whole-grain foods more often  A healthy meal plan should contain fiber   Fiber is the part of grains, fruits, and vegetables that is not broken down by your body  Whole-grain foods are healthy and provide extra fiber in your diet  Some examples of whole-grain foods are whole-wheat breads and pastas, oatmeal, brown rice, and bulgur  · Eat a variety of vegetables every day  Include dark, leafy greens such as spinach, kale, michel greens, and mustard greens  Eat yellow and orange vegetables such as carrots, sweet potatoes, and winter squash  · Eat a variety of fruits every day  Choose fresh or canned fruit (canned in its own juice or light syrup) instead of juice  Fruit juice has very little or no fiber  · Eat low-fat dairy foods  Drink fat-free (skim) milk or 1% milk  Eat fat-free yogurt and low-fat cottage cheese  Try low-fat cheeses such as mozzarella and other reduced-fat cheeses  · Choose meat and other protein foods that are low in fat  Choose beans or other legumes such as split peas or lentils  Choose fish, skinless poultry (chicken or turkey), or lean cuts of red meat (beef or pork)  Before you cook meat or poultry, cut off any visible fat  · Use less fat and oil  Try baking foods instead of frying them  Add less fat, such as margarine, sour cream, regular salad dressing and mayonnaise to foods  Eat fewer high-fat foods  Some examples of high-fat foods include french fries, doughnuts, ice cream, and cakes  · Eat fewer sweets  Limit foods and drinks that are high in sugar  This includes candy, cookies, regular soda, and sweetened drinks  Exercise:  Exercise at least 30 minutes per day on most days of the week  Some examples of exercise include walking, biking, dancing, and swimming  You can also fit in more physical activity by taking the stairs instead of the elevator or parking farther away from stores  Ask your healthcare provider about the best exercise plan for you        © Copyright [employer] 2018 Information is for End User's use only and may not be sold, redistributed or otherwise used for commercial purposes   All illustrations and images included in CareNotes® are the copyrighted property of A D A M , Inc  or [facility] Cristopher Aguilra

## 2022-04-20 NOTE — PROGRESS NOTES
BMI Counseling: There is no height or weight on file to calculate BMI  The BMI is above normal  Nutrition recommendations include decreasing portion sizes and encouraging healthy choices of fruits and vegetables  Exercise recommendations include moderate physical activity 150 minutes/week  No pharmacotherapy was ordered  Rationale for BMI follow-up plan is due to patient being overweight or obese  Assessment/Plan:  Problem List Items Addressed This Visit        Endocrine    Hypothyroidism - Primary    Relevant Orders    TSH, 3rd generation       Cardiovascular and Mediastinum    Coronary artery calcification seen on CT scan    Relevant Orders    Lipid Panel with Direct LDL reflex       Musculoskeletal and Integument    Osteoarthritis       Immune and Lymphatic    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)       Genitourinary    Chronic kidney disease, stage 3a (HCC)       Other    Hyperlipidemia    Relevant Orders    Lipid Panel with Direct LDL reflex    Generalized anxiety disorder    Relevant Orders    [facility] All Prescribed Meds    Amphetamines, Methamphetamines    Butalbital    Phenobarbital    Secobarbital    Temazepam    Alprazolam    Clonazepam    Diazepam    Lorazepam    Oxazepam    Gabapentin    Pregabalin    Cocaine    Heroin    Buprenorphine    Levorphanol    Meperidine    Naltrexone    Fentanyl    Methadone    Oxycodone    Oxymorphone    Tapentadol    Tramadol    Codeine, Hydrocodone, Hydropmorphone, Morphine    Bath Salts    Kratom    Spice    Methylphenidate    Phentermine    Validity Oxidant    Validity Creatinine    Validity pH    Validity Specific      Other Visit Diagnoses     Medicare annual wellness visit, subsequent               Diagnoses and all orders for this visit:    Hypothyroidism, unspecified type  -     TSH, 3rd generation; Future    Coronary artery calcification seen on CT scan  -     Lipid Panel with Direct LDL reflex;  Future    Primary osteoarthritis, unspecified site    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)    Chronic kidney disease, stage 3a (HCC)    Generalized anxiety disorder  -     [facility] All Prescribed Meds  -     Amphetamines, Methamphetamines  -     Butalbital  -     Phenobarbital  -     Secobarbital  -     Temazepam  -     Alprazolam  -     Clonazepam  -     Diazepam  -     Lorazepam  -     Oxazepam  -     Gabapentin  -     Pregabalin  -     Cocaine  -     Heroin  -     Buprenorphine  -     Levorphanol  -     Meperidine  -     Naltrexone  -     Fentanyl  -     Methadone  -     Oxycodone  -     Oxymorphone  -     Tapentadol  -     Tramadol  -     Codeine, Hydrocodone, Hydropmorphone, Morphine  -     Bath Salts  -     Kratom  -     Spice  -     Methylphenidate  -     Phentermine  -     Validity Oxidant  -     Validity Creatinine  -     Validity pH  -     Validity Specific    Hyperlipidemia, unspecified hyperlipidemia type  -     Lipid Panel with Direct LDL reflex; Future    Medicare annual wellness visit, subsequent        No problem-specific Assessment & Plan notes found for this encounter  A/P: Doing well and will check labs  Discussed BMI and will give info on diet and exercises  Will go back to the xanax  Continue current treatment and RTC four months for routine  Subjective:      Patient ID: Gerald Jessica is a 78 y o  male   RTC for f/u HTN, hypothyroidism, etc  Doing ok except reports restoril isn't working for sleep and wants his xanax back  Remains active w/o difficulty and no falls  Prostate cancer is in remission  Denies CP, SOB, palpitations, edema, orthopnea or PND  NADIA is controlled  Due for labs         The following portions of the patient's history were reviewed and updated as appropriate:   He has a past medical history of Cancer ([address] ), Chronic kidney disease, stage 3a ([address] ) (06/05/2021), Coronary artery calcification seen on CT scan (11/07/2021), COVID-19 (11/06/2021), Dementia ([facility] ), Disease of thyroid gland, Eczema, Generalized anxiety disorder, Hypertension, Prostate cancer [facility]), Prostate cancer metastatic to intrapelvic lymph node ([facility] ), and Skin disorder  ,  does not have any pertinent problems on file  ,   has a past surgical history that includes Knee surgery; Prostate surgery; Vasectomy; Cataract extraction; Eye surgery; Colonoscopy; and Colonoscopy (11/26/2019)  ,  family history includes Alcohol abuse in his family, father, and mother; Colon cancer in his maternal aunt; Depression in his father; No Known Problems in his maternal grandfather, maternal grandmother, paternal grandfather, paternal grandmother, and sister; Stroke in his brother  ,   reports that he has quit smoking  His smoking use included cigars  He has never used smokeless tobacco  He reports that he does not drink alcohol and does not use drugs  ,  has No Known Allergies     Current Outpatient Medications   Medication Sig Dispense Refill    atorvastatin (LIPITOR) 40 mg tablet Take 1 tablet (40 mg total) by mouth daily 90 tablet 3    levothyroxine 88 mcg tablet Take 1 tablet (88 mcg total) by mouth daily 90 tablet 0    lisinopril-hydrochlorothiazide (PRINZIDE,ZESTORETIC) 20-25 MG per tablet Take 1 tablet by mouth daily 90 tablet 0    mesalamine (CANASA) 1,000 mg suppository 1,000 mg as needed   1    Multiple Vitamin (MULTI-VITAMIN DAILY PO) Take 1 tablet by mouth daily      triamcinolone (KENALOG) 0 1 % cream Apply topically 2 (two) times a day (Patient taking differently: Apply topically as needed ) 454 g 5     No current facility-administered medications for this visit         Review of Systems    PHQ-2/9 Depression Screening    Little interest or pleasure in doing things: 0 - not at all  Feeling down, depressed, or hopeless: 0 - not at all  PHQ-2 Score: 0  PHQ-2 Interpretation: Negative depression screen        Objective:  Vitals:    04/20/22 1351   BP: 120/80   Pulse: 64   Temp: 98 4 °F (36 9 °C)   SpO2: 94%   Weight: 90 kg (198 lb 8 oz)   Height: 5' 8" (1 727 m)     Body mass index is 30 18 kg/m²  Physical Exam  Vitals and nursing note reviewed  Constitutional:       General: He is not in acute distress  Appearance: Normal appearance  He is not ill-appearing  HENT:      Head: Normocephalic and atraumatic  Mouth/Throat:      Mouth: Mucous membranes are moist    Eyes:      Extraocular Movements: Extraocular movements intact  Conjunctiva/sclera: Conjunctivae normal       Pupils: Pupils are equal, round, and reactive to light  Neck:      Vascular: No carotid bruit  Cardiovascular:      Rate and Rhythm: Normal rate and regular rhythm  Heart sounds: Normal heart sounds  Pulmonary:      Effort: Pulmonary effort is normal  No respiratory distress  Breath sounds: Normal breath sounds  No wheezing or rales  Abdominal:      General: Bowel sounds are normal  There is no distension  Palpations: Abdomen is soft  Tenderness: There is no abdominal tenderness  Musculoskeletal:      Cervical back: Neck supple  Right lower leg: No edema  Left lower leg: No edema  Neurological:      General: No focal deficit present  Mental Status: He is alert and oriented to person, place, and time  Mental status is at baseline  Psychiatric:         Mood and Affect: Mood normal          Behavior: Behavior normal          Thought Content: Thought content normal          Judgment: Judgment normal          BMI Counseling: Body mass index is 30 18 kg/m²  The BMI is above normal  Nutrition recommendations include reducing portion sizes, decreasing overall calorie intake, reducing intake of saturated fat and trans fat and reducing intake of cholesterol  Exercise recommendations include moderate aerobic physical activity for 150 minutes/week  Scheduled Medication Review:  Pt's scheduled medication use was reviewed by myself/staff via the [facility] website   Pt's use has been found to be appropriate w/o any concerns for misuse by the patient  Pt's current conditions require continued scheduled medication use at this time  Future review for continued appropriate medication use and misuse will continue

## 2022-04-26 ENCOUNTER — APPOINTMENT (OUTPATIENT)
Dept: LAB | Facility: CLINIC | Age: 82
End: 2022-04-26
Payer: COMMERCIAL

## 2022-04-28 LAB
6MAM UR QL CFM: NEGATIVE NG/ML
7AMINOCLONAZEPAM UR QL CFM: NEGATIVE NG/ML
A-OH ALPRAZ UR QL CFM: NEGATIVE NG/ML
ACCEPTABLE CREAT UR QL: NORMAL MG/DL
ACCEPTIBLE SP GR UR QL: NORMAL
AMPHET UR QL CFM: NEGATIVE NG/ML
BUPRENORPHINE UR QL CFM: NEGATIVE NG/ML
BUTALBITAL UR QL CFM: NEGATIVE NG/ML
BZE UR QL CFM: NEGATIVE NG/ML
CODEINE UR QL CFM: NEGATIVE NG/ML
EDDP UR QL CFM: NEGATIVE NG/ML
EUTYLONE UR QL: NEGATIVE NG/ML
FENTANYL UR QL CFM: NEGATIVE NG/ML
GLIADIN IGG SER IA-ACNC: NEGATIVE NG/ML
HYDROCODONE UR QL CFM: NEGATIVE NG/ML
HYDROMORPHONE UR QL CFM: NEGATIVE NG/ML
LORAZEPAM UR QL CFM: NEGATIVE NG/ML
ME-PHENIDATE UR QL CFM: NEGATIVE NG/ML
MEPERIDINE UR QL CFM: NEGATIVE NG/ML
METHADONE UR QL CFM: NEGATIVE NG/ML
METHAMPHET UR QL CFM: NEGATIVE NG/ML
MORPHINE UR QL CFM: NEGATIVE NG/ML
NALTREXONE UR QL CFM: NEGATIVE NG/ML
NITRITE UR QL: NORMAL UG/ML
NORBUPRENORPHINE UR QL CFM: NEGATIVE NG/ML
NORDIAZEPAM UR QL CFM: NEGATIVE NG/ML
NORFENTANYL UR QL CFM: NEGATIVE NG/ML
NORHYDROCODONE UR QL CFM: NEGATIVE NG/ML
NORMEPERIDINE UR QL CFM: NEGATIVE NG/ML
NOROXYCODONE UR QL CFM: NEGATIVE NG/ML
OXAZEPAM UR QL CFM: ABNORMAL NG/ML
OXYCODONE UR QL CFM: NEGATIVE NG/ML
OXYMORPHONE UR QL CFM: NEGATIVE NG/ML
OXYMORPHONE UR QL CFM: NEGATIVE NG/ML
PHENOBARB UR QL CFM: NEGATIVE NG/ML
RESULT ALL_PRESCRIBED MEDS AND SPECIAL INSTRUCTIONS: NORMAL
SECOBARBITAL UR QL CFM: NEGATIVE NG/ML
SL AMB 3-METHYL-FENTANYL QUANTIFICATION: NORMAL NG/ML
SL AMB 4-ANPP QUANTIFICATION: NORMAL NG/ML
SL AMB 4-FIBF QUANTIFICATION: NORMAL NG/ML
SL AMB 5F-ADB-M7 METABOLITE QUANTIFICATION: NEGATIVE NG/ML
SL AMB 7-OH-MITRAGYNINE (KRATOM ALKALOID) QUANTIFICATION: NEGATIVE NG/ML
SL AMB AB-FUBINACA-M3 METABOLITE QUANTIFICATION: NEGATIVE NG/ML
SL AMB ACETYL FENTANYL QUANTIFICATION: NORMAL NG/ML
SL AMB ACETYL NORFENTANYL QUANTIFICATION: NORMAL NG/ML
SL AMB ACRYL FENTANYL QUANTIFICATION: NORMAL NG/ML
SL AMB BUTRYL FENTANYL QUANTIFICATION: NORMAL NG/ML
SL AMB CARFENTANIL QUANTIFICATION: NORMAL NG/ML
SL AMB CYCLOPROPYL FENTANYL QUANTIFICATION: NORMAL NG/ML
SL AMB DEXTRORPHAN (DEXTROMETHORPHAN METABOLITE) QUANT: NEGATIVE NG/ML
SL AMB FURANYL FENTANYL QUANTIFICATION: NORMAL NG/ML
SL AMB GABAPENTIN QUANTIFICATION: NEGATIVE
SL AMB JWH018 METABOLITE QUANTIFICATION: NEGATIVE NG/ML
SL AMB JWH073 METABOLITE QUANTIFICATION: NEGATIVE NG/ML
SL AMB MDMB-FUBINACA-M1 METABOLITE QUANTIFICATION: NEGATIVE NG/ML
SL AMB METHOXYACETYL FENTANYL QUANTIFICATION: NORMAL NG/ML
SL AMB METHYLONE QUANTIFICATION: NEGATIVE NG/ML
SL AMB N-DESMETHYL U-47700 QUANTIFICATION: NORMAL NG/ML
SL AMB N-DESMETHYL-TRAMADOL QUANTIFICATION: NEGATIVE NG/ML
SL AMB PHENTERMINE QUANTIFICATION: NEGATIVE NG/ML
SL AMB PREGABALIN QUANTIFICATION: NEGATIVE
SL AMB RCS4 METABOLITE QUANTIFICATION: NEGATIVE NG/ML
SL AMB RITALINIC ACID QUANTIFICATION: NEGATIVE NG/ML
SL AMB U-47700 QUANTIFICATION: NORMAL NG/ML
SMOOTH MUSCLE AB TITR SER IF: NEGATIVE NG/ML
SPECIMEN DRAWN SERPL: NEGATIVE NG/ML
SPECIMEN PH ACCEPTABLE UR: NORMAL
TAPENTADOL UR QL CFM: NEGATIVE NG/ML
TEMAZEPAM UR QL CFM: ABNORMAL NG/ML
TEMAZEPAM UR QL CFM: ABNORMAL NG/ML
TRAMADOL UR QL CFM: NEGATIVE NG/ML
URATE/CREAT 24H UR: NEGATIVE NG/ML

## 2022-05-18 DIAGNOSIS — F41.1 GENERALIZED ANXIETY DISORDER: ICD-10-CM

## 2022-05-18 RX ORDER — ALPRAZOLAM 0.5 MG/1
0.5 TABLET ORAL
Qty: 30 TABLET | Refills: 0 | Status: SHIPPED | OUTPATIENT
Start: 2022-05-18 | End: 2022-06-14 | Stop reason: SDUPTHER

## 2022-06-13 ENCOUNTER — RADIATION ONCOLOGY FOLLOW-UP (OUTPATIENT)
Dept: RADIATION ONCOLOGY | Facility: CLINIC | Age: 82
End: 2022-06-13
Attending: RADIOLOGY
Payer: COMMERCIAL

## 2022-06-13 VITALS
WEIGHT: 197.75 LBS | TEMPERATURE: 98 F | HEIGHT: 68 IN | RESPIRATION RATE: 16 BRPM | OXYGEN SATURATION: 95 % | HEART RATE: 76 BPM | SYSTOLIC BLOOD PRESSURE: 130 MMHG | DIASTOLIC BLOOD PRESSURE: 74 MMHG | BODY MASS INDEX: 29.97 KG/M2

## 2022-06-13 DIAGNOSIS — C77.5 MALIGNANT NEOPLASM OF PROSTATE METASTATIC TO INTRAPELVIC LYMPH NODE (HCC): Primary | ICD-10-CM

## 2022-06-13 DIAGNOSIS — C61 MALIGNANT NEOPLASM OF PROSTATE METASTATIC TO INTRAPELVIC LYMPH NODE (HCC): Primary | ICD-10-CM

## 2022-06-13 PROCEDURE — 99211 OFF/OP EST MAY X REQ PHY/QHP: CPT | Performed by: RADIOLOGY

## 2022-06-13 PROCEDURE — 99213 OFFICE O/P EST LOW 20 MIN: CPT | Performed by: RADIOLOGY

## 2022-06-13 NOTE — PROGRESS NOTES
Follow-up - Radiation Oncology   Del Clayton 1940 78 y o  male [id number]      History of Present Illness   Cancer Staging  See Below    Del Clayton is a 78y o  year old male with a history of recurrent prostate adenocarcinoma   He is status post prostatectomy in 1997  He developed 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 involving a right pelvic lymph node and within the bone  He completed radiation therapy to the whole pelvis including his right pelvic lymphadenopathy, prostate bed, and right acetabular region on September 25, 2018  The pt was last seen in radiation on 10/18/2021  He returns today for follow up      Interval History:        Lab Results   Component Value Date     PSA <0 1 12/13/2021     PSA <0 1 02/12/2021     PSA <0 1 07/09/2020 11/6/21-11/9/21  Hospitalized [facility]  DX: COVID pneumonia     No recent visits with urology     He reports he is feeling well  He reports normal bowel movements with no diarrhea   He was seeing a small amount of blood when he wipes about once per week, but this has improved now with no blood for over 1 year  Sarai Ruiz had a negative colonoscopy per his report 10 years ago   He had a repeat colonoscopy November 26, 2019 revealed 1 tubular adenoma and some radiation proctitis    Since he no denies any rectal bleeding, he is not using Canasa suppositories   He does continue to take Metamucil powder once or twice a day  Sarai Ruiz reports a good urinary stream   He urinates about every 2 hours during the day  He has stable nocturia x2  Sarai Ruiz is active and goes for a daily morning walk for 2 miles   He denies any right hip nor pelvic pains    Both the patient and his wife have had both of their COVID vaccines in February 2021  they have not had COVID booster vaccinations  He did have COVID pneumonia in November 2021 but his wife did not become infected    He was in the hospital for 3 days with shortness of breath but did not require ventilation  He has stable poor short-term memory  He reports he did have a cognitive evaluation the last year and was diagnosed with age-related memory loss" but no dementia  Historical Information   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 ([facility] [address] [facility] )   1997 Initial Diagnosis    Prostate cancer     1997 Surgery    Prostatectomy in [place], [place] 6 disease     7/5/2018 Initial Diagnosis    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 ([facility] )     8/2/2018 - 9/25/2018 Radiation    Treatment:  Course: C1 toPelvis, Rt acetabulum & prostate bed     Plan ID Energy Fractions Dose per Fraction (cGy) Dose Correction (cGy) Total Dose Delivered (cGy) Elapsed Days   CD P Bed 10X 12 / 12 180 0 2,160 15   WP_R Acetab 10X 25 / 25 180 0 4,500 36      Treatment dates:  C1: 8/2/2018 - 9/25/2018           Past Medical History:   Diagnosis Date    Cancer ([facility] )     Chronic kidney disease, stage 3a ([facility] ) 06/05/2021    Coronary artery calcification seen on CT scan 11/07/2021    COVID-19 11/06/2021    [facility]     Disease of thyroid gland     Eczema     Generalized anxiety disorder     Hypertension     Prostate cancer ([facility] )     Prostate cancer metastatic to intrapelvic lymph node ([facility] )     Skin disorder     suspect benign, but will need removal and due to location, refer   Last assessed: April 18, 2013     Past Surgical History:   Procedure Laterality Date    CATARACT EXTRACTION      COLONOSCOPY      COLONOSCOPY  11/26/2019    EYE SURGERY      KNEE SURGERY      PROSTATE SURGERY      VASECTOMY         Social History   Social History     Substance and Sexual Activity   Alcohol Use No     Social History     Substance and Sexual Activity   Drug Use No    Comment: Chronic Narcotic use noted in "[employer]scripts"      Social History     Tobacco Use   Smoking Status Former Smoker    Types: Cigarettes, Cigars    Quit date: 12    Years since quittin 4   Smokeless Tobacco Never Used   Tobacco Comment    smokes 1-2 cigarss/month     Meds/Allergies     Current Outpatient Medications:     ALPRAZolam (XANAX) 0 5 mg tablet, Take 1 tablet (0 5 mg total) by mouth daily at bedtime as needed for anxiety, Disp: 30 tablet, Rfl: 0    atorvastatin (LIPITOR) 40 mg tablet, Take 1 tablet (40 mg total) by mouth daily, Disp: 90 tablet, Rfl: 3    levothyroxine 88 mcg tablet, Take 1 tablet (88 mcg total) by mouth daily, Disp: 90 tablet, Rfl: 0    lisinopril-hydrochlorothiazide (PRINZIDE,ZESTORETIC) 20-25 MG per tablet, Take 1 tablet by mouth daily, Disp: 90 tablet, Rfl: 0    mesalamine (CANASA) 1,000 mg suppository, 1,000 mg as needed , Disp: , Rfl: 1    Multiple Vitamin (MULTI-VITAMIN DAILY PO), Take 1 tablet by mouth daily, Disp: , Rfl:     triamcinolone (KENALOG) 0 1 % cream, Apply topically 2 (two) times a day (Patient taking differently: Apply topically as needed), Disp: 454 g, Rfl: 5  No Known Allergies    Review of Systems  Constitutional: Negative  Negative for activity change, appetite change and unexpected weight change  HENT: Negative  Negative for dental problem  Eyes: Negative for discharge and itching  Wears glasses  Weak left eye  Respiratory: Negative  Negative for chest tightness, shortness of breath, wheezing and stridor  Cardiovascular: Negative  Negative for chest pain, palpitations and leg swelling  Gastrointestinal: Negative  Diarrhea: once in awhile  Urgency with bowel movements with no improvement   Endocrine: Negative  Negative for cold intolerance and heat intolerance  Genitourinary: Positive for frequency (voiding every 2 hours) and urgency  Negative for dysuria and hematuria  Nocturia x 1-2, stream is fair   Musculoskeletal: Positive for arthralgias (right shoulder)  Skin: Negative          eczema   Allergic/Immunologic: Positive for environmental allergies  Neurological: Negative  Negative for tremors and weakness  Hematological: Bruises/bleeds easily  Psychiatric/Behavioral: Negative  Negative for sleep disturbance  The patient is not nervous/anxious  Uses alprazolam       Clinical Trial: no     IPSS Questionnaire (AUA-7): Over the past month     1)  How often have you had a sensation of not emptying your bladder completely after you finish urinating? 0 - Not at all   2)  How often have you had to urinate again less than two hours after you finished urinating? 3 - About half the time   3)  How often have you found you stopped and started again several times when you urinated? 0 - Not at all   4) How difficult have you found it to postpone urination? 3 - About half the time   5) How often have you had a weak urinary stream?  1 - Less than 1 time in 5   6) How often have you had to push or strain to begin urination? 0 - Not at all   7) How many times did you most typically get up to urinate from the time you went to bed until the time you got up in the morning? 2 - 2 times   Total Score:  9     OBJECTIVE:   /74 (BP Location: Right arm, Patient Position: Sitting, Cuff Size: Large)   Pulse 76   Temp 98 °F (36 7 °C) (Temporal)   Resp 16   Ht 5' 8" (1 727 m)   Wt 89 7 kg (197 lb 12 oz)   SpO2 95%   BMI 30 07 kg/m²   Pain Assessment:  0  ECOG/Zubrod/WHO: 1 - Symptomatic but completely ambulatory    Physical Exam   Constitutional: He is oriented to person, place, and time  He appears well-developed and well-nourished  No distress  HENT:   Head: Normocephalic and atraumatic  Mouth/Throat: No oropharyngeal exudate  Eyes: Pupils are equal, round, and reactive to light  Conjunctivae and EOM are normal  No scleral icterus  Neck: Normal range of motion  Neck supple  No tracheal deviation present  No thyromegaly present     Cardiovascular: Normal rate, regular rhythm and normal heart sounds     Pulmonary/Chest: Effort normal and breath sounds normal  No respiratory distress  He has no wheezes  He has no rales  He exhibits no tenderness  Abdominal: Soft  Bowel sounds are normal  He exhibits no distension and no mass  There is no tenderness  Genitourinary:   Genitourinary Comments: Rectal examination deferred    Musculoskeletal: Normal range of motion  He exhibits no edema or tenderness  Lymphadenopathy:     He has no cervical adenopathy         Right: No inguinal and no supraclavicular adenopathy present         Left: No inguinal and no supraclavicular adenopathy present  Neurological: He is alert and oriented to person, place, and time  No cranial nerve deficit  Coordination normal    Skin: Skin is warm and dry  No rash noted  He is not diaphoretic  No erythema  No pallor  Psychiatric: He has a normal mood and affect  His behavior is normal  Judgment and thought content normal    Nursing note and vitals reviewed    RESULTS    Lab Results: No results found for this or any previous visit (from the past 672 hour(s))  Imaging Studies:No results found  Assessment/Plan:  Orders Placed This Encounter   Procedures    PSA Total, Diagnostic        Renee Townsend is a 78y o  year old male with a history of recurrent prostate Mckinley Alethea is status post prostatectomy in 1997 when he lived in [place] and did not require any postoperative radiation therapy  Corey Situ had a rising PSA level that went up to 1 4 in April 2018   Axumin PET-CT scan July 5, 2018 showed increased uptake in the right pelvic sidewall lymph node in addition the right posterior acetabulum consistent with metastatic disease  This was confirmed on subsequent bone scan performed July 17, 2018    He has a 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 involving a right pelvic lymph node and within the bone   Recommendations were made salvage radiation therapy to the whole pelvis including his right pelvic lymphadenopathy, prostate bed, and right acetabular region  Corey Situ completed treatment on September 25, 2018 and returns today for follow-up examination       He is doing well  Spike Feliz has no clinical or biochemical evidence of any recurrent disease   PSA level was 0 4 NG/mL on January 17, 2019  PSA level decreased further to 0 2 NG/mL on May 16, 2019    His PSA level November 21, 2019, July 9, 2020, February 12, 2021, and December 13, 2021 was less than 0 1 NG/mL   We are pleased with his good clinical and biochemical response to treatment    He had a repeat colonoscopy November 26, 2019 and was found to have some radiation proctitis and a benign tubular adenoma  Spike Feliz has been on Metamucil powder twice a day and was having a small amount of blood on average once a  month which stopped over 12 months ago  Spike Feliz stopped using Canasa suppositories   He continues on Metamucil twice a day  He will have repeat blood work including a PSA level before he sees Dr Jason Leslie on August 22, 2022  We will call him with the results of the PSA blood test   He will return for follow-up in 12 months        Hector Hernandez MD  6/13/2022,3:26 PM    Portions of the record may have been created with voice recognition software   Occasional wrong word or "sound a like" substitutions may have occurred due to the inherent limitations of voice recognition software   Read the chart carefully and recognize, using context, where substitutions have occurred

## 2022-06-13 NOTE — PROGRESS NOTES
Leah Mariscal 1940 is a 78 y o  male  with a history of recurrent prostate adenocarcinoma  He is status post prostatectomy in 1997  He developed 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 involving a right pelvic lymph node and within the bone  He completed radiation therapy to the whole pelvis including his right pelvic lymphadenopathy, prostate bed, and right acetabular region on September 25, 2018  The pt was last seen in radiation on 10/18/2021  He returns today for follow up  Lab Results   Component Value Date    PSA <0 1 12/13/2021    PSA <0 1 02/12/2021    PSA <0 1 07/09/2020 11/6/21-11/9/21  Hospitalized [facility]  DX: COVID pneumonia    No recent visits with urology          Follow up visit     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 [place] [address] )     8/2/2018 - 9/25/2018 Radiation    Treatment:  Course: C1 toPelvis, Rt acetabulum & prostate bed     Plan ID Energy Fractions Dose per Fraction (cGy) Dose Correction (cGy) Total Dose Delivered (cGy) Elapsed Days   CD P Bed 10X 12 / 12 180 0 2,160 15   WP_R Acetab 10X 25 / 25 180 0 4,500 36      Treatment dates:  C1: 8/2/2018 - 9/25/2018           Review of Systems:  Review of Systems   Constitutional: Negative  Negative for activity change, appetite change and unexpected weight change  HENT: Negative  Negative for dental problem  Eyes: Negative for discharge and itching  Wears glasses  Weak left eye  Respiratory: Negative  Negative for chest tightness, shortness of breath, wheezing and stridor  Cardiovascular: Negative  Negative for chest pain, palpitations and leg swelling  Gastrointestinal: Negative  Diarrhea: once in awhile  Urgency with bowel movements with no improvement   Endocrine: Negative  Negative for cold intolerance and heat intolerance  Genitourinary: Positive for frequency (voiding every 2 hours) and urgency  Negative for dysuria and hematuria  Nocturia x 1-2, stream is fair   Musculoskeletal: Positive for arthralgias (right shoulder)  Skin: Negative          eczema   Allergic/Immunologic: Positive for environmental allergies  Neurological: Negative  Negative for tremors and weakness  Hematological: Bruises/bleeds easily  Psychiatric/Behavioral: Negative  Negative for sleep disturbance  The patient is not nervous/anxious  Uses alprazolam        Clinical Trial: no    IPSS Questionnaire (AUA-7): Over the past month    1)  How often have you had a sensation of not emptying your bladder completely after you finish urinating? 0 - Not at all   2)  How often have you had to urinate again less than two hours after you finished urinating? 3 - About half the time   3)  How often have you found you stopped and started again several times when you urinated? 0 - Not at all   4) How difficult have you found it to postpone urination? 3 - About half the time   5) How often have you had a weak urinary stream?  1 - Less than 1 time in 5   6) How often have you had to push or strain to begin urination? 0 - Not at all   7) How many times did you most typically get up to urinate from the time you went to bed until the time you got up in the morning?   2 - 2 times   Total Score:  9       Teaching    Covid Vaccine Status Vaccinated x2    Health Maintenance   Topic Date Due    SLP PLAN OF CARE  Never done    Pneumococcal Vaccine: 65+ Years (3 - PPSV23 or PCV20) 04/11/2018    COVID-19 Vaccine (3 - Pfizer risk series) 03/26/2021    Fall Risk  04/20/2023    Depression Screening  04/20/2023    Medicare Annual Wellness Visit (AWV)  04/20/2023    BMI: Followup Plan  04/20/2023    BMI: Adult  04/20/2023    DTaP,Tdap,and Td Vaccines (2 - Td or Tdap) 05/01/2024    Hepatitis C Screening  Completed    Influenza Vaccine  Completed    HIB Vaccine  Aged Out    Hepatitis B Vaccine  Aged Out    IPV Vaccine  Aged Out    Hepatitis A Vaccine  Aged Out    Meningococcal ACWY Vaccine  Aged Out    HPV Vaccine  Aged Out     Patient Active Problem List   Diagnosis    Vesicular palmoplantar eczema    Eczema    Generalized anxiety disorder    Hyperlipidemia    Hypertension    Hypothyroidism    Osteoarthritis    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 ([facility] [address] )    History of radiation therapy    Chronic kidney disease, stage 3a ([facility] [address] )    History of COVID-19    Degenerative arthritis of spine    Coronary artery calcification seen on CT scan     Past Medical History:   Diagnosis Date    Cancer ([facility] )     Chronic kidney disease, stage 3a ([facility] [address] ) 06/05/2021    Coronary artery calcification seen on CT scan 11/07/2021    COVID-19 11/06/2021    Dementia ([facility] )     Disease of thyroid gland     Eczema     Generalized anxiety disorder     Hypertension     Prostate cancer ([facility] [address] )     Prostate cancer metastatic to intrapelvic lymph node ([facility] [address] )     Skin disorder     suspect benign, but will need removal and due to location, refer   Last assessed: April 18, 2013     Past Surgical History:   Procedure Laterality Date    CATARACT EXTRACTION      COLONOSCOPY      COLONOSCOPY  11/26/2019    EYE SURGERY      KNEE SURGERY      PROSTATE SURGERY      VASECTOMY       Family History   Problem Relation Age of Onset    Alcohol abuse Mother     Alcohol abuse Father     Depression Father     No Known Problems Sister     Stroke Brother         syndrome     No Known Problems Maternal Grandmother     No Known Problems Maternal Grandfather     No Known Problems Paternal Grandmother     No Known Problems Paternal Grandfather     Alcohol abuse Family     Colon cancer Maternal Aunt      Social History Socioeconomic History    Marital status: /Civil Union     Spouse name: Not on file    Number of children: Not on file    Years of education: Not on file    Highest education level: Not on file   Occupational History    Occupation: self employed  floor covering   Tobacco Use    Smoking status: Former Smoker     Types: Cigars    Smokeless tobacco: Never Used    Tobacco comment: smokes 1 cigar a couple times a month   Vaping Use    Vaping Use: Never used   Substance and Sexual Activity    Alcohol use: No    Drug use: No     Comment: Chronic Narcotic use noted in "allscripts"     Sexual activity: Not on file   Other Topics Concern    Not on file   Social History Narrative    Caffeine use      Social Determinants of Health     Financial Resource Strain: Not on file   Food Insecurity: No Food Insecurity    Worried About Running Out of Food in the Last Year: Never true    Suad of Food in the Last Year: Never true   Transportation Needs: No Transportation Needs    Lack of Transportation (Medical): No    Lack of Transportation (Non-Medical):  No   Physical Activity: Not on file   Stress: Not on file   Social Connections: Not on file   Intimate Partner Violence: Not on file   Housing Stability: Low Risk     Unable to Pay for Housing in the Last Year: No    Number of Places Lived in the Last Year: 1    Unstable Housing in the Last Year: No       Current Outpatient Medications:     ALPRAZolam (XANAX) 0 5 mg tablet, Take 1 tablet (0 5 mg total) by mouth daily at bedtime as needed for anxiety, Disp: 30 tablet, Rfl: 0    atorvastatin (LIPITOR) 40 mg tablet, Take 1 tablet (40 mg total) by mouth daily, Disp: 90 tablet, Rfl: 3    levothyroxine 88 mcg tablet, Take 1 tablet (88 mcg total) by mouth daily, Disp: 90 tablet, Rfl: 0    lisinopril-hydrochlorothiazide (PRINZIDE,ZESTORETIC) 20-25 MG per tablet, Take 1 tablet by mouth daily, Disp: 90 tablet, Rfl: 0    mesalamine (CANASA) 1,000 mg suppository, 1,000 mg as needed , Disp: , Rfl: 1    Multiple Vitamin (MULTI-VITAMIN DAILY PO), Take 1 tablet by mouth daily, Disp: , Rfl:     triamcinolone (KENALOG) 0 1 % cream, Apply topically 2 (two) times a day (Patient taking differently: Apply topically as needed ), Disp: 454 g, Rfl: 5  No Known Allergies  There were no vitals filed for this visit

## 2022-06-14 DIAGNOSIS — I25.10 CORONARY ARTERY CALCIFICATION SEEN ON CT SCAN: ICD-10-CM

## 2022-06-14 DIAGNOSIS — E03.9 HYPOTHYROIDISM, UNSPECIFIED TYPE: ICD-10-CM

## 2022-06-14 DIAGNOSIS — F41.1 GENERALIZED ANXIETY DISORDER: ICD-10-CM

## 2022-06-14 RX ORDER — ATORVASTATIN CALCIUM 40 MG/1
40 TABLET, FILM COATED ORAL DAILY
Qty: 90 TABLET | Refills: 0 | OUTPATIENT
Start: 2022-06-14

## 2022-06-14 RX ORDER — ALPRAZOLAM 0.5 MG/1
0.5 TABLET ORAL
Qty: 30 TABLET | Refills: 0 | Status: SHIPPED | OUTPATIENT
Start: 2022-06-14 | End: 2022-07-13 | Stop reason: SDUPTHER

## 2022-06-14 RX ORDER — LEVOTHYROXINE SODIUM 88 UG/1
88 TABLET ORAL DAILY
Qty: 90 TABLET | Refills: 0 | Status: SHIPPED | OUTPATIENT
Start: 2022-06-14

## 2022-07-13 DIAGNOSIS — F41.1 GENERALIZED ANXIETY DISORDER: ICD-10-CM

## 2022-07-13 DIAGNOSIS — I10 ESSENTIAL HYPERTENSION: ICD-10-CM

## 2022-07-14 RX ORDER — ALPRAZOLAM 0.5 MG/1
0.5 TABLET ORAL
Qty: 30 TABLET | Refills: 0 | Status: SHIPPED | OUTPATIENT
Start: 2022-07-14 | End: 2022-08-14 | Stop reason: SDUPTHER

## 2022-07-14 RX ORDER — LISINOPRIL AND HYDROCHLOROTHIAZIDE 25; 20 MG/1; MG/1
1 TABLET ORAL DAILY
Qty: 90 TABLET | Refills: 0 | Status: SHIPPED | OUTPATIENT
Start: 2022-07-14 | End: 2022-10-12 | Stop reason: SDUPTHER

## 2022-08-14 DIAGNOSIS — F41.1 GENERALIZED ANXIETY DISORDER: ICD-10-CM

## 2022-08-15 RX ORDER — ALPRAZOLAM 0.5 MG/1
0.5 TABLET ORAL
Qty: 30 TABLET | Refills: 0 | Status: SHIPPED | OUTPATIENT
Start: 2022-08-15 | End: 2022-09-14 | Stop reason: SDUPTHER

## 2022-08-16 ENCOUNTER — RA CDI HCC (OUTPATIENT)
Dept: OTHER | Facility: HOSPITAL | Age: 82
End: 2022-08-16

## 2022-08-16 NOTE — PROGRESS NOTES
[facility]  coding opportunities     Per 4/20/22 progress notes, the prostate cancer is in remission   If this is accurate, the more appropriate codes to use are as follows:    Z85 46 Personal history of malignant neoplasm of prostate  Z85 89 Personal history of malignant neoplasm of other organs and systems      Chart reviewed, no opportunity found: CHART REVIEWED, NO OPPORTUNITY FOUND        Patients Insurance     Medicare Insurance: [employer]age

## 2022-08-22 ENCOUNTER — APPOINTMENT (OUTPATIENT)
Dept: LAB | Facility: CLINIC | Age: 82
End: 2022-08-22
Payer: COMMERCIAL

## 2022-08-22 ENCOUNTER — OFFICE VISIT (OUTPATIENT)
Dept: INTERNAL MEDICINE CLINIC | Facility: CLINIC | Age: 82
End: 2022-08-22
Payer: COMMERCIAL

## 2022-08-22 VITALS
OXYGEN SATURATION: 96 % | HEART RATE: 44 BPM | DIASTOLIC BLOOD PRESSURE: 58 MMHG | TEMPERATURE: 97.8 F | BODY MASS INDEX: 29.78 KG/M2 | WEIGHT: 196.5 LBS | SYSTOLIC BLOOD PRESSURE: 112 MMHG | HEIGHT: 68 IN

## 2022-08-22 DIAGNOSIS — E78.5 HYPERLIPIDEMIA, UNSPECIFIED HYPERLIPIDEMIA TYPE: ICD-10-CM

## 2022-08-22 DIAGNOSIS — I25.10 CORONARY ARTERY CALCIFICATION SEEN ON CT SCAN: ICD-10-CM

## 2022-08-22 DIAGNOSIS — E03.9 HYPOTHYROIDISM, UNSPECIFIED TYPE: ICD-10-CM

## 2022-08-22 DIAGNOSIS — N18.31 CHRONIC KIDNEY DISEASE, STAGE 3A (HCC): ICD-10-CM

## 2022-08-22 DIAGNOSIS — Z23 ENCOUNTER FOR VACCINATION: ICD-10-CM

## 2022-08-22 DIAGNOSIS — C61 MALIGNANT NEOPLASM OF PROSTATE METASTATIC TO INTRAPELVIC LYMPH NODE (HCC): ICD-10-CM

## 2022-08-22 DIAGNOSIS — Z92.3 HISTORY OF RADIATION THERAPY: ICD-10-CM

## 2022-08-22 DIAGNOSIS — C77.5 MALIGNANT NEOPLASM OF PROSTATE METASTATIC TO INTRAPELVIC LYMPH NODE (HCC): ICD-10-CM

## 2022-08-22 DIAGNOSIS — F41.1 GENERALIZED ANXIETY DISORDER: ICD-10-CM

## 2022-08-22 DIAGNOSIS — M19.91 PRIMARY OSTEOARTHRITIS, UNSPECIFIED SITE: ICD-10-CM

## 2022-08-22 DIAGNOSIS — I10 PRIMARY HYPERTENSION: Primary | ICD-10-CM

## 2022-08-22 LAB
PSA SERPL-MCNC: <0.1 NG/ML (ref 0–4)
TSH SERPL DL<=0.05 MIU/L-ACNC: 1.26 UIU/ML (ref 0.45–4.5)

## 2022-08-22 PROCEDURE — 84153 ASSAY OF PSA TOTAL: CPT

## 2022-08-22 PROCEDURE — 3078F DIAST BP <80 MM HG: CPT | Performed by: INTERNAL MEDICINE

## 2022-08-22 PROCEDURE — 99214 OFFICE O/P EST MOD 30 MIN: CPT | Performed by: INTERNAL MEDICINE

## 2022-08-22 PROCEDURE — 3074F SYST BP LT 130 MM HG: CPT | Performed by: INTERNAL MEDICINE

## 2022-08-22 PROCEDURE — 1160F RVW MEDS BY RX/DR IN RCRD: CPT | Performed by: INTERNAL MEDICINE

## 2022-08-22 PROCEDURE — 36415 COLL VENOUS BLD VENIPUNCTURE: CPT

## 2022-08-22 PROCEDURE — 84443 ASSAY THYROID STIM HORMONE: CPT

## 2022-08-22 NOTE — PROGRESS NOTES
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)    Relevant Orders    PSA Total, Diagnostic       Genitourinary    Chronic kidney disease, stage 3a ([facility] [address] )       Other    Hyperlipidemia    History of radiation therapy    Generalized anxiety disorder      Other Visit Diagnoses     Encounter for vaccination               Diagnoses and all orders for this visit:    Primary hypertension    Hypothyroidism, unspecified type  -     TSH, 3rd generation; Future    Coronary artery calcification seen on CT scan    Primary osteoarthritis, unspecified site    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)  -     PSA Total, Diagnostic; Future    Chronic kidney disease, stage 3a (HCC)    Hyperlipidemia, unspecified hyperlipidemia type    Generalized anxiety disorder    History of radiation therapy    Encounter for vaccination      No problem-specific Assessment & Plan notes found for this encounter  A/P: Doing ok and will check a TSH  Onc requested a PSA as well and will add this   Repeat HR were greater than 60's  Will monitor for now with pt asymptomatic otherwise  Discussed vaccines defers prevnar 20  Teresa Pringle Continue current treatment and RTC four months for routine  Subjective:      Patient ID: Kevin Oquendo is a 78 y o  male  WM RTC for f/u htn, hypothyroidism, etc  Doing ok and no new issues  Remains active w/o difficulty and no falls  Denies CP, SOB, edema, palpitations, orthopnea or PND  Chronic pain is manageable  Prostate Ca is in remission  NADIA is controlled  Due for labs and vaccines         The following portions of the patient's history were reviewed and updated as appropriate:   He has a past medical history of Cancer ([facility] ), Chronic kidney disease, stage 3a ([place] [address] ) (06/05/2021), Coronary artery calcification seen on CT scan (11/07/2021), COVID-19 (11/06/2021), Dementia ([place] [address] ), Disease of thyroid gland, Eczema, Generalized anxiety disorder, Hypertension, Prostate cancer [facility]), Prostate cancer metastatic to intrapelvic lymph node ([place] [address] ), and Skin disorder  ,  does not have any pertinent problems on file  ,   has a past surgical history that includes Knee surgery; Prostate surgery; Vasectomy; Cataract extraction; Eye surgery; Colonoscopy; and Colonoscopy (11/26/2019)  ,  family history includes Alcohol abuse in his family, father, and mother; Colon cancer in his maternal aunt; Depression in his father; No Known Problems in his maternal grandfather, maternal grandmother, paternal grandfather, paternal grandmother, and sister; Stroke in his brother  ,   reports that he quit smoking about 36 years ago  His smoking use included cigarettes and cigars  He has never used smokeless tobacco  He reports that he does not drink alcohol and does not use drugs  ,  has No Known Allergies     Current Outpatient Medications   Medication Sig Dispense Refill    ALPRAZolam (XANAX) 0 5 mg tablet Take 1 tablet (0 5 mg total) by mouth daily at bedtime as needed for anxiety 30 tablet 0    atorvastatin (LIPITOR) 40 mg tablet Take 1 tablet (40 mg total) by mouth daily 90 tablet 3    levothyroxine 88 mcg tablet Take 1 tablet (88 mcg total) by mouth daily 90 tablet 0    lisinopril-hydrochlorothiazide (PRINZIDE,ZESTORETIC) 20-25 MG per tablet Take 1 tablet by mouth daily 90 tablet 0    mesalamine (CANASA) 1,000 mg suppository 1,000 mg as needed   1    Multiple Vitamin (MULTI-VITAMIN DAILY PO) Take 1 tablet by mouth daily      triamcinolone (KENALOG) 0 1 % cream Apply topically 2 (two) times a day (Patient taking differently: Apply topically as needed) 454 g 5     No current facility-administered medications for this visit  Review of Systems   Constitutional: Negative for activity change, chills, diaphoresis, fatigue and fever  HENT: Negative  Eyes: Negative for visual disturbance  Respiratory: Negative for cough, chest tightness, shortness of breath and wheezing  Cardiovascular: Negative for chest pain, palpitations and leg swelling  Gastrointestinal: Negative for abdominal pain, constipation, diarrhea, nausea and vomiting  Endocrine: Negative for cold intolerance and heat intolerance  Genitourinary: Negative for difficulty urinating, dysuria and frequency  Musculoskeletal: Negative for arthralgias, gait problem and myalgias  Neurological: Negative for dizziness, seizures, syncope, weakness, light-headedness and headaches  Psychiatric/Behavioral: Negative for confusion, dysphoric mood and sleep disturbance  The patient is not nervous/anxious  PHQ-2/9 Depression Screening          Objective:  Vitals:    08/22/22 1325   BP: 112/58   Pulse: (!) 44   Temp: 97 8 °F (36 6 °C)   SpO2: 96%   Weight: 89 1 kg (196 lb 8 oz)   Height: 5' 8" (1 727 m)     Body mass index is 29 88 kg/m²  Physical Exam  Vitals and nursing note reviewed  Constitutional:       General: He is not in acute distress  Appearance: Normal appearance  He is not ill-appearing  HENT:      Head: Normocephalic and atraumatic  Mouth/Throat:      Mouth: Mucous membranes are moist    Eyes:      Extraocular Movements: Extraocular movements intact  Conjunctiva/sclera: Conjunctivae normal       Pupils: Pupils are equal, round, and reactive to light  Neck:      Vascular: No carotid bruit  Cardiovascular:      Rate and Rhythm: Normal rate and regular rhythm  Heart sounds: Normal heart sounds  Pulmonary:      Effort: Pulmonary effort is normal  No respiratory distress  Breath sounds: Normal breath sounds  No wheezing or rales     Abdominal:      General: Bowel sounds are normal  There is no distension  Tenderness: There is no abdominal tenderness  Musculoskeletal:      Cervical back: Neck supple  Right lower leg: No edema  Left lower leg: No edema  Neurological:      General: No focal deficit present  Mental Status: He is alert and oriented to person, place, and time  Mental status is at baseline  Psychiatric:         Mood and Affect: Mood normal          Behavior: Behavior normal          Thought Content:  Thought content normal          Judgment: Judgment normal

## 2022-08-22 NOTE — PATIENT INSTRUCTIONS
Chronic Hypertension   AMBULATORY CARE:   Hypertension  is high blood pressure  Your blood pressure is the force of your blood moving against the walls of your arteries  Hypertension causes your blood pressure to get so high that your heart has to work much harder than normal  This can damage your heart  Even if you have hypertension for years, lifestyle changes, medicines, or both can help bring your blood pressure to normal   Call your local emergency number (911 in the [address] [facility]) or have someone call if:   You have chest pain  You have any of the following signs of a heart attack:      Squeezing, pressure, or pain in your chest    You may  also have any of the following:     Discomfort or pain in your back, neck, jaw, stomach, or arm    Shortness of breath    Nausea or vomiting    Lightheadedness or a sudden cold sweat    You become confused or have difficulty speaking  You suddenly feel lightheaded or have trouble breathing  Seek care immediately if:   You have a severe headache or vision loss  You have weakness in an arm or leg  Call your doctor or cardiologist if:   You feel faint, dizzy, confused, or drowsy  You have been taking your blood pressure medicine but your pressure is higher than your provider says it should be  You have questions or concerns about your condition or care  Treatment for chronic hypertension  may include medicine to lower your blood pressure and cholesterol levels  A low cholesterol level helps prevent heart disease and makes it easier to control your blood pressure  Heart disease can make your blood pressure harder to control  You may also need to make lifestyle changes  What you need to know about the stages of hypertension:       Normal blood pressure is 119/79 or lower   Your healthcare provider may only check your blood pressure each year if it stays at a normal level  Elevated blood pressure is 120/79 to 129/79   This is sometimes called prehypertension  Your healthcare provider may suggest lifestyle changes to help lower your blood pressure to a normal level  He or she may then check it again in 3 to 6 months  Stage 1 hypertension is 130/80  to 139/89   Your provider may recommend lifestyle changes, medication, and checks every 3 to 6 months until your blood pressure is controlled  Stage 2 hypertension is 140/90 or higher   Your provider will recommend lifestyle changes and have you take 2 kinds of hypertension medicines  You will also need to have your blood pressure checked monthly until it is controlled  Manage chronic hypertension:   Check your blood pressure at home  Avoid smoking, caffeine, and exercise at least 30 minutes before checking your blood pressure  Sit and rest for 5 minutes before you take your blood pressure  Extend your arm and support it on a flat surface  Your arm should be at the same level as your heart  Follow the directions that came with your blood pressure monitor  Check your blood pressure 2 times, 1 minute apart, before you take your medicine in the morning  Also check your blood pressure before your evening meal  Keep a record of your readings and bring it to your follow-up visits  Ask your healthcare provider what your blood pressure should be  Manage any other health conditions you have  Health conditions such as diabetes can increase your risk for hypertension  Follow your healthcare provider's instructions and take all your medicines as directed  Talk to your healthcare provider about any new health conditions you have recently developed  Ask about all medicines  Certain medicines can increase your blood pressure  Examples include oral birth control pills, decongestants, herbal supplements, and NSAIDs, such as ibuprofen  Your healthcare provider can tell you which medicines are safe for you to take  This includes prescription and over-the-counter medicines      Lifestyle changes you can make to lower your blood pressure: Your provider may want you to make more lifestyle changes if you are having trouble controlling your blood pressure  This may feel difficult over time, especially if you think you are making good changes but your pressure is still high  It might help to focus on one new change at a time  For example, try to add 1 more day of exercise, or exercise for an extra 10 minutes on 2 days  Small changes can make a big difference  Your healthcare provider can also refer you to specialists such as a dietitian who can help you make small changes  Your family members may be included in helping you learn to create lifestyle changes, such as the following:     Limit sodium (salt) as directed  Too much sodium can affect your fluid balance  Check labels to find low-sodium or no-salt-added foods  Some low-sodium foods use potassium salts for flavor  Too much potassium can also cause health problems  Your healthcare provider will tell you how much sodium and potassium are safe for you to have in a day  He or she may recommend that you limit sodium to 2,300 mg a day  Follow the meal plan recommended by your healthcare provider  A dietitian or your provider can give you more information on low-sodium plans or the DASH (Dietary Approaches to Stop Hypertension) eating plan  The DASH plan is low in sodium, processed sugar, unhealthy fats, and total fat  It is high in potassium, calcium, and fiber  These can be found in vegetables, fruit, and whole-grain foods  Be physically active throughout the day  Physical activity, such as exercise, can help control your blood pressure and your weight  Be physically active for at least 30 minutes per day, on most days of the week  Include aerobic activity, such as walking or riding a bicycle  Also include strength training at least 2 times each week  Your healthcare providers can help you create a physical activity plan  Decrease stress    This may help lower your blood pressure  Learn ways to relax, such as deep breathing or listening to music  Limit alcohol as directed  Alcohol can increase your blood pressure  A drink of alcohol is 12 ounces of beer, 5 ounces of wine, or 1½ ounces of liquor  Do not smoke  Nicotine and other chemicals in cigarettes and cigars can increase your blood pressure and also cause lung damage  Ask your healthcare provider for information if you currently smoke and need help to quit  E-cigarettes or smokeless tobacco still contain nicotine  Talk to your healthcare provider before you use these products  Follow up with your doctor or cardiologist as directed: You will need to return to have your blood pressure checked and to have other lab tests done  Write down your questions so you remember to ask them during your visits  © Copyright [employer] 2022 Information is for End User's use only and may not be sold, redistributed or otherwise used for commercial purposes  All illustrations and images included in CareNotes® are the copyrighted property of A D A M , Inc  or [facility] Cristopher Sapp   The above information is an  only  It is not intended as medical advice for individual conditions or treatments  Talk to your doctor, nurse or pharmacist before following any medical regimen to see if it is safe and effective for you

## 2022-09-14 DIAGNOSIS — F41.1 GENERALIZED ANXIETY DISORDER: ICD-10-CM

## 2022-09-14 RX ORDER — ALPRAZOLAM 0.5 MG/1
0.5 TABLET ORAL
Qty: 30 TABLET | Refills: 0 | Status: SHIPPED | OUTPATIENT
Start: 2022-09-14 | End: 2022-10-12 | Stop reason: SDUPTHER

## 2022-10-12 DIAGNOSIS — E03.9 HYPOTHYROIDISM, UNSPECIFIED TYPE: ICD-10-CM

## 2022-10-12 DIAGNOSIS — F41.1 GENERALIZED ANXIETY DISORDER: ICD-10-CM

## 2022-10-12 DIAGNOSIS — I10 ESSENTIAL HYPERTENSION: ICD-10-CM

## 2022-10-12 RX ORDER — ALPRAZOLAM 0.5 MG/1
0.5 TABLET ORAL
Qty: 30 TABLET | Refills: 0 | Status: SHIPPED | OUTPATIENT
Start: 2022-10-12

## 2022-10-12 RX ORDER — LEVOTHYROXINE SODIUM 88 UG/1
88 TABLET ORAL DAILY
Qty: 90 TABLET | Refills: 1 | Status: SHIPPED | OUTPATIENT
Start: 2022-10-12

## 2022-10-12 RX ORDER — LISINOPRIL AND HYDROCHLOROTHIAZIDE 25; 20 MG/1; MG/1
1 TABLET ORAL DAILY
Qty: 90 TABLET | Refills: 1 | Status: SHIPPED | OUTPATIENT
Start: 2022-10-12

## 2022-11-11 DIAGNOSIS — F41.1 GENERALIZED ANXIETY DISORDER: ICD-10-CM

## 2022-11-11 RX ORDER — ALPRAZOLAM 0.5 MG/1
0.5 TABLET ORAL
Qty: 30 TABLET | Refills: 0 | Status: SHIPPED | OUTPATIENT
Start: 2022-11-11

## 2022-11-28 DIAGNOSIS — I25.10 CORONARY ARTERY CALCIFICATION SEEN ON CT SCAN: ICD-10-CM

## 2022-11-28 RX ORDER — ATORVASTATIN CALCIUM 40 MG/1
40 TABLET, FILM COATED ORAL DAILY
Qty: 90 TABLET | Refills: 3 | Status: SHIPPED | OUTPATIENT
Start: 2022-11-28

## 2022-12-09 DIAGNOSIS — F41.1 GENERALIZED ANXIETY DISORDER: ICD-10-CM

## 2022-12-12 RX ORDER — ALPRAZOLAM 0.5 MG/1
0.5 TABLET ORAL
Qty: 30 TABLET | Refills: 0 | Status: SHIPPED | OUTPATIENT
Start: 2022-12-12

## 2022-12-21 ENCOUNTER — RA CDI HCC (OUTPATIENT)
Dept: OTHER | Facility: HOSPITAL | Age: 82
End: 2022-12-21

## 2022-12-22 NOTE — PROGRESS NOTES
[facility]       Chart reviewed, no opportunity found:   [address]        Patients Insurance     Medicare Insurance: [facility] Advantage

## 2023-01-04 ENCOUNTER — OFFICE VISIT (OUTPATIENT)
Dept: INTERNAL MEDICINE CLINIC | Facility: CLINIC | Age: 83
End: 2023-01-04

## 2023-01-04 VITALS
WEIGHT: 199.5 LBS | SYSTOLIC BLOOD PRESSURE: 118 MMHG | OXYGEN SATURATION: 100 % | DIASTOLIC BLOOD PRESSURE: 70 MMHG | BODY MASS INDEX: 30.23 KG/M2 | TEMPERATURE: 97.6 F | HEART RATE: 63 BPM | HEIGHT: 68 IN

## 2023-01-04 DIAGNOSIS — E03.9 HYPOTHYROIDISM, UNSPECIFIED TYPE: ICD-10-CM

## 2023-01-04 DIAGNOSIS — F41.1 GENERALIZED ANXIETY DISORDER: ICD-10-CM

## 2023-01-04 DIAGNOSIS — C77.5 MALIGNANT NEOPLASM OF PROSTATE METASTATIC TO INTRAPELVIC LYMPH NODE (HCC): ICD-10-CM

## 2023-01-04 DIAGNOSIS — E78.5 HYPERLIPIDEMIA, UNSPECIFIED HYPERLIPIDEMIA TYPE: ICD-10-CM

## 2023-01-04 DIAGNOSIS — M19.91 PRIMARY OSTEOARTHRITIS, UNSPECIFIED SITE: ICD-10-CM

## 2023-01-04 DIAGNOSIS — N18.31 CHRONIC KIDNEY DISEASE, STAGE 3A (HCC): ICD-10-CM

## 2023-01-04 DIAGNOSIS — C61 MALIGNANT NEOPLASM OF PROSTATE METASTATIC TO INTRAPELVIC LYMPH NODE (HCC): ICD-10-CM

## 2023-01-04 DIAGNOSIS — I25.10 CORONARY ARTERY CALCIFICATION SEEN ON CT SCAN: ICD-10-CM

## 2023-01-04 DIAGNOSIS — Z13.1 SCREENING FOR DIABETES MELLITUS (DM): ICD-10-CM

## 2023-01-04 DIAGNOSIS — I10 PRIMARY HYPERTENSION: Primary | ICD-10-CM

## 2023-01-04 NOTE — PROGRESS NOTES
BMI Counseling: There is no height or weight on file to calculate BMI  The BMI is above normal  Nutrition recommendations include decreasing portion sizes and encouraging healthy choices of fruits and vegetables  Exercise recommendations include moderate physical activity 150 minutes/week  No pharmacotherapy was ordered  Rationale for BMI follow-up plan is due to patient being overweight or obese  Assessment/Plan:  Problem List Items Addressed This Visit        Endocrine    Hypothyroidism    Relevant Orders    TSH, 3rd generation with Free T4 reflex       Cardiovascular and Mediastinum    Hypertension - Primary    Relevant Orders    CBC and differential    Comprehensive metabolic panel    TSH, 3rd generation with Free T4 reflex    Microalbumin / creatinine urine ratio    Coronary artery calcification seen on CT scan       Musculoskeletal and Integument    Osteoarthritis       Immune and Lymphatic    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)       Genitourinary    Chronic kidney disease, stage 3a (HCC)    Relevant Orders    CBC and differential    Comprehensive metabolic panel    Microalbumin / creatinine urine ratio       Other    Hyperlipidemia    Relevant Orders    Comprehensive metabolic panel    LDL cholesterol, direct    Triglycerides    Generalized anxiety disorder   Other Visit Diagnoses     Screening for diabetes mellitus (DM)        Relevant Orders    Hemoglobin A1C           Diagnoses and all orders for this visit:    Primary hypertension  -     CBC and differential; Future  -     Comprehensive metabolic panel; Future  -     TSH, 3rd generation with Free T4 reflex; Future  -     Microalbumin / creatinine urine ratio    Hypothyroidism, unspecified type  -     TSH, 3rd generation with Free T4 reflex;  Future    Coronary artery calcification seen on CT scan    Primary osteoarthritis, unspecified site    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 ([facility] )    Chronic kidney disease, stage 3a ([facility] )  -     CBC and differential; Future  -     Comprehensive metabolic panel; Future  -     Microalbumin / creatinine urine ratio    Generalized anxiety disorder    Hyperlipidemia, unspecified hyperlipidemia type  -     Comprehensive metabolic panel; Future  -     LDL cholesterol, direct; Future  -     Triglycerides; Future    Screening for diabetes mellitus (DM)  -     Hemoglobin A1C; Future      No problem-specific Assessment & Plan notes found for this encounter  A/P: Doing well and will check labs  Already had his flu vaccine  Continue current treatment and RTC four months for routine  Subjective:      Patient ID: Jeremiah Polanco is a 78 y o  male  WM RTC for f/u HTN, hypothyroidism, etc  Doing ok and no new issues  Remains active w/o difficulty and no falls  Denies CP,SOB, palpitations, edema  Orthopnea or PND  Chronic pain is manageable  NADIA/MDD are controlled  Prostate ca in remission  Due for labs and vaccines  The following portions of the patient's history were reviewed and updated as appropriate:   He has a past medical history of Cancer ([place] [address] ), Chronic kidney disease, stage 3a ([place] [address] ) (06/05/2021), Coronary artery calcification seen on CT scan (11/07/2021), COVID-19 (11/06/2021), Dementia ([place] [address] ), Disease of thyroid gland, Eczema, Generalized anxiety disorder, Hypertension, Prostate cancer ([place] [address] ), Prostate cancer metastatic to intrapelvic lymph node ([place] [address] ), and Skin disorder  ,  does not have any pertinent problems on file  ,   has a past surgical history that includes Knee surgery; Prostate surgery; Vasectomy; Cataract extraction; Eye surgery; Colonoscopy; and Colonoscopy (11/26/2019)  ,  family history includes Alcohol abuse in his family, father, and mother; Colon cancer in his maternal aunt; Depression in his father; No Known Problems in his maternal grandfather, maternal grandmother, paternal grandfather, paternal grandmother, and sister; Stroke in his brother  ,   reports that he quit smoking about 37 years ago  His smoking use included cigarettes and cigars  He has never used smokeless tobacco  He reports that he does not drink alcohol and does not use drugs  ,  has No Known Allergies     Current Outpatient Medications   Medication Sig Dispense Refill   • ALPRAZolam (XANAX) 0 5 mg tablet Take 1 tablet (0 5 mg total) by mouth daily at bedtime as needed for anxiety 30 tablet 0   • atorvastatin (LIPITOR) 40 mg tablet Take 1 tablet (40 mg total) by mouth daily 90 tablet 3   • levothyroxine 88 mcg tablet Take 1 tablet (88 mcg total) by mouth daily 90 tablet 1   • lisinopril-hydrochlorothiazide (PRINZIDE,ZESTORETIC) 20-25 MG per tablet Take 1 tablet by mouth daily 90 tablet 1   • Multiple Vitamin (MULTI-VITAMIN DAILY PO) Take 1 tablet by mouth daily     • triamcinolone (KENALOG) 0 1 % cream Apply topically 2 (two) times a day (Patient taking differently: Apply topically as needed) 454 g 5     No current facility-administered medications for this visit  Review of Systems   Constitutional: Negative for activity change, chills, diaphoresis, fatigue and fever  HENT: Negative  Eyes: Negative for visual disturbance  Respiratory: Negative for cough, chest tightness, shortness of breath and wheezing  Cardiovascular: Negative for chest pain, palpitations and leg swelling  Gastrointestinal: Negative for abdominal pain, constipation, diarrhea, nausea and vomiting  Endocrine: Negative for cold intolerance and heat intolerance  Genitourinary: Negative for difficulty urinating, dysuria and frequency  Musculoskeletal: Negative for arthralgias, gait problem and myalgias  Neurological: Negative for dizziness, seizures, syncope, weakness, light-headedness and headaches  Psychiatric/Behavioral: Negative for confusion, dysphoric mood and sleep disturbance  The patient is not nervous/anxious          PHQ-2/9 Depression Screening    Little interest or pleasure in doing things: 0 - not at all  Feeling down, depressed, or hopeless: 0 - not at all  PHQ-2 Score: 0  PHQ-2 Interpretation: Negative depression screen        Objective:  Vitals:    01/04/23 1445   BP: 118/70   Pulse: 63   Temp: 97 6 °F (36 4 °C)   SpO2: 100%   Weight: 90 5 kg (199 lb 8 oz)   Height: 5' 8" (1 727 m)     Body mass index is 30 33 kg/m²  Physical Exam  Vitals and nursing note reviewed  Constitutional:       General: He is not in acute distress  Appearance: Normal appearance  He is not ill-appearing  HENT:      Head: Normocephalic and atraumatic  Mouth/Throat:      Mouth: Mucous membranes are moist    Eyes:      Extraocular Movements: Extraocular movements intact  Conjunctiva/sclera: Conjunctivae normal       Pupils: Pupils are equal, round, and reactive to light  Neck:      Vascular: No carotid bruit  Cardiovascular:      Rate and Rhythm: Normal rate and regular rhythm  Heart sounds: Normal heart sounds  Pulmonary:      Effort: Pulmonary effort is normal  No respiratory distress  Breath sounds: Normal breath sounds  No wheezing, rhonchi or rales  Abdominal:      General: Bowel sounds are normal  There is no distension  Palpations: Abdomen is soft  Tenderness: There is no abdominal tenderness  Musculoskeletal:      Cervical back: Neck supple  Right lower leg: No edema  Left lower leg: No edema  Neurological:      General: No focal deficit present  Mental Status: He is alert and oriented to person, place, and time  Mental status is at baseline  Psychiatric:         Mood and Affect: Mood normal          Behavior: Behavior normal          Thought Content:  Thought content normal          Judgment: Judgment normal

## 2023-01-04 NOTE — PATIENT INSTRUCTIONS
Chronic Hypertension   AMBULATORY CARE:   Hypertension  is high blood pressure  Your blood pressure is the force of your blood moving against the walls of your arteries  Hypertension causes your blood pressure to get so high that your heart has to work much harder than normal  This can damage your heart  Even if you have hypertension for years, lifestyle changes, medicines, or both can help bring your blood pressure to normal   Call your local emergency number (911 in the [address] [facility]) or have someone call if:   You have chest pain  You have any of the following signs of a heart attack:      Squeezing, pressure, or pain in your chest    You may  also have any of the following:     Discomfort or pain in your back, neck, jaw, stomach, or arm    Shortness of breath    Nausea or vomiting    Lightheadedness or a sudden cold sweat    You become confused or have difficulty speaking  You suddenly feel lightheaded or have trouble breathing  Seek care immediately if:   You have a severe headache or vision loss  You have weakness in an arm or leg  Call your doctor or cardiologist if:   You feel faint, dizzy, confused, or drowsy  You have been taking your blood pressure medicine but your pressure is higher than your provider says it should be  You have questions or concerns about your condition or care  Treatment for chronic hypertension  may include medicine to lower your blood pressure and cholesterol levels  A low cholesterol level helps prevent heart disease and makes it easier to control your blood pressure  Heart disease can make your blood pressure harder to control  You may also need to make lifestyle changes  What you need to know about the stages of hypertension:       Normal blood pressure is 119/79 or lower   Your healthcare provider may only check your blood pressure each year if it stays at a normal level  Elevated blood pressure is 120/79 to 129/79   This is sometimes called prehypertension  Your healthcare provider may suggest lifestyle changes to help lower your blood pressure to a normal level  He or she may then check it again in 3 to 6 months  Stage 1 hypertension is 130/80  to 139/89   Your provider may recommend lifestyle changes, medication, and checks every 3 to 6 months until your blood pressure is controlled  Stage 2 hypertension is 140/90 or higher   Your provider will recommend lifestyle changes and have you take 2 kinds of hypertension medicines  You will also need to have your blood pressure checked monthly until it is controlled  Manage chronic hypertension:   Check your blood pressure at home  Avoid smoking, caffeine, and exercise at least 30 minutes before checking your blood pressure  Sit and rest for 5 minutes before you take your blood pressure  Extend your arm and support it on a flat surface  Your arm should be at the same level as your heart  Follow the directions that came with your blood pressure monitor  Check your blood pressure 2 times, 1 minute apart, before you take your medicine in the morning  Also check your blood pressure before your evening meal  Keep a record of your readings and bring it to your follow-up visits  Ask your healthcare provider what your blood pressure should be  Manage any other health conditions you have  Health conditions such as diabetes can increase your risk for hypertension  Follow your healthcare provider's instructions and take all your medicines as directed  Talk to your healthcare provider about any new health conditions you have recently developed  Ask about all medicines  Certain medicines can increase your blood pressure  Examples include oral birth control pills, decongestants, herbal supplements, and NSAIDs, such as ibuprofen  Your healthcare provider can tell you which medicines are safe for you to take  This includes prescription and over-the-counter medicines      Lifestyle changes you can make to lower your blood pressure: Your provider may want you to make more lifestyle changes if you are having trouble controlling your blood pressure  This may feel difficult over time, especially if you think you are making good changes but your pressure is still high  It might help to focus on one new change at a time  For example, try to add 1 more day of exercise, or exercise for an extra 10 minutes on 2 days  Small changes can make a big difference  Your healthcare provider can also refer you to specialists such as a dietitian who can help you make small changes  Your family members may be included in helping you learn to create lifestyle changes, such as the following:     Limit sodium (salt) as directed  Too much sodium can affect your fluid balance  Check labels to find low-sodium or no-salt-added foods  Some low-sodium foods use potassium salts for flavor  Too much potassium can also cause health problems  Your healthcare provider will tell you how much sodium and potassium are safe for you to have in a day  He or she may recommend that you limit sodium to 2,300 mg a day  Follow the meal plan recommended by your healthcare provider  A dietitian or your provider can give you more information on low-sodium plans or the DASH (Dietary Approaches to Stop Hypertension) eating plan  The DASH plan is low in sodium, processed sugar, unhealthy fats, and total fat  It is high in potassium, calcium, and fiber  These can be found in vegetables, fruit, and whole-grain foods  Be physically active throughout the day  Physical activity, such as exercise, can help control your blood pressure and your weight  Be physically active for at least 30 minutes per day, on most days of the week  Include aerobic activity, such as walking or riding a bicycle  Also include strength training at least 2 times each week  Your healthcare providers can help you create a physical activity plan  Decrease stress    This may help lower your blood pressure  Learn ways to relax, such as deep breathing or listening to music  Limit alcohol as directed  Alcohol can increase your blood pressure  A drink of alcohol is 12 ounces of beer, 5 ounces of wine, or 1½ ounces of liquor  Do not smoke  Nicotine and other chemicals in cigarettes and cigars can increase your blood pressure and also cause lung damage  Ask your healthcare provider for information if you currently smoke and need help to quit  E-cigarettes or smokeless tobacco still contain nicotine  Talk to your healthcare provider before you use these products  Follow up with your doctor or cardiologist as directed: You will need to return to have your blood pressure checked and to have other lab tests done  Write down your questions so you remember to ask them during your visits  © Copyright [employer] 2022 Information is for End User's use only and may not be sold, redistributed or otherwise used for commercial purposes  All illustrations and images included in CareNotes® are the copyrighted property of [employer] Inc  or [facility] Cristopher Sapp   The above information is an  only  It is not intended as medical advice for individual conditions or treatments  Talk to your doctor, nurse or pharmacist before following any medical regimen to see if it is safe and effective for you

## 2023-01-09 ENCOUNTER — APPOINTMENT (OUTPATIENT)
Dept: LAB | Facility: CLINIC | Age: 83
End: 2023-01-09

## 2023-01-09 DIAGNOSIS — E78.5 HYPERLIPIDEMIA, UNSPECIFIED HYPERLIPIDEMIA TYPE: ICD-10-CM

## 2023-01-09 DIAGNOSIS — I10 PRIMARY HYPERTENSION: ICD-10-CM

## 2023-01-09 DIAGNOSIS — E03.9 HYPOTHYROIDISM, UNSPECIFIED TYPE: ICD-10-CM

## 2023-01-09 DIAGNOSIS — N18.31 CHRONIC KIDNEY DISEASE, STAGE 3A (HCC): ICD-10-CM

## 2023-01-09 DIAGNOSIS — Z13.1 SCREENING FOR DIABETES MELLITUS (DM): ICD-10-CM

## 2023-01-09 LAB
ALBUMIN SERPL BCP-MCNC: 3.8 G/DL (ref 3.5–5)
ALP SERPL-CCNC: 88 U/L (ref 46–116)
ALT SERPL W P-5'-P-CCNC: 40 U/L (ref 12–78)
ANION GAP SERPL CALCULATED.3IONS-SCNC: 6 MMOL/L (ref 4–13)
AST SERPL W P-5'-P-CCNC: 26 U/L (ref 5–45)
BASOPHILS # BLD AUTO: 0.04 THOUSANDS/ÂΜL (ref 0–0.1)
BASOPHILS NFR BLD AUTO: 1 % (ref 0–1)
BILIRUB SERPL-MCNC: 0.74 MG/DL (ref 0.2–1)
BUN SERPL-MCNC: 20 MG/DL (ref 5–25)
CALCIUM SERPL-MCNC: 9.1 MG/DL (ref 8.3–10.1)
CHLORIDE SERPL-SCNC: 110 MMOL/L (ref 96–108)
CO2 SERPL-SCNC: 25 MMOL/L (ref 21–32)
CREAT SERPL-MCNC: 1.15 MG/DL (ref 0.6–1.3)
CREAT UR-MCNC: 121 MG/DL
EOSINOPHIL # BLD AUTO: 0.23 THOUSAND/ÂΜL (ref 0–0.61)
EOSINOPHIL NFR BLD AUTO: 3 % (ref 0–6)
ERYTHROCYTE [DISTWIDTH] IN BLOOD BY AUTOMATED COUNT: 13.2 % (ref 11.6–15.1)
EST. AVERAGE GLUCOSE BLD GHB EST-MCNC: 128 MG/DL
GFR SERPL CREATININE-BSD FRML MDRD: 58 ML/MIN/1.73SQ M
GLUCOSE P FAST SERPL-MCNC: 95 MG/DL (ref 65–99)
HBA1C MFR BLD: 6.1 %
HCT VFR BLD AUTO: 41.8 % (ref 36.5–49.3)
HGB BLD-MCNC: 14.1 G/DL (ref 12–17)
IMM GRANULOCYTES # BLD AUTO: 0.04 THOUSAND/UL (ref 0–0.2)
IMM GRANULOCYTES NFR BLD AUTO: 1 % (ref 0–2)
LDLC SERPL DIRECT ASSAY-MCNC: 74 MG/DL (ref 0–100)
LYMPHOCYTES # BLD AUTO: 1.35 THOUSANDS/ÂΜL (ref 0.6–4.47)
LYMPHOCYTES NFR BLD AUTO: 18 % (ref 14–44)
MCH RBC QN AUTO: 31.5 PG (ref 26.8–34.3)
MCHC RBC AUTO-ENTMCNC: 33.7 G/DL (ref 31.4–37.4)
MCV RBC AUTO: 93 FL (ref 82–98)
MICROALBUMIN UR-MCNC: 9.9 MG/L (ref 0–20)
MICROALBUMIN/CREAT 24H UR: 8 MG/G CREATININE (ref 0–30)
MONOCYTES # BLD AUTO: 0.76 THOUSAND/ÂΜL (ref 0.17–1.22)
MONOCYTES NFR BLD AUTO: 10 % (ref 4–12)
NEUTROPHILS # BLD AUTO: 5.23 THOUSANDS/ÂΜL (ref 1.85–7.62)
NEUTS SEG NFR BLD AUTO: 67 % (ref 43–75)
NRBC BLD AUTO-RTO: 0 /100 WBCS
PLATELET # BLD AUTO: 241 THOUSANDS/UL (ref 149–390)
PMV BLD AUTO: 10.6 FL (ref 8.9–12.7)
POTASSIUM SERPL-SCNC: 4.1 MMOL/L (ref 3.5–5.3)
PROT SERPL-MCNC: 7.1 G/DL (ref 6.4–8.4)
RBC # BLD AUTO: 4.48 MILLION/UL (ref 3.88–5.62)
SODIUM SERPL-SCNC: 141 MMOL/L (ref 135–147)
TRIGL SERPL-MCNC: 91 MG/DL
TSH SERPL DL<=0.05 MIU/L-ACNC: 3.11 UIU/ML (ref 0.45–4.5)
WBC # BLD AUTO: 7.65 THOUSAND/UL (ref 4.31–10.16)

## 2023-01-10 DIAGNOSIS — F41.1 GENERALIZED ANXIETY DISORDER: ICD-10-CM

## 2023-01-11 DIAGNOSIS — F41.1 GENERALIZED ANXIETY DISORDER: ICD-10-CM

## 2023-01-11 RX ORDER — ALPRAZOLAM 0.5 MG/1
0.5 TABLET ORAL
Qty: 30 TABLET | Refills: 0 | Status: SHIPPED | OUTPATIENT
Start: 2023-01-11

## 2023-02-10 DIAGNOSIS — F41.1 GENERALIZED ANXIETY DISORDER: ICD-10-CM

## 2023-02-10 RX ORDER — ALPRAZOLAM 0.5 MG/1
0.5 TABLET ORAL
Qty: 30 TABLET | Refills: 0 | Status: SHIPPED | OUTPATIENT
Start: 2023-02-10

## 2023-02-13 ENCOUNTER — TELEPHONE (OUTPATIENT)
Dept: INTERNAL MEDICINE CLINIC | Facility: CLINIC | Age: 83
End: 2023-02-13

## 2023-02-13 NOTE — TELEPHONE ENCOUNTER
Pt states that he thinks he has a UTI, he has some during while urinating and slight groin pain  He would like medication for this, would you like to order a urine dip?

## 2023-02-14 ENCOUNTER — APPOINTMENT (OUTPATIENT)
Dept: RADIOLOGY | Facility: CLINIC | Age: 83
End: 2023-02-14

## 2023-02-14 ENCOUNTER — APPOINTMENT (OUTPATIENT)
Dept: LAB | Facility: CLINIC | Age: 83
End: 2023-02-14

## 2023-02-14 ENCOUNTER — OFFICE VISIT (OUTPATIENT)
Dept: INTERNAL MEDICINE CLINIC | Facility: CLINIC | Age: 83
End: 2023-02-14

## 2023-02-14 VITALS
BODY MASS INDEX: 29.93 KG/M2 | WEIGHT: 197.5 LBS | HEART RATE: 88 BPM | TEMPERATURE: 97.7 F | SYSTOLIC BLOOD PRESSURE: 140 MMHG | OXYGEN SATURATION: 97 % | HEIGHT: 68 IN | DIASTOLIC BLOOD PRESSURE: 80 MMHG

## 2023-02-14 DIAGNOSIS — M79.652 LEFT THIGH PAIN: ICD-10-CM

## 2023-02-14 DIAGNOSIS — R10.32 LEFT GROIN PAIN: Primary | ICD-10-CM

## 2023-02-14 DIAGNOSIS — R10.32 LEFT GROIN PAIN: ICD-10-CM

## 2023-02-14 DIAGNOSIS — R31.29 OTHER MICROSCOPIC HEMATURIA: ICD-10-CM

## 2023-02-14 DIAGNOSIS — C61 MALIGNANT NEOPLASM OF PROSTATE METASTATIC TO INTRAPELVIC LYMPH NODE (HCC): ICD-10-CM

## 2023-02-14 DIAGNOSIS — Z92.3 HISTORY OF RADIATION THERAPY: ICD-10-CM

## 2023-02-14 DIAGNOSIS — R39.15 URGENCY OF URINATION: ICD-10-CM

## 2023-02-14 DIAGNOSIS — C77.5 MALIGNANT NEOPLASM OF PROSTATE METASTATIC TO INTRAPELVIC LYMPH NODE (HCC): ICD-10-CM

## 2023-02-14 DIAGNOSIS — R35.0 URINARY FREQUENCY: ICD-10-CM

## 2023-02-14 PROBLEM — R30.0 DYSURIA: Status: ACTIVE | Noted: 2023-02-14

## 2023-02-14 LAB
SL AMB  POCT GLUCOSE, UA: ABNORMAL
SL AMB LEUKOCYTE ESTERASE,UA: ABNORMAL
SL AMB POCT BILIRUBIN,UA: ABNORMAL
SL AMB POCT BLOOD,UA: ABNORMAL
SL AMB POCT CLARITY,UA: ABNORMAL
SL AMB POCT COLOR,UA: YELLOW
SL AMB POCT KETONES,UA: ABNORMAL
SL AMB POCT NITRITE,UA: ABNORMAL
SL AMB POCT PH,UA: 5
SL AMB POCT SPECIFIC GRAVITY,UA: 1.03
SL AMB POCT URINE PROTEIN: ABNORMAL
SL AMB POCT UROBILINOGEN: NORMAL

## 2023-02-14 RX ORDER — PHENAZOPYRIDINE HYDROCHLORIDE 200 MG/1
200 TABLET, FILM COATED ORAL
Qty: 6 TABLET | Refills: 0 | Status: SHIPPED | OUTPATIENT
Start: 2023-02-14

## 2023-02-14 RX ORDER — CIPROFLOXACIN 250 MG/1
250 TABLET, FILM COATED ORAL EVERY 12 HOURS SCHEDULED
Qty: 14 TABLET | Refills: 0 | Status: SHIPPED | OUTPATIENT
Start: 2023-02-14 | End: 2023-02-21

## 2023-02-14 RX ORDER — MELOXICAM 7.5 MG/1
7.5 TABLET ORAL DAILY
Qty: 30 TABLET | Refills: 0 | Status: SHIPPED | OUTPATIENT
Start: 2023-02-14 | End: 2023-02-20 | Stop reason: SDUPTHER

## 2023-02-14 NOTE — PROGRESS NOTES
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)    Relevant Medications    meloxicam (MOBIC) 7 5 mg tablet    Other Relevant Orders    CBC and differential    Comprehensive metabolic panel    C-reactive protein    XR hip/pelv 4+ vw left if performed    XR femur 2 vw left       Other    History of radiation therapy    Relevant Medications    meloxicam (MOBIC) 7 5 mg tablet    Other Relevant Orders    UA w Reflex to Microscopic w Reflex to Culture    CBC and differential    Comprehensive metabolic panel    C-reactive protein    XR hip/pelv 4+ vw left if performed    XR femur 2 vw left   Other Visit Diagnoses     Left groin pain    -  Primary    Relevant Medications    meloxicam (MOBIC) 7 5 mg tablet    Other Relevant Orders    CBC and differential    Comprehensive metabolic panel    C-reactive protein    XR hip/pelv 4+ vw left if performed    XR femur 2 vw left    Urinary frequency        Relevant Medications    ciprofloxacin (CIPRO) 250 mg tablet    phenazopyridine (PYRIDIUM) 200 mg tablet    meloxicam (MOBIC) 7 5 mg tablet    Other Relevant Orders    UA w Reflex to Microscopic w Reflex to Culture    POCT urine dip (Completed)    CBC and differential    Comprehensive metabolic panel    C-reactive protein    XR hip/pelv 4+ vw left if performed    XR femur 2 vw left    Urgency of urination        Relevant Medications    ciprofloxacin (CIPRO) 250 mg tablet    phenazopyridine (PYRIDIUM) 200 mg tablet    meloxicam (MOBIC) 7 5 mg tablet    Other Relevant Orders    UA w Reflex to Microscopic w Reflex to Culture    POCT urine dip (Completed)    CBC and differential    Comprehensive metabolic panel    C-reactive protein    XR hip/pelv 4+ vw left if performed    XR femur 2 vw left    Other microscopic hematuria        Relevant Medications    ciprofloxacin (CIPRO) 250 mg tablet    meloxicam (MOBIC) 7 5 mg tablet Other Relevant Orders    UA w Reflex to Microscopic w Reflex to Culture    CBC and differential    Comprehensive metabolic panel    C-reactive protein    XR hip/pelv 4+ vw left if performed    XR femur 2 vw left    Left thigh pain        Relevant Medications    meloxicam (MOBIC) 7 5 mg tablet    Other Relevant Orders    CBC and differential    Comprehensive metabolic panel    C-reactive protein    XR hip/pelv 4+ vw left if performed    XR femur 2 vw left           Diagnoses and all orders for this visit:    Left groin pain  -     CBC and differential; Future  -     Comprehensive metabolic panel; Future  -     C-reactive protein; Future  -     XR hip/pelv 4+ vw left if performed; Future  -     XR femur 2 vw left; Future  -     meloxicam (MOBIC) 7 5 mg tablet; Take 1 tablet (7 5 mg total) by mouth daily    Urinary frequency  -     ciprofloxacin (CIPRO) 250 mg tablet; Take 1 tablet (250 mg total) by mouth every 12 (twelve) hours for 7 days  -     UA w Reflex to Microscopic w Reflex to Culture  -     POCT urine dip  -     phenazopyridine (PYRIDIUM) 200 mg tablet; Take 1 tablet (200 mg total) by mouth 3 (three) times a day with meals  -     CBC and differential; Future  -     Comprehensive metabolic panel; Future  -     C-reactive protein; Future  -     XR hip/pelv 4+ vw left if performed; Future  -     XR femur 2 vw left; Future  -     meloxicam (MOBIC) 7 5 mg tablet; Take 1 tablet (7 5 mg total) by mouth daily    Urgency of urination  -     ciprofloxacin (CIPRO) 250 mg tablet; Take 1 tablet (250 mg total) by mouth every 12 (twelve) hours for 7 days  -     UA w Reflex to Microscopic w Reflex to Culture  -     POCT urine dip  -     phenazopyridine (PYRIDIUM) 200 mg tablet; Take 1 tablet (200 mg total) by mouth 3 (three) times a day with meals  -     CBC and differential; Future  -     Comprehensive metabolic panel; Future  -     C-reactive protein; Future  -     XR hip/pelv 4+ vw left if performed;  Future  -     XR femur 2 vw left; Future  -     meloxicam (MOBIC) 7 5 mg tablet; Take 1 tablet (7 5 mg total) by mouth daily    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)  -     CBC and differential; Future  -     Comprehensive metabolic panel; Future  -     C-reactive protein; Future  -     XR hip/pelv 4+ vw left if performed; Future  -     XR femur 2 vw left; Future  -     meloxicam (MOBIC) 7 5 mg tablet; Take 1 tablet (7 5 mg total) by mouth daily    History of radiation therapy  -     UA w Reflex to Microscopic w Reflex to Culture  -     CBC and differential; Future  -     Comprehensive metabolic panel; Future  -     C-reactive protein; Future  -     XR hip/pelv 4+ vw left if performed; Future  -     XR femur 2 vw left; Future  -     meloxicam (MOBIC) 7 5 mg tablet; Take 1 tablet (7 5 mg total) by mouth daily    Other microscopic hematuria  -     ciprofloxacin (CIPRO) 250 mg tablet; Take 1 tablet (250 mg total) by mouth every 12 (twelve) hours for 7 days  -     UA w Reflex to Microscopic w Reflex to Culture  -     CBC and differential; Future  -     Comprehensive metabolic panel; Future  -     C-reactive protein; Future  -     XR hip/pelv 4+ vw left if performed; Future  -     XR femur 2 vw left; Future  -     meloxicam (MOBIC) 7 5 mg tablet; Take 1 tablet (7 5 mg total) by mouth daily    Left thigh pain  -     CBC and differential; Future  -     Comprehensive metabolic panel; Future  -     C-reactive protein; Future  -     XR hip/pelv 4+ vw left if performed; Future  -     XR femur 2 vw left; Future  -     meloxicam (MOBIC) 7 5 mg tablet; Take 1 tablet (7 5 mg total) by mouth daily      No problem-specific Assessment & Plan notes found for this encounter  A/P: Stable  Hx not c/w UTI or stone, and dip not classic for UTI, but a lot of hematuria  ?benign and due to XRT  Less likely radiation cystitis at this point in time  Acting more like a bone problem/hip  Given prostate ca, ?mets   Will check labs and imaging  No s/s of hernia  Will send urine out for c/s given blood and empiric abx  Will start short coarse of NSAID's  Last GFR 50's  RTC one week for f/u  May need more imaging or second opinion from  or ortho or general sx  Subjective:      Patient ID: Noman Garber is a 78 y o  male  WM, with PMH of prostate ca and s/p XRT,  presents with a several day h/o urinary frequency, urgency, but no burning  No gross blood  No fever or chills  No n/v  No flank or suprapubic pain,but worsening groin and left thigh pain    No history of stones  No change in diet or meds  Last UTI was never  No falls           The following portions of the patient's history were reviewed and updated as appropriate:   He has a past medical history of Cancer ([facility] [address] ), Chronic kidney disease, stage 3a ([facility] [address] ) (06/05/2021), Coronary artery calcification seen on CT scan (11/07/2021), COVID-19 (11/06/2021), Dementia ([facility] [address] ), Disease of thyroid gland, Eczema, Generalized anxiety disorder, Hypertension, Prostate cancer ([facility] [address] ), Prostate cancer metastatic to intrapelvic lymph node ([facility] [address] ), and Skin disorder  ,  does not have any pertinent problems on file  ,   has a past surgical history that includes Knee surgery; Prostate surgery; Vasectomy; Cataract extraction; Eye surgery; Colonoscopy; and Colonoscopy (11/26/2019)  ,  family history includes Alcohol abuse in his family, father, and mother; Colon cancer in his maternal aunt; Depression in his father; No Known Problems in his maternal grandfather, maternal grandmother, paternal grandfather, paternal grandmother, and sister; Stroke in his brother  ,   reports that he quit smoking about 37 years ago  His smoking use included cigarettes and cigars  He has never used smokeless tobacco  He reports that he does not drink alcohol and does not use drugs  ,  has No Known Allergies     Current Outpatient Medications   Medication Sig Dispense Refill   • ALPRAZolam (XANAX) 0 5 mg tablet Take 1 tablet (0 5 mg total) by mouth daily at bedtime as needed for anxiety 30 tablet 0   • atorvastatin (LIPITOR) 40 mg tablet Take 1 tablet (40 mg total) by mouth daily 90 tablet 3   • ciprofloxacin (CIPRO) 250 mg tablet Take 1 tablet (250 mg total) by mouth every 12 (twelve) hours for 7 days 14 tablet 0   • levothyroxine 88 mcg tablet Take 1 tablet (88 mcg total) by mouth daily 90 tablet 1   • lisinopril-hydrochlorothiazide (PRINZIDE,ZESTORETIC) 20-25 MG per tablet Take 1 tablet by mouth daily 90 tablet 1   • meloxicam (MOBIC) 7 5 mg tablet Take 1 tablet (7 5 mg total) by mouth daily 30 tablet 0   • phenazopyridine (PYRIDIUM) 200 mg tablet Take 1 tablet (200 mg total) by mouth 3 (three) times a day with meals 6 tablet 0     No current facility-administered medications for this visit  Review of Systems   Constitutional: Positive for activity change  Negative for chills, diaphoresis, fatigue and fever  Respiratory: Negative for cough, chest tightness, shortness of breath and wheezing  Cardiovascular: Negative for chest pain, palpitations and leg swelling  Gastrointestinal: Negative for abdominal pain, constipation, diarrhea, nausea and vomiting  Genitourinary: Positive for frequency and urgency  Negative for decreased urine volume, difficulty urinating, dysuria, flank pain, hematuria, penile discharge, penile pain and penile swelling  Musculoskeletal: Positive for arthralgias and gait problem  Negative for myalgias  Neurological: Negative for light-headedness and headaches  Psychiatric/Behavioral: Negative for confusion  The patient is not nervous/anxious  PHQ-2/9 Depression Screening          Objective:  Vitals:    02/14/23 1355   BP: 140/80   Pulse: 88   Temp: 97 7 °F (36 5 °C)   SpO2: 97%   Weight: 89 6 kg (197 lb 8 oz)   Height: 5' 8" (1 727 m)     Body mass index is 30 03 kg/m²  Physical Exam  Vitals and nursing note reviewed  Constitutional:       General: He is not in acute distress  Appearance: Normal appearance  He is not ill-appearing  HENT:      Head: Normocephalic and atraumatic  Mouth/Throat:      Mouth: Mucous membranes are moist    Eyes:      Extraocular Movements: Extraocular movements intact  Conjunctiva/sclera: Conjunctivae normal       Pupils: Pupils are equal, round, and reactive to light  Cardiovascular:      Rate and Rhythm: Normal rate and regular rhythm  Heart sounds: Normal heart sounds  No murmur heard  Pulmonary:      Effort: Pulmonary effort is normal  No respiratory distress  Breath sounds: Normal breath sounds  No wheezing, rhonchi or rales  Abdominal:      General: Bowel sounds are normal  There is no distension  Palpations: Abdomen is soft  Tenderness: There is no abdominal tenderness  There is no right CVA tenderness, left CVA tenderness, guarding or rebound  Genitourinary:     Penis: Normal        Testes: Normal       Comments: FG noted in scrotal sac  Musculoskeletal:         General: Tenderness present  No swelling or deformity  Right lower leg: No edema  Left lower leg: No edema  Comments: Left groin/hip/ and thigh w/o any gross deformities, increase temp, erythema, or swelling  No crepitus  No effusions or ballotment  Joint integrity intact  Arther Soja ROM wnl  Tenderness over the left groin/anterior hip  Pain worse with leg extension and better with flexion  No hernias      Neurological:      General: No focal deficit present  Mental Status: He is alert and oriented to person, place, and time  Mental status is at baseline  Psychiatric:         Mood and Affect: Mood normal          Behavior: Behavior normal          Thought Content:  Thought content normal          Judgment: Judgment normal

## 2023-02-15 ENCOUNTER — RA CDI HCC (OUTPATIENT)
Dept: OTHER | Facility: HOSPITAL | Age: 83
End: 2023-02-15

## 2023-02-15 NOTE — PROGRESS NOTES
Robert [facility]  coding opportunities       Chart reviewed, no opportunity found:   [address]        Patients Insurance     Medicare Insurance: [employer]

## 2023-02-16 ENCOUNTER — TELEPHONE (OUTPATIENT)
Dept: INTERNAL MEDICINE CLINIC | Facility: CLINIC | Age: 83
End: 2023-02-16

## 2023-02-16 LAB — BACTERIA UR CULT: NORMAL

## 2023-02-16 NOTE — TELEPHONE ENCOUNTER
Pt called and says that the mobic 7 5 that you prescribed him on 2/14 isn't strong enough and would either like something stronger or is asking if he can take Motrin with this

## 2023-02-20 ENCOUNTER — OFFICE VISIT (OUTPATIENT)
Dept: INTERNAL MEDICINE CLINIC | Facility: CLINIC | Age: 83
End: 2023-02-20

## 2023-02-20 VITALS
WEIGHT: 202 LBS | TEMPERATURE: 98.1 F | HEIGHT: 68 IN | DIASTOLIC BLOOD PRESSURE: 90 MMHG | OXYGEN SATURATION: 95 % | HEART RATE: 58 BPM | BODY MASS INDEX: 30.62 KG/M2 | SYSTOLIC BLOOD PRESSURE: 140 MMHG

## 2023-02-20 DIAGNOSIS — M79.652 LEFT THIGH PAIN: ICD-10-CM

## 2023-02-20 DIAGNOSIS — R35.0 URINARY FREQUENCY: ICD-10-CM

## 2023-02-20 DIAGNOSIS — R26.9 GAIT ABNORMALITY: ICD-10-CM

## 2023-02-20 DIAGNOSIS — R31.29 OTHER MICROSCOPIC HEMATURIA: ICD-10-CM

## 2023-02-20 DIAGNOSIS — R39.15 URGENCY OF URINATION: ICD-10-CM

## 2023-02-20 DIAGNOSIS — C61 MALIGNANT NEOPLASM OF PROSTATE METASTATIC TO INTRAPELVIC LYMPH NODE (HCC): ICD-10-CM

## 2023-02-20 DIAGNOSIS — M25.552 LEFT HIP PAIN: Primary | ICD-10-CM

## 2023-02-20 DIAGNOSIS — R10.32 LEFT GROIN PAIN: ICD-10-CM

## 2023-02-20 DIAGNOSIS — N18.31 CHRONIC KIDNEY DISEASE, STAGE 3A (HCC): ICD-10-CM

## 2023-02-20 DIAGNOSIS — C77.5 MALIGNANT NEOPLASM OF PROSTATE METASTATIC TO INTRAPELVIC LYMPH NODE (HCC): ICD-10-CM

## 2023-02-20 DIAGNOSIS — Z92.3 HISTORY OF RADIATION THERAPY: ICD-10-CM

## 2023-02-20 RX ORDER — MELOXICAM 15 MG/1
15 TABLET ORAL DAILY
Qty: 30 TABLET | Refills: 1 | Status: SHIPPED | OUTPATIENT
Start: 2023-02-20

## 2023-02-20 NOTE — PROGRESS NOTES
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)    Relevant Medications    meloxicam (MOBIC) 15 mg tablet       Genitourinary    Chronic kidney disease, stage 3a (HCC)    Relevant Orders    Ambulatory referral to Orthopedic Surgery       Other    History of radiation therapy    Relevant Medications    meloxicam (MOBIC) 15 mg tablet   Other Visit Diagnoses     Left hip pain    -  Primary    Relevant Orders    Ambulatory referral to Orthopedic Surgery    Gait abnormality        Relevant Orders    Ambulatory referral to Orthopedic Surgery    Urinary frequency        Relevant Medications    meloxicam (MOBIC) 15 mg tablet    Urgency of urination        Relevant Medications    meloxicam (MOBIC) 15 mg tablet    Other microscopic hematuria        Relevant Medications    meloxicam (MOBIC) 15 mg tablet    Left groin pain        Relevant Medications    meloxicam (MOBIC) 15 mg tablet    Left thigh pain        Relevant Medications    meloxicam (MOBIC) 15 mg tablet           Diagnoses and all orders for this visit:    Left hip pain  -     Ambulatory referral to Orthopedic Surgery; Future    Gait abnormality  -     Ambulatory referral to Orthopedic Surgery; Future    Chronic kidney disease, stage 3a ([facility] [address] )  -     Ambulatory referral to Orthopedic Surgery; Future    Urinary frequency  -     meloxicam (MOBIC) 15 mg tablet; Take 1 tablet (15 mg total) by mouth daily    Urgency of urination  -     meloxicam (MOBIC) 15 mg tablet; Take 1 tablet (15 mg total) by mouth daily    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)  -     meloxicam (MOBIC) 15 mg tablet; Take 1 tablet (15 mg total) by mouth daily    History of radiation therapy  -     meloxicam (MOBIC) 15 mg tablet; Take 1 tablet (15 mg total) by mouth daily    Other microscopic hematuria  -     meloxicam (MOBIC) 15 mg tablet;  Take 1 tablet (15 mg total) by mouth daily    Left groin pain  -     meloxicam (MOBIC) 15 mg tablet; Take 1 tablet (15 mg total) by mouth daily    Left thigh pain  -     meloxicam (MOBIC) 15 mg tablet; Take 1 tablet (15 mg total) by mouth daily      No problem-specific Assessment & Plan notes found for this encounter  A/P: Doing better and discussed labs and imaging  Concerned that he still needs NSAID to control the pain and has a h/o CKD  Seems to be all MS in origin  Will refer to ortho for a second opinion  RTC as scheduled  Subjective:      Patient ID: Berto Yen is a 78 y o  male  WM with PMH of Prostate Ca with XRT, RTC for a several day f/u left groin pain into the thigh and urinary frequency  Dip with blood  C/s was negative  Xray of the hip showed DJD changes only  Started on NSAID and empiric abx  Reports that once he increased his mobic, s/s started to improve  No urinary s/s  Tolerating the meds          The following portions of the patient's history were reviewed and updated as appropriate:   He has a past medical history of Cancer ([facility] [address] ), Chronic kidney disease, stage 3a ([address] ) (06/05/2021), Coronary artery calcification seen on CT scan (11/07/2021), COVID-19 (11/06/2021), Dementia ([facility] [address] ), Disease of thyroid gland, Eczema, Generalized anxiety disorder, Hypertension, Prostate cancer ([facility] [address] ), Prostate cancer metastatic to intrapelvic lymph node ([facility] [address] ), and Skin disorder  ,  does not have any pertinent problems on file  ,   has a past surgical history that includes Knee surgery; Prostate surgery; Vasectomy; Cataract extraction; Eye surgery; Colonoscopy; and Colonoscopy (11/26/2019)  ,  family history includes Alcohol abuse in his family, father, and mother; Colon cancer in his maternal aunt; Depression in his father; No Known Problems in his maternal grandfather, maternal grandmother, paternal grandfather, paternal grandmother, and sister; Stroke in his brother  ,   reports that he quit smoking about 37 years ago  His smoking use included cigarettes and cigars  He has never used smokeless tobacco  He reports that he does not drink alcohol and does not use drugs  ,  has No Known Allergies     Current Outpatient Medications   Medication Sig Dispense Refill   • meloxicam (MOBIC) 15 mg tablet Take 1 tablet (15 mg total) by mouth daily 30 tablet 1   • ALPRAZolam (XANAX) 0 5 mg tablet Take 1 tablet (0 5 mg total) by mouth daily at bedtime as needed for anxiety 30 tablet 0   • atorvastatin (LIPITOR) 40 mg tablet Take 1 tablet (40 mg total) by mouth daily 90 tablet 3   • ciprofloxacin (CIPRO) 250 mg tablet Take 1 tablet (250 mg total) by mouth every 12 (twelve) hours for 7 days 14 tablet 0   • levothyroxine 88 mcg tablet Take 1 tablet (88 mcg total) by mouth daily 90 tablet 1   • lisinopril-hydrochlorothiazide (PRINZIDE,ZESTORETIC) 20-25 MG per tablet Take 1 tablet by mouth daily 90 tablet 1   • phenazopyridine (PYRIDIUM) 200 mg tablet Take 1 tablet (200 mg total) by mouth 3 (three) times a day with meals 6 tablet 0     No current facility-administered medications for this visit  Review of Systems   Constitutional: Positive for activity change  Negative for chills, diaphoresis, fatigue and fever  Respiratory: Negative for cough, chest tightness, shortness of breath and wheezing  Cardiovascular: Negative for chest pain, palpitations and leg swelling  Gastrointestinal: Negative for abdominal pain, constipation, diarrhea, nausea and vomiting  Genitourinary: Negative for decreased urine volume, difficulty urinating, dysuria, flank pain, frequency, hematuria and urgency  Musculoskeletal: Positive for arthralgias  Negative for gait problem and myalgias  Neurological: Negative for light-headedness and headaches  Psychiatric/Behavioral: Negative for confusion  The patient is not nervous/anxious          PHQ-2/9 Depression Screening          Objective:  Vitals:    02/20/23 1248   BP: 140/90   Pulse: 58   Temp: 98 1 °F (36 7 °C)   SpO2: 95%   Weight: 91 6 kg (202 lb)   Height: 5' 8" (1 727 m)     Body mass index is 30 71 kg/m²  Physical Exam  Vitals and nursing note reviewed  Constitutional:       General: He is not in acute distress  Appearance: Normal appearance  He is not ill-appearing  HENT:      Head: Normocephalic and atraumatic  Mouth/Throat:      Mouth: Mucous membranes are moist    Eyes:      Extraocular Movements: Extraocular movements intact  Conjunctiva/sclera: Conjunctivae normal       Pupils: Pupils are equal, round, and reactive to light  Cardiovascular:      Rate and Rhythm: Normal rate and regular rhythm  Heart sounds: Normal heart sounds  Pulmonary:      Effort: Pulmonary effort is normal  No respiratory distress  Breath sounds: Normal breath sounds  No wheezing, rhonchi or rales  Musculoskeletal:         General: Tenderness present  No swelling, deformity or signs of injury  Right lower leg: No edema  Left lower leg: No edema  Comments:  Left hip joint w/o any gross deformities, increase temp, erythema, or swelling  No crepitus  No effusions or ballotment  Joint integrity intact  Cathlean Byron ROM wnl  Tenderness left inguinal area      Neurological:      General: No focal deficit present  Mental Status: He is alert and oriented to person, place, and time  Mental status is at baseline  Psychiatric:         Mood and Affect: Mood normal          Behavior: Behavior normal          Thought Content:  Thought content normal          Judgment: Judgment normal

## 2023-03-10 ENCOUNTER — RA CDI HCC (OUTPATIENT)
Dept: OTHER | Facility: HOSPITAL | Age: 83
End: 2023-03-10

## 2023-03-10 NOTE — PROGRESS NOTES
[facility] [[email or web address]] [facility]  coding opportunities       Chart reviewed, no opportunity found:   [address]        Patients Insurance     Medicare Insurance: [employer]

## 2023-03-11 DIAGNOSIS — I25.10 CORONARY ARTERY CALCIFICATION SEEN ON CT SCAN: ICD-10-CM

## 2023-03-11 DIAGNOSIS — F41.1 GENERALIZED ANXIETY DISORDER: ICD-10-CM

## 2023-03-13 DIAGNOSIS — I25.10 CORONARY ARTERY CALCIFICATION SEEN ON CT SCAN: ICD-10-CM

## 2023-03-13 DIAGNOSIS — F41.1 GENERALIZED ANXIETY DISORDER: ICD-10-CM

## 2023-03-13 RX ORDER — ATORVASTATIN CALCIUM 40 MG/1
40 TABLET, FILM COATED ORAL DAILY
Qty: 90 TABLET | Refills: 1 | Status: SHIPPED | OUTPATIENT
Start: 2023-03-13 | End: 2023-03-13 | Stop reason: SDUPTHER

## 2023-03-13 RX ORDER — ALPRAZOLAM 0.5 MG/1
0.5 TABLET ORAL
Qty: 30 TABLET | Refills: 0 | Status: SHIPPED | OUTPATIENT
Start: 2023-03-13 | End: 2023-03-13 | Stop reason: SDUPTHER

## 2023-03-13 RX ORDER — ATORVASTATIN CALCIUM 40 MG/1
40 TABLET, FILM COATED ORAL DAILY
Qty: 90 TABLET | Refills: 1 | Status: SHIPPED | OUTPATIENT
Start: 2023-03-13

## 2023-03-13 RX ORDER — ALPRAZOLAM 0.5 MG/1
0.5 TABLET ORAL
Qty: 30 TABLET | Refills: 0 | Status: SHIPPED | OUTPATIENT
Start: 2023-03-13

## 2023-03-21 ENCOUNTER — OFFICE VISIT (OUTPATIENT)
Dept: OBGYN CLINIC | Facility: CLINIC | Age: 83
End: 2023-03-21

## 2023-03-21 ENCOUNTER — APPOINTMENT (OUTPATIENT)
Dept: RADIOLOGY | Facility: CLINIC | Age: 83
End: 2023-03-21

## 2023-03-21 VITALS
DIASTOLIC BLOOD PRESSURE: 76 MMHG | WEIGHT: 201 LBS | BODY MASS INDEX: 30.46 KG/M2 | HEIGHT: 68 IN | SYSTOLIC BLOOD PRESSURE: 179 MMHG | HEART RATE: 56 BPM

## 2023-03-21 DIAGNOSIS — M25.511 RIGHT SHOULDER PAIN, UNSPECIFIED CHRONICITY: ICD-10-CM

## 2023-03-21 DIAGNOSIS — M16.12 PRIMARY OSTEOARTHRITIS OF LEFT HIP: ICD-10-CM

## 2023-03-21 DIAGNOSIS — R31.29 OTHER MICROSCOPIC HEMATURIA: ICD-10-CM

## 2023-03-21 DIAGNOSIS — Z92.3 HISTORY OF RADIATION THERAPY: ICD-10-CM

## 2023-03-21 DIAGNOSIS — C77.5 MALIGNANT NEOPLASM OF PROSTATE METASTATIC TO INTRAPELVIC LYMPH NODE (HCC): ICD-10-CM

## 2023-03-21 DIAGNOSIS — R39.15 URGENCY OF URINATION: ICD-10-CM

## 2023-03-21 DIAGNOSIS — R35.0 URINARY FREQUENCY: ICD-10-CM

## 2023-03-21 DIAGNOSIS — M79.652 LEFT THIGH PAIN: ICD-10-CM

## 2023-03-21 DIAGNOSIS — R10.32 LEFT GROIN PAIN: ICD-10-CM

## 2023-03-21 DIAGNOSIS — M75.51 SUBACROMIAL BURSITIS OF RIGHT SHOULDER JOINT: Primary | ICD-10-CM

## 2023-03-21 DIAGNOSIS — C61 MALIGNANT NEOPLASM OF PROSTATE METASTATIC TO INTRAPELVIC LYMPH NODE (HCC): ICD-10-CM

## 2023-03-21 RX ORDER — MELOXICAM 15 MG/1
15 TABLET ORAL DAILY
Qty: 30 TABLET | Refills: 0 | Status: SHIPPED | OUTPATIENT
Start: 2023-03-21

## 2023-03-21 NOTE — PROGRESS NOTES
Assessment/Plan:   Diagnoses and all orders for this visit:    Subacromial bursitis of right shoulder joint  -     XR shoulder 2+ vw right; Future    Primary osteoarthritis of left hip  -     Ambulatory referral to Orthopedic Surgery     Briefly discussed with patient that his ongoing L hip pain is likely secondary to osteoarthritis and he would likely benefit from total hip arthroplasty  In regards to the shoulder, his pain is consistent with subacromial bursitis of his R shoulder  Reviewed physical exam with the patient during today's visit  Patient was offered CS injection for pain relief, but declined at this time  Provided exercises to improve rotator cuff strengthening  He will follow-up in 8 weeks for re-evaluation and CS injection, if needed  Patient was in agreement with this treatment plan  Patient is asymptomatic in regards to his left hip  He is having more trouble with his right shoulder appears to have some bursitis/tendinitis  There is both primary second signs of impingement  No gross instability  Rotator cuff strength testing was grossly intact  Under aseptic technique, the right shoulder was injected with Kenalog and Marcaine  He he tolerated procedure quite well  Return back in 3 months evaluation  If his condition changes, he would not hesitate to let us know    Subjective:   Patient ID: Stu Ferreira  1940     HPI  Patient is a 78 y o  male who presents for initial evaluation of L hip  He stated that he has been experiencing hip pain for several months  Patient was previously seen by PCP regarding his hip pain, imaging was done, which showed mild osteoarthritic changes  Patient explained that most of his pain is in the front of his hip and in his groin  He describes it as achy pain with deep flexion or prolonged sitting  Patient denies increased pain with stairs or with circumduction  He stated that the hip pain has improved recently     However, during today's visit, the patient reported R shoulder pain  He stated that the pain started approximately 3 weeks ago and has progressed  He described sharp pain with movement, specifically overhead motions  He does not recall any specific mechanism of injury  Patient has not experienced any numbness or tingling  He is not taking any medication for the pain  The following portions of the patient's history were reviewed and updated as appropriate:  Past medical history, past surgical history, Family history, social history, current medications and allergies    Past Medical History:   Diagnosis Date   • Cancer [facility])    • Chronic kidney disease, stage 3a ([place] [address] ) 06/05/2021   • Coronary artery calcification seen on CT scan 11/07/2021   • COVID-19 11/06/2021   • Dementia ([place] [address] )    • Disease of thyroid gland    • Eczema    • Generalized anxiety disorder    • Hypertension    • Prostate cancer [facility])    • Prostate cancer metastatic to intrapelvic lymph node ([place] [id number] )    • Skin disorder     suspect benign, but will need removal and due to location, refer   Last assessed: April 18, 2013       Past Surgical History:   Procedure Laterality Date   • CATARACT EXTRACTION     • COLONOSCOPY     • COLONOSCOPY  11/26/2019   • EYE SURGERY     • KNEE SURGERY     • PROSTATE SURGERY     • VASECTOMY         Family History   Problem Relation Age of Onset   • Alcohol abuse Mother    • Alcohol abuse Father    • Depression Father    • No Known Problems Sister    • Stroke Brother         syndrome    • No Known Problems Maternal Grandmother    • No Known Problems Maternal Grandfather    • No Known Problems Paternal Grandmother    • No Known Problems Paternal Grandfather    • Alcohol abuse Family    • Colon cancer Maternal Aunt        Social History     Socioeconomic History   • Marital status: /Civil Union     Spouse name: None   • Number of children: None   • Years of education: None   • Highest education level: None   Occupational History   • Occupation: self employed  floor covering   Tobacco Use   • Smoking status: Former     Types: Cigarettes, Cigars     Quit date:      Years since quittin 2   • Smokeless tobacco: Never   • Tobacco comments:     smokes 1-2 cigarss/month   Vaping Use   • Vaping Use: Never used   Substance and Sexual Activity   • Alcohol use: No   • Drug use: No     Comment: Chronic Narcotic use noted in "[employer]"    • Sexual activity: None   Other Topics Concern   • None   Social History Narrative    Caffeine use      Social Determinants of Health     Financial Resource Strain: Not on file   Food Insecurity: Not on file   Transportation Needs: Not on file   Physical Activity: Not on file   Stress: Not on file   Social Connections: Not on file   Intimate Partner Violence: Not on file   Housing Stability: Not on file         Current Outpatient Medications:   •  ALPRAZolam (XANAX) 0 5 mg tablet, Take 1 tablet (0 5 mg total) by mouth daily at bedtime as needed for anxiety, Disp: 30 tablet, Rfl: 0  •  atorvastatin (LIPITOR) 40 mg tablet, Take 1 tablet (40 mg total) by mouth daily, Disp: 90 tablet, Rfl: 1  •  levothyroxine 88 mcg tablet, Take 1 tablet (88 mcg total) by mouth daily, Disp: 90 tablet, Rfl: 1  •  lisinopril-hydrochlorothiazide (PRINZIDE,ZESTORETIC) 20-25 MG per tablet, Take 1 tablet by mouth daily, Disp: 90 tablet, Rfl: 1  •  meloxicam (MOBIC) 15 mg tablet, Take 1 tablet (15 mg total) by mouth daily, Disp: 30 tablet, Rfl: 0  •  phenazopyridine (PYRIDIUM) 200 mg tablet, Take 1 tablet (200 mg total) by mouth 3 (three) times a day with meals, Disp: 6 tablet, Rfl: 0    No Known Allergies    Review of Systems   Constitutional: Negative for chills, fever and unexpected weight change  HENT: Negative for hearing loss, nosebleeds and sore throat  Eyes: Negative for pain, redness and visual disturbance  Respiratory: Negative for cough, shortness of breath and wheezing      Cardiovascular: Negative for chest pain, palpitations and leg swelling  Gastrointestinal: Negative for abdominal pain, nausea and vomiting  Endocrine: Negative for polydipsia and polyuria  Musculoskeletal:        As noted in HPI   Skin: Negative for rash and wound  Neurological: Negative for dizziness, numbness and headaches  Psychiatric/Behavioral: Negative for decreased concentration and suicidal ideas  The patient is not nervous/anxious  All other systems reviewed and are negative  Objective:  BP (!) 179/76 (BP Location: Left arm, Patient Position: Sitting, Cuff Size: Large)   Pulse 56   Ht 5' 8" (1 727 m)   Wt 91 2 kg (201 lb)   BMI 30 56 kg/m²     Ortho Exam  left Hip -  Patient presents with no anatomical deformity  Ambulates with steady gait pattern  Uses No assistive devices  Skin is warm and dry  No erythema or ecchymosis  TTP over greater trochanter / trochanteric bursa, TTP over anterior hip joint, TTP over groin    ROM: 0-15 seated IR, 0-35 seated ER  0- 25 seated hip flexion, passive circumduction within normal limits  MMT: 5/5 throughout  Knee flexor and extensor mechanism intact  - CRICKET, - FADIR  - Log roll  2+ TP and DP pulses with brisk capillary refill to the toes  Sural, saphenous, tibial, superficial and deep peroneal motor and sensory distribution intact  Sensation to light touch     right Shoulder -   No anatomical deformity  Skin is warm and dry to touch with no signs of erythema, ecchymosis, or infection  No  soft tissue swelling or effusion noted  +  palpable crepitus with passive motion  TTP over rotator cuff, posterior capsule, subacromial bursa  ROM FF 90 degrees, ABD 90 degrees, ER 85 degrees, IR 85 degrees  MMT: 4+/5 ABD, 4+/5 scaption, 4+/5 Fwd flexion  No  glenohumeral instability appreciated on exam  +  Neer's, +  Chairez  - Speed's, - Yergason's  +  empty can, -  drop-arm, -  belly press, -resisted external rotation, - lift-off  Demonstrates normal elbow, wrist, and finger motion  2+  distal radial pulse with brisk capillary refill to the fingers  Radial, median, ulnar and motor  and sensory distribution intact  Sensation to light touch intact distally    Physical Exam  HENT:      Head: Normocephalic and atraumatic  Nose: Nose normal    Eyes:      Conjunctiva/sclera: Conjunctivae normal    Cardiovascular:      Rate and Rhythm: Normal rate  Pulmonary:      Effort: Pulmonary effort is normal    Musculoskeletal:      Cervical back: Neck supple  Skin:     General: Skin is warm and dry  Capillary Refill: Capillary refill takes less than 2 seconds  Neurological:      Mental Status: He is alert and oriented to person, place, and time  Psychiatric:         Mood and Affect: Mood normal          Behavior: Behavior normal           Diagnostic Test Review:  Attending physician has personally reviewed pertinent PACS, impression is as follows; Review of radiographic image series performed 3/21/23 shows osteoarthritis of L Hip  Procedures   No procedures done on today's visit       Scribe Attestation    I,:  Herber Dunham am acting as a scribe while in the presence of the attending physician :       I,:  Miriam Smith DO personally performed the services described in this documentation    as scribed in my presence :

## 2023-03-21 NOTE — PATIENT INSTRUCTIONS
Rotator Cuff Injury Exercises   AMBULATORY CARE:   What you need to know about rotator cuff injury exercises:  Exercises help improve shoulder movement and strength, and decrease pain  Your physical therapist or healthcare provider will tell you when to start doing the exercises  He or she will tell you how often to do them  You will need to start slowly to prevent more injury  You will move through several levels over time as you get stronger and more flexible  Safety guidelines:   Always warm up before you do the exercises  Walk or ride a stationary bike for 5 to 10 minutes to help you warm up  Do not put your arm over your head until directed  You will need to wait until your injury has healed  The movement of some exercises could continue until your arm is over your head  You will need to stop the movement where directed  Stop if you feel pain  You may feel some tight or stiff areas when you start  This should get better as you continue the exercises  You should not feel pain  Pain means you are not ready to do the exercise yet  Stop and call your physical therapist or healthcare provider right away  Always work both rotator cuffs  Even if your injury is only on 1 side, it is important to do each exercise on both sides  This helps prevent injury and maintain balance in your shoulders and back  Use correct posture  Your physical therapist or healthcare provider will show you the proper posture for each exercise  You will be shown how to pull your shoulders back and down to engage the correct muscles  Remember not to let your shoulders shrug during an exercise unless it is part of the movement  How to do stretching exercises: You will not feel every exercise in your shoulder area  You may feel some of the stretches in your back, side, or upper arm muscles  You need to work muscles in and around your rotator cuffs and down your arms  This helps stabilize your shoulders   Your physical therapist or healthcare provider will tell you how long to hold each stretch  He or she will also tell you how many times to repeat each stretch during an exercise session  You may be told to do only certain exercises, or to do them in a specific order  The following are general directions to help you remember the exercises you are taught:  Pendulum swings:  Lean forward and rest your arm on a table  Do not round your back or lock your knees during the exercise  Let your other arm hang freely by your side  Gently swing your free arm forward and backward, side to side, and in circles  Crossover arm stretch:  Relax your shoulders  Hold your upper arm with the opposite hand  Pull your arm across your chest until you feel a stretch  Hold the stretch  Return to the starting position  Sleeper stretch:  Lie on your side on a firm, flat surface  Bend the elbow of your top arm 90°  Use your other hand to slowly push your arm down  Stop when you feel a stretch at the back of your shoulder  Hold the stretch  Slowly return to the starting position  Shoulder movement, up and down: This exercise may also be called shoulder extension  Sit in a chair that has a back but no armrests  Raise your arm like you are reaching for the wall in front of you  Continue to raise the arm until it is over your head, or as high as directed  Bring your arm back down to your side  Bring it back as far as possible behind your body  Return your arm to the starting position  Shoulder movement, side to side: These movements may be called flexion, internal rotation, and external rotation  Sit in a chair that has a back but no armrests  Raise your arm to the side and then up over your head as far as directed  Return your arm to your side  Bring your arm across the front of your body and reach for the opposite shoulder  Return your arm to the starting position           Shoulder rolls:  Stand and raise both shoulders toward your ears  Lower them to the starting position  Relax your shoulders  Pull your shoulders back  Then relax them again  Roll your shoulders in a smooth [place]  Then roll your shoulders in a smooth [place] in the other direction  Wall reach exercise: This may also be called a flexion stretch  Stand facing a wall  Slowly walk your fingers up the wall until you feel a stretch  Hold the stretch  Return to the starting position  Arm reach exercise:  Lie on your back with your legs straight  Reach your arms like you are trying to touch the ceiling  Reach as high as you can so you feel a stretch in the back of your arms  Hold the stretch  Then lower your arms to your sides  Elbow bends:  Stand with your arms down to your sides  Keep your palm facing forward  Bend your elbow and try to touch your shoulder with your fingertips  Return your arm to the starting position  Up the back stretch:  Stand and put both arms behind your back  Put one hand under the other  Move the bottom hand and slowly push the upper hand up toward your head  You should feel a stretch in the front of your arm and shoulder  Be careful not to push too hard  Hold the stretch  Then return to the starting position  Triceps stretch:  Stand and drop your forearm down your back so your hand is pointing to the ground behind you  Your elbow should be pointing at the ceiling  Take your other hand and place it on your elbow  Gently and slowly push on the elbow until you feel a stretch in the back of your arm  Hold the stretch  Let go of your elbow and relax your arm  You may be shown how to do this stretch with a towel  The towel can be pulled gently with a hand behind your back at waist level  How to do strengthening exercises:  Strengthening exercises may include handheld weights or resistance bands  Your physical therapist or healthcare provider will tell you how much weight or resistance to use   The general guide is to use light weights or low resistance and to do a high number of repetitions  You may be told to do only certain exercises, or to do them in a specific order  The following are general directions to help you remember the exercises you are taught:  Scapular squeeze:  Stand with your arms at your sides  Squeeze your shoulder blades together and hold this position  Then relax the muscles  Keep your shoulder back during the entire exercise  Wall pushups: This exercise is similar to a pushup done on the ground  The goal is to use your back and shoulder muscles to bring your upper body toward and away from the wall  Stand facing a wall  Put your hands on the wall  Bend your elbows to bring your upper body toward the wall  Straighten your arms to return to the starting position  Keep your feet close enough to the wall that you do not take a step when you bend your elbows  Standing row with exercise band:  Wrap the exercise band around a heavy, stable object at waist level  Make loop in the end of the band to create a handle, if needed  Hold the handle or loop and pull the band straight back toward your hip  Keep your shoulder down  Squeeze your shoulder blade  Hold this position  Then slowly return to the starting position  External rotation with arm abducted 90 degrees:  Wrap the exercise band around a heavy, stable object at waist level  Make loop in the end of the band to create a handle, if needed  Stand and hold the handle or loop  Bend your elbow and raise your arm to shoulder height  Keep your arm in this position  Raise your hand like you are pointing at the ceiling  Slowly return to the starting position  You may also be shown how to do this exercise lying down and with a weight  Shoulder abduction with weight:  Stand and hold a weight in your hand with your palm facing your body  Slowly raise your arm to the side with your thumb pointing up   Then raise your arm as far as you can without pain  Hold this position  Then return to the starting position  Shoulder abduction with exercise band:  Wrap the exercise band around a heavy, stable object near your foot  Grab the band  Keep your arm straight  Slowly raise your arm to the side with your thumb pointing up  Then, slowly pull the band as far as you can without pain  Slowly return to the starting position  Shoulder adduction with weight:  Lie on your back on a firm surface  Hold a weight in your hand at your shoulder  Slowly raise your arm toward the ceiling and straighten your elbow  Hold this position  Then slowly return to the starting position  Shoulder adduction with exercise band:  Wrap the exercise band around a heavy, stable object  Stand and face away from where the band is anchored  Hold each end of the band in both hands with your elbows bent  Your elbows should not be behind your body  Keep your arms parallel to the floor and slowly straighten your elbows  Hold this position  Slowly return to the starting position  Call your doctor or physical therapist if:   You have sharp or worsening pain during exercise or at rest     You have questions or concerns about your rotator cuff injury exercises  © Copyright [facility] 2022 Information is for End User's use only and may not be sold, redistributed or otherwise used for commercial purposes  The above information is an  only  It is not intended as medical advice for individual conditions or treatments  Talk to your doctor, nurse or pharmacist before following any medical regimen to see if it is safe and effective for you

## 2023-03-27 ENCOUNTER — OFFICE VISIT (OUTPATIENT)
Dept: INTERNAL MEDICINE CLINIC | Facility: CLINIC | Age: 83
End: 2023-03-27

## 2023-03-27 VITALS
SYSTOLIC BLOOD PRESSURE: 148 MMHG | TEMPERATURE: 98.5 F | WEIGHT: 195 LBS | DIASTOLIC BLOOD PRESSURE: 68 MMHG | BODY MASS INDEX: 29.55 KG/M2 | OXYGEN SATURATION: 98 % | HEIGHT: 68 IN | HEART RATE: 51 BPM

## 2023-03-27 DIAGNOSIS — E78.5 HYPERLIPIDEMIA, UNSPECIFIED HYPERLIPIDEMIA TYPE: ICD-10-CM

## 2023-03-27 DIAGNOSIS — M16.12 PRIMARY OSTEOARTHRITIS OF LEFT HIP: ICD-10-CM

## 2023-03-27 DIAGNOSIS — I25.10 CORONARY ARTERY CALCIFICATION SEEN ON CT SCAN: ICD-10-CM

## 2023-03-27 DIAGNOSIS — F41.1 GENERALIZED ANXIETY DISORDER: ICD-10-CM

## 2023-03-27 DIAGNOSIS — I10 PRIMARY HYPERTENSION: Primary | ICD-10-CM

## 2023-03-27 DIAGNOSIS — E03.9 HYPOTHYROIDISM, UNSPECIFIED TYPE: ICD-10-CM

## 2023-03-27 DIAGNOSIS — C61 MALIGNANT NEOPLASM OF PROSTATE METASTATIC TO INTRAPELVIC LYMPH NODE (HCC): ICD-10-CM

## 2023-03-27 DIAGNOSIS — C77.5 MALIGNANT NEOPLASM OF PROSTATE METASTATIC TO INTRAPELVIC LYMPH NODE (HCC): ICD-10-CM

## 2023-03-27 DIAGNOSIS — N18.31 CHRONIC KIDNEY DISEASE, STAGE 3A (HCC): ICD-10-CM

## 2023-03-27 RX ORDER — AMLODIPINE BESYLATE 5 MG/1
5 TABLET ORAL DAILY
Qty: 90 TABLET | Refills: 0 | Status: SHIPPED | OUTPATIENT
Start: 2023-03-27

## 2023-03-27 NOTE — PROGRESS NOTES
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)       Genitourinary    Chronic kidney disease, stage 3a (HCC)       Other    Hyperlipidemia    Generalized anxiety disorder    Relevant Orders    [facility] All Prescribed Meds and Special Instructions    Amphetamines, Methamphetamines    Butalbital    Phenobarbital    Secobarbital    Alprazolam    Clonazepam    Diazepam    Lorazepam    Gabapentin    Pregabalin    Cocaine    Heroin    Buprenorphine    Levorphanol    Meperidine    Naltrexone    Fentanyl    Methadone    Oxycodone    Tapentadol    THC    Tramadol    Codeine, Hydrocodone, Hydropmorphone, Morphine    Bath Salts    Ethyl Glucuronide/Ethyl Sulfate    Kratom    Spice    Methylphenidate    Phentermine    Validity Oxidant    Validity Creatinine    Validity pH    Validity Specific        Diagnoses and all orders for this visit:    Primary hypertension  -     amLODIPine (NORVASC) 5 mg tablet;  Take 1 tablet (5 mg total) by mouth daily    Hypothyroidism, unspecified type    Coronary artery calcification seen on CT scan    Primary osteoarthritis of left hip    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)    Chronic kidney disease, stage 3a (HCC)    Generalized anxiety disorder  -     [facility] All Prescribed Meds and Special Instructions  -     Amphetamines, Methamphetamines  -     Butalbital  -     Phenobarbital  -     Secobarbital  -     Alprazolam  -     Clonazepam  -     Diazepam  -     Lorazepam  -     Gabapentin  -     Pregabalin  -     Cocaine  - Heroin  -     Buprenorphine  -     Levorphanol  -     Meperidine  -     Naltrexone  -     Fentanyl  -     Methadone  -     Oxycodone  -     Tapentadol  -     THC  -     Tramadol  -     Codeine, Hydrocodone, Hydropmorphone, Morphine  -     Bath Salts  -     Ethyl Glucuronide/Ethyl Sulfate  -     Kratom  -     Spice  -     Methylphenidate  -     Phentermine  -     Validity Oxidant  -     Validity Creatinine  -     Validity pH  -     Validity Specific    Hyperlipidemia, unspecified hyperlipidemia type      No problem-specific Assessment & Plan notes found for this encounter  A/P: Doing ok and labs up to date except for UDT  BP remains elevated and will add norvasc  Continue current treatment and RTC four months for routine  Subjective:      Patient ID: Therese Weir is a 78 y o  male  WM RTC for f/u HTN, HLD, etc  Doing ok and no new issues  Remains active w/o difficulty and no falls  Denies CP, SOB, palpitations, edema, orthopnea or PND  Chronic pain is manageable  NADIA is controlled  Due for UDT  The following portions of the patient's history were reviewed and updated as appropriate:   He has a past medical history of Cancer ([facility], [place] [address] ), Chronic kidney disease, stage 3a ([facility], [place] [address] ) (06/05/2021), Coronary artery calcification seen on CT scan (11/07/2021), COVID-19 (11/06/2021), Dementia ([facility], [place] [address] ), Disease of thyroid gland, Eczema, Generalized anxiety disorder, Hypertension, Prostate cancer ([facility], [place] [address] ), Prostate cancer metastatic to intrapelvic lymph node ([facility] [place] [address] ), and Skin disorder  ,  does not have any pertinent problems on file  ,   has a past surgical history that includes Knee surgery; Prostate surgery; Vasectomy; Cataract extraction; Eye surgery; Colonoscopy; and Colonoscopy (11/26/2019)  ,  family history includes Alcohol abuse in his family, father, and mother; Colon cancer in his maternal aunt; Depression in his father; No Known Problems in his maternal grandfather, maternal grandmother, paternal grandfather, paternal grandmother, and sister; Stroke in his brother  ,   reports that he quit smoking about 37 years ago  His smoking use included cigarettes and cigars  He has never used smokeless tobacco  He reports that he does not drink alcohol and does not use drugs  ,  has No Known Allergies     Current Outpatient Medications   Medication Sig Dispense Refill   • ALPRAZolam (XANAX) 0 5 mg tablet Take 1 tablet (0 5 mg total) by mouth daily at bedtime as needed for anxiety 30 tablet 0   • amLODIPine (NORVASC) 5 mg tablet Take 1 tablet (5 mg total) by mouth daily 90 tablet 0   • atorvastatin (LIPITOR) 40 mg tablet Take 1 tablet (40 mg total) by mouth daily 90 tablet 1   • levothyroxine 88 mcg tablet Take 1 tablet (88 mcg total) by mouth daily 90 tablet 1   • lisinopril-hydrochlorothiazide (PRINZIDE,ZESTORETIC) 20-25 MG per tablet Take 1 tablet by mouth daily 90 tablet 1   • meloxicam (MOBIC) 15 mg tablet Take 1 tablet (15 mg total) by mouth daily (Patient not taking: Reported on 3/27/2023) 30 tablet 0   • phenazopyridine (PYRIDIUM) 200 mg tablet Take 1 tablet (200 mg total) by mouth 3 (three) times a day with meals 6 tablet 0     No current facility-administered medications for this visit  Review of Systems   Constitutional: Negative for activity change, chills, diaphoresis, fatigue and fever  HENT: Negative  Eyes: Negative for visual disturbance  Respiratory: Negative for cough, chest tightness, shortness of breath and wheezing  Cardiovascular: Negative for chest pain, palpitations and leg swelling  Gastrointestinal: Negative for abdominal pain, constipation, diarrhea, nausea and vomiting  Endocrine: Negative for cold intolerance and heat intolerance  Genitourinary: Negative for difficulty urinating, dysuria and frequency  Musculoskeletal: Negative for arthralgias, gait problem and myalgias  Neurological: Negative for dizziness, seizures, syncope, weakness, light-headedness and headaches  "  Psychiatric/Behavioral: Negative for confusion, dysphoric mood and sleep disturbance  The patient is not nervous/anxious  PHQ-2/9 Depression Screening          Objective:  Vitals:    03/27/23 1510   BP: 148/68   BP Location: Left arm   Patient Position: Sitting   Cuff Size: Standard   Pulse: (!) 51   Temp: 98 5 °F (36 9 °C)   SpO2: 98%   Weight: 88 5 kg (195 lb)   Height: 5' 8\" (1 727 m)     Body mass index is 29 65 kg/m²  Physical Exam  Vitals and nursing note reviewed  Constitutional:       General: He is not in acute distress  Appearance: Normal appearance  He is not ill-appearing  HENT:      Head: Normocephalic and atraumatic  Mouth/Throat:      Mouth: Mucous membranes are moist    Eyes:      Extraocular Movements: Extraocular movements intact  Conjunctiva/sclera: Conjunctivae normal       Pupils: Pupils are equal, round, and reactive to light  Neck:      Vascular: No carotid bruit  Cardiovascular:      Rate and Rhythm: Normal rate and regular rhythm  Heart sounds: Normal heart sounds  No murmur heard  Pulmonary:      Effort: Pulmonary effort is normal  No respiratory distress  Breath sounds: Normal breath sounds  No wheezing, rhonchi or rales  Abdominal:      General: Bowel sounds are normal  There is no distension  Palpations: Abdomen is soft  Tenderness: There is no abdominal tenderness  Musculoskeletal:      Cervical back: Neck supple  Right lower leg: No edema  Left lower leg: No edema  Neurological:      General: No focal deficit present  Mental Status: He is alert and oriented to person, place, and time  Mental status is at baseline  Psychiatric:         Mood and Affect: Mood normal          Behavior: Behavior normal          Thought Content:  Thought content normal          Judgment: Judgment normal          "

## 2023-03-30 LAB
6MAM UR QL CFM: NEGATIVE NG/ML
7AMINOCLONAZEPAM UR QL CFM: NEGATIVE NG/ML
A-OH ALPRAZ UR QL CFM: NORMAL NG/ML
ACCEPTABLE CREAT UR QL: NORMAL MG/DL
ACCEPTIBLE SP GR UR QL: NORMAL
AMPHET UR QL CFM: NEGATIVE NG/ML
BUPRENORPHINE UR QL CFM: NEGATIVE NG/ML
BUTALBITAL UR QL CFM: NEGATIVE NG/ML
BZE UR QL CFM: NEGATIVE NG/ML
CODEINE UR QL CFM: NEGATIVE NG/ML
EDDP UR QL CFM: NEGATIVE NG/ML
ETHYL GLUCURONIDE UR QL CFM: NEGATIVE NG/ML
ETHYL SULFATE UR QL SCN: NEGATIVE NG/ML
EUTYLONE UR QL: NEGATIVE NG/ML
FENTANYL UR QL CFM: NEGATIVE NG/ML
GLIADIN IGG SER IA-ACNC: NEGATIVE NG/ML
HYDROCODONE UR QL CFM: NEGATIVE NG/ML
HYDROMORPHONE UR QL CFM: NEGATIVE NG/ML
LORAZEPAM UR QL CFM: NEGATIVE NG/ML
ME-PHENIDATE UR QL CFM: NEGATIVE NG/ML
MEPERIDINE UR QL CFM: NEGATIVE NG/ML
METHADONE UR QL CFM: NEGATIVE NG/ML
METHAMPHET UR QL CFM: NEGATIVE NG/ML
MORPHINE UR QL CFM: NEGATIVE NG/ML
NALTREXONE UR QL CFM: NEGATIVE NG/ML
NITRITE UR QL: NORMAL UG/ML
NORBUPRENORPHINE UR QL CFM: NEGATIVE NG/ML
NORDIAZEPAM UR QL CFM: NEGATIVE NG/ML
NORFENTANYL UR QL CFM: NEGATIVE NG/ML
NORHYDROCODONE UR QL CFM: NEGATIVE NG/ML
NORMEPERIDINE UR QL CFM: NEGATIVE NG/ML
NOROXYCODONE UR QL CFM: NEGATIVE NG/ML
OXAZEPAM UR QL CFM: NEGATIVE NG/ML
OXYCODONE UR QL CFM: NEGATIVE NG/ML
OXYMORPHONE UR QL CFM: NEGATIVE NG/ML
PARA-FLUOROFENTANYL QUANTIFICATION: NORMAL NG/ML
PHENOBARB UR QL CFM: NEGATIVE NG/ML
RESULT ALL_PRESCRIBED MEDS AND SPECIAL INSTRUCTIONS: NORMAL
SECOBARBITAL UR QL CFM: NEGATIVE NG/ML
SL AMB 4-ANPP QUANTIFICATION: NORMAL NG/ML
SL AMB 5F-ADB-M7 METABOLITE QUANTIFICATION: NEGATIVE NG/ML
SL AMB 7-OH-MITRAGYNINE (KRATOM ALKALOID) QUANTIFICATION: NEGATIVE NG/ML
SL AMB AB-FUBINACA-M3 METABOLITE QUANTIFICATION: NEGATIVE NG/ML
SL AMB ACETYL FENTANYL QUANTIFICATION: NORMAL NG/ML
SL AMB ACETYL NORFENTANYL QUANTIFICATION: NORMAL NG/ML
SL AMB ACRYL FENTANYL QUANTIFICATION: NORMAL NG/ML
SL AMB CARFENTANIL QUANTIFICATION: NORMAL NG/ML
SL AMB CTHC (MARIJUANA METABOLITE) QUANTIFICATION: NEGATIVE NG/ML
SL AMB DEXTRORPHAN (DEXTROMETHORPHAN METABOLITE) QUANT: NEGATIVE NG/ML
SL AMB GABAPENTIN QUANTIFICATION: NEGATIVE
SL AMB JWH018 METABOLITE QUANTIFICATION: NEGATIVE NG/ML
SL AMB JWH073 METABOLITE QUANTIFICATION: NEGATIVE NG/ML
SL AMB MDMB-FUBINACA-M1 METABOLITE QUANTIFICATION: NEGATIVE NG/ML
SL AMB METHYLONE QUANTIFICATION: NEGATIVE NG/ML
SL AMB N-DESMETHYL-TRAMADOL QUANTIFICATION: NEGATIVE NG/ML
SL AMB PHENTERMINE QUANTIFICATION: NEGATIVE NG/ML
SL AMB PREGABALIN QUANTIFICATION: NEGATIVE
SL AMB RCS4 METABOLITE QUANTIFICATION: NEGATIVE NG/ML
SL AMB RITALINIC ACID QUANTIFICATION: NEGATIVE NG/ML
SMOOTH MUSCLE AB TITR SER IF: NEGATIVE NG/ML
SPECIMEN DRAWN SERPL: NEGATIVE NG/ML
SPECIMEN PH ACCEPTABLE UR: NORMAL
TAPENTADOL UR QL CFM: NEGATIVE NG/ML
TEMAZEPAM UR QL CFM: NEGATIVE NG/ML
TRAMADOL UR QL CFM: NEGATIVE NG/ML
URATE/CREAT 24H UR: NEGATIVE NG/ML

## 2023-05-05 ENCOUNTER — OFFICE VISIT (OUTPATIENT)
Dept: INTERNAL MEDICINE CLINIC | Facility: CLINIC | Age: 83
End: 2023-05-05

## 2023-05-05 VITALS
BODY MASS INDEX: 29.74 KG/M2 | HEIGHT: 68 IN | SYSTOLIC BLOOD PRESSURE: 120 MMHG | TEMPERATURE: 99.5 F | DIASTOLIC BLOOD PRESSURE: 80 MMHG | OXYGEN SATURATION: 99 % | HEART RATE: 74 BPM | WEIGHT: 196.25 LBS

## 2023-05-05 DIAGNOSIS — H65.02 NON-RECURRENT ACUTE SEROUS OTITIS MEDIA OF LEFT EAR: ICD-10-CM

## 2023-05-05 DIAGNOSIS — Z00.00 MEDICARE ANNUAL WELLNESS VISIT, SUBSEQUENT: ICD-10-CM

## 2023-05-05 DIAGNOSIS — H93.8X3 SENSATION OF FULLNESS IN BOTH EARS: ICD-10-CM

## 2023-05-05 DIAGNOSIS — Z13.31 NEGATIVE DEPRESSION SCREENING: ICD-10-CM

## 2023-05-05 DIAGNOSIS — H61.22 IMPACTED CERUMEN OF LEFT EAR: ICD-10-CM

## 2023-05-05 DIAGNOSIS — H91.93 BILATERAL HEARING LOSS, UNSPECIFIED HEARING LOSS TYPE: Primary | ICD-10-CM

## 2023-05-05 RX ORDER — GUAIFENESIN 600 MG/1
600 TABLET, EXTENDED RELEASE ORAL EVERY 12 HOURS SCHEDULED
Qty: 20 TABLET | Refills: 0 | Status: SHIPPED | OUTPATIENT
Start: 2023-05-05

## 2023-05-05 RX ORDER — FLUTICASONE PROPIONATE 50 MCG
1 SPRAY, SUSPENSION (ML) NASAL DAILY
Qty: 16 G | Refills: 0 | Status: SHIPPED | OUTPATIENT
Start: 2023-05-05

## 2023-05-05 NOTE — PATIENT INSTRUCTIONS
Medicare Preventive Visit Patient Instructions  Thank you for completing your Welcome to Medicare Visit or Medicare Annual Wellness Visit today  Your next wellness visit will be due in one year (5/5/2024)  The screening/preventive services that you may require over the next 5-10 years are detailed below  Some tests may not apply to you based off risk factors and/or age  Screening tests ordered at today's visit but not completed yet may show as past due  Also, please note that scanned in results may not display below  Preventive Screenings:  Service Recommendations Previous Testing/Comments   Colorectal Cancer Screening  · Colonoscopy    · Fecal Occult Blood Test (FOBT)/Fecal Immunochemical Test (FIT)  · Fecal DNA/Cologuard Test  · Flexible Sigmoidoscopy Age: 39-70 years old   Colonoscopy: every 10 years (May be performed more frequently if at higher risk)  OR  FOBT/FIT: every 1 year  OR  Cologuard: every 3 years  OR  Sigmoidoscopy: every 5 years  Screening may be recommended earlier than age 39 if at higher risk for colorectal cancer  Also, an individualized decision between you and your healthcare provider will decide whether screening between the ages of 74-80 would be appropriate   Colonoscopy: 11/26/2019  FOBT/FIT: Not on file  Cologuard: Not on file  Sigmoidoscopy: Not on file          Prostate Cancer Screening Individualized decision between patient and health care provider in men between ages of 53-78   Medicare will cover every 12 months beginning on the day after your 50th birthday PSA: <0 1 ng/mL     History Prostate Cancer  Screening Not Indicated     Hepatitis C Screening Once for adults born between 1945 and 1965  More frequently in patients at high risk for Hepatitis C Hep C Antibody: 11/08/2021    Screening Current   Diabetes Screening 1-2 times per year if you're at risk for diabetes or have pre-diabetes Fasting glucose: 95 mg/dL (1/9/2023)  A1C: 6 1 % (1/9/2023)  Screening Current   Cholesterol Screening Once every 5 years if you don't have a lipid disorder  May order more often based on risk factors  Lipid panel: 04/26/2022  Screening Not Indicated  History Lipid Disorder      Other Preventive Screenings Covered by Medicare:  1  Abdominal Aortic Aneurysm (AAA) Screening: covered once if your at risk  You're considered to be at risk if you have a family history of AAA or a male between the age of 73-68 who smoking at least 100 cigarettes in your lifetime  2  Lung Cancer Screening: covers low dose CT scan once per year if you meet all of the following conditions: (1) Age 50-69; (2) No signs or symptoms of lung cancer; (3) Current smoker or have quit smoking within the last 15 years; (4) You have a tobacco smoking history of at least 20 pack years (packs per day x number of years you smoked); (5) You get a written order from a healthcare provider  3  Glaucoma Screening: covered annually if you're considered high risk: (1) You have diabetes OR (2) Family history of glaucoma OR (3)  aged 48 and older OR (3)  American aged 72 and older  3  Osteoporosis Screening: covered every 2 years if you meet one of the following conditions: (1) Have a vertebral abnormality; (2) On glucocorticoid therapy for more than 3 months; (3) Have primary hyperparathyroidism; (4) On osteoporosis medications and need to assess response to drug therapy  5  HIV Screening: covered annually if you're between the age of 12-76  Also covered annually if you are younger than 13 and older than 72 with risk factors for HIV infection  For pregnant patients, it is covered up to 3 times per pregnancy      Immunizations:  Immunization Recommendations   Influenza Vaccine Annual influenza vaccination during flu season is recommended for all persons aged >= 6 months who do not have contraindications   Pneumococcal Vaccine   * Pneumococcal conjugate vaccine = PCV13 (Prevnar 13), PCV15 (Vaxneuvance), PCV20 (Prevnar 20)  * Pneumococcal polysaccharide vaccine = PPSV23 (Pneumovax) Adults 2364 years old: 1-3 doses may be recommended based on certain risk factors  Adults 72 years old: 1-2 doses may be recommended based off what pneumonia vaccine you previously received   Hepatitis B Vaccine 3 dose series if at intermediate or high risk (ex: diabetes, end stage renal disease, liver disease)   Tetanus (Td) Vaccine - COST NOT COVERED BY MEDICARE PART B Following completion of primary series, a booster dose should be given every 10 years to maintain immunity against tetanus  Td may also be given as tetanus wound prophylaxis  Tdap Vaccine - COST NOT COVERED BY MEDICARE PART B Recommended at least once for all adults  For pregnant patients, recommended with each pregnancy  Shingles Vaccine (Shingrix) - COST NOT COVERED BY MEDICARE PART B  2 shot series recommended in those aged 48 and above     Health Maintenance Due:      Topic Date Due    Hepatitis C Screening  Completed     Immunizations Due:      Topic Date Due    COVID-19 Vaccine (3 - Pfizer risk series) 03/26/2021     Advance Directives   What are advance directives? Advance directives are legal documents that state your wishes and plans for medical care  These plans are made ahead of time in case you lose your ability to make decisions for yourself  Advance directives can apply to any medical decision, such as the treatments you want, and if you want to donate organs  What are the types of advance directives? There are many types of advance directives, and each state has rules about how to use them  You may choose a combination of any of the following:  · Living will: This is a written record of the treatment you want  You can also choose which treatments you do not want, which to limit, and which to stop at a certain time  This includes surgery, medicine, IV fluid, and tube feedings  · Durable power of  for healthcare [facility]):   This is a written record that states who you want to make healthcare choices for you when you are unable to make them for yourself  This person, called a proxy, is usually a family member or a friend  You may choose more than 1 proxy  · Do not resuscitate (DNR) order:  A DNR order is used in case your heart stops beating or you stop breathing  It is a request not to have certain forms of treatment, such as CPR  A DNR order may be included in other types of advance directives  · Medical directive: This covers the care that you want if you are in a coma, near death, or unable to make decisions for yourself  You can list the treatments you want for each condition  Treatment may include pain medicine, surgery, blood transfusions, dialysis, IV or tube feedings, and a ventilator (breathing machine)  · Values history: This document has questions about your views, beliefs, and how you feel and think about life  This information can help others choose the care that you would choose  Why are advance directives important? An advance directive helps you control your care  Although spoken wishes may be used, it is better to have your wishes written down  Spoken wishes can be misunderstood, or not followed  Treatments may be given even if you do not want them  An advance directive may make it easier for your family to make difficult choices about your care  Weight Management   Why it is important to manage your weight:  Being overweight increases your risk of health conditions such as heart disease, high blood pressure, type 2 diabetes, and certain types of cancer  It can also increase your risk for osteoarthritis, sleep apnea, and other respiratory problems  Aim for a slow, steady weight loss  Even a small amount of weight loss can lower your risk of health problems  How to lose weight safely:  A safe and healthy way to lose weight is to eat fewer calories and get regular exercise   You can lose up about 1 pound a week by decreasing the number of calories you eat by 500 calories each day  Healthy meal plan for weight management:  A healthy meal plan includes a variety of foods, contains fewer calories, and helps you stay healthy  A healthy meal plan includes the following:  · Eat whole-grain foods more often  A healthy meal plan should contain fiber  Fiber is the part of grains, fruits, and vegetables that is not broken down by your body  Whole-grain foods are healthy and provide extra fiber in your diet  Some examples of whole-grain foods are whole-wheat breads and pastas, oatmeal, brown rice, and bulgur  · Eat a variety of vegetables every day  Include dark, leafy greens such as spinach, kale, michel greens, and mustard greens  Eat yellow and orange vegetables such as carrots, sweet potatoes, and winter squash  · Eat a variety of fruits every day  Choose fresh or canned fruit (canned in its own juice or light syrup) instead of juice  Fruit juice has very little or no fiber  · Eat low-fat dairy foods  Drink fat-free (skim) milk or 1% milk  Eat fat-free yogurt and low-fat cottage cheese  Try low-fat cheeses such as mozzarella and other reduced-fat cheeses  · Choose meat and other protein foods that are low in fat  Choose beans or other legumes such as split peas or lentils  Choose fish, skinless poultry (chicken or turkey), or lean cuts of red meat (beef or pork)  Before you cook meat or poultry, cut off any visible fat  · Use less fat and oil  Try baking foods instead of frying them  Add less fat, such as margarine, sour cream, regular salad dressing and mayonnaise to foods  Eat fewer high-fat foods  Some examples of high-fat foods include french fries, doughnuts, ice cream, and cakes  · Eat fewer sweets  Limit foods and drinks that are high in sugar  This includes candy, cookies, regular soda, and sweetened drinks  Exercise:  Exercise at least 30 minutes per day on most days of the week   Some examples of exercise include walking, biking, dancing, and swimming  You can also fit in more physical activity by taking the stairs instead of the elevator or parking farther away from stores  Ask your healthcare provider about the best exercise plan for you  © Copyright [employer] 2018 Information is for End User's use only and may not be sold, redistributed or otherwise used for commercial purposes  All illustrations and images included in CareNotes® are the copyrighted property of [employer]  or [facility] Preventive Visit Patient Instructions  Thank you for completing your Welcome to Medicare Visit or Medicare Annual Wellness Visit today  Your next wellness visit will be due in one year (5/5/2024)  The screening/preventive services that you may require over the next 5-10 years are detailed below  Some tests may not apply to you based off risk factors and/or age  Screening tests ordered at today's visit but not completed yet may show as past due  Also, please note that scanned in results may not display below  Preventive Screenings:  Service Recommendations Previous Testing/Comments   Colorectal Cancer Screening  · Colonoscopy    · Fecal Occult Blood Test (FOBT)/Fecal Immunochemical Test (FIT)  · Fecal DNA/Cologuard Test  · Flexible Sigmoidoscopy Age: 39-70 years old   Colonoscopy: every 10 years (May be performed more frequently if at higher risk)  OR  FOBT/FIT: every 1 year  OR  Cologuard: every 3 years  OR  Sigmoidoscopy: every 5 years  Screening may be recommended earlier than age 39 if at higher risk for colorectal cancer  Also, an individualized decision between you and your healthcare provider will decide whether screening between the ages of 74-80 would be appropriate   Colonoscopy: 11/26/2019  FOBT/FIT: Not on file  Cologuard: Not on file  Sigmoidoscopy: Not on file          Prostate Cancer Screening Individualized decision between patient and health care provider in men between ages of 53-78   Medicare will cover every 12 months beginning on the day after your 50th birthday PSA: <0 1 ng/mL     History Prostate Cancer  Screening Not Indicated     Hepatitis C Screening Once for adults born between 1945 and 1965  More frequently in patients at high risk for Hepatitis C Hep C Antibody: 11/08/2021    Screening Current   Diabetes Screening 1-2 times per year if you're at risk for diabetes or have pre-diabetes Fasting glucose: 95 mg/dL (1/9/2023)  A1C: 6 1 % (1/9/2023)  Screening Current   Cholesterol Screening Once every 5 years if you don't have a lipid disorder  May order more often based on risk factors  Lipid panel: 04/26/2022  Screening Not Indicated  History Lipid Disorder      Other Preventive Screenings Covered by Medicare:  6  Abdominal Aortic Aneurysm (AAA) Screening: covered once if your at risk  You're considered to be at risk if you have a family history of AAA or a male between the age of 73-68 who smoking at least 100 cigarettes in your lifetime  7  Lung Cancer Screening: covers low dose CT scan once per year if you meet all of the following conditions: (1) Age 50-69; (2) No signs or symptoms of lung cancer; (3) Current smoker or have quit smoking within the last 15 years; (4) You have a tobacco smoking history of at least 20 pack years (packs per day x number of years you smoked); (5) You get a written order from a healthcare provider  8  Glaucoma Screening: covered annually if you're considered high risk: (1) You have diabetes OR (2) Family history of glaucoma OR (3)  aged 48 and older OR (3)  American aged 72 and older  5  Osteoporosis Screening: covered every 2 years if you meet one of the following conditions: (1) Have a vertebral abnormality; (2) On glucocorticoid therapy for more than 3 months; (3) Have primary hyperparathyroidism; (4) On osteoporosis medications and need to assess response to drug therapy  10  HIV Screening: covered annually if you're between the age of 12-76   Also covered annually if you are younger than 13 and older than 72 with risk factors for HIV infection  For pregnant patients, it is covered up to 3 times per pregnancy  Immunizations:  Immunization Recommendations   Influenza Vaccine Annual influenza vaccination during flu season is recommended for all persons aged >= 6 months who do not have contraindications   Pneumococcal Vaccine   * Pneumococcal conjugate vaccine = PCV13 (Prevnar 13), PCV15 (Vaxneuvance), PCV20 (Prevnar 20)  * Pneumococcal polysaccharide vaccine = PPSV23 (Pneumovax) Adults 25-60 years old: 1-3 doses may be recommended based on certain risk factors  Adults 72 years old: 1-2 doses may be recommended based off what pneumonia vaccine you previously received   Hepatitis B Vaccine 3 dose series if at intermediate or high risk (ex: diabetes, end stage renal disease, liver disease)   Tetanus (Td) Vaccine - COST NOT COVERED BY MEDICARE PART B Following completion of primary series, a booster dose should be given every 10 years to maintain immunity against tetanus  Td may also be given as tetanus wound prophylaxis  Tdap Vaccine - COST NOT COVERED BY MEDICARE PART B Recommended at least once for all adults  For pregnant patients, recommended with each pregnancy  Shingles Vaccine (Shingrix) - COST NOT COVERED BY MEDICARE PART B  2 shot series recommended in those aged 48 and above     Health Maintenance Due:      Topic Date Due    Hepatitis C Screening  Completed     Immunizations Due:      Topic Date Due    COVID-19 Vaccine (3 - Pfizer risk series) 03/26/2021     Advance Directives   What are advance directives? Advance directives are legal documents that state your wishes and plans for medical care  These plans are made ahead of time in case you lose your ability to make decisions for yourself  Advance directives can apply to any medical decision, such as the treatments you want, and if you want to donate organs  What are the types of advance directives? There are many types of advance directives, and each state has rules about how to use them  You may choose a combination of any of the following:  · Living will: This is a written record of the treatment you want  You can also choose which treatments you do not want, which to limit, and which to stop at a certain time  This includes surgery, medicine, IV fluid, and tube feedings  · Durable power of  for healthcare [facility]): This is a written record that states who you want to make healthcare choices for you when you are unable to make them for yourself  This person, called a proxy, is usually a family member or a friend  You may choose more than 1 proxy  · Do not resuscitate (DNR) order:  A DNR order is used in case your heart stops beating or you stop breathing  It is a request not to have certain forms of treatment, such as CPR  A DNR order may be included in other types of advance directives  · Medical directive: This covers the care that you want if you are in a coma, near death, or unable to make decisions for yourself  You can list the treatments you want for each condition  Treatment may include pain medicine, surgery, blood transfusions, dialysis, IV or tube feedings, and a ventilator (breathing machine)  · Values history: This document has questions about your views, beliefs, and how you feel and think about life  This information can help others choose the care that you would choose  Why are advance directives important? An advance directive helps you control your care  Although spoken wishes may be used, it is better to have your wishes written down  Spoken wishes can be misunderstood, or not followed  Treatments may be given even if you do not want them  An advance directive may make it easier for your family to make difficult choices about your care     Weight Management   Why it is important to manage your weight:  Being overweight increases your risk of health conditions such as heart disease, high blood pressure, type 2 diabetes, and certain types of cancer  It can also increase your risk for osteoarthritis, sleep apnea, and other respiratory problems  Aim for a slow, steady weight loss  Even a small amount of weight loss can lower your risk of health problems  How to lose weight safely:  A safe and healthy way to lose weight is to eat fewer calories and get regular exercise  You can lose up about 1 pound a week by decreasing the number of calories you eat by 500 calories each day  Healthy meal plan for weight management:  A healthy meal plan includes a variety of foods, contains fewer calories, and helps you stay healthy  A healthy meal plan includes the following:  · Eat whole-grain foods more often  A healthy meal plan should contain fiber  Fiber is the part of grains, fruits, and vegetables that is not broken down by your body  Whole-grain foods are healthy and provide extra fiber in your diet  Some examples of whole-grain foods are whole-wheat breads and pastas, oatmeal, brown rice, and bulgur  · Eat a variety of vegetables every day  Include dark, leafy greens such as spinach, kale, michel greens, and mustard greens  Eat yellow and orange vegetables such as carrots, sweet potatoes, and winter squash  · Eat a variety of fruits every day  Choose fresh or canned fruit (canned in its own juice or light syrup) instead of juice  Fruit juice has very little or no fiber  · Eat low-fat dairy foods  Drink fat-free (skim) milk or 1% milk  Eat fat-free yogurt and low-fat cottage cheese  Try low-fat cheeses such as mozzarella and other reduced-fat cheeses  · Choose meat and other protein foods that are low in fat  Choose beans or other legumes such as split peas or lentils  Choose fish, skinless poultry (chicken or turkey), or lean cuts of red meat (beef or pork)  Before you cook meat or poultry, cut off any visible fat  · Use less fat and oil    Try baking foods instead of frying them  Add less fat, such as margarine, sour cream, regular salad dressing and mayonnaise to foods  Eat fewer high-fat foods  Some examples of high-fat foods include french fries, doughnuts, ice cream, and cakes  · Eat fewer sweets  Limit foods and drinks that are high in sugar  This includes candy, cookies, regular soda, and sweetened drinks  Exercise:  Exercise at least 30 minutes per day on most days of the week  Some examples of exercise include walking, biking, dancing, and swimming  You can also fit in more physical activity by taking the stairs instead of the elevator or parking farther away from stores  Ask your healthcare provider about the best exercise plan for you  © Copyright [address] 2018 Information is for End User's use only and may not be sold, redistributed or otherwise used for commercial purposes   All illustrations and images included in CareNotes® are the copyrighted property of [employer]  or [address] [place] [address]

## 2023-05-05 NOTE — PROGRESS NOTES
Assessment and Plan:     Problem List Items Addressed This Visit    None  Visit Diagnoses     Bilateral hearing loss, unspecified hearing loss type    -  Primary    Relevant Medications    fluticasone (FLONASE) 50 mcg/act nasal spray    guaiFENesin (Mucinex) 600 mg 12 hr tablet    Other Relevant Orders    Ear cerumen removal (Completed)    Sensation of fullness in both ears        Relevant Orders    Ear cerumen removal (Completed)    Medicare annual wellness visit, subsequent        Negative depression screening        Impacted cerumen of left ear        Relevant Orders    Ear cerumen removal (Completed)    Non-recurrent acute serous otitis media of left ear        Relevant Medications    fluticasone (FLONASE) 50 mcg/act nasal spray    guaiFENesin (Mucinex) 600 mg 12 hr tablet           Preventive health issues were discussed with patient, and age appropriate screening tests were ordered as noted in patient's After Visit Summary  Personalized health advice and appropriate referrals for health education or preventive services given if needed, as noted in patient's After Visit Summary       History of Present Illness:     Patient presents for a Medicare Wellness Visit    HPI   Patient Care Team:  Carla Rodriguez DO as PCP - General (Internal Medicine)  Carla Rodriguez DO as PCP - [address] [place] [address] (RTE)  [facility]  Karina Arenas MD (Radiation Oncology)  Richard Aponte MD (Urology)     Review of Systems:     Review of Systems     Problem List:     Patient Active Problem List   Diagnosis    Eczema    Generalized anxiety disorder    Hyperlipidemia    Hypertension    Hypothyroidism    Osteoarthritis    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 ([facility] )    History of radiation therapy    Chronic kidney disease, stage 3a ([facility] )    History of COVID-19    Degenerative arthritis of spine    Coronary artery calcification seen on CT "scan    Dysuria      Past Medical and Surgical History:     Past Medical History:   Diagnosis Date    Cancer [facility])     Chronic kidney disease, stage 3a [facility] ) 2021    Coronary artery calcification seen on CT scan 2021    COVID-19 2021    Dementia ([place] [id number] )     Disease of thyroid gland     Eczema     Generalized anxiety disorder     Hypertension     Prostate cancer ([facility] )     Prostate cancer metastatic to intrapelvic lymph node ([place] [id number] )     Skin disorder     suspect benign, but will need removal and due to location, refer   Last assessed: 2013     Past Surgical History:   Procedure Laterality Date    CATARACT EXTRACTION      COLONOSCOPY      COLONOSCOPY  2019    EYE SURGERY      KNEE SURGERY      PROSTATE SURGERY      VASECTOMY        Family History:     Family History   Problem Relation Age of Onset    Alcohol abuse Mother     Alcohol abuse Father     Depression Father     No Known Problems Sister     Stroke Brother         syndrome     No Known Problems Maternal Grandmother     No Known Problems Maternal Grandfather     No Known Problems Paternal Grandmother     No Known Problems Paternal Grandfather     Alcohol abuse Family     Colon cancer Maternal Aunt       Social History:     Social History     Socioeconomic History    Marital status: /Civil Union     Spouse name: None    Number of children: None    Years of education: None    Highest education level: None   Occupational History    Occupation: self employed  floor covering   Tobacco Use    Smoking status: Former     Types: Cigarettes, Cigars     Quit date:      Years since quittin 3    Smokeless tobacco: Never    Tobacco comments:     smokes 1-2 cigarss/month   Vaping Use    Vaping Use: Never used   Substance and Sexual Activity    Alcohol use: No    Drug use: No     Comment: Chronic Narcotic use noted in \"allscripts\"     Sexual activity: None   Other Topics Concern    None " Social History Narrative    Caffeine use      Social Determinants of Health     Financial Resource Strain: Low Risk     Difficulty of Paying Living Expenses: Not hard at all   Food Insecurity: Not on file   Transportation Needs: No Transportation Needs    Lack of Transportation (Medical): No    Lack of Transportation (Non-Medical): No   Physical Activity: Not on file   Stress: Not on file   Social Connections: Not on file   Intimate Partner Violence: Not on file   Housing Stability: Not on file      Medications and Allergies:     Current Outpatient Medications   Medication Sig Dispense Refill    fluticasone (FLONASE) 50 mcg/act nasal spray 1 spray into each nostril daily 16 g 0    guaiFENesin (Mucinex) 600 mg 12 hr tablet Take 1 tablet (600 mg total) by mouth every 12 (twelve) hours 20 tablet 0    ALPRAZolam (XANAX) 0 5 mg tablet Take 1 tablet (0 5 mg total) by mouth daily at bedtime as needed for anxiety 30 tablet 0    amLODIPine (NORVASC) 5 mg tablet Take 1 tablet (5 mg total) by mouth daily 90 tablet 0    atorvastatin (LIPITOR) 40 mg tablet Take 1 tablet (40 mg total) by mouth daily 90 tablet 1    levothyroxine 88 mcg tablet Take 1 tablet (88 mcg total) by mouth daily 90 tablet 1    lisinopril-hydrochlorothiazide (PRINZIDE,ZESTORETIC) 20-25 MG per tablet Take 1 tablet by mouth daily 90 tablet 1    meloxicam (MOBIC) 15 mg tablet Take 1 tablet (15 mg total) by mouth daily 30 tablet 0     No current facility-administered medications for this visit       No Known Allergies   Immunizations:     Immunization History   Administered Date(s) Administered    COVID-19 PFIZER VACCINE 0 3 ML IM 02/05/2021, 02/26/2021    INFLUENZA 10/01/2017, 10/08/2018, 10/15/2019, 10/19/2020, 09/15/2021, 10/19/2022    Influenza Split High Dose Preservative Free IM 10/18/2016    Influenza, seasonal, injectable 10/01/2017    Pneumococcal Conjugate 13-Valent 04/11/2017    Pneumococcal Polysaccharide PPV23 01/01/2006    Td (adult), adsorbed 01/01/2000    Tdap 05/01/2014    influenza, trivalent, adjuvanted 10/26/2019      Health Maintenance:         Topic Date Due    Hepatitis C Screening  Completed         Topic Date Due    COVID-19 Vaccine (3 - Pfizer risk series) 03/26/2021      Medicare Screening Tests and Risk Assessments:     Silvia Palm is here for his Subsequent Wellness visit  Last Medicare Wellness visit information reviewed, patient interviewed and updates made to the record today  Health Risk Assessment:   Patient rates overall health as good  Patient feels that their physical health rating is same  Patient is satisfied with their life  Eyesight was rated as slightly worse  Hearing was rated as slightly worse  Patient feels that their emotional and mental health rating is same  Patients states they are never, rarely angry  Patient states they are never, rarely unusually tired/fatigued  Pain experienced in the last 7 days has been none  Patient states that he has experienced no weight loss or gain in last 6 months  Depression Screening:   PHQ-2 Score: 0      Fall Risk Screening: In the past year, patient has experienced: no history of falling in past year      Home Safety:  Patient does not have trouble with stairs inside or outside of their home  Patient has working smoke alarms and has working carbon monoxide detector  Home safety hazards include: none  Nutrition:   Current diet is Regular  Medications:   Patient is not currently taking any over-the-counter supplements  Patient is able to manage medications  Activities of Daily Living (ADLs)/Instrumental Activities of Daily Living (IADLs):   Walk and transfer into and out of bed and chair?: Yes  Dress and groom yourself?: Yes    Bathe or shower yourself?: Yes    Feed yourself?  Yes  Do your laundry/housekeeping?: Yes  Manage your money, pay your bills and track your expenses?: Yes  Make your own meals?: Yes    Do your own shopping?: Yes    Previous "Hospitalizations:   Any hospitalizations or ED visits within the last 12 months?: No      Advance Care Planning:   Living will: Yes    Durable POA for healthcare: Yes    Advanced directive: Yes    Five wishes given: No    Patient declined ACP directive: No    End of Life Decisions reviewed with patient: Yes    Provider agrees with end of life decisions: Yes      Cognitive Screening:   Provider or family/friend/caregiver concerned regarding cognition?: No    PREVENTIVE SCREENINGS      Cardiovascular Screening:    General: Screening Not Indicated, History Lipid Disorder and Screening Current      Diabetes Screening:     General: Screening Current      Colorectal Cancer Screening:     General: Screening Current      Prostate Cancer Screening:    General: History Prostate Cancer, Screening Not Indicated and Screening Current      Osteoporosis Screening:    General: Screening Not Indicated      Abdominal Aortic Aneurysm (AAA) Screening:    Risk factors include: tobacco use        General: Screening Not Indicated      Lung Cancer Screening:     General: Screening Not Indicated      Hepatitis C Screening:    General: Screening Current    Screening, Brief Intervention, and Referral to Treatment (SBIRT)    Screening  Typical number of drinks in a day: 0  Typical number of drinks in a week: 0  Interpretation: Low risk drinking behavior  Single Item Drug Screening:  How often have you used an illegal drug (including marijuana) or a prescription medication for non-medical reasons in the past year? never    Single Item Drug Screen Score: 0  Interpretation: Negative screen for possible drug use disorder    Other Counseling Topics:   Car/seat belt/driving safety, sunscreen and calcium and vitamin D intake and regular weightbearing exercise  No results found       Physical Exam:     /80   Pulse 74   Temp 99 5 °F (37 5 °C)   Ht 5' 8\" (1 727 m)   Wt 89 kg (196 lb 4 oz)   SpO2 99%   BMI 29 84 kg/m²     Physical Exam " A/P: Doing well and no falls reported  Denies depression and feels safe at home  Diverse diet  No problems operating a MV and uses seat belts  Has a living will and POA  No DME or referrals needed today  RTC one year for medicare wellness       Bailey Solano,

## 2023-05-05 NOTE — PROGRESS NOTES
Depression Screening and Follow-up Plan: Patient was screened for depression during today's encounter  They screened negative with a PHQ-2 score of 0  Assessment/Plan:  Problem List Items Addressed This Visit    None  Visit Diagnoses     Bilateral hearing loss, unspecified hearing loss type    -  Primary    Relevant Medications    fluticasone (FLONASE) 50 mcg/act nasal spray    guaiFENesin (Mucinex) 600 mg 12 hr tablet    Other Relevant Orders    Ear cerumen removal (Completed)    Sensation of fullness in both ears        Relevant Orders    Ear cerumen removal (Completed)    Medicare annual wellness visit, subsequent        Negative depression screening        Impacted cerumen of left ear        Relevant Orders    Ear cerumen removal (Completed)    Non-recurrent acute serous otitis media of left ear        Relevant Medications    fluticasone (FLONASE) 50 mcg/act nasal spray    guaiFENesin (Mucinex) 600 mg 12 hr tablet           Diagnoses and all orders for this visit:    Bilateral hearing loss, unspecified hearing loss type  -     Ear cerumen removal  -     fluticasone (FLONASE) 50 mcg/act nasal spray; 1 spray into each nostril daily  -     guaiFENesin (Mucinex) 600 mg 12 hr tablet; Take 1 tablet (600 mg total) by mouth every 12 (twelve) hours    Sensation of fullness in both ears  -     Ear cerumen removal    Medicare annual wellness visit, subsequent    Negative depression screening    Impacted cerumen of left ear  -     Ear cerumen removal    Non-recurrent acute serous otitis media of left ear  -     fluticasone (FLONASE) 50 mcg/act nasal spray; 1 spray into each nostril daily  -     guaiFENesin (Mucinex) 600 mg 12 hr tablet; Take 1 tablet (600 mg total) by mouth every 12 (twelve) hours      No problem-specific Assessment & Plan notes found for this encounter  A/P: Doing ok and AS flushed with good results and some improvement in hearing, but OM noted  Will treat with mucinex and INS  RTC as scheduled  Subjective:      Patient ID: Jean Rios is a 78 y o  male  WM presents with a one month h/o decreased hearing and ear fullness w/o pain bilat  Reports that just a day ago, the right side improved  No recent illnesses, flying, or swimming  Reports the last time he had his ears flushed was over fifty years ago  NO recent URI  The following portions of the patient's history were reviewed and updated as appropriate:   He has a past medical history of Cancer ([facility] ), Chronic kidney disease, stage [facility]a [facility] ) (06/05/2021), Coronary artery calcification seen on CT scan (11/07/2021), COVID-19 (11/06/2021), Dementia ([facility] ), Disease of thyroid gland, Eczema, Generalized anxiety disorder, Hypertension, Prostate cancer ([facility] ), Prostate cancer metastatic to intrapelvic lymph node ([facility] ), and Skin disorder  ,  does not have any pertinent problems on file  ,   has a past surgical history that includes Knee surgery; Prostate surgery; Vasectomy; Cataract extraction; Eye surgery; Colonoscopy; and Colonoscopy (11/26/2019)  ,  family history includes Alcohol abuse in his family, father, and mother; Colon cancer in his maternal aunt; Depression in his father; No Known Problems in his maternal grandfather, maternal grandmother, paternal grandfather, paternal grandmother, and sister; Stroke in his brother  ,   reports that he quit smoking about 37 years ago  His smoking use included cigarettes and cigars  He has never used smokeless tobacco  He reports that he does not drink alcohol and does not use drugs  ,  has No Known Allergies     Current Outpatient Medications   Medication Sig Dispense Refill    fluticasone (FLONASE) 50 mcg/act nasal spray 1 spray into each nostril daily 16 g 0    guaiFENesin (Mucinex) 600 mg 12 hr tablet Take 1 tablet (600 mg total) by mouth every 12 (twelve) hours 20 tablet 0    ALPRAZolam (XANAX) 0 5 mg tablet Take 1 tablet (0 5 mg total) by mouth daily at bedtime as needed for anxiety 30 tablet 0    "amLODIPine (NORVASC) 5 mg tablet Take 1 tablet (5 mg total) by mouth daily 90 tablet 0    atorvastatin (LIPITOR) 40 mg tablet Take 1 tablet (40 mg total) by mouth daily 90 tablet 1    levothyroxine 88 mcg tablet Take 1 tablet (88 mcg total) by mouth daily 90 tablet 1    lisinopril-hydrochlorothiazide (PRINZIDE,ZESTORETIC) 20-25 MG per tablet Take 1 tablet by mouth daily 90 tablet 1    meloxicam (MOBIC) 15 mg tablet Take 1 tablet (15 mg total) by mouth daily 30 tablet 0     No current facility-administered medications for this visit  Review of Systems   Constitutional: Negative for activity change, chills, diaphoresis, fatigue and fever  HENT: Positive for hearing loss  Negative for congestion, ear discharge, ear pain, facial swelling, rhinorrhea, sinus pain and sneezing  Respiratory: Negative for cough, chest tightness, shortness of breath and wheezing  Cardiovascular: Negative for chest pain, palpitations and leg swelling  Gastrointestinal: Negative for abdominal pain, constipation, diarrhea, nausea and vomiting  Genitourinary: Negative for difficulty urinating, dysuria and frequency  Musculoskeletal: Negative for arthralgias, gait problem and myalgias  Neurological: Negative for light-headedness and headaches  Psychiatric/Behavioral: Negative for confusion  The patient is not nervous/anxious  PHQ-2/9 Depression Screening    Little interest or pleasure in doing things: 0 - not at all  Feeling down, depressed, or hopeless: 0 - not at all  PHQ-2 Score: 0  PHQ-2 Interpretation: Negative depression screen        Objective:  Vitals:    05/05/23 1130   BP: 120/80   Pulse: 74   Temp: 99 5 °F (37 5 °C)   SpO2: 99%   Weight: 89 kg (196 lb 4 oz)   Height: 5' 8\" (1 727 m)     Body mass index is 29 84 kg/m²  Physical Exam  Vitals and nursing note reviewed  Constitutional:       General: He is not in acute distress  Appearance: Normal appearance  He is not ill-appearing     HENT:      " Head: Normocephalic  Right Ear: Tympanic membrane, ear canal and external ear normal  There is no impacted cerumen  Left Ear: There is impacted cerumen  Mouth/Throat:      Mouth: Mucous membranes are moist    Eyes:      Extraocular Movements: Extraocular movements intact  Conjunctiva/sclera: Conjunctivae normal       Pupils: Pupils are equal, round, and reactive to light  Neurological:      General: No focal deficit present  Mental Status: He is alert and oriented to person, place, and time  Mental status is at baseline  Psychiatric:         Mood and Affect: Mood normal          Behavior: Behavior normal          Thought Content: Thought content normal          Judgment: Judgment normal          Ear cerumen removal    Date/Time: 5/5/2023 11:30 AM  Performed by: Ramya Avendaño DO  Authorized by: Ramya Avendaño DO   Universal Protocol:  Consent: Verbal consent obtained  Risks and benefits: risks, benefits and alternatives were discussed  Consent given by: patient  Patient understanding: patient states understanding of the procedure being performed  Required items: required blood products, implants, devices, and special equipment available  Patient identity confirmed: verbally with patient      Patient location:  Clinic  Procedure details:     Local anesthetic:  None    Location:  L ear    Procedure type: irrigation only      Visualization (free text):  AD wnl  AS partially blocked with cerumen and unable to visualize TM  Equipment used:  60cc syringe and angio cath tip  Post-procedure details:     Complication:  None    Hearing quality:  Improved    Patient tolerance of procedure: Tolerated well, no immediate complications  Comments:      After AS flushing with good results  TM Bulging with air fluid level

## 2023-05-11 DIAGNOSIS — F41.1 GENERALIZED ANXIETY DISORDER: ICD-10-CM

## 2023-05-11 DIAGNOSIS — I10 ESSENTIAL HYPERTENSION: ICD-10-CM

## 2023-05-11 DIAGNOSIS — E03.9 HYPOTHYROIDISM, UNSPECIFIED TYPE: ICD-10-CM

## 2023-05-11 RX ORDER — ALPRAZOLAM 0.5 MG/1
0.5 TABLET ORAL
Qty: 30 TABLET | Refills: 0 | Status: SHIPPED | OUTPATIENT
Start: 2023-05-11

## 2023-05-11 RX ORDER — LISINOPRIL AND HYDROCHLOROTHIAZIDE 25; 20 MG/1; MG/1
1 TABLET ORAL DAILY
Qty: 90 TABLET | Refills: 0 | Status: SHIPPED | OUTPATIENT
Start: 2023-05-11

## 2023-05-11 RX ORDER — LEVOTHYROXINE SODIUM 88 UG/1
88 TABLET ORAL DAILY
Qty: 90 TABLET | Refills: 0 | Status: SHIPPED | OUTPATIENT
Start: 2023-05-11

## 2023-05-21 DIAGNOSIS — R10.32 LEFT GROIN PAIN: ICD-10-CM

## 2023-05-21 DIAGNOSIS — R31.29 OTHER MICROSCOPIC HEMATURIA: ICD-10-CM

## 2023-05-21 DIAGNOSIS — Z92.3 HISTORY OF RADIATION THERAPY: ICD-10-CM

## 2023-05-21 DIAGNOSIS — R39.15 URGENCY OF URINATION: ICD-10-CM

## 2023-05-21 DIAGNOSIS — R35.0 URINARY FREQUENCY: ICD-10-CM

## 2023-05-21 DIAGNOSIS — C77.5 MALIGNANT NEOPLASM OF PROSTATE METASTATIC TO INTRAPELVIC LYMPH NODE (HCC): ICD-10-CM

## 2023-05-21 DIAGNOSIS — M79.652 LEFT THIGH PAIN: ICD-10-CM

## 2023-05-21 DIAGNOSIS — C61 MALIGNANT NEOPLASM OF PROSTATE METASTATIC TO INTRAPELVIC LYMPH NODE (HCC): ICD-10-CM

## 2023-05-22 RX ORDER — MELOXICAM 15 MG/1
15 TABLET ORAL DAILY
Qty: 30 TABLET | Refills: 1 | Status: SHIPPED | OUTPATIENT
Start: 2023-05-22

## 2023-06-07 ENCOUNTER — TELEPHONE (OUTPATIENT)
Dept: RADIATION ONCOLOGY | Facility: CLINIC | Age: 83
End: 2023-06-07

## 2023-06-07 DIAGNOSIS — C61 MALIGNANT NEOPLASM OF PROSTATE METASTATIC TO INTRAPELVIC LYMPH NODE (HCC): Primary | ICD-10-CM

## 2023-06-07 DIAGNOSIS — C77.5 MALIGNANT NEOPLASM OF PROSTATE METASTATIC TO INTRAPELVIC LYMPH NODE (HCC): Primary | ICD-10-CM

## 2023-06-07 NOTE — TELEPHONE ENCOUNTER
As per communication consent, LMOV  RE: upcoming appointment with Dr Sofie Wilson in radiation oncology on 6/15/23 at   Needs to complete PSA level prior to appointment    Order for same placed in chart

## 2023-06-08 DIAGNOSIS — I25.10 CORONARY ARTERY CALCIFICATION SEEN ON CT SCAN: ICD-10-CM

## 2023-06-08 DIAGNOSIS — F41.1 GENERALIZED ANXIETY DISORDER: ICD-10-CM

## 2023-06-09 RX ORDER — ATORVASTATIN CALCIUM 40 MG/1
40 TABLET, FILM COATED ORAL DAILY
Qty: 90 TABLET | Refills: 1 | Status: SHIPPED | OUTPATIENT
Start: 2023-06-09

## 2023-06-09 RX ORDER — ALPRAZOLAM 0.5 MG/1
0.5 TABLET ORAL
Qty: 30 TABLET | Refills: 0 | Status: SHIPPED | OUTPATIENT
Start: 2023-06-09

## 2023-06-13 ENCOUNTER — APPOINTMENT (OUTPATIENT)
Age: 83
End: 2023-06-13
Payer: COMMERCIAL

## 2023-06-13 DIAGNOSIS — C77.5 MALIGNANT NEOPLASM OF PROSTATE METASTATIC TO INTRAPELVIC LYMPH NODE (HCC): ICD-10-CM

## 2023-06-13 DIAGNOSIS — C61 MALIGNANT NEOPLASM OF PROSTATE METASTATIC TO INTRAPELVIC LYMPH NODE (HCC): ICD-10-CM

## 2023-06-13 LAB — PSA SERPL-MCNC: 0.01 NG/ML (ref 0–4)

## 2023-06-13 PROCEDURE — 36415 COLL VENOUS BLD VENIPUNCTURE: CPT

## 2023-06-13 PROCEDURE — 84153 ASSAY OF PSA TOTAL: CPT

## 2023-06-15 ENCOUNTER — CLINICAL SUPPORT (OUTPATIENT)
Dept: RADIATION ONCOLOGY | Facility: CLINIC | Age: 83
End: 2023-06-15
Attending: RADIOLOGY

## 2023-06-15 ENCOUNTER — RADIATION ONCOLOGY FOLLOW-UP (OUTPATIENT)
Dept: RADIATION ONCOLOGY | Facility: CLINIC | Age: 83
End: 2023-06-15
Attending: RADIOLOGY
Payer: COMMERCIAL

## 2023-06-15 VITALS
WEIGHT: 196.43 LBS | SYSTOLIC BLOOD PRESSURE: 161 MMHG | HEART RATE: 58 BPM | BODY MASS INDEX: 29.87 KG/M2 | TEMPERATURE: 97.8 F | RESPIRATION RATE: 18 BRPM | OXYGEN SATURATION: 98 % | DIASTOLIC BLOOD PRESSURE: 72 MMHG

## 2023-06-15 DIAGNOSIS — R35.0 URINARY FREQUENCY: ICD-10-CM

## 2023-06-15 DIAGNOSIS — C61 MALIGNANT NEOPLASM OF PROSTATE METASTATIC TO INTRAPELVIC LYMPH NODE (HCC): Primary | ICD-10-CM

## 2023-06-15 DIAGNOSIS — Z92.3 HISTORY OF RADIATION THERAPY: ICD-10-CM

## 2023-06-15 DIAGNOSIS — C77.5 MALIGNANT NEOPLASM OF PROSTATE METASTATIC TO INTRAPELVIC LYMPH NODE (HCC): Primary | ICD-10-CM

## 2023-06-15 DIAGNOSIS — R31.29 OTHER MICROSCOPIC HEMATURIA: ICD-10-CM

## 2023-06-15 DIAGNOSIS — C77.5 MALIGNANT NEOPLASM OF PROSTATE METASTATIC TO INTRAPELVIC LYMPH NODE (HCC): ICD-10-CM

## 2023-06-15 DIAGNOSIS — R10.32 LEFT GROIN PAIN: ICD-10-CM

## 2023-06-15 DIAGNOSIS — R39.15 URGENCY OF URINATION: ICD-10-CM

## 2023-06-15 DIAGNOSIS — M79.652 LEFT THIGH PAIN: ICD-10-CM

## 2023-06-15 DIAGNOSIS — C61 MALIGNANT NEOPLASM OF PROSTATE METASTATIC TO INTRAPELVIC LYMPH NODE (HCC): ICD-10-CM

## 2023-06-15 DIAGNOSIS — I10 PRIMARY HYPERTENSION: ICD-10-CM

## 2023-06-15 PROCEDURE — 99213 OFFICE O/P EST LOW 20 MIN: CPT | Performed by: RADIOLOGY

## 2023-06-15 RX ORDER — AMLODIPINE BESYLATE 5 MG/1
5 TABLET ORAL DAILY
Qty: 90 TABLET | Refills: 1 | Status: SHIPPED | OUTPATIENT
Start: 2023-06-15

## 2023-06-15 RX ORDER — MELOXICAM 15 MG/1
15 TABLET ORAL DAILY
Qty: 30 TABLET | Refills: 3 | Status: SHIPPED | OUTPATIENT
Start: 2023-06-15

## 2023-06-15 NOTE — PROGRESS NOTES
Suman Sutherland 1940 is a 78 y.o. male  with a history of recurrent prostate adenocarcinoma. He is status post prostatectomy in 1997. He developed 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 involving a right pelvic lymph node and within the bone. He completed radiation therapy to the whole pelvis including his right pelvic lymphadenopathy, prostate bed, and right acetabular region on 9/25/2018. He was last seen in radiation oncology on 6/13/22. 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 Negative for activity change, appetite change and unexpected weight change. HENT: Negative. Negative for dental problem. Eyes: Negative for discharge and itching. Wears glasses. Weak left eye. Respiratory: Negative. Negative for chest tightness, shortness of breath, wheezing and stridor. Cardiovascular: Negative. Negative for chest pain, palpitations and leg swelling. Gastrointestinal: Negative. Diarrhea: once in awhile. Urgency with bowel movements with no improvement   Endocrine: Negative. Negative for cold intolerance and heat intolerance. Genitourinary: Positive for frequency (voiding every 2 hours) and urgency. Negative for dysuria and hematuria. Nocturia x 1-2, stream is fair   Musculoskeletal: Positive for arthralgias (right shoulder). Skin: Negative.         eczema   Allergic/Immunologic: Positive for environmental allergies. Neurological: Negative. Negative for tremors and weakness. Hematological: Bruises/bleeds easily. Psychiatric/Behavioral: Negative. Negative for sleep disturbance. The patient is not nervous/anxious. Uses alprazolam        Clinical Trial: no    IPSS Questionnaire (AUA-7): Over the past month…    1)  How often have you had a sensation of not emptying your bladder completely after you finish urinating? 0 - Not at all   2)  How often have you had to urinate again less than two hours after you finished urinating? 4 - More than half the time   3)  How often have you found you stopped and started again several times when you urinated? 0 - Not at all   4) How difficult have you found it to postpone urination? 0 - Not at all   5) How often have you had a weak urinary stream?  1 - Less than 1 time in 5   6) How often have you had to push or strain to begin urination? 0 - Not at all   7) How many times did you most typically get up to urinate from the time you went to bed until the time you got up in the morning?   2 - 2 times   Total Score:  7       Teaching: completed    Health Maintenance   Topic Date Due   • SLP PLAN OF CARE  Never done   • COVID-19 Vaccine (3 - Pfizer risk series) 03/26/2021   • BMI: Followup Plan  01/04/2024   • Fall Risk  05/05/2024   • Depression Screening  05/05/2024   • Medicare Annual Wellness Visit (AWV)  05/05/2024   • BMI: Adult  05/05/2024   • Hepatitis C Screening  Completed   • Pneumococcal Vaccine: 65+ Years  Completed   • Influenza Vaccine  Completed   • HIB Vaccine  Aged Out   • IPV Vaccine  Aged Out   • Hepatitis A Vaccine  Aged Out   • Meningococcal ACWY Vaccine  Aged Out   • HPV Vaccine  Aged Out     Patient Active Problem List   Diagnosis   • Eczema   • Generalized anxiety disorder   • Hyperlipidemia   • Hypertension   • Hypothyroidism   • Osteoarthritis   • 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 ([address])   • History of radiation therapy   • Chronic kidney disease, stage 3a ([address])   • History of COVID-19   • Degenerative arthritis of spine   • Coronary artery calcification seen on CT scan   • Dysuria     Past Medical History:   Diagnosis Date   • Cancer [facility])    • Chronic kidney disease, stage 3a ([address]) 06/05/2021   • Coronary artery calcification seen on CT scan 11/07/2021   • COVID-19 11/06/2021   • Dementia ([address])    • Disease of thyroid gland    • Eczema    • Generalized anxiety disorder    • Hypertension    • Prostate cancer ([address])    • Prostate cancer metastatic to intrapelvic lymph node ([address])    • Skin disorder     suspect benign, but will need removal and due to location, refer.  Last assessed: April 18, 2013     Past Surgical History:   Procedure Laterality Date   • CATARACT EXTRACTION     • COLONOSCOPY     • COLONOSCOPY  11/26/2019   • EYE SURGERY     • KNEE SURGERY     • PROSTATE SURGERY     • VASECTOMY       Family History   Problem Relation Age of Onset   • Alcohol abuse Mother    • Alcohol abuse Father    • Depression Father    • No Known Problems Sister    • Stroke Brother         syndrome    • No Known Problems Maternal Grandmother    • No Known Problems Maternal Grandfather    • No Known Problems Paternal Grandmother    • No Known Problems Paternal Grandfather    • Alcohol abuse Family    • Colon cancer Maternal Aunt      Social History     Socioeconomic History   • Marital status: /Civil Union     Spouse name: Not on file   • Number of children: Not on file   • Years of education: Not on file   • Highest education level: Not on file   Occupational History   • Occupation: self employed  floor covering   Tobacco Use   • Smoking status: Former     Types: Cigarettes, Cigars     Quit date:      Years since quittin.4   • Smokeless tobacco: Never   • Tobacco comments:     smokes 1-2 cigarss/month   Vaping Use   • Vaping Use: Never used   Substance and Sexual Activity   • Alcohol use: No   • Drug use: No     Comment: Chronic Narcotic use noted in "allscripts"    • Sexual activity: Not on file   Other Topics Concern   • Not on file   Social History Narrative    Caffeine use      Social Determinants of Health     Financial Resource Strain: Low Risk  (2023)    Overall Financial Resource Strain (CARDIA)    • Difficulty of Paying Living Expenses: Not hard at all   Food Insecurity: No Food Insecurity (2021)    Hunger Vital Sign    • Worried About Running Out of Food in the Last Year: Never true    • Ran Out of Food in the Last Year: Never true   Transportation Needs: No Transportation Needs (2023)    PRAPARE - Transportation    • Lack of Transportation (Medical): No    • Lack of Transportation (Non-Medical):  No   Physical Activity: Not on file   Stress: Not on file   Social Connections: Not on file   Intimate Partner Violence: Not on file   Housing Stability: Low Risk  (2021)    Housing Stability Vital Sign    • Unable to Pay for Housing in the Last Year: No    • Number of Places Lived in the Last Year: 1    • Unstable Housing in the Last Year: No       Current Outpatient Medications:   •  ALPRAZolam (XANAX) 0.5 mg tablet, Take 1 tablet (0.5 mg total) by mouth daily at bedtime as needed for anxiety, Disp: 30 tablet, Rfl: 0  •  amLODIPine (NORVASC) 5 mg tablet, Take 1 tablet (5 mg total) by mouth daily, Disp: 90 tablet, Rfl: 1  •  atorvastatin (LIPITOR) 40 mg tablet, Take 1 tablet (40 mg total) by mouth daily, Disp: 90 tablet, Rfl: 1  •  fluticasone (FLONASE) 50 mcg/act nasal spray, 1 spray into each nostril daily, Disp: 16 g, Rfl: 0  •  guaiFENesin (Mucinex) 600 mg 12 hr tablet, Take 1 tablet (600 mg total) by mouth every 12 (twelve) hours, Disp: 20 tablet, Rfl: 0  •  levothyroxine 88 mcg tablet, Take 1 tablet (88 mcg total) by mouth daily, Disp: 90 tablet, Rfl: 0  •  lisinopril-hydrochlorothiazide (PRINZIDE,ZESTORETIC) 20-25 MG per tablet, Take 1 tablet by mouth daily, Disp: 90 tablet, Rfl: 0  •  meloxicam (MOBIC) 15 mg tablet, Take 1 tablet (15 mg total) by mouth daily, Disp: 30 tablet, Rfl: 3  No Known Allergies  There were no vitals filed for this visit.

## 2023-06-15 NOTE — PROGRESS NOTES
Follow-up - Radiation Oncology   Paz Aceves 1940 78 y.o. male [id number]      History of Present Illness   Cancer Staging   See Below    Paz Aceves is a 78y.o. year old male with a history of recurrent prostate adenocarcinoma.  He is status post prostatectomy in 1997. He developed 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 involving a right pelvic lymph node and within the bone. He completed radiation therapy to the whole pelvis including his right pelvic lymphadenopathy, prostate bed, and right acetabular region on 9/25/2018. He was last seen in radiation oncology on 6/13/22.     Interval History:        Lab Results   Component Value Date     PSA 0.01 06/13/2023     PSA <0.1 08/22/2022     PSA <0.1 12/13/2021       No recent  or upcoming appointments  with urology found in system    He reports he is feeling "excellent". He reports normal bowel movements with no diarrhea.  He was seeing a small amount of blood when he wipes about once per week, but this has resolved now with no blood for over 2 years. Cathi Garcia had a negative colonoscopy per his report 11 years ago.  He had a repeat colonoscopy November 26, 2019 revealed 1 tubular adenoma and some radiation proctitis.   Since he no denies any rectal bleeding, he is not using Canasa suppositories.  He does continue to take Metamucil powder once or twice a day. Cathi Garcia reports a good urinary stream.  He urinates about every 2 hours during the day.  He has stable nocturia x2. Cathi Garcia is active and goes for a walk for 1.5 miles 3 times a day with his dog Carine.  He denies any right hip nor pelvic pains.   Both the patient and his wife have had both of their COVID vaccines in February 2021.   They have not had COVID booster vaccinations. He did have COVID pneumonia in November 2021 but his wife did not become infected. He was in the hospital for 3 days with shortness of breath but did not require ventilation.   He has stable poor short-term memory. 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 He had stopped smoking tobacco in . He stopped drinking alcohol 40 years ago and has been going to [employer] since . He has left eye cataract surgery scheduled .     Upcomin2023 Dr. 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)    • Chronic kidney disease, stage 3a ([address]) 2021   • Coronary artery calcification seen on CT scan 2021   • COVID-19 2021   • Dementia ([address])    • Disease of thyroid gland    • Eczema    • Generalized anxiety disorder    • Hypertension    • Prostate cancer ([address])    • Prostate cancer metastatic to intrapelvic lymph node ([address])    • Skin disorder     suspect benign, but will need removal and due to location, refer.  Last assessed: 2013     Past Surgical History:   Procedure Laterality Date   • CATARACT EXTRACTION     • COLONOSCOPY     • COLONOSCOPY  2019   • EYE SURGERY     • KNEE SURGERY     • PROSTATE SURGERY     • VASECTOMY         Social History   Social History     Substance and Sexual Activity   Alcohol Use No     Social History     Substance and Sexual Activity Drug Use No    Comment: Chronic Narcotic use noted in "allscripts"      Social History     Tobacco Use   Smoking Status Former   • Types: Cigarettes, Cigars   • Quit date: 12   • Years since quittin.4   Smokeless Tobacco Never   Tobacco Comments    smokes 1-2 cigarss/month   \  Meds/Allergies     Current Outpatient Medications:   •  ALPRAZolam (XANAX) 0.5 mg tablet, Take 1 tablet (0.5 mg total) by mouth daily at bedtime as needed for anxiety, Disp: 30 tablet, Rfl: 0  •  amLODIPine (NORVASC) 5 mg tablet, Take 1 tablet (5 mg total) by mouth daily, Disp: 90 tablet, Rfl: 1  •  atorvastatin (LIPITOR) 40 mg tablet, Take 1 tablet (40 mg total) by mouth daily, Disp: 90 tablet, Rfl: 1  •  levothyroxine 88 mcg tablet, Take 1 tablet (88 mcg total) by mouth daily, Disp: 90 tablet, Rfl: 0  •  lisinopril-hydrochlorothiazide (PRINZIDE,ZESTORETIC) 20-25 MG per tablet, Take 1 tablet by mouth daily, Disp: 90 tablet, Rfl: 0  •  meloxicam (MOBIC) 15 mg tablet, Take 1 tablet (15 mg total) by mouth daily, Disp: 30 tablet, Rfl: 3  •  fluticasone (FLONASE) 50 mcg/act nasal spray, 1 spray into each nostril daily, Disp: 16 g, Rfl: 0  •  guaiFENesin (Mucinex) 600 mg 12 hr tablet, Take 1 tablet (600 mg total) by mouth every 12 (twelve) hours, Disp: 20 tablet, Rfl: 0  No Known Allergies    Review of Systems  Constitutional: Negative. Negative for activity change, appetite change and unexpected weight change. HENT: Negative. Negative for dental problem. Eyes: Negative for discharge and itching. Wears glasses. Weak left eye. Respiratory: Negative. Negative for chest tightness, shortness of breath, wheezing and stridor. Cardiovascular: Negative. Negative for chest pain, palpitations and leg swelling. Gastrointestinal: Negative. Diarrhea: once in awhile. Urgency with bowel movements with no improvement   Endocrine: Negative. Negative for cold intolerance and heat intolerance.    Genitourinary: Positive for frequency (voiding every 2 hours) and urgency. Negative for dysuria and hematuria. Nocturia x 1-2, stream is fair   Musculoskeletal: Positive for arthralgias (right shoulder). Skin: Negative.         eczema   Allergic/Immunologic: Positive for environmental allergies. Neurological: Negative. Negative for tremors and weakness. Hematological: Bruises/bleeds easily. Psychiatric/Behavioral: Negative. Negative for sleep disturbance. The patient is not nervous/anxious. Uses alprazolam       IPSS Questionnaire (AUA-7): Over the past month…     1)  How often have you had a sensation of not emptying your bladder completely after you finish urinating? 0 - Not at all   2)  How often have you had to urinate again less than two hours after you finished urinating? 4 - More than half the time   3)  How often have you found you stopped and started again several times when you urinated? 0 - Not at all   4) How difficult have you found it to postpone urination? 0 - Not at all   5) How often have you had a weak urinary stream?  1 - Less than 1 time in 5   6) How often have you had to push or strain to begin urination? 0 - Not at all   7) How many times did you most typically get up to urinate from the time you went to bed until the time you got up in the morning? 2 - 2 times   Total Score:  7      OBJECTIVE:   /72   Pulse 58   Temp 97.8 °F (36.6 °C) (Temporal)   Resp 18   Wt 89.1 kg (196 lb 6.9 oz)   SpO2 98%   BMI 29.87 kg/m²   Pain Assessment:  0  ECOG/Zubrod/WHO: 1 - Symptomatic but completely ambulatory    Physical Exam   Constitutional: He is oriented to person, place, and time. He appears well-developed and well-nourished. No distress. HENT:   Head: Normocephalic and atraumatic. Mouth/Throat: No oropharyngeal exudate. Eyes: Pupils are equal, round, and reactive to light. Conjunctivae and EOM are normal. No scleral icterus.    Neck: Normal range of motion. Neck supple. No tracheal deviation present. No thyromegaly present. Cardiovascular: Normal rate, regular rhythm and normal heart sounds.    Pulmonary/Chest: Effort normal and breath sounds normal. No respiratory distress. He has no wheezes. He has no rales. He exhibits no tenderness. Abdominal: Soft. Bowel sounds are normal. He exhibits no distension and no mass. There is no tenderness. Genitourinary:   Genitourinary Comments: Rectal examination deferred.   Musculoskeletal: Normal range of motion. He exhibits no edema or tenderness. Lymphadenopathy:     He has no cervical adenopathy.        Right: No inguinal and no supraclavicular adenopathy present.        Left: No inguinal and no supraclavicular adenopathy present. Neurological: He is alert and oriented to person, place, and time. No cranial nerve deficit. Coordination normal.   Skin: Skin is warm and dry. No rash noted. He is not diaphoretic. No erythema. No pallor. Psychiatric: He has a normal mood and affect. His behavior is normal. Judgment and thought content normal.   Nursing note and vitals reviewed     RESULTS    Lab Results:   Recent Results (from the past 672 hour(s))   PSA Total, Diagnostic    Collection Time: 06/13/23  9:06 AM   Result Value Ref Range    PSA, Diagnostic 0.01 0.00 - 4.00 ng/mL     Imaging Studies:No results found. Assessment/Plan:  No orders of the defined types were placed in this encounter.     June Meredith is a 78y.o. year old male with a history of recurrent prostate Emiliano Mak is status post prostatectomy in 1997 when he lived in [place] and did not require any postoperative radiation therapy. Esdras Craig had a rising PSA level that went up to 1.4 in April 2018.  Axumin PET-CT scan July 5, 2018 showed increased uptake in the right pelvic sidewall lymph node in addition the right posterior acetabulum consistent with metastatic disease. This was confirmed on subsequent bone scan performed July 17, 2018.   He has a 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 involving a right pelvic lymph node and within the bone. Recommendations were made salvage radiation therapy to the whole pelvis including his right pelvic lymphadenopathy, prostate bed, and right acetabular region. Vern Holter completed treatment on September 25, 2018 and returns today for follow-up examination.      He is doing well. Vern Holter has no clinical or biochemical evidence of any recurrent disease.  PSA level was 0.4 NG/mL on January 17, 2019. PSA level decreased further to 0.2 NG/mL on May 16, 2019.   His PSA level November 21, 2019, July 9, 2020, February 12, 2021, December 13, 2021, and August 22, 2022 was less than 0.1 NG/mL.  His most recent PSA level June 13, 2023 came back at 0.01 NG/mL. We are pleased with his good clinical and biochemical response to treatment.   He had a repeat colonoscopy November 26, 2019 and was found to have some radiation proctitis and a benign tubular adenoma. Vern Holter has been on Metamucil powder twice a day and was having a small amount of blood on average once a  month which stopped over 24 months ago. Gareth Hinson will continue on Metamucil twice a day. Since he remains without any evidence of recurrent disease now 5 years post the completion of treatment, he will be discharged from follow-up in this office. Vern Holter will continue to see Dr. Evin Diop on July 5, 2023 and several times a year including having blood work that would include an annual PSA level.       Quentin Gordon MD  7/99/9987,7:12 PM    Portions of the record may have been created with voice recognition software.  Occasional wrong word or "sound a like" substitutions may have occurred due to the inherent limitations of voice recognition software.  Read the chart carefully and recognize, using context, where substitutions have occurred.

## 2023-06-29 ENCOUNTER — TELEPHONE (OUTPATIENT)
Dept: ADMINISTRATIVE | Facility: OTHER | Age: 83
End: 2023-06-29

## 2023-06-29 NOTE — TELEPHONE ENCOUNTER
06/29/23 10:38 AM    Patient contacted (left message) to bring ACP document to next scheduled pcp visit  Thank you    Darrel Steiner MA  [facility]    Patient called back ,Aware of documents,

## 2023-07-05 ENCOUNTER — RA CDI HCC (OUTPATIENT)
Dept: OTHER | Facility: HOSPITAL | Age: 83
End: 2023-07-05

## 2023-07-05 ENCOUNTER — CONSULT (OUTPATIENT)
Dept: INTERNAL MEDICINE CLINIC | Facility: CLINIC | Age: 83
End: 2023-07-05
Payer: COMMERCIAL

## 2023-07-05 VITALS
BODY MASS INDEX: 30.03 KG/M2 | HEIGHT: 68 IN | WEIGHT: 198.13 LBS | TEMPERATURE: 99.8 F | HEART RATE: 78 BPM | OXYGEN SATURATION: 98 % | DIASTOLIC BLOOD PRESSURE: 78 MMHG | SYSTOLIC BLOOD PRESSURE: 124 MMHG

## 2023-07-05 DIAGNOSIS — I10 PRIMARY HYPERTENSION: ICD-10-CM

## 2023-07-05 DIAGNOSIS — I25.10 CORONARY ARTERY CALCIFICATION SEEN ON CT SCAN: ICD-10-CM

## 2023-07-05 DIAGNOSIS — H61.22 CERUMINOSIS, LEFT: ICD-10-CM

## 2023-07-05 DIAGNOSIS — Z01.818 VISIT FOR PRE-OPERATIVE EXAMINATION: Primary | ICD-10-CM

## 2023-07-05 DIAGNOSIS — N18.31 CHRONIC KIDNEY DISEASE, STAGE 3A (HCC): ICD-10-CM

## 2023-07-05 DIAGNOSIS — R73.03 PREDIABETES: ICD-10-CM

## 2023-07-05 DIAGNOSIS — H25.9 SENILE CATARACT OF LEFT EYE, UNSPECIFIED AGE-RELATED CATARACT TYPE: ICD-10-CM

## 2023-07-05 PROCEDURE — 99213 OFFICE O/P EST LOW 20 MIN: CPT | Performed by: INTERNAL MEDICINE

## 2023-07-05 NOTE — PROGRESS NOTES
Assessment/Plan:  Problem List Items Addressed This Visit        Cardiovascular and Mediastinum    Hypertension    Coronary artery calcification seen on CT scan       Genitourinary    Chronic kidney disease, stage 3a (HCC)       Other    Prediabetes   Other Visit Diagnoses     Visit for pre-operative examination    -  Primary    Senile cataract of left eye, unspecified age-related cataract type        Ceruminosis, left        Relevant Medications    carbamide peroxide (DEBROX) 6.5 % otic solution           Diagnoses and all orders for this visit:    Visit for pre-operative examination    Senile cataract of left eye, unspecified age-related cataract type    Primary hypertension    Coronary artery calcification seen on CT scan    Chronic kidney disease, stage 3a (HCC)    Prediabetes    Ceruminosis, left  -     carbamide peroxide (DEBROX) 6.5 % otic solution; Administer 5 drops into the left ear 2 (two) times a day        No problem-specific Assessment & Plan notes found for this encounter. A/P: PAT tests not requested, but labs earlier this year ok. . Pt and co-morbidities are stable. Pt is a [employer] Class II, mainly due to age  and carries a cardiac risk of between 1-7%, but given the type of sx, more likely around 1%. . Recommend holding any ASA, NSAID's, omega 3, and MVT one week prior to the procedure. Given type of sx and anesthesia, pt does not have to stop his ACEI prior to sx. ON the day of sx, may take with a sip of water his levothyroxine, norvasc, Prinizide, and xanax if needed. May restart his meds once taking po. No other recs at this time. Thanks and good luck. Subjective:      Patient ID: Jennifer Urbina is a 78 y.o. male. WM presents at the request of Dr. Francisco Magallon for pre-op eval for upcoming OS cataract extraction  tentatively scheduled for 7/13/23. Since last visit, doing well and no recent illnesses. Remains active w/o difficulty and no falls. No travel history. Denies depression.  No recent illnesses. No fever, chills, or sweats. No unexplained wt changes. Denies CP, SOB, or palpitations. No edema. No orthopnea or PND. No sz or syncope. No changes in bowel or bladder habits. PMH includes HTN, Coronary calcification, cataracts, Hypothyroidism, DJD, prediabetes,  prostate ca, CKD3, HLD, and NADIA. Beyer Busing Past sx include cataracts extraction, colonoscopy, knee sx, vasectomy, prostate sx and reports no problems with prior procedures or anesthesia. Distance  smoking and denies ETOH use. No history of DVT or PE. NO history of bleeding issues and is not  on anti-coagulants. Denies  dental plates. Denies C spine issues. No objections to getting blood products if deemed necessary. No  PAT testing done. 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 ,  does not have any pertinent problems on file. ,   has a past surgical history that includes Knee surgery; Prostate surgery; Vasectomy; Cataract extraction; Eye surgery; Colonoscopy; and Colonoscopy (11/26/2019). ,  family history includes Alcohol abuse in his family, father, and mother; Colon cancer in his maternal aunt; Depression in his father; No Known Problems in his maternal grandfather, maternal grandmother, paternal grandfather, paternal grandmother, and sister; Stroke in his brother. ,   reports that he quit smoking about 37 years ago. His smoking use included cigarettes and cigars. He has never used smokeless tobacco. He reports that he does not drink alcohol and does not use drugs. ,  has No Known Allergies. .  Current Outpatient Medications   Medication Sig Dispense Refill   • ALPRAZolam (XANAX) 0.5 mg tablet Take 1 tablet (0.5 mg total) by mouth daily at bedtime as needed for anxiety 30 tablet 0   • amLODIPine (NORVASC) 5 mg tablet Take 1 tablet (5 mg total) by mouth daily 90 tablet 1   • atorvastatin (LIPITOR) 40 mg tablet Take 1 tablet (40 mg total) by mouth daily 90 tablet 1   • carbamide peroxide (DEBROX) 6.5 % otic solution Administer 5 drops into the left ear 2 (two) times a day 15 mL 0   • levothyroxine 88 mcg tablet Take 1 tablet (88 mcg total) by mouth daily 90 tablet 0   • lisinopril-hydrochlorothiazide (PRINZIDE,ZESTORETIC) 20-25 MG per tablet Take 1 tablet by mouth daily 90 tablet 0   • meloxicam (MOBIC) 15 mg tablet Take 1 tablet (15 mg total) by mouth daily 30 tablet 3   • fluticasone (FLONASE) 50 mcg/act nasal spray 1 spray into each nostril daily 16 g 0   • guaiFENesin (Mucinex) 600 mg 12 hr tablet Take 1 tablet (600 mg total) by mouth every 12 (twelve) hours 20 tablet 0     No current facility-administered medications for this visit. Review of Systems   Constitutional: Negative for activity change, chills, diaphoresis, fatigue and fever. HENT: Negative. Eyes: Positive for visual disturbance. Respiratory: Negative for cough, chest tightness, shortness of breath and wheezing. Cardiovascular: Negative for chest pain, palpitations and leg swelling. Gastrointestinal: Negative for abdominal pain, constipation, diarrhea, nausea and vomiting. Endocrine: Negative for cold intolerance and heat intolerance. Genitourinary: Negative for difficulty urinating, dysuria and frequency. Musculoskeletal: Negative for arthralgias, gait problem and myalgias. Neurological: Negative for dizziness, seizures, syncope, weakness, light-headedness and headaches. Psychiatric/Behavioral: Negative for confusion, dysphoric mood and sleep disturbance. The patient is not nervous/anxious.         PHQ-2/9 Depression Screening Objective:  Vitals:    07/05/23 1503   BP: 124/78   Pulse: 78   Temp: 99.8 °F (37.7 °C)   SpO2: 98%   Weight: 89.9 kg (198 lb 2 oz)   Height: 5' 8" (1.727 m)     Body mass index is 30.12 kg/m². Physical Exam  Vitals and nursing note reviewed. Constitutional:       General: He is not in acute distress. Appearance: Normal appearance. He is not ill-appearing. HENT:      Head: Normocephalic and atraumatic. Comments: No oropharyngeal obstructions     Mouth/Throat:      Mouth: Mucous membranes are moist.   Eyes:      Extraocular Movements: Extraocular movements intact. Conjunctiva/sclera: Conjunctivae normal.      Pupils: Pupils are equal, round, and reactive to light. Neck:      Vascular: No carotid bruit. Comments: No C spine restrictions. Cardiovascular:      Rate and Rhythm: Normal rate and regular rhythm. Heart sounds: Normal heart sounds. No murmur heard. Pulmonary:      Effort: Pulmonary effort is normal. No respiratory distress. Breath sounds: Normal breath sounds. No wheezing, rhonchi or rales. Abdominal:      General: Bowel sounds are normal. There is no distension. Palpations: Abdomen is soft. Tenderness: There is no abdominal tenderness. Musculoskeletal:      Cervical back: Normal range of motion and neck supple. No rigidity or tenderness. Right lower leg: No edema. Left lower leg: No edema. Lymphadenopathy:      Cervical: No cervical adenopathy. Neurological:      General: No focal deficit present. Mental Status: He is alert and oriented to person, place, and time. Mental status is at baseline. Psychiatric:         Mood and Affect: Mood normal.         Behavior: Behavior normal.         Thought Content:  Thought content normal.         Judgment: Judgment normal.

## 2023-07-05 NOTE — PROGRESS NOTES
[address] [facility] coding opportunities       Chart reviewed, no opportunity found: [address]        Patients Insurance     Medicare Insurance: [employer]

## 2023-07-12 DIAGNOSIS — R35.0 URINARY FREQUENCY: ICD-10-CM

## 2023-07-12 DIAGNOSIS — R10.32 LEFT GROIN PAIN: ICD-10-CM

## 2023-07-12 DIAGNOSIS — I10 PRIMARY HYPERTENSION: ICD-10-CM

## 2023-07-12 DIAGNOSIS — R31.29 OTHER MICROSCOPIC HEMATURIA: ICD-10-CM

## 2023-07-12 DIAGNOSIS — C61 MALIGNANT NEOPLASM OF PROSTATE METASTATIC TO INTRAPELVIC LYMPH NODE (HCC): ICD-10-CM

## 2023-07-12 DIAGNOSIS — F41.1 GENERALIZED ANXIETY DISORDER: ICD-10-CM

## 2023-07-12 DIAGNOSIS — Z92.3 HISTORY OF RADIATION THERAPY: ICD-10-CM

## 2023-07-12 DIAGNOSIS — M79.652 LEFT THIGH PAIN: ICD-10-CM

## 2023-07-12 DIAGNOSIS — R39.15 URGENCY OF URINATION: ICD-10-CM

## 2023-07-12 DIAGNOSIS — C77.5 MALIGNANT NEOPLASM OF PROSTATE METASTATIC TO INTRAPELVIC LYMPH NODE (HCC): ICD-10-CM

## 2023-07-12 RX ORDER — AMLODIPINE BESYLATE 5 MG/1
5 TABLET ORAL DAILY
Qty: 90 TABLET | Refills: 0 | Status: SHIPPED | OUTPATIENT
Start: 2023-07-12

## 2023-07-12 RX ORDER — ALPRAZOLAM 0.5 MG/1
0.5 TABLET ORAL
Qty: 30 TABLET | Refills: 0 | Status: SHIPPED | OUTPATIENT
Start: 2023-07-12

## 2023-07-12 RX ORDER — MELOXICAM 15 MG/1
15 TABLET ORAL DAILY
Qty: 30 TABLET | Refills: 0 | Status: SHIPPED | OUTPATIENT
Start: 2023-07-12

## 2023-07-27 DIAGNOSIS — H60.339 SWIMMER'S EAR, UNSPECIFIED CHRONICITY, UNSPECIFIED LATERALITY: Primary | ICD-10-CM

## 2023-08-04 ENCOUNTER — TELEPHONE (OUTPATIENT)
Dept: FAMILY MEDICINE CLINIC | Facility: CLINIC | Age: 83
End: 2023-08-04

## 2023-08-10 DIAGNOSIS — E03.9 HYPOTHYROIDISM, UNSPECIFIED TYPE: ICD-10-CM

## 2023-08-10 DIAGNOSIS — I10 ESSENTIAL HYPERTENSION: ICD-10-CM

## 2023-08-10 DIAGNOSIS — F41.1 GENERALIZED ANXIETY DISORDER: ICD-10-CM

## 2023-08-10 DIAGNOSIS — I25.10 CORONARY ARTERY CALCIFICATION SEEN ON CT SCAN: ICD-10-CM

## 2023-08-11 RX ORDER — ALPRAZOLAM 0.5 MG/1
0.5 TABLET ORAL
Qty: 30 TABLET | Refills: 0 | Status: SHIPPED | OUTPATIENT
Start: 2023-08-11

## 2023-08-11 RX ORDER — ATORVASTATIN CALCIUM 40 MG/1
40 TABLET, FILM COATED ORAL DAILY
Qty: 90 TABLET | Refills: 0 | Status: SHIPPED | OUTPATIENT
Start: 2023-08-11

## 2023-08-11 RX ORDER — LISINOPRIL AND HYDROCHLOROTHIAZIDE 25; 20 MG/1; MG/1
1 TABLET ORAL DAILY
Qty: 90 TABLET | Refills: 0 | Status: SHIPPED | OUTPATIENT
Start: 2023-08-11

## 2023-08-11 RX ORDER — LEVOTHYROXINE SODIUM 88 UG/1
88 TABLET ORAL DAILY
Qty: 90 TABLET | Refills: 0 | Status: SHIPPED | OUTPATIENT
Start: 2023-08-11

## 2023-08-22 ENCOUNTER — RA CDI HCC (OUTPATIENT)
Dept: OTHER | Facility: HOSPITAL | Age: 83
End: 2023-08-22

## 2023-08-22 NOTE — PROGRESS NOTES
[address] coding opportunities       Chart reviewed, no opportunity found: [address] Review     Patients Insurance     Medicare Insurance: [employer]

## 2023-08-26 ENCOUNTER — RA CDI HCC (OUTPATIENT)
Dept: OTHER | Facility: HOSPITAL | Age: 83
End: 2023-08-26

## 2023-08-27 NOTE — PROGRESS NOTES
[address] [facility] coding opportunities       Chart reviewed, no opportunity found: CHART REVIEWED, [facility]     Patients Insurance     Medicare Insurance: [employer]

## 2023-08-30 ENCOUNTER — TELEPHONE (OUTPATIENT)
Dept: ADMINISTRATIVE | Facility: OTHER | Age: 83
End: 2023-08-30

## 2023-08-30 NOTE — TELEPHONE ENCOUNTER
08/30/23 9:46 AM    Patient contacted (left message) to bring Advance Directive, POLST, or Living Will document to next scheduled pcp visit. Thank you.   [place] [facility]  [facility]

## 2023-09-01 ENCOUNTER — OFFICE VISIT (OUTPATIENT)
Dept: INTERNAL MEDICINE CLINIC | Facility: CLINIC | Age: 83
End: 2023-09-01
Payer: COMMERCIAL

## 2023-09-01 VITALS
TEMPERATURE: 97.9 F | SYSTOLIC BLOOD PRESSURE: 130 MMHG | HEART RATE: 85 BPM | WEIGHT: 193 LBS | BODY MASS INDEX: 29.25 KG/M2 | DIASTOLIC BLOOD PRESSURE: 80 MMHG | HEIGHT: 68 IN | OXYGEN SATURATION: 99 %

## 2023-09-01 DIAGNOSIS — I10 PRIMARY HYPERTENSION: Primary | ICD-10-CM

## 2023-09-01 DIAGNOSIS — R73.03 PREDIABETES: ICD-10-CM

## 2023-09-01 DIAGNOSIS — E03.9 HYPOTHYROIDISM, UNSPECIFIED TYPE: ICD-10-CM

## 2023-09-01 DIAGNOSIS — F41.1 GENERALIZED ANXIETY DISORDER: ICD-10-CM

## 2023-09-01 DIAGNOSIS — E78.5 HYPERLIPIDEMIA, UNSPECIFIED HYPERLIPIDEMIA TYPE: ICD-10-CM

## 2023-09-01 DIAGNOSIS — C61 MALIGNANT NEOPLASM OF PROSTATE METASTATIC TO INTRAPELVIC LYMPH NODE (HCC): ICD-10-CM

## 2023-09-01 DIAGNOSIS — Z23 ENCOUNTER FOR VACCINATION: ICD-10-CM

## 2023-09-01 DIAGNOSIS — M16.12 PRIMARY OSTEOARTHRITIS OF LEFT HIP: ICD-10-CM

## 2023-09-01 DIAGNOSIS — N18.31 CHRONIC KIDNEY DISEASE, STAGE 3A (HCC): ICD-10-CM

## 2023-09-01 DIAGNOSIS — C77.5 MALIGNANT NEOPLASM OF PROSTATE METASTATIC TO INTRAPELVIC LYMPH NODE (HCC): ICD-10-CM

## 2023-09-01 DIAGNOSIS — I25.10 CORONARY ARTERY CALCIFICATION SEEN ON CT SCAN: ICD-10-CM

## 2023-09-01 PROCEDURE — 99214 OFFICE O/P EST MOD 30 MIN: CPT | Performed by: INTERNAL MEDICINE

## 2023-09-01 NOTE — PATIENT INSTRUCTIONS
Chronic Hypertension   AMBULATORY CARE:   Hypertension is considered chronic  when it continues for 3 months or longer. Hypertension that continues causes your heart to work much harder than normal, which may lead to heart damage. Even if you have hypertension for years, lifestyle changes, medicines, or both may help lower your blood pressure. Call your local emergency number [phone] in the [address]) or have someone call if:   You have chest pain. You have any of the following signs of a heart attack:      Squeezing, pressure, or pain in your chest    You may  also have any of the following:     Discomfort or pain in your back, neck, jaw, stomach, or arm    Shortness of breath    Nausea or vomiting    Lightheadedness or a sudden cold sweat    You become confused or have difficulty speaking. You suddenly feel lightheaded or have trouble breathing. Seek care immediately if:   You have a severe headache or vision loss. You have weakness in an arm or leg. Call your doctor or cardiologist if:   You feel faint, dizzy, confused, or drowsy. You have been taking your blood pressure medicine but your pressure is higher than your provider says it should be. You have questions or concerns about your condition or care. Treatment for chronic hypertension  may include medicine to lower your blood pressure and cholesterol levels. A low cholesterol level helps prevent heart disease and makes it easier to control your blood pressure. Heart disease can make your blood pressure harder to control. You may also need to make lifestyle changes. What you need to know about the stages of hypertension:  Your healthcare provider will give you a blood pressure goal based on your age, health, and risk for cardiovascular disease. The following are general guidelines on the stages of hypertension:  Normal blood pressure is 119/79 or lower .  Your provider may only check your blood pressure each year if it stays at a normal level.    Elevated blood pressure is 120/79 to 129/79 . This is sometimes called prehypertension. Your provider may suggest lifestyle changes to help lower your blood pressure to a normal level. He or she may then check it again in 3 to 6 months. Stage 1 hypertension is 130/80  to 139/89 . Your provider may recommend lifestyle changes, medication, and checks every 3 to 6 months until your blood pressure is controlled. Stage 2 hypertension is 140/90 or higher . Your provider will recommend lifestyle changes and have you take 2 kinds of hypertension medicines. You will also need to have your blood pressure checked monthly until it is controlled. Manage chronic hypertension:   Check your blood pressure at home. Do not smoke, have caffeine, or exercise for at least 30 minutes before you check your blood pressure. Sit and rest for 5 minutes before you check your blood pressure. Extend your arm and support it on a flat surface. Your arm should be at the same level as your heart. Follow the directions that came with your blood pressure monitor. Check your blood pressure 2 times, 1 minute apart, before you take your medicine in the morning. Also check your blood pressure before your evening meal. Keep a record of your readings and bring it to your follow-up visits. Your healthcare provider may use the readings to make changes to your treatment plan. Manage any other health conditions you have. Health conditions such as diabetes can increase your risk for hypertension. Follow your provider's instructions and take all your medicines as directed. Talk to your provider about any new health conditions you have recently developed. Ask about all medicines. Certain medicines can increase your blood pressure. Examples include oral birth control pills, decongestants, herbal supplements, and NSAIDs, such as ibuprofen. Your provider can tell you which medicines are safe for you to take.  This includes prescription and over-the-counter medicines. Lifestyle changes you can make to lower your blood pressure: Your provider may want you to make more lifestyle changes if you are having trouble controlling your blood pressure. This may feel difficult over time, especially if you think you are making good changes but your pressure is still high. It might help to focus on one new change at a time. For example, try to add 1 more day of exercise, or exercise for an extra 10 minutes on 2 days. Small changes can make a big difference. Your healthcare provider can also refer you to specialists such as a dietitian who can help you make small changes. Your family members may be included in helping you learn to create lifestyle changes, such as the following:     Limit sodium (salt) as directed. Too much sodium can affect your fluid balance. Check labels to find low-sodium or no-salt-added foods. Some low-sodium foods use potassium salts for flavor. Too much potassium can also cause health problems. Your provider will tell you how much sodium and potassium are safe for you to have in a day. He or she may recommend that you limit sodium to 2,300 mg a day. Follow the meal plan recommended by your provider. A dietitian or your provider can give you more information on low-sodium plans or the DASH (Dietary Approaches to Stop Hypertension) eating plan. The DASH plan is low in sodium, processed sugar, unhealthy fats, and total fat. It is high in potassium, calcium, and fiber. These can be found in vegetables, fruit, and whole-grain foods. Be physically active throughout the day. Physical activity, such as exercise, can help control your blood pressure and your weight. Be physically active for at least 30 minutes per day, on most days of the week. Include aerobic activity, such as walking or riding a bicycle. Also include strength training at least 2 times each week.  Your provider can help you create a physical activity plan. Decrease stress. This may help lower your blood pressure. Learn ways to relax, such as deep breathing or listening to music. Limit alcohol as directed. Alcohol can increase your blood pressure. A drink of alcohol is 12 ounces of beer, 5 ounces of wine, or 1½ ounces of liquor. Your provider can help you set daily and weekly drink limits. He or she may recommend no alcohol if your blood pressure stays higher than goal even with medicine or other measures. Ask your provider for information if you need help to quit. Do not smoke. Nicotine and other chemicals in cigarettes and cigars can increase your blood pressure and also cause lung damage. Ask your provider for information if you currently smoke and need help to quit. E-cigarettes or smokeless tobacco still contain nicotine. Talk to your provider before you use these products. Follow up with your doctor or cardiologist as directed: You will need to return to have your blood pressure checked and to have other lab tests done. Write down your questions so you remember to ask them during your visits. © Copyright [employer] 2022 Information is for End User's use only and may not be sold, redistributed or otherwise used for commercial purposes. The above information is an  only. It is not intended as medical advice for individual conditions or treatments. Talk to your doctor, nurse or pharmacist before following any medical regimen to see if it is safe and effective for you.

## 2023-09-01 NOTE — PROGRESS NOTES
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)       Genitourinary    Chronic kidney disease, stage 3a (HCC)       Other    Prediabetes    Relevant Orders    Hemoglobin A1C    Hyperlipidemia    Relevant Orders    Lipid Panel with Direct LDL reflex    Generalized anxiety disorder   Other Visit Diagnoses     Encounter for vaccination               Diagnoses and all orders for this visit:    Primary hypertension  -     Comprehensive metabolic panel; Future    Hypothyroidism, unspecified type  -     TSH, 3rd generation with Free T4 reflex; Future    Coronary artery calcification seen on CT scan  -     Comprehensive metabolic panel; Future  -     Hemoglobin A1C; Future  -     Lipid Panel with Direct LDL reflex; Future    Primary osteoarthritis of left hip    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 A/P:  Doing ok and labs will be ordered. Discussed vaccines and wants to go to the pharmacy with his wife. . Continue current treatment and RTC four months for routine. Subjective:      Patient ID: Farzad Goel is a 78 y. o. male.     RTC for f/u HTN, HLD, etc. Doing ok and no new issues. Remains active w/o difficulty and no falls. Denies CP, SOB, palpitations, edema,. Orthopnea or PND. Chronic pain is manageable. Prostate Ca is in remission. NADIA is controlled. Due for vaccines and labs. .       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 ,  does not have any pertinent problems on file. ,   has a past surgical history that includes Knee surgery; Prostate surgery; Vasectomy; Cataract extraction; Eye surgery; Colonoscopy; and Colonoscopy (11/26/2019). ,  family history includes Alcohol abuse in his family, father, and mother; Colon cancer in his maternal aunt; Depression in his father; No Known Problems in his maternal grandfather, maternal grandmother, paternal grandfather, paternal grandmother, and sister; Stroke in his brother. ,   reports that he quit smoking about 37 years ago. His smoking use included cigarettes and cigars. He has never used smokeless tobacco. He reports that he does not drink alcohol and does not use drugs. ,  has No Known Allergies. .  Current Outpatient Medications   Medication Sig Dispense Refill   • ALPRAZolam (XANAX) 0.5 mg tablet Take 1 tablet (0.5 mg total) by mouth daily at bedtime as needed for anxiety 30 tablet 0   • amLODIPine (NORVASC) 5 mg tablet Take 1 tablet (5 mg total) by mouth daily 90 tablet 0   • atorvastatin (LIPITOR) 40 mg tablet Take 1 tablet (40 mg total) by mouth daily 90 tablet 0   • carbamide peroxide (DEBROX) 6.5 % otic solution Administer 5 drops into the left ear 2 (two) times a day 15 mL 0   • levothyroxine 88 mcg tablet Take 1 tablet (88 mcg total) by mouth daily 90 tablet 0   • lisinopril-hydrochlorothiazide (PRINZIDE,ZESTORETIC) 20-25 MG per tablet Take 1 tablet by mouth daily 90 tablet 0   • meloxicam (MOBIC) 15 mg tablet Take 1 tablet (15 mg total) by mouth daily 30 tablet 0     No current facility-administered medications for this visit. Review of Systems   Constitutional: Negative for activity change, chills, diaphoresis, fatigue and fever. HENT: Negative. Eyes: Negative for visual disturbance. Respiratory: Negative for cough, chest tightness, shortness of breath and wheezing. Cardiovascular: Negative for chest pain, palpitations and leg swelling. Gastrointestinal: Negative for abdominal pain, constipation, diarrhea, nausea and vomiting. Endocrine: Negative for cold intolerance and heat intolerance. Genitourinary: Negative for difficulty urinating, dysuria and frequency. Musculoskeletal: Negative for arthralgias, gait problem and myalgias. Neurological: Negative for dizziness, seizures, syncope, weakness, light-headedness and headaches. Psychiatric/Behavioral: Negative for confusion, dysphoric mood and sleep disturbance. The patient is not nervous/anxious. PHQ-2/9 Depression Screening          Objective:  Vitals:    09/01/23 1310   BP: 130/80   Pulse: 85   Temp: 97.9 °F (36.6 °C)   SpO2: 99%   Weight: 87.5 kg (193 lb)   Height: 5' 8" (1.727 m)     Body mass index is 29.35 kg/m². Physical Exam  Vitals and nursing note reviewed. Constitutional:       General: He is not in acute distress. Appearance: Normal appearance. He is not ill-appearing. HENT:      Head: Normocephalic and atraumatic. Mouth/Throat:      Mouth: Mucous membranes are moist.   Eyes:      Extraocular Movements: Extraocular movements intact. Conjunctiva/sclera: Conjunctivae normal.      Pupils: Pupils are equal, round, and reactive to light.    Neck: Vascular: No carotid bruit. Cardiovascular:      Rate and Rhythm: Normal rate and regular rhythm. Heart sounds: Normal heart sounds. No murmur heard. Pulmonary:      Effort: Pulmonary effort is normal. No respiratory distress. Breath sounds: Normal breath sounds. No wheezing, rhonchi or rales. Abdominal:      General: Bowel sounds are normal. There is no distension. Palpations: Abdomen is soft. Tenderness: There is no abdominal tenderness. Musculoskeletal:      Cervical back: Neck supple. Right lower leg: No edema. Left lower leg: No edema. Neurological:      General: No focal deficit present. Mental Status: He is alert and oriented to person, place, and time. Mental status is at baseline. Psychiatric:         Mood and Affect: Mood normal.         Behavior: Behavior normal.         Thought Content:  Thought content normal.         Judgment: Judgment normal.

## 2023-09-07 DIAGNOSIS — C77.5 MALIGNANT NEOPLASM OF PROSTATE METASTATIC TO INTRAPELVIC LYMPH NODE (HCC): ICD-10-CM

## 2023-09-07 DIAGNOSIS — Z92.3 HISTORY OF RADIATION THERAPY: ICD-10-CM

## 2023-09-07 DIAGNOSIS — R35.0 URINARY FREQUENCY: ICD-10-CM

## 2023-09-07 DIAGNOSIS — C61 MALIGNANT NEOPLASM OF PROSTATE METASTATIC TO INTRAPELVIC LYMPH NODE (HCC): ICD-10-CM

## 2023-09-07 DIAGNOSIS — R31.29 OTHER MICROSCOPIC HEMATURIA: ICD-10-CM

## 2023-09-07 DIAGNOSIS — R10.32 LEFT GROIN PAIN: ICD-10-CM

## 2023-09-07 DIAGNOSIS — M79.652 LEFT THIGH PAIN: ICD-10-CM

## 2023-09-07 DIAGNOSIS — R39.15 URGENCY OF URINATION: ICD-10-CM

## 2023-09-07 RX ORDER — MELOXICAM 15 MG/1
15 TABLET ORAL DAILY
Qty: 30 TABLET | Refills: 0 | Status: SHIPPED | OUTPATIENT
Start: 2023-09-07

## 2023-09-08 DIAGNOSIS — F41.1 GENERALIZED ANXIETY DISORDER: ICD-10-CM

## 2023-09-11 ENCOUNTER — APPOINTMENT (OUTPATIENT)
Age: 83
End: 2023-09-11
Payer: COMMERCIAL

## 2023-09-11 DIAGNOSIS — E03.9 HYPOTHYROIDISM, UNSPECIFIED TYPE: ICD-10-CM

## 2023-09-11 DIAGNOSIS — R73.03 PREDIABETES: ICD-10-CM

## 2023-09-11 DIAGNOSIS — E78.5 HYPERLIPIDEMIA, UNSPECIFIED HYPERLIPIDEMIA TYPE: ICD-10-CM

## 2023-09-11 DIAGNOSIS — I10 PRIMARY HYPERTENSION: ICD-10-CM

## 2023-09-11 DIAGNOSIS — I25.10 CORONARY ARTERY CALCIFICATION SEEN ON CT SCAN: ICD-10-CM

## 2023-09-11 LAB
ALBUMIN SERPL BCP-MCNC: 4.2 G/DL (ref 3.5–5)
ALP SERPL-CCNC: 68 U/L (ref 34–104)
ALT SERPL W P-5'-P-CCNC: 30 U/L (ref 7–52)
ANION GAP SERPL CALCULATED.3IONS-SCNC: 6 MMOL/L
AST SERPL W P-5'-P-CCNC: 26 U/L (ref 13–39)
BILIRUB SERPL-MCNC: 0.72 MG/DL (ref 0.2–1)
BUN SERPL-MCNC: 23 MG/DL (ref 5–25)
CALCIUM SERPL-MCNC: 8.8 MG/DL (ref 8.4–10.2)
CHLORIDE SERPL-SCNC: 106 MMOL/L (ref 96–108)
CHOLEST SERPL-MCNC: 85 MG/DL
CO2 SERPL-SCNC: 27 MMOL/L (ref 21–32)
CREAT SERPL-MCNC: 1.17 MG/DL (ref 0.6–1.3)
EST. AVERAGE GLUCOSE BLD GHB EST-MCNC: 134 MG/DL
GFR SERPL CREATININE-BSD FRML MDRD: 56 ML/MIN/1.73SQ M
GLUCOSE P FAST SERPL-MCNC: 90 MG/DL (ref 65–99)
HBA1C MFR BLD: 6.3 %
HDLC SERPL-MCNC: 40 MG/DL
LDLC SERPL CALC-MCNC: 33 MG/DL (ref 0–100)
POTASSIUM SERPL-SCNC: 4.4 MMOL/L (ref 3.5–5.3)
PROT SERPL-MCNC: 6.4 G/DL (ref 6.4–8.4)
SODIUM SERPL-SCNC: 139 MMOL/L (ref 135–147)
TRIGL SERPL-MCNC: 59 MG/DL
TSH SERPL DL<=0.05 MIU/L-ACNC: 1.74 UIU/ML (ref 0.45–4.5)

## 2023-09-11 PROCEDURE — 36415 COLL VENOUS BLD VENIPUNCTURE: CPT

## 2023-09-11 PROCEDURE — 83036 HEMOGLOBIN GLYCOSYLATED A1C: CPT

## 2023-09-11 PROCEDURE — 80053 COMPREHEN METABOLIC PANEL: CPT

## 2023-09-11 PROCEDURE — 84443 ASSAY THYROID STIM HORMONE: CPT

## 2023-09-11 PROCEDURE — 80061 LIPID PANEL: CPT

## 2023-09-11 RX ORDER — ALPRAZOLAM 0.5 MG/1
0.5 TABLET ORAL
Qty: 30 TABLET | Refills: 0 | Status: SHIPPED | OUTPATIENT
Start: 2023-09-11

## 2023-09-13 ENCOUNTER — TELEPHONE (OUTPATIENT)
Dept: SLEEP CENTER | Facility: CLINIC | Age: 83
End: 2023-09-13

## 2023-09-13 NOTE — TELEPHONE ENCOUNTER
----- Message from Nehemias Arrieta PA-C sent at 9/11/2023  9:51 AM EDT -----  Is he able to come in earlier?     ----- Message -----  From: Frances Frances MA  Sent: 9/11/2023   9:35 AM EDT  To: Nehemias Arrieta PA-C    Pt scheduled for 9/18 at 1:20   ----- Message -----  From: Nehemias Arrieta PA-C  Sent: 9/11/2023   9:26 AM EDT  To: Frances Frances MA; Lucio Calderon    Not a problem! That's a good idea. I'll loop in [facility], that way he can come in a little earlier. [facility], can we get Mark Sharp in at 1 on 09/18? Thanks! Ruddy Tyler        ----- Message -----  From: Lucio Calderon  Sent: 9/11/2023   9:01 AM EDT  To: Nehemias Arrieta PA-C    [address] [facility],     I have to leave for an OB appt next Monday at 1:45 (sorry, its nearly impossible to make these appts without leaving work early!), it doesn't seem like there is anyone in the afternoon that is an obvious hearing test. I saw Kate Hernandez about 3 weeks ago so she is good, Jada Joedavid had normal ear specific testing in February. [facility] Childress is scheduled because of idris's ear so he is the only one I am not sure of. Anyone you want me to preschedule? Or if you think Morena Valderrama needs to be seen, should he come in at 1 instead so I can see him quick?      Thanks,     [facility] [place]

## 2023-09-13 NOTE — TELEPHONE ENCOUNTER
Would like patient to come in at 1pm instead of 1:20. Asked him to call us if this would be possible.

## 2023-09-18 ENCOUNTER — OFFICE VISIT (OUTPATIENT)
Dept: OTOLARYNGOLOGY | Facility: CLINIC | Age: 83
End: 2023-09-18
Payer: COMMERCIAL

## 2023-09-18 VITALS
HEIGHT: 68 IN | BODY MASS INDEX: 28.49 KG/M2 | SYSTOLIC BLOOD PRESSURE: 122 MMHG | DIASTOLIC BLOOD PRESSURE: 58 MMHG | HEART RATE: 90 BPM | OXYGEN SATURATION: 97 % | WEIGHT: 188 LBS

## 2023-09-18 DIAGNOSIS — H61.23 BILATERAL IMPACTED CERUMEN: ICD-10-CM

## 2023-09-18 DIAGNOSIS — H93.8X2 SENSATION OF PLUGGED EAR ON LEFT SIDE: Primary | ICD-10-CM

## 2023-09-18 PROCEDURE — 99203 OFFICE O/P NEW LOW 30 MIN: CPT | Performed by: PHYSICIAN ASSISTANT

## 2023-09-18 PROCEDURE — 69210 REMOVE IMPACTED EAR WAX UNI: CPT | Performed by: PHYSICIAN ASSISTANT

## 2023-09-18 NOTE — PROGRESS NOTES
[facility] Otolaryngology New Patient visit      Perry Tao is a 78 y.o. male who presents with a chief complaint of ears     Independent historian: none    Pertinent elements of the history:  History of cerumen impaction  Cleaned at PCP visit  Left ear feels clogged     Feels he can hear well despite this    No otalgia   No otorrhea     Review of any relevant imaging: images from any scan reviewed personally  Scans:   Labs:   Notes:     Review of Systems:  As above    PMHx:  Past Medical History:   Diagnosis Date   • Cancer ([address])    • Chronic kidney disease, stage 3a ([address]) 2021   • Coronary artery calcification seen on CT scan 2021   • COVID-19 2021   • Dementia ([address])    • Disease of thyroid gland    • Eczema    • Generalized anxiety disorder    • Hypertension    • Prostate cancer ([address])    • Prostate cancer metastatic to intrapelvic lymph node ([address])    • Skin disorder     suspect benign, but will need removal and due to location, refer.  Last assessed: 2013        FAMHx:  Family History   Problem Relation Age of Onset   • Alcohol abuse Mother    • Alcohol abuse Father    • Depression Father    • No Known Problems Sister    • Stroke Brother         syndrome    • No Known Problems Maternal Grandmother    • No Known Problems Maternal Grandfather    • No Known Problems Paternal Grandmother    • No Known Problems Paternal Grandfather    • Alcohol abuse Family    • Colon cancer Maternal Aunt        SOCHx:  Social History     Socioeconomic History   • Marital status: /Civil Union     Spouse name: None   • Number of children: None   • Years of education: None   • Highest education level: None   Occupational History   • Occupation: self employed  floor covering   Tobacco Use   • Smoking status: Former     Types: Cigarettes, Cigars     Quit date:      Years since quittin.7   • Smokeless tobacco: Never   • Tobacco comments:     smokes 1-2 cigarss/month   Vaping Use   • Vaping Use: Never used   Substance and Sexual Activity   • Alcohol use: No   • Drug use: No     Comment: Chronic Narcotic use noted in "allscripts"    • Sexual activity: None   Other Topics Concern   • None   Social History Narrative    Caffeine use      Social Determinants of Health     Financial Resource Strain: Low Risk  (5/5/2023)    Overall Financial Resource Strain (CARDIA)    • Difficulty of Paying Living Expenses: Not hard at all   Food Insecurity: No Food Insecurity (11/8/2021)    Hunger Vital Sign    • Worried About Running Out of Food in the Last Year: Never true    • Ran Out of Food in the Last Year: Never true   Transportation Needs: No Transportation Needs (5/5/2023)    PRAPARE - Transportation    • Lack of Transportation (Medical): No    • Lack of Transportation (Non-Medical): No   Physical Activity: Not on file   Stress: Not on file   Social Connections: Not on file   Intimate Partner Violence: Not on file   Housing Stability: Low Risk  (11/8/2021)    Housing Stability Vital Sign    • Unable to Pay for Housing in the Last Year: No    • Number of Places Lived in the Last Year: 1    • Unstable Housing in the Last Year: No       Allergies:  No Known Allergies     MEDS:  Reviewed      Physical exam: (abnormal findings appear in bold and supercede any conflicting normal findings listed below)    /58   Pulse 90   Ht 5' 8" (1.727 m)   Wt 85.3 kg (188 lb)   SpO2 97%   BMI 28.59 kg/m²     Constitutional:  Well developed, well nourished and groomed, in no acute distress. Eyes:  Extra-ocular movements intact, pupils equally round and reactive to light and accommodation, the lids and conjunctivae are normal in appearance. Head: Atraumatic, normocephalic, no visible scalp lesions, bony palpation unremarkable without stepoffs, parotid and submandibular salivary glands non-tender to palpation and without masses bilaterally.      Ears:  Auricles normal in appearance bilaterally, mastoid prominence non-tender, external auditory canals clear bilaterally, tympanic membranes intact bilaterally without evidence of middle ear effusion or masses, normal appearing ossicles. B/l cerumen impaction against TM's. See proc. Nose/Sinuses:  External appearance unremarkable, no maxillary or frontal sinus tenderness to palpation bilaterally. Anterior rhinoscopy demonstrates pink mucosa. No polyps or other masses identified. Turbinates are non-edematous. No evidence of purulent drainage. Oral Cavity:  Moist mucus membranes, gums and dentition unremarkable, no oral mucosal masses or lesions, floor of mouth soft, tongue mobile without masses or lesions. Oropharynx:  Tonsils bilaterally unremarkable, soft palate mucosa unremarkable. Neck:  No visible or palpable cervical lesions or lymphadenopathy, thyroid gland is normal in size and symmetry and without masses, normal laryngeal elevation with swallowing. Cardiovascular:  Normal rate and rhythm, no palpable thrills, no jugulovenous distension observed. Respiratory:  Normal respiratory effort without evidence of retractions or use of accessory muscles. Integument:  Normal appearing without observed masses or lesions. Neurologic:  Cranial nerves II-XII intact bilaterally. Psychiatric:  Alert and oriented to time, place and person, normal affect. Procedure:  Procedure: Cerumen debridement b/l EAC     Indications: Cerumen impaction b/l EAC     Procedure in detail: After informed verbal consent was obtained the ear was visualized using procedural otoscope. Affected ear was debrided of cerumen using cerumen loop, suction, and alligator forceps. The findings below were seen. The patient tolerated the procedure well. FINDINGS: Cerumen impaction removed without difficulty. Assessment:  1. Sensation of plugged ear on left side        2. Bilateral impacted cerumen            Plan:  1.  Franc Ware is a 78 y.o. male with acute and chronic problems as above who presents for evaluation of his ears. Was seen at PCP and had ears cleaned in May. Bilateral cerumen impaction removed uneventfully today. TM's translucent and mobile. Audiology is unavailable this afternoon, but he defers hearing test. Feels he hears well in general.     Oil and cerumen within the [facility] is a natural process. Recommend against using Q-tips. Patient can consider application of OTC Debrox/hydrogen peroxide as needed to facilitate removal.    Follow up PRN. ** Please Note: Portions of the record may have been created with voice recognition software. Occasional wrong word or "sound a like" substitutions may have occurred due to the inherent limitations of voice recognition software. There may also be notations and random deletions of words or characters from malfunctioning software. Read the chart carefully and recognize, using context, where substitutions/deletions have occurred. **

## 2023-09-18 NOTE — PROGRESS NOTES
Review of Systems   Constitutional: Negative for activity change, diaphoresis and unexpected weight change. HENT: Negative for congestion, ear discharge, mouth sores, sneezing and voice change. Eyes: Negative for photophobia and discharge. Respiratory: Negative for apnea and stridor. Cardiovascular: Negative for chest pain and leg swelling. Gastrointestinal: Negative for abdominal distention, blood in stool and rectal pain. Endocrine: Negative for cold intolerance, heat intolerance and polyphagia. Genitourinary: Negative for decreased urine volume, enuresis, flank pain, genital sores and hematuria. Musculoskeletal: Negative for arthralgias. Skin: Negative for color change, pallor and rash. Allergic/Immunologic: Negative for environmental allergies, food allergies and immunocompromised state. Neurological: Negative for seizures, speech difficulty, light-headedness and numbness. Hematological: Negative for adenopathy. Psychiatric/Behavioral: Negative for agitation, confusion, hallucinations, self-injury and suicidal ideas. The patient is not hyperactive. All other systems reviewed and are negative.

## 2023-09-20 ENCOUNTER — HOSPITAL ENCOUNTER (EMERGENCY)
Facility: HOSPITAL | Age: 83
Discharge: HOME | End: 2023-09-20
Attending: FAMILY MEDICINE
Payer: COMMERCIAL

## 2023-09-20 ENCOUNTER — APPOINTMENT (EMERGENCY)
Dept: CT IMAGING | Facility: HOSPITAL | Age: 83
End: 2023-09-20
Payer: COMMERCIAL

## 2023-09-20 ENCOUNTER — APPOINTMENT (EMERGENCY)
Dept: RADIOLOGY | Facility: HOSPITAL | Age: 83
End: 2023-09-20
Payer: COMMERCIAL

## 2023-09-20 VITALS
BODY MASS INDEX: 28.49 KG/M2 | HEART RATE: 59 BPM | SYSTOLIC BLOOD PRESSURE: 178 MMHG | OXYGEN SATURATION: 93 % | TEMPERATURE: 97.4 F | WEIGHT: 188 LBS | HEIGHT: 68 IN | DIASTOLIC BLOOD PRESSURE: 93 MMHG | RESPIRATION RATE: 20 BRPM

## 2023-09-20 DIAGNOSIS — K62.5 RECTAL BLEEDING: Primary | ICD-10-CM

## 2023-09-20 DIAGNOSIS — K59.00 CONSTIPATION: ICD-10-CM

## 2023-09-20 DIAGNOSIS — J90 PLEURAL EFFUSION: ICD-10-CM

## 2023-09-20 LAB
ALBUMIN SERPL BCP-MCNC: 4.1 G/DL (ref 3.5–5)
ALP SERPL-CCNC: 71 U/L (ref 34–104)
ALT SERPL W P-5'-P-CCNC: 30 U/L (ref 7–52)
ANION GAP SERPL CALCULATED.3IONS-SCNC: 10 MMOL/L
AST SERPL W P-5'-P-CCNC: 33 U/L (ref 13–39)
BASOPHILS # BLD AUTO: 0.03 THOUSANDS/ÂΜL (ref 0–0.1)
BASOPHILS NFR BLD AUTO: 0 % (ref 0–1)
BILIRUB SERPL-MCNC: 0.95 MG/DL (ref 0.2–1)
BUN SERPL-MCNC: 23 MG/DL (ref 5–25)
CALCIUM SERPL-MCNC: 9 MG/DL (ref 8.4–10.2)
CHLORIDE SERPL-SCNC: 106 MMOL/L (ref 96–108)
CO2 SERPL-SCNC: 25 MMOL/L (ref 21–32)
CREAT SERPL-MCNC: 1.02 MG/DL (ref 0.6–1.3)
EOSINOPHIL # BLD AUTO: 0.1 THOUSAND/ÂΜL (ref 0–0.61)
EOSINOPHIL NFR BLD AUTO: 1 % (ref 0–6)
ERYTHROCYTE [DISTWIDTH] IN BLOOD BY AUTOMATED COUNT: 13.3 % (ref 11.6–15.1)
GFR SERPL CREATININE-BSD FRML MDRD: 67 ML/MIN/1.73SQ M
GLUCOSE SERPL-MCNC: 93 MG/DL (ref 65–140)
HCT VFR BLD AUTO: 38.8 % (ref 36.5–49.3)
HGB BLD-MCNC: 13.2 G/DL (ref 12–17)
IMM GRANULOCYTES # BLD AUTO: 0.02 THOUSAND/UL (ref 0–0.2)
IMM GRANULOCYTES NFR BLD AUTO: 0 % (ref 0–2)
INR PPP: 1.04 (ref 0.84–1.19)
LYMPHOCYTES # BLD AUTO: 0.86 THOUSANDS/ÂΜL (ref 0.6–4.47)
LYMPHOCYTES NFR BLD AUTO: 12 % (ref 14–44)
MCH RBC QN AUTO: 31.9 PG (ref 26.8–34.3)
MCHC RBC AUTO-ENTMCNC: 34 G/DL (ref 31.4–37.4)
MCV RBC AUTO: 94 FL (ref 82–98)
MONOCYTES # BLD AUTO: 0.51 THOUSAND/ÂΜL (ref 0.17–1.22)
MONOCYTES NFR BLD AUTO: 7 % (ref 4–12)
NEUTROPHILS # BLD AUTO: 5.76 THOUSANDS/ÂΜL (ref 1.85–7.62)
NEUTS SEG NFR BLD AUTO: 80 % (ref 43–75)
NRBC BLD AUTO-RTO: 0 /100 WBCS
PLATELET # BLD AUTO: 187 THOUSANDS/UL (ref 149–390)
PMV BLD AUTO: 10.8 FL (ref 8.9–12.7)
POTASSIUM SERPL-SCNC: 3.6 MMOL/L (ref 3.5–5.3)
PROT SERPL-MCNC: 6.2 G/DL (ref 6.4–8.4)
PROTHROMBIN TIME: 13.7 SECONDS (ref 11.6–14.5)
RBC # BLD AUTO: 4.14 MILLION/UL (ref 3.88–5.62)
SODIUM SERPL-SCNC: 141 MMOL/L (ref 135–147)
WBC # BLD AUTO: 7.28 THOUSAND/UL (ref 4.31–10.16)

## 2023-09-20 PROCEDURE — 99285 EMERGENCY DEPT VISIT HI MDM: CPT

## 2023-09-20 PROCEDURE — 74177 CT ABD & PELVIS W/CONTRAST: CPT

## 2023-09-20 PROCEDURE — 80053 COMPREHEN METABOLIC PANEL: CPT | Performed by: FAMILY MEDICINE

## 2023-09-20 PROCEDURE — 74022 RADEX COMPL AQT ABD SERIES: CPT

## 2023-09-20 PROCEDURE — 99285 EMERGENCY DEPT VISIT HI MDM: CPT | Performed by: FAMILY MEDICINE

## 2023-09-20 PROCEDURE — 96360 HYDRATION IV INFUSION INIT: CPT

## 2023-09-20 PROCEDURE — 96361 HYDRATE IV INFUSION ADD-ON: CPT

## 2023-09-20 PROCEDURE — 71275 CT ANGIOGRAPHY CHEST: CPT

## 2023-09-20 PROCEDURE — G1004 CDSM NDSC: HCPCS

## 2023-09-20 PROCEDURE — 85025 COMPLETE CBC W/AUTO DIFF WBC: CPT | Performed by: FAMILY MEDICINE

## 2023-09-20 PROCEDURE — 85610 PROTHROMBIN TIME: CPT | Performed by: FAMILY MEDICINE

## 2023-09-20 PROCEDURE — 36415 COLL VENOUS BLD VENIPUNCTURE: CPT | Performed by: FAMILY MEDICINE

## 2023-09-20 PROCEDURE — 82272 OCCULT BLD FECES 1-3 TESTS: CPT

## 2023-09-20 RX ORDER — ENEMA 19; 7 G/133ML; G/133ML
1 ENEMA RECTAL ONCE
Status: COMPLETED | OUTPATIENT
Start: 2023-09-20 | End: 2023-09-20

## 2023-09-20 RX ADMIN — IOHEXOL 100 ML: 350 INJECTION, SOLUTION INTRAVENOUS at 14:26

## 2023-09-20 RX ADMIN — IOHEXOL 85 ML: 350 INJECTION, SOLUTION INTRAVENOUS at 16:31

## 2023-09-20 RX ADMIN — SODIUM CHLORIDE 1000 ML: 0.9 INJECTION, SOLUTION INTRAVENOUS at 12:30

## 2023-09-20 RX ADMIN — SODIUM PHOSPHATE, DIBASIC AND SODIUM PHOSPHATE, MONOBASIC 1 ENEMA: 7; 19 ENEMA RECTAL at 15:17

## 2023-09-20 NOTE — DISCHARGE INSTRUCTIONS
Consider using Metamucil daily as a regimen to keep your stools softer. Return to the ER with any new or concerning issues. You have some fluid in your right chest.  This should be reevaluated by your family doctor.     Please follow-up with him this coming week for repeat evaluation

## 2023-09-20 NOTE — ED PROVIDER NOTES
History  Chief Complaint   Patient presents with   • Rectal Bleeding     Pt presents to ER from home for reports of rectal bleeding. Reports history of same in 2018 after prostate treatment with proctitis. Pt reports 10/10 pain in rectum as well and the urge to pass a bowel movement. Few drops of dried blood noted in underwear, patient states it was worse before coming to ER. HPI  79yo male came with c/o rectal bleed and constipation. Patient with history of prostate cancer status post prostatectomy in 1987 due to radiation proctitis in 2018 and rectal bleed. Patient stated that today he was constipated was trying to have a bowel movement then started with rectal bleed this morning. Complaining of lower abdominal pain denies nausea vomiting. Denies any fever and chills. Prior to Admission Medications   Prescriptions Last Dose Informant Patient Reported? Taking?    ALPRAZolam (XANAX) 0.5 mg tablet   No No   Sig: Take 1 tablet (0.5 mg total) by mouth daily at bedtime as needed for anxiety   amLODIPine (NORVASC) 5 mg tablet   No No   Sig: Take 1 tablet (5 mg total) by mouth daily   atorvastatin (LIPITOR) 40 mg tablet   No No   Sig: Take 1 tablet (40 mg total) by mouth daily   carbamide peroxide (DEBROX) 6.5 % otic solution   No No   Sig: Administer 5 drops into the left ear 2 (two) times a day   levothyroxine 88 mcg tablet   No No   Sig: Take 1 tablet (88 mcg total) by mouth daily   lisinopril-hydrochlorothiazide (PRINZIDE,ZESTORETIC) 20-25 MG per tablet   No No   Sig: Take 1 tablet by mouth daily   meloxicam (MOBIC) 15 mg tablet   No No   Sig: Take 1 tablet (15 mg total) by mouth daily      Facility-Administered Medications: None       Past Medical History:   Diagnosis Date   • Cancer ([address])    • Chronic kidney disease, stage 3a ([address]) 06/05/2021   • Coronary artery calcification seen on CT scan 11/07/2021   • COVID-19 11/06/2021   • Dementia ([facility])    • Disease of thyroid gland    • Eczema    • Generalized anxiety disorder    • Hypertension    • Prostate cancer [facility])    • Prostate cancer metastatic to intrapelvic lymph node ([address])    • Skin disorder     suspect benign, but will need removal and due to location, refer. Last assessed: 2013       Past Surgical History:   Procedure Laterality Date   • CATARACT EXTRACTION     • COLONOSCOPY     • COLONOSCOPY  2019   • EYE SURGERY     • KNEE SURGERY     • PROSTATE SURGERY     • VASECTOMY         Family History   Problem Relation Age of Onset   • Alcohol abuse Mother    • Alcohol abuse Father    • Depression Father    • No Known Problems Sister    • Stroke Brother         syndrome    • No Known Problems Maternal Grandmother    • No Known Problems Maternal Grandfather    • No Known Problems Paternal Grandmother    • No Known Problems Paternal Grandfather    • Alcohol abuse Family    • Colon cancer Maternal Aunt      I have reviewed and agree with the history as documented. E-Cigarette/Vaping   • E-Cigarette Use Never User      E-Cigarette/Vaping Substances   • Nicotine No    • THC No    • CBD No    • Flavoring No    • Other No    • Unknown No      Social History     Tobacco Use   • Smoking status: Former     Types: Cigarettes, Cigars     Quit date:      Years since quittin.7   • Smokeless tobacco: Never   • Tobacco comments:     smokes 1-2 cigarss/month   Vaping Use   • Vaping Use: Never used   Substance Use Topics   • Alcohol use: No   • Drug use: No     Comment: Chronic Narcotic use noted in "allscripts"        Review of Systems   Constitutional: Negative. HENT: Negative. Eyes: Negative. Respiratory: Negative. Cardiovascular: Negative. Gastrointestinal: Positive for blood in stool and constipation. Endocrine: Negative. Genitourinary: Negative. Musculoskeletal: Negative. Skin: Negative. Allergic/Immunologic: Negative. Neurological: Negative. Hematological: Negative. Psychiatric/Behavioral: Negative. Physical Exam  Physical Exam  Vitals and nursing note reviewed. Constitutional:       General: He is not in acute distress. Appearance: He is well-developed. He is not diaphoretic. HENT:      Head: Normocephalic and atraumatic. Right Ear: External ear normal.      Left Ear: External ear normal.      Nose: Nose normal.      Mouth/Throat:      Mouth: Mucous membranes are moist.      Pharynx: Oropharynx is clear. No posterior oropharyngeal erythema. Eyes:      Conjunctiva/sclera: Conjunctivae normal.      Pupils: Pupils are equal, round, and reactive to light. Cardiovascular:      Rate and Rhythm: Normal rate and regular rhythm. Pulses: Normal pulses. Heart sounds: Normal heart sounds. Pulmonary:      Effort: Pulmonary effort is normal. No respiratory distress. Breath sounds: Normal breath sounds. No wheezing. Abdominal:      General: Bowel sounds are normal. There is no distension. Palpations: Abdomen is soft. Tenderness: There is no abdominal tenderness. Genitourinary:     Rectum: Guaiac result positive. Comments: Rectal Exam: hard stool palpate in the rectum. Kent Lesser blood per rectum  Musculoskeletal:         General: Normal range of motion. Cervical back: Normal range of motion and neck supple. Lymphadenopathy:      Cervical: No cervical adenopathy. Skin:     General: Skin is warm and dry. Capillary Refill: Capillary refill takes less than 2 seconds. Neurological:      Mental Status: He is alert and oriented to person, place, and time.    Psychiatric:         Mood and Affect: Mood normal.         Behavior: Behavior normal.         Vital Signs  ED Triage Vitals [09/20/23 1040]   Temperature Pulse Respirations Blood Pressure SpO2   (!) 97.4 °F (36.3 °C) 59 20 (!) 178/93 93 %      Temp Source Heart Rate Source Patient Position - Orthostatic VS BP Location FiO2 (%)   Oral -- Sitting Left arm --      Pain Score       10 - Worst Possible Pain Vitals:    09/20/23 1040   BP: (!) 178/93   Pulse: 59   Patient Position - Orthostatic VS: Sitting         Visual Acuity      ED Medications  Medications   sodium chloride 0.9 % bolus 1,000 mL (1,000 mL Intravenous New Bag 9/20/23 1230)       Diagnostic Studies  Results Reviewed     Procedure Component Value Units Date/Time    Comprehensive metabolic panel [[id number]]  (Abnormal) Collected: 09/20/23 1156    Lab Status: Final result Specimen: Blood from Arm, Left Updated: 09/20/23 1224     Sodium 141 mmol/L      Potassium 3.6 mmol/L      Chloride 106 mmol/L      CO2 25 mmol/L      ANION GAP 10 mmol/L      BUN 23 mg/dL      Creatinine 1.02 mg/dL      Glucose 93 mg/dL      Calcium 9.0 mg/dL      AST 33 U/L      ALT 30 U/L      Alkaline Phosphatase 71 U/L      Total Protein 6.2 g/dL      Albumin 4.1 g/dL      Total Bilirubin 0.95 mg/dL      eGFR 67 ml/min/1.73sq m     Narrative:      [place] guidelines for Chronic Kidney Disease (CKD):   •  Stage 1 with normal or high GFR (GFR > 90 mL/min/1.73 square meters)  •  Stage 2 Mild CKD (GFR = 60-89 mL/min/1.73 square meters)  •  Stage 3A Moderate CKD (GFR = 45-59 mL/min/1.73 square meters)  •  Stage 3B Moderate CKD (GFR = 30-44 mL/min/1.73 square meters)  •  Stage 4 Severe CKD (GFR = 15-29 mL/min/1.73 square meters)  •  Stage 5 End Stage CKD (GFR <15 mL/min/1.73 square meters)  Note: GFR calculation is accurate only with a steady state creatinine    Protime-INR [[id number]]  (Normal) Collected: 09/20/23 1156    Lab Status: Final result Specimen: Blood from Arm, Left Updated: 09/20/23 1216     Protime 13.7 seconds      INR 1.04    CBC and differential [[id number]]  (Abnormal) Collected: 09/20/23 1156    Lab Status: Final result Specimen: Blood from Arm, Left Updated: 09/20/23 1204     WBC 7.28 Thousand/uL      RBC 4.14 Million/uL      Hemoglobin 13.2 g/dL      Hematocrit 38.8 %      MCV 94 fL      MCH 31.9 pg      MCHC 34.0 g/dL      RDW 13.3 % MPV 10.8 fL      Platelets 258 Thousands/uL      nRBC 0 /100 WBCs      Neutrophils Relative 80 %      Immat GRANS % 0 %      Lymphocytes Relative 12 %      Monocytes Relative 7 %      Eosinophils Relative 1 %      Basophils Relative 0 %      Neutrophils Absolute 5.76 Thousands/µL      Immature Grans Absolute 0.02 Thousand/uL      Lymphocytes Absolute 0.86 Thousands/µL      Monocytes Absolute 0.51 Thousand/µL      Eosinophils Absolute 0.10 Thousand/µL      Basophils Absolute 0.03 Thousands/µL                  XR abdomen obstruction series    (Results Pending)              Procedures  Procedures         ED Course                               SBIRT 22yo+    Flowsheet Row Most Recent Value   Initial Alcohol Screen: US AUDIT-C     1. How often do you have a drink containing alcohol? 0 Filed at: 09/20/2023 1041   2. How many drinks containing alcohol do you have on a typical day you are drinking? 0 Filed at: 09/20/2023 1041   3b. FEMALE Any Age, or MALE 65+: How often do you have 4 or more drinks on one occassion? 0 Filed at: 09/20/2023 1041   Audit-C Score 0 Filed at: 09/20/2023 1041   NAT: How many times in the past year have you. .. Used an illegal drug or used a prescription medication for non-medical reasons? Never Filed at: 09/20/2023 1041                    Medical Decision Making  41-year-old male with a history of prostate cancer status post prostatectomy history of rectal bleed presented with complaint of a constipation rectal bleed. History is taken from patient. Patient is awake alert oriented times uses 15  Differential diagnoses: Upper/lower GI bleed/hemorrhoid/anal fissure/constipation/small bowel obstruction  Plan we will obtain lab obstructive series patient may need to enema  Patient care signed out to Dr. Heidi Fernandes.    Amount and/or Complexity of Data Reviewed  Labs: ordered. Radiology: ordered.           Disposition  Final diagnoses:   Rectal bleeding   Constipation     Time reflects when diagnosis was documented in both MDM as applicable and the Disposition within this note     Time User Action Codes Description Comment    9/20/2023  1:24 PM Marques Call [K62.5] Rectal bleeding     9/20/2023  1:24 PM Nabila Moe Add [K59.00] Constipation       ED Disposition     None      Follow-up Information    None         Patient's Medications   Discharge Prescriptions    No medications on file       No discharge procedures on file.     PDMP Review       Value Time User    PDMP Reviewed  Yes 9/11/2023 11:38 AM Jose J Gabriel DO          ED Provider  Electronically Signed by           Nabila Moe MD  09/20/23 0248

## 2023-09-20 NOTE — ED NOTES
Fleet enema given. Pt not able to hold enema in. Immediately to bedside commode. Awaiting results.        Kaye Sousa RN  09/20/23 6168

## 2023-09-20 NOTE — ED CARE HANDOFF
Emergency Department Sign Out Note        Sign out and transfer of care from Dr. Farshad Wasserman. See Separate Emergency Department note. The patient, Brandon Moore, was evaluated by the previous provider for lower abdominal and pelvic pain with lupe red rectal bleeding. .    Workup Completed:  Patient is a 64-year-old male who presented to the emergency department due to the sensation of needing to move his bowels and having difficulty doing so. The patient is not usually chronically constipated. The patient states that he goes every day, but over the last 2 days it is becoming more difficult and now has blood. The patient had a history of prostate cancer and radiation to the pelvis. Patient denies any fever or chills. ED Course / Workup Pending (followup): Patient have emergency department work-up which consisted of abdominal x-ray, and lab work. Rectal exam done by the previous physician showed evidence of hard stool and bright red blood. Due to the patient having a history of the pelvic cancer, and is status post radiation, a CT will be ordered to further ascertain colitis versus true constipation. CT scan did not show evidence of colitis, but did show constipation. Patient also had some filling defects of the lung which were concerning for possible PE. Radiology has recommended a CTA of the chest.     CT of the chest showed no evidence of clot but did show pleural effusion. Patient was urged to follow-up with family doctor for further evaluation of this area. ED Course as of 09/21/23 0015   Wed Sep 20, 2023   1308 Patient with constipation as well as GI bleeding. 1606 Patient had a large bowel movement with increased relief. A report was called from radiology who notes that the patient has some abnormal findings on the chest CT which are concerning for change and vascular flow with contrast to the lower lobe of the lung.   Radiology thinks the patient should like to be evaluated for possible PE. Procedures  MDM        Disposition  Final diagnoses:   Rectal bleeding   Constipation   Pleural effusion     Time reflects when diagnosis was documented in both MDM as applicable and the Disposition within this note     Time User Action Codes Description Comment    9/20/2023  1:24 PM Dorrene Bio Add [K62.5] Rectal bleeding     9/20/2023  1:24 PM Dorrene Bio Add [K59.00] Constipation     9/20/2023  5:29 PM Lo Weinbergsindy Ashley Add [J90] Pleural effusion       ED Disposition     ED Disposition   Discharge    Condition   Stable    Date/Time   Wed Sep 20, 2023  5:29 PM    Comment   Azul Sharp discharge to home/self care. Follow-up Information     Follow up With Specialties Details Why [id number], DO Internal Medicine On 9/25/2023  [address] [place] [address]  [phone]          Discharge Medication List as of 9/20/2023  5:32 PM      CONTINUE these medications which have NOT CHANGED    Details   ALPRAZolam (XANAX) 0.5 mg tablet Take 1 tablet (0.5 mg total) by mouth daily at bedtime as needed for anxiety, Starting Mon 9/11/2023, Normal      amLODIPine (NORVASC) 5 mg tablet Take 1 tablet (5 mg total) by mouth daily, Starting Wed 7/12/2023, Normal      atorvastatin (LIPITOR) 40 mg tablet Take 1 tablet (40 mg total) by mouth daily, Starting Fri 8/11/2023, Normal      carbamide peroxide (DEBROX) 6.5 % otic solution Administer 5 drops into the left ear 2 (two) times a day, Starting Wed 7/5/2023, Normal      levothyroxine 88 mcg tablet Take 1 tablet (88 mcg total) by mouth daily, Starting Fri 8/11/2023, Normal      lisinopril-hydrochlorothiazide (PRINZIDE,ZESTORETIC) 20-25 MG per tablet Take 1 tablet by mouth daily, Starting Fri 8/11/2023, Normal      meloxicam (MOBIC) 15 mg tablet Take 1 tablet (15 mg total) by mouth daily, Starting u 9/7/2023, Normal           No discharge procedures on file.        ED Provider  Electronically Signed by     Oziel Gottlieb., DO  09/21/23 7776

## 2023-10-10 DIAGNOSIS — F41.1 GENERALIZED ANXIETY DISORDER: ICD-10-CM

## 2023-10-10 DIAGNOSIS — R31.29 OTHER MICROSCOPIC HEMATURIA: ICD-10-CM

## 2023-10-10 DIAGNOSIS — C77.5 MALIGNANT NEOPLASM OF PROSTATE METASTATIC TO INTRAPELVIC LYMPH NODE (HCC): ICD-10-CM

## 2023-10-10 DIAGNOSIS — R35.0 URINARY FREQUENCY: ICD-10-CM

## 2023-10-10 DIAGNOSIS — R39.15 URGENCY OF URINATION: ICD-10-CM

## 2023-10-10 DIAGNOSIS — C61 MALIGNANT NEOPLASM OF PROSTATE METASTATIC TO INTRAPELVIC LYMPH NODE (HCC): ICD-10-CM

## 2023-10-10 DIAGNOSIS — R10.32 LEFT GROIN PAIN: ICD-10-CM

## 2023-10-10 DIAGNOSIS — I10 PRIMARY HYPERTENSION: ICD-10-CM

## 2023-10-10 DIAGNOSIS — Z92.3 HISTORY OF RADIATION THERAPY: ICD-10-CM

## 2023-10-10 DIAGNOSIS — M79.652 LEFT THIGH PAIN: ICD-10-CM

## 2023-10-10 RX ORDER — ALPRAZOLAM 0.5 MG/1
0.5 TABLET ORAL
Qty: 30 TABLET | Refills: 0 | Status: SHIPPED | OUTPATIENT
Start: 2023-10-10

## 2023-10-10 RX ORDER — MELOXICAM 15 MG/1
15 TABLET ORAL DAILY
Qty: 30 TABLET | Refills: 0 | Status: SHIPPED | OUTPATIENT
Start: 2023-10-10

## 2023-10-10 RX ORDER — AMLODIPINE BESYLATE 5 MG/1
5 TABLET ORAL DAILY
Qty: 90 TABLET | Refills: 0 | Status: SHIPPED | OUTPATIENT
Start: 2023-10-10

## 2023-11-10 DIAGNOSIS — R35.0 URINARY FREQUENCY: ICD-10-CM

## 2023-11-10 DIAGNOSIS — C77.5 MALIGNANT NEOPLASM OF PROSTATE METASTATIC TO INTRAPELVIC LYMPH NODE (HCC): ICD-10-CM

## 2023-11-10 DIAGNOSIS — C61 MALIGNANT NEOPLASM OF PROSTATE METASTATIC TO INTRAPELVIC LYMPH NODE (HCC): ICD-10-CM

## 2023-11-10 DIAGNOSIS — R39.15 URGENCY OF URINATION: ICD-10-CM

## 2023-11-10 DIAGNOSIS — R10.32 LEFT GROIN PAIN: ICD-10-CM

## 2023-11-10 DIAGNOSIS — Z92.3 HISTORY OF RADIATION THERAPY: ICD-10-CM

## 2023-11-10 DIAGNOSIS — I25.10 CORONARY ARTERY CALCIFICATION SEEN ON CT SCAN: ICD-10-CM

## 2023-11-10 DIAGNOSIS — E03.9 HYPOTHYROIDISM, UNSPECIFIED TYPE: ICD-10-CM

## 2023-11-10 DIAGNOSIS — F41.1 GENERALIZED ANXIETY DISORDER: ICD-10-CM

## 2023-11-10 DIAGNOSIS — M79.652 LEFT THIGH PAIN: ICD-10-CM

## 2023-11-10 DIAGNOSIS — I10 ESSENTIAL HYPERTENSION: ICD-10-CM

## 2023-11-10 DIAGNOSIS — R31.29 OTHER MICROSCOPIC HEMATURIA: ICD-10-CM

## 2023-11-10 RX ORDER — LEVOTHYROXINE SODIUM 88 UG/1
88 TABLET ORAL DAILY
Qty: 90 TABLET | Refills: 3 | Status: SHIPPED | OUTPATIENT
Start: 2023-11-10

## 2023-11-10 RX ORDER — ATORVASTATIN CALCIUM 40 MG/1
40 TABLET, FILM COATED ORAL DAILY
Qty: 90 TABLET | Refills: 3 | Status: SHIPPED | OUTPATIENT
Start: 2023-11-10

## 2023-11-10 RX ORDER — MELOXICAM 15 MG/1
15 TABLET ORAL DAILY
Qty: 30 TABLET | Refills: 3 | Status: SHIPPED | OUTPATIENT
Start: 2023-11-10

## 2023-11-10 RX ORDER — LISINOPRIL AND HYDROCHLOROTHIAZIDE 25; 20 MG/1; MG/1
1 TABLET ORAL DAILY
Qty: 90 TABLET | Refills: 3 | Status: SHIPPED | OUTPATIENT
Start: 2023-11-10

## 2023-11-11 RX ORDER — ALPRAZOLAM 0.5 MG/1
0.5 TABLET ORAL
Qty: 30 TABLET | Refills: 0 | Status: SHIPPED | OUTPATIENT
Start: 2023-11-11

## 2023-12-09 DIAGNOSIS — F41.1 GENERALIZED ANXIETY DISORDER: ICD-10-CM

## 2023-12-09 DIAGNOSIS — M79.652 LEFT THIGH PAIN: ICD-10-CM

## 2023-12-09 DIAGNOSIS — Z92.3 HISTORY OF RADIATION THERAPY: ICD-10-CM

## 2023-12-09 DIAGNOSIS — R39.15 URGENCY OF URINATION: ICD-10-CM

## 2023-12-09 DIAGNOSIS — R31.29 OTHER MICROSCOPIC HEMATURIA: ICD-10-CM

## 2023-12-09 DIAGNOSIS — R10.32 LEFT GROIN PAIN: ICD-10-CM

## 2023-12-09 DIAGNOSIS — C61 MALIGNANT NEOPLASM OF PROSTATE METASTATIC TO INTRAPELVIC LYMPH NODE (HCC): ICD-10-CM

## 2023-12-09 DIAGNOSIS — R35.0 URINARY FREQUENCY: ICD-10-CM

## 2023-12-09 DIAGNOSIS — C77.5 MALIGNANT NEOPLASM OF PROSTATE METASTATIC TO INTRAPELVIC LYMPH NODE (HCC): ICD-10-CM

## 2023-12-11 RX ORDER — ALPRAZOLAM 0.5 MG/1
0.5 TABLET ORAL
Qty: 30 TABLET | Refills: 0 | Status: SHIPPED | OUTPATIENT
Start: 2023-12-11

## 2023-12-11 RX ORDER — MELOXICAM 15 MG/1
15 TABLET ORAL DAILY
Qty: 30 TABLET | Refills: 0 | Status: SHIPPED | OUTPATIENT
Start: 2023-12-11

## 2023-12-26 ENCOUNTER — RA CDI HCC (OUTPATIENT)
Dept: OTHER | Facility: HOSPITAL | Age: 83
End: 2023-12-26

## 2023-12-26 NOTE — PROGRESS NOTES
HCC coding opportunities       Chart reviewed, no opportunity found: CHART REVIEWED, NO OPPORTUNITY FOUND  [facility] Review     Patients Insurance     Medicare Insurance: [facility] [employer]age

## 2024-01-02 ENCOUNTER — RA CDI HCC (OUTPATIENT)
Dept: OTHER | Facility: HOSPITAL | Age: 84
End: 2024-01-02

## 2024-01-02 NOTE — PROGRESS NOTES
HCC coding opportunities    N18.31     Chart Reviewed number of suggestions sent to Provider: 1     GR    Patients Insurance     Medicare Insurance: [facility] [employer]age

## 2024-01-03 ENCOUNTER — OFFICE VISIT (OUTPATIENT)
Dept: INTERNAL MEDICINE CLINIC | Facility: CLINIC | Age: 84
End: 2024-01-03
Payer: COMMERCIAL

## 2024-01-03 VITALS
OXYGEN SATURATION: 97 % | BODY MASS INDEX: 29.31 KG/M2 | SYSTOLIC BLOOD PRESSURE: 124 MMHG | HEART RATE: 50 BPM | WEIGHT: 193.4 LBS | DIASTOLIC BLOOD PRESSURE: 68 MMHG | HEIGHT: 68 IN | TEMPERATURE: 98.1 F

## 2024-01-03 DIAGNOSIS — R73.03 PREDIABETES: ICD-10-CM

## 2024-01-03 DIAGNOSIS — E78.5 HYPERLIPIDEMIA, UNSPECIFIED HYPERLIPIDEMIA TYPE: ICD-10-CM

## 2024-01-03 DIAGNOSIS — M16.12 PRIMARY OSTEOARTHRITIS OF LEFT HIP: ICD-10-CM

## 2024-01-03 DIAGNOSIS — I10 PRIMARY HYPERTENSION: Primary | ICD-10-CM

## 2024-01-03 DIAGNOSIS — N18.31 CHRONIC KIDNEY DISEASE, STAGE 3A (HCC): ICD-10-CM

## 2024-01-03 DIAGNOSIS — E03.9 HYPOTHYROIDISM, UNSPECIFIED TYPE: ICD-10-CM

## 2024-01-03 DIAGNOSIS — C77.5 MALIGNANT NEOPLASM OF PROSTATE METASTATIC TO INTRAPELVIC LYMPH NODE (HCC): ICD-10-CM

## 2024-01-03 DIAGNOSIS — Z23 ENCOUNTER FOR IMMUNIZATION: ICD-10-CM

## 2024-01-03 DIAGNOSIS — C61 MALIGNANT NEOPLASM OF PROSTATE METASTATIC TO INTRAPELVIC LYMPH NODE (HCC): ICD-10-CM

## 2024-01-03 DIAGNOSIS — F41.1 GENERALIZED ANXIETY DISORDER: ICD-10-CM

## 2024-01-03 DIAGNOSIS — I25.10 CORONARY ARTERY CALCIFICATION SEEN ON CT SCAN: ICD-10-CM

## 2024-01-03 PROCEDURE — 99214 OFFICE O/P EST MOD 30 MIN: CPT | Performed by: INTERNAL MEDICINE

## 2024-01-03 NOTE — PROGRESS NOTES
Depression Screening and Follow-up Plan: Patient was screened for depression during today's encounter. They screened negative with a PHQ-2 score of 0.    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)       Genitourinary    Chronic kidney disease, stage 3a (HCC)       Other    Prediabetes    Relevant Orders    Hemoglobin A1C    Hyperlipidemia    Generalized anxiety disorder    Relevant Orders    [facility] All Prescribed Meds and Special Instructions    Amphetamines, Methamphetamines    Butalbital    Phenobarbital    Secobarbital    Alprazolam    Clonazepam    Diazepam    Lorazepam    Gabapentin    Pregabalin    Cocaine    Heroin    Buprenorphine    Levorphanol    Meperidine    Naltrexone    Fentanyl    Methadone    Oxycodone    Tapentadol    THC    Tramadol    Codeine, Hydrocodone, Hydropmorphone, Morphine    Bath Salts    Ethyl Glucuronide/Ethyl Sulfate    Kratom    Spice    Methylphenidate    Phentermine    Validity Oxidant    Validity Creatinine    Validity pH    Validity Specific   Other Visit Diagnoses     Encounter for immunization               Diagnoses and all orders for this visit:    Primary hypertension    Encounter for immunization    Hypothyroidism, unspecified type  -     TSH, 3rd generation; Future    Coronary artery calcification seen on CT scan    Primary osteoarthritis of left hip    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)    Chronic kidney disease, stage 3a (HCC)    Generalized anxiety disorder  -     [facility] Prescribed Meds and Special Instructions  -     Amphetamines, Methamphetamines  -      Butalbital  -     Phenobarbital  -     Secobarbital  -     Alprazolam  -     Clonazepam  -     Diazepam  -     Lorazepam  -     Gabapentin  -     Pregabalin  -     Cocaine  -     Heroin  -     Buprenorphine  -     Levorphanol  -     Meperidine  -     Naltrexone  -     Fentanyl  -     Methadone  -     Oxycodone  -     Tapentadol  -     THC  -     Tramadol  -     Codeine, Hydrocodone, Hydropmorphone, Morphine  -     Bath Salts  -     Ethyl Glucuronide/Ethyl Sulfate  -     Kratom  -     Spice  -     Methylphenidate  -     Phentermine  -     Validity Oxidant  -     Validity Creatinine  -     Validity pH  -     Validity Specific    Hyperlipidemia, unspecified hyperlipidemia type    Prediabetes  -     Hemoglobin A1C; Future        No problem-specific Assessment & Plan notes found for this encounter.    A/P: Doing ok and will check labs. Discussed vaccines already had his flu vaccine. Will check a UDT. Continue current treatment and RTC four months for routine.     Subjective:      Patient ID: Dexter Sharp is a 83 y.o. male.    WM RTC for f/u HTN, Hypothyroidism, etc. Doing ok and no new issues. Remains active w/o difficulty and no falls. Denies CP, SOB, palpitations, edema, orthopnea or PND. CHronic pain is manageable. NADIA is controlled. Due for labs and vaccines.         The following portions of the patient's history were reviewed and updated as appropriate:   He has a past medical history of Cancer (HCC), Chronic kidney disease, stage 3a (HCC) (06/05/2021), Coronary artery calcification seen on CT scan (11/07/2021), COVID-19 (11/06/2021), Dementia (HCC), Disease of thyroid gland, Eczema, Generalized anxiety disorder, Hypertension, Prostate cancer (HCC), 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), and Skin disorder.,  does not have any pertinent problems on file.,   has a past surgical history that includes Knee surgery; Prostate surgery; Vasectomy; Cataract extraction; Eye surgery; Colonoscopy; and  Colonoscopy (11/26/2019).,  family history includes Alcohol abuse in his family, father, and mother; Colon cancer in his maternal aunt; Depression in his father; No Known Problems in his maternal grandfather, maternal grandmother, paternal grandfather, paternal grandmother, and sister; Stroke in his brother.,   reports that he quit smoking about 38 years ago. His smoking use included cigarettes and cigars. He has never used smokeless tobacco. He reports that he does not drink alcohol and does not use drugs.,  has No Known Allergies..  Current Outpatient Medications   Medication Sig Dispense Refill   • ALPRAZolam (XANAX) 0.5 mg tablet Take 1 tablet (0.5 mg total) by mouth daily at bedtime as needed for anxiety 30 tablet 0   • amLODIPine (NORVASC) 5 mg tablet Take 1 tablet (5 mg total) by mouth daily 90 tablet 0   • atorvastatin (LIPITOR) 40 mg tablet Take 1 tablet (40 mg total) by mouth daily 90 tablet 3   • levothyroxine 88 mcg tablet Take 1 tablet (88 mcg total) by mouth daily 90 tablet 3   • lisinopril-hydrochlorothiazide (PRINZIDE,ZESTORETIC) 20-25 MG per tablet Take 1 tablet by mouth daily 90 tablet 3   • meloxicam (MOBIC) 15 mg tablet Take 1 tablet (15 mg total) by mouth daily 30 tablet 0     No current facility-administered medications for this visit.       Review of Systems   Constitutional:  Negative for activity change, chills, diaphoresis, fatigue and fever.   HENT: Negative.     Eyes:  Negative for visual disturbance.   Respiratory:  Negative for cough, chest tightness, shortness of breath and wheezing.    Cardiovascular:  Negative for chest pain, palpitations and leg swelling.   Gastrointestinal:  Negative for abdominal pain, constipation, diarrhea, nausea and vomiting.   Endocrine: Negative for cold intolerance and heat intolerance.   Genitourinary:  Negative for difficulty urinating, dysuria and frequency.   Musculoskeletal:  Negative for arthralgias, gait problem and myalgias.   Neurological:   "Negative for dizziness, seizures, syncope, weakness, light-headedness and headaches.   Psychiatric/Behavioral:  Negative for confusion, dysphoric mood and sleep disturbance. The patient is not nervous/anxious.        PHQ-2/9 Depression Screening    Little interest or pleasure in doing things: 0 - not at all  Feeling down, depressed, or hopeless: 0 - not at all  PHQ-2 Score: 0  PHQ-2 Interpretation: Negative depression screen        Objective:  Vitals:    01/03/24 1330   BP: 124/68   Pulse: (!) 50   Temp: 98.1 °F (36.7 °C)   SpO2: 97%   Weight: 87.7 kg (193 lb 6.4 oz)   Height: 5' 8\" (1.727 m)     Body mass index is 29.41 kg/m².     Physical Exam  Vitals and nursing note reviewed.   Constitutional:       General: He is not in acute distress.     Appearance: Normal appearance. He is not ill-appearing.   HENT:      Head: Normocephalic and atraumatic.      Mouth/Throat:      Mouth: Mucous membranes are moist.   Eyes:      Extraocular Movements: Extraocular movements intact.      Conjunctiva/sclera: Conjunctivae normal.      Pupils: Pupils are equal, round, and reactive to light.   Neck:      Vascular: No carotid bruit.   Cardiovascular:      Rate and Rhythm: Normal rate and regular rhythm.      Heart sounds: Normal heart sounds. No murmur heard.  Pulmonary:      Effort: Pulmonary effort is normal. No respiratory distress.      Breath sounds: Normal breath sounds. No wheezing, rhonchi or rales.   Abdominal:      General: Bowel sounds are normal. There is no distension.      Palpations: Abdomen is soft.      Tenderness: There is no abdominal tenderness.   Musculoskeletal:      Cervical back: Neck supple.      Right lower leg: No edema.      Left lower leg: No edema.   Neurological:      General: No focal deficit present.      Mental Status: He is alert and oriented to person, place, and time. Mental status is at baseline.   Psychiatric:         Mood and Affect: Mood normal.         Behavior: Behavior normal.         " Thought Content: Thought content normal.         Judgment: Judgment normal.

## 2024-01-05 ENCOUNTER — APPOINTMENT (OUTPATIENT)
Age: 84
End: 2024-01-05
Payer: COMMERCIAL

## 2024-01-05 DIAGNOSIS — R73.03 PREDIABETES: ICD-10-CM

## 2024-01-05 DIAGNOSIS — E03.9 HYPOTHYROIDISM, UNSPECIFIED TYPE: ICD-10-CM

## 2024-01-05 LAB
6MAM UR QL CFM: NEGATIVE NG/ML
7AMINOCLONAZEPAM UR QL CFM: NEGATIVE NG/ML
A-OH ALPRAZ UR QL CFM: NORMAL NG/ML
ACCEPTABLE CREAT UR QL: NORMAL MG/DL
ACCEPTIBLE SP GR UR QL: NORMAL
AMPHET UR QL CFM: NEGATIVE NG/ML
BUPRENORPHINE UR QL CFM: NEGATIVE NG/ML
BUTALBITAL UR QL CFM: NEGATIVE NG/ML
BZE UR QL CFM: NEGATIVE NG/ML
CODEINE UR QL CFM: NEGATIVE NG/ML
EDDP UR QL CFM: NEGATIVE NG/ML
EST. AVERAGE GLUCOSE BLD GHB EST-MCNC: 131 MG/DL
ETHYL GLUCURONIDE UR QL CFM: NEGATIVE NG/ML
ETHYL SULFATE UR QL SCN: NEGATIVE NG/ML
EUTYLONE UR QL: NEGATIVE NG/ML
FENTANYL UR QL CFM: NEGATIVE NG/ML
GLIADIN IGG SER IA-ACNC: NEGATIVE NG/ML
HBA1C MFR BLD: 6.2 %
HYDROCODONE UR QL CFM: NEGATIVE NG/ML
HYDROMORPHONE UR QL CFM: NEGATIVE NG/ML
LORAZEPAM UR QL CFM: NEGATIVE NG/ML
ME-PHENIDATE UR QL CFM: NEGATIVE NG/ML
MEPERIDINE UR QL CFM: NEGATIVE NG/ML
METHADONE UR QL CFM: NEGATIVE NG/ML
METHAMPHET UR QL CFM: NEGATIVE NG/ML
MORPHINE UR QL CFM: NEGATIVE NG/ML
NALTREXONE UR QL CFM: NEGATIVE NG/ML
NITRITE UR QL: NORMAL UG/ML
NORBUPRENORPHINE UR QL CFM: NEGATIVE NG/ML
NORDIAZEPAM UR QL CFM: NEGATIVE NG/ML
NORFENTANYL UR QL CFM: NEGATIVE NG/ML
NORHYDROCODONE UR QL CFM: NEGATIVE NG/ML
NORMEPERIDINE UR QL CFM: NEGATIVE NG/ML
NOROXYCODONE UR QL CFM: NEGATIVE NG/ML
OXAZEPAM UR QL CFM: NEGATIVE NG/ML
OXYCODONE UR QL CFM: NEGATIVE NG/ML
OXYMORPHONE UR QL CFM: NEGATIVE NG/ML
PARA-FLUOROFENTANYL QUANTIFICATION: NORMAL NG/ML
PHENOBARB UR QL CFM: NEGATIVE NG/ML
RESULT ALL_PRESCRIBED MEDS AND SPECIAL INSTRUCTIONS: NORMAL
SECOBARBITAL UR QL CFM: NEGATIVE NG/ML
SL AMB 4-ANPP QUANTIFICATION: NORMAL NG/ML
SL AMB 5F-ADB-M7 METABOLITE QUANTIFICATION: NEGATIVE NG/ML
SL AMB 7-OH-MITRAGYNINE (KRATOM ALKALOID) QUANTIFICATION: NEGATIVE NG/ML
SL AMB AB-FUBINACA-M3 METABOLITE QUANTIFICATION: NEGATIVE NG/ML
SL AMB ACETYL FENTANYL QUANTIFICATION: NORMAL NG/ML
SL AMB ACETYL NORFENTANYL QUANTIFICATION: NORMAL NG/ML
SL AMB ACRYL FENTANYL QUANTIFICATION: NORMAL NG/ML
SL AMB CARFENTANIL QUANTIFICATION: NORMAL NG/ML
SL AMB CTHC (MARIJUANA METABOLITE) QUANTIFICATION: NEGATIVE NG/ML
SL AMB DEXTRORPHAN (DEXTROMETHORPHAN METABOLITE) QUANT: NEGATIVE NG/ML
SL AMB GABAPENTIN QUANTIFICATION: NEGATIVE
SL AMB JWH018 METABOLITE QUANTIFICATION: NEGATIVE NG/ML
SL AMB JWH073 METABOLITE QUANTIFICATION: NEGATIVE NG/ML
SL AMB MDMB-FUBINACA-M1 METABOLITE QUANTIFICATION: NEGATIVE NG/ML
SL AMB METHYLONE QUANTIFICATION: NEGATIVE NG/ML
SL AMB N-DESMETHYL-TRAMADOL QUANTIFICATION: NEGATIVE NG/ML
SL AMB PHENTERMINE QUANTIFICATION: NEGATIVE NG/ML
SL AMB PREGABALIN QUANTIFICATION: NEGATIVE
SL AMB RCS4 METABOLITE QUANTIFICATION: NEGATIVE NG/ML
SL AMB RITALINIC ACID QUANTIFICATION: NEGATIVE NG/ML
SMOOTH MUSCLE AB TITR SER IF: NEGATIVE NG/ML
SPECIMEN DRAWN SERPL: NEGATIVE NG/ML
SPECIMEN PH ACCEPTABLE UR: NORMAL
TAPENTADOL UR QL CFM: NEGATIVE NG/ML
TEMAZEPAM UR QL CFM: NEGATIVE NG/ML
TRAMADOL UR QL CFM: NEGATIVE NG/ML
TSH SERPL DL<=0.05 MIU/L-ACNC: 2.26 UIU/ML (ref 0.45–4.5)
URATE/CREAT 24H UR: NEGATIVE NG/ML

## 2024-01-05 PROCEDURE — 36415 COLL VENOUS BLD VENIPUNCTURE: CPT

## 2024-01-05 PROCEDURE — 83036 HEMOGLOBIN GLYCOSYLATED A1C: CPT

## 2024-01-05 PROCEDURE — 84443 ASSAY THYROID STIM HORMONE: CPT

## 2024-01-07 DIAGNOSIS — I10 PRIMARY HYPERTENSION: ICD-10-CM

## 2024-01-08 RX ORDER — AMLODIPINE BESYLATE 5 MG/1
5 TABLET ORAL DAILY
Qty: 90 TABLET | Refills: 0 | Status: SHIPPED | OUTPATIENT
Start: 2024-01-08

## 2024-01-10 DIAGNOSIS — M79.652 LEFT THIGH PAIN: ICD-10-CM

## 2024-01-10 DIAGNOSIS — R39.15 URGENCY OF URINATION: ICD-10-CM

## 2024-01-10 DIAGNOSIS — F41.1 GENERALIZED ANXIETY DISORDER: ICD-10-CM

## 2024-01-10 DIAGNOSIS — C77.5 MALIGNANT NEOPLASM OF PROSTATE METASTATIC TO INTRAPELVIC LYMPH NODE (HCC): ICD-10-CM

## 2024-01-10 DIAGNOSIS — R35.0 URINARY FREQUENCY: ICD-10-CM

## 2024-01-10 DIAGNOSIS — R31.29 OTHER MICROSCOPIC HEMATURIA: ICD-10-CM

## 2024-01-10 DIAGNOSIS — R10.32 LEFT GROIN PAIN: ICD-10-CM

## 2024-01-10 DIAGNOSIS — C61 MALIGNANT NEOPLASM OF PROSTATE METASTATIC TO INTRAPELVIC LYMPH NODE (HCC): ICD-10-CM

## 2024-01-10 DIAGNOSIS — Z92.3 HISTORY OF RADIATION THERAPY: ICD-10-CM

## 2024-01-11 RX ORDER — ALPRAZOLAM 0.5 MG/1
0.5 TABLET ORAL
Qty: 30 TABLET | Refills: 0 | Status: SHIPPED | OUTPATIENT
Start: 2024-01-11

## 2024-01-11 RX ORDER — MELOXICAM 15 MG/1
15 TABLET ORAL DAILY
Qty: 30 TABLET | Refills: 0 | Status: SHIPPED | OUTPATIENT
Start: 2024-01-11

## 2024-02-19 DIAGNOSIS — R31.29 OTHER MICROSCOPIC HEMATURIA: ICD-10-CM

## 2024-02-19 DIAGNOSIS — Z92.3 HISTORY OF RADIATION THERAPY: ICD-10-CM

## 2024-02-19 DIAGNOSIS — R35.0 URINARY FREQUENCY: ICD-10-CM

## 2024-02-19 DIAGNOSIS — C77.5 MALIGNANT NEOPLASM OF PROSTATE METASTATIC TO INTRAPELVIC LYMPH NODE (HCC): ICD-10-CM

## 2024-02-19 DIAGNOSIS — R39.15 URGENCY OF URINATION: ICD-10-CM

## 2024-02-19 DIAGNOSIS — M79.652 LEFT THIGH PAIN: ICD-10-CM

## 2024-02-19 DIAGNOSIS — C61 MALIGNANT NEOPLASM OF PROSTATE METASTATIC TO INTRAPELVIC LYMPH NODE (HCC): ICD-10-CM

## 2024-02-19 DIAGNOSIS — F41.1 GENERALIZED ANXIETY DISORDER: ICD-10-CM

## 2024-02-19 DIAGNOSIS — R10.32 LEFT GROIN PAIN: ICD-10-CM

## 2024-02-19 RX ORDER — ALPRAZOLAM 0.5 MG/1
0.5 TABLET ORAL
Qty: 30 TABLET | Refills: 0 | Status: SHIPPED | OUTPATIENT
Start: 2024-02-19

## 2024-02-19 RX ORDER — MELOXICAM 15 MG/1
15 TABLET ORAL DAILY
Qty: 30 TABLET | Refills: 2 | Status: SHIPPED | OUTPATIENT
Start: 2024-02-19

## 2024-03-11 DIAGNOSIS — Z92.3 HISTORY OF RADIATION THERAPY: ICD-10-CM

## 2024-03-11 DIAGNOSIS — C77.5 MALIGNANT NEOPLASM OF PROSTATE METASTATIC TO INTRAPELVIC LYMPH NODE (HCC): ICD-10-CM

## 2024-03-11 DIAGNOSIS — R39.15 URGENCY OF URINATION: ICD-10-CM

## 2024-03-11 DIAGNOSIS — C61 MALIGNANT NEOPLASM OF PROSTATE METASTATIC TO INTRAPELVIC LYMPH NODE (HCC): ICD-10-CM

## 2024-03-11 DIAGNOSIS — M79.652 LEFT THIGH PAIN: ICD-10-CM

## 2024-03-11 DIAGNOSIS — I10 ESSENTIAL HYPERTENSION: ICD-10-CM

## 2024-03-11 DIAGNOSIS — R35.0 URINARY FREQUENCY: ICD-10-CM

## 2024-03-11 DIAGNOSIS — R10.32 LEFT GROIN PAIN: ICD-10-CM

## 2024-03-11 DIAGNOSIS — R31.29 OTHER MICROSCOPIC HEMATURIA: ICD-10-CM

## 2024-03-11 DIAGNOSIS — I25.10 CORONARY ARTERY CALCIFICATION SEEN ON CT SCAN: ICD-10-CM

## 2024-03-11 DIAGNOSIS — F41.1 GENERALIZED ANXIETY DISORDER: ICD-10-CM

## 2024-03-11 DIAGNOSIS — E03.9 HYPOTHYROIDISM, UNSPECIFIED TYPE: ICD-10-CM

## 2024-03-11 RX ORDER — ALPRAZOLAM 0.5 MG/1
0.5 TABLET ORAL
Qty: 30 TABLET | Refills: 0 | Status: SHIPPED | OUTPATIENT
Start: 2024-03-11

## 2024-03-11 RX ORDER — ATORVASTATIN CALCIUM 40 MG/1
40 TABLET, FILM COATED ORAL DAILY
Qty: 90 TABLET | Refills: 1 | Status: SHIPPED | OUTPATIENT
Start: 2024-03-11

## 2024-03-11 RX ORDER — MELOXICAM 15 MG/1
15 TABLET ORAL DAILY
Qty: 90 TABLET | Refills: 1 | Status: SHIPPED | OUTPATIENT
Start: 2024-03-11

## 2024-03-11 RX ORDER — LISINOPRIL AND HYDROCHLOROTHIAZIDE 25; 20 MG/1; MG/1
1 TABLET ORAL DAILY
Qty: 90 TABLET | Refills: 1 | Status: SHIPPED | OUTPATIENT
Start: 2024-03-11

## 2024-03-11 RX ORDER — LEVOTHYROXINE SODIUM 88 UG/1
88 TABLET ORAL DAILY
Qty: 90 TABLET | Refills: 1 | Status: SHIPPED | OUTPATIENT
Start: 2024-03-11

## 2024-04-12 DIAGNOSIS — R10.32 LEFT GROIN PAIN: ICD-10-CM

## 2024-04-12 DIAGNOSIS — M79.652 LEFT THIGH PAIN: ICD-10-CM

## 2024-04-12 DIAGNOSIS — C61 MALIGNANT NEOPLASM OF PROSTATE METASTATIC TO INTRAPELVIC LYMPH NODE (HCC): ICD-10-CM

## 2024-04-12 DIAGNOSIS — R31.29 OTHER MICROSCOPIC HEMATURIA: ICD-10-CM

## 2024-04-12 DIAGNOSIS — R39.15 URGENCY OF URINATION: ICD-10-CM

## 2024-04-12 DIAGNOSIS — Z92.3 HISTORY OF RADIATION THERAPY: ICD-10-CM

## 2024-04-12 DIAGNOSIS — C77.5 MALIGNANT NEOPLASM OF PROSTATE METASTATIC TO INTRAPELVIC LYMPH NODE (HCC): ICD-10-CM

## 2024-04-12 DIAGNOSIS — R35.0 URINARY FREQUENCY: ICD-10-CM

## 2024-04-12 DIAGNOSIS — F41.1 GENERALIZED ANXIETY DISORDER: ICD-10-CM

## 2024-04-12 RX ORDER — ALPRAZOLAM 0.5 MG/1
0.5 TABLET ORAL
Qty: 30 TABLET | Refills: 0 | Status: SHIPPED | OUTPATIENT
Start: 2024-04-12

## 2024-04-12 RX ORDER — MELOXICAM 15 MG/1
15 TABLET ORAL DAILY
Qty: 90 TABLET | Refills: 1 | Status: SHIPPED | OUTPATIENT
Start: 2024-04-12

## 2024-04-25 ENCOUNTER — RA CDI HCC (OUTPATIENT)
Dept: OTHER | Facility: HOSPITAL | Age: 84
End: 2024-04-25

## 2024-04-26 ENCOUNTER — RA CDI HCC (OUTPATIENT)
Dept: OTHER | Facility: HOSPITAL | Age: 84
End: 2024-04-26

## 2024-04-26 PROBLEM — I12.9 HYPERTENSIVE KIDNEY DISEASE WITH CHRONIC KIDNEY DISEASE: Status: ACTIVE | Noted: 2024-04-26

## 2024-04-28 DIAGNOSIS — I10 PRIMARY HYPERTENSION: ICD-10-CM

## 2024-04-28 RX ORDER — AMLODIPINE BESYLATE 5 MG/1
5 TABLET ORAL DAILY
Qty: 90 TABLET | Refills: 0 | Status: SHIPPED | OUTPATIENT
Start: 2024-04-28

## 2024-05-01 ENCOUNTER — TELEPHONE (OUTPATIENT)
Dept: ADMINISTRATIVE | Facility: OTHER | Age: 84
End: 2024-05-01

## 2024-05-01 NOTE — TELEPHONE ENCOUNTER
05/01/24 11:14 AM    Patient contacted (left message) to bring Advance Directive, POLST, or Living Will document to next scheduled pcp visit.    Thank you.  Mary Del Toro  [facility]

## 2024-05-06 ENCOUNTER — OFFICE VISIT (OUTPATIENT)
Dept: INTERNAL MEDICINE CLINIC | Facility: CLINIC | Age: 84
End: 2024-05-06
Payer: COMMERCIAL

## 2024-05-06 VITALS
BODY MASS INDEX: 28.79 KG/M2 | DIASTOLIC BLOOD PRESSURE: 64 MMHG | WEIGHT: 190 LBS | OXYGEN SATURATION: 98 % | HEIGHT: 68 IN | HEART RATE: 51 BPM | SYSTOLIC BLOOD PRESSURE: 110 MMHG | TEMPERATURE: 97.8 F

## 2024-05-06 DIAGNOSIS — N18.31 CHRONIC KIDNEY DISEASE, STAGE 3A (HCC): ICD-10-CM

## 2024-05-06 DIAGNOSIS — C77.5 MALIGNANT NEOPLASM OF PROSTATE METASTATIC TO INTRAPELVIC LYMPH NODE (HCC): ICD-10-CM

## 2024-05-06 DIAGNOSIS — M16.12 PRIMARY OSTEOARTHRITIS OF LEFT HIP: ICD-10-CM

## 2024-05-06 DIAGNOSIS — C61 MALIGNANT NEOPLASM OF PROSTATE METASTATIC TO INTRAPELVIC LYMPH NODE (HCC): ICD-10-CM

## 2024-05-06 DIAGNOSIS — E03.9 HYPOTHYROIDISM, UNSPECIFIED TYPE: ICD-10-CM

## 2024-05-06 DIAGNOSIS — F41.1 GENERALIZED ANXIETY DISORDER: ICD-10-CM

## 2024-05-06 DIAGNOSIS — E78.5 HYPERLIPIDEMIA, UNSPECIFIED HYPERLIPIDEMIA TYPE: ICD-10-CM

## 2024-05-06 DIAGNOSIS — I10 PRIMARY HYPERTENSION: Primary | ICD-10-CM

## 2024-05-06 DIAGNOSIS — Z00.00 MEDICARE ANNUAL WELLNESS VISIT, SUBSEQUENT: ICD-10-CM

## 2024-05-06 DIAGNOSIS — R73.03 PREDIABETES: ICD-10-CM

## 2024-05-06 PROBLEM — Z86.16 HISTORY OF COVID-19: Status: RESOLVED | Noted: 2021-11-06 | Resolved: 2024-05-06

## 2024-05-06 PROCEDURE — 99214 OFFICE O/P EST MOD 30 MIN: CPT | Performed by: INTERNAL MEDICINE

## 2024-05-06 PROCEDURE — G0439 PPPS, SUBSEQ VISIT: HCPCS | Performed by: INTERNAL MEDICINE

## 2024-05-06 NOTE — PATIENT INSTRUCTIONS
Chronic Hypertension   AMBULATORY CARE:   Hypertension is considered chronic  when it continues for 3 months or longer. Hypertension that continues causes your heart to work much harder than normal, which may lead to heart damage. Even if you have hypertension for years, lifestyle changes, medicines, or both may help lower your blood pressure.  Call your local emergency number (911 in the [place]) or have someone call if:   You have chest pain.    You have any of the following signs of a heart attack:      Squeezing, pressure, or pain in your chest    You may  also have any of the following:     Discomfort or pain in your back, neck, jaw, stomach, or arm    Shortness of breath    Nausea or vomiting    Lightheadedness or a sudden cold sweat    You become confused or have difficulty speaking.    You suddenly feel lightheaded or have trouble breathing.    Seek care immediately if:   You have a severe headache or vision loss.    You have weakness in an arm or leg.    Call your doctor or cardiologist if:   You feel faint, dizzy, confused, or drowsy.    You have been taking your blood pressure medicine but your pressure is higher than your provider says it should be.    You have questions or concerns about your condition or care.    Treatment for chronic hypertension  may include medicine to lower your blood pressure and cholesterol levels. A low cholesterol level helps prevent heart disease and makes it easier to control your blood pressure. Heart disease can make your blood pressure harder to control. You may also need to make lifestyle changes.  What you need to know about the stages of hypertension:  Your healthcare provider will give you a blood pressure goal based on your age, health, and risk for cardiovascular disease. The following are general guidelines on the stages of hypertension:  Normal blood pressure is 119/79 or lower . Your provider may only check your blood pressure each year if it stays at a normal  level.    Elevated blood pressure is 120/79 to 129/79 . This is sometimes called prehypertension. Your provider may suggest lifestyle changes to help lower your blood pressure to a normal level. He or she may then check it again in 3 to 6 months.    Stage 1 hypertension is 130/80  to 139/89 . Your provider may recommend lifestyle changes, medication, and checks every 3 to 6 months until your blood pressure is controlled.    Stage 2 hypertension is 140/90 or higher . Your provider will recommend lifestyle changes and have you take 2 kinds of hypertension medicines. You will also need to have your blood pressure checked monthly until it is controlled.       Manage chronic hypertension:   Check your blood pressure at home.  Do not smoke, have caffeine, or exercise for at least 30 minutes before you check your blood pressure. Sit and rest for 5 minutes before you check your blood pressure. Extend your arm and support it on a flat surface. Your arm should be at the same level as your heart. Follow the directions that came with your blood pressure monitor. Check your blood pressure 2 times, 1 minute apart, before you take your medicine in the morning. Also check your blood pressure before your evening meal. Keep a record of your readings and bring it to your follow-up visits. Your healthcare provider may use the readings to make changes to your treatment plan.         Manage any other health conditions you have.  Health conditions such as diabetes can increase your risk for hypertension. Follow your provider's instructions and take all your medicines as directed. Talk to your provider about any new health conditions you have recently developed.    Ask about all medicines.  Certain medicines can increase your blood pressure. Examples include oral birth control pills, decongestants, herbal supplements, and NSAIDs, such as ibuprofen. Your provider can tell you which medicines are safe for you to take. This includes  prescription and over-the-counter medicines.    Lifestyle changes you can make to lower your blood pressure:  Your provider may want you to make more lifestyle changes if you are having trouble controlling your blood pressure. This may feel difficult over time, especially if you think you are making good changes but your pressure is still high. It might help to focus on one new change at a time. For example, try to add 1 more day of exercise, or exercise for an extra 10 minutes on 2 days. Small changes can make a big difference. Your healthcare provider can also refer you to specialists such as a dietitian who can help you make small changes. Your family members may be included in helping you learn to create lifestyle changes, such as the following:     Limit sodium (salt) as directed.  Too much sodium can affect your fluid balance. Check labels to find low-sodium or no-salt-added foods. Some low-sodium foods use potassium salts for flavor. Too much potassium can also cause health problems. Your provider will tell you how much sodium and potassium are safe for you to have in a day. He or she may recommend that you limit sodium to 2,300 mg a day.         Follow the meal plan recommended by your provider.  A dietitian or your provider can give you more information on low-sodium plans or the DASH (Dietary Approaches to Stop Hypertension) eating plan. The DASH plan is low in sodium, processed sugar, unhealthy fats, and total fat. It is high in potassium, calcium, and fiber. These can be found in vegetables, fruit, and whole-grain foods.         Be physically active throughout the day.  Physical activity, such as exercise, can help control your blood pressure and your weight. Be physically active for at least 30 minutes per day, on most days of the week. Include aerobic activity, such as walking or riding a bicycle. Also include strength training at least 2 times each week. Your provider can help you create a physical  activity plan.            Decrease stress.  This may help lower your blood pressure. Learn ways to relax, such as deep breathing or listening to music.    Limit alcohol as directed.  Alcohol can increase your blood pressure. A drink of alcohol is 12 ounces of beer, 5 ounces of wine, or 1½ ounces of liquor. Your provider can help you set daily and weekly drink limits. He or she may recommend no alcohol if your blood pressure stays higher than goal even with medicine or other measures. Ask your provider for information if you need help to quit.    Do not smoke.  Nicotine and other chemicals in cigarettes and cigars can increase your blood pressure and also cause lung damage. Ask your provider for information if you currently smoke and need help to quit. E-cigarettes or smokeless tobacco still contain nicotine. Talk to your provider before you use these products.       Follow up with your doctor or cardiologist as directed:  You will need to return to have your blood pressure checked and to have other lab tests done. Write down your questions so you remember to ask them during your visits.  © Copyright [employer] 2023 Information is for End User's use only and may not be sold, redistributed or otherwise used for commercial purposes.  The above information is an  only. It is not intended as medical advice for individual conditions or treatments. Talk to your doctor, nurse or pharmacist before following any medical regimen to see if it is safe and effective for you.    Medicare Preventive Visit Patient Instructions  Thank you for completing your Welcome to Medicare Visit or Medicare Annual Wellness Visit today. Your next wellness visit will be due in one year (5/7/2025).  The screening/preventive services that you may require over the next 5-10 years are detailed below. Some tests may not apply to you based off risk factors and/or age. Screening tests ordered at today's visit but not completed yet may show  as past due. Also, please note that scanned in results may not display below.  Preventive Screenings:  Service Recommendations Previous Testing/Comments   Colorectal Cancer Screening  Colonoscopy    Fecal Occult Blood Test (FOBT)/Fecal Immunochemical Test (FIT)  Fecal DNA/Cologuard Test  Flexible Sigmoidoscopy Age: 45-75 years old   Colonoscopy: every 10 years (May be performed more frequently if at higher risk)  OR  FOBT/FIT: every 1 year  OR  Cologuard: every 3 years  OR  Sigmoidoscopy: every 5 years  Screening may be recommended earlier than age 45 if at higher risk for colorectal cancer. Also, an individualized decision between you and your healthcare provider will decide whether screening between the ages of 76-85 would be appropriate. Colonoscopy: 11/26/2019  FOBT/FIT: Not on file  Cologuard: Not on file  Sigmoidoscopy: Not on file    Screening Not Indicated     Prostate Cancer Screening Individualized decision between patient and health care provider in men between ages of 55-69   Medicare will cover every 12 months beginning on the day after your 50th birthday PSA: 0.01 ng/mL     History Prostate Cancer  Screening Not Indicated     Hepatitis C Screening Once for adults born between 1945 and 1965  More frequently in patients at high risk for Hepatitis C Hep C Antibody: 11/08/2021    Screening Current   Diabetes Screening 1-2 times per year if you're at risk for diabetes or have pre-diabetes Fasting glucose: 90 mg/dL (9/11/2023)  A1C: 6.2 % (1/5/2024)  Screening Current   Cholesterol Screening Once every 5 years if you don't have a lipid disorder. May order more often based on risk factors. Lipid panel: 09/11/2023  Screening Not Indicated  History Lipid Disorder      Other Preventive Screenings Covered by Medicare:  Abdominal Aortic Aneurysm (AAA) Screening: covered once if your at risk. You're considered to be at risk if you have a family history of AAA or a male between the age of 65-75 who smoking at least  100 cigarettes in your lifetime.  Lung Cancer Screening: covers low dose CT scan once per year if you meet all of the following conditions: (1) Age 55-77; (2) No signs or symptoms of lung cancer; (3) Current smoker or have quit smoking within the last 15 years; (4) You have a tobacco smoking history of at least 20 pack years (packs per day x number of years you smoked); (5) You get a written order from a healthcare provider.  Glaucoma Screening: covered annually if you're considered high risk: (1) You have diabetes OR (2) Family history of glaucoma OR (3)  aged 50 and older OR (4)  American aged 65 and older  Osteoporosis Screening: covered every 2 years if you meet one of the following conditions: (1) Have a vertebral abnormality; (2) On glucocorticoid therapy for more than 3 months; (3) Have primary hyperparathyroidism; (4) On osteoporosis medications and need to assess response to drug therapy.  HIV Screening: covered annually if you're between the age of 15-65. Also covered annually if you are younger than 15 and older than 65 with risk factors for HIV infection. For pregnant patients, it is covered up to 3 times per pregnancy.    Immunizations:  Immunization Recommendations   Influenza Vaccine Annual influenza vaccination during flu season is recommended for all persons aged >= 6 months who do not have contraindications   Pneumococcal Vaccine   * Pneumococcal conjugate vaccine = PCV13 (Prevnar 13), PCV15 (Vaxneuvance), PCV20 (Prevnar 20)  * Pneumococcal polysaccharide vaccine = PPSV23 (Pneumovax) Adults 19-65 yo with certain risk factors or if 65+ yo  If never received any pneumonia vaccine: recommend Prevnar 20 (PCV20)  Give PCV20 if previously received 1 dose of PCV13 or PPSV23   Hepatitis B Vaccine 3 dose series if at intermediate or high risk (ex: diabetes, end stage renal disease, liver disease)   Respiratory syncytial virus (RSV) Vaccine - COVERED BY MEDICARE PART D  * RSVPreF3  (Arexvy) CDC recommends that adults 60 years of age and older may receive a single dose of RSV vaccine using shared clinical decision-making (SCDM)   Tetanus (Td) Vaccine - COST NOT COVERED BY MEDICARE PART B Following completion of primary series, a booster dose should be given every 10 years to maintain immunity against tetanus. Td may also be given as tetanus wound prophylaxis.   Tdap Vaccine - COST NOT COVERED BY MEDICARE PART B Recommended at least once for all adults. For pregnant patients, recommended with each pregnancy.   Shingles Vaccine (Shingrix) - COST NOT COVERED BY MEDICARE PART B  2 shot series recommended in those 19 years and older who have or will have weakened immune systems or those 50 years and older     Health Maintenance Due:      Topic Date Due   • Hepatitis C Screening  Completed     Immunizations Due:      Topic Date Due   • COVID-19 Vaccine (4 - 2023-24 season) 09/01/2023     Advance Directives   What are advance directives?  Advance directives are legal documents that state your wishes and plans for medical care. These plans are made ahead of time in case you lose your ability to make decisions for yourself. Advance directives can apply to any medical decision, such as the treatments you want, and if you want to donate organs.   What are the types of advance directives?  There are many types of advance directives, and each state has rules about how to use them. You may choose a combination of any of the following:  Living will:  This is a written record of the treatment you want. You can also choose which treatments you do not want, which to limit, and which to stop at a certain time. This includes surgery, medicine, IV fluid, and tube feedings.   Durable power of  for healthcare (DPAHC):  This is a written record that states who you want to make healthcare choices for you when you are unable to make them for yourself. This person, called a proxy, is usually a family member or a  friend. You may choose more than 1 proxy.  Do not resuscitate (DNR) order:  A DNR order is used in case your heart stops beating or you stop breathing. It is a request not to have certain forms of treatment, such as CPR. A DNR order may be included in other types of advance directives.  Medical directive:  This covers the care that you want if you are in a coma, near death, or unable to make decisions for yourself. You can list the treatments you want for each condition. Treatment may include pain medicine, surgery, blood transfusions, dialysis, IV or tube feedings, and a ventilator (breathing machine).  Values history:  This document has questions about your views, beliefs, and how you feel and think about life. This information can help others choose the care that you would choose.  Why are advance directives important?  An advance directive helps you control your care. Although spoken wishes may be used, it is better to have your wishes written down. Spoken wishes can be misunderstood, or not followed. Treatments may be given even if you do not want them. An advance directive may make it easier for your family to make difficult choices about your care.   Weight Management   Why it is important to manage your weight:  Being overweight increases your risk of health conditions such as heart disease, high blood pressure, type 2 diabetes, and certain types of cancer. It can also increase your risk for osteoarthritis, sleep apnea, and other respiratory problems. Aim for a slow, steady weight loss. Even a small amount of weight loss can lower your risk of health problems.  How to lose weight safely:  A safe and healthy way to lose weight is to eat fewer calories and get regular exercise. You can lose up about 1 pound a week by decreasing the number of calories you eat by 500 calories each day.   Healthy meal plan for weight management:  A healthy meal plan includes a variety of foods, contains fewer calories, and helps  you stay healthy. A healthy meal plan includes the following:  Eat whole-grain foods more often.  A healthy meal plan should contain fiber. Fiber is the part of grains, fruits, and vegetables that is not broken down by your body. Whole-grain foods are healthy and provide extra fiber in your diet. Some examples of whole-grain foods are whole-wheat breads and pastas, oatmeal, brown rice, and bulgur.  Eat a variety of vegetables every day.  Include dark, leafy greens such as spinach, kale, michel greens, and mustard greens. Eat yellow and orange vegetables such as carrots, sweet potatoes, and winter squash.   Eat a variety of fruits every day.  Choose fresh or canned fruit (canned in its own juice or light syrup) instead of juice. Fruit juice has very little or no fiber.  Eat low-fat dairy foods.  Drink fat-free (skim) milk or 1% milk. Eat fat-free yogurt and low-fat cottage cheese. Try low-fat cheeses such as mozzarella and other reduced-fat cheeses.  Choose meat and other protein foods that are low in fat.  Choose beans or other legumes such as split peas or lentils. Choose fish, skinless poultry (chicken or turkey), or lean cuts of red meat (beef or pork). Before you cook meat or poultry, cut off any visible fat.   Use less fat and oil.  Try baking foods instead of frying them. Add less fat, such as margarine, sour cream, regular salad dressing and mayonnaise to foods. Eat fewer high-fat foods. Some examples of high-fat foods include french fries, doughnuts, ice cream, and cakes.  Eat fewer sweets.  Limit foods and drinks that are high in sugar. This includes candy, cookies, regular soda, and sweetened drinks.  Exercise:  Exercise at least 30 minutes per day on most days of the week. Some examples of exercise include walking, biking, dancing, and swimming. You can also fit in more physical activity by taking the stairs instead of the elevator or parking farther away from stores. Ask your healthcare provider about  the best exercise plan for you.      © Copyright [employer] 2018 Information is for End User's use only and may not be sold, redistributed or otherwise used for commercial purposes. All illustrations and images included in CareNotes® are the copyrighted property of [employer] or [employer]    Medicare Preventive Visit Patient Instructions  Thank you for completing your Welcome to Medicare Visit or Medicare Annual Wellness Visit today. Your next wellness visit will be due in one year (5/7/2025).  The screening/preventive services that you may require over the next 5-10 years are detailed below. Some tests may not apply to you based off risk factors and/or age. Screening tests ordered at today's visit but not completed yet may show as past due. Also, please note that scanned in results may not display below.  Preventive Screenings:  Service Recommendations Previous Testing/Comments   Colorectal Cancer Screening  Colonoscopy    Fecal Occult Blood Test (FOBT)/Fecal Immunochemical Test (FIT)  Fecal DNA/Cologuard Test  Flexible Sigmoidoscopy Age: 45-75 years old   Colonoscopy: every 10 years (May be performed more frequently if at higher risk)  OR  FOBT/FIT: every 1 year  OR  Cologuard: every 3 years  OR  Sigmoidoscopy: every 5 years  Screening may be recommended earlier than age 45 if at higher risk for colorectal cancer. Also, an individualized decision between you and your healthcare provider will decide whether screening between the ages of 76-85 would be appropriate. Colonoscopy: 11/26/2019  FOBT/FIT: Not on file  Cologuard: Not on file  Sigmoidoscopy: Not on file    Screening Not Indicated     Prostate Cancer Screening Individualized decision between patient and health care provider in men between ages of 55-69   Medicare will cover every 12 months beginning on the day after your 50th birthday PSA: 0.01 ng/mL     History Prostate Cancer  Screening Not Indicated     Hepatitis C Screening Once for  adults born between 1945 and 1965  More frequently in patients at high risk for Hepatitis C Hep C Antibody: 11/08/2021    Screening Current   Diabetes Screening 1-2 times per year if you're at risk for diabetes or have pre-diabetes Fasting glucose: 90 mg/dL (9/11/2023)  A1C: 6.2 % (1/5/2024)  Screening Current   Cholesterol Screening Once every 5 years if you don't have a lipid disorder. May order more often based on risk factors. Lipid panel: 09/11/2023  Screening Not Indicated  History Lipid Disorder      Other Preventive Screenings Covered by Medicare:  Abdominal Aortic Aneurysm (AAA) Screening: covered once if your at risk. You're considered to be at risk if you have a family history of AAA or a male between the age of 65-75 who smoking at least 100 cigarettes in your lifetime.  Lung Cancer Screening: covers low dose CT scan once per year if you meet all of the following conditions: (1) Age 55-77; (2) No signs or symptoms of lung cancer; (3) Current smoker or have quit smoking within the last 15 years; (4) You have a tobacco smoking history of at least 20 pack years (packs per day x number of years you smoked); (5) You get a written order from a healthcare provider.  Glaucoma Screening: covered annually if you're considered high risk: (1) You have diabetes OR (2) Family history of glaucoma OR (3)  aged 50 and older OR (4)  American aged 65 and older  Osteoporosis Screening: covered every 2 years if you meet one of the following conditions: (1) Have a vertebral abnormality; (2) On glucocorticoid therapy for more than 3 months; (3) Have primary hyperparathyroidism; (4) On osteoporosis medications and need to assess response to drug therapy.  HIV Screening: covered annually if you're between the age of 15-65. Also covered annually if you are younger than 15 and older than 65 with risk factors for HIV infection. For pregnant patients, it is covered up to 3 times per  pregnancy.    Immunizations:  Immunization Recommendations   Influenza Vaccine Annual influenza vaccination during flu season is recommended for all persons aged >= 6 months who do not have contraindications   Pneumococcal Vaccine   * Pneumococcal conjugate vaccine = PCV13 (Prevnar 13), PCV15 (Vaxneuvance), PCV20 (Prevnar 20)  * Pneumococcal polysaccharide vaccine = PPSV23 (Pneumovax) Adults 19-63 yo with certain risk factors or if 65+ yo  If never received any pneumonia vaccine: recommend Prevnar 20 (PCV20)  Give PCV20 if previously received 1 dose of PCV13 or PPSV23   Hepatitis B Vaccine 3 dose series if at intermediate or high risk (ex: diabetes, end stage renal disease, liver disease)   Respiratory syncytial virus (RSV) Vaccine - COVERED BY MEDICARE PART D  * RSVPreF3 (Arexvy) CDC recommends that adults 60 years of age and older may receive a single dose of RSV vaccine using shared clinical decision-making (SCDM)   Tetanus (Td) Vaccine - COST NOT COVERED BY MEDICARE PART B Following completion of primary series, a booster dose should be given every 10 years to maintain immunity against tetanus. Td may also be given as tetanus wound prophylaxis.   Tdap Vaccine - COST NOT COVERED BY MEDICARE PART B Recommended at least once for all adults. For pregnant patients, recommended with each pregnancy.   Shingles Vaccine (Shingrix) - COST NOT COVERED BY MEDICARE PART B  2 shot series recommended in those 19 years and older who have or will have weakened immune systems or those 50 years and older     Health Maintenance Due:      Topic Date Due   • Hepatitis C Screening  Completed     Immunizations Due:      Topic Date Due   • COVID-19 Vaccine (4 - 2023-24 season) 09/01/2023     Advance Directives   What are advance directives?  Advance directives are legal documents that state your wishes and plans for medical care. These plans are made ahead of time in case you lose your ability to make decisions for yourself. Advance  directives can apply to any medical decision, such as the treatments you want, and if you want to donate organs.   What are the types of advance directives?  There are many types of advance directives, and each state has rules about how to use them. You may choose a combination of any of the following:  Living will:  This is a written record of the treatment you want. You can also choose which treatments you do not want, which to limit, and which to stop at a certain time. This includes surgery, medicine, IV fluid, and tube feedings.   Durable power of  for healthcare (DPAHC):  This is a written record that states who you want to make healthcare choices for you when you are unable to make them for yourself. This person, called a proxy, is usually a family member or a friend. You may choose more than 1 proxy.  Do not resuscitate (DNR) order:  A DNR order is used in case your heart stops beating or you stop breathing. It is a request not to have certain forms of treatment, such as CPR. A DNR order may be included in other types of advance directives.  Medical directive:  This covers the care that you want if you are in a coma, near death, or unable to make decisions for yourself. You can list the treatments you want for each condition. Treatment may include pain medicine, surgery, blood transfusions, dialysis, IV or tube feedings, and a ventilator (breathing machine).  Values history:  This document has questions about your views, beliefs, and how you feel and think about life. This information can help others choose the care that you would choose.  Why are advance directives important?  An advance directive helps you control your care. Although spoken wishes may be used, it is better to have your wishes written down. Spoken wishes can be misunderstood, or not followed. Treatments may be given even if you do not want them. An advance directive may make it easier for your family to make difficult choices about  your care.   Weight Management   Why it is important to manage your weight:  Being overweight increases your risk of health conditions such as heart disease, high blood pressure, type 2 diabetes, and certain types of cancer. It can also increase your risk for osteoarthritis, sleep apnea, and other respiratory problems. Aim for a slow, steady weight loss. Even a small amount of weight loss can lower your risk of health problems.  How to lose weight safely:  A safe and healthy way to lose weight is to eat fewer calories and get regular exercise. You can lose up about 1 pound a week by decreasing the number of calories you eat by 500 calories each day.   Healthy meal plan for weight management:  A healthy meal plan includes a variety of foods, contains fewer calories, and helps you stay healthy. A healthy meal plan includes the following:  Eat whole-grain foods more often.  A healthy meal plan should contain fiber. Fiber is the part of grains, fruits, and vegetables that is not broken down by your body. Whole-grain foods are healthy and provide extra fiber in your diet. Some examples of whole-grain foods are whole-wheat breads and pastas, oatmeal, brown rice, and bulgur.  Eat a variety of vegetables every day.  Include dark, leafy greens such as spinach, kale, michel greens, and mustard greens. Eat yellow and orange vegetables such as carrots, sweet potatoes, and winter squash.   Eat a variety of fruits every day.  Choose fresh or canned fruit (canned in its own juice or light syrup) instead of juice. Fruit juice has very little or no fiber.  Eat low-fat dairy foods.  Drink fat-free (skim) milk or 1% milk. Eat fat-free yogurt and low-fat cottage cheese. Try low-fat cheeses such as mozzarella and other reduced-fat cheeses.  Choose meat and other protein foods that are low in fat.  Choose beans or other legumes such as split peas or lentils. Choose fish, skinless poultry (chicken or turkey), or lean cuts of red meat  (beef or pork). Before you cook meat or poultry, cut off any visible fat.   Use less fat and oil.  Try baking foods instead of frying them. Add less fat, such as margarine, sour cream, regular salad dressing and mayonnaise to foods. Eat fewer high-fat foods. Some examples of high-fat foods include french fries, doughnuts, ice cream, and cakes.  Eat fewer sweets.  Limit foods and drinks that are high in sugar. This includes candy, cookies, regular soda, and sweetened drinks.  Exercise:  Exercise at least 30 minutes per day on most days of the week. Some examples of exercise include walking, biking, dancing, and swimming. You can also fit in more physical activity by taking the stairs instead of the elevator or parking farther away from stores. Ask your healthcare provider about the best exercise plan for you.      © Copyright [employer] 2018 Information is for End User's use only and may not be sold, redistributed or otherwise used for commercial purposes. All illustrations and images included in CareNotes® are the copyrighted property of [employer] or [employer]

## 2024-05-06 NOTE — PROGRESS NOTES
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  Personalized health advice and appropriate referrals for health education or preventive services given if needed, as noted in patient's After Visit Summary.     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)    History of radiation therapy    Chronic kidney disease, stage 3a (HCC)    Degenerative  arthritis of spine    Coronary artery calcification seen on CT scan    Dysuria    Prediabetes    Hypertensive kidney disease with chronic kidney disease      Past Medical and Surgical History:     Past Medical History:   Diagnosis Date    Cancer (HCC)     Chronic kidney disease, stage 3a (HCC) 2021    Coronary artery calcification seen on CT scan 2021    COVID-19 2021    Dementia (HCC)     Disease of thyroid gland     Eczema     Generalized anxiety disorder     Hypertension     Prostate cancer (HCC)     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 Last assessed: 2013     Past Surgical History:   Procedure Laterality Date    CATARACT EXTRACTION      COLONOSCOPY      COLONOSCOPY  2019    EYE SURGERY      KNEE SURGERY      PROSTATE SURGERY      VASECTOMY        Family History:     Family History   Problem Relation Age of Onset    Alcohol abuse Mother     Alcohol abuse Father     Depression Father     No Known Problems Sister     Stroke Brother         syndrome     No Known Problems Maternal Grandmother     No Known Problems Maternal Grandfather     No Known Problems Paternal Grandmother     No Known Problems Paternal Grandfather     Alcohol abuse Family     Colon cancer Maternal Aunt       Social History:     Social History     Socioeconomic History    Marital status: /Civil Union     Spouse name: None    Number of children: None    Years of education: None    Highest education level: None   Occupational History    Occupation: self employed  floor covering   Tobacco Use    Smoking status: Former     Current packs/day: 0.00     Types: Cigarettes, Cigars     Quit date:      Years since quittin.3     Passive exposure: Past    Smokeless tobacco: Never    Tobacco comments:     smokes 1-2 cigarss/month   Vaping Use    Vaping status: Never Used   Substance and Sexual Activity  "   Alcohol use: No    Drug use: No     Comment: Chronic Narcotic use noted in \"[employer]scripts\"     Sexual activity: None   Other Topics Concern    None   Social History Narrative    Caffeine use      Social Determinants of Health     Financial Resource Strain: Low Risk  (5/5/2023)    Overall Financial Resource Strain (CARDIA)     Difficulty of Paying Living Expenses: Not hard at all   Food Insecurity: No Food Insecurity (5/6/2024)    Hunger Vital Sign     Worried About Running Out of Food in the Last Year: Never true     Ran Out of Food in the Last Year: Never true   Transportation Needs: No Transportation Needs (5/6/2024)    PRAPARE - Transportation     Lack of Transportation (Medical): No     Lack of Transportation (Non-Medical): No   Physical Activity: Not on file   Stress: Not on file   Social Connections: Not on file   Intimate Partner Violence: Not on file   Housing Stability: Low Risk  (5/6/2024)    Housing Stability Vital Sign     Unable to Pay for Housing in the Last Year: No     Number of Places Lived in the Last Year: 0     Unstable Housing in the Last Year: No      Medications and Allergies:     Current Outpatient Medications   Medication Sig Dispense Refill    ALPRAZolam (XANAX) 0.5 mg tablet Take 1 tablet (0.5 mg total) by mouth daily at bedtime as needed for anxiety 30 tablet 0    amLODIPine (NORVASC) 5 mg tablet Take 1 tablet (5 mg total) by mouth daily 90 tablet 0    atorvastatin (LIPITOR) 40 mg tablet Take 1 tablet (40 mg total) by mouth daily 90 tablet 1    levothyroxine 88 mcg tablet Take 1 tablet (88 mcg total) by mouth daily 90 tablet 1    lisinopril-hydrochlorothiazide (PRINZIDE,ZESTORETIC) 20-25 MG per tablet Take 1 tablet by mouth daily 90 tablet 1    meloxicam (MOBIC) 15 mg tablet Take 1 tablet (15 mg total) by mouth daily 90 tablet 1     No current facility-administered medications for this visit.     No Known Allergies   Immunizations:     Immunization History   Administered Date(s) " Administered    COVID-19 PFIZER VACCINE 0.3 ML IM 02/05/2021, 02/26/2021, 12/02/2021    INFLUENZA 10/01/2017, 10/08/2018, 10/15/2019, 10/19/2020, 09/15/2021, 10/19/2022, 10/19/2023    Influenza Split High Dose Preservative Free IM 10/18/2016    Influenza, seasonal, injectable 10/01/2017    Pneumococcal Conjugate 13-Valent 04/11/2017    Pneumococcal Polysaccharide PPV23 01/01/2006    Td (adult), adsorbed 01/01/2000    Tdap 05/01/2014    influenza, trivalent, adjuvanted 10/26/2019      Health Maintenance:         Topic Date Due    Hepatitis C Screening  Completed         Topic Date Due    COVID-19 Vaccine (4 - 2023-24 season) 09/01/2023      Medicare Screening Tests and Risk Assessments:     Dexter is here for his Subsequent Wellness visit.     Health Risk Assessment:   Patient rates overall health as good. Patient feels that their physical health rating is same. Patient is satisfied with their life. Eyesight was rated as same. Hearing was rated as same. Patient feels that their emotional and mental health rating is same. Patients states they are never, rarely angry. Patient states they are sometimes unusually tired/fatigued. Pain experienced in the last 7 days has been some. Patient's pain rating has been 8/10. Patient states that he has experienced no weight loss or gain in last 6 months.     Depression Screening:   PHQ-2 Score: 0      Fall Risk Screening:   In the past year, patient has experienced: no history of falling in past year      Home Safety:  Patient has trouble with stairs inside or outside of their home. Patient has working smoke alarms and has no working carbon monoxide detector. Home safety hazards include: none.     Nutrition:   Current diet is Regular.     Medications:   Patient is not currently taking any over-the-counter supplements. Patient is able to manage medications.     Activities of Daily Living (ADLs)/Instrumental Activities of Daily Living (IADLs):   Walk and transfer into and out of bed  and chair?: Yes  Dress and groom yourself?: Yes    Bathe or shower yourself?: Yes    Feed yourself? Yes  Do your laundry/housekeeping?: Yes  Manage your money, pay your bills and track your expenses?: Yes  Make your own meals?: Yes    Do your own shopping?: Yes    Previous Hospitalizations:   Any hospitalizations or ED visits within the last 12 months?: No      Advance Care Planning:   Living will: Yes    Durable POA for healthcare: Yes    Advanced directive: Yes    Advanced directive counseling given: Yes    Five wishes given: No    Patient declined ACP directive: No    End of Life Decisions reviewed with patient: Yes    Provider agrees with end of life decisions: Yes      Comments: Declines bringing in living will.     Cognitive Screening:   Provider or family/friend/caregiver concerned regarding cognition?: No    PREVENTIVE SCREENINGS      Cardiovascular Screening:    General: History Lipid Disorder and Screening Current    Due for: Lipid Panel      Diabetes Screening:     General: Screening Current    Due for: Blood Glucose      Colorectal Cancer Screening:     General: Screening Not Indicated      Prostate Cancer Screening:    General: History Prostate Cancer, Screening Not Indicated and Screening Current      Osteoporosis Screening:    General: Screening Not Indicated      Abdominal Aortic Aneurysm (AAA) Screening:    Risk factors include: tobacco use        Lung Cancer Screening:     General: Screening Not Indicated      Hepatitis C Screening:    General: Screening Current    Screening, Brief Intervention, and Referral to Treatment (SBIRT)    Screening  Typical number of drinks in a day: 0  Typical number of drinks in a week: 0  Interpretation: Low risk drinking behavior.    Single Item Drug Screening:  How often have you used an illegal drug (including marijuana) or a prescription medication for non-medical reasons in the past year? never    Single Item Drug Screen Score: 0  Interpretation: Negative screen  "for possible drug use disorder    Other Counseling Topics:   Car/seat belt/driving safety, sunscreen and calcium and vitamin D intake and regular weightbearing exercise.     No results found.     Physical Exam:     /64   Pulse (!) 51   Temp 97.8 °F (36.6 °C)   Ht 5' 8\" (1.727 m)   Wt 86.2 kg (190 lb)   SpO2 98%   BMI 28.89 kg/m²     Physical Exam   A/P: Doing well and no falls reported. Denies depression and feels safe at home. Diverse diet. No problems operating a MV and uses seat belts. Has a living will and POA. No DME or referrals needed today. RTC one year for medicare wellness.     Edil Mac, DO  "

## 2024-05-06 NOTE — PROGRESS NOTES
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)       Genitourinary    Chronic kidney disease, stage 3a (HCC)       Behavioral Health    Generalized anxiety disorder       Other    Prediabetes    Relevant Orders    Hemoglobin A1C    Hyperlipidemia    Relevant Orders    LDL cholesterol, direct    Triglycerides     Other Visit Diagnoses       Medicare annual wellness visit, subsequent                 Diagnoses and all orders for this visit:    Primary hypertension  -     Comprehensive metabolic panel; Future  -     Albumin / creatinine urine ratio; Future  -     TSH, 3rd generation; Future  -     Hemoglobin A1C; Future  -     LDL cholesterol, direct; Future  -     Triglycerides; Future    Hypothyroidism, unspecified type  -     TSH, 3rd generation; Future    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    A/P: Doing ok and will check labs. Continue current treatment and RTC four months for routine.     Subjective:      Patient ID: Dexter Sharp is a 83 y.o. male.    WM RTC for f/u HTN, HLD, etc. Doing ok and no new  issues. Remains active w/o difficulty and no falls. Denies CP, SOB, edema, palpitations, orthopnea or PND. Chronic pain is manageable. Prostate Ca is in remission. NADIA is controlled. Due for labs         The following portions of the patient's history were reviewed and updated as appropriate:   He has a past medical history of Cancer (HCC), Chronic kidney disease, stage 3a (HCC) (06/05/2021), Coronary artery calcification seen on CT scan (11/07/2021), COVID-19 (11/06/2021), Dementia (HCC), Disease of thyroid gland, Eczema, Generalized anxiety disorder, Hypertension, Prostate cancer (HCC), 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), and Skin disorder.,  does not have any pertinent problems on file.,   has a past surgical history that includes Knee surgery; Prostate surgery; Vasectomy; Cataract extraction; Eye surgery; Colonoscopy; and Colonoscopy (11/26/2019).,  family history includes Alcohol abuse in his family, father, and mother; Colon cancer in his maternal aunt; Depression in his father; No Known Problems in his maternal grandfather, maternal grandmother, paternal grandfather, paternal grandmother, and sister; Stroke in his brother.,   reports that he quit smoking about 38 years ago. His smoking use included cigarettes and cigars. He has been exposed to tobacco smoke. He has never used smokeless tobacco. He reports that he does not drink alcohol and does not use drugs.,  has No Known Allergies..  Current Outpatient Medications   Medication Sig Dispense Refill    ALPRAZolam (XANAX) 0.5 mg tablet Take 1 tablet (0.5 mg total) by mouth daily at bedtime as needed for anxiety 30 tablet 0    amLODIPine (NORVASC) 5 mg tablet Take 1 tablet (5 mg total) by mouth daily 90 tablet 0    atorvastatin (LIPITOR) 40 mg tablet Take 1 tablet (40 mg total) by mouth daily 90 tablet 1    levothyroxine 88 mcg tablet Take 1 tablet (88 mcg total) by mouth daily 90 tablet 1    lisinopril-hydrochlorothiazide  "(PRINZIDE,ZESTORETIC) 20-25 MG per tablet Take 1 tablet by mouth daily 90 tablet 1    meloxicam (MOBIC) 15 mg tablet Take 1 tablet (15 mg total) by mouth daily 90 tablet 1     No current facility-administered medications for this visit.       Review of Systems   Constitutional:  Negative for activity change, chills, diaphoresis, fatigue and fever.   HENT: Negative.     Eyes:  Negative for visual disturbance.   Respiratory:  Negative for cough, chest tightness, shortness of breath and wheezing.    Cardiovascular:  Negative for chest pain, palpitations and leg swelling.   Gastrointestinal:  Negative for abdominal pain, constipation, diarrhea, nausea and vomiting.   Endocrine: Negative for cold intolerance and heat intolerance.   Genitourinary:  Negative for difficulty urinating, dysuria and frequency.   Musculoskeletal:  Negative for arthralgias, gait problem and myalgias.   Neurological:  Negative for dizziness, seizures, syncope, weakness, light-headedness and headaches.   Psychiatric/Behavioral:  Negative for confusion, dysphoric mood and sleep disturbance. The patient is not nervous/anxious.        PHQ-2/9 Depression Screening    Little interest or pleasure in doing things: 0 - not at all  Feeling down, depressed, or hopeless: 0 - not at all  PHQ-2 Score: 0  PHQ-2 Interpretation: Negative depression screen        Objective:  Vitals:    05/06/24 1432   BP: 110/64   Pulse: (!) 51   Temp: 97.8 °F (36.6 °C)   SpO2: 98%   Weight: 86.2 kg (190 lb)   Height: 5' 8\" (1.727 m)     Body mass index is 28.89 kg/m².     Physical Exam  Vitals and nursing note reviewed.   Constitutional:       General: He is not in acute distress.     Appearance: Normal appearance. He is not ill-appearing.   HENT:      Head: Normocephalic and atraumatic.      Mouth/Throat:      Mouth: Mucous membranes are moist.   Eyes:      Extraocular Movements: Extraocular movements intact.      Conjunctiva/sclera: Conjunctivae normal.      Pupils: Pupils are " equal, round, and reactive to light.   Neck:      Vascular: No carotid bruit.   Cardiovascular:      Rate and Rhythm: Normal rate and regular rhythm.      Heart sounds: Normal heart sounds. No murmur heard.  Pulmonary:      Effort: Pulmonary effort is normal. No respiratory distress.      Breath sounds: Normal breath sounds. No wheezing, rhonchi or rales.   Abdominal:      General: Bowel sounds are normal. There is no distension.      Palpations: Abdomen is soft.      Tenderness: There is no abdominal tenderness.   Musculoskeletal:      Cervical back: Neck supple.      Right lower leg: No edema.      Left lower leg: No edema.   Neurological:      General: No focal deficit present.      Mental Status: He is alert and oriented to person, place, and time. Mental status is at baseline.   Psychiatric:         Mood and Affect: Mood normal.         Behavior: Behavior normal.         Thought Content: Thought content normal.         Judgment: Judgment normal.

## 2024-05-10 DIAGNOSIS — F41.1 GENERALIZED ANXIETY DISORDER: ICD-10-CM

## 2024-05-10 RX ORDER — ALPRAZOLAM 0.5 MG/1
0.5 TABLET ORAL
Qty: 30 TABLET | Refills: 0 | Status: SHIPPED | OUTPATIENT
Start: 2024-05-10

## 2024-05-20 ENCOUNTER — OFFICE VISIT (OUTPATIENT)
Dept: OTOLARYNGOLOGY | Facility: CLINIC | Age: 84
End: 2024-05-20
Payer: COMMERCIAL

## 2024-05-20 ENCOUNTER — APPOINTMENT (OUTPATIENT)
Age: 84
End: 2024-05-20
Payer: COMMERCIAL

## 2024-05-20 ENCOUNTER — OFFICE VISIT (OUTPATIENT)
Dept: AUDIOLOGY | Facility: CLINIC | Age: 84
End: 2024-05-20
Payer: COMMERCIAL

## 2024-05-20 VITALS
SYSTOLIC BLOOD PRESSURE: 140 MMHG | TEMPERATURE: 98.7 F | WEIGHT: 192.8 LBS | DIASTOLIC BLOOD PRESSURE: 60 MMHG | BODY MASS INDEX: 29.22 KG/M2 | OXYGEN SATURATION: 91 % | RESPIRATION RATE: 16 BRPM | HEART RATE: 51 BPM | HEIGHT: 68 IN

## 2024-05-20 DIAGNOSIS — R73.03 PREDIABETES: ICD-10-CM

## 2024-05-20 DIAGNOSIS — E78.5 HYPERLIPIDEMIA, UNSPECIFIED HYPERLIPIDEMIA TYPE: ICD-10-CM

## 2024-05-20 DIAGNOSIS — E03.9 HYPOTHYROIDISM, UNSPECIFIED TYPE: ICD-10-CM

## 2024-05-20 DIAGNOSIS — H61.22 IMPACTED CERUMEN OF LEFT EAR: ICD-10-CM

## 2024-05-20 DIAGNOSIS — H90.3 SENSORY HEARING LOSS, BILATERAL: Primary | ICD-10-CM

## 2024-05-20 DIAGNOSIS — I10 PRIMARY HYPERTENSION: ICD-10-CM

## 2024-05-20 DIAGNOSIS — H90.3 SENSORINEURAL HEARING LOSS (SNHL) OF BOTH EARS: ICD-10-CM

## 2024-05-20 DIAGNOSIS — H61.22 CERUMEN DEBRIS ON TYMPANIC MEMBRANE OF LEFT EAR: ICD-10-CM

## 2024-05-20 DIAGNOSIS — H93.8X2 SENSATION OF PLUGGED EAR ON LEFT SIDE: Primary | ICD-10-CM

## 2024-05-20 LAB
ALBUMIN SERPL BCP-MCNC: 4.3 G/DL (ref 3.5–5)
ALP SERPL-CCNC: 76 U/L (ref 34–104)
ALT SERPL W P-5'-P-CCNC: 26 U/L (ref 7–52)
ANION GAP SERPL CALCULATED.3IONS-SCNC: 8 MMOL/L (ref 4–13)
AST SERPL W P-5'-P-CCNC: 24 U/L (ref 13–39)
BILIRUB SERPL-MCNC: 0.79 MG/DL (ref 0.2–1)
BUN SERPL-MCNC: 22 MG/DL (ref 5–25)
CALCIUM SERPL-MCNC: 9.1 MG/DL (ref 8.4–10.2)
CHLORIDE SERPL-SCNC: 106 MMOL/L (ref 96–108)
CO2 SERPL-SCNC: 28 MMOL/L (ref 21–32)
CREAT SERPL-MCNC: 1.21 MG/DL (ref 0.6–1.3)
CREAT UR-MCNC: 123.1 MG/DL
EST. AVERAGE GLUCOSE BLD GHB EST-MCNC: 128 MG/DL
GFR SERPL CREATININE-BSD FRML MDRD: 54 ML/MIN/1.73SQ M
GLUCOSE P FAST SERPL-MCNC: 100 MG/DL (ref 65–99)
HBA1C MFR BLD: 6.1 %
LDLC SERPL DIRECT ASSAY-MCNC: 64 MG/DL (ref 0–100)
MICROALBUMIN UR-MCNC: 12 MG/L
MICROALBUMIN/CREAT 24H UR: 10 MG/G CREATININE (ref 0–30)
POTASSIUM SERPL-SCNC: 4.1 MMOL/L (ref 3.5–5.3)
PROT SERPL-MCNC: 6.8 G/DL (ref 6.4–8.4)
SODIUM SERPL-SCNC: 142 MMOL/L (ref 135–147)
TRIGL SERPL-MCNC: 78 MG/DL
TSH SERPL DL<=0.05 MIU/L-ACNC: 2.1 UIU/ML (ref 0.45–4.5)

## 2024-05-20 PROCEDURE — 84478 ASSAY OF TRIGLYCERIDES: CPT

## 2024-05-20 PROCEDURE — 83036 HEMOGLOBIN GLYCOSYLATED A1C: CPT

## 2024-05-20 PROCEDURE — 82043 UR ALBUMIN QUANTITATIVE: CPT

## 2024-05-20 PROCEDURE — 92567 TYMPANOMETRY: CPT | Performed by: AUDIOLOGIST-HEARING AID FITTER

## 2024-05-20 PROCEDURE — 69210 REMOVE IMPACTED EAR WAX UNI: CPT | Performed by: PHYSICIAN ASSISTANT

## 2024-05-20 PROCEDURE — 99213 OFFICE O/P EST LOW 20 MIN: CPT | Performed by: PHYSICIAN ASSISTANT

## 2024-05-20 PROCEDURE — 82570 ASSAY OF URINE CREATININE: CPT

## 2024-05-20 PROCEDURE — 92557 COMPREHENSIVE HEARING TEST: CPT | Performed by: AUDIOLOGIST-HEARING AID FITTER

## 2024-05-20 PROCEDURE — 84443 ASSAY THYROID STIM HORMONE: CPT

## 2024-05-20 PROCEDURE — 80053 COMPREHEN METABOLIC PANEL: CPT

## 2024-05-20 PROCEDURE — 36415 COLL VENOUS BLD VENIPUNCTURE: CPT

## 2024-05-20 PROCEDURE — 83721 ASSAY OF BLOOD LIPOPROTEIN: CPT

## 2024-05-20 NOTE — PROGRESS NOTES
ENT HEARING EVALUATION    Name:  Dexter Sharp  :  1940  Age:  83 y.o.   MRN:  [id number]  Date of Evaluation: 24     HISTORY:      Dexter Sharp is being seen today for an evaluation of hearing as part of their ENT visit.   EVALUATION:    Otoscopy was completed during ENT visit.    Tympanometry:   Right Ear: Type A; normal middle ear pressure and static compliance    Left Ear: Type A; normal middle ear pressure and static compliance     Speech Audiometry:  Speech Reception (SRT)   Right Ear: 30 dB HL   Left Ear: 25 dB HL    Word Recognition Scores (WRS):  Right Ear: excellent (100 % correct)     Left Ear: excellent (100 % correct)   Stimuli: [id number]    Pure Tone Audiometry:  Conventional pure tone audiometry from 250 - 8000 Hz  was obtained with good reliability and revealed the following:     Right Ear: Mild sloping to severe sensorineural hearing loss (SNHL)    Left Ear: Mild sloping to severe sensorineural hearing loss (SNHL)       *see attached audiogram    RECOMMENDATIONS:  Consult ENT      Kayla Patino, CCC-A  Clinical Audiologist

## 2024-05-20 NOTE — PROGRESS NOTES
Review of Systems   Constitutional: Negative.    HENT:  Positive for hearing loss.         Feels fluid in the left ear   Eyes: Negative.    Respiratory: Negative.     Cardiovascular: Negative.    Gastrointestinal: Negative.    Endocrine: Negative.    Genitourinary: Negative.    Musculoskeletal: Negative.    Skin: Negative.    Allergic/Immunologic: Negative.    Neurological: Negative.    Hematological: Negative.    Psychiatric/Behavioral: Negative.

## 2024-06-10 DIAGNOSIS — F41.1 GENERALIZED ANXIETY DISORDER: ICD-10-CM

## 2024-06-10 RX ORDER — ALPRAZOLAM 0.5 MG/1
0.5 TABLET ORAL
Qty: 30 TABLET | Refills: 0 | Status: SHIPPED | OUTPATIENT
Start: 2024-06-10

## 2024-07-09 DIAGNOSIS — I10 PRIMARY HYPERTENSION: ICD-10-CM

## 2024-07-09 DIAGNOSIS — I25.10 CORONARY ARTERY CALCIFICATION SEEN ON CT SCAN: ICD-10-CM

## 2024-07-09 DIAGNOSIS — F41.1 GENERALIZED ANXIETY DISORDER: ICD-10-CM

## 2024-07-09 RX ORDER — ATORVASTATIN CALCIUM 40 MG/1
40 TABLET, FILM COATED ORAL DAILY
Qty: 90 TABLET | Refills: 0 | Status: SHIPPED | OUTPATIENT
Start: 2024-07-09

## 2024-07-09 RX ORDER — AMLODIPINE BESYLATE 5 MG/1
5 TABLET ORAL DAILY
Qty: 90 TABLET | Refills: 0 | Status: SHIPPED | OUTPATIENT
Start: 2024-07-09

## 2024-07-10 RX ORDER — ALPRAZOLAM 0.5 MG/1
0.5 TABLET ORAL
Qty: 30 TABLET | Refills: 0 | Status: SHIPPED | OUTPATIENT
Start: 2024-07-10

## 2024-07-30 DIAGNOSIS — M79.652 LEFT THIGH PAIN: ICD-10-CM

## 2024-07-30 DIAGNOSIS — Z92.3 HISTORY OF RADIATION THERAPY: ICD-10-CM

## 2024-07-30 DIAGNOSIS — E03.9 HYPOTHYROIDISM, UNSPECIFIED TYPE: ICD-10-CM

## 2024-07-30 DIAGNOSIS — I10 ESSENTIAL HYPERTENSION: ICD-10-CM

## 2024-07-30 DIAGNOSIS — C77.5 MALIGNANT NEOPLASM OF PROSTATE METASTATIC TO INTRAPELVIC LYMPH NODE (HCC): ICD-10-CM

## 2024-07-30 DIAGNOSIS — C61 MALIGNANT NEOPLASM OF PROSTATE METASTATIC TO INTRAPELVIC LYMPH NODE (HCC): ICD-10-CM

## 2024-07-30 DIAGNOSIS — R10.32 LEFT GROIN PAIN: ICD-10-CM

## 2024-07-30 DIAGNOSIS — R39.15 URGENCY OF URINATION: ICD-10-CM

## 2024-07-30 DIAGNOSIS — R31.29 OTHER MICROSCOPIC HEMATURIA: ICD-10-CM

## 2024-07-30 DIAGNOSIS — R35.0 URINARY FREQUENCY: ICD-10-CM

## 2024-07-31 RX ORDER — LISINOPRIL AND HYDROCHLOROTHIAZIDE 25; 20 MG/1; MG/1
1 TABLET ORAL DAILY
Qty: 100 TABLET | Refills: 1 | Status: SHIPPED | OUTPATIENT
Start: 2024-07-31

## 2024-07-31 RX ORDER — MELOXICAM 15 MG/1
15 TABLET ORAL DAILY
Qty: 100 TABLET | Refills: 1 | Status: SHIPPED | OUTPATIENT
Start: 2024-07-31

## 2024-07-31 RX ORDER — LEVOTHYROXINE SODIUM 88 UG/1
88 TABLET ORAL DAILY
Qty: 100 TABLET | Refills: 1 | Status: SHIPPED | OUTPATIENT
Start: 2024-07-31

## 2024-08-13 DIAGNOSIS — F41.1 GENERALIZED ANXIETY DISORDER: ICD-10-CM

## 2024-08-13 RX ORDER — ALPRAZOLAM 0.5 MG
0.5 TABLET ORAL
Qty: 30 TABLET | Refills: 0 | Status: SHIPPED | OUTPATIENT
Start: 2024-08-13

## 2024-09-03 ENCOUNTER — RA CDI HCC (OUTPATIENT)
Dept: OTHER | Facility: HOSPITAL | Age: 84
End: 2024-09-03

## 2024-09-06 ENCOUNTER — OFFICE VISIT (OUTPATIENT)
Dept: INTERNAL MEDICINE CLINIC | Facility: CLINIC | Age: 84
End: 2024-09-06
Payer: COMMERCIAL

## 2024-09-06 VITALS
BODY MASS INDEX: 29.37 KG/M2 | DIASTOLIC BLOOD PRESSURE: 68 MMHG | WEIGHT: 193.8 LBS | OXYGEN SATURATION: 95 % | SYSTOLIC BLOOD PRESSURE: 142 MMHG | TEMPERATURE: 98.2 F | HEART RATE: 54 BPM | HEIGHT: 68 IN

## 2024-09-06 DIAGNOSIS — I10 PRIMARY HYPERTENSION: Primary | ICD-10-CM

## 2024-09-06 DIAGNOSIS — R73.03 PREDIABETES: ICD-10-CM

## 2024-09-06 DIAGNOSIS — I25.10 CORONARY ARTERY CALCIFICATION SEEN ON CT SCAN: ICD-10-CM

## 2024-09-06 DIAGNOSIS — E03.9 HYPOTHYROIDISM, UNSPECIFIED TYPE: ICD-10-CM

## 2024-09-06 DIAGNOSIS — N18.31 CHRONIC KIDNEY DISEASE, STAGE 3A (HCC): ICD-10-CM

## 2024-09-06 DIAGNOSIS — C61 MALIGNANT NEOPLASM OF PROSTATE METASTATIC TO INTRAPELVIC LYMPH NODE (HCC): ICD-10-CM

## 2024-09-06 DIAGNOSIS — M16.12 PRIMARY OSTEOARTHRITIS OF LEFT HIP: ICD-10-CM

## 2024-09-06 DIAGNOSIS — E78.5 HYPERLIPIDEMIA, UNSPECIFIED HYPERLIPIDEMIA TYPE: ICD-10-CM

## 2024-09-06 DIAGNOSIS — C77.5 MALIGNANT NEOPLASM OF PROSTATE METASTATIC TO INTRAPELVIC LYMPH NODE (HCC): ICD-10-CM

## 2024-09-06 DIAGNOSIS — F41.1 GENERALIZED ANXIETY DISORDER: ICD-10-CM

## 2024-09-06 PROCEDURE — 1159F MED LIST DOCD IN RCRD: CPT | Performed by: INTERNAL MEDICINE

## 2024-09-06 PROCEDURE — G2211 COMPLEX E/M VISIT ADD ON: HCPCS | Performed by: INTERNAL MEDICINE

## 2024-09-06 PROCEDURE — 3077F SYST BP >= 140 MM HG: CPT | Performed by: INTERNAL MEDICINE

## 2024-09-06 PROCEDURE — 3078F DIAST BP <80 MM HG: CPT | Performed by: INTERNAL MEDICINE

## 2024-09-06 PROCEDURE — 1160F RVW MEDS BY RX/DR IN RCRD: CPT | Performed by: INTERNAL MEDICINE

## 2024-09-06 PROCEDURE — 99214 OFFICE O/P EST MOD 30 MIN: CPT | Performed by: INTERNAL MEDICINE

## 2024-09-06 NOTE — PROGRESS NOTES
Ambulatory Visit  Name: Dexter Sharp      : 1940      MRN: [id number]  Encounter Provider: Edil Mac DO  Encounter Date: 2024   Encounter department: [facility]    Assessment & Plan   1. Primary hypertension  2. Coronary artery calcification seen on CT scan  3. Hypothyroidism, unspecified type  -     TSH, 3rd generation; Future  4. Primary osteoarthritis of left hip  5. 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 Chronic kidney disease, stage 3a (HCC)  7. Generalized anxiety disorder  -     TSH, 3rd generation; Future  8. Hyperlipidemia, unspecified hyperlipidemia type  9. Prediabetes  -     Hemoglobin A1C; Future  A/P: Doing ok and will check labs, but pt wants to wait. . Continue current treatment and RTC four months for routine.      History of Present Illness     WM RTC for f/u HTN, DJD, etc. Doing ok and no new issues. Remains active w/o difficulty and no falls. Denies CP, SOB, edema, orthopnea or PND. Chronic pain is manageable. Prostate ca is in remission. NADIA is controlled. Due for labs.         Review of Systems   Constitutional:  Negative for activity change, chills, diaphoresis, fatigue and fever.   HENT: Negative.     Eyes:  Negative for visual disturbance.   Respiratory:  Negative for cough, chest tightness, shortness of breath and wheezing.    Cardiovascular:  Negative for chest pain, palpitations and leg swelling.   Gastrointestinal:  Negative for abdominal pain, constipation, diarrhea, nausea and vomiting.   Endocrine: Negative for cold intolerance and heat intolerance.   Genitourinary:  Negative for difficulty urinating, dysuria and frequency.   Musculoskeletal:  Negative for arthralgias, gait problem and myalgias.   Neurological:  Negative for dizziness, seizures, syncope, weakness, light-headedness and headaches.   Psychiatric/Behavioral:  Negative for confusion, dysphoric mood and sleep disturbance. The patient is not  "nervous/anxious.        Objective     /68 (BP Location: Left arm, Patient Position: Sitting, Cuff Size: Adult)   Pulse (!) 54   Temp 98.2 °F (36.8 °C) (Tympanic)   Ht 5' 8\" (1.727 m)   Wt 87.9 kg (193 lb 12.8 oz)   SpO2 95%   BMI 29.47 kg/m²     Physical Exam  Vitals and nursing note reviewed.   Constitutional:       General: He is not in acute distress.     Appearance: Normal appearance. He is not ill-appearing.   HENT:      Head: Normocephalic and atraumatic.      Mouth/Throat:      Mouth: Mucous membranes are moist.   Eyes:      Extraocular Movements: Extraocular movements intact.      Conjunctiva/sclera: Conjunctivae normal.      Pupils: Pupils are equal, round, and reactive to light.   Neck:      Vascular: No carotid bruit.   Cardiovascular:      Rate and Rhythm: Normal rate and regular rhythm.      Heart sounds: Normal heart sounds. No murmur heard.  Pulmonary:      Effort: Pulmonary effort is normal. No respiratory distress.      Breath sounds: Normal breath sounds. No wheezing, rhonchi or rales.   Abdominal:      General: Bowel sounds are normal. There is no distension.      Palpations: Abdomen is soft.      Tenderness: There is no abdominal tenderness.   Musculoskeletal:      Cervical back: Neck supple.      Right lower leg: No edema.      Left lower leg: No edema.   Neurological:      General: No focal deficit present.      Mental Status: He is alert and oriented to person, place, and time. Mental status is at baseline.   Psychiatric:         Mood and Affect: Mood normal.         Behavior: Behavior normal.         Thought Content: Thought content normal.         Judgment: Judgment normal.       Administrative Statements           "

## 2024-09-06 NOTE — PATIENT INSTRUCTIONS
"Patient Education     High blood pressure in adults   The Basics   Written by the doctors and editors at [facility]   What is high blood pressure? -- High blood pressure is a condition that puts you at risk for heart attack, stroke, and kidney disease. It does not usually cause symptoms. But it can be serious.  When your doctor or nurse tells you your blood pressure, they say 2 numbers. For instance, your doctor or nurse might say that your blood pressure is \"130 over 80.\" The top number is the pressure inside your arteries when your heart is meek. The bottom number is the pressure inside your arteries when your heart is relaxed.  \"Elevated blood pressure\" is a term doctors or nurses use as a warning. People with elevated blood pressure do not yet have high blood pressure. But their blood pressure is not as low as it should be for good health.  Many experts define high, elevated, and normal blood pressure as follows:   High - Top number of 130 or above and/or bottom number of 80 or above.   Elevated - Top number between 120 and 129 and bottom number of 79 or below.   Normal - Top number of 119 or below and bottom number of 79 or below.  This information is also in the table (table 1).  How can I lower my blood pressure? -- If your doctor or nurse prescribed blood pressure medicine, the most important thing you can do is to take it. If it causes side effects, do not just stop taking it. Instead, talk to your doctor or nurse about the problems it causes. They might be able to lower your dose or switch you to another medicine. If cost is a problem, mention that, too. They might be able to put you on a less expensive medicine. Taking your blood pressure medicine can keep you from having a heart attack or stroke, and it can save your life!  Can I do anything on my own? -- You have a lot of control over your blood pressure. To lower it:   Lose weight (if you are overweight).   Choose a diet low in fat and rich in " "fruits, vegetables, and low-fat dairy products.   Eat less salt.   Do something active for at least 30 minutes a day on most days of the week.   Drink less alcohol (if you drink more than 2 alcoholic drinks per day).  It's also a good idea to get a home blood pressure meter. People who check their own blood pressure at home do better at keeping it low and can sometimes even reduce the amount of medicine they take.  All topics are updated as new evidence becomes available and our peer review process is complete.  This topic retrieved from [employer] on: Feb 26, 2024.  Topic 42997 Version 23.0  Release: 32.2.4 - C32.56  © 2024 UpToDate, Inc. and/or its affiliates. All rights reserved.  table 1: Definition of normal and high blood pressure  Level  Top number  Bottom number    High 130 or above 80 or above   Elevated 120 to 129 79 or below   Normal 119 or below 79 or below   These definitions are from the American College of Cardiology/American Heart Association. Other expert groups might use slightly different definitions.  \"Elevated blood pressure\" is a term doctor or nurses use as a warning. It means you do not yet have high blood pressure, but your blood pressure is not as low as it should be for good health.  Graphic 39031 Version 6.0  Consumer Information Use and Disclaimer   Disclaimer: This generalized information is a limited summary of diagnosis, treatment, and/or medication information. It is not meant to be comprehensive and should be used as a tool to help the user understand and/or assess potential diagnostic and treatment options. It does NOT include all information about conditions, treatments, medications, side effects, or risks that may apply to a specific patient. It is not intended to be medical advice or a substitute for the medical advice, diagnosis, or treatment of a health care provider based on the health care provider's examination and assessment of a patient's specific and unique circumstances. " Patients must speak with a health care provider for complete information about their health, medical questions, and treatment options, including any risks or benefits regarding use of medications. This information does not endorse any treatments or medications as safe, effective, or approved for treating a specific patient. UpToDate, Inc. and its affiliates disclaim any warranty or liability relating to this information or the use thereof.The use of this information is governed by the Terms of Use, available at https://www.woltersGoodzeruwer.com/en/know/clinical-effectiveness-terms. 2024© UpToDate, Inc. and its affiliates and/or licensors. All rights reserved.  Copyright   © 2024 UpToDate, Inc. and/or its affiliates. All rights reserved.

## 2024-09-09 DIAGNOSIS — F41.1 GENERALIZED ANXIETY DISORDER: ICD-10-CM

## 2024-09-09 RX ORDER — ALPRAZOLAM 0.5 MG
0.5 TABLET ORAL
Qty: 30 TABLET | Refills: 0 | Status: SHIPPED | OUTPATIENT
Start: 2024-09-09

## 2024-10-07 DIAGNOSIS — F41.1 GENERALIZED ANXIETY DISORDER: ICD-10-CM

## 2024-10-08 RX ORDER — ALPRAZOLAM 0.5 MG
0.5 TABLET ORAL
Qty: 30 TABLET | Refills: 0 | Status: SHIPPED | OUTPATIENT
Start: 2024-10-08

## 2024-10-12 DIAGNOSIS — I25.10 CORONARY ARTERY CALCIFICATION SEEN ON CT SCAN: ICD-10-CM

## 2024-10-12 DIAGNOSIS — I10 PRIMARY HYPERTENSION: ICD-10-CM

## 2024-10-14 RX ORDER — ATORVASTATIN CALCIUM 40 MG/1
40 TABLET, FILM COATED ORAL DAILY
Qty: 90 TABLET | Refills: 1 | Status: SHIPPED | OUTPATIENT
Start: 2024-10-14

## 2024-10-14 RX ORDER — AMLODIPINE BESYLATE 5 MG/1
5 TABLET ORAL DAILY
Qty: 90 TABLET | Refills: 1 | Status: SHIPPED | OUTPATIENT
Start: 2024-10-14

## 2024-10-17 ENCOUNTER — TELEPHONE (OUTPATIENT)
Age: 84
End: 2024-10-17

## 2024-10-17 NOTE — TELEPHONE ENCOUNTER
Patient called stating he received a Flu vaccine at his local pharmacy and they offered him an RSV vaccine. He declined stating that he would like to ask his PCP first. He would like to know if Dr. Mac thinks he should get the RSV vaccine as well? Please advise. Patient can be reached at [phone]    Patient states it's ok to leave a message.

## 2024-10-31 ENCOUNTER — OFFICE VISIT (OUTPATIENT)
Dept: OTOLARYNGOLOGY | Facility: CLINIC | Age: 84
End: 2024-10-31
Payer: COMMERCIAL

## 2024-10-31 VITALS
OXYGEN SATURATION: 96 % | HEIGHT: 68 IN | BODY MASS INDEX: 28.61 KG/M2 | RESPIRATION RATE: 16 BRPM | TEMPERATURE: 99.2 F | WEIGHT: 188.8 LBS | DIASTOLIC BLOOD PRESSURE: 64 MMHG | SYSTOLIC BLOOD PRESSURE: 151 MMHG | HEART RATE: 46 BPM

## 2024-10-31 DIAGNOSIS — Z86.69 HISTORY OF IMPACTED CERUMEN: ICD-10-CM

## 2024-10-31 DIAGNOSIS — H90.3 SENSORINEURAL HEARING LOSS (SNHL) OF BOTH EARS: Primary | ICD-10-CM

## 2024-10-31 PROCEDURE — 99212 OFFICE O/P EST SF 10 MIN: CPT | Performed by: PHYSICIAN ASSISTANT

## 2024-10-31 NOTE — PROGRESS NOTES
[facility]'s Otolaryngology Follow up visit      Dexter Sharp is a 84 y.o. male who presents with a chief complaint of ears     Independent historian: none    Pertinent elements of the history:  After a shower, his ears usually block up and then they get better  Uses Qtips    History of SNHL-- hearing is stable    No otalgia   No otorrhea     Review of any relevant imaging: images from any scan reviewed personally  Scans:   Labs:   Notes:     Review of Systems:  As above    PMHx:  Past Medical History:   Diagnosis Date    Cancer (HCC)     Chronic kidney disease, stage 3a (HCC) 2021    Coronary artery calcification seen on CT scan 2021    COVID-19 2021    Dementia (HCC)     Disease of thyroid gland     Eczema     Generalized anxiety disorder     Hypertension     Prostate cancer (HCC)     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 Last assessed: 2013        FAMHx:  Family History   Problem Relation Age of Onset    Alcohol abuse Mother     Alcohol abuse Father     Depression Father     No Known Problems Sister     Stroke Brother         syndrome     No Known Problems Maternal Grandmother     No Known Problems Maternal Grandfather     No Known Problems Paternal Grandmother     No Known Problems Paternal Grandfather     Alcohol abuse Family     Colon cancer Maternal Aunt        SOCHx:  Social History     Socioeconomic History    Marital status: /Civil Union     Spouse name: None    Number of children: None    Years of education: None    Highest education level: None   Occupational History    Occupation: self employed  floor covering   Tobacco Use    Smoking status: Former     Current packs/day: 0.00     Types: Cigarettes, Cigars     Quit date:      Years since quittin.8     Passive exposure: Past    Smokeless tobacco: Never    Tobacco comments:     smokes 1-2 cigarss/month  "  Vaping Use    Vaping status: Never Used   Substance and Sexual Activity    Alcohol use: No    Drug use: No     Comment: Chronic Narcotic use noted in \"allscripts\"     Sexual activity: None   Other Topics Concern    None   Social History Narrative    Caffeine use      Social Determinants of Health     Financial Resource Strain: Low Risk  (5/5/2023)    Overall Financial Resource Strain (CARDIA)     Difficulty of Paying Living Expenses: Not hard at all   Food Insecurity: No Food Insecurity (5/6/2024)    Nursing - Inadequate Food Risk Classification     Worried About Running Out of Food in the Last Year: Never true     Ran Out of Food in the Last Year: Never true     Ran Out of Food in the Last Year: Not on file   Transportation Needs: No Transportation Needs (5/6/2024)    PRAPARE - Transportation     Lack of Transportation (Medical): No     Lack of Transportation (Non-Medical): No   Physical Activity: Not on file   Stress: Not on file   Social Connections: Unknown (6/18/2024)    Received from [employer]    Social Connections     How often do you feel lonely or isolated from those around you? (Adult - for ages 18 years and over): Not on file   Intimate Partner Violence: Not on file   Housing Stability: Low Risk  (5/6/2024)    Housing Stability Vital Sign     Unable to Pay for Housing in the Last Year: No     Number of Times Moved in the Last Year: 0     Homeless in the Last Year: No       Allergies:  No Known Allergies     MEDS:  Reviewed      Physical exam: (abnormal findings appear in bold and supercede any conflicting normal findings listed below)    /64   Pulse (!) 46   Temp 99.2 °F (37.3 °C) (Temporal)   Resp 16   Ht 5' 8\" (1.727 m)   Wt 85.6 kg (188 lb 12.8 oz)   SpO2 96%   BMI 28.71 kg/m²     Constitutional:  Well developed, well nourished and groomed, in no acute distress.     Eyes:  Extra-ocular movements intact, pupils equally round and reactive to light and accommodation, the lids and " conjunctivae are normal in appearance.    Head: Atraumatic, normocephalic, no visible scalp lesions, bony palpation unremarkable without stepoffs, parotid and submandibular salivary glands non-tender to palpation and without masses bilaterally.     Ears:  Auricles normal in appearance bilaterally, mastoid prominence non-tender, external auditory canals clear bilaterally, tympanic membranes intact bilaterally without evidence of middle ear effusion or masses, normal appearing ossicles. Ears are clear today. No impaction. Minimal was removed from right ear.    Nose/Sinuses:  External appearance unremarkable, no maxillary or frontal sinus tenderness to palpation bilaterally. Anterior rhinoscopy demonstrates pink mucosa. No polyps or other masses identified. Turbinates are non-edematous. No evidence of purulent drainage.    Oral Cavity:  Moist mucus membranes, gums and dentition unremarkable, no oral mucosal masses or lesions, floor of mouth soft, tongue mobile without masses or lesions.     Oropharynx:  Tonsils bilaterally unremarkable, soft palate mucosa unremarkable.     Neck:  No visible or palpable cervical lesions or lymphadenopathy, thyroid gland is normal in size and symmetry and without masses, normal laryngeal elevation with swallowing.     Cardiovascular:  Normal rate and rhythm, no palpable thrills, no jugulovenous distension observed.  Respiratory:  Normal respiratory effort without evidence of retractions or use of accessory muscles.  Integument:  Normal appearing without observed masses or lesions.  Neurologic:  Cranial nerves II-XII intact bilaterally.  Psychiatric:  Alert and oriented to time, place and person, normal affect.      Procedure:    Audiometry:  Audiogram 05/2024 reviewed. Normal hearing in the low to mid frequencies. Mild sloping to severe high frequency SNHL. Word recognition 100% at 65 dB. Tympanograms type A bilaterally.      Assessment:  1. Sensorineural hearing loss (SNHL) of both ears  "       2. History of impacted cerumen                Plan:  1. Dexter Sharp is a 84 y.o. male with acute and chronic problems as above who presents for re-evaluation of his ears. Doing well since last visit. Ears will block up after his shower and then it goes away. Uses Qtips. Hearing is stable.    Ears are clear today. No cerumen impaction today.    Oil and cerumen within the EAC is a natural process. Recommend against using Q-tips. He does not need to use any cerumen removal kits or hydrogen peroxide.    Follow up in 1 year or PRN.        ** Please Note: Portions of the record may have been created with voice recognition software. Occasional wrong word or \"sound a like\" substitutions may have occurred due to the inherent limitations of voice recognition software. There may also be notations and random deletions of words or characters from malfunctioning software. Read the chart carefully and recognize, using context, where substitutions/deletions have occurred.**    "

## 2024-10-31 NOTE — PROGRESS NOTES
Review of Systems   Constitutional: Negative.    HENT:          Bilateral ears clogged, after taking a shower   Eyes: Negative.    Respiratory: Negative.     Cardiovascular: Negative.    Gastrointestinal: Negative.    Endocrine: Negative.    Genitourinary: Negative.    Musculoskeletal: Negative.    Skin: Negative.    Allergic/Immunologic: Negative.    Neurological: Negative.    Hematological: Negative.    Psychiatric/Behavioral: Negative.

## 2024-11-06 DIAGNOSIS — F41.1 GENERALIZED ANXIETY DISORDER: ICD-10-CM

## 2024-11-06 RX ORDER — ALPRAZOLAM 0.5 MG
0.5 TABLET ORAL
Qty: 30 TABLET | Refills: 0 | Status: SHIPPED | OUTPATIENT
Start: 2024-11-06 | End: 2024-11-11 | Stop reason: SDUPTHER

## 2024-11-07 DIAGNOSIS — C61 MALIGNANT NEOPLASM OF PROSTATE METASTATIC TO INTRAPELVIC LYMPH NODE (HCC): ICD-10-CM

## 2024-11-07 DIAGNOSIS — I10 ESSENTIAL HYPERTENSION: ICD-10-CM

## 2024-11-07 DIAGNOSIS — R31.29 OTHER MICROSCOPIC HEMATURIA: ICD-10-CM

## 2024-11-07 DIAGNOSIS — E03.9 HYPOTHYROIDISM, UNSPECIFIED TYPE: ICD-10-CM

## 2024-11-07 DIAGNOSIS — R39.15 URGENCY OF URINATION: ICD-10-CM

## 2024-11-07 DIAGNOSIS — R35.0 URINARY FREQUENCY: ICD-10-CM

## 2024-11-07 DIAGNOSIS — M79.652 LEFT THIGH PAIN: ICD-10-CM

## 2024-11-07 DIAGNOSIS — R10.32 LEFT GROIN PAIN: ICD-10-CM

## 2024-11-07 DIAGNOSIS — C77.5 MALIGNANT NEOPLASM OF PROSTATE METASTATIC TO INTRAPELVIC LYMPH NODE (HCC): ICD-10-CM

## 2024-11-07 DIAGNOSIS — Z92.3 HISTORY OF RADIATION THERAPY: ICD-10-CM

## 2024-11-08 RX ORDER — LISINOPRIL AND HYDROCHLOROTHIAZIDE 20; 25 MG/1; MG/1
1 TABLET ORAL DAILY
Qty: 100 TABLET | Refills: 1 | Status: SHIPPED | OUTPATIENT
Start: 2024-11-08 | End: 2024-11-11 | Stop reason: SDUPTHER

## 2024-11-08 RX ORDER — MELOXICAM 15 MG/1
15 TABLET ORAL DAILY
Qty: 100 TABLET | Refills: 0 | Status: SHIPPED | OUTPATIENT
Start: 2024-11-08

## 2024-11-08 RX ORDER — LEVOTHYROXINE SODIUM 88 UG/1
88 TABLET ORAL DAILY
Qty: 100 TABLET | Refills: 1 | Status: SHIPPED | OUTPATIENT
Start: 2024-11-08

## 2024-11-11 DIAGNOSIS — F41.1 GENERALIZED ANXIETY DISORDER: ICD-10-CM

## 2024-11-11 DIAGNOSIS — I10 PRIMARY HYPERTENSION: ICD-10-CM

## 2024-11-11 DIAGNOSIS — I10 ESSENTIAL HYPERTENSION: ICD-10-CM

## 2024-11-12 RX ORDER — ALPRAZOLAM 0.5 MG
0.5 TABLET ORAL
Qty: 30 TABLET | Refills: 0 | Status: SHIPPED | OUTPATIENT
Start: 2024-11-12

## 2024-11-12 RX ORDER — LISINOPRIL AND HYDROCHLOROTHIAZIDE 20; 25 MG/1; MG/1
1 TABLET ORAL DAILY
Qty: 90 TABLET | Refills: 1 | Status: SHIPPED | OUTPATIENT
Start: 2024-11-12

## 2024-11-12 RX ORDER — AMLODIPINE BESYLATE 5 MG/1
5 TABLET ORAL DAILY
Qty: 90 TABLET | Refills: 1 | Status: SHIPPED | OUTPATIENT
Start: 2024-11-12

## 2024-12-07 DIAGNOSIS — F41.1 GENERALIZED ANXIETY DISORDER: ICD-10-CM

## 2024-12-09 RX ORDER — ALPRAZOLAM 0.5 MG
0.5 TABLET ORAL
Qty: 30 TABLET | Refills: 0 | Status: SHIPPED | OUTPATIENT
Start: 2024-12-09

## 2024-12-31 ENCOUNTER — RA CDI HCC (OUTPATIENT)
Dept: OTHER | Facility: HOSPITAL | Age: 84
End: 2024-12-31

## 2024-12-31 NOTE — PROGRESS NOTES
HCC coding opportunities          Chart Reviewed number of suggestions sent to Provider: 1  F10.21     Patients Insurance     Medicare Insurance: [facility] [employer] Advantage

## 2025-01-03 ENCOUNTER — APPOINTMENT (OUTPATIENT)
Age: 85
End: 2025-01-03
Payer: COMMERCIAL

## 2025-01-03 ENCOUNTER — RESULTS FOLLOW-UP (OUTPATIENT)
Dept: INTERNAL MEDICINE CLINIC | Facility: CLINIC | Age: 85
End: 2025-01-03

## 2025-01-03 DIAGNOSIS — E03.9 HYPOTHYROIDISM, UNSPECIFIED TYPE: ICD-10-CM

## 2025-01-03 DIAGNOSIS — F41.1 GENERALIZED ANXIETY DISORDER: ICD-10-CM

## 2025-01-03 DIAGNOSIS — R73.03 PREDIABETES: ICD-10-CM

## 2025-01-03 LAB
EST. AVERAGE GLUCOSE BLD GHB EST-MCNC: 126 MG/DL
HBA1C MFR BLD: 6 %

## 2025-01-03 PROCEDURE — 83036 HEMOGLOBIN GLYCOSYLATED A1C: CPT

## 2025-01-03 PROCEDURE — 36415 COLL VENOUS BLD VENIPUNCTURE: CPT

## 2025-01-03 PROCEDURE — 84443 ASSAY THYROID STIM HORMONE: CPT

## 2025-01-04 LAB — TSH SERPL DL<=0.05 MIU/L-ACNC: 4.02 UIU/ML (ref 0.45–4.5)

## 2025-01-08 ENCOUNTER — APPOINTMENT (EMERGENCY)
Dept: CT IMAGING | Facility: HOSPITAL | Age: 85
DRG: 871 | End: 2025-01-08
Payer: COMMERCIAL

## 2025-01-08 ENCOUNTER — APPOINTMENT (EMERGENCY)
Dept: RADIOLOGY | Facility: HOSPITAL | Age: 85
DRG: 871 | End: 2025-01-08
Payer: COMMERCIAL

## 2025-01-08 ENCOUNTER — HOSPITAL ENCOUNTER (INPATIENT)
Facility: HOSPITAL | Age: 85
LOS: 5 days | Discharge: HOME HEALTH | DRG: 871 | End: 2025-01-13
Attending: EMERGENCY MEDICINE | Admitting: INTERNAL MEDICINE
Payer: COMMERCIAL

## 2025-01-08 ENCOUNTER — RA CDI HCC (OUTPATIENT)
Dept: RADIOLOGY | Facility: HOSPITAL | Age: 85
End: 2025-01-08

## 2025-01-08 DIAGNOSIS — A41.9 SEPSIS (HCC): ICD-10-CM

## 2025-01-08 DIAGNOSIS — J96.01 ACUTE HYPOXIC RESPIRATORY FAILURE (HCC): Primary | ICD-10-CM

## 2025-01-08 DIAGNOSIS — E87.8 HYPOCHLOREMIA: ICD-10-CM

## 2025-01-08 DIAGNOSIS — J10.1 INFLUENZA A: ICD-10-CM

## 2025-01-08 DIAGNOSIS — J90 PLEURAL EFFUSION, RIGHT: ICD-10-CM

## 2025-01-08 DIAGNOSIS — R79.89 ELEVATED TROPONIN: ICD-10-CM

## 2025-01-08 DIAGNOSIS — J18.9 PNEUMONIA: ICD-10-CM

## 2025-01-08 DIAGNOSIS — E87.1 HYPONATREMIA: ICD-10-CM

## 2025-01-08 DIAGNOSIS — D72.825 BANDEMIA: ICD-10-CM

## 2025-01-08 DIAGNOSIS — J96.01 ACUTE RESPIRATORY FAILURE WITH HYPOXIA (HCC): ICD-10-CM

## 2025-01-08 DIAGNOSIS — J90 LARGE PLEURAL EFFUSION: ICD-10-CM

## 2025-01-08 DIAGNOSIS — E87.6 HYPOKALEMIA: ICD-10-CM

## 2025-01-08 DIAGNOSIS — J90 PLEURAL EFFUSION ON RIGHT: ICD-10-CM

## 2025-01-08 LAB
2HR DELTA HS TROPONIN: 56 NG/L
4HR DELTA HS TROPONIN: 75 NG/L
ALBUMIN SERPL BCG-MCNC: 4 G/DL (ref 3.5–5)
ALP SERPL-CCNC: 57 U/L (ref 34–104)
ALT SERPL W P-5'-P-CCNC: 43 U/L (ref 7–52)
AMORPH URATE CRY URNS QL MICRO: NORMAL /HPF
ANION GAP SERPL CALCULATED.3IONS-SCNC: 14 MMOL/L (ref 4–13)
APTT PPP: 33 SECONDS (ref 23–34)
AST SERPL W P-5'-P-CCNC: 127 U/L (ref 13–39)
BACTERIA UR QL AUTO: NORMAL /HPF
BASE EX.OXY STD BLDV CALC-SCNC: 79.1 % (ref 60–80)
BASE EXCESS BLDV CALC-SCNC: -3.7 MMOL/L
BASOPHILS # BLD MANUAL: 0 THOUSAND/UL (ref 0–0.1)
BASOPHILS NFR MAR MANUAL: 0 % (ref 0–1)
BILIRUB SERPL-MCNC: 1.03 MG/DL (ref 0.2–1)
BILIRUB UR QL STRIP: NEGATIVE
BNP SERPL-MCNC: 316 PG/ML (ref 0–100)
BUN SERPL-MCNC: 29 MG/DL (ref 5–25)
CALCIUM SERPL-MCNC: 8.5 MG/DL (ref 8.4–10.2)
CARDIAC TROPONIN I PNL SERPL HS: 515 NG/L (ref ?–50)
CARDIAC TROPONIN I PNL SERPL HS: 571 NG/L (ref ?–50)
CARDIAC TROPONIN I PNL SERPL HS: 590 NG/L (ref ?–50)
CHLORIDE SERPL-SCNC: 91 MMOL/L (ref 96–108)
CLARITY UR: CLEAR
CO2 SERPL-SCNC: 24 MMOL/L (ref 21–32)
COLOR UR: YELLOW
CREAT SERPL-MCNC: 1.38 MG/DL (ref 0.6–1.3)
EOSINOPHIL # BLD MANUAL: 0 THOUSAND/UL (ref 0–0.4)
EOSINOPHIL NFR BLD MANUAL: 0 % (ref 0–6)
ERYTHROCYTE [DISTWIDTH] IN BLOOD BY AUTOMATED COUNT: 13 % (ref 11.6–15.1)
FINE GRAN CASTS URNS QL MICRO: NORMAL /LPF
FLUAV RNA RESP QL NAA+PROBE: POSITIVE
FLUBV RNA RESP QL NAA+PROBE: NEGATIVE
GFR SERPL CREATININE-BSD FRML MDRD: 45 ML/MIN/1.73SQ M
GLUCOSE SERPL-MCNC: 142 MG/DL (ref 65–140)
GLUCOSE UR STRIP-MCNC: NEGATIVE MG/DL
HCO3 BLDV-SCNC: 21.3 MMOL/L (ref 24–30)
HCT VFR BLD AUTO: 41.1 % (ref 36.5–49.3)
HGB BLD-MCNC: 14.4 G/DL (ref 12–17)
HGB UR QL STRIP.AUTO: ABNORMAL
INR PPP: 1.07 (ref 0.85–1.19)
KETONES UR STRIP-MCNC: ABNORMAL MG/DL
LACTATE SERPL-SCNC: 1.9 MMOL/L (ref 0.5–2)
LEUKOCYTE ESTERASE UR QL STRIP: NEGATIVE
LYMPHOCYTES # BLD AUTO: 0.3 THOUSAND/UL (ref 0.6–4.47)
LYMPHOCYTES # BLD AUTO: 2 % (ref 14–44)
MCH RBC QN AUTO: 31.2 PG (ref 26.8–34.3)
MCHC RBC AUTO-ENTMCNC: 35 G/DL (ref 31.4–37.4)
MCV RBC AUTO: 89 FL (ref 82–98)
MONOCYTES # BLD AUTO: 1.51 THOUSAND/UL (ref 0–1.22)
MONOCYTES NFR BLD: 10 % (ref 4–12)
NEUTROPHILS # BLD MANUAL: 13.3 THOUSAND/UL (ref 1.85–7.62)
NEUTS BAND NFR BLD MANUAL: 18 % (ref 0–8)
NEUTS SEG NFR BLD AUTO: 70 % (ref 43–75)
NITRITE UR QL STRIP: NEGATIVE
NON-SQ EPI CELLS URNS QL MICRO: NORMAL /HPF
O2 CT BLDV-SCNC: 17 ML/DL
PCO2 BLDV: 38.8 MM HG (ref 42–50)
PH BLDV: 7.36 [PH] (ref 7.3–7.4)
PH UR STRIP.AUTO: 6 [PH]
PLATELET # BLD AUTO: 151 THOUSANDS/UL (ref 149–390)
PLATELET BLD QL SMEAR: ADEQUATE
PMV BLD AUTO: 10.3 FL (ref 8.9–12.7)
PO2 BLDV: 45.9 MM HG (ref 35–45)
POTASSIUM SERPL-SCNC: 3.3 MMOL/L (ref 3.5–5.3)
PROCALCITONIN SERPL-MCNC: 8.62 NG/ML
PROT SERPL-MCNC: 6.8 G/DL (ref 6.4–8.4)
PROT UR STRIP-MCNC: ABNORMAL MG/DL
PROTHROMBIN TIME: 14.4 SECONDS (ref 12.3–15)
RBC # BLD AUTO: 4.62 MILLION/UL (ref 3.88–5.62)
RBC #/AREA URNS AUTO: NORMAL /HPF
RBC MORPH BLD: NORMAL
RSV RNA RESP QL NAA+PROBE: NEGATIVE
SARS-COV-2 RNA RESP QL NAA+PROBE: NEGATIVE
SODIUM SERPL-SCNC: 129 MMOL/L (ref 135–147)
SP GR UR STRIP.AUTO: 1.02
UROBILINOGEN UR QL STRIP.AUTO: 0.2 E.U./DL
WBC # BLD AUTO: 15.11 THOUSAND/UL (ref 4.31–10.16)
WBC #/AREA URNS AUTO: NORMAL /HPF

## 2025-01-08 PROCEDURE — 87070 CULTURE OTHR SPECIMN AEROBIC: CPT | Performed by: INTERNAL MEDICINE

## 2025-01-08 PROCEDURE — 99223 1ST HOSP IP/OBS HIGH 75: CPT | Performed by: INTERNAL MEDICINE

## 2025-01-08 PROCEDURE — 82805 BLOOD GASES W/O2 SATURATION: CPT

## 2025-01-08 PROCEDURE — 94668 MNPJ CHEST WALL SBSQ: CPT

## 2025-01-08 PROCEDURE — 87040 BLOOD CULTURE FOR BACTERIA: CPT

## 2025-01-08 PROCEDURE — 87205 SMEAR GRAM STAIN: CPT | Performed by: INTERNAL MEDICINE

## 2025-01-08 PROCEDURE — 85007 BL SMEAR W/DIFF WBC COUNT: CPT

## 2025-01-08 PROCEDURE — 94760 N-INVAS EAR/PLS OXIMETRY 1: CPT

## 2025-01-08 PROCEDURE — 0241U HB NFCT DS VIR RESP RNA 4 TRGT: CPT

## 2025-01-08 PROCEDURE — 85027 COMPLETE CBC AUTOMATED: CPT

## 2025-01-08 PROCEDURE — 87081 CULTURE SCREEN ONLY: CPT | Performed by: INTERNAL MEDICINE

## 2025-01-08 PROCEDURE — 96361 HYDRATE IV INFUSION ADD-ON: CPT

## 2025-01-08 PROCEDURE — 93005 ELECTROCARDIOGRAM TRACING: CPT

## 2025-01-08 PROCEDURE — 84484 ASSAY OF TROPONIN QUANT: CPT

## 2025-01-08 PROCEDURE — 84145 PROCALCITONIN (PCT): CPT

## 2025-01-08 PROCEDURE — 84484 ASSAY OF TROPONIN QUANT: CPT | Performed by: INTERNAL MEDICINE

## 2025-01-08 PROCEDURE — 96365 THER/PROPH/DIAG IV INF INIT: CPT

## 2025-01-08 PROCEDURE — 80053 COMPREHEN METABOLIC PANEL: CPT

## 2025-01-08 PROCEDURE — 99285 EMERGENCY DEPT VISIT HI MDM: CPT

## 2025-01-08 PROCEDURE — 83880 ASSAY OF NATRIURETIC PEPTIDE: CPT

## 2025-01-08 PROCEDURE — 96368 THER/DIAG CONCURRENT INF: CPT

## 2025-01-08 PROCEDURE — 83605 ASSAY OF LACTIC ACID: CPT

## 2025-01-08 PROCEDURE — 71250 CT THORAX DX C-: CPT

## 2025-01-08 PROCEDURE — 85610 PROTHROMBIN TIME: CPT

## 2025-01-08 PROCEDURE — 99291 CRITICAL CARE FIRST HOUR: CPT

## 2025-01-08 PROCEDURE — 94640 AIRWAY INHALATION TREATMENT: CPT

## 2025-01-08 PROCEDURE — 36415 COLL VENOUS BLD VENIPUNCTURE: CPT

## 2025-01-08 PROCEDURE — 81001 URINALYSIS AUTO W/SCOPE: CPT | Performed by: INTERNAL MEDICINE

## 2025-01-08 PROCEDURE — 85730 THROMBOPLASTIN TIME PARTIAL: CPT

## 2025-01-08 PROCEDURE — 87449 NOS EACH ORGANISM AG IA: CPT | Performed by: INTERNAL MEDICINE

## 2025-01-08 PROCEDURE — 71045 X-RAY EXAM CHEST 1 VIEW: CPT

## 2025-01-08 RX ORDER — ACETAMINOPHEN 10 MG/ML
1000 INJECTION, SOLUTION INTRAVENOUS ONCE
Status: COMPLETED | OUTPATIENT
Start: 2025-01-08 | End: 2025-01-08

## 2025-01-08 RX ORDER — LEVALBUTEROL INHALATION SOLUTION 1.25 MG/3ML
1.25 SOLUTION RESPIRATORY (INHALATION)
Status: DISCONTINUED | OUTPATIENT
Start: 2025-01-08 | End: 2025-01-13 | Stop reason: HOSPADM

## 2025-01-08 RX ORDER — VANCOMYCIN HYDROCHLORIDE 1 G/200ML
12.5 INJECTION, SOLUTION INTRAVENOUS ONCE AS NEEDED
Status: DISCONTINUED | OUTPATIENT
Start: 2025-01-09 | End: 2025-01-09 | Stop reason: DRUGHIGH

## 2025-01-08 RX ORDER — ENOXAPARIN SODIUM 100 MG/ML
40 INJECTION SUBCUTANEOUS DAILY
Status: DISCONTINUED | OUTPATIENT
Start: 2025-01-09 | End: 2025-01-13 | Stop reason: HOSPADM

## 2025-01-08 RX ORDER — ALPRAZOLAM 0.5 MG
0.5 TABLET ORAL
Status: DISCONTINUED | OUTPATIENT
Start: 2025-01-08 | End: 2025-01-13 | Stop reason: HOSPADM

## 2025-01-08 RX ORDER — HYDRALAZINE HYDROCHLORIDE 20 MG/ML
10 INJECTION INTRAMUSCULAR; INTRAVENOUS EVERY 6 HOURS PRN
Status: DISCONTINUED | OUTPATIENT
Start: 2025-01-08 | End: 2025-01-13 | Stop reason: HOSPADM

## 2025-01-08 RX ORDER — VANCOMYCIN HYDROCHLORIDE 1 G/200ML
15 INJECTION, SOLUTION INTRAVENOUS EVERY 24 HOURS
Status: DISCONTINUED | OUTPATIENT
Start: 2025-01-08 | End: 2025-01-08

## 2025-01-08 RX ORDER — POTASSIUM CHLORIDE 1500 MG/1
40 TABLET, EXTENDED RELEASE ORAL ONCE
Status: COMPLETED | OUTPATIENT
Start: 2025-01-08 | End: 2025-01-08

## 2025-01-08 RX ORDER — ACETAMINOPHEN 325 MG/1
650 TABLET ORAL EVERY 6 HOURS PRN
Status: DISCONTINUED | OUTPATIENT
Start: 2025-01-08 | End: 2025-01-13 | Stop reason: HOSPADM

## 2025-01-08 RX ORDER — ALBUTEROL SULFATE 2.5 MG/3ML
1 SOLUTION RESPIRATORY (INHALATION) ONCE
Status: COMPLETED | OUTPATIENT
Start: 2025-01-08 | End: 2025-01-08

## 2025-01-08 RX ORDER — OSELTAMIVIR PHOSPHATE 30 MG/1
30 CAPSULE ORAL EVERY 12 HOURS SCHEDULED
Status: DISCONTINUED | OUTPATIENT
Start: 2025-01-08 | End: 2025-01-13

## 2025-01-08 RX ORDER — CEFTRIAXONE 2 G/50ML
2000 INJECTION, SOLUTION INTRAVENOUS EVERY 24 HOURS
Status: DISCONTINUED | OUTPATIENT
Start: 2025-01-09 | End: 2025-01-09

## 2025-01-08 RX ORDER — IPRATROPIUM BROMIDE AND ALBUTEROL SULFATE .5; 3 MG/3ML; MG/3ML
1 SOLUTION RESPIRATORY (INHALATION) ONCE
Status: COMPLETED | OUTPATIENT
Start: 2025-01-08 | End: 2025-01-08

## 2025-01-08 RX ORDER — ATORVASTATIN CALCIUM 40 MG/1
40 TABLET, FILM COATED ORAL DAILY
Status: DISCONTINUED | OUTPATIENT
Start: 2025-01-09 | End: 2025-01-13 | Stop reason: HOSPADM

## 2025-01-08 RX ORDER — METHYLPREDNISOLONE SOD SUCC 125 MG
1 VIAL (EA) INJECTION ONCE
Status: COMPLETED | OUTPATIENT
Start: 2025-01-08 | End: 2025-01-08

## 2025-01-08 RX ORDER — ASPIRIN 81 MG/1
81 TABLET ORAL DAILY
Status: DISCONTINUED | OUTPATIENT
Start: 2025-01-09 | End: 2025-01-13 | Stop reason: HOSPADM

## 2025-01-08 RX ORDER — ONDANSETRON 2 MG/ML
4 INJECTION INTRAMUSCULAR; INTRAVENOUS EVERY 6 HOURS PRN
Status: DISCONTINUED | OUTPATIENT
Start: 2025-01-08 | End: 2025-01-13 | Stop reason: HOSPADM

## 2025-01-08 RX ORDER — CEFTRIAXONE 2 G/50ML
2000 INJECTION, SOLUTION INTRAVENOUS ONCE
Status: COMPLETED | OUTPATIENT
Start: 2025-01-08 | End: 2025-01-08

## 2025-01-08 RX ORDER — SODIUM CHLORIDE FOR INHALATION 3 %
4 VIAL, NEBULIZER (ML) INHALATION
Status: DISCONTINUED | OUTPATIENT
Start: 2025-01-08 | End: 2025-01-11

## 2025-01-08 RX ORDER — HYDROCODONE POLISTIREX AND CHLORPHENIRAMINE POLISTIREX 10; 8 MG/5ML; MG/5ML
5 SUSPENSION, EXTENDED RELEASE ORAL EVERY 12 HOURS PRN
Refills: 0 | Status: DISCONTINUED | OUTPATIENT
Start: 2025-01-08 | End: 2025-01-13 | Stop reason: HOSPADM

## 2025-01-08 RX ORDER — LEVOTHYROXINE SODIUM 88 UG/1
88 TABLET ORAL DAILY
Status: DISCONTINUED | OUTPATIENT
Start: 2025-01-09 | End: 2025-01-13 | Stop reason: HOSPADM

## 2025-01-08 RX ORDER — AMLODIPINE BESYLATE 5 MG/1
5 TABLET ORAL DAILY
Status: DISCONTINUED | OUTPATIENT
Start: 2025-01-09 | End: 2025-01-13 | Stop reason: HOSPADM

## 2025-01-08 RX ADMIN — SODIUM CHLORIDE 1000 ML: 0.9 INJECTION, SOLUTION INTRAVENOUS at 16:14

## 2025-01-08 RX ADMIN — AZITHROMYCIN MONOHYDRATE 500 MG: 500 INJECTION, POWDER, LYOPHILIZED, FOR SOLUTION INTRAVENOUS at 16:47

## 2025-01-08 RX ADMIN — HYDROCODONE POLISTIREX AND CHLORPHENIRAMINE POLISTIREX 5 ML: 10; 8 SUSPENSION, EXTENDED RELEASE ORAL at 21:23

## 2025-01-08 RX ADMIN — CEFTRIAXONE 2000 MG: 2 INJECTION, SOLUTION INTRAVENOUS at 16:12

## 2025-01-08 RX ADMIN — HYDRALAZINE HYDROCHLORIDE 10 MG: 20 INJECTION INTRAMUSCULAR; INTRAVENOUS at 23:26

## 2025-01-08 RX ADMIN — VANCOMYCIN HYDROCHLORIDE 2000 MG: 1 INJECTION, POWDER, LYOPHILIZED, FOR SOLUTION INTRAVENOUS at 20:02

## 2025-01-08 RX ADMIN — SODIUM CHLORIDE SOLN NEBU 3% 4 ML: 3 NEBU SOLN at 19:55

## 2025-01-08 RX ADMIN — OSELTAMAVIR PHOSPHATE 30 MG: 30 CAPSULE ORAL at 21:23

## 2025-01-08 RX ADMIN — LEVALBUTEROL HYDROCHLORIDE 1.25 MG: 1.25 SOLUTION RESPIRATORY (INHALATION) at 19:55

## 2025-01-08 RX ADMIN — POTASSIUM CHLORIDE 40 MEQ: 1500 TABLET, EXTENDED RELEASE ORAL at 17:43

## 2025-01-08 RX ADMIN — SODIUM CHLORIDE 1000 ML: 0.9 INJECTION, SOLUTION INTRAVENOUS at 17:41

## 2025-01-08 RX ADMIN — ACETAMINOPHEN 1000 MG: 1000 INJECTION, SOLUTION INTRAVENOUS at 16:12

## 2025-01-08 NOTE — ASSESSMENT & PLAN NOTE
Secondary to pneumonia, influenza, and large effusion  Continue oxygen, titrate as tolerated  Pulmonology consult

## 2025-01-08 NOTE — ED PROVIDER NOTES
Time reflects when diagnosis was documented in both MDM as applicable and the Disposition within this note       Time User Action Codes Description Comment    1/8/2025  5:31 PM Jose David Garcia Add [J10.1] Influenza A     1/8/2025  5:31 PM Jose David Garcia Remove [J10.1] Influenza A     1/8/2025  5:31 PM Harvey Garciaon Add [J96.01] Acute hypoxic respiratory failure (HCC)     1/8/2025  5:32 PM Harvey Garciaon Add [A41.9] Sepsis (HCC)     1/8/2025  5:32 PM RadhaHarvey ochoaon Add [J18.9] Pneumonia     1/8/2025  5:32 PM RadhaHarvey ochoaon Add [J10.1] Influenza A     1/8/2025  5:32 PM Jose David Garcia Add [R79.89] Elevated troponin     1/8/2025  5:32 PM Harvey Garciaon Add [E87.1] Hyponatremia     1/8/2025  5:32 PM Jose David Garcia Add [E87.6] Hypokalemia     1/8/2025  5:33 PM Harvey Garciaon Add [E87.8] Hypochloremia     1/8/2025  5:34 PM Jose David Garcia Add [J90] Pleural effusion, right     1/8/2025  5:39 PM Jose David Garcia Add [D72.825] Bandemia           ED Disposition       ED Disposition   Admit    Condition   Stable    Date/Time   Wed Jan 8, 2025  5:58 PM    Comment   Case was discussed with Dr. Yates and the patient's admission status was agreed to be Admission Status: inpatient status to the service of Dr. Yates.               Assessment & Plan       Medical Decision Making  Patient is an ill-appearing 84-year-old male presenting with worsening shortness of breath since Saturday. He started with fatigue, chills, and a productive cough with brown sputum over the weekend and reports worsening shortness of breath so he presents to the emergency department today via EMS. He is dyspneic on initial exam with an spo2 of 86% on room air and tachypnea. He was placed on nasal cannula by nursing staff. Concern for right-sided pneumonia on exam. Sepsis alert was called and sepsis labs ordered including blood cultures. Will also obtain cardiac workup including ECG, troponin, and chest x-ray as dyspnea can be an anginal  equivalent. Will obtain VBG and swab for COVID/influenza/RSV given his respiratory symptoms. BNP given no prior echo.  See ED course for interpretation of labs, imaging, and further medical decision making.   We placed him on nasal cannula for hypoxia and respiratory failure. Will give Ofirmev drip for his significant fever and 30 cc/kg IBW IV fluids. No rales or edema. Will give IV Rocephin and IV azithromycin for broad-spectrum coverage with concern for pneumonia. He does have new inferior depression on his ECG. Initial troponin 515 and patient remains without chest pain, likely demand ischemia. Ran the case past cardiology who said he could have underlying CAD but certainly not the cause of his presentation today. Treat for pneumonia and he can stay here. Chest x-ray without pneumonia and he has what appears to be a large pleural effusion so ordered a dry chest CT. He is positive for influenza A and on his fifth day of symptoms so we will defer Tamiflu.  Dispo: Patient hospitalized to inpatient [facility] with telemetry under the care of Dr. Yates for further workup and management.    Amount and/or Complexity of Data Reviewed  External Data Reviewed: labs, radiology and notes.  Labs: ordered. Decision-making details documented in ED Course.  Radiology: ordered and independent interpretation performed. Decision-making details documented in ED Course.  ECG/medicine tests: ordered and independent interpretation performed.    Risk  Prescription drug management.  Decision regarding hospitalization.        ED Course as of 01/08/25 1818 Wed Jan 08, 2025   1547 Temperature(!): 103.3 °F (39.6 °C)  Febrile, tachypneic, and hypoxic. Sepsis labs ordered and sepsis alert called. Placed on NC by nursing staff.   1547 Respirations(!): 24   1547 SpO2(!): 86 %   1605 WBC(!): 15.11  IV fluids and IV antibiotics ordered.    1624 LACTIC ACID: 1.9   1624 Sodium(!): 129   1624 Potassium(!): 3.3  Will give oral potassium.   1624  Chloride(!): 91   1624 Creatinine(!): 1.38   1632 BNP(!): 316   1632 Procalcitonin(!): 8.62   1632 hs TnI 0hr(!): 515  Suspect demand ischemia. Will run the case past cardiology and repeat at 2 hours.   1641 XR chest portable  IMPRESSION:  Mild perihilar patchy airspace disease likely on the basis of pulmonary edema. Moderate right pleural effusion.    Will obtain dry chest CT with concerns for pneumonia and large effusion.   1704 Bands %(!): 18   1749 Dr. Waller agrees likely demand ischemia. Treat for pneumonia and patient can stay here. Depression on 2-hour ECG improved. Will reach out to [facility] for hospitalization.       Medications   ALPRAZolam (XANAX) tablet 0.5 mg (has no administration in time range)   amLODIPine (NORVASC) tablet 5 mg (has no administration in time range)   atorvastatin (LIPITOR) tablet 40 mg (has no administration in time range)   levothyroxine tablet 88 mcg (has no administration in time range)   acetaminophen (TYLENOL) tablet 650 mg (has no administration in time range)   ondansetron (ZOFRAN) injection 4 mg (has no administration in time range)   cefTRIAXone (ROCEPHIN) IVPB (premix in dextrose) 2,000 mg 50 mL (has no administration in time range)   vancomycin (VANCOCIN) IVPB (premix in dextrose) 1,000 mg 200 mL (has no administration in time range)   enoxaparin (LOVENOX) subcutaneous injection 40 mg (has no administration in time range)   aspirin (ECOTRIN LOW STRENGTH) EC tablet 81 mg (has no administration in time range)   albuterol (FOR EMS ONLY) (2.5 mg/3 mL) 0.083 % inhalation solution 2.5 mg (0 mg Does not apply Given to EMS 1/8/25 1546)   ipratropium-albuterol (FOR EMS ONLY) (DUO-NEB) 0.5-2.5 mg/3 mL inhalation solution 3 mL (0 mL Does not apply Given to EMS 1/8/25 1546)   methylPREDNISolone sodium succinate (FOR EMS ONLY) (Solu-MEDROL) 125 MG injection 125 mg (0 mg Does not apply Given to EMS 1/8/25 1547)   cefTRIAXone (ROCEPHIN) IVPB (premix in dextrose) 2,000 mg 50 mL (0 mg  Intravenous Stopped 1/8/25 1642)   acetaminophen (Ofirmev) injection 1,000 mg (0 mg Intravenous Stopped 1/8/25 1648)   azithromycin (ZITHROMAX) 500 mg in sodium chloride 0.9 % 250 mL IVPB (0 mg Intravenous Stopped 1/8/25 1759)   sodium chloride 0.9 % bolus 1,000 mL (0 mL Intravenous Stopped 1/8/25 1736)     Followed by   sodium chloride 0.9 % bolus 1,000 mL (1,000 mL Intravenous New Bag 1/8/25 1741)   potassium chloride (Klor-Con M20) CR tablet 40 mEq (40 mEq Oral Given 1/8/25 1743)       ED Risk Strat Scores   HEART Risk Score      Flowsheet Row Most Recent Value   Heart Score Risk Calculator    History 1 Filed at: 01/08/2025 1607   ECG 2 Filed at: 01/08/2025 1607   Age 2 Filed at: 01/08/2025 1607   Risk Factors 1 Filed at: 01/08/2025 1607   Troponin 2 Filed at: 01/08/2025 1607   HEART Score 8 Filed at: 01/08/2025 1607          HEART Risk Score      Flowsheet Row Most Recent Value   Heart Score Risk Calculator    History 1 Filed at: 01/08/2025 1607   ECG 2 Filed at: 01/08/2025 1607   Age 2 Filed at: 01/08/2025 1607   Risk Factors 1 Filed at: 01/08/2025 1607   Troponin 2 Filed at: 01/08/2025 1607   HEART Score 8 Filed at: 01/08/2025 1607                            SBIRT 20yo+      Flowsheet Row Most Recent Value   Initial Alcohol Screen: US AUDIT-C     1. How often do you have a drink containing alcohol? 0 Filed at: 01/08/2025 1547   2. How many drinks containing alcohol do you have on a typical day you are drinking?  0 Filed at: 01/08/2025 1547   3a. Male UNDER 65: How often do you have five or more drinks on one occasion? 0 Filed at: 01/08/2025 1547   3b. FEMALE Any Age, or MALE 65+: How often do you have 4 or more drinks on one occassion? 0 Filed at: 01/08/2025 1547   Audit-C Score 0 Filed at: 01/08/2025 1072   NAT: How many times in the past year have you...    Used an illegal drug or used a prescription medication for non-medical reasons? Never Filed at: 01/08/2025 0127                            History of  "Present Illness       Chief Complaint   Patient presents with    Cough    Fever     Patient presents with cough, fever, generalized fatigue and more frequent falls over the past few days. States today it got worse. 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 Last assessed: 2013      Past Surgical History:   Procedure Laterality Date    CATARACT EXTRACTION      COLONOSCOPY      COLONOSCOPY  2019    EYE SURGERY      KNEE SURGERY      PROSTATE SURGERY      VASECTOMY        Family History   Problem Relation Age of Onset    Alcohol abuse Mother     Alcohol abuse Father     Depression Father     No Known Problems Sister     Stroke Brother         syndrome     No Known Problems Maternal Grandmother     No Known Problems Maternal Grandfather     No Known Problems Paternal Grandmother     No Known Problems Paternal Grandfather     Alcohol abuse Family     Colon cancer Maternal Aunt       Social History     Tobacco Use    Smoking status: Former     Current packs/day: 0.00     Types: Cigarettes, Cigars     Quit date:      Years since quittin.0     Passive exposure: Past    Smokeless tobacco: Never    Tobacco comments:     smokes 1-2 cigarss/month   Vaping Use    Vaping status: Never Used   Substance Use Topics    Alcohol use: No    Drug use: No     Comment: Chronic Narcotic use noted in \"allscripts\"       E-Cigarette/Vaping    E-Cigarette Use Never User       E-Cigarette/Vaping Substances    Nicotine No     THC No     CBD No     Flavoring No "     Other No     Unknown No       I have reviewed and agree with the history as documented.     Patient is a 84-year-old male with relevant past medical history of CKD, COVID-19, dementia, thyroid disease, NADIA, hypertension, prostate cancer, rectal bleeding, and pleural effusion presenting with worsening shortness of breath since Saturday. He started with fatigue, chills, and a productive cough with brown sputum over the weekend and reports worsening shortness of breath so he presents to the ER today via EMS. His wife called for 911 for him and has similar symptoms. He has not been taking his temperature at home and has not been in the hospital recently. EMS gave him a breathing treatment and Solu-Medrol 125 mg on the way to the hospital. He denies headache, dizziness, chest pain, chest tightness, abdominal pain, N/V, or urinary symptoms.      History provided by:  Patient   used: No    Cough  Associated symptoms: chills, fever and shortness of breath    Associated symptoms: no chest pain, no ear pain, no headaches, no rash, no rhinorrhea and no sore throat    Fever  Associated symptoms: cough, fatigue, fever and shortness of breath    Associated symptoms: no abdominal pain, no chest pain, no diarrhea, no ear pain, no headaches, no nausea, no rash, no rhinorrhea, no sore throat and no vomiting        Review of Systems   Constitutional:  Positive for chills, fatigue and fever.   HENT:  Negative for ear pain, rhinorrhea and sore throat.    Eyes:  Negative for pain and visual disturbance.   Respiratory:  Positive for cough and shortness of breath.    Cardiovascular:  Negative for chest pain and palpitations.   Gastrointestinal:  Negative for abdominal pain, constipation, diarrhea, nausea and vomiting.   Genitourinary:  Negative for dysuria, frequency, hematuria and urgency.   Musculoskeletal:  Negative for arthralgias and back pain.   Skin:  Negative for color change and rash.   Neurological:   Negative for dizziness, seizures, syncope, light-headedness and headaches.   Psychiatric/Behavioral:  Negative for agitation and confusion.            Objective       ED Triage Vitals [01/08/25 1545]   Temperature Pulse Blood Pressure Respirations SpO2 Patient Position - Orthostatic VS   (!) 103.3 °F (39.6 °C) 90 148/67 18 (!) 86 % Sitting      Temp Source Heart Rate Source BP Location FiO2 (%) Pain Score    Temporal Monitor Left arm -- No Pain      Vitals      Date and Time Temp Pulse SpO2 Resp BP Pain Score FACES Pain Rating User   01/08/25 1800 -- 81 92 % 24 133/58 -- -- EM   01/08/25 1744 98.6 °F (37 °C) -- -- -- -- -- -- EM   01/08/25 1730 -- 73 93 % 22 127/62 -- -- EM   01/08/25 1715 -- 78 93 % 22 136/65 -- -- EM   01/08/25 1645 -- 81 93 % 24 169/72 -- -- EM   01/08/25 1630 -- 85 93 % 24 132/63 -- -- EM   01/08/25 1615 -- 85 93 % 24 140/57 -- -- EM   01/08/25 1600 -- 89 93 % 24 133/65 -- -- EM   01/08/25 1546 -- -- -- 24 -- -- --    01/08/25 1545 103.3 °F (39.6 °C) 90 86 % 18 148/67 No Pain -- RC            Physical Exam  Vitals and nursing note reviewed.   Constitutional:       General: He is in acute distress.      Appearance: He is well-developed. He is ill-appearing.   HENT:      Head: Normocephalic and atraumatic.      Nose: Congestion present.      Mouth/Throat:      Mouth: Mucous membranes are dry.   Eyes:      Conjunctiva/sclera: Conjunctivae normal.   Cardiovascular:      Rate and Rhythm: Normal rate and regular rhythm.      Heart sounds: No murmur heard.  Pulmonary:      Effort: Tachypnea and respiratory distress present.      Breath sounds: Examination of the right-upper field reveals rhonchi. Examination of the right-middle field reveals rhonchi. Examination of the right-lower field reveals rhonchi. Rhonchi present. No wheezing or rales.   Abdominal:      Palpations: Abdomen is soft.      Tenderness: There is no abdominal tenderness. There is no guarding or rebound.   Musculoskeletal:          General: No swelling.      Cervical back: Neck supple. No bony tenderness.      Thoracic back: No bony tenderness.      Lumbar back: No bony tenderness.      Right lower leg: No edema.      Left lower leg: No edema.   Skin:     General: Skin is warm and dry.      Capillary Refill: Capillary refill takes less than 2 seconds.   Neurological:      General: No focal deficit present.      Mental Status: He is alert and oriented to person, place, and time.      GCS: GCS eye subscore is 4. GCS verbal subscore is 5. GCS motor subscore is 6.      Cranial Nerves: Cranial nerves 2-12 are intact.      Sensory: Sensation is intact.      Motor: Motor function is intact.      Coordination: Coordination is intact.      Gait: Gait is intact.   Psychiatric:         Mood and Affect: Mood normal.         Results Reviewed       Procedure Component Value Units Date/Time    Sputum culture and Gram stain [[id number]]     Lab Status: No result Specimen: Sputum     Strep Pneumoniae, Urine [[id number]]     Lab Status: No result Specimen: Urine     Legionella antigen, urine [[id number]]     Lab Status: No result Specimen: Urine     MRSA culture [[id number]]     Lab Status: No result Specimen: Nares     HS Troponin I 2hr [[id number]] Collected: 01/08/25 1801    Lab Status: In process Specimen: Blood from Arm, Right Updated: 01/08/25 1806    Manual Differential(PHLEBS Do Not Order) [[id number]]  (Abnormal) Collected: 01/08/25 1554    Lab Status: Final result Specimen: Blood from Arm, Right Updated: 01/08/25 1700     Segmented % 70 %      Bands % 18 %      Lymphocytes % 2 %      Monocytes % 10 %      Eosinophils % 0 %      Basophils % 0 %      Absolute Neutrophils 13.30 Thousand/uL      Absolute Lymphocytes 0.30 Thousand/uL      Absolute Monocytes 1.51 Thousand/uL      Absolute Eosinophils 0.00 Thousand/uL      Absolute Basophils 0.00 Thousand/uL      Total Counted --     RBC Morphology Normal     Platelet Estimate Adequate    FLU/RSV/COVID - if  FLU/RSV clinically relevant [[id number]]  (Abnormal) Collected: 01/08/25 1554    Lab Status: Final result Specimen: Nares from Nose Updated: 01/08/25 1641     SARS-CoV-2 Negative     INFLUENZA A PCR Positive     INFLUENZA B PCR Negative     RSV PCR Negative    Narrative:      This test has been performed using the CoV-2/Flu/RSV plus assay on the [employer] GeneXpert platform. This test has been validated by the  and verified by the performing laboratory.     This test is designed to amplify and detect the following: nucleocapsid (N), envelope (E), and RNA-dependent RNA polymerase (RdRP) genes of the SARS-CoV-2 genome; matrix (M), basic polymerase (PB2), and acidic protein (PA) segments of the influenza A genome; matrix (M) and non-structural protein (NS) segments of the influenza B genome, and the nucleocapsid genes of RSV A and RSV B.     Positive results are indicative of the presence of Flu A, Flu B, RSV, and/or SARS-CoV-2 RNA. Positive results for SARS-CoV-2 or suspected novel influenza should be reported to state, local, or federal health departments according to local reporting requirements.      All results should be assessed in conjunction with clinical presentation and other laboratory markers for clinical management.     FOR PEDIATRIC PATIENTS - copy/paste COVID Guidelines URL to browser: [email or web address]       Procalcitonin [[id number]]  (Abnormal) Collected: 01/08/25 1554    Lab Status: Final result Specimen: Blood from Arm, Right Updated: 01/08/25 1631     Procalcitonin 8.62 ng/ml     HS Troponin I 4hr [[id number]]     Lab Status: No result Specimen: Blood     HS Troponin 0hr (reflex protocol) [[id number]]  (Abnormal) Collected: 01/08/25 1554    Lab Status: Final result Specimen: Blood from Arm, Right Updated: 01/08/25 1627     hs TnI 0hr 515 ng/L     B-Type Natriuretic Peptide(BNP) [[id number]]  (Abnormal) Collected: 01/08/25 1554    Lab  Status: Final result Specimen: Blood from Arm, Right Updated: 01/08/25 1627      pg/mL     Comprehensive metabolic panel [[id number]]  (Abnormal) Collected: 01/08/25 1554    Lab Status: Final result Specimen: Blood from Arm, Right Updated: 01/08/25 1621     Sodium 129 mmol/L      Potassium 3.3 mmol/L      Chloride 91 mmol/L      CO2 24 mmol/L      ANION GAP 14 mmol/L      BUN 29 mg/dL      Creatinine 1.38 mg/dL      Glucose 142 mg/dL      Calcium 8.5 mg/dL       U/L      ALT 43 U/L      Alkaline Phosphatase 57 U/L      Total Protein 6.8 g/dL      Albumin 4.0 g/dL      Total Bilirubin 1.03 mg/dL      eGFR 45 ml/min/1.73sq m     Narrative:      National Kidney Disease Foundation guidelines for Chronic Kidney Disease (CKD):     Stage 1 with normal or high GFR (GFR > 90 mL/min/1.73 square meters)    Stage 2 Mild CKD (GFR = 60-89 mL/min/1.73 square meters)    Stage 3A Moderate CKD (GFR = 45-59 mL/min/1.73 square meters)    Stage 3B Moderate CKD (GFR = 30-44 mL/min/1.73 square meters)    Stage 4 Severe CKD (GFR = 15-29 mL/min/1.73 square meters)    Stage 5 End Stage CKD (GFR <15 mL/min/1.73 square meters)  Note: GFR calculation is accurate only with a steady state creatinine    Lactic acid [[id number]]  (Normal) Collected: 01/08/25 1554    Lab Status: Final result Specimen: Blood from Arm, Right Updated: 01/08/25 1621     LACTIC ACID 1.9 mmol/L     Narrative:      Result may be elevated if tourniquet was used during collection.    Protime-INR [[id number]]  (Normal) Collected: 01/08/25 1554    Lab Status: Final result Specimen: Blood from Arm, Right Updated: 01/08/25 1617     Protime 14.4 seconds      INR 1.07    Narrative:      INR Therapeutic Range    Indication                                             INR Range      Atrial Fibrillation                                               2.0-3.0  Hypercoagulable State                                    2.0.2.3  Left Ventricular Asist Device                             2.0-3.0  Mechanical Heart Valve                                  -    Aortic(with afib, MI, embolism, HF, LA enlargement,    and/or coagulopathy)                                     2.0-3.0 (2.5-3.5)     Mitral                                                             2.5-3.5  Prosthetic/Bioprosthetic Heart Valve               2.0-3.0  Venous thromboembolism (VTE: VT, PE        2.0-3.0    APTT [[id number]]  (Normal) Collected: 01/08/25 1554    Lab Status: Final result Specimen: Blood from Arm, Right Updated: 01/08/25 1617     PTT 33 seconds     Blood gas, Venous [[id number]]  (Abnormal) Collected: 01/08/25 1554    Lab Status: Final result Specimen: Blood from Arm, Right Updated: 01/08/25 1612     pH, Santino 7.358     pCO2, Santino 38.8 mm Hg      pO2, Santino 45.9 mm Hg      HCO3, Santino 21.3 mmol/L      Base Excess, Santino -3.7 mmol/L      O2 Content, Santino 17.0 ml/dL      O2 HGB, VENOUS 79.1 %     CBC and differential [[id number]]  (Abnormal) Collected: 01/08/25 1554    Lab Status: Final result Specimen: Blood from Arm, Right Updated: 01/08/25 1610     WBC 15.11 Thousand/uL      RBC 4.62 Million/uL      Hemoglobin 14.4 g/dL      Hematocrit 41.1 %      MCV 89 fL      MCH 31.2 pg      MCHC 35.0 g/dL      RDW 13.0 %      MPV 10.3 fL      Platelets 151 Thousands/uL     Narrative:      This is an appended report.  These results have been appended to a previously verified report.    Blood culture #2 [[id number]] Collected: 01/08/25 1554    Lab Status: In process Specimen: Blood from Arm, Right Updated: 01/08/25 1601    Blood culture #1 [[id number]] Collected: 01/08/25 1554    Lab Status: In process Specimen: Blood from Arm, Left Updated: 01/08/25 1601    UA w Reflex to Microscopic w Reflex to Culture [[id number]]     Lab Status: No result Specimen: Urine             CT chest without contrast   Final Interpretation by Paolo Newman MD (01/08 1746)      Multifocal consolidations/nodular opacities as described consistent with an  infectious/inflammatory infiltrate.      Large right pleural effusion.      The study was marked in EPIC for immediate notification.               Workstation performed: [id number]         XR chest portable   Final Interpretation by Victor M Neri MD (01/08 1626)      Mild perihilar patchy airspace disease likely on the basis of pulmonary edema. Moderate right pleural effusion.            Workstation performed: [id number]             ECG 12 Lead Documentation Only    Date/Time: 1/8/2025 3:59 PM    Performed by: Jose David Garcia PA-C  Authorized by: Jose David Garcia PA-C    Indications / Diagnosis:  Shortness of breath.  ECG reviewed by me, the ED Provider: yes    Patient location:  ED  Previous ECG:     Comparison to cardiac monitor: Yes    Interpretation:     Interpretation: abnormal    Rate:     ECG rate:  91    ECG rate assessment: normal    Rhythm:     Rhythm: sinus rhythm    Ectopy:     Ectopy: PAC    QRS:     QRS axis:  Normal    QRS intervals:  Normal  Conduction:     Conduction: normal    ST segments:     ST segments:  Abnormal    Depression:  II, III, aVF, V5, V6 and V4  T waves:     T waves: non-specific    CriticalCare Time    Date/Time: 1/8/2025 3:48 PM    Performed by: Jose David Garcia PA-C  Authorized by: Jose David Garcia PA-C    Critical care provider statement:     Critical care time (minutes):  45    Critical care time was exclusive of:  Separately billable procedures and treating other patients and teaching time    Critical care was necessary to treat or prevent imminent or life-threatening deterioration of the following conditions:  Sepsis and respiratory failure    Critical care was time spent personally by me on the following activities:  Blood draw for specimens, obtaining history from patient or surrogate, development of treatment plan with patient or surrogate, discussions with consultants, evaluation of patient's response to treatment, examination of patient, interpretation of cardiac  output measurements, ordering and performing treatments and interventions, ordering and review of laboratory studies, ordering and review of radiographic studies, re-evaluation of patient's condition and review of old charts    I assumed direction of critical care for this patient from another provider in my specialty: no        ED Medication and Procedure Management   Prior to Admission Medications   Prescriptions Last Dose Informant Patient Reported? Taking?   ALPRAZolam (XANAX) 0.5 mg tablet   No No   Sig: Take 1 tablet (0.5 mg total) by mouth daily at bedtime as needed for anxiety   amLODIPine (NORVASC) 5 mg tablet   No No   Sig: Take 1 tablet (5 mg total) by mouth daily   atorvastatin (LIPITOR) 40 mg tablet  Self No No   Sig: Take 1 tablet (40 mg total) by mouth daily   levothyroxine 88 mcg tablet   No No   Sig: TAKE 1 TABLET (88 MCG TOTAL) BY MOUTH DAILY   lisinopril-hydrochlorothiazide (PRINZIDE,ZESTORETIC) 20-25 MG per tablet   No No   Sig: Take 1 tablet by mouth daily   meloxicam (MOBIC) 15 mg tablet   No No   Sig: TAKE 1 TABLET (15 MG TOTAL) BY MOUTH DAILY      Facility-Administered Medications: None     Patient's Medications   Discharge Prescriptions    No medications on file     No discharge procedures on file.  ED SEPSIS DOCUMENTATION   Time reflects when diagnosis was documented in both MDM as applicable and the Disposition within this note       Time User Action Codes Description Comment    1/8/2025  5:31 PM Jose David Garcia Add [J10.1] Influenza A     1/8/2025  5:31 PM Jose David Garcia Remove [J10.1] Influenza A     1/8/2025  5:31 PM Jose David Garcia Add [J96.01] Acute hypoxic respiratory failure (HCC)     1/8/2025  5:32 PM Jose David Garcia Add [A41.9] Sepsis (HCC)     1/8/2025  5:32 PM Jose David Garcia Add [J18.9] Pneumonia     1/8/2025  5:32 PM Jose David Garcia Add [J10.1] Influenza A     1/8/2025  5:32 PM Jose David Garcia [R79.89] Elevated troponin     1/8/2025  5:32 PM Jose David Garcia  [E87.1] Hyponatremia     1/8/2025  5:32 PM Jose David Garcia Add [E87.6] Hypokalemia     1/8/2025  5:33 PM Jose David Garcia Add [E87.8] Hypochloremia     1/8/2025  5:34 PM Jose David Garcia Add [J90] Pleural effusion, right     1/8/2025  5:39 PM Jose David Garcia Add [D72.825] Bandemia            Initial Sepsis Screening       Row Name 01/08/25 1637                Is the patient's history suggestive of a new or worsening infection? Yes (Proceed)  -BS        Suspected source of infection pneumonia  -BS        Indicate SIRS criteria Hyperthemia > 38.3C (100.9F) OR Hypothermia <36C (96.8F);Tachypnea > 20 resp per min;Leukocytosis (WBC > 20896 IJL) OR Leukopenia (WBC <4000 IJL) OR Bandemia (WBC >10% bands)  -BS        Are two or more of the above signs & symptoms of infection both present and new to the patient? Yes (Proceed)  -BS        Assess for evidence of organ dysfunction: Are any of the below criteria present within 6 hours of suspected infection and SIRS criteria that are NOT considered to be chronic conditions? --                  User Key  (r) = Recorded By, (t) = Taken By, (c) = Cosigned By      Initials Name Provider Type    BS Jose David Garcia PA-C Physician Assistant                       Jose David Garcia PA-C  01/08/25 9917

## 2025-01-08 NOTE — RESPIRATORY THERAPY NOTE
"RT Protocol Note  Dexter Sharp 84 y.o. male MRN: [id number]  Unit/Bed#: ED 15 Encounter: [id number]    Assessment    Principal Problem:    Sepsis (HCC)  Active Problems:    Chronic kidney disease, stage 3a (HCC)    Elevated troponin    Hyponatremia    Large pleural effusion    Acute respiratory failure with hypoxia (HCC)    Hypokalemia      Home Pulmonary Medications:  none       Past Medical History:   Diagnosis Date    Cancer (HCC)     Chronic kidney disease, stage 3a (HCC) 2021    Coronary artery calcification seen on CT scan 2021    COVID-19 2021    Dementia (HCC)     Disease of thyroid gland     Eczema     Generalized anxiety disorder     Hypertension     Prostate cancer (HCC)     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 Last assessed: 2013     Social History     Socioeconomic History    Marital status: /Civil Union     Spouse name: None    Number of children: None    Years of education: None    Highest education level: None   Occupational History    Occupation: self employed  floor covering   Tobacco Use    Smoking status: Former     Current packs/day: 0.00     Types: Cigarettes, Cigars     Quit date:      Years since quittin.0     Passive exposure: Past    Smokeless tobacco: Never    Tobacco comments:     smokes 1-2 cigarss/month   Vaping Use    Vaping status: Never Used   Substance and Sexual Activity    Alcohol use: No    Drug use: No     Comment: Chronic Narcotic use noted in \"allscripts\"     Sexual activity: None   Other Topics Concern    None   Social History Narrative    Caffeine use      Social Drivers of Health     Financial Resource Strain: Low Risk  (2023)    Overall Financial Resource Strain (CARDIA)     Difficulty of Paying Living Expenses: Not hard at all   Food Insecurity: No Food Insecurity (2024)    Nursing - Inadequate Food Risk " Classification     Worried About Running Out of Food in the Last Year: Never true     Ran Out of Food in the Last Year: Never true     Ran Out of Food in the Last Year: Not on file   Transportation Needs: No Transportation Needs (5/6/2024)    PRAPARE - Transportation     Lack of Transportation (Medical): No     Lack of Transportation (Non-Medical): No   Physical Activity: Not on file   Stress: Not on file   Social Connections: Unknown (6/18/2024)    Received from [employer]    Social The Influence     How often do you feel lonely or isolated from those around you? (Adult - for ages 18 years and over): Not on file   Intimate Partner Violence: Not on file   Housing Stability: Low Risk  (5/6/2024)    Housing Stability Vital Sign     Unable to Pay for Housing in the Last Year: No     Number of Times Moved in the Last Year: 0     Homeless in the Last Year: No       Subjective         Objective    Physical Exam:   Assessment Type: (P) Assess only  General Appearance: (P) Alert, Awake  Respiratory Pattern: (P) Dyspnea at rest  Chest Assessment: (P) Chest expansion symmetrical  Bilateral Breath Sounds: (P) Rhonchi, Expiratory wheezes  Cough: (P) Productive  O2 Device: (P) nc    Vitals:  Blood pressure 133/58, pulse 81, temperature 98.6 °F (37 °C), temperature source Oral, resp. rate (!) 24, SpO2 92%.          Imaging and other studies: Results Review Statement: I reviewed radiology reports from this admission including: CT chest.    O2 Device: (P) nc     Plan    Respiratory Plan: (P) Mild Distress pathway  Airway Clearance Plan: (P) Flutter     Resp Comments: (P) Pt admitted for flu. No pulm hx noted. No meds at home. Nonsmoker. Pt with rhonchi and exp. wheezing. Productive cough of brown sputum. Pt with dyspnea at rest. Will order udn tid and flutter valve for mucus clearance

## 2025-01-08 NOTE — SEPSIS NOTE
Sepsis Note   Dexter Sharp 84 y.o. male MRN: [id number]  Unit/Bed#: ED 15 Encounter: [id number]       Initial Sepsis Screening       Row Name 01/08/25 1636                Is the patient's history suggestive of a new or worsening infection? Yes (Proceed)  -BS        Suspected source of infection pneumonia  -BS        Indicate SIRS criteria Hyperthemia > 38.3C (100.9F) OR Hypothermia <36C (96.8F);Tachypnea > 20 resp per min;Leukocytosis (WBC > 39792 IJL) OR Leukopenia (WBC <4000 IJL) OR Bandemia (WBC >10% bands)  -BS        Are two or more of the above signs & symptoms of infection both present and new to the patient? Yes (Proceed)  -BS        Assess for evidence of organ dysfunction: Are any of the below criteria present within 6 hours of suspected infection and SIRS criteria that are NOT considered to be chronic conditions? --                  User Key  (r) = Recorded By, (t) = Taken By, (c) = Cosigned By      Initials Name Provider Type    BS Jose David Garcia PA-C Physician Assistant                        There is no height or weight on file to calculate BMI.  Wt Readings from Last 1 Encounters:   10/31/24 85.6 kg (188 lb 12.8 oz)        Ideal body weight: 68.4 kg (150 lb 12.7 oz)  Adjusted ideal body weight: 75.3 kg (165 lb 15.9 oz)

## 2025-01-08 NOTE — ASSESSMENT & PLAN NOTE
Likely secondary to sepsis.  ER reviewed case with cardiology who recommended admission and monitoring without heparin drip  Trend troponin  Check echo  Consult cardiology

## 2025-01-08 NOTE — ASSESSMENT & PLAN NOTE
Likely multifactorial secondary to sepsis, dehydration  Patient received IV fluids in the ER  Fluid restriction  Follow-up BMP in the morning, if worsening will consult nephrology

## 2025-01-08 NOTE — ASSESSMENT & PLAN NOTE
Patient currently on 3 L nasal cannula  Large right pleural effusion noted on CT imaging  Consult pulmonology, possible IR consult for thoracentesis  Continue IV antibiotics

## 2025-01-08 NOTE — ASSESSMENT & PLAN NOTE
Secondary to influenza and suspected bacterial pneumonia with fever and tachypnea  Lactate normal  Procalcitonin elevated will continue to trend  IV fluids as needed  Check MRSA screen  Strep pneumo/urine Legionella  Sputum culture   Follow-up blood cultures  IV antibiotics with ceftriaxone/vancomycin

## 2025-01-08 NOTE — ASSESSMENT & PLAN NOTE
Lab Results   Component Value Date    EGFR 45 01/08/2025    EGFR 54 05/20/2024    EGFR 67 09/20/2023    CREATININE 1.38 (H) 01/08/2025    CREATININE 1.21 05/20/2024    CREATININE 1.02 09/20/2023   Creatinine slightly above baseline.  Will repeat BMP in the morning.  Patient did receive IV fluids in the ER

## 2025-01-08 NOTE — H&P
H&P - Hospitalist   Name: Dexter Sharp 84 y.o. male I MRN: [id number]  Unit/Bed#: ED 15 I Date of Admission: 1/8/2025   Date of Service: 1/8/2025 I Hospital Day: 0     Assessment & Plan  Sepsis (HCC)  Secondary to influenza and suspected bacterial pneumonia with fever and tachypnea  Lactate normal  Procalcitonin elevated will continue to trend  IV fluids as needed  Check MRSA screen  Strep pneumo/urine Legionella  Sputum culture   Follow-up blood cultures  IV antibiotics with ceftriaxone/vancomycin  Chronic kidney disease, stage 3a (HCC)  Lab Results   Component Value Date    EGFR 45 01/08/2025    EGFR 54 05/20/2024    EGFR 67 09/20/2023    CREATININE 1.38 (H) 01/08/2025    CREATININE 1.21 05/20/2024    CREATININE 1.02 09/20/2023   Creatinine slightly above baseline.  Will repeat BMP in the morning.  Patient did receive IV fluids in the ER  Elevated troponin  Likely secondary to sepsis.  ER reviewed case with cardiology who recommended admission and monitoring without heparin drip  Trend troponin  Check echo  Consult cardiology  Hyponatremia  Likely multifactorial secondary to sepsis, dehydration  Patient received IV fluids in the ER  Fluid restriction  Follow-up BMP in the morning, if worsening will consult nephrology  Large pleural effusion  Patient currently on 3 L nasal cannula  Large right pleural effusion noted on CT imaging  Consult pulmonology, possible IR consult for thoracentesis  Continue IV antibiotics  Acute respiratory failure with hypoxia (HCC)  Secondary to pneumonia, influenza, and large effusion  Continue oxygen, titrate as tolerated  Pulmonology consult  Hypokalemia  Supplemented in the ER.  Will follow-up BMP in the morning      VTE Pharmacologic Prophylaxis:   Moderate Risk (Score 3-4) - Pharmacological DVT Prophylaxis Ordered: enoxaparin (Lovenox).  Code Status: Level 3 - DNAR and DNI   Discussion with family: Updated  (wife) via phone.    Anticipated Length of Stay: Patient  will be admitted on an inpatient basis with an anticipated length of stay of greater than 2 midnights secondary to sepsis.    History of Present Illness   Chief Complaint: SOB    Dexter Sharp is a 84 y.o. male with a PMH of CKD3, HTN who presents with SOB.  Patient states that over the last few days he has had increasing shortness of breath and cough.  Cough is productive of yellow/brown sputum.  Also has been having fever and chills.  He has had generalized weakness and fatigue with a few falls over the last couple of days.  No head injury or loss of consciousness.  Denies any chest pain.    In the ER patient found to be septic with influenza A positive and concern for possible pneumonia on imaging studies.  Started on IV antibiotics per sepsis protocol.  Also noted to have elevated troponin, ER reviewed case with cardiology who recommended admission and treatment for underlying cause which is likely the sepsis.    Review of Systems   Constitutional:  Positive for activity change, appetite change, chills, fatigue and fever.   Respiratory:  Positive for cough and shortness of breath.    Cardiovascular:  Negative for chest pain.   All other systems reviewed and are negative.      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 Last assessed: April 18, 2013     Past Surgical History:   Procedure Laterality Date    CATARACT EXTRACTION      COLONOSCOPY      COLONOSCOPY  11/26/2019    EYE SURGERY      KNEE SURGERY      PROSTATE SURGERY      VASECTOMY       Social History  "    Tobacco Use    Smoking status: Former     Current packs/day: 0.00     Types: Cigarettes, Cigars     Quit date:      Years since quittin.0     Passive exposure: Past    Smokeless tobacco: Never    Tobacco comments:     smokes 1-2 cigarss/month   Vaping Use    Vaping status: Never Used   Substance and Sexual Activity    Alcohol use: No    Drug use: No     Comment: Chronic Narcotic use noted in \"allscripts\"     Sexual activity: Not on file     E-Cigarette/Vaping    E-Cigarette Use Never User      E-Cigarette/Vaping Substances    Nicotine No     THC No     CBD No     Flavoring No     Other No     Unknown No      Family History   Problem Relation Age of Onset    Alcohol abuse Mother     Alcohol abuse Father     Depression Father     No Known Problems Sister     Stroke Brother         syndrome     No Known Problems Maternal Grandmother     No Known Problems Maternal Grandfather     No Known Problems Paternal Grandmother     No Known Problems Paternal Grandfather     Alcohol abuse Family     Colon cancer Maternal Aunt      Social History:  Marital Status: /Civil Union   Occupation: none  Patient Pre-hospital Living Situation: Home  Patient Pre-hospital Level of Mobility: walks with walker  Patient Pre-hospital Diet Restrictions: none    Meds/Allergies   I have reviewed home medications with patient personally.  Prior to Admission medications    Medication Sig Start Date End Date Taking? Authorizing Provider   ALPRAZolam (XANAX) 0.5 mg tablet Take 1 tablet (0.5 mg total) by mouth daily at bedtime as needed for anxiety 24   Edil Mac,    amLODIPine (NORVASC) 5 mg tablet Take 1 tablet (5 mg total) by mouth daily 24   Edil Mac DO   atorvastatin (LIPITOR) 40 mg tablet Take 1 tablet (40 mg total) by mouth daily 10/14/24   Edil Mac DO   levothyroxine 88 mcg tablet TAKE 1 TABLET (88 MCG TOTAL) BY MOUTH DAILY 24   Edil Mac, DO   lisinopril-hydrochlorothiazide " (PRINZIDE,ZESTORETIC) 20-25 MG per tablet Take 1 tablet by mouth daily 11/12/24   Edil Mac DO   meloxicam (MOBIC) 15 mg tablet TAKE 1 TABLET (15 MG TOTAL) BY MOUTH DAILY 11/8/24   Edil Mac DO     No Known Allergies    Objective :  Temp:  [98.6 °F (37 °C)-103.3 °F (39.6 °C)] 98.6 °F (37 °C)  HR:  [73-90] 81  BP: (127-169)/(57-72) 133/58  Resp:  [18-24] 24  SpO2:  [86 %-93 %] 92 %  O2 Device: Nasal cannula  Nasal Cannula O2 Flow Rate (L/min):  [3 L/min] 3 L/min    Physical Exam  Constitutional:       Appearance: He is ill-appearing.   HENT:      Head: Normocephalic and atraumatic.      Nose: Nose normal.      Mouth/Throat:      Mouth: Mucous membranes are dry.   Eyes:      Extraocular Movements: Extraocular movements intact.      Conjunctiva/sclera: Conjunctivae normal.   Cardiovascular:      Rate and Rhythm: Regular rhythm. Tachycardia present.   Pulmonary:      Breath sounds: Rhonchi present.      Comments: Tachypneic with decreased breath sounds bilateral bases  Abdominal:      Palpations: Abdomen is soft.      Tenderness: There is no abdominal tenderness.   Musculoskeletal:         General: No swelling or tenderness. Normal range of motion.      Cervical back: Normal range of motion and neck supple.   Skin:     General: Skin is warm and dry.   Neurological:      General: No focal deficit present.      Mental Status: He is alert. Mental status is at baseline.      Cranial Nerves: No cranial nerve deficit.   Psychiatric:         Mood and Affect: Mood normal.         Behavior: Behavior normal.          Lines/Drains:            Lab Results: I have reviewed the following results:  Results from last 7 days   Lab Units 01/08/25  1554   WBC Thousand/uL 15.11*   HEMOGLOBIN g/dL 14.4   HEMATOCRIT % 41.1   PLATELETS Thousands/uL 151   BANDS PCT % 18*   LYMPHO PCT % 2*   MONO PCT % 10   EOS PCT % 0     Results from last 7 days   Lab Units 01/08/25  1554   SODIUM mmol/L 129*   POTASSIUM mmol/L 3.3*   CHLORIDE  mmol/L 91*   CO2 mmol/L 24   BUN mg/dL 29*   CREATININE mg/dL 1.38*   ANION GAP mmol/L 14*   CALCIUM mg/dL 8.5   ALBUMIN g/dL 4.0   TOTAL BILIRUBIN mg/dL 1.03*   ALK PHOS U/L 57   ALT U/L 43   AST U/L 127*   GLUCOSE RANDOM mg/dL 142*     Results from last 7 days   Lab Units 01/08/25  1554   INR  1.07         Lab Results   Component Value Date    HGBA1C 6.0 (H) 01/03/2025    HGBA1C 6.1 (H) 05/20/2024    HGBA1C 6.2 (H) 01/05/2024     Results from last 7 days   Lab Units 01/08/25  1554   LACTIC ACID mmol/L 1.9   PROCALCITONIN ng/ml 8.62*       Imaging Results Review: I reviewed radiology reports from this admission including: CT chest.  Other Study Results Review: No additional pertinent studies reviewed.    Administrative Statements       ** Please Note: This note has been constructed using a voice recognition system. **

## 2025-01-09 ENCOUNTER — TELEPHONE (OUTPATIENT)
Dept: CARDIOLOGY CLINIC | Facility: CLINIC | Age: 85
End: 2025-01-09

## 2025-01-09 ENCOUNTER — APPOINTMENT (INPATIENT)
Dept: RADIOLOGY | Facility: HOSPITAL | Age: 85
DRG: 871 | End: 2025-01-09
Payer: COMMERCIAL

## 2025-01-09 ENCOUNTER — APPOINTMENT (INPATIENT)
Dept: NON INVASIVE DIAGNOSTICS | Facility: HOSPITAL | Age: 85
DRG: 871 | End: 2025-01-09
Payer: COMMERCIAL

## 2025-01-09 PROBLEM — J10.1 INFLUENZA A: Status: ACTIVE | Noted: 2025-01-09

## 2025-01-09 PROBLEM — J09.X1 INFLUENZA A WITH PNEUMONIA: Status: ACTIVE | Noted: 2025-01-09

## 2025-01-09 LAB
ALBUMIN SERPL BCG-MCNC: 3.7 G/DL (ref 3.5–5)
ALP SERPL-CCNC: 48 U/L (ref 34–104)
ALT SERPL W P-5'-P-CCNC: 55 U/L (ref 7–52)
ANION GAP SERPL CALCULATED.3IONS-SCNC: 8 MMOL/L (ref 4–13)
AORTIC ROOT: 3.6 CM
APPEARANCE FLD: CLEAR
ASCENDING AORTA: 3.5 CM
AST SERPL W P-5'-P-CCNC: 163 U/L (ref 13–39)
BILIRUB SERPL-MCNC: 0.7 MG/DL (ref 0.2–1)
BSA FOR ECHO PROCEDURE: 1.99 M2
BUN SERPL-MCNC: 22 MG/DL (ref 5–25)
CALCIUM SERPL-MCNC: 8.5 MG/DL (ref 8.4–10.2)
CHLORIDE SERPL-SCNC: 98 MMOL/L (ref 96–108)
CO2 SERPL-SCNC: 27 MMOL/L (ref 21–32)
COLOR FLD: YELLOW
CREAT SERPL-MCNC: 1.1 MG/DL (ref 0.6–1.3)
E WAVE DECELERATION TIME: 229 MS
E/A RATIO: 0.83
ERYTHROCYTE [DISTWIDTH] IN BLOOD BY AUTOMATED COUNT: 13.2 % (ref 11.6–15.1)
FRACTIONAL SHORTENING: 20 (ref 28–44)
GFR SERPL CREATININE-BSD FRML MDRD: 60 ML/MIN/1.73SQ M
GLUCOSE FLD-MCNC: 166 MG/DL
GLUCOSE SERPL-MCNC: 153 MG/DL (ref 65–140)
HCT VFR BLD AUTO: 41 % (ref 36.5–49.3)
HGB BLD-MCNC: 14.4 G/DL (ref 12–17)
HISTIOCYTES NFR FLD: 61 %
INTERVENTRICULAR SEPTUM IN DIASTOLE (PARASTERNAL SHORT AXIS VIEW): 0.7 CM
INTERVENTRICULAR SEPTUM: 0.7 CM (ref 0.6–1.1)
L PNEUMO1 AG UR QL IA.RAPID: NEGATIVE
LAAS-AP2: 14.8 CM2
LAAS-AP4: 12.8 CM2
LDH FLD L TO P-CCNC: 136 U/L
LDH SERPL-CCNC: 430 U/L (ref 140–271)
LEFT ATRIUM SIZE: 3.9 CM
LEFT ATRIUM VOLUME (MOD BIPLANE): 34 ML
LEFT ATRIUM VOLUME INDEX (MOD BIPLANE): 17.1 ML/M2
LEFT INTERNAL DIMENSION IN SYSTOLE: 3.9 CM (ref 2.1–4)
LEFT VENTRICULAR INTERNAL DIMENSION IN DIASTOLE: 4.9 CM (ref 3.5–6)
LEFT VENTRICULAR POSTERIOR WALL IN END DIASTOLE: 0.8 CM
LEFT VENTRICULAR STROKE VOLUME: 48 ML
LV EF US.2D.A4C+ESTIMATED: 61 %
LVSV (TEICH): 48 ML
LYMPHOCYTES NFR BLD AUTO: 5 %
MAGNESIUM SERPL-MCNC: 1.9 MG/DL (ref 1.9–2.7)
MCH RBC QN AUTO: 31.3 PG (ref 26.8–34.3)
MCHC RBC AUTO-ENTMCNC: 35.1 G/DL (ref 31.4–37.4)
MCV RBC AUTO: 89 FL (ref 82–98)
MONO+MESO NFR FLD MANUAL: 5 %
MONOCYTES NFR BLD AUTO: 5 %
MV PEAK A VEL: 1.03 M/S
MV PEAK E VEL: 86 CM/S
MV STENOSIS PRESSURE HALF TIME: 66 MS
MV VALVE AREA P 1/2 METHOD: 3.33
NEUTS SEG NFR BLD AUTO: 8 %
PH BODY FLUID: 7.6
PLATELET # BLD AUTO: 161 THOUSANDS/UL (ref 149–390)
PMV BLD AUTO: 10.3 FL (ref 8.9–12.7)
POTASSIUM SERPL-SCNC: 3.6 MMOL/L (ref 3.5–5.3)
PROCALCITONIN SERPL-MCNC: 14.44 NG/ML
PROT FLD-MCNC: 3.8 G/DL
PROT SERPL-MCNC: 6.6 G/DL (ref 6.4–8.4)
RBC # BLD AUTO: 4.6 MILLION/UL (ref 3.88–5.62)
RIGHT ATRIUM AREA SYSTOLE A4C: 13.5 CM2
RIGHT VENTRICLE ID DIMENSION: 2.8 CM
S PNEUM AG UR QL: NEGATIVE
SITE: NORMAL
SL CV LEFT ATRIUM LENGTH A2C: 4.4 CM
SL CV LV EF: 60
SL CV PED ECHO LEFT VENTRICLE DIASTOLIC VOLUME (MOD BIPLANE) 2D: 114 ML
SL CV PED ECHO LEFT VENTRICLE SYSTOLIC VOLUME (MOD BIPLANE) 2D: 66 ML
SODIUM SERPL-SCNC: 133 MMOL/L (ref 135–147)
TOTAL CELLS COUNTED SPEC: 100
TR MAX PG: 43 MMHG
TR PEAK VELOCITY: 3.3 M/S
TRICUSPID ANNULAR PLANE SYSTOLIC EXCURSION: 2.2 CM
TRICUSPID VALVE PEAK REGURGITATION VELOCITY: 3.28 M/S
VANCOMYCIN SERPL-MCNC: 11.3 UG/ML (ref 10–20)
WBC # BLD AUTO: 23.2 THOUSAND/UL (ref 4.31–10.16)
WBC # FLD MANUAL: 236 /UL
WBC OTHER NFR FLD MANUAL: 16 %

## 2025-01-09 PROCEDURE — 87070 CULTURE OTHR SPECIMN AEROBIC: CPT | Performed by: INTERNAL MEDICINE

## 2025-01-09 PROCEDURE — 88341 IMHCHEM/IMCYTCHM EA ADD ANTB: CPT | Performed by: STUDENT IN AN ORGANIZED HEALTH CARE EDUCATION/TRAINING PROGRAM

## 2025-01-09 PROCEDURE — 83735 ASSAY OF MAGNESIUM: CPT | Performed by: INTERNAL MEDICINE

## 2025-01-09 PROCEDURE — 80053 COMPREHEN METABOLIC PANEL: CPT | Performed by: INTERNAL MEDICINE

## 2025-01-09 PROCEDURE — 88342 IMHCHEM/IMCYTCHM 1ST ANTB: CPT | Performed by: STUDENT IN AN ORGANIZED HEALTH CARE EDUCATION/TRAINING PROGRAM

## 2025-01-09 PROCEDURE — 99223 1ST HOSP IP/OBS HIGH 75: CPT | Performed by: INTERNAL MEDICINE

## 2025-01-09 PROCEDURE — 99232 SBSQ HOSP IP/OBS MODERATE 35: CPT | Performed by: INTERNAL MEDICINE

## 2025-01-09 PROCEDURE — 83986 ASSAY PH BODY FLUID NOS: CPT | Performed by: INTERNAL MEDICINE

## 2025-01-09 PROCEDURE — 84157 ASSAY OF PROTEIN OTHER: CPT | Performed by: INTERNAL MEDICINE

## 2025-01-09 PROCEDURE — 88312 SPECIAL STAINS GROUP 1: CPT | Performed by: STUDENT IN AN ORGANIZED HEALTH CARE EDUCATION/TRAINING PROGRAM

## 2025-01-09 PROCEDURE — 85027 COMPLETE CBC AUTOMATED: CPT | Performed by: INTERNAL MEDICINE

## 2025-01-09 PROCEDURE — 83615 LACTATE (LD) (LDH) ENZYME: CPT | Performed by: INTERNAL MEDICINE

## 2025-01-09 PROCEDURE — 93306 TTE W/DOPPLER COMPLETE: CPT | Performed by: INTERNAL MEDICINE

## 2025-01-09 PROCEDURE — 93306 TTE W/DOPPLER COMPLETE: CPT

## 2025-01-09 PROCEDURE — 88112 CYTOPATH CELL ENHANCE TECH: CPT | Performed by: STUDENT IN AN ORGANIZED HEALTH CARE EDUCATION/TRAINING PROGRAM

## 2025-01-09 PROCEDURE — 94640 AIRWAY INHALATION TREATMENT: CPT

## 2025-01-09 PROCEDURE — 89051 BODY FLUID CELL COUNT: CPT | Performed by: INTERNAL MEDICINE

## 2025-01-09 PROCEDURE — NC001 PR NO CHARGE: Performed by: INTERNAL MEDICINE

## 2025-01-09 PROCEDURE — 92610 EVALUATE SWALLOWING FUNCTION: CPT

## 2025-01-09 PROCEDURE — 71045 X-RAY EXAM CHEST 1 VIEW: CPT

## 2025-01-09 PROCEDURE — 82945 GLUCOSE OTHER FLUID: CPT | Performed by: INTERNAL MEDICINE

## 2025-01-09 PROCEDURE — 97166 OT EVAL MOD COMPLEX 45 MIN: CPT

## 2025-01-09 PROCEDURE — 88305 TISSUE EXAM BY PATHOLOGIST: CPT | Performed by: STUDENT IN AN ORGANIZED HEALTH CARE EDUCATION/TRAINING PROGRAM

## 2025-01-09 PROCEDURE — 87205 SMEAR GRAM STAIN: CPT | Performed by: INTERNAL MEDICINE

## 2025-01-09 PROCEDURE — 80202 ASSAY OF VANCOMYCIN: CPT | Performed by: INTERNAL MEDICINE

## 2025-01-09 PROCEDURE — 97163 PT EVAL HIGH COMPLEX 45 MIN: CPT

## 2025-01-09 PROCEDURE — 84145 PROCALCITONIN (PCT): CPT | Performed by: INTERNAL MEDICINE

## 2025-01-09 PROCEDURE — 94668 MNPJ CHEST WALL SBSQ: CPT

## 2025-01-09 PROCEDURE — 0W993ZX DRAINAGE OF RIGHT PLEURAL CAVITY, PERCUTANEOUS APPROACH, DIAGNOSTIC: ICD-10-PCS | Performed by: INTERNAL MEDICINE

## 2025-01-09 PROCEDURE — 32555 ASPIRATE PLEURA W/ IMAGING: CPT | Performed by: INTERNAL MEDICINE

## 2025-01-09 PROCEDURE — 94664 DEMO&/EVAL PT USE INHALER: CPT

## 2025-01-09 PROCEDURE — 94760 N-INVAS EAR/PLS OXIMETRY 1: CPT

## 2025-01-09 RX ORDER — FUROSEMIDE 10 MG/ML
20 INJECTION INTRAMUSCULAR; INTRAVENOUS ONCE
Status: COMPLETED | OUTPATIENT
Start: 2025-01-09 | End: 2025-01-09

## 2025-01-09 RX ORDER — CEFTRIAXONE 1 G/50ML
1000 INJECTION, SOLUTION INTRAVENOUS EVERY 24 HOURS
Status: DISCONTINUED | OUTPATIENT
Start: 2025-01-09 | End: 2025-01-10

## 2025-01-09 RX ADMIN — SODIUM CHLORIDE SOLN NEBU 3% 4 ML: 3 NEBU SOLN at 07:24

## 2025-01-09 RX ADMIN — LEVALBUTEROL HYDROCHLORIDE 1.25 MG: 1.25 SOLUTION RESPIRATORY (INHALATION) at 07:24

## 2025-01-09 RX ADMIN — AMLODIPINE BESYLATE 5 MG: 5 TABLET ORAL at 08:07

## 2025-01-09 RX ADMIN — VANCOMYCIN HYDROCHLORIDE 1250 MG: 1 INJECTION, POWDER, LYOPHILIZED, FOR SOLUTION INTRAVENOUS at 13:51

## 2025-01-09 RX ADMIN — CEFTRIAXONE 1000 MG: 1 INJECTION, SOLUTION INTRAVENOUS at 16:49

## 2025-01-09 RX ADMIN — OSELTAMAVIR PHOSPHATE 30 MG: 30 CAPSULE ORAL at 21:28

## 2025-01-09 RX ADMIN — LEVOTHYROXINE SODIUM 88 MCG: 88 TABLET ORAL at 06:32

## 2025-01-09 RX ADMIN — ENOXAPARIN SODIUM 40 MG: 40 INJECTION SUBCUTANEOUS at 08:08

## 2025-01-09 RX ADMIN — SODIUM CHLORIDE SOLN NEBU 3% 4 ML: 3 NEBU SOLN at 13:11

## 2025-01-09 RX ADMIN — ATORVASTATIN CALCIUM 40 MG: 40 TABLET, FILM COATED ORAL at 08:07

## 2025-01-09 RX ADMIN — LEVALBUTEROL HYDROCHLORIDE 1.25 MG: 1.25 SOLUTION RESPIRATORY (INHALATION) at 19:39

## 2025-01-09 RX ADMIN — AZITHROMYCIN MONOHYDRATE 500 MG: 500 INJECTION, POWDER, LYOPHILIZED, FOR SOLUTION INTRAVENOUS at 17:38

## 2025-01-09 RX ADMIN — SODIUM CHLORIDE SOLN NEBU 3% 4 ML: 3 NEBU SOLN at 19:39

## 2025-01-09 RX ADMIN — HYDROCODONE POLISTIREX AND CHLORPHENIRAMINE POLISTIREX 5 ML: 10; 8 SUSPENSION, EXTENDED RELEASE ORAL at 15:03

## 2025-01-09 RX ADMIN — ASPIRIN 81 MG: 81 TABLET, COATED ORAL at 08:10

## 2025-01-09 RX ADMIN — OSELTAMAVIR PHOSPHATE 30 MG: 30 CAPSULE ORAL at 08:07

## 2025-01-09 RX ADMIN — FUROSEMIDE 20 MG: 10 INJECTION, SOLUTION INTRAMUSCULAR; INTRAVENOUS at 04:39

## 2025-01-09 RX ADMIN — LEVALBUTEROL HYDROCHLORIDE 1.25 MG: 1.25 SOLUTION RESPIRATORY (INHALATION) at 13:11

## 2025-01-09 NOTE — QUICK NOTE
Pt admitted with sepsis secondary to influenza and bacterial pneumonia.  Troponin was noted to be elevated in this setting, but subsequent values were flat trending.  There is no report of chest pain or discomfort.    Initial EKG was notable for NSSTW changes in the inferior leads, slightly more pronounced compared to prior tracings, but resolved on subsequent tracings.    Echocardiogram this morning without wall motion abnormalities, preserved EF 60%.    Discussed with medical team.  Will arrange for outpatient follow-up once recovered from acute illness.

## 2025-01-09 NOTE — OCCUPATIONAL THERAPY NOTE
Occupational Therapy Evaluation     Patient Name: Dexter Sharp  Today's Date: 1/9/2025  Problem List  Principal Problem:    Sepsis (HCC)  Active Problems:    Chronic kidney disease, stage 3a (HCC)    Elevated troponin    Hyponatremia    Large pleural effusion    Acute respiratory failure with hypoxia (HCC)    Hypokalemia    Past Medical History  Past Medical History:   Diagnosis Date    Cancer (HCC)     Chronic kidney disease, stage 3a (HCC) 06/05/2021    Coronary artery calcification seen on CT scan 11/07/2021    COVID-19 11/06/2021    Dementia (HCC)     Disease of thyroid gland     Eczema     Generalized anxiety disorder     Hypertension     Prostate cancer (HCC)     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 Last assessed: April 18, 2013     Past Surgical History  Past Surgical History:   Procedure Laterality Date    CATARACT EXTRACTION      COLONOSCOPY      COLONOSCOPY  11/26/2019    EYE SURGERY      KNEE SURGERY      PROSTATE SURGERY      VASECTOMY          01/09/25 1048   OT Last Visit   OT Visit Date 01/09/25   Note Type   Note type Evaluation   Additional Comments Pt seen as a co-eval with PT due to the patient's co-morbidities, clinically unstable presentation, and present impairments which are a regression from the patient's baseline.   Pain Assessment   Pain Assessment Tool 0-10   Pain Score No Pain   Restrictions/Precautions   Other Precautions Droplet precautions;Contact/isolation;Chair Alarm;Bed Alarm;Fall Risk;O2  (2L O2 via NC, does wear at baseline)   Home Living   Type of Home House   Home Layout Two level;Stairs to enter with rails;Bed/bath upstairs  (2 YE w/ HR; FOS to 2nd floor - full bath downstairs)   Bathroom Shower/Tub Tub/shower unit   Bathroom Toilet Standard   Bathroom Equipment   (no DME reported)   Bathroom Accessibility Accessible   Home Equipment Cane  (Pt reports SPC used  "at baseline during community mobility)   Prior Function   Level of [place] Independent with ADLs;Independent with functional mobility;Independent with IADLS   Lives With Spouse   Receives Help From Family   IADLs Independent with driving;Independent with medication management;Independent with meal prep   Falls in the last 6 months 1 to 4  (x1 fall slipping on ice)   Vocational Retired  (Retail business; HS )   Lifestyle   Autonomy Pt resides in two level home w/ spouse; I with ADLs and IADLs; +    Reciprocal Relationships supportive family and friends   Service to Others retired   Intrinsic Gratification seeing friends   Subjective   Subjective \"I want to get up and move around\"   ADL   Where Assessed Edge of bed   Eating Assistance 7  Independent   Grooming Assistance 7  Independent   UB Bathing Assistance 5  Supervision/Setup   LB Bathing Assistance 5  Supervision/Setup   UB Dressing Assistance 5  Supervision/Setup   LB Dressing Assistance 5  Supervision/Setup   Toileting Assistance  4  Minimal Assistance   Additional Comments Given functional performance skills + medical complexity, therapist suspects via clinical judgement + skilled analysis; pt currently requires stated assist above to perform each area of ADLs d/t limitations including: functional mobility, functional activity tolerance, coordination and balance   Bed Mobility   Supine to Sit 5  Supervision   Additional items Assist x 1;HOB elevated;Bedrails;Increased time required   Sit to Supine   (DNT; pt seated in recliner upon conclusion of session)   Additional Comments VC for bedrail utilization and proper body mechanics   Transfers   Sit to Stand 4  Minimal assistance   Additional items Assist x 1;Increased time required;Armrests   Stand to Sit 5  Supervision   Additional items Assist x 1;Increased time required;Verbal cues   Stand pivot 4  Minimal assistance   Additional items Assist x 1;Increased time required   Additional " Comments RW used; VC for safe hand placement and proper body mechanics; initial dizziness reported with positional changes but sympomatic resolution with increased time  (SpO2 > 90% on RA during mobility; placed patient on 2L O2 via NC upon conclusion of session)   Balance   Static Sitting Good   Dynamic Sitting Fair +   Static Standing Fair +   Dynamic Standing Fair   Ambulatory Fair   Activity Tolerance   Activity Tolerance Patient limited by fatigue   Medical Staff Made Aware Yes, CM made aware of d/c recs   Nurse Made Aware Yes, nursing staff made aware of session outcomes   RUE Assessment   RUE Assessment WFL   LUE Assessment   LUE Assessment WFL   Vision-Basic Assessment   Current Vision Wears glasses all the time   Psychosocial   Psychosocial (WDL) WDL   Cognition   Overall Cognitive Status WFL   Arousal/Participation Alert;Responsive;Cooperative   Attention Within functional limits   Orientation Level Oriented X4   Memory Decreased recall of precautions   Following Commands Follows all commands and directions without difficulty   Comments Pt agreeable to OT evaluation, pleasant   Assessment   Limitation Decreased ADL status;Decreased UE strength;Decreased Safe judgement during ADL;Decreased endurance;Decreased high-level ADLs   Prognosis Good   Assessment Pt is a 84 y.o. male seen for OT evaluation s/p admit to [facility] on 1/8/2025 w/ Sepsis (HCC).  Comorbidities affecting pt's functional performance at time of assessment include: CKD, elevated troponin, large pleural effusion, NADIA, OA. Personal factors affecting pt at time of IE include:steps to enter environment, difficulty performing ADLS, difficulty performing IADLS , and health management . Prior to admission, pt was I with ADLs and IADLs. Upon evaluation: the following deficits impact occupational performance: weakness, decreased strength, decreased balance, decreased tolerance, and decreased safety awareness. Pt to benefit from continued skilled OT  tx while in the hospital to address deficits as defined above and maximize level of functional independence w ADL's and functional mobility. Occupational Performance areas to address include: bathing/shower, toilet hygiene, dressing, functional mobility, community mobility, and clothing management. From OT standpoint, recommendation at time of d/c would with minimal intensity OT resources.   Goals   Patient Goals to go for a walk   Plan   Treatment Interventions ADL retraining;Functional transfer training;UE strengthening/ROM;Endurance training;Patient/family training;Energy conservation;Activityengagement   Goal Expiration Date 01/19/25   OT Treatment Day 0   OT Frequency 2-3x/wk   Discharge Recommendation   Rehab Resource Intensity Level, OT III (Minimum Resource Intensity)   Additional Comments  The patient's raw score on the AM-PAC Daily Activity inpatient short form is 20, standardized score is 42.03, greater than 39.4. Patients at this level are likely to benefit from discharge with minimal intensity OT resources. Please refer to the recommendation of the Occupational Therapist for safe discharge planning.   AM-PAC Daily Activity Inpatient   Lower Body Dressing 3   Bathing 3   Toileting 3   Upper Body Dressing 3   Grooming 4   Eating 4   Daily Activity Raw Score 20   Daily Activity Standardized Score (Calc for Raw Score >=11) 42.03   AM-PAC Applied Cognition Inpatient   Following a Speech/Presentation 3   Understanding Ordinary Conversation 4   Taking Medications 4   Remembering Where Things Are Placed or Put Away 4   Remembering List of 4-5 Errands 3   Taking Care of Complicated Tasks 3   Applied Cognition Raw Score 21   Applied Cognition Standardized Score 44.3     GOALS:    Pt will achieve the following within specified time frame: LTG  Pt will achieve the following goals within 10 days    *ADL transfers with (I) for inc'd independence with ADLs/purposeful tasks    *UB ADL with (I) for inc'd independence  with self cares    *LB ADL with (I) using AE prn for inc'd independence with self cares    *Toileting with (I) for clothing management and hygiene for return to PLOF with personal care    *Increase static stand balance and dyn stand balance to G- for inc'd safety with standing purposeful tasks    *Increase stand tolerance x7 m for inc'd tolerance with standing purposeful tasks    *Bed mobility- (I) for inc'd independence to manage own comfort and initiate EOB & OOB purposeful tasks    *Participate in 10-15m UE therex to increase overall stamina/activity tolerance for purposeful tasks      Morena Castle MS, OTR/L

## 2025-01-09 NOTE — ASSESSMENT & PLAN NOTE
Likely multifactorial secondary to sepsis, dehydration  Patient received IV fluids in the ER  Fluid restriction  Sodium level gradually improving

## 2025-01-09 NOTE — PROGRESS NOTES
Progress Note - Hospitalist   Name: Dexter Sharp 84 y.o. male I MRN: [id number]  Unit/Bed#: -01 I Date of Admission: 1/8/2025   Date of Service: 1/9/2025 I Hospital Day: 1    Assessment & Plan  Sepsis (HCC)  Secondary to influenza and suspected bacterial pneumonia with fever and tachypnea  Lactate normal  Procalcitonin elevated will continue to trend  IV fluids as needed  Screen pending  Strep pneumo/urine Legionella negative  Sputum culture pending  Follow-up blood cultures pending  IV antibiotics with ceftriaxone/vancomycin  Chronic kidney disease, stage 3a (HCC)  Lab Results   Component Value Date    EGFR 60 01/09/2025    EGFR 45 01/08/2025    EGFR 54 05/20/2024    CREATININE 1.10 01/09/2025    CREATININE 1.38 (H) 01/08/2025    CREATININE 1.21 05/20/2024   Creatinine improving.  Will monitor BMP as needed  Elevated troponin  Likely secondary to sepsis.  ER reviewed case with cardiology who recommended admission and monitoring without heparin drip  Troponins have been flat  Reviewed case with cardiology, no acute invention needed at this time  Per cardiology, echo with EF of 60% and no wall motion normalities noted  No further workup at this time unless change in patient's clinical condition  Hyponatremia  Likely multifactorial secondary to sepsis, dehydration  Patient received IV fluids in the ER  Fluid restriction  Sodium level gradually improving  Pleural effusion on right  Patient currently on 3 L nasal cannula  Large right pleural effusion noted on CT imaging  Pulmonology consulted.  Patient had thoracentesis performed on 1/9/2025 with approximately 1.5 L removed  Fluid studies pending  Acute respiratory failure with hypoxia (HCC)  Patient is not on oxygen at home  Secondary to pneumonia, influenza, and large effusion  Continue oxygen, titrate as tolerated  Pulmonology consult  Hypokalemia  Improved with supplementation    VTE Pharmacologic Prophylaxis:   Moderate Risk (Score 3-4) - Pharmacological  DVT Prophylaxis Ordered: enoxaparin (Lovenox).    Mobility:   Basic Mobility Inpatient Raw Score: 18  JH-HLM Goal: 6: Walk 10 steps or more  JH-HLM Achieved: 5: Stand (1 or more minutes)  JH-HLM Goal achieved. Continue to encourage appropriate mobility.    Patient Centered Rounds: I performed bedside rounds with nursing staff today.   Discussions with Specialists or Other Care Team Provider: yes    Education and Discussions with Family / Patient: Updated  (significant other) at bedside.    Current Length of Stay: 1 day(s)  Current Patient Status: Inpatient   Certification Statement: The patient will continue to require additional inpatient hospital stay due to pneumonia  Discharge Plan: Anticipate discharge in 24-48 hrs to home.    Code Status: Level 1 - Full Code    Subjective   No overnight events noted.  Patient had thoracentesis performed today with pulmonology.  States he is feeling slightly better however still with shortness of breath and cough    Objective :  Temp:  [97.7 °F (36.5 °C)-103.3 °F (39.6 °C)] 97.8 °F (36.6 °C)  HR:  [63-90] 81  BP: (100-169)/(54-72) 125/54  Resp:  [18-24] 18  SpO2:  [86 %-100 %] 94 %  O2 Device: Nasal cannula  Nasal Cannula O2 Flow Rate (L/min):  [1 L/min-3 L/min] 1 L/min    Body mass index is 28.59 kg/m².     Input and Output Summary (last 24 hours):     Intake/Output Summary (Last 24 hours) at 1/9/2025 1510  Last data filed at 1/9/2025 1400  Gross per 24 hour   Intake 2762 ml   Output 825 ml   Net 1937 ml       Physical Exam  Constitutional:       General: He is not in acute distress.     Appearance: He is ill-appearing.   HENT:      Head: Normocephalic and atraumatic.      Nose: Nose normal.      Mouth/Throat:      Mouth: Mucous membranes are moist.   Eyes:      Extraocular Movements: Extraocular movements intact.      Conjunctiva/sclera: Conjunctivae normal.   Cardiovascular:      Rate and Rhythm: Normal rate and regular rhythm.   Pulmonary:      Effort:  Pulmonary effort is normal. No respiratory distress.      Breath sounds: Rhonchi present.   Abdominal:      Palpations: Abdomen is soft.      Tenderness: There is no abdominal tenderness.   Musculoskeletal:         General: Normal range of motion.      Cervical back: Normal range of motion and neck supple.      Comments: Generalized weakness   Skin:     General: Skin is warm and dry.   Neurological:      Mental Status: He is alert. Mental status is at baseline.      Cranial Nerves: No cranial nerve deficit.   Psychiatric:         Mood and Affect: Mood normal.         Behavior: Behavior normal.           Lines/Drains:        Lab Results: I have reviewed the following results:   Results from last 7 days   Lab Units 01/09/25  0635 01/08/25  1554   WBC Thousand/uL 23.20* 15.11*   HEMOGLOBIN g/dL 14.4 14.4   HEMATOCRIT % 41.0 41.1   PLATELETS Thousands/uL 161 151   BANDS PCT %  --  18*   LYMPHO PCT %  --  2*   MONO PCT %  --  10   EOS PCT %  --  0     Results from last 7 days   Lab Units 01/09/25  0635   SODIUM mmol/L 133*   POTASSIUM mmol/L 3.6   CHLORIDE mmol/L 98   CO2 mmol/L 27   BUN mg/dL 22   CREATININE mg/dL 1.10   ANION GAP mmol/L 8   CALCIUM mg/dL 8.5   ALBUMIN g/dL 3.7   TOTAL BILIRUBIN mg/dL 0.70   ALK PHOS U/L 48   ALT U/L 55*   AST U/L 163*   GLUCOSE RANDOM mg/dL 153*     Results from last 7 days   Lab Units 01/08/25  1554   INR  1.07         Results from last 7 days   Lab Units 01/03/25  0727   HEMOGLOBIN A1C % 6.0*     Results from last 7 days   Lab Units 01/09/25  0635 01/08/25  1554   LACTIC ACID mmol/L  --  1.9   PROCALCITONIN ng/ml 14.44* 8.62*       Recent Cultures (last 7 days):   Results from last 7 days   Lab Units 01/08/25  2017 01/08/25  1849 01/08/25  1554   BLOOD CULTURE   --   --  Received in Microbiology Lab. Culture in Progress.  Received in Microbiology Lab. Culture in Progress.   SPUTUM CULTURE   --  Culture too young- will reincubate  --    GRAM STAIN RESULT   --  2+ Epithelial cells per  low power field*  3+ Gram positive cocci in pairs and chains*  1+ Gram variable rods*  --    LEGIONELLA URINARY ANTIGEN  Negative  --   --        Imaging Results Review: No pertinent imaging studies reviewed.  Other Study Results Review: No additional pertinent studies reviewed.    Last 24 Hours Medication List:     Current Facility-Administered Medications:     acetaminophen (TYLENOL) tablet 650 mg, Q6H PRN    ALPRAZolam (XANAX) tablet 0.5 mg, HS PRN    amLODIPine (NORVASC) tablet 5 mg, Daily    aspirin (ECOTRIN LOW STRENGTH) EC tablet 81 mg, Daily    atorvastatin (LIPITOR) tablet 40 mg, Daily    azithromycin (ZITHROMAX) 500 mg in sodium chloride 0.9 % 250 mL IVPB, Q24H    cefTRIAXone (ROCEPHIN) IVPB (premix in dextrose) 1,000 mg 50 mL, Q24H    enoxaparin (LOVENOX) subcutaneous injection 40 mg, Daily    hydrALAZINE (APRESOLINE) injection 10 mg, Q6H PRN    Hydrocod Elie-Chlorphe Elie ER (TUSSIONEX) ER suspension 5 mL, Q12H PRN    levalbuterol (XOPENEX) inhalation solution 1.25 mg, TID    levothyroxine tablet 88 mcg, Daily    ondansetron (ZOFRAN) injection 4 mg, Q6H PRN    oseltamivir (TAMIFLU) capsule 30 mg, Q12H MARKOS    sodium chloride 3 % inhalation solution 4 mL, TID    vancomycin (VANCOCIN) 1,250 mg in sodium chloride 0.9 % 250 mL IVPB, Q24H, Last Rate: 1,250 mg (01/09/25 1351)    Administrative Statements   Today, Patient Was Seen By: Cristin Yates MD      **Please Note: This note may have been constructed using a voice recognition system.**

## 2025-01-09 NOTE — PLAN OF CARE
Problem: OCCUPATIONAL THERAPY ADULT  Goal: Performs self-care activities at highest level of function for planned discharge setting.  See evaluation for individualized goals.  Description: Treatment Interventions: ADL retraining, Functional transfer training, UE strengthening/ROM, Endurance training, Patient/family training, Energy conservation, Activityengagement       See flowsheet documentation for full assessment, interventions and recommendations.   Note: Limitation: Decreased ADL status, Decreased UE strength, Decreased Safe judgement during ADL, Decreased endurance, Decreased high-level ADLs  Prognosis: Good  Assessment: Pt is a 84 y.o. male seen for OT evaluation s/p admit to [facility] on 1/8/2025 w/ Sepsis (HCC).  Comorbidities affecting pt's functional performance at time of assessment include: CKD, elevated troponin, large pleural effusion, NADIA, OA. Personal factors affecting pt at time of IE include:steps to enter environment, difficulty performing ADLS, difficulty performing IADLS , and health management . Prior to admission, pt was I with ADLs and IADLs. Upon evaluation: the following deficits impact occupational performance: weakness, decreased strength, decreased balance, decreased tolerance, and decreased safety awareness. Pt to benefit from continued skilled OT tx while in the hospital to address deficits as defined above and maximize level of functional independence w ADL's and functional mobility. Occupational Performance areas to address include: bathing/shower, toilet hygiene, dressing, functional mobility, community mobility, and clothing management. From OT standpoint, recommendation at time of d/c would with minimal intensity OT resources.     Rehab Resource Intensity Level, OT: III (Minimum Resource Intensity)     Morena Castle OTR/L

## 2025-01-09 NOTE — UTILIZATION REVIEW
Initial Clinical Review    Admission: Date/Time/Statement:   Admission Orders (From admission, onward)       Ordered        01/08/25 1759  INPATIENT ADMISSION  Once                          Orders Placed This Encounter   Procedures    INPATIENT ADMISSION     Standing Status:   Standing     Number of Occurrences:   1     Level of Care:   Med Surg [16]     Estimated length of stay:   More than 2 Midnights     Certification:   I certify that inpatient services are medically necessary for this patient for a duration of greater than two midnights. See H&P and MD Progress Notes for additional information about the patient's course of treatment.     ED Arrival Information       Expected   -    Arrival   1/8/2025 15:36    Acuity   Emergent              Means of arrival   Ambulance    Escorted by   [facility]    Service   Hospitalist    Admission type   Emergency              Arrival complaint   sob             Chief Complaint   Patient presents with    Cough    Fever     Patient presents with cough, fever, generalized fatigue and more frequent falls over the past few days. States today it got worse. Denies any head strike or LOC        Initial Presentation: 84 y.o. male to the ED from home via EMS with complaints of cough, fever. Admitted to inpatient for sepsis.  H/O  CKD3, HTN .  OVer the past few days, has had increasing shortness of breath, cough.  Arrives with pulse ox 86% on room air, febrile with temp 103.3, wheezing and rhonchi heard in lungs, tachypneic with nasal congestion. EMS gave him iv steroids, nebulizer.  Continue.  Placed on 3 LNC. Flu A positive.  Sodium 129 on arrival, likely due to sepsis, dehydration.  BLood cultures pending. Started on IV abx.  Given 2 LIters iv fluids in the ED. Troponin elevated. Cardiology consult.     Anticipated Length of Stay/Certification Statement:  Patient will be admitted on an inpatient basis with an anticipated length of stay of greater than 2 midnights secondary  to sepsis.     Date: 1/9   Day 2: Follow cultures,continue iv abx. Sodium levels slowly improving.  Continue fluid restriction.      Cardiology quick note: Troponin  elevated  but subsequent values were flat trending.  No report of chest pain or discomfort.   Initial EKG was notable for ST changes in the inferior leads, resolved on subsequent tracings.   Echocardiogram this morning without wall motion abnormalities, preserved EF 60%.    PROCEDURE NOTE:  Thoracentesis to right side for 1500 ml removed.      PUlmonary consult: Continue tamiflu. FOllow up sputum culture blood culture.  Continue iv abx. Still with shortness of breath, cough.  On 2LNC. Rhonchi heard in lungs.  Diminished right base.     Date: 1/10  Day 3: Has surpassed a 2nd midnight with active treatments and services. Initially admitted to inpatient for sepsis related to Flu A with pneumonia.  Has been on IV abx.  S/p thoracentesis 1/9 for 1500 ml .ECho today with normal EF. PUlmonary following.  Continue nebulizers. Rhonchi heard in lungs.          ED Treatment-Medication Administration from 01/08/2025 1536 to 01/09/2025 0356         Date/Time Order Dose Route Action     01/08/2025 1546 albuterol (FOR EMS ONLY) (2.5 mg/3 mL) 0.083 % inhalation solution 2.5 mg 0 mg Does not apply Given to EMS     01/08/2025 1546 ipratropium-albuterol (FOR EMS ONLY) (DUO-NEB) 0.5-2.5 mg/3 mL inhalation solution 3 mL 0 mL Does not apply Given to EMS     01/08/2025 1547 methylPREDNISolone sodium succinate (FOR EMS ONLY) (Solu-MEDROL) 125 MG injection 125 mg 0 mg Does not apply Given to EMS     01/08/2025 1612 cefTRIAXone (ROCEPHIN) IVPB (premix in dextrose) 2,000 mg 50 mL 2,000 mg Intravenous New Bag     01/08/2025 1612 acetaminophen (Ofirmev) injection 1,000 mg 1,000 mg Intravenous New Bag     01/08/2025 1647 azithromycin (ZITHROMAX) 500 mg in sodium chloride 0.9 % 250 mL IVPB 500 mg Intravenous New Bag     01/08/2025 1614 sodium chloride 0.9 % bolus 1,000 mL 1,000 mL  Intravenous New Bag     01/08/2025 1741 sodium chloride 0.9 % bolus 1,000 mL 1,000 mL Intravenous New Bag     01/08/2025 1743 potassium chloride (Klor-Con M20) CR tablet 40 mEq 40 mEq Oral Given     01/08/2025 2002 vancomycin (VANCOCIN) 2,000 mg in sodium chloride 0.9 % 500 mL IVPB 2,000 mg Intravenous New Bag     01/08/2025 1955 levalbuterol (XOPENEX) inhalation solution 1.25 mg 1.25 mg Nebulization Given     01/08/2025 1955 sodium chloride 3 % inhalation solution 4 mL 4 mL Nebulization Given     01/08/2025 2123 Hydrocod Elie-Chlorphe Elie ER (TUSSIONEX) ER suspension 5 mL 5 mL Oral Given     01/08/2025 2123 oseltamivir (TAMIFLU) capsule 30 mg 30 mg Oral Given     01/08/2025 2326 hydrALAZINE (APRESOLINE) injection 10 mg 10 mg Intravenous Given            Scheduled Medications:  amLODIPine, 5 mg, Oral, Daily  aspirin, 81 mg, Oral, Daily  atorvastatin, 40 mg, Oral, Daily  cefTRIAXone, 2,000 mg, Intravenous, Q24H  enoxaparin, 40 mg, Subcutaneous, Daily  levalbuterol, 1.25 mg, Nebulization, TID  levothyroxine, 88 mcg, Oral, Daily  oseltamivir, 30 mg, Oral, Q12H MARKOS  sodium chloride, 4 mL, Nebulization, TID      Continuous IV Infusions:     PRN Meds:  acetaminophen, 650 mg, Oral, Q6H PRN  ALPRAZolam, 0.5 mg, Oral, HS PRN  hydrALAZINE, 10 mg, Intravenous, Q6H PRN  Hydrocod Elie-Chlorphe Elie ER, 5 mL, Oral, Q12H PRN  ondansetron, 4 mg, Intravenous, Q6H PRN      ED Triage Vitals [01/08/25 1545]   Temperature Pulse Respirations Blood Pressure SpO2 Pain Score   (!) 103.3 °F (39.6 °C) 90 18 148/67 (!) 86 % No Pain     Weight (last 2 days)       Date/Time Weight    01/09/25 0836 85.3 (188)    01/08/25 1852 85.5 (188.49)            Vital Signs (last 3 days)       Date/Time Temp Pulse Resp BP MAP (mmHg) SpO2 Calculated FIO2 (%) - Nasal Cannula Nasal Cannula O2 Flow Rate (L/min) O2 Device Patient Position - Orthostatic VS Shira Coma Scale Score Pain    01/10/25 1357 -- -- -- -- -- 94 % -- -- None (Room air) -- -- --     01/10/25 09:56:54 -- 80 -- 126/67 87 94 % -- -- -- -- -- --    01/10/25 0956 -- -- -- 126/67 -- -- -- -- -- -- -- --    01/10/25 0925 -- -- -- -- -- -- -- -- -- -- -- No Pain    01/10/25 0750 -- -- -- -- -- 95 % -- -- None (Room air) -- -- --    01/10/25 0730 -- -- -- -- -- 99 % -- -- -- -- -- --    01/10/25 07:23:34 97.4 °F (36.3 °C) 68 18 141/78 99 97 % 24 1 L/min Nasal cannula -- -- --    01/10/25 0712 -- -- -- -- -- 98 % 24 1 L/min Nasal cannula -- -- --    01/10/25 0711 -- -- -- -- -- 97 % -- -- -- -- -- --    01/09/25 22:27:04 97.8 °F (36.6 °C) 68 -- 133/71 92 96 % -- -- -- -- -- --    01/09/25 1940 -- 69 -- -- -- 96 % 24 1 L/min Nasal cannula -- -- --    01/09/25 1939 -- -- -- -- -- 97 % -- -- Nasal cannula -- 15 No Pain    01/09/25 14:40:14 97.8 °F (36.6 °C) 81 18 125/54 78 94 % -- -- -- -- -- --    01/09/25 1335 -- -- -- -- -- 93 % -- -- -- -- -- --    01/09/25 1311 -- -- -- -- -- 92 % 24 1 L/min Nasal cannula -- -- --    01/09/25 1125 -- -- -- -- -- 88 % -- -- None (Room air) -- -- --    01/09/25 1058 -- -- -- -- -- -- -- -- -- -- -- No Pain    01/09/25 1048 -- -- -- -- -- -- -- -- -- -- -- No Pain    01/09/25 0900 -- -- -- -- -- 95 % 32 3 L/min Nasal cannula -- 15 No Pain    01/09/25 0836 -- 75 -- 151/60 -- -- -- -- -- -- -- --    01/09/25 07:33:28 97.7 °F (36.5 °C) 69 24 151/60 90 100 % -- -- -- -- -- --    01/09/25 0725 -- -- -- -- -- 97 % 28 2 L/min Nasal cannula -- -- --    01/09/25 0724 -- -- -- -- -- 96 % -- -- -- -- -- --    01/09/25 0425 -- -- -- -- -- -- -- -- -- -- 15 No Pain    01/09/25 0414 -- -- -- -- -- -- -- -- -- -- -- No Pain    01/09/25 0413 97.7 °F (36.5 °C) 68 24 166/62 97 95 % -- -- -- -- -- --    01/09/25 04:09:55 -- 68 -- 166/62 97 96 % -- -- -- -- -- --    01/09/25 0200 -- 72 20 157/70 100 92 % 32 3 L/min Nasal cannula Sitting -- --    01/09/25 0000 -- 71 20 140/63 91 95 % -- -- None (Room air) Sitting -- --    01/08/25 2348 -- -- -- -- -- -- -- -- -- -- 15 --    01/08/25 2318 --  -- -- -- -- -- -- -- Nasal cannula -- -- --    01/08/25 2315 -- 66 22 163/71 102 93 % 32 3 L/min Nasal cannula Sitting -- --    01/08/25 2200 -- 68 22 136/56 85 95 % 32 3 L/min Nasal cannula -- -- --    01/08/25 2045 -- 67 22 119/59 85 95 % 32 3 L/min Nasal cannula -- -- --    01/08/25 2030 -- 74 20 158/69 99 94 % 32 3 L/min Nasal cannula -- -- --    01/08/25 2015 -- 73 -- 149/65 93 95 % -- -- -- -- -- --    01/08/25 1955 -- -- -- -- -- -- 32 3 L/min Nasal cannula -- -- --    01/08/25 1945 -- 63 22 116/56 81 95 % -- -- Nasal cannula -- -- --    01/08/25 1942 -- -- -- -- -- -- -- -- -- -- 15 --    01/08/25 1930 -- 64 22 130/58 83 93 % 32 3 L/min Nasal cannula -- -- --    01/08/25 1915 -- 70 22 100/55 77 94 % 32 3 L/min Nasal cannula -- -- --    01/08/25 1845 -- 72 22 131/62 89 92 % 32 3 L/min Nasal cannula -- -- --    01/08/25 1828 -- 77 -- -- -- 93 % 32 3 L/min Nasal cannula -- -- --    01/08/25 1800 -- 81 24 133/58 83 92 % 32 3 L/min Nasal cannula -- -- --    01/08/25 1744 98.6 °F (37 °C) -- -- -- -- -- -- -- -- -- -- --    01/08/25 1730 -- 73 22 127/62 89 93 % 32 3 L/min Nasal cannula -- -- --    01/08/25 1715 -- 78 22 136/65 93 93 % 32 3 L/min Nasal cannula -- -- --    01/08/25 1645 -- 81 24 169/72 103 93 % 32 3 L/min Nasal cannula -- -- --    01/08/25 1634 -- -- -- -- -- -- -- -- Nasal cannula -- 15 --    01/08/25 1630 -- 85 24 132/63 91 93 % 32 3 L/min Nasal cannula -- -- --    01/08/25 1615 -- 85 24 140/57 84 93 % 32 3 L/min Nasal cannula -- -- --    01/08/25 1600 -- 89 24 133/65 90 93 % 32 3 L/min Nasal cannula -- -- --    01/08/25 1546 -- -- 24 -- -- -- -- -- -- -- -- --    01/08/25 1545 103.3 °F (39.6 °C) 90 18 148/67 -- 86 % -- -- None (Room air) Sitting -- No Pain              Pertinent Labs/Diagnostic Test Results:   Radiology:  XR chest portable   Final Interpretation by Ritchie Field MD (01/09 8315)      Status post thoracentesis with residual small right pleural effusion.      Bilateral multifocal  opacities,  increased from prior, which are consistent with infectious infiltrate/pneumonia.            Resident: Paolo Paniagua I, the attending radiologist, have reviewed the images and agree with the final report above.      Workstation performed: [id number]         CT chest without contrast   Final Interpretation by Paolo Newman MD (01/08 1746)      Multifocal consolidations/nodular opacities as described consistent with an infectious/inflammatory infiltrate.      Large right pleural effusion.      The study was marked in EPIC for immediate notification.               Workstation performed: [id number]         XR chest portable   Final Interpretation by Victor M Neri MD (01/08 1626)      Mild perihilar patchy airspace disease likely on the basis of pulmonary edema. Moderate right pleural effusion.            Workstation performed: [id number]           Cardiology:  Echo complete w/ contrast if indicated   Final Result by Jl Waller MD (01/09 0846)        Left Ventricle: Left ventricular cavity size is normal. Wall thickness    is normal. The left ventricular ejection fraction is 60%. Systolic    function is normal. Wall motion is normal. Diastolic function is normal.     Pleural fluid is visualized.         1/8 EKG: Interpretation:     Interpretation: abnormal    Rate:     ECG rate:  91     ECG rate assessment: normal    Rhythm:     Rhythm: sinus rhythm    Ectopy:     Ectopy: PAC    QRS:     QRS axis:  Normal     QRS intervals:  Normal   Conduction:     Conduction: normal    ST segments:     ST segments:  Abnormal     Depression:  II, III, aVF, V5, V6 and V4   T waves:     T waves: non-specific       Results from last 7 days   Lab Units 01/08/25  1554   SARS-COV-2  Negative     Results from last 7 days   Lab Units 01/10/25  0559 01/09/25  0635 01/08/25  1554   WBC Thousand/uL 15.68* 23.20* 15.11*   HEMOGLOBIN g/dL 13.6 14.4 14.4   HEMATOCRIT % 40.4 41.0 41.1   PLATELETS Thousands/uL 153 161 151    TOTAL NEUT ABS Thousands/µL 13.91*  --   --    BANDS PCT %  --   --  18*         Results from last 7 days   Lab Units 01/10/25  0559 01/09/25  0635 01/08/25  1554   SODIUM mmol/L 135 133* 129*   POTASSIUM mmol/L 3.4* 3.6 3.3*   CHLORIDE mmol/L 99 98 91*   CO2 mmol/L 29 27 24   ANION GAP mmol/L 7 8 14*   BUN mg/dL 22 22 29*   CREATININE mg/dL 0.99 1.10 1.38*   EGFR ml/min/1.73sq m 68 60 45   CALCIUM mg/dL 8.4 8.5 8.5   MAGNESIUM mg/dL 2.1 1.9  --      Results from last 7 days   Lab Units 01/09/25  0635 01/08/25  1554   AST U/L 163* 127*   ALT U/L 55* 43   ALK PHOS U/L 48 57   TOTAL PROTEIN g/dL 6.6 6.8   ALBUMIN g/dL 3.7 4.0   TOTAL BILIRUBIN mg/dL 0.70 1.03*         Results from last 7 days   Lab Units 01/10/25  0559 01/09/25  0635 01/08/25  1554   GLUCOSE RANDOM mg/dL 106 153* 142*               Results from last 7 days   Lab Units 01/08/25  1554   PH MARTINEZ  7.358   PCO2 MARTINEZ mm Hg 38.8*   PO2 MARTINEZ mm Hg 45.9*   HCO3 MARTINEZ mmol/L 21.3*   BASE EXC MARTINEZ mmol/L -3.7   O2 CONTENT MARTINEZ ml/dL 17.0   O2 HGB, VENOUS % 79.1         Results from last 7 days   Lab Units 01/08/25 2000 01/08/25  1801 01/08/25  1554   HS TNI 0HR ng/L  --   --  515*   HS TNI 2HR ng/L  --  571*  --    HSTNI D2 ng/L  --  56*  --    HS TNI 4HR ng/L 590*  --   --    HSTNI D4 ng/L 75*  --   --          Results from last 7 days   Lab Units 01/08/25  1554   PROTIME seconds 14.4   INR  1.07   PTT seconds 33           Results from last 7 days   Lab Units 01/10/25  0559 01/09/25  0635 01/08/25  1554   PROCALCITONIN ng/ml 9.31* 14.44* 8.62*     Results from last 7 days   Lab Units 01/08/25  1554   LACTIC ACID mmol/L 1.9             Results from last 7 days   Lab Units 01/08/25  1554   BNP pg/mL 316*     Results from last 7 days   Lab Units 01/08/25  2017   CLARITY UA  Clear   COLOR UA  Yellow   SPEC GRAV UA  1.025   PH UA  6.0   GLUCOSE UA mg/dl Negative   KETONES UA mg/dl 15 (1+)*   BLOOD UA  3+*   PROTEIN UA mg/dl Trace*   NITRITE UA  Negative   BILIRUBIN UA   Negative   UROBILINOGEN UA E.U./dl 0.2   LEUKOCYTES UA  Negative   WBC UA /hpf 0-1   RBC UA /hpf 1-2   BACTERIA UA /hpf Occasional   EPITHELIAL CELLS WET PREP /hpf None Seen     Results from last 7 days   Lab Units 01/08/25  2017 01/08/25  1554   STREP PNEUMONIAE ANTIGEN, URINE  Negative  --    LEGIONELLA URINARY ANTIGEN  Negative  --    INFLUENZA A PCR   --  Positive*   INFLUENZA B PCR   --  Negative   RSV PCR   --  Negative                             Results from last 7 days   Lab Units 01/09/25  1240 01/08/25  1849 01/08/25  1554   BLOOD CULTURE   --   --  No Growth at 24 hrs.  No Growth at 24 hrs.   SPUTUM CULTURE   --  4+ Growth of  --    GRAM STAIN RESULT  No Polys or Bacteria seen 2+ Epithelial cells per low power field*  3+ Gram positive cocci in pairs and chains*  1+ Gram variable rods*  --    BODY FLUID CULTURE, STERILE  No growth  --   --      Results from last 7 days   Lab Units 01/09/25  1240   TOTAL COUNTED  100   WBC FLUID /ul 236         Results from last 7 days   Lab Units 01/09/25  1116   VANCOMYCIN RM ug/mL 11.3       Past Medical History:   Diagnosis Date    Cancer (HCC)     Chronic kidney disease, stage 3a (HCC) 06/05/2021    Coronary artery calcification seen on CT scan 11/07/2021    COVID-19 11/06/2021    Dementia (HCC)     Disease of thyroid gland     Eczema     Generalized anxiety disorder     Hypertension     Prostate cancer (HCC)     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 Last assessed: April 18, 2013     Present on Admission:   Hyponatremia   Elevated troponin   Chronic kidney disease, stage 3a (HCC)      Admitting Diagnosis: Cough [R05.9]  Hypokalemia [E87.6]  Hypochloremia [E87.8]  Hyponatremia [E87.1]  Pneumonia [J18.9]  Influenza A [J10.1]  Elevated troponin [R79.89]  Bandemia [D72.825]  Fever [R50.9]  Pleural effusion, right [J90]  Sepsis (HCC) [A41.9]  Acute hypoxic  respiratory failure (HCC) [J96.01]  Age/Sex: 84 y.o. male    Network Utilization Review Department  ATTENTION: Please call with any questions or concerns to [phone] and carefully listen to the prompts so that you are directed to the right person. All voicemails are confidential.   For Discharge needs, contact Care Management DC Support Team at [phone] opt. 2  Send all requests for admission clinical reviews, approved or denied determinations and any other requests to dedicated fax number below belonging to the [place] where the patient is receiving treatment. List of dedicated fax numbers for the Facilities:  FACILITY NAME UR FAX NUMBER   ADMISSION DENIALS (Administrative/Medical Necessity) [phone]   DISCHARGE SUPPORT TEAM (NETWORK) [phone]   PARENT CHILD HEALTH (Maternity/NICU/Pediatrics) [phone]   [facility] [phone]   [facility] [phone]   [facility] [phone]   [facility] [phone]   [facility] [phone]   [facility] [phone]   [facility] [phone]   [facility] [phone]   [facility] [phone]   [facility] [phone]   [facility] [phone]   [facility] [phone]

## 2025-01-09 NOTE — WOUND OSTOMY CARE
Consult Note - Wound   Dexter Sharp 84 y.o. male MRN: [id number]  Unit/Bed#: -01 Encounter: [id number]      History and Present Illness:  Admitted to [facility] 1/8/25 due to pleural effusion ,acute respiratory failure with hypoxia ,hypokaliemia, pneumonia and influenza A.      Assessment Findings:   Wound care consulted for abrasion to left knee and ankle due to a fall on ice at home  1)Left lateral ankle, dry intact open to air  2)Left lateral knee, partail thickness skin loss  No induration, fluctuance, odor, warmth/temperature differences, redness, or purulence noted to the above noted wounds and skin areas assessed. New dressings applied per orders listed below. Patient tolerated well- no s/s of non-verbal pain or discomfort observed during the encounter. Bedside nurse aware of plan of care. See flow sheets for more detailed assessment findings.  Wound care will Sing off call or Secure Chat Message with questions.       Skin care plans:  1-Hydraguard/Silicone Cream to bilateral sacrum, buttock, and heels BID and PRN  2-Elevate heels to offload pressure.  3-Ehob cushion in chair when out of bed.  4-Moisturize skin daily with skin nourishing cream.  5-Turn/reposition q2h or when medically stable for pressure re-distribution on skin.   6-Cleanse left lateral knee with NS. Apply adaptic non adherent then foam dressing , Change every 3 days and prn           Wound 01/09/25 Traumatic Abrasion(s) Knee Left;Proximal (Active)   Wound Image   01/09/25 1005   Wound Description Beefy red 01/09/25 1005   Mariel-wound Assessment Intact 01/09/25 1005   Wound Length (cm) 6 cm 01/09/25 1005   Wound Width (cm) 5 cm 01/09/25 1005   Wound Depth (cm) 0.1 cm 01/09/25 1005   Wound Surface Area (cm^2) 30 cm^2 01/09/25 1005   Wound Volume (cm^3) 3 cm^3 01/09/25 1005   Calculated Wound Volume (cm^3) 3 cm^3 01/09/25 1005   Drainage Amount None 01/09/25 1005   Non-staged Wound Description Partial thickness 01/09/25 1005    Treatments Cleansed;Site care 01/09/25 1005   Dressing Foam, Silicon (eg. Allevyn, etc);Non adherent 01/09/25 1005   Dressing Changed Reinforced 01/09/25 1005   Patient Tolerance Tolerated well 01/09/25 1005   Dressing Status Clean;Intact;Dry 01/09/25 1005       Wound 01/09/25 Abrasion(s) Ankle Anterior;Left (Active)   Wound Image   01/09/25 1006   Wound Description Dry;Brown;Beefy red 01/09/25 1006   Mariel-wound Assessment Dry;Intact 01/09/25 1006   Drainage Amount None 01/09/25 1006   Treatments Site care 01/09/25 1006   Dressing Open to air 01/09/25 1006   Patient Tolerance Tolerated well 01/09/25 1006   Dressing Status Removed 01/09/25 1006       Wound Care will sign off  Call or Secure Chat with any questions    Tiffany FINEN RN CWON

## 2025-01-09 NOTE — ASSESSMENT & PLAN NOTE
Lab Results   Component Value Date    EGFR 60 01/09/2025    EGFR 45 01/08/2025    EGFR 54 05/20/2024    CREATININE 1.10 01/09/2025    CREATININE 1.38 (H) 01/08/2025    CREATININE 1.21 05/20/2024

## 2025-01-09 NOTE — PROGRESS NOTES
Dexter Sharp is a 84 y.o. male who is currently ordered Vancomycin IV with management by the Pharmacy Consult service.  Relevant clinical data and objective / subjective history reviewed.  Vancomycin Assessment:  Indication and Goal AUC/Trough: Pneumonia (goal -600, trough >10)  Clinical Status: stable  Micro:   pending  Renal Function:  SCr: 1.38 mg/dL  CrCl: 40.9 mL/min  Renal replacement: Not on dialysis  Days of Therapy: 1  Current Dose: 1000 mg iv q 24 hrs  Vancomycin Plan:  New Dosin mg iv once, followed by vanco pulse dosing, redose 1000 mg iv once when random lvl is 15 or below  Next Level: 1000 random on 25  Renal Function Monitoring: Daily BMP and UOP  Pharmacy will continue to follow closely for s/sx of nephrotoxicity, infusion reactions and appropriateness of therapy.  BMP and CBC will be ordered per protocol. We will continue to follow the patient’s culture results and clinical progress daily.    Rocco Romero, Pharmacist

## 2025-01-09 NOTE — CONSULTS
Consultation - Pulmonology   Name: Dexter Sharp 84 y.o. male I MRN: [id number]  Unit/Bed#: -01 I Date of Admission: 1/8/2025   Date of Service: 1/9/2025 I Hospital Day: 1   Inpatient consult to Pulmonology  Consult performed by: Edil Valdes MD  Consult ordered by: Cristin Yates MD        Physician Requesting Evaluation: Cristin Yates, *   Reason for Evaluation / Principal Problem: Pneumonia, Pleural effusion    Assessment & Plan  Sepsis (HCC)  Due to influenza and superimposed bacterial pneumonia  Improved  Influenza A with pneumonia  Has influenza  On 5 days tamiflu ordered by IM team- day 2/5  Seems to have superimposed bacterial pneumonia given leukocytosis, imaging findings, elevated Procalcitonin  Day 2/5 ceftriaxone/azithromycin/vancomycin.  F/u MRSA nares as well.  I think it is OK to stop vancomycin but will defer to IM attending  Follow up sputum culture, blood cultures. Strep/Legionella Urine antigens negative. Follow-up pleural fluid culture    Chronic kidney disease, stage 3a (HCC)  Lab Results   Component Value Date    EGFR 60 01/09/2025    EGFR 45 01/08/2025    EGFR 54 05/20/2024    CREATININE 1.10 01/09/2025    CREATININE 1.38 (H) 01/08/2025    CREATININE 1.21 05/20/2024     Pleural effusion on right  Has been present on R since CT in 2023  Never had thoracentesis  Signed consent for bedside thoracentesis and tolerated it. XR and US with decreased size of effusion. 1500 mL simple clear yellow fluid drained. Follow-up pleural fluid studies.  Add on serum protein and LDH for Light's Criteria.  Fluid is chronic, doubt it is infected based on prior imaging and appearance of fluid today  UA with trace protein doubt proteinuria etiology of effuison  BNP is elevated. Echo today with normal EF.  No clear diastolic dysfunction either  Acute respiratory failure with hypoxia (HCC)  Wean oxygen for SPO2 >88%  I have discussed the above management plan in detail with the  primary service.     History of Present Illness   Dexter Sharp is a 84 y.o. male who presents with a history of CKD and hypertension presents with shortness of breath, cough, sputum production (yellow/brown). Some subjective fevers/chills, weakness, fatigue.  He is positive for influenza A.  He has a R pleural effusion on image (also had R pleural effusion 2023).  He has some consolidations on left and opacites on R on CT lung fields.  He met sepsis criteria - febrile to 103.3 leukocytosis, bands, procalcitonin elevated and was started on antibiotics as well as tamiflu.  He was found to have acute hypoxemic resp failure with SPO2 86% on room air and he is on 2 lpm of supplemental oxygen. He is currently ordered vancomycin, ceftriaxone, azithromycin, tamiflu.    Patient feels a bit better today but is still short of breath and has a productive cough. No fever today.  He has been sick for around 3 days. He normally does not have lung disease.  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  He has had the R pleural effusion since 2023 but has never had a thoracentesis.      Review of Systems   Constitutional:  Positive for fatigue and fever. Negative for chills.   HENT:  Negative for ear pain and sore throat.    Eyes:  Negative for pain and visual disturbance.   Respiratory:  Positive for cough and shortness of breath.    Cardiovascular:  Negative for chest pain and palpitations.   Gastrointestinal:  Negative for abdominal pain and vomiting.   Musculoskeletal:  Negative for arthralgias and back pain.   Skin:  Negative for color change and rash.   Neurological:  Negative for syncope and light-headedness.   All other systems reviewed and are negative.      Historical Information   I have reviewed the patient's PMH, PSH, Social History, Family History, Meds, and Allergies  Tobacco History: Former remote smoker  Occupational History: Office work  Family  History:non-contributory    Objective :  Temp:  [97.7 °F (36.5 °C)-103.3 °F (39.6 °C)] 97.7 °F (36.5 °C)  HR:  [63-90] 75  BP: (100-169)/(55-72) 151/60  Resp:  [18-24] 24  SpO2:  [86 %-100 %] 100 %  O2 Device: Nasal cannula  Nasal Cannula O2 Flow Rate (L/min):  [2 L/min-3 L/min] 2 L/min    Physical Exam  Vitals and nursing note reviewed.   Constitutional:       General: He is not in acute distress.     Appearance: Normal appearance. He is well-developed. He is not ill-appearing, toxic-appearing or diaphoretic.   HENT:      Head: Normocephalic and atraumatic.   Eyes:      Conjunctiva/sclera: Conjunctivae normal.   Cardiovascular:      Rate and Rhythm: Normal rate and regular rhythm.      Heart sounds: No murmur heard.  Pulmonary:      Effort: Pulmonary effort is normal. No respiratory distress.      Breath sounds: Rhonchi present.      Comments: Diminished R base  Abdominal:      Palpations: Abdomen is soft.      Tenderness: There is no abdominal tenderness.   Musculoskeletal:         General: No swelling.      Cervical back: Neck supple.      Right lower leg: No edema.      Left lower leg: No edema.   Skin:     General: Skin is warm and dry.      Capillary Refill: Capillary refill takes less than 2 seconds.   Neurological:      Mental Status: He is alert and oriented to person, place, and time.   Psychiatric:         Mood and Affect: Mood normal.         Behavior: Behavior normal.           Lab Results: I have reviewed the following results:  .     01/08/25  1554 01/08/25  1801 01/09/25  0635   WBC 15.11*  --  23.20*   HGB 14.4  --  14.4   HCT 41.1  --  41.0     --  161   BANDSPCT 18*  --   --    SODIUM 129*  --  133*   K 3.3*  --  3.6   CL 91*  --  98   CO2 24  --  27   BUN 29*  --  22   CREATININE 1.38*  --  1.10   GLUC 142*  --  153*   MG  --   --  1.9   *  --  163*   ALT 43  --  55*   ALB 4.0  --  3.7   TBILI 1.03*  --  0.70   ALKPHOS 57  --  48   PTT 33  --   --    INR 1.07  --   --    HSTNI0 515*   --   --    HSTNI2  --  571*  --    *  --   --    LACTICACID 1.9  --   --      ABG: No new results in last 24 hours.    Imaging Results Review: I personally reviewed the following image studies in PACS and associated radiology reports: CT chest. My interpretation of the radiology images/reports is: Large R pleural effusion.  L sided consolidations, small R sided opacities.   Also CT chest 9/2023 with R pleural effusion.  CT Chest 11/2021- + GGO bilaterally in setting of COVID pneumonia, no pleural effusion  Other Study Results Review: Other studies reviewed include: Echo 1/2025- normal LVEF  PFT Results Reviewed: none for review    VTE Prophylaxis: VTE covered by:  enoxaparin, Subcutaneous, 40 mg at 01/09/25 0808

## 2025-01-09 NOTE — ED NOTES
Patient encouraged to cough and deep breath at bedside and use  inspirometer.      Erica Del Castillo RN  01/08/25 [id number]

## 2025-01-09 NOTE — ASSESSMENT & PLAN NOTE
Patient currently on 3 L nasal cannula  Large right pleural effusion noted on CT imaging  Pulmonology consulted.  Patient had thoracentesis performed on 1/9/2025 with approximately 1.5 L removed  Fluid studies pending

## 2025-01-09 NOTE — ASSESSMENT & PLAN NOTE
Secondary to influenza and suspected bacterial pneumonia with fever and tachypnea  Lactate normal  Procalcitonin elevated will continue to trend  IV fluids as needed  Screen pending  Strep pneumo/urine Legionella negative  Sputum culture pending  Follow-up blood cultures pending  IV antibiotics with ceftriaxone/vancomycin

## 2025-01-09 NOTE — PLAN OF CARE
Problem: INFECTION - ADULT  Goal: Absence or prevention of progression during hospitalization  Description: INTERVENTIONS:  - Assess and monitor for signs and symptoms of infection  - Monitor lab/diagnostic results  - Monitor all insertion sites, i.e. indwelling lines, tubes, and drains  - Monitor endotracheal if appropriate and nasal secretions for changes in amount and color  - [place] appropriate cooling/warming therapies per order  - Administer medications as ordered  - Instruct and encourage patient and family to use good hand hygiene technique  - Identify and instruct in appropriate isolation precautions for identified infection/condition  Outcome: Progressing  Goal: Absence of fever/infection during neutropenic period  Description: INTERVENTIONS:  - Monitor WBC    Outcome: Progressing     Problem: RESPIRATORY - ADULT  Goal: Achieves optimal ventilation and oxygenation  Description: INTERVENTIONS:  - Assess for changes in respiratory status  - Assess for changes in mentation and behavior  - Position to facilitate oxygenation and minimize respiratory effort  - Oxygen administered by appropriate delivery if ordered  - Initiate smoking cessation education as indicated  - Encourage broncho-pulmonary hygiene including cough, deep breathe, Incentive Spirometry  - Assess the need for suctioning and aspirate as needed  - Assess and instruct to report SOB or any respiratory difficulty  - Respiratory Therapy support as indicated  Outcome: Progressing     Problem: METABOLIC, FLUID AND ELECTROLYTES - ADULT  Goal: Electrolytes maintained within normal limits  Description: INTERVENTIONS:  - Monitor labs and assess patient for signs and symptoms of electrolyte imbalances  - Administer electrolyte replacement as ordered  - Monitor response to electrolyte replacements, including repeat lab results as appropriate  - Instruct patient on fluid and nutrition as appropriate  Outcome: Progressing  Goal: Fluid balance  maintained  Description: INTERVENTIONS:  - Monitor labs   - Monitor I/O and WT  - Instruct patient on fluid and nutrition as appropriate  - Assess for signs & symptoms of volume excess or deficit  Outcome: Progressing

## 2025-01-09 NOTE — CASE MANAGEMENT
Case Management Assessment & Discharge Planning Note    Patient name Dexter Sharp  Location /-01 MRN [id number]  : 1940 Date 2025       Current Admission Date: 2025  Current Admission Diagnosis:Sepsis (HCC)   Patient Active Problem List    Diagnosis Date Noted Date Diagnosed    Sepsis (HCC) 2025     Large pleural effusion 2025     Acute respiratory failure with hypoxia (HCC) 2025     Hypokalemia 2025     Hypertensive kidney disease with chronic kidney disease 2024     Prediabetes 2023     Dysuria 2023     Hyponatremia 2021     Elevated troponin 2021     Degenerative arthritis of spine 2021     Coronary artery calcification seen on CT scan 2021     Chronic kidney disease, stage 3a (HCC) 2021     History of radiation therapy 2018     Eczema 2015     Hypothyroidism 2012     Generalized anxiety disorder 2012     Hyperlipidemia 2012     Hypertension 2012     Osteoarthritis 2012     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       LOS (days): 1  Geometric Mean LOS (GMLOS) (days): 4.9  Days to GMLOS:4.2     OBJECTIVE:    Risk of Unplanned Readmission Score: 11.71         Current admission status: Inpatient       Preferred Pharmacy:   [address] [address]  Phone: [phone] Fax: [phone]    [facility] [address] [place] [address] [address]  [address]  [facility] [address]  Phone: [phone] Fax: [phone]    Primary Care Provider: Edil Mac DO    Primary Insurance: [facility] REP  Secondary Insurance:     ASSESSMENT:  Active Health Care Proxies       Lora Sharp Health Care Representative - Spouse   Primary Phone: [phone] (Home)                           Readmission Root Cause  30 Day  Readmission: No    Patient Information  Admitted from:: Home  Mental Status: Alert  During Assessment patient was accompanied by: Not accompanied during assessment  Assessment information provided by:: Patient  Primary Caregiver: Self  Support Systems: Spouse/significant other, Children  County of Residence: [place]  What city do you live in?: [place]  Home entry access options. Select all that apply.: Stairs  Number of steps to enter home.: 4  Type of Current Residence: 2 story home  Upon entering residence, is there a bedroom on the main floor (no further steps)?: No  A bedroom is located on the following floor levels of residence (select all that apply):: 2nd Floor  Upon entering residence, is there a bathroom on the main floor (no further steps)?: Yes  Number of steps to 2nd floor from main floor: One Flight  Living Arrangements: Lives w/ Spouse/significant other  Is patient a ?: Yes  Is patient active with [facility] ([employer])?: No    Activities of Daily Living Prior to Admission  Functional Status: Independent  Completes ADLs independently?: Yes  Ambulates independently?: Yes  Does patient use assisted devices?: Yes  Assisted Devices (DME) used: Straight Cane  Does patient currently own DME?: Yes  What DME does the patient currently own?: Straight Cane  Does patient have a history of Outpatient Therapy (PT/OT)?: Yes  Does the patient have a history of Short-Term Rehab?: No  Does patient have a history of HHC?: No  Does patient currently have HHC?: No         Patient Information Continued  Income Source: SSI/SSD  Does patient have prescription coverage?: Yes  Does patient receive dialysis treatments?: No  Does patient have a history of substance abuse?: No  Does patient have a history of Mental Health Diagnosis?: No         Means of Transportation  [facility] D[facility] Self          DISCHARGE DETAILS:    Discharge planning discussed with:: Patient at bedside.  Freedom of Choice:  Yes  Comments - Freedom of Choice: PT/OT/Speech pending.     Were Treatment Team discharge recommendations reviewed with patient/caregiver?: Yes  Did patient/caregiver verbalize understanding of patient care needs?: Yes  Were patient/caregiver advised of the risks associated with not following Treatment Team discharge recommendations?: Yes    Contacts  Patient Contacts: Patient  Relationship to Patient:: Other (Comment) (Self)  Contact Method: In Person  Reason/Outcome: Continuity of Care, Discharge Planning    Requested Home Health Care         Is the patient interested in HHC at discharge?: No    DME Referral Provided  Referral made for DME?: No    Other Referral/Resources/Interventions Provided:  Referral Comments: No referrals sent at this time. PT/OT/Speech pending.    Additional Comments: CM met with patient at bedside and introduced self and role. Pt resides in a 2-story home with 4STE with his spouse. Pt is IPTA with ADL's and ambulation. Pt owns and uses a cane for ambulation outside of the home. Pt denied hx of STR and HHC. Pt reports hx of OPPT in past. Pt denied hx of MH and substance abuse. PT/OT/Speech pending at this time. CM department to remain available for any discharge planning needs throughout this admisson.

## 2025-01-09 NOTE — PROGRESS NOTES
Dexter Sharp is a 84 y.o. male who is currently ordered Vancomycin IV with management by the Pharmacy Consult service.  Relevant clinical data and objective / subjective history reviewed.  Vancomycin Assessment:  Indication and Goal AUC/Trough: Pneumonia (goal -600, trough >10)  Clinical Status: stable  Micro:     Renal Function:  SCr: 1.1 mg/dL  CrCl: 51.1 mL/min  Renal replacement: Not on dialysis  Days of Therapy: 2  Current Dose: pulse dosing--1000 mg iv once prn random level < or = 15  Vancomycin Plan:  New Dosin mg iv q 24 hrs  Estimated AUC: 446 mcg*hr/mL  Estimated Trough: 11.8 mcg/mL  Next Level: 25 @ 0600  Renal Function Monitoring: Daily BMP and UOP  Pharmacy will continue to follow closely for s/sx of nephrotoxicity, infusion reactions and appropriateness of therapy.  BMP and CBC will be ordered per protocol. We will continue to follow the patient’s culture results and clinical progress daily.    Joy Salas, Pharmacist    Patient is a 77y old  Male who presents with a chief complaint of Diabetic foot ulcer (21 Mar 2022 12:29)    ?Type: 2 DX. Patient was never told has diabetes. a1c 5.8%; no home Does not have meter at home. rx. 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 known complications: neuropathy- had toe amputation 9/2021; states had a blister on toe next to the amput. toe from wearing shoes without socks. diabetes education provided- re: diabetes and foot care.   Hx +ASCVD, no CKD, no HF    HPI:  76 year old male with PMH pancreatic cancer (dx 3/2021, chemo) s/p ERCP s/p whipple 3/2021 and 10/26/21, HTN, HLD, HFrEF (Oct 2021 EF: 35%), CAD s/p stents x2 (~15 years ago), Vtach with AICD (implanted 2005, extracted and re-implantation 9/2021 and 10/4/21), TAVR (4/2021), bilateral PE (7/2021) on Xarelto, spinal stenosis, macular degeneration, GERD, BPH, Left THR (x2 revisions), left femur fracture (hardware), Osteomyelitis s/p R hallux amputation 9/2021, MSSA/VRE bacteremia presenting with wound of RLE 2nd digit. Patient follows with wound care for open wound of abdominal wall (present since Whipple in Oct 2021) and was scheduled for hyperbaric O2 treatment tomorrow when he noticed wound. Patient went to wound care center today for evaluation, who recommended he come to ER. At baseline, he does not have sensation in b/l LE. Denies any inciting trauma to digit. Patient denies any fevers, chills, purulent discharge at home.    ED Course:   97.3, 127/82, 67, 16, 100% RA   WBC wnl, Hb 9.9, ESR 56  Given: vanc, zosyn, 1L NS bolus (17 Mar 2022 19:22)      PAST MEDICAL & SURGICAL HISTORY:  HTN (hypertension)    HLD (hyperlipidemia)    GERD (gastroesophageal reflux disease)    Cardiomyopathy    MSSA bacteremia  9/2021    Acute osteomyelitis    Pulmonary embolism    Pancreas cancer  chemo, s/p Whipple    [place] (hard of hearing)  B/L hearing aids    History of total hip replacement  left X 1, 2 revisions    History of laminectomy  X3    ICD (implantable cardiac defibrillator) in place  implanted 2005, replaced 10/4/2021 [employer] Subcutaneous ICD    Benign testicular tumor  radiation    Status post amputation of toe of right foot  8/2021    Status post fracture of femur  2017 left with hardware    S/P TAVR (transcatheter aortic valve replacement)  7/2021    H/O Whipple procedure  2/2021, 10/2021        REVIEW OF SYSTEMS  General:	as above  Respiratory: NAD, No SOB, no cough  Cardiovascular: No chest pain, no palpitations	  Endocrine: no polyuria, no polydipsia, or S/S of hypoglycemia        Allergies    Dilaudid (Anaphylaxis; Hives)    Intolerances        MEDICATIONS  (STANDING):  aluminum hydroxide/magnesium hydroxide/simethicone Suspension 30 milliLiter(s) Oral every 6 hours  aMIOdarone    Tablet 200 milliGRAM(s) Oral daily  brimonidine 0.2% Ophthalmic Solution 1 Drop(s) Both EYES two times a day  dextrose 40% Gel 15 Gram(s) Oral once  dextrose 5%. 1000 milliLiter(s) (50 mL/Hr) IV Continuous <Continuous>  dextrose 5%. 1000 milliLiter(s) (100 mL/Hr) IV Continuous <Continuous>  dextrose 50% Injectable 25 Gram(s) IV Push once  dextrose 50% Injectable 12.5 Gram(s) IV Push once  dextrose 50% Injectable 25 Gram(s) IV Push once  DULoxetine 60 milliGRAM(s) Oral daily  furosemide    Tablet 20 milliGRAM(s) Oral daily  glucagon  Injectable 1 milliGRAM(s) IntraMuscular once  insulin lispro (ADMELOG) corrective regimen sliding scale   SubCutaneous three times a day before meals  insulin lispro (ADMELOG) corrective regimen sliding scale   SubCutaneous at bedtime  latanoprost 0.005% Ophthalmic Solution 1 Drop(s) Both EYES at bedtime  metoprolol succinate ER 50 milliGRAM(s) Oral daily  pancrelipase  (CREON 36,000 Lipase Units) 3 Capsule(s) Oral three times a day with meals  pantoprazole    Tablet 40 milliGRAM(s) Oral before breakfast  piperacillin/tazobactam IVPB.. 3.375 Gram(s) IV Intermittent every 8 hours  rivaroxaban 20 milliGRAM(s) Oral with dinner  tamsulosin 0.4 milliGRAM(s) Oral at bedtime  timolol 0.5% Solution 1 Drop(s) Both EYES every 12 hours  traMADol 25 milliGRAM(s) Oral at bedtime

## 2025-01-09 NOTE — PROCEDURES
Thoracentesis (Bedside)    Date/Time: 1/9/2025 12:00 PM    Performed by: Edil Valdes MD  Authorized by: Edil Valdes MD    Patient location:  Bedside  Other Assisting Provider: No    Consent:     Consent obtained:  Verbal and written    Consent given by:  Patient  Universal protocol:     Procedure explained and questions answered to patient or proxy's satisfaction: yes      Relevant documents present and verified: yes      Test results available and properly labeled: yes      Radiology Images displayed and confirmed.  If images not available, report reviewed: yes      Site/side marked: yes      Immediately prior to procedure a time out was called: yes      Patient identity confirmed:  Verbally with patient, hospital-assigned identification number, arm band and provided demographic data  Indications:     Procedure Purpose: diagnostic and therapeutic      Indications: pleural effusion    Anesthesia (see MAR for exact dosages):     Anesthesia method:  Local infiltration    Local anesthetic:  Lidocaine 1% w/o epi  Procedure details:     Preparation: Patient was prepped and draped in usual sterile fashion      Standard thoracentesis cath kit used: Yes      Patient position:  Sitting    Laterality:  Right    Location:  Midscapular line    Puncture method:  Over-the-needle catheter    Ultrasound guidance: yes      Reason for ultrasound: Identify fluid collection and guide catheter placement.      Ultrasound image availability:  Images available in PACS    Sterile ultrasound techniques: Sterile gel and sterile probe covers were used      Indwelling catheter placed: no      Number of attempts:  1    Catheter size:  8 Fr    Drainage color:  Yellow    Drainage characteristics:  Clear    Fluid removed amount:  1500 mL  Post-procedure details:     Chest x-ray performed: yes      Chest x-ray findings:  Pleural effusion improved    Patient tolerance of procedure:  Tolerated well, no immediate complications

## 2025-01-09 NOTE — DISCHARGE INSTR - OTHER ORDERS
Skin care plans:  1-Hydraguard/Silicone Cream to bilateral sacrum, buttock, and heels BID and PRN  2-Elevate heels to offload pressure.  3-Ehob cushion in chair when out of bed.  4-Moisturize skin daily with skin nourishing cream.  5-Turn/reposition q2h or when medically stable for pressure re-distribution on skin.   6-Cleanse left lateral knee with NS. Apply adaptic non adherent then foam dressing , Change every 3 days and prn

## 2025-01-09 NOTE — ED NOTES
Provider aware of elevated blood pressure and lung sounds with rhonchi, encourage inspirometer, and perform flutter.      Erica Del Castillo RN  01/08/25 [id number]       Erica Del Castillo RN  01/08/25 7974       Erica Del Castillo RN  01/08/25 2399

## 2025-01-09 NOTE — ASSESSMENT & PLAN NOTE
Patient is not on oxygen at home  Secondary to pneumonia, influenza, and large effusion  Continue oxygen, titrate as tolerated  Pulmonology consult

## 2025-01-09 NOTE — SPEECH THERAPY NOTE
Speech Language/Pathology  Speech/Language Pathology  Assessment    Patient Name: Dexter Sharp  Today's Date: 1/9/2025     Problem List  Principal Problem:    Sepsis (HCC)  Active Problems:    Chronic kidney disease, stage 3a (HCC)    Elevated troponin    Hyponatremia    Large pleural effusion    Acute respiratory failure with hypoxia (HCC)    Hypokalemia    Past Medical History  Past Medical History:   Diagnosis Date    Cancer (HCC)     Chronic kidney disease, stage 3a (HCC) 06/05/2021    Coronary artery calcification seen on CT scan 11/07/2021    COVID-19 11/06/2021    Dementia (HCC)     Disease of thyroid gland     Eczema     Generalized anxiety disorder     Hypertension     Prostate cancer (HCC)     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 Last assessed: April 18, 2013     Past Surgical History  Past Surgical History:   Procedure Laterality Date    CATARACT EXTRACTION      COLONOSCOPY      COLONOSCOPY  11/26/2019    EYE SURGERY      KNEE SURGERY      PROSTATE SURGERY      VASECTOMY        Bedside Swallow Evaluation:    Summary:  Pt presented w/ mild oral and WFL pharyngeal stage of swallow. Positioned upright and alert.  Intermittent cough, prior to trials pt expectorating brownish secretions in bedside emesis bag. Pt reported has been coughing and bring up secretions frequently.Pt agreeable to blueberry muffin and thin liquids via straw with single/successive sips. Mastication was min prolonged however functional for current diet level. Bolus control, formation, and transfer were WNL.Swallows appeared prompt. No overt s/s of aspiration.  Reported poor appetite. Denied use of dietary supplements. Stated he typically takes his medications whole with thin liquids. ST reviewed diet recommendations and safe swallow strategies as listed below. Pt understood. Encouraged use of incentive spirometer and  remaining fully upright/OOB as tolerated.     Recommendations:  Diet: Regular   Liquid:thin   Meds:as tolerated   Supervision: distant   Positioning:Upright  Strategies: SLOW rate, alternate liquids with solids, swallow prior to additional po.   Pt to take PO/Meds only when fully alert and upright.   Oral care  Aspiration precautions  Reflux precautions  Therapy Prognosis:fair   Prognosis considerations:age, medical status  Frequency:1-3 times weekly as indicated.     Consider consult w/:  Rehab  Pulmonary  Nutrition    Goal(s):  Dysphagia LTG  -Patient will demonstrate safe and effective oral intake (without overt s/s significant oral/pharyngeal dysphagia including s/s penetration or aspiration) for the highest appropriate diet level.     1.Pt will tolerate least restrictive diet w/out s/s aspiration or oral/pharyngeal difficulties.   2.Pt will will effectively manipulate/masticate and transfer solids w/out s/s dysphagia/aspiration.   3.Pt will tolerate thin liquids w/out s/s aspiration.   -If indicated, patient will comply with a Video/Modified Barium Swallow study for more complete assessment of swallowing anatomy/physiology/aspiration risk and to assess efficacy of treatment techniques so as to best guide treatment plan      H&P/Admit info/ pertinent provider notes: (PMH noted above)  Dexter Sharp is a 84 y.o. male with a PMH of CKD3, HTN who presents with SOB.  Patient states that over the last few days he has had increasing shortness of breath and cough.  Cough is productive of yellow/brown sputum.  Also has been having fever and chills.  He has had generalized weakness and fatigue with a few falls over the last couple of days.  No head injury or loss of consciousness.  Denies any chest pain.     In the ER patient found to be septic with influenza A positive and concern for possible pneumonia on imaging studies.  Started on IV antibiotics per sepsis protocol.  Also noted to have elevated troponin, ER  "reviewed case with cardiology who recommended admission and treatment for underlying cause which is likely the sepsis    Special Studies:  CT CHEST WITHOUT IV CONTRAST 01/08/2025  IMPRESSION:  Multifocal consolidations/nodular opacities as described consistent with an infectious/inflammatory infiltrate.  Large right pleural effusion  XR CHEST PORTABLE 01/08/2025  IMPRESSION:  Mild perihilar patchy airspace disease likely on the basis of pulmonary edema. Moderate right pleural effusion.    Procalcitonin: 8.62             01/08/2025   WBC: 15.11            01/082025     Code Status Level 3 DNR/DNI    Previous MBS: none     Patient's goal:\" I want to stand\"    Did the pt report pain? no  If yes, was nursing notified/was it addressed? N/a    Reason for consult:  R/o aspiration  Determine safest and least restrictive diet  respiratory compromise  poor intake    Precautions:  Contact  Droplet    Food Allergies: No known    Current Diet: Regular and thin    Premorbid diet: Regular and thin    O2 requirement: 2 liters NC   Social/Prior living Lives with spouse    Voice/Speech: WNL   Follows commands: Basic    Cognitive status: Alert      Oral [facility] exam:  Dentition:Adequate   Lips (VII):WNL  Tongue (XII):midline  Mandible (V):adequate ROM  Face/oral sensation (V):WNL  Velum (X):WNL  Secretion management: Expectorating large amount of secretions     Items administered:  Blueberry muffin and thin liquids via straw with single/successive sips     Oral stage:  Lip closure:WNL  Mastication:WFL   Bolus formation:WNL  Bolus control:WNL  Transfer:WNL    Pharyngeal stage:  Swallow promptness: prompt   Laryngeal rise:adequate   No overt s/s aspiration    Esophageal stage:  No s/s reported    Results d/w:  Pt, nursing,            "

## 2025-01-09 NOTE — ASSESSMENT & PLAN NOTE
Likely secondary to sepsis.  ER reviewed case with cardiology who recommended admission and monitoring without heparin drip  Troponins have been flat  Reviewed case with cardiology, no acute invention needed at this time  Per cardiology, echo with EF of 60% and no wall motion normalities noted  No further workup at this time unless change in patient's clinical condition

## 2025-01-09 NOTE — ASSESSMENT & PLAN NOTE
Lab Results   Component Value Date    EGFR 60 01/09/2025    EGFR 45 01/08/2025    EGFR 54 05/20/2024    CREATININE 1.10 01/09/2025    CREATININE 1.38 (H) 01/08/2025    CREATININE 1.21 05/20/2024   Creatinine improving.  Will monitor BMP as needed

## 2025-01-09 NOTE — TELEPHONE ENCOUNTER
----- Message from GAIL Paige sent at 1/9/2025  9:30 AM EST -----  Needs appointment for hospital f/u

## 2025-01-09 NOTE — ASSESSMENT & PLAN NOTE
Has been present on R since CT in 2023  Never had thoracentesis  Signed consent for bedside thoracentesis and tolerated it. XR and US with decreased size of effusion. 1500 mL simple clear yellow fluid drained. Follow-up pleural fluid studies.  Add on serum protein and LDH for Light's Criteria.  Fluid is chronic, doubt it is infected based on prior imaging and appearance of fluid today  UA with trace protein doubt proteinuria etiology of effuison  BNP is elevated. Echo today with normal EF.  No clear diastolic dysfunction either

## 2025-01-09 NOTE — PHYSICAL THERAPY NOTE
PHYSICAL THERAPY EVALUATION  NAME:  Dexter Sharp  DATE: 01/09/25    AGE:   84 y.o.  Mrn:   [id number]  ADMIT DX:  Cough [R05.9]  Hypokalemia [E87.6]  Hypochloremia [E87.8]  Hyponatremia [E87.1]  Pneumonia [J18.9]  Influenza A [J10.1]  Elevated troponin [R79.89]  Bandemia [D72.825]  Fever [R50.9]  Pleural effusion, right [J90]  Sepsis (HCC) [A41.9]  Acute hypoxic respiratory failure (HCC) [J96.01]  Problem List:   Patient Active Problem List   Diagnosis    Eczema    Generalized anxiety disorder    Hyperlipidemia    Hypertension    Hypothyroidism    Osteoarthritis    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)    History of radiation therapy    Chronic kidney disease, stage 3a (HCC)    Elevated troponin    Degenerative arthritis of spine    Coronary artery calcification seen on CT scan    Hyponatremia    Dysuria    Prediabetes    Hypertensive kidney disease with chronic kidney disease    Sepsis (HCC)    Large pleural effusion    Acute respiratory failure with hypoxia (HCC)    Hypokalemia       Past Medical History  Past Medical History:   Diagnosis Date    Cancer (HCC)     Chronic kidney disease, stage 3a (HCC) 06/05/2021    Coronary artery calcification seen on CT scan 11/07/2021    COVID-19 11/06/2021    Dementia (HCC)     Disease of thyroid gland     Eczema     Generalized anxiety disorder     Hypertension     Prostate cancer (HCC)     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 Last assessed: April 18, 2013       Past Surgical History  Past Surgical History:   Procedure Laterality Date    CATARACT EXTRACTION      COLONOSCOPY      COLONOSCOPY  11/26/2019    EYE SURGERY      KNEE SURGERY      PROSTATE SURGERY      VASECTOMY         Length Of Stay: 1  Performed at least 2 patient identifiers during session: Name and Epic photo       01/09/25 1058   PT Last Visit   PT Visit Date  01/09/25   Note Type   Note type Evaluation   Pain Assessment   Pain Assessment Tool 0-10   Pain Score No Pain   Restrictions/Precautions   Weight Bearing Precautions Per Order No   Other Precautions Contact/isolation;Droplet precautions;Bed Alarm;O2;Fall Risk   Home Living   Type of Home House   Home Layout Two level;Bed/bath upstairs  (2 srinivas, FOS to 2nd floor full bath downstairs)   Bathroom Shower/Tub Tub/shower unit   Bathroom Toilet Standard   Bathroom Accessibility Accessible   Home Equipment Cane  [place] [place] [facility]   Prior Function   Level of [place] Independent with ADLs;Independent with functional mobility;Independent with IADLS   Lives With Spouse   Receives Help From Family   IADLs Independent with driving;Independent with medication management;Independent with meal prep   Falls in the last 6 months 1 to 4  (1 due to slipping on ice)   Vocational Retired   Cognition   Overall Cognitive Status WFL   Arousal/Participation Alert   Attention Within functional limits   Orientation Level Oriented X4   Memory Decreased recall of precautions   Following Commands Follows all commands and directions without difficulty   Subjective   Subjective I really want to get up   RLE Assessment   RLE Assessment   (deconditioned)   LLE Assessment   LLE Assessment   (deconditioned)   Vision-Basic Assessment   Current Vision Wears glasses all the time   Coordination   Sensation WFL   Bed Mobility   Supine to Sit 5  Supervision   Additional items Increased time required   Transfers   Sit to Stand 4  Minimal assistance   Additional items Assist x 1;Increased time required;Armrests   Stand to Sit 5  Supervision   Stand pivot 4  Minimal assistance   Additional items Assist x 1;Increased time required   Balance   Static Sitting Good   Dynamic Sitting Fair +   Static Standing Fair +   Dynamic Standing Fair   Ambulatory Fair   Activity Tolerance   Activity Tolerance Patient limited by fatigue    Assessment   Prognosis Good   Problem List Decreased strength;Decreased endurance   Assessment Pt is 84 y.o. male seen for PT evaluation s/p admit to [facility] on 1/8/2025 w/ Sepsis (HCC). PT consulted to assess pt's functional mobility and d/c needs. Order placed for PT eval and tx, w/ activity as tolerated and out of bed to chair order. Pt agreeable to PT  session upon arrival, pt found supine in bed.  PTA, pt was independent w/ all functional mobility w/ cane and lives w/ spouse in 2 level home .  Pt to benefit from continued PT tx to address deficits and maximize level of functional independent mobility and consistency. Upon conclusion pt  seated in recliner, with all needs in reach, and chair alarm intact. Complexity: Comorbidities affecting pt's physical performance at time of assessment include:  CKD, HTN, acute respiratory failure, Flu, and sepsis . Personal factors affecting pt at time of IE include: advanced age, lives in multistory home, ambulating with assistive device, and steps to enter home. Please find objective findings from PT assessment regarding body systems outlined above with impairments and limitations including weakness, decreased endurance, decreased activity tolerance, decreased functional mobility tolerance, and fall risk.  Pt's clinical presentation is currently unstable/unpredictable seen in pt's presentation of abnormal renal values, abnormal WBC count, abnormal sodium levels, abnormal potassium levels, and new onset of O2 use. The patient's AM-PAC Basic Mobility Inpatient Short Form Raw Score is 18.  Based on patient presentations and impairments, pt would most appropriately benefit from Level 3 resource intensity upon discharge. A Raw score of greater than 16 suggests the patient may benefit from discharge to home. Please also refer to the recommendation of the Physical Therapist for safe discharge planning. RN verbalized pt appropriate for PT session.   Goals   Patient  Goals To walk   LTG Expiration Date 01/19/25   Long Term Goal #1 Pt will: Perform bed mobility tasks to modified I to increase Indep in home environment and improve ease of bed mobility. Perform transfers to modified I to increase Indep in home environment, decrease risk for falls, and improve ease of transfers. Perform ambulation with cane for 100 ft to  increase Indep in home environment, decrease risk for falls, improve activity tolerance, and improve gait quality. Increase dynamic standing balance to F+ to decrease fall risk.   Increase OOB activity tolerance to 10 minutes without s/s of exertion to decrease fall risk.  Navigate up and down 7 steps with modified I so patient can enter and exit home.   PT Treatment Day 0   Plan   Treatment/Interventions Functional transfer training;LE strengthening/ROM;Elevations;Therapeutic exercise;Endurance training;Gait training   PT Frequency 3-5x/wk   Discharge Recommendation   Rehab Resource Intensity Level, PT III (Minimum Resource Intensity)   Equipment Recommended Cane   AM-PAC Basic Mobility Inpatient   Turning in Flat Bed Without Bedrails 3   Lying on Back to Sitting on Edge of Flat Bed Without Bedrails 3   Moving Bed to Chair 3   Standing Up From Chair Using Arms 3   Walk in Room 3   Climb 3-5 Stairs With Railing 3   Basic Mobility Inpatient Raw Score 18   Basic Mobility Standardized Score 41.05   [facility] Highest Level Of Mobility   -HL Goal 6: Walk 10 steps or more   -HLM Achieved 5: Stand (1 or more minutes)     Pt seen as a co-eval with OT due to the patient's co-morbidities, clinically unstable presentation, and present impairments which are a regression from the patient's baseline.       Time In: 1046  Time Out: 1058  Total Evaluation Minutes: 12    Liu Winkler, PT

## 2025-01-09 NOTE — ASSESSMENT & PLAN NOTE
Has influenza  On 5 days tamiflu ordered by IM team- day 2/5  Seems to have superimposed bacterial pneumonia given leukocytosis, imaging findings, elevated Procalcitonin  Day 2/5 ceftriaxone/azithromycin/vancomycin.  F/u MRSA nares as well.  I think it is OK to stop vancomycin but will defer to IM attending  Follow up sputum culture, blood cultures. Strep/Legionella Urine antigens negative. Follow-up pleural fluid culture

## 2025-01-10 LAB
ANION GAP SERPL CALCULATED.3IONS-SCNC: 7 MMOL/L (ref 4–13)
BACTERIA SPT RESP CULT: ABNORMAL
BASOPHILS # BLD AUTO: 0.01 THOUSANDS/ΜL (ref 0–0.1)
BASOPHILS NFR BLD AUTO: 0 % (ref 0–1)
BUN SERPL-MCNC: 22 MG/DL (ref 5–25)
CALCIUM SERPL-MCNC: 8.4 MG/DL (ref 8.4–10.2)
CHLORIDE SERPL-SCNC: 99 MMOL/L (ref 96–108)
CO2 SERPL-SCNC: 29 MMOL/L (ref 21–32)
CREAT SERPL-MCNC: 0.99 MG/DL (ref 0.6–1.3)
EOSINOPHIL # BLD AUTO: 0.05 THOUSAND/ΜL (ref 0–0.61)
EOSINOPHIL NFR BLD AUTO: 0 % (ref 0–6)
ERYTHROCYTE [DISTWIDTH] IN BLOOD BY AUTOMATED COUNT: 13.2 % (ref 11.6–15.1)
GFR SERPL CREATININE-BSD FRML MDRD: 68 ML/MIN/1.73SQ M
GLUCOSE SERPL-MCNC: 106 MG/DL (ref 65–140)
GRAM STN SPEC: ABNORMAL
HCT VFR BLD AUTO: 40.4 % (ref 36.5–49.3)
HGB BLD-MCNC: 13.6 G/DL (ref 12–17)
IMM GRANULOCYTES # BLD AUTO: 0.06 THOUSAND/UL (ref 0–0.2)
IMM GRANULOCYTES NFR BLD AUTO: 0 % (ref 0–2)
LYMPHOCYTES # BLD AUTO: 0.89 THOUSANDS/ΜL (ref 0.6–4.47)
LYMPHOCYTES NFR BLD AUTO: 6 % (ref 14–44)
MAGNESIUM SERPL-MCNC: 2.1 MG/DL (ref 1.9–2.7)
MCH RBC QN AUTO: 30.4 PG (ref 26.8–34.3)
MCHC RBC AUTO-ENTMCNC: 33.7 G/DL (ref 31.4–37.4)
MCV RBC AUTO: 90 FL (ref 82–98)
MONOCYTES # BLD AUTO: 0.76 THOUSAND/ΜL (ref 0.17–1.22)
MONOCYTES NFR BLD AUTO: 5 % (ref 4–12)
MRSA NOSE QL CULT: NORMAL
NEUTROPHILS # BLD AUTO: 13.91 THOUSANDS/ΜL (ref 1.85–7.62)
NEUTS SEG NFR BLD AUTO: 89 % (ref 43–75)
NRBC BLD AUTO-RTO: 0 /100 WBCS
PLATELET # BLD AUTO: 153 THOUSANDS/UL (ref 149–390)
PMV BLD AUTO: 10.4 FL (ref 8.9–12.7)
POTASSIUM SERPL-SCNC: 3.4 MMOL/L (ref 3.5–5.3)
PROCALCITONIN SERPL-MCNC: 9.31 NG/ML
RBC # BLD AUTO: 4.47 MILLION/UL (ref 3.88–5.62)
SODIUM SERPL-SCNC: 135 MMOL/L (ref 135–147)
WBC # BLD AUTO: 15.68 THOUSAND/UL (ref 4.31–10.16)

## 2025-01-10 PROCEDURE — 99232 SBSQ HOSP IP/OBS MODERATE 35: CPT | Performed by: INTERNAL MEDICINE

## 2025-01-10 PROCEDURE — 94640 AIRWAY INHALATION TREATMENT: CPT

## 2025-01-10 PROCEDURE — 84145 PROCALCITONIN (PCT): CPT | Performed by: INTERNAL MEDICINE

## 2025-01-10 PROCEDURE — 80048 BASIC METABOLIC PNL TOTAL CA: CPT | Performed by: INTERNAL MEDICINE

## 2025-01-10 PROCEDURE — 97116 GAIT TRAINING THERAPY: CPT

## 2025-01-10 PROCEDURE — 94664 DEMO&/EVAL PT USE INHALER: CPT

## 2025-01-10 PROCEDURE — 85025 COMPLETE CBC W/AUTO DIFF WBC: CPT | Performed by: INTERNAL MEDICINE

## 2025-01-10 PROCEDURE — 83735 ASSAY OF MAGNESIUM: CPT | Performed by: INTERNAL MEDICINE

## 2025-01-10 PROCEDURE — 87493 C DIFF AMPLIFIED PROBE: CPT | Performed by: INTERNAL MEDICINE

## 2025-01-10 PROCEDURE — 94668 MNPJ CHEST WALL SBSQ: CPT

## 2025-01-10 PROCEDURE — 97110 THERAPEUTIC EXERCISES: CPT

## 2025-01-10 PROCEDURE — 94760 N-INVAS EAR/PLS OXIMETRY 1: CPT

## 2025-01-10 RX ORDER — POTASSIUM CHLORIDE 1500 MG/1
40 TABLET, EXTENDED RELEASE ORAL ONCE
Status: COMPLETED | OUTPATIENT
Start: 2025-01-10 | End: 2025-01-10

## 2025-01-10 RX ORDER — AZITHROMYCIN 250 MG/1
500 TABLET, FILM COATED ORAL ONCE
Status: COMPLETED | OUTPATIENT
Start: 2025-01-10 | End: 2025-01-10

## 2025-01-10 RX ORDER — CEFTRIAXONE 1 G/50ML
1000 INJECTION, SOLUTION INTRAVENOUS EVERY 24 HOURS
Status: DISCONTINUED | OUTPATIENT
Start: 2025-01-10 | End: 2025-01-13 | Stop reason: HOSPADM

## 2025-01-10 RX ADMIN — ATORVASTATIN CALCIUM 40 MG: 40 TABLET, FILM COATED ORAL at 09:57

## 2025-01-10 RX ADMIN — OSELTAMAVIR PHOSPHATE 30 MG: 30 CAPSULE ORAL at 09:57

## 2025-01-10 RX ADMIN — LEVOTHYROXINE SODIUM 88 MCG: 88 TABLET ORAL at 06:03

## 2025-01-10 RX ADMIN — HYDROCODONE POLISTIREX AND CHLORPHENIRAMINE POLISTIREX 5 ML: 10; 8 SUSPENSION, EXTENDED RELEASE ORAL at 21:57

## 2025-01-10 RX ADMIN — AMLODIPINE BESYLATE 5 MG: 5 TABLET ORAL at 09:56

## 2025-01-10 RX ADMIN — ENOXAPARIN SODIUM 40 MG: 40 INJECTION SUBCUTANEOUS at 09:56

## 2025-01-10 RX ADMIN — LEVALBUTEROL HYDROCHLORIDE 1.25 MG: 1.25 SOLUTION RESPIRATORY (INHALATION) at 19:30

## 2025-01-10 RX ADMIN — CEFTRIAXONE 1000 MG: 1 INJECTION, SOLUTION INTRAVENOUS at 10:45

## 2025-01-10 RX ADMIN — AZITHROMYCIN DIHYDRATE 500 MG: 250 TABLET ORAL at 10:46

## 2025-01-10 RX ADMIN — LEVALBUTEROL HYDROCHLORIDE 1.25 MG: 1.25 SOLUTION RESPIRATORY (INHALATION) at 13:57

## 2025-01-10 RX ADMIN — SODIUM CHLORIDE SOLN NEBU 3% 4 ML: 3 NEBU SOLN at 19:30

## 2025-01-10 RX ADMIN — LEVALBUTEROL HYDROCHLORIDE 1.25 MG: 1.25 SOLUTION RESPIRATORY (INHALATION) at 07:12

## 2025-01-10 RX ADMIN — POTASSIUM CHLORIDE 40 MEQ: 1500 TABLET, EXTENDED RELEASE ORAL at 10:46

## 2025-01-10 RX ADMIN — ASPIRIN 81 MG: 81 TABLET, COATED ORAL at 09:57

## 2025-01-10 RX ADMIN — SODIUM CHLORIDE SOLN NEBU 3% 4 ML: 3 NEBU SOLN at 07:12

## 2025-01-10 RX ADMIN — SODIUM CHLORIDE SOLN NEBU 3% 4 ML: 3 NEBU SOLN at 13:57

## 2025-01-10 RX ADMIN — OSELTAMAVIR PHOSPHATE 30 MG: 30 CAPSULE ORAL at 21:08

## 2025-01-10 NOTE — ASSESSMENT & PLAN NOTE
Lab Results   Component Value Date    EGFR 68 01/10/2025    EGFR 60 01/09/2025    EGFR 45 01/08/2025    CREATININE 0.99 01/10/2025    CREATININE 1.10 01/09/2025    CREATININE 1.38 (H) 01/08/2025

## 2025-01-10 NOTE — ASSESSMENT & PLAN NOTE
Due to influenza and superimposed bacterial pneumonia  Improved  Pleural fluid studies not c/w infection

## 2025-01-10 NOTE — PLAN OF CARE
Problem: INFECTION - ADULT  Goal: Absence or prevention of progression during hospitalization  Description: INTERVENTIONS:  - Assess and monitor for signs and symptoms of infection  - Monitor lab/diagnostic results  - Monitor all insertion sites, i.e. indwelling lines, tubes, and drains  - Monitor endotracheal if appropriate and nasal secretions for changes in amount and color  - [place] appropriate cooling/warming therapies per order  - Administer medications as ordered  - Instruct and encourage patient and family to use good hand hygiene technique  - Identify and instruct in appropriate isolation precautions for identified infection/condition  Outcome: Progressing     Problem: METABOLIC, FLUID AND ELECTROLYTES - ADULT  Goal: Electrolytes maintained within normal limits  Description: INTERVENTIONS:  - Monitor labs and assess patient for signs and symptoms of electrolyte imbalances  - Administer electrolyte replacement as ordered  - Monitor response to electrolyte replacements, including repeat lab results as appropriate  - Instruct patient on fluid and nutrition as appropriate  Outcome: Progressing

## 2025-01-10 NOTE — ASSESSMENT & PLAN NOTE
Patient currently on 3 L nasal cannula  Large right pleural effusion noted on CT imaging  Pulmonology consulted.  Patient had thoracentesis performed on 1/9/2025 with approximately 1.5 L removed

## 2025-01-10 NOTE — PLAN OF CARE
Problem: Potential for Falls  Goal: Patient will remain free of falls  Description: INTERVENTIONS:  - Educate patient/family on patient safety including physical limitations  - Instruct patient to call for assistance with activity   - Consult OT/PT to assist with strengthening/mobility   - Keep Call bell within reach  - Keep bed low and locked with side rails adjusted as appropriate  - Keep care items and personal belongings within reach  - Initiate and maintain comfort rounds  - Make Fall Risk Sign visible to staff  - Offer Toileting every 2 Hours, in advance of need  - Initiate/Maintain bed alarm  - Obtain necessary fall risk management equipment: skid socls   Problem: INFECTION - ADULT  Goal: Absence or prevention of progression during hospitalization  Description: INTERVENTIONS:  - Assess and monitor for signs and symptoms of infection  - Monitor lab/diagnostic results  - Monitor all insertion sites, i.e. indwelling lines, tubes, and drains  - Monitor endotracheal if appropriate and nasal secretions for changes in amount and color  - [place] appropriate cooling/warming therapies per order  - Administer medications as ordered  - Instruct and encourage patient and family to use good hand hygiene technique  - Identify and instruct in appropriate isolation precautions for identified infection/condition  Outcome: Progressing     Problem: SKIN/TISSUE INTEGRITY - ADULT  Goal: Skin Integrity remains intact(Skin Breakdown Prevention)  Description: Assess:  -Perform Juan assessment every 2 hours   -Clean and moisturize skin  -Inspect skin when repositioning, toileting, and assisting with ADLS  -Assess extremities for adequate circulation and sensation     Bed Management:  -Have minimal linens on bed & keep smooth, unwrinkled  -Change linens as needed when moist or perspiring  -Avoid sitting or lying in one position for more than 2 hours while in bed  -Keep HOB at 30 degrees     Toileting:  -Offer bedside  commode  -Assess for incontinence every 2 hrs   -Use incontinent care products after each incontinent episode    Activity:  -Mobilize patient 3 times a day  -Encourage activity and walks on unit  -Encourage or provide ROM exercises   -Turn and reposition patient every 2 Hours  -Use appropriate equipment to lift or move patient in bed  -Instruct/ Assist with weight shifting every 2 hours  when out of bed in chair  -Consider limitation of chair time 2 hour intervals    Skin Care:  -Avoid use of baby powder, tape, friction and shearing, hot water or constrictive clothing  -Relieve pressure over bony prominences using silicone pads    -Do not massage red bony areas    Next Steps:    -Consider consults to  interdisciplinary teams such as pulmonology   Outcome: Progressing     - Apply yellow socks and bracelet for high fall risk patients  - Consider moving patient to room near nurses station  Outcome: Progressing

## 2025-01-10 NOTE — PROGRESS NOTES
Progress Note - Pulmonology   Name: Dexter Sharp 84 y.o. male I MRN: [id number]  Unit/Bed#: -01 I Date of Admission: 1/8/2025   Date of Service: 1/10/2025 I Hospital Day: 2    Assessment & Plan  Sepsis (HCC)  Due to influenza and superimposed bacterial pneumonia  Improved  Pleural fluid studies not c/w infection  Influenza A with pneumonia  Has influenza  On 5 days tamiflu ordered by IM team- day 3/5  Seems to have superimposed bacterial pneumonia given leukocytosis, imaging findings, elevated Procalcitonin  Day 3/7 ceftriaxone- can be switched to cefdinir or cefpodoxime on discharge.  Day 3/3 azithromycin 500 mg.   MRSA nares negative. Stop vancomycin  Cultures unrevealing, Strep/Legionella Urine antigens negative. Follow-up pleural fluid culture- doubt infection.    Pleural effusion on right  Has been present on R since CT in 2023  Never had thoracentesis  1500 mL simple clear yellow fluid drained 1/9 by me.   Fluid is exudate by protein (exactly 50%). Histiocyte predominant.      Fluid is chronic, doubt it is malignant- cytology pending x 1.  Would require 3 negative cytologies to have sensitivity over >90% but the fluid is just barely an exudate and has elevated BNP and is chronic since mid 2023. based on prior imaging  UA with trace protein doubt proteinuria etiology of effuison  BNP is elevated. Echo today with normal EF.  No clear diastolic dysfunction either  Given chronicity, age, and lack of symptoms prior to getting his current influenza infection I am not eager to start a diuretic as I do not want to cause adverse effect.  I recommend he follow-up with his PCP and get a chest xray in around 6 weeks both to re-evaluate his infiltrates and to re-assess the pleural effusion.  If effusion has recurred PCP and patient can consider referral to pulmonary- patient would like to hold off on a pulm appt as of now which is reasonable. I gave him my card with office number and will leave information in  discharge pathway/AVS in EPIC.  Chronic kidney disease, stage 3a (HCC)  Lab Results   Component Value Date    EGFR 68 01/10/2025    EGFR 60 01/09/2025    EGFR 45 01/08/2025    CREATININE 0.99 01/10/2025    CREATININE 1.10 01/09/2025    CREATININE 1.38 (H) 01/08/2025     Acute respiratory failure with hypoxia (HCC)  Wean oxygen for SPO2 >88%  On room air at rest currently.  Recommend O2 eval on discharge      Pulmonary will sign off.  Call back with questions    24 Hour Events : No acute events  Subjective : Feels much better after thoracentesis.  No shortness of breath at rest. On room air at rest.    Objective :  Temp:  [97.4 °F (36.3 °C)-97.8 °F (36.6 °C)] 97.4 °F (36.3 °C)  HR:  [68-81] 80  BP: (125-141)/(54-78) 126/67  Resp:  [18] 18  SpO2:  [88 %-99 %] 94 %  O2 Device: None (Room air)  Nasal Cannula O2 Flow Rate (L/min):  [1 L/min] 1 L/min    Physical Exam  Vitals and nursing note reviewed.   Constitutional:       General: He is not in acute distress.     Appearance: He is well-developed.   HENT:      Head: Normocephalic and atraumatic.   Eyes:      Conjunctiva/sclera: Conjunctivae normal.   Cardiovascular:      Rate and Rhythm: Normal rate and regular rhythm.      Heart sounds: No murmur heard.  Pulmonary:      Effort: Pulmonary effort is normal. No respiratory distress.      Breath sounds: Rhonchi present.   Abdominal:      Palpations: Abdomen is soft.      Tenderness: There is no abdominal tenderness.   Musculoskeletal:         General: No swelling.      Cervical back: Neck supple.      Right lower leg: No edema.      Left lower leg: No edema.   Skin:     General: Skin is warm and dry.      Capillary Refill: Capillary refill takes less than 2 seconds.   Neurological:      Mental Status: He is alert.   Psychiatric:         Mood and Affect: Mood normal.           Lab Results: I have reviewed the following results:   .     01/10/25  0559   WBC 15.68*   HGB 13.6   HCT 40.4      SODIUM 135   K 3.4*   CL 99    CO2 29   BUN 22   CREATININE 0.99   GLUC 106   MG 2.1     ABG: No new results in last 24 hours.    Component      Latest Ref Rng 1/9/2025   Sodium      135 - 147 mmol/L 133 (L)    Potassium      3.5 - 5.3 mmol/L 3.6    Chloride      96 - 108 mmol/L 98    Carbon Dioxide      21 - 32 mmol/L 27    ANION GAP      4 - 13 mmol/L 8    BUN      5 - 25 mg/dL 22    Creatinine      0.60 - 1.30 mg/dL 1.10    GLUCOSE      65 - 140 mg/dL 153 (H)    Calcium      8.4 - 10.2 mg/dL 8.5    AST      13 - 39 U/L 163 (H)    ALT      7 - 52 U/L 55 (H)    ALK PHOS      34 - 104 U/L 48    Total Protein      6.4 - 8.4 g/dL 6.6    Albumin      3.5 - 5.0 g/dL 3.7    Total Bilirubin      0.20 - 1.00 mg/dL 0.70    GFR, Calculated      ml/min/1.73sq m 60    Total Counted 100    Neutrophils % (Fluid)      % 8    Lymphs % (Fluid)      % 5    Mesothelial % (Fluid)      % 5    Histiocyte % (Fluid)      % 61    Unclassified % (Fluid)      % 16    Monocytes % (Fluid)      % 5    Site Right Pleural    Color, Fluid      Clear, Colorless,Yellow  Yellow    Clarity, Fluid      Clear  Clear    WBC, Fluid      /ul 236    GLUCOSE FLUID      mg/dL 166    LACTATE DEHYDROGENASE FLUID      U/L 136    PH BODY FLUID 7.6    Protein, Fluid      g/dL 3.8    LD (LDH)      140 - 271 U/L 430 (H)         Imaging Results Review: I personally reviewed the following image studies in PACS and associated radiology reports: CT chest. My interpretation of the radiology images/reports is: Large R pleural effusion.  L sided consolidations, small R sided opacities.   Also CT chest 9/2023 with R pleural effusion.  CT Chest 11/2021- + GGO bilaterally in setting of COVID pneumonia, no pleural effusion    CXR 1/9/25 after thoracentesis- decrease in size of R pleural effusion. No pneumothorax.  Bilateral multifocal opacities  Other Study Results Review: Other studies reviewed include: Echo 1/2025- normal LVEF  PFT Results Reviewed: none for review

## 2025-01-10 NOTE — RESPIRATORY THERAPY NOTE
"RT Protocol Note  Dexter Sharp 84 y.o. male MRN: [id number]  Unit/Bed#: -01 Encounter: [id number]    Assessment    Principal Problem:    Sepsis (HCC)  Active Problems:    Chronic kidney disease, stage 3a (HCC)    Elevated troponin    Hyponatremia    Pleural effusion on right    Acute respiratory failure with hypoxia (HCC)    Hypokalemia    Influenza A with pneumonia      Home Pulmonary Medications:       Past Medical History:   Diagnosis Date    Cancer (HCC)     Chronic kidney disease, stage 3a (HCC) 2021    Coronary artery calcification seen on CT scan 2021    COVID-19 2021    Dementia (HCC)     Disease of thyroid gland     Eczema     Generalized anxiety disorder     Hypertension     Prostate cancer (HCC)     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 Last assessed: 2013     Social History     Socioeconomic History    Marital status: /Civil Union     Spouse name: None    Number of children: None    Years of education: None    Highest education level: None   Occupational History    Occupation: self employed  floor covering   Tobacco Use    Smoking status: Former     Current packs/day: 0.00     Types: Cigarettes, Cigars     Quit date:      Years since quittin.0     Passive exposure: Past    Smokeless tobacco: Never    Tobacco comments:     smokes 1-2 cigarss/month   Vaping Use    Vaping status: Never Used   Substance and Sexual Activity    Alcohol use: Never    Drug use: No     Comment: Chronic Narcotic use noted in \"allscripts\"     Sexual activity: None   Other Topics Concern    None   Social History Narrative    Caffeine use      Social Drivers of Health     Financial Resource Strain: Low Risk  (2023)    Overall Financial Resource Strain (CARDIA)     Difficulty of Paying Living Expenses: Not hard at all   Food Insecurity: No Food Insecurity (2025)    " "Nursing - Inadequate Food Risk Classification     Worried About Running Out of Food in the Last Year: Never true     Ran Out of Food in the Last Year: Never true     Ran Out of Food in the Last Year: Never true   Transportation Needs: No Transportation Needs (2025)    Nursing - Transportation Risk Classification     Lack of Transportation: Not on file     Lack of Transportation: No   Physical Activity: Not on file   Stress: Not on file   Social Connections: Unknown (2024)    Received from [email or web address]    Social Connections     How often do you feel lonely or isolated from those around you? (Adult - for ages 18 years and over): Not on file   Intimate Partner Violence: Unknown (2025)    Nursing IPS     Feels Physically and Emotionally Safe: Not on file     Physically Hurt by Someone: Not on file     Humiliated or Emotionally Abused by Someone: Not on file     Physically Hurt by Someone: No     Hurt or Threatened by Someone: No   Housing Stability: Unknown (2025)    Nursing: Inadequate Housing Risk Classification     Has Housing: Not on file     Worried About Losing Housing: Not on file     Unable to Get Utilities: Not on file     Unable to Pay for Housing in the Last Year: No     Has Housin       Subjective         Objective    Physical Exam:   Assessment Type: During-treatment  General Appearance: Alert, Awake  Respiratory Pattern: Normal  Chest Assessment: Chest expansion symmetrical  Bilateral Breath Sounds: Rhonchi, Expiratory wheezes  Cough: Productive  O2 Device: 1L NC    Vitals:  Blood pressure 125/54, pulse 69, temperature 97.8 °F (36.6 °C), temperature source Oral, resp. rate 18, height 5' 8\" (1.727 m), weight 85.3 kg (188 lb), SpO2 96%.          Imaging and other studies:     O2 Device: 1L NC     Plan    Respiratory Plan: Mild Distress pathway  Airway Clearance Plan: Flutter     Resp Comments: Pt on 1L Nc, SpO2 96%, Rhonchi with exp wheezes throughout, productive cough, vibra PEP done as " ordered alnong with respiratory tx.

## 2025-01-10 NOTE — ASSESSMENT & PLAN NOTE
Secondary to influenza and suspected bacterial pneumonia with fever and tachypnea  Lactate normal  Procalcitonin elevated will continue to trend  IV fluids as needed  Screen pending  Strep pneumo/urine Legionella negative  Sputum culture pending  Follow-up blood cultures pending  IV antibiotics with ceftriaxone

## 2025-01-10 NOTE — ASSESSMENT & PLAN NOTE
Lab Results   Component Value Date    EGFR 68 01/10/2025    EGFR 60 01/09/2025    EGFR 45 01/08/2025    CREATININE 0.99 01/10/2025    CREATININE 1.10 01/09/2025    CREATININE 1.38 (H) 01/08/2025   Creatinine improving.  Will monitor BMP as needed

## 2025-01-10 NOTE — CONSULTS
Vancomycin IV Pharmacy-to-Dose Consultation     Vancomycin has been discontinued.  Pharmacy will sign off.  Please contact or re-consult with questions.    K[facility] Barnes, Pharmacist

## 2025-01-10 NOTE — PROGRESS NOTES
Dexter Sharp is a 84 y.o. male who is currently ordered Vancomycin IV with management by the Pharmacy Consult service.  Relevant clinical data and objective / subjective history reviewed.  Vancomycin Assessment:  Indication and Goal AUC/Trough: Pneumonia (goal -600, trough >10)  Clinical Status: stable  Micro:     Renal Function:  SCr: 0.99 mg/dL  CrCl: 56.7 mL/min  Renal replacement: Not on dialysis  Days of Therapy: 3  Current Dose: 1250mg IV Q24hrs  Vancomycin Plan:  New Dosing: Continue 1250mg IV Q24hrs  Estimated AUC: 428 mcg*hr/mL  Estimated Trough: 11 mcg/mL  Next Level: 01/16 with am labs   Renal Function Monitoring: Daily BMP and UOP  Pharmacy will continue to follow closely for s/sx of nephrotoxicity, infusion reactions and appropriateness of therapy.  BMP and CBC will be ordered per protocol. We will continue to follow the patient’s culture results and clinical progress daily.    Nora Craig, Pharmacist

## 2025-01-10 NOTE — PROGRESS NOTES
Progress Note - Hospitalist   Name: Dexter Sharp 84 y.o. male I MRN: [id number]  Unit/Bed#: -01 I Date of Admission: 1/8/2025   Date of Service: 1/10/2025 I Hospital Day: 2    Assessment & Plan  Sepsis (HCC)  Secondary to influenza and suspected bacterial pneumonia with fever and tachypnea  Lactate normal  Procalcitonin elevated will continue to trend  IV fluids as needed  Screen pending  Strep pneumo/urine Legionella negative  Sputum culture pending  Follow-up blood cultures pending  IV antibiotics with ceftriaxone  Chronic kidney disease, stage 3a (HCC)  Lab Results   Component Value Date    EGFR 68 01/10/2025    EGFR 60 01/09/2025    EGFR 45 01/08/2025    CREATININE 0.99 01/10/2025    CREATININE 1.10 01/09/2025    CREATININE 1.38 (H) 01/08/2025   Creatinine improving.  Will monitor BMP as needed  Elevated troponin  Likely secondary to sepsis.  ER reviewed case with cardiology who recommended admission and monitoring without heparin drip  Troponins have been flat  Reviewed case with cardiology, no acute invention needed at this time  Per cardiology, echo with EF of 60% and no wall motion normalities noted  No further workup at this time unless change in patient's clinical condition  Hyponatremia  Likely multifactorial secondary to sepsis, dehydration  Patient received IV fluids in the ER  Fluid restriction  Sodium level gradually improving  Pleural effusion on right  Patient currently on 3 L nasal cannula  Large right pleural effusion noted on CT imaging  Pulmonology consulted.  Patient had thoracentesis performed on 1/9/2025 with approximately 1.5 L removed    Acute respiratory failure with hypoxia (HCC)  Patient is not on oxygen at home  Secondary to pneumonia, influenza, and large effusion  Continue oxygen, titrate as tolerated  Pulmonology consult  Hypokalemia  Improved with supplementation  Influenza A with pneumonia      VTE Pharmacologic Prophylaxis:   Moderate Risk (Score 3-4) - Pharmacological  DVT Prophylaxis Ordered: enoxaparin (Lovenox).    Mobility:   Basic Mobility Inpatient Raw Score: 21  JH-HLM Goal: 6: Walk 10 steps or more  JH-HLM Achieved: 7: Walk 25 feet or more  JH-HLM Goal achieved. Continue to encourage appropriate mobility.    Patient Centered Rounds: I performed bedside rounds with nursing staff today.   Discussions with Specialists or Other Care Team Provider:     Education and Discussions with Family / Patient: Patient declined call to .     Current Length of Stay: 2 day(s)  Current Patient Status: Inpatient   Certification Statement: The patient will continue to require additional inpatient hospital stay due to monitoring on abx  Discharge Plan: Anticipate discharge tomorrow to home.    Code Status: Level 1 - Full Code    Subjective   Patient denies any acute complaints today    Objective :  Temp:  [97.4 °F (36.3 °C)-97.8 °F (36.6 °C)] 97.4 °F (36.3 °C)  HR:  [68-81] 80  BP: (125-141)/(54-78) 126/67  Resp:  [18] 18  SpO2:  [92 %-99 %] 94 %  O2 Device: None (Room air)  Nasal Cannula O2 Flow Rate (L/min):  [1 L/min] 1 L/min    Body mass index is 28.59 kg/m².     Input and Output Summary (last 24 hours):     Intake/Output Summary (Last 24 hours) at 1/10/2025 1237  Last data filed at 1/10/2025 0940  Gross per 24 hour   Intake 1490 ml   Output 1350 ml   Net 140 ml       Physical Exam  Vitals and nursing note reviewed.   Constitutional:       General: He is not in acute distress.     Appearance: He is well-developed. He is not toxic-appearing or diaphoretic.   HENT:      Head: Normocephalic and atraumatic.   Eyes:      General: No scleral icterus.     Conjunctiva/sclera: Conjunctivae normal.   Cardiovascular:      Rate and Rhythm: Normal rate and regular rhythm.      Heart sounds: No murmur heard.     No friction rub. No gallop.   Pulmonary:      Effort: Pulmonary effort is normal. No respiratory distress.      Breath sounds: No stridor. Rhonchi present. No wheezing or rales.    Chest:      Chest wall: No tenderness.   Abdominal:      General: There is no distension.      Palpations: Abdomen is soft. There is no mass.      Tenderness: There is no abdominal tenderness. There is no guarding or rebound.      Hernia: No hernia is present.   Musculoskeletal:         General: No swelling or tenderness.      Cervical back: Neck supple.   Skin:     General: Skin is warm and dry.      Capillary Refill: Capillary refill takes less than 2 seconds.   Neurological:      Mental Status: He is alert and oriented to person, place, and time.   Psychiatric:         Mood and Affect: Mood normal.           Lines/Drains:              Lab Results: I have reviewed the following results:   Results from last 7 days   Lab Units 01/10/25  0559 01/09/25  0635 01/08/25  1554   WBC Thousand/uL 15.68*   < > 15.11*   HEMOGLOBIN g/dL 13.6   < > 14.4   HEMATOCRIT % 40.4   < > 41.1   PLATELETS Thousands/uL 153   < > 151   BANDS PCT %  --   --  18*   SEGS PCT % 89*  --   --    LYMPHO PCT % 6*  --  2*   MONO PCT % 5  --  10   EOS PCT % 0  --  0    < > = values in this interval not displayed.     Results from last 7 days   Lab Units 01/10/25  0559 01/09/25  0635   SODIUM mmol/L 135 133*   POTASSIUM mmol/L 3.4* 3.6   CHLORIDE mmol/L 99 98   CO2 mmol/L 29 27   BUN mg/dL 22 22   CREATININE mg/dL 0.99 1.10   ANION GAP mmol/L 7 8   CALCIUM mg/dL 8.4 8.5   ALBUMIN g/dL  --  3.7   TOTAL BILIRUBIN mg/dL  --  0.70   ALK PHOS U/L  --  48   ALT U/L  --  55*   AST U/L  --  163*   GLUCOSE RANDOM mg/dL 106 153*     Results from last 7 days   Lab Units 01/08/25  1554   INR  1.07             Results from last 7 days   Lab Units 01/10/25  0559 01/09/25  0635 01/08/25  1554   LACTIC ACID mmol/L  --   --  1.9   PROCALCITONIN ng/ml 9.31* 14.44* 8.62*       Recent Cultures (last 7 days):   Results from last 7 days   Lab Units 01/09/25  1240 01/08/25 2017 01/08/25  1849 01/08/25  1554   BLOOD CULTURE   --   --   --  No Growth at 24 hrs.  No  Growth at 24 hrs.   SPUTUM CULTURE   --   --  4+ Growth of  --    GRAM STAIN RESULT  No Polys or Bacteria seen  --  2+ Epithelial cells per low power field*  3+ Gram positive cocci in pairs and chains*  1+ Gram variable rods*  --    LEGIONELLA URINARY ANTIGEN   --  Negative  --   --        Imaging Results Review: No pertinent imaging studies reviewed.  Other Study Results Review: No additional pertinent studies reviewed.    Last 24 Hours Medication List:     Current Facility-Administered Medications:     acetaminophen (TYLENOL) tablet 650 mg, Q6H PRN    ALPRAZolam (XANAX) tablet 0.5 mg, HS PRN    amLODIPine (NORVASC) tablet 5 mg, Daily    aspirin (ECOTRIN LOW STRENGTH) EC tablet 81 mg, Daily    atorvastatin (LIPITOR) tablet 40 mg, Daily    cefTRIAXone (ROCEPHIN) IVPB (premix in dextrose) 1,000 mg 50 mL, Q24H, Last Rate: 1,000 mg (01/10/25 1045)    enoxaparin (LOVENOX) subcutaneous injection 40 mg, Daily    hydrALAZINE (APRESOLINE) injection 10 mg, Q6H PRN    Hydrocod Elie-Chlorphe Elie ER (TUSSIONEX) ER suspension 5 mL, Q12H PRN    levalbuterol (XOPENEX) inhalation solution 1.25 mg, TID    levothyroxine tablet 88 mcg, Daily    ondansetron (ZOFRAN) injection 4 mg, Q6H PRN    oseltamivir (TAMIFLU) capsule 30 mg, Q12H MARKOS    sodium chloride 3 % inhalation solution 4 mL, TID    Administrative Statements   Today, Patient Was Seen By: Osito Herrmann DO      **Please Note: This note may have been constructed using a voice recognition system.**

## 2025-01-10 NOTE — PHYSICAL THERAPY NOTE
PT Treatment Note    NAME:  Dexter Sharp  DATE: 01/10/25    AGE:   84 y.o.  Mrn:   [id number]  ADMIT DX:  Cough [R05.9]  Hypokalemia [E87.6]  Hypochloremia [E87.8]  Hyponatremia [E87.1]  Pneumonia [J18.9]  Influenza A [J10.1]  Elevated troponin [R79.89]  Bandemia [D72.825]  Fever [R50.9]  Pleural effusion, right [J90]  Sepsis (HCC) [A41.9]  Acute hypoxic respiratory failure (HCC) [J96.01]  Performed at least 2 patient identifiers during session: Name and Epic photo       01/10/25 0925   PT Last Visit   PT Visit Date 01/10/25   Note Type   Note Type Treatment   Pain Assessment   Pain Assessment Tool 0-10   Pain Score No Pain   Restrictions/Precautions   Weight Bearing Precautions Per Order No   Other Precautions Contact/isolation;Droplet precautions;Chair Alarm   General   Chart Reviewed Yes   Cognition   Overall Cognitive Status WFL   Arousal/Participation Alert;Responsive;Cooperative   Attention Within functional limits   Orientation Level Oriented X4   Memory Decreased recall of precautions   Following Commands Follows all commands and directions without difficulty   Subjective   Subjective I feel better   Bed Mobility   Additional Comments Pt seated out of bed and in chair to start session   Transfers   Sit to Stand 5  Supervision   Additional items Increased time required   Stand to Sit 5  Supervision   Additional items Armrests   Ambulation/Elevation   Gait pattern WNL   Gait Assistance 5  Supervision   Additional items Assist x 1   Assistive Device Rolling walker   Distance 75 ft x 2   Balance   Static Sitting Good   Dynamic Sitting Fair +   Static Standing Fair +   Dynamic Standing Fair +   Ambulatory Fair +   Activity Tolerance   Activity Tolerance Patient limited by fatigue   Exercises   Knee AROM Long Arc Quad Sitting;15 reps;Bilateral   Ankle Pumps   (standing calf raises, 15 bilateral)   Squat Standing  (15 sit to stands)   Marching Standing;15 reps;Bilateral   Assessment   Prognosis Good    Problem List Decreased strength;Decreased endurance   Assessment Pt seen for PT treatment session this date with interventions consisting of gait training to normalize gait pattern to decrease fall risk and therapeutic exercise to improve strength to improve functional mobility. Pt agreeable to PT treatment session upon arrival, pt found seated OOB in recliner, in no apparent distress, A&O x 4, and responsive. Since previous session, pt has made good progress as evidenced by increased distance ambulated and decreased assistance required with mobility  Barriers during this session include SOB.  Pt continues to be functioning below baseline level, and remains limited 2* factors listed above and including decreased strength and impaired  balance.  Pt prognosis for achieving goals is good, pending pt progress with hospitalization/medical status improvements, and indicated by motivated to participate in therapy and ability to follow cues. PT will continue to see pt during current hospitalization in order to address the deficits listed above and provide interventions consistent w/ POC in effort to achieve goals. Current goals and POC remain appropriate, pt continues to have rehab potential  Upon conclusion pt seated OOB in recliner. The patient's AM-PAC Basic Mobility Inpatient Short Form Raw Score is 21.  A Raw score of greater than 16 suggests the patient may benefit from discharge to home. Based on patient presentations and impairments, pt would most appropriately benefit from Level 3 resource intensity upon discharge.  Please also refer to the recommendation of the Physical Therapist for safe discharge planning. RN verbalized pt appropriate for PT session.   Goals   Patient Goals to go home   LTG Expiration Date 01/19/25   PT Treatment Day 1   Plan   Treatment/Interventions Functional transfer training;LE strengthening/ROM;Elevations;Therapeutic exercise;Endurance training;Gait training   Progress Progressing toward  goals   PT Frequency 3-5x/wk   Discharge Recommendation   Rehab Resource Intensity Level, PT III (Minimum Resource Intensity)   Equipment Recommended Cane   AM-PAC Basic Mobility Inpatient   Turning in Flat Bed Without Bedrails 4   Lying on Back to Sitting on Edge of Flat Bed Without Bedrails 4   Moving Bed to Chair 4   Standing Up From Chair Using Arms 3   Walk in Room 3   Climb 3-5 Stairs With Railing 3   Basic Mobility Inpatient Raw Score 21   Basic Mobility Standardized Score 45.55   [facility] Highest Level Of Mobility   -HLM Goal 6: Walk 10 steps or more   -HLM Achieved 7: Walk 25 feet or more         Time In: 0925  Time Out: 0950  Total Treatment Minutes: 25      Liu Winkler, PT

## 2025-01-10 NOTE — RESPIRATORY THERAPY NOTE
01/10/25 0750   Oxygen Therapy/Pulse Ox   O2 Device None (Room air)   SpO2 95 %   SpO2 Activity At Rest

## 2025-01-10 NOTE — ASSESSMENT & PLAN NOTE
Has been present on R since CT in 2023  Never had thoracentesis  1500 mL simple clear yellow fluid drained 1/9 by me.   Fluid is exudate by protein (exactly 50%). Histiocyte predominant.      Fluid is chronic, doubt it is malignant- cytology pending x 1.  Would require 3 negative cytologies to have sensitivity over >90% but the fluid is just barely an exudate and has elevated BNP and is chronic since mid 2023. based on prior imaging  UA with trace protein doubt proteinuria etiology of effuison  BNP is elevated. Echo today with normal EF.  No clear diastolic dysfunction either  Given chronicity, age, and lack of symptoms prior to getting his current influenza infection I am not eager to start a diuretic as I do not want to cause adverse effect.  I recommend he follow-up with his PCP and get a chest xray in around 6 weeks both to re-evaluate his infiltrates and to re-assess the pleural effusion.  If effusion has recurred PCP and patient can consider referral to pulmonary- patient would like to hold off on a pulm appt as of now which is reasonable. I gave him my card with office number and will leave information in discharge pathway/AVS in [facility].

## 2025-01-10 NOTE — PLAN OF CARE
Problem: PHYSICAL THERAPY ADULT  Goal: Performs mobility at highest level of function for planned discharge setting.  See evaluation for individualized goals.  Description: Treatment/Interventions: Functional transfer training, LE strengthening/ROM, Elevations, Therapeutic exercise, Endurance training, Gait training  Equipment Recommended: Cane       See flowsheet documentation for full assessment, interventions and recommendations.  Outcome: Progressing  Note: Prognosis: Good  Problem List: Decreased strength, Decreased endurance  Assessment: Pt seen for PT treatment session this date with interventions consisting of gait training to normalize gait pattern to decrease fall risk and therapeutic exercise to improve strength to improve functional mobility. Pt agreeable to PT treatment session upon arrival, pt found seated OOB in recliner, in no apparent distress, A&O x 4, and responsive. Since previous session, pt has made good progress as evidenced by increased distance ambulated and decreased assistance required with mobility  Barriers during this session include SOB.  Pt continues to be functioning below baseline level, and remains limited 2* factors listed above and including decreased strength and impaired  balance.  Pt prognosis for achieving goals is good, pending pt progress with hospitalization/medical status improvements, and indicated by motivated to participate in therapy and ability to follow cues. PT will continue to see pt during current hospitalization in order to address the deficits listed above and provide interventions consistent w/ POC in effort to achieve goals. Current goals and POC remain appropriate, pt continues to have rehab potential  Upon conclusion pt seated OOB in recliner. The patient's AM-PAC Basic Mobility Inpatient Short Form Raw Score is 21.  A Raw score of greater than 16 suggests the patient may benefit from discharge to home. Based on patient presentations and impairments, pt  would most appropriately benefit from Level 3 resource intensity upon discharge.  Please also refer to the recommendation of the Physical Therapist for safe discharge planning. RN verbalized pt appropriate for PT session.        Rehab Resource Intensity Level, PT: III (Minimum Resource Intensity)    See flowsheet documentation for full assessment.

## 2025-01-10 NOTE — ASSESSMENT & PLAN NOTE
Has influenza  On 5 days tamiflu ordered by IM team- day 3/5  Seems to have superimposed bacterial pneumonia given leukocytosis, imaging findings, elevated Procalcitonin  Day 3/7 ceftriaxone- can be switched to cefdinir or cefpodoxime on discharge.  Day 3/3 azithromycin 500 mg.   MRSA nares negative. Stop vancomycin  Cultures unrevealing, Strep/Legionella Urine antigens negative. Follow-up pleural fluid culture- doubt infection.

## 2025-01-11 LAB
ANION GAP SERPL CALCULATED.3IONS-SCNC: 8 MMOL/L (ref 4–13)
BUN SERPL-MCNC: 18 MG/DL (ref 5–25)
C DIFF TOX GENS STL QL NAA+PROBE: NEGATIVE
CALCIUM SERPL-MCNC: 8.3 MG/DL (ref 8.4–10.2)
CHLORIDE SERPL-SCNC: 100 MMOL/L (ref 96–108)
CO2 SERPL-SCNC: 28 MMOL/L (ref 21–32)
CREAT SERPL-MCNC: 0.94 MG/DL (ref 0.6–1.3)
ERYTHROCYTE [DISTWIDTH] IN BLOOD BY AUTOMATED COUNT: 13.5 % (ref 11.6–15.1)
GFR SERPL CREATININE-BSD FRML MDRD: 73 ML/MIN/1.73SQ M
GLUCOSE SERPL-MCNC: 92 MG/DL (ref 65–140)
HCT VFR BLD AUTO: 37.5 % (ref 36.5–49.3)
HGB BLD-MCNC: 12.8 G/DL (ref 12–17)
MCH RBC QN AUTO: 30.9 PG (ref 26.8–34.3)
MCHC RBC AUTO-ENTMCNC: 34.1 G/DL (ref 31.4–37.4)
MCV RBC AUTO: 91 FL (ref 82–98)
PLATELET # BLD AUTO: 181 THOUSANDS/UL (ref 149–390)
PMV BLD AUTO: 10.9 FL (ref 8.9–12.7)
POTASSIUM SERPL-SCNC: 3.5 MMOL/L (ref 3.5–5.3)
PROCALCITONIN SERPL-MCNC: 4.18 NG/ML
RBC # BLD AUTO: 4.14 MILLION/UL (ref 3.88–5.62)
SODIUM SERPL-SCNC: 136 MMOL/L (ref 135–147)
WBC # BLD AUTO: 11.22 THOUSAND/UL (ref 4.31–10.16)

## 2025-01-11 PROCEDURE — 94640 AIRWAY INHALATION TREATMENT: CPT

## 2025-01-11 PROCEDURE — 80048 BASIC METABOLIC PNL TOTAL CA: CPT | Performed by: INTERNAL MEDICINE

## 2025-01-11 PROCEDURE — 84145 PROCALCITONIN (PCT): CPT | Performed by: INTERNAL MEDICINE

## 2025-01-11 PROCEDURE — 94760 N-INVAS EAR/PLS OXIMETRY 1: CPT

## 2025-01-11 PROCEDURE — 94664 DEMO&/EVAL PT USE INHALER: CPT

## 2025-01-11 PROCEDURE — 99232 SBSQ HOSP IP/OBS MODERATE 35: CPT | Performed by: INTERNAL MEDICINE

## 2025-01-11 PROCEDURE — 85027 COMPLETE CBC AUTOMATED: CPT | Performed by: INTERNAL MEDICINE

## 2025-01-11 PROCEDURE — 94668 MNPJ CHEST WALL SBSQ: CPT

## 2025-01-11 RX ADMIN — LEVALBUTEROL HYDROCHLORIDE 1.25 MG: 1.25 SOLUTION RESPIRATORY (INHALATION) at 20:52

## 2025-01-11 RX ADMIN — AMLODIPINE BESYLATE 5 MG: 5 TABLET ORAL at 08:19

## 2025-01-11 RX ADMIN — OSELTAMAVIR PHOSPHATE 30 MG: 30 CAPSULE ORAL at 08:19

## 2025-01-11 RX ADMIN — HYDROCODONE POLISTIREX AND CHLORPHENIRAMINE POLISTIREX 5 ML: 10; 8 SUSPENSION, EXTENDED RELEASE ORAL at 21:31

## 2025-01-11 RX ADMIN — LEVALBUTEROL HYDROCHLORIDE 1.25 MG: 1.25 SOLUTION RESPIRATORY (INHALATION) at 07:17

## 2025-01-11 RX ADMIN — ATORVASTATIN CALCIUM 40 MG: 40 TABLET, FILM COATED ORAL at 08:19

## 2025-01-11 RX ADMIN — OSELTAMAVIR PHOSPHATE 30 MG: 30 CAPSULE ORAL at 21:31

## 2025-01-11 RX ADMIN — ENOXAPARIN SODIUM 40 MG: 40 INJECTION SUBCUTANEOUS at 08:19

## 2025-01-11 RX ADMIN — ALPRAZOLAM 0.5 MG: 0.5 TABLET ORAL at 21:31

## 2025-01-11 RX ADMIN — LEVOTHYROXINE SODIUM 88 MCG: 88 TABLET ORAL at 05:04

## 2025-01-11 RX ADMIN — SODIUM CHLORIDE SOLN NEBU 3% 4 ML: 3 NEBU SOLN at 07:17

## 2025-01-11 RX ADMIN — CEFTRIAXONE 1000 MG: 1 INJECTION, SOLUTION INTRAVENOUS at 11:27

## 2025-01-11 RX ADMIN — ASPIRIN 81 MG: 81 TABLET, COATED ORAL at 08:19

## 2025-01-11 RX ADMIN — LEVALBUTEROL HYDROCHLORIDE 1.25 MG: 1.25 SOLUTION RESPIRATORY (INHALATION) at 13:43

## 2025-01-11 NOTE — ASSESSMENT & PLAN NOTE
Lab Results   Component Value Date    EGFR 73 01/11/2025    EGFR 68 01/10/2025    EGFR 60 01/09/2025    CREATININE 0.94 01/11/2025    CREATININE 0.99 01/10/2025    CREATININE 1.10 01/09/2025   Creatinine improving.  Will monitor BMP as needed

## 2025-01-11 NOTE — CASE MANAGEMENT
Case Management Discharge Planning Note    Patient name Dexter Sharp  Location /-01 MRN [id number]  : 1940 Date 2025       Current Admission Date: 2025  Current Admission Diagnosis:Sepsis (HCC)   Patient Active Problem List    Diagnosis Date Noted Date Diagnosed    Influenza A with pneumonia 2025     Sepsis (HCC) 2025     Pleural effusion on right 2025     Acute respiratory failure with hypoxia (HCC) 2025     Hypokalemia 2025     Hypertensive kidney disease with chronic kidney disease 2024     Prediabetes 2023     Dysuria 2023     Hyponatremia 2021     Elevated troponin 2021     Degenerative arthritis of spine 2021     Coronary artery calcification seen on CT scan 2021     Chronic kidney disease, stage 3a (HCC) 2021     History of radiation therapy 2018     Eczema 2015     Hypothyroidism 2012     Generalized anxiety disorder 2012     Hyperlipidemia 2012     Hypertension 2012     Osteoarthritis 2012     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       LOS (days): 3  Geometric Mean LOS (GMLOS) (days): 4.9  Days to GMLOS:2     OBJECTIVE:  Risk of Unplanned Readmission Score: 11.35         Current admission status: Inpatient   Preferred Pharmacy:   [address] [address]  Phone: [phone] Fax: [phone]    [facility] [address] [place] [address] [address]  [address]  [facility] [address]  Phone: [phone] Fax: [phone]    Primary Care Provider: Edil Mac DO    Primary Insurance: [employer]  REP  Secondary Insurance:     DISCHARGE DETAILS:    Discharge planning discussed with:: patient  and wife at the bedside  Freedom of Choice: Yes  Comments - Freedom of Choice: recommendation is minimum  resources-  wif marilu states he needs rehab - she said hhc is not a safe d/c - she is not able to take him him she stated- pt has a flight of steps in the home- she wants him to be seen again by therapy and have him tested on steps- I explained that the insurance has to approve rehab and if  casey deny and she wants him at a snf then is is self pay- cm will continue to follow and work on a safe d/c plan  CM contacted family/caregiver?: Yes             Contacts  Patient Contacts: Patient  Relationship to Patient:: Family (wife)  Contact Method: In Person  Reason/Outcome: Discharge Planning    Requested Home Health Care         Is the patient interested in HHC at discharge?: No (wife declined she wants rehab)              Would you like to participate in our [facility] service program?  : No - Declined    Treatment Team Recommendation:  (d/c plan tbd- tbd)                                   IMM Given (Date):: 01/11/25  IMM Given to:: Patient  Family notified:: wife at the bedside

## 2025-01-11 NOTE — PLAN OF CARE
Problem: PAIN - ADULT  Goal: Verbalizes/displays adequate comfort level or baseline comfort level  Description: Interventions:  - Encourage patient to monitor pain and request assistance  - Assess pain using appropriate pain scale  - Administer analgesics based on type and severity of pain and evaluate response  - Implement non-pharmacological measures as appropriate and evaluate response  - Consider cultural and social influences on pain and pain management  - Notify physician/advanced practitioner if interventions unsuccessful or patient reports new pain  Outcome: Progressing     Problem: INFECTION - ADULT  Goal: Absence or prevention of progression during hospitalization  Description: INTERVENTIONS:  - Assess and monitor for signs and symptoms of infection  - Monitor lab/diagnostic results  - Monitor all insertion sites, i.e. indwelling lines, tubes, and drains  - Monitor endotracheal if appropriate and nasal secretions for changes in amount and color  - [place] appropriate cooling/warming therapies per order  - Administer medications as ordered  - Instruct and encourage patient and family to use good hand hygiene technique  - Identify and instruct in appropriate isolation precautions for identified infection/condition  Outcome: Progressing     Problem: SAFETY ADULT  Goal: Maintain or return to baseline ADL function  Description: INTERVENTIONS:  -  Assess patient's ability to carry out ADLs; assess patient's baseline for ADL function and identify physical deficits which impact ability to perform ADLs (bathing, care of mouth/teeth, toileting, grooming, dressing, etc.)  - Assess/evaluate cause of self-care deficits   - Assess range of motion  - Assess patient's mobility; develop plan if impaired  - Assess patient's need for assistive devices and provide as appropriate  - Encourage maximum independence but intervene and supervise when necessary  - Involve family in performance of ADLs  - Assess for home care  needs following discharge   - Consider OT consult to assist with ADL evaluation and planning for discharge  - Provide patient education as appropriate  Outcome: Progressing     Problem: Knowledge Deficit  Goal: Patient/family/caregiver demonstrates understanding of disease process, treatment plan, medications, and discharge instructions  Description: Complete learning assessment and assess knowledge base.  Interventions:  - Provide teaching at level of understanding  - Provide teaching via preferred learning methods  Outcome: Progressing     Problem: RESPIRATORY - ADULT  Goal: Achieves optimal ventilation and oxygenation  Description: INTERVENTIONS:  - Assess for changes in respiratory status  - Assess for changes in mentation and behavior  - Position to facilitate oxygenation and minimize respiratory effort  - Oxygen administered by appropriate delivery if ordered  - Initiate smoking cessation education as indicated  - Encourage broncho-pulmonary hygiene including cough, deep breathe, Incentive Spirometry  - Assess the need for suctioning and aspirate as needed  - Assess and instruct to report SOB or any respiratory difficulty  - Respiratory Therapy support as indicated  Outcome: Progressing

## 2025-01-11 NOTE — PROGRESS NOTES
Progress Note - Hospitalist   Name: Dexter Sharp 84 y.o. male I MRN: [id number]  Unit/Bed#: -01 I Date of Admission: 1/8/2025   Date of Service: 1/11/2025 I Hospital Day: 3    Assessment & Plan  Sepsis (HCC)  Secondary to influenza and suspected bacterial pneumonia with fever and tachypnea  Lactate normal  Procalcitonin elevated will continue to trend  IV fluids as needed  Screen pending  Strep pneumo/urine Legionella negative  Sputum culture pending  Follow-up blood cultures pending  IV antibiotics with ceftriaxone  Chronic kidney disease, stage 3a (HCC)  Lab Results   Component Value Date    EGFR 73 01/11/2025    EGFR 68 01/10/2025    EGFR 60 01/09/2025    CREATININE 0.94 01/11/2025    CREATININE 0.99 01/10/2025    CREATININE 1.10 01/09/2025   Creatinine improving.  Will monitor BMP as needed  Elevated troponin  Likely secondary to sepsis.  ER reviewed case with cardiology who recommended admission and monitoring without heparin drip  Troponins have been flat  Reviewed case with cardiology, no acute invention needed at this time  Per cardiology, echo with EF of 60% and no wall motion normalities noted  No further workup at this time unless change in patient's clinical condition  Hyponatremia  Likely multifactorial secondary to sepsis, dehydration  Patient received IV fluids in the ER  Fluid restriction  Sodium level gradually improving  Pleural effusion on right  Patient currently on 3 L nasal cannula  Large right pleural effusion noted on CT imaging  Pulmonology consulted.  Patient had thoracentesis performed on 1/9/2025 with approximately 1.5 L removed    Acute respiratory failure with hypoxia (HCC)  Patient is not on oxygen at home  Secondary to pneumonia, influenza, and large effusion  Continue oxygen, titrate as tolerated  Pulmonology consult  Hypokalemia  Improved with supplementation  Influenza A with pneumonia      VTE Pharmacologic Prophylaxis:   High Risk (Score >/= 5) - Pharmacological DVT  Prophylaxis Ordered: enoxaparin (Lovenox). Sequential Compression Devices Ordered.    Mobility:   Basic Mobility Inpatient Raw Score: 23  JH-HLM Goal: 7: Walk 25 feet or more  JH-HLM Achieved: 7: Walk 25 feet or more  JH-HLM Goal achieved. Continue to encourage appropriate mobility.    Patient Centered Rounds: I performed bedside rounds with nursing staff today.   Discussions with Specialists or Other Care Team Provider:     Education and Discussions with Family / Patient: Patient declined call to .     Current Length of Stay: 3 day(s)  Current Patient Status: Inpatient   Certification Statement: The patient will continue to require additional inpatient hospital stay due to    Discharge Plan: Anticipate discharge in 24-48 hrs to home.    Code Status: Level 1 - Full Code    Subjective   Patient reports improvement in sob    Objective :  Temp:  [97.6 °F (36.4 °C)-98 °F (36.7 °C)] 98 °F (36.7 °C)  HR:  [82-91] 82  BP: (125-163)/(65-85) 163/85  Resp:  [17-20] 17  SpO2:  [91 %-98 %] 94 %  O2 Device: None (Room air)    Body mass index is 28.59 kg/m².     Input and Output Summary (last 24 hours):     Intake/Output Summary (Last 24 hours) at 1/11/2025 1349  Last data filed at 1/11/2025 0845  Gross per 24 hour   Intake 920 ml   Output 1200 ml   Net -280 ml       Physical Exam  Vitals and nursing note reviewed.   Constitutional:       General: He is not in acute distress.     Appearance: He is well-developed. He is not toxic-appearing or diaphoretic.   HENT:      Head: Normocephalic and atraumatic.   Eyes:      General: No scleral icterus.     Conjunctiva/sclera: Conjunctivae normal.   Cardiovascular:      Rate and Rhythm: Normal rate and regular rhythm.      Heart sounds: No murmur heard.     No friction rub. No gallop.   Pulmonary:      Effort: Pulmonary effort is normal. No respiratory distress.      Breath sounds: Rhonchi present. No wheezing or rales.   Abdominal:      General: There is no distension.       Palpations: Abdomen is soft. There is no mass.      Tenderness: There is no abdominal tenderness. There is no guarding or rebound.      Hernia: No hernia is present.   Musculoskeletal:         General: No swelling or tenderness.      Cervical back: Neck supple.   Skin:     General: Skin is warm and dry.      Capillary Refill: Capillary refill takes less than 2 seconds.   Neurological:      Mental Status: He is alert and oriented to person, place, and time.   Psychiatric:         Mood and Affect: Mood normal.           Lines/Drains:              Lab Results: I have reviewed the following results:   Results from last 7 days   Lab Units 01/11/25  0448 01/10/25  0559 01/09/25  0635 01/08/25  1554   WBC Thousand/uL 11.22* 15.68*   < > 15.11*   HEMOGLOBIN g/dL 12.8 13.6   < > 14.4   HEMATOCRIT % 37.5 40.4   < > 41.1   PLATELETS Thousands/uL 181 153   < > 151   BANDS PCT %  --   --   --  18*   SEGS PCT %  --  89*  --   --    LYMPHO PCT %  --  6*  --  2*   MONO PCT %  --  5  --  10   EOS PCT %  --  0  --  0    < > = values in this interval not displayed.     Results from last 7 days   Lab Units 01/11/25  0448 01/10/25  0559 01/09/25  0635   SODIUM mmol/L 136   < > 133*   POTASSIUM mmol/L 3.5   < > 3.6   CHLORIDE mmol/L 100   < > 98   CO2 mmol/L 28   < > 27   BUN mg/dL 18   < > 22   CREATININE mg/dL 0.94   < > 1.10   ANION GAP mmol/L 8   < > 8   CALCIUM mg/dL 8.3*   < > 8.5   ALBUMIN g/dL  --   --  3.7   TOTAL BILIRUBIN mg/dL  --   --  0.70   ALK PHOS U/L  --   --  48   ALT U/L  --   --  55*   AST U/L  --   --  163*   GLUCOSE RANDOM mg/dL 92   < > 153*    < > = values in this interval not displayed.     Results from last 7 days   Lab Units 01/08/25  1554   INR  1.07             Results from last 7 days   Lab Units 01/11/25  0448 01/10/25  0559 01/09/25  0635 01/08/25  1554   LACTIC ACID mmol/L  --   --   --  1.9   PROCALCITONIN ng/ml 4.18* 9.31* 14.44* 8.62*       Recent Cultures (last 7 days):   Results from last 7 days    Lab Units 01/10/25  1710 01/09/25  1240 01/08/25  2017 01/08/25  1849 01/08/25  1554   BLOOD CULTURE   --   --   --   --  No Growth at 48 hrs.  No Growth at 48 hrs.   SPUTUM CULTURE   --   --   --  4+ Growth of  --    GRAM STAIN RESULT   --  No Polys or Bacteria seen  --  2+ Epithelial cells per low power field*  3+ Gram positive cocci in pairs and chains*  1+ Gram variable rods*  --    BODY FLUID CULTURE, STERILE   --  No growth  --   --   --    LEGIONELLA URINARY ANTIGEN   --   --  Negative  --   --    C DIFF TOXIN B BY PCR  Negative  --   --   --   --        Imaging Results Review: No pertinent imaging studies reviewed.  Other Study Results Review: No additional pertinent studies reviewed.    Last 24 Hours Medication List:     Current Facility-Administered Medications:     acetaminophen (TYLENOL) tablet 650 mg, Q6H PRN    ALPRAZolam (XANAX) tablet 0.5 mg, HS PRN    amLODIPine (NORVASC) tablet 5 mg, Daily    aspirin (ECOTRIN LOW STRENGTH) EC tablet 81 mg, Daily    atorvastatin (LIPITOR) tablet 40 mg, Daily    cefTRIAXone (ROCEPHIN) IVPB (premix in dextrose) 1,000 mg 50 mL, Q24H, Last Rate: 1,000 mg (01/11/25 1127)    enoxaparin (LOVENOX) subcutaneous injection 40 mg, Daily    hydrALAZINE (APRESOLINE) injection 10 mg, Q6H PRN    Hydrocod Elie-Chlorphe Elie ER (TUSSIONEX) ER suspension 5 mL, Q12H PRN    levalbuterol (XOPENEX) inhalation solution 1.25 mg, TID    levothyroxine tablet 88 mcg, Daily    ondansetron (ZOFRAN) injection 4 mg, Q6H PRN    oseltamivir (TAMIFLU) capsule 30 mg, Q12H MARKOS    Administrative Statements   Today, Patient Was Seen By: Osito Herrmann DO      **Please Note: This note may have been constructed using a voice recognition system.**

## 2025-01-11 NOTE — PLAN OF CARE
Problem: Potential for Falls  Goal: Patient will remain free of falls  Description: INTERVENTIONS:  - Educate patient/family on patient safety including physical limitations  - Instruct patient to call for assistance with activity   - Consult OT/PT to assist with strengthening/mobility   - Keep Call bell within reach  - Keep bed low and locked with side rails adjusted as appropriate  - Keep care items and personal belongings within reach  - Initiate and maintain comfort rounds  - Make Fall Risk Sign visible to staff  - Offer Toileting every 2 Hours, in advance of need  - Initiate/Maintain bed alarm  - Obtain necessary fall risk management equipment  - Apply yellow socks and bracelet for high fall risk patients  - Consider moving patient to room near nurses station  Outcome: Progressing     Problem: Prexisting or High Potential for Compromised Skin Integrity  Goal: Skin integrity is maintained or improved  Description: INTERVENTIONS:  - Identify patients at risk for skin breakdown  - Assess and monitor skin integrity  - Assess and monitor nutrition and hydration status  - Monitor labs   - Assess for incontinence   - Turn and reposition patient  - Assist with mobility/ambulation  - Relieve pressure over bony prominences  - Avoid friction and shearing  - Provide appropriate hygiene as needed including keeping skin clean and dry  - Evaluate need for skin moisturizer/barrier cream  - Collaborate with interdisciplinary team   - Patient/family teaching  - Consider wound care consult   Outcome: Progressing     Problem: PAIN - ADULT  Goal: Verbalizes/displays adequate comfort level or baseline comfort level  Description: Interventions:  - Encourage patient to monitor pain and request assistance  - Assess pain using appropriate pain scale  - Administer analgesics based on type and severity of pain and evaluate response  - Implement non-pharmacological measures as appropriate and evaluate response  - Consider cultural and  social influences on pain and pain management  - Notify physician/advanced practitioner if interventions unsuccessful or patient reports new pain  Outcome: Progressing     Problem: INFECTION - ADULT  Goal: Absence or prevention of progression during hospitalization  Description: INTERVENTIONS:  - Assess and monitor for signs and symptoms of infection  - Monitor lab/diagnostic results  - Monitor all insertion sites, i.e. indwelling lines, tubes, and drains  - Monitor endotracheal if appropriate and nasal secretions for changes in amount and color  - [place] appropriate cooling/warming therapies per order  - Administer medications as ordered  - Instruct and encourage patient and family to use good hand hygiene technique  - Identify and instruct in appropriate isolation precautions for identified infection/condition  Outcome: Progressing  Goal: Absence of fever/infection during neutropenic period  Description: INTERVENTIONS:  - Monitor WBC    Outcome: Progressing  Goal: Infection Control Precautions  Outcome: Progressing     Goal: Maintain or return to baseline ADL function  Description: INTERVENTIONS:  -  Assess patient's ability to carry out ADLs; assess patient's baseline for ADL function and identify physical deficits which impact ability to perform ADLs (bathing, care of mouth/teeth, toileting, grooming, dressing, etc.)  - Assess/evaluate cause of self-care deficits   - Assess range of motion  - Assess patient's mobility; develop plan if impaired  - Assess patient's need for assistive devices and provide as appropriate  - Encourage maximum independence but intervene and supervise when necessary  - Involve family in performance of ADLs  - Assess for home care needs following discharge   - Consider OT consult to assist with ADL evaluation and planning for discharge  - Provide patient education as appropriate  Outcome: Progressing  Goal: Maintains/Returns to pre admission functional level  Description:  INTERVENTIONS:  - Perform AM-PAC 6 Click Basic Mobility/ Daily Activity assessment daily.  - Set and communicate daily mobility goal to care team and patient/family/caregiver.   - Collaborate with rehabilitation services on mobility goals if consulted  - Perform Range of Motion 3 times a day.  - Reposition patient every 2 hours.  - Dangle patient 3 times a day  - Stand patient 3 times a day  - Ambulate patient 3 times a day  - Out of bed to chair 3 times a day   - Out of bed for meals 3 times a day  - Out of bed for toileting  - Record patient progress and toleration of activity level   Outcome: Progressing     Problem: DISCHARGE PLANNING  Goal: Discharge to home or other facility with appropriate resources  Description: INTERVENTIONS:  - Identify barriers to discharge w/patient and caregiver  - Arrange for needed discharge resources and transportation as appropriate  - Identify discharge learning needs (meds, wound care, etc.)  - Arrange for interpretive services to assist at discharge as needed  - Refer to Case Management Department for coordinating discharge planning if the patient needs post-hospital services based on physician/advanced practitioner order or complex needs related to functional status, cognitive ability, or social support system  Outcome: Progressing     Problem: Knowledge Deficit  Goal: Patient/family/caregiver demonstrates understanding of disease process, treatment plan, medications, and discharge instructions  Description: Complete learning assessment and assess knowledge base.  Interventions:  - Provide teaching at level of understanding  - Provide teaching via preferred learning methods  Outcome: Progressing     Problem: RESPIRATORY - ADULT  Goal: Achieves optimal ventilation and oxygenation  Description: INTERVENTIONS:  - Assess for changes in respiratory status  - Assess for changes in mentation and behavior  - Position to facilitate oxygenation and minimize respiratory effort  - Oxygen  administered by appropriate delivery if ordered  - Initiate smoking cessation education as indicated  - Encourage broncho-pulmonary hygiene including cough, deep breathe, Incentive Spirometry  - Assess the need for suctioning and aspirate as needed  - Assess and instruct to report SOB or any respiratory difficulty  - Respiratory Therapy support as indicated  Outcome: Progressing

## 2025-01-12 LAB
BACTERIA SPEC BFLD CULT: NO GROWTH
GRAM STN SPEC: NORMAL

## 2025-01-12 PROCEDURE — 94640 AIRWAY INHALATION TREATMENT: CPT

## 2025-01-12 PROCEDURE — 99232 SBSQ HOSP IP/OBS MODERATE 35: CPT | Performed by: INTERNAL MEDICINE

## 2025-01-12 PROCEDURE — 94664 DEMO&/EVAL PT USE INHALER: CPT

## 2025-01-12 PROCEDURE — 94668 MNPJ CHEST WALL SBSQ: CPT

## 2025-01-12 PROCEDURE — 94760 N-INVAS EAR/PLS OXIMETRY 1: CPT

## 2025-01-12 RX ADMIN — LEVALBUTEROL HYDROCHLORIDE 1.25 MG: 1.25 SOLUTION RESPIRATORY (INHALATION) at 20:17

## 2025-01-12 RX ADMIN — OSELTAMAVIR PHOSPHATE 30 MG: 30 CAPSULE ORAL at 21:31

## 2025-01-12 RX ADMIN — ENOXAPARIN SODIUM 40 MG: 40 INJECTION SUBCUTANEOUS at 08:18

## 2025-01-12 RX ADMIN — AMLODIPINE BESYLATE 5 MG: 5 TABLET ORAL at 08:18

## 2025-01-12 RX ADMIN — ALPRAZOLAM 0.5 MG: 0.5 TABLET ORAL at 21:31

## 2025-01-12 RX ADMIN — LEVOTHYROXINE SODIUM 88 MCG: 88 TABLET ORAL at 05:58

## 2025-01-12 RX ADMIN — OSELTAMAVIR PHOSPHATE 30 MG: 30 CAPSULE ORAL at 08:18

## 2025-01-12 RX ADMIN — ASPIRIN 81 MG: 81 TABLET, COATED ORAL at 08:18

## 2025-01-12 RX ADMIN — ATORVASTATIN CALCIUM 40 MG: 40 TABLET, FILM COATED ORAL at 08:18

## 2025-01-12 RX ADMIN — CEFTRIAXONE 1000 MG: 1 INJECTION, SOLUTION INTRAVENOUS at 11:41

## 2025-01-12 RX ADMIN — LEVALBUTEROL HYDROCHLORIDE 1.25 MG: 1.25 SOLUTION RESPIRATORY (INHALATION) at 07:25

## 2025-01-12 RX ADMIN — LEVALBUTEROL HYDROCHLORIDE 1.25 MG: 1.25 SOLUTION RESPIRATORY (INHALATION) at 13:35

## 2025-01-12 RX ADMIN — HYDROCODONE POLISTIREX AND CHLORPHENIRAMINE POLISTIREX 5 ML: 10; 8 SUSPENSION, EXTENDED RELEASE ORAL at 21:31

## 2025-01-12 NOTE — PLAN OF CARE
Problem: Potential for Falls  Goal: Patient will remain free of falls  Description: INTERVENTIONS:  - Educate patient/family on patient safety including physical limitations  - Instruct patient to call for assistance with activity   - Consult OT/PT to assist with strengthening/mobility   - Keep Call bell within reach  - Keep bed low and locked with side rails adjusted as appropriate  - Keep care items and personal belongings within reach  - Initiate and maintain comfort rounds  - Make Fall Risk Sign visible to staff  - Offer Toileting every 2 Hours, in advance of need  - Initiate/Maintain bed alarm  - Obtain necessary fall risk management equipment  - Apply yellow socks and bracelet for high fall risk patients  - Consider moving patient to room near nurses station  Outcome: Progressing     Problem: Prexisting or High Potential for Compromised Skin Integrity  Goal: Skin integrity is maintained or improved  Description: INTERVENTIONS:  - Identify patients at risk for skin breakdown  - Assess and monitor skin integrity  - Assess and monitor nutrition and hydration status  - Monitor labs   - Assess for incontinence   - Turn and reposition patient  - Assist with mobility/ambulation  - Relieve pressure over bony prominences  - Avoid friction and shearing  - Provide appropriate hygiene as needed including keeping skin clean and dry  - Evaluate need for skin moisturizer/barrier cream  - Collaborate with interdisciplinary team   - Patient/family teaching  - Consider wound care consult   Outcome: Progressing     Problem: PAIN - ADULT  Goal: Verbalizes/displays adequate comfort level or baseline comfort level  Description: Interventions:  - Encourage patient to monitor pain and request assistance  - Assess pain using appropriate pain scale  - Administer analgesics based on type and severity of pain and evaluate response  - Implement non-pharmacological measures as appropriate and evaluate response  - Consider cultural and  social influences on pain and pain management  - Notify physician/advanced practitioner if interventions unsuccessful or patient reports new pain  Outcome: Progressing     Problem: INFECTION - ADULT  Goal: Absence or prevention of progression during hospitalization  Description: INTERVENTIONS:  - Assess and monitor for signs and symptoms of infection  - Monitor lab/diagnostic results  - Monitor all insertion sites, i.e. indwelling lines, tubes, and drains  - Monitor endotracheal if appropriate and nasal secretions for changes in amount and color  - [place] appropriate cooling/warming therapies per order  - Administer medications as ordered  - Instruct and encourage patient and family to use good hand hygiene technique  - Identify and instruct in appropriate isolation precautions for identified infection/condition  Outcome: Progressing  Goal: Absence of fever/infection during neutropenic period  Description: INTERVENTIONS:  - Monitor WBC    Outcome: Progressing  Goal: Infection Control Precautions  Outcome: Progressing     Goal: Maintain or return to baseline ADL function  Description: INTERVENTIONS:  -  Assess patient's ability to carry out ADLs; assess patient's baseline for ADL function and identify physical deficits which impact ability to perform ADLs (bathing, care of mouth/teeth, toileting, grooming, dressing, etc.)  - Assess/evaluate cause of self-care deficits   - Assess range of motion  - Assess patient's mobility; develop plan if impaired  - Assess patient's need for assistive devices and provide as appropriate  - Encourage maximum independence but intervene and supervise when necessary  - Involve family in performance of ADLs  - Assess for home care needs following discharge   - Consider OT consult to assist with ADL evaluation and planning for discharge  - Provide patient education as appropriate  Outcome: Progressing  Goal: Maintains/Returns to pre admission functional level  Description:  INTERVENTIONS:  - Perform AM-PAC 6 Click Basic Mobility/ Daily Activity assessment daily.  - Set and communicate daily mobility goal to care team and patient/family/caregiver.   - Collaborate with rehabilitation services on mobility goals if consulted  - Perform Range of Motion 3 times a day.  - Reposition patient every 2 hours.  - Dangle patient 3 times a day  - Stand patient 3 times a day  - Ambulate patient 3 times a day  - Out of bed to chair 3 times a day   - Out of bed for meals 3 times a day  - Out of bed for toileting  - Record patient progress and toleration of activity level   Outcome: Progressing     Problem: DISCHARGE PLANNING  Goal: Discharge to home or other facility with appropriate resources  Description: INTERVENTIONS:  - Identify barriers to discharge w/patient and caregiver  - Arrange for needed discharge resources and transportation as appropriate  - Identify discharge learning needs (meds, wound care, etc.)  - Arrange for interpretive services to assist at discharge as needed  - Refer to Case Management Department for coordinating discharge planning if the patient needs post-hospital services based on physician/advanced practitioner order or complex needs related to functional status, cognitive ability, or social support system  Outcome: Progressing     Problem: Knowledge Deficit  Goal: Patient/family/caregiver demonstrates understanding of disease process, treatment plan, medications, and discharge instructions  Description: Complete learning assessment and assess knowledge base.  Interventions:  - Provide teaching at level of understanding  - Provide teaching via preferred learning methods  Outcome: Progressing     Problem: RESPIRATORY - ADULT  Goal: Achieves optimal ventilation and oxygenation  Description: INTERVENTIONS:  - Assess for changes in respiratory status  - Assess for changes in mentation and behavior  - Position to facilitate oxygenation and minimize respiratory effort  - Oxygen  administered by appropriate delivery if ordered  - Initiate smoking cessation education as indicated  - Encourage broncho-pulmonary hygiene including cough, deep breathe, Incentive Spirometry  - Assess the need for suctioning and aspirate as needed  - Assess and instruct to report SOB or any respiratory difficulty  - Respiratory Therapy support as indicated  Outcome: Progressing     Problem: METABOLIC, FLUID AND ELECTROLYTES - ADULT  Goal: Electrolytes maintained within normal limits  Description: INTERVENTIONS:  - Monitor labs and assess patient for signs and symptoms of electrolyte imbalances  - Administer electrolyte replacement as ordered  - Monitor response to electrolyte replacements, including repeat lab results as appropriate  - Instruct patient on fluid and nutrition as appropriate  Outcome: Progressing  Goal: Fluid balance maintained  Description: INTERVENTIONS:  - Monitor labs   - Monitor I/O and WT  - Instruct patient on fluid and nutrition as appropriate  - Assess for signs & symptoms of volume excess or deficit  Outcome: Progressing

## 2025-01-12 NOTE — PROGRESS NOTES
Progress Note - Hospitalist   Name: Dexter Sharp 84 y.o. male I MRN: [id number]  Unit/Bed#: -01 I Date of Admission: 1/8/2025   Date of Service: 1/12/2025 I Hospital Day: 4    Assessment & Plan  Sepsis (HCC)  Secondary to influenza and suspected bacterial pneumonia with fever and tachypnea  Lactate normal  Procalcitonin elevated will continue to trend  IV fluids as needed  Screen pending  Strep pneumo/urine Legionella negative  Sputum culture pending  Follow-up blood cultures pending  IV antibiotics with ceftriaxone  Chronic kidney disease, stage 3a (HCC)  Lab Results   Component Value Date    EGFR 73 01/11/2025    EGFR 68 01/10/2025    EGFR 60 01/09/2025    CREATININE 0.94 01/11/2025    CREATININE 0.99 01/10/2025    CREATININE 1.10 01/09/2025   Creatinine improving.  Will monitor BMP as needed  Elevated troponin  Likely secondary to sepsis.  ER reviewed case with cardiology who recommended admission and monitoring without heparin drip  Troponins have been flat  Reviewed case with cardiology, no acute invention needed at this time  Per cardiology, echo with EF of 60% and no wall motion normalities noted  No further workup at this time unless change in patient's clinical condition  Hyponatremia  Likely multifactorial secondary to sepsis, dehydration  Patient received IV fluids in the ER  Fluid restriction  Sodium level gradually improving  Pleural effusion on right  Patient currently on 3 L nasal cannula  Large right pleural effusion noted on CT imaging  Pulmonology consulted.  Patient had thoracentesis performed on 1/9/2025 with approximately 1.5 L removed    Acute respiratory failure with hypoxia (HCC)  Patient is not on oxygen at home  Secondary to pneumonia, influenza, and large effusion  Continue oxygen, titrate as tolerated  Pulmonology consult  Hypokalemia  Improved with supplementation  Influenza A with pneumonia      VTE Pharmacologic Prophylaxis:   Moderate Risk (Score 3-4) - Pharmacological DVT  Prophylaxis Ordered: enoxaparin (Lovenox).    Mobility:   Basic Mobility Inpatient Raw Score: 23  JH-HLM Goal: 7: Walk 25 feet or more  JH-HLM Achieved: 7: Walk 25 feet or more  JH-HLM Goal achieved. Continue to encourage appropriate mobility.    Patient Centered Rounds: I performed bedside rounds with nursing staff today.   Discussions with Specialists or Other Care Team Provider:     Education and Discussions with Family / Patient: Updated  (significant other) at bedside.    Current Length of Stay: 4 day(s)  Current Patient Status: Inpatient   Certification Statement: The patient will continue to require additional inpatient hospital stay due to pt ot re-evaluation  Discharge Plan: Anticipate discharge in 24-48 hrs to discharge location to be determined pending rehab evaluations.    Code Status: Level 1 - Full Code    Subjective   Patient reports generalized weakness and cough    Objective :  Temp:  [98.3 °F (36.8 °C)-99.1 °F (37.3 °C)] 98.3 °F (36.8 °C)  HR:  [71-90] 71  BP: (154-157)/(75-87) 154/80  Resp:  [16-18] 18  SpO2:  [91 %-94 %] 93 %  O2 Device: None (Room air)    Body mass index is 28.59 kg/m².     Input and Output Summary (last 24 hours):     Intake/Output Summary (Last 24 hours) at 1/12/2025 1246  Last data filed at 1/12/2025 0725  Gross per 24 hour   Intake 480 ml   Output 850 ml   Net -370 ml       Physical Exam  Vitals and nursing note reviewed.   Constitutional:       General: He is not in acute distress.     Appearance: He is well-developed. He is not toxic-appearing or diaphoretic.   HENT:      Head: Normocephalic and atraumatic.   Eyes:      General: No scleral icterus.     Conjunctiva/sclera: Conjunctivae normal.   Cardiovascular:      Rate and Rhythm: Normal rate and regular rhythm.      Heart sounds: No murmur heard.     No friction rub. No gallop.   Pulmonary:      Effort: Pulmonary effort is normal. No respiratory distress.      Breath sounds: No stridor. Rhonchi present. No  wheezing or rales.   Chest:      Chest wall: No tenderness.   Abdominal:      General: There is no distension.      Palpations: Abdomen is soft. There is no mass.      Tenderness: There is no abdominal tenderness. There is no guarding or rebound.      Hernia: No hernia is present.   Musculoskeletal:         General: No swelling or tenderness.      Cervical back: Neck supple.   Skin:     General: Skin is warm and dry.      Capillary Refill: Capillary refill takes less than 2 seconds.   Neurological:      Mental Status: He is alert and oriented to person, place, and time.   Psychiatric:         Mood and Affect: Mood normal.           Lines/Drains:              Lab Results: I have reviewed the following results:   Results from last 7 days   Lab Units 01/11/25  0448 01/10/25  0559 01/09/25  0635 01/08/25  1554   WBC Thousand/uL 11.22* 15.68*   < > 15.11*   HEMOGLOBIN g/dL 12.8 13.6   < > 14.4   HEMATOCRIT % 37.5 40.4   < > 41.1   PLATELETS Thousands/uL 181 153   < > 151   BANDS PCT %  --   --   --  18*   SEGS PCT %  --  89*  --   --    LYMPHO PCT %  --  6*  --  2*   MONO PCT %  --  5  --  10   EOS PCT %  --  0  --  0    < > = values in this interval not displayed.     Results from last 7 days   Lab Units 01/11/25  0448 01/10/25  0559 01/09/25  0635   SODIUM mmol/L 136   < > 133*   POTASSIUM mmol/L 3.5   < > 3.6   CHLORIDE mmol/L 100   < > 98   CO2 mmol/L 28   < > 27   BUN mg/dL 18   < > 22   CREATININE mg/dL 0.94   < > 1.10   ANION GAP mmol/L 8   < > 8   CALCIUM mg/dL 8.3*   < > 8.5   ALBUMIN g/dL  --   --  3.7   TOTAL BILIRUBIN mg/dL  --   --  0.70   ALK PHOS U/L  --   --  48   ALT U/L  --   --  55*   AST U/L  --   --  163*   GLUCOSE RANDOM mg/dL 92   < > 153*    < > = values in this interval not displayed.     Results from last 7 days   Lab Units 01/08/25  1554   INR  1.07             Results from last 7 days   Lab Units 01/11/25  0448 01/10/25  0559 01/09/25  0635 01/08/25  1554   LACTIC ACID mmol/L  --   --   --   1.9   PROCALCITONIN ng/ml 4.18* 9.31* 14.44* 8.62*       Recent Cultures (last 7 days):   Results from last 7 days   Lab Units 01/10/25  1710 01/09/25  1240 01/08/25  2017 01/08/25  1849 01/08/25  1554   BLOOD CULTURE   --   --   --   --  No Growth at 72 hrs.  No Growth at 72 hrs.   SPUTUM CULTURE   --   --   --  4+ Growth of  --    GRAM STAIN RESULT   --  No Polys or Bacteria seen  --  2+ Epithelial cells per low power field*  3+ Gram positive cocci in pairs and chains*  1+ Gram variable rods*  --    BODY FLUID CULTURE, STERILE   --  No growth  --   --   --    LEGIONELLA URINARY ANTIGEN   --   --  Negative  --   --    C DIFF TOXIN B BY PCR  Negative  --   --   --   --        Imaging Results Review: No pertinent imaging studies reviewed.  Other Study Results Review: No additional pertinent studies reviewed.    Last 24 Hours Medication List:     Current Facility-Administered Medications:     acetaminophen (TYLENOL) tablet 650 mg, Q6H PRN    ALPRAZolam (XANAX) tablet 0.5 mg, HS PRN    amLODIPine (NORVASC) tablet 5 mg, Daily    aspirin (ECOTRIN LOW STRENGTH) EC tablet 81 mg, Daily    atorvastatin (LIPITOR) tablet 40 mg, Daily    cefTRIAXone (ROCEPHIN) IVPB (premix in dextrose) 1,000 mg 50 mL, Q24H, Last Rate: 1,000 mg (01/12/25 1141)    enoxaparin (LOVENOX) subcutaneous injection 40 mg, Daily    hydrALAZINE (APRESOLINE) injection 10 mg, Q6H PRN    Hydrocod Elie-Chlorphe Elie ER (TUSSIONEX) ER suspension 5 mL, Q12H PRN    levalbuterol (XOPENEX) inhalation solution 1.25 mg, TID    levothyroxine tablet 88 mcg, Daily    ondansetron (ZOFRAN) injection 4 mg, Q6H PRN    oseltamivir (TAMIFLU) capsule 30 mg, Q12H MARKOS    Administrative Statements   Today, Patient Was Seen By: Osito Herrmann DO      **Please Note: This note may have been constructed using a voice recognition system.**

## 2025-01-12 NOTE — PLAN OF CARE
Problem: Prexisting or High Potential for Compromised Skin Integrity  Goal: Skin integrity is maintained or improved  Description: INTERVENTIONS:  - Identify patients at risk for skin breakdown  - Assess and monitor skin integrity  - Assess and monitor nutrition and hydration status  - Monitor labs   - Assess for incontinence   - Turn and reposition patient  - Assist with mobility/ambulation  - Relieve pressure over bony prominences  - Avoid friction and shearing  - Provide appropriate hygiene as needed including keeping skin clean and dry  - Evaluate need for skin moisturizer/barrier cream  - Collaborate with interdisciplinary team   - Patient/family teaching  - Consider wound care consult   Outcome: Progressing     Problem: PAIN - ADULT  Goal: Verbalizes/displays adequate comfort level or baseline comfort level  Description: Interventions:  - Encourage patient to monitor pain and request assistance  - Assess pain using appropriate pain scale  - Administer analgesics based on type and severity of pain and evaluate response  - Implement non-pharmacological measures as appropriate and evaluate response  - Consider cultural and social influences on pain and pain management  - Notify physician/advanced practitioner if interventions unsuccessful or patient reports new pain  Outcome: Progressing     Problem: SAFETY ADULT  Goal: Maintain or return to baseline ADL function  Description: INTERVENTIONS:  -  Assess patient's ability to carry out ADLs; assess patient's baseline for ADL function and identify physical deficits which impact ability to perform ADLs (bathing, care of mouth/teeth, toileting, grooming, dressing, etc.)  - Assess/evaluate cause of self-care deficits   - Assess range of motion  - Assess patient's mobility; develop plan if impaired  - Assess patient's need for assistive devices and provide as appropriate  - Encourage maximum independence but intervene and supervise when necessary  - Involve family in  performance of ADLs  - Assess for home care needs following discharge   - Consider OT consult to assist with ADL evaluation and planning for discharge  - Provide patient education as appropriate  Outcome: Progressing     Problem: Knowledge Deficit  Goal: Patient/family/caregiver demonstrates understanding of disease process, treatment plan, medications, and discharge instructions  Description: Complete learning assessment and assess knowledge base.  Interventions:  - Provide teaching at level of understanding  - Provide teaching via preferred learning methods  Outcome: Progressing     Problem: RESPIRATORY - ADULT  Goal: Achieves optimal ventilation and oxygenation  Description: INTERVENTIONS:  - Assess for changes in respiratory status  - Assess for changes in mentation and behavior  - Position to facilitate oxygenation and minimize respiratory effort  - Oxygen administered by appropriate delivery if ordered  - Initiate smoking cessation education as indicated  - Encourage broncho-pulmonary hygiene including cough, deep breathe, Incentive Spirometry  - Assess the need for suctioning and aspirate as needed  - Assess and instruct to report SOB or any respiratory difficulty  - Respiratory Therapy support as indicated  Outcome: Progressing     Problem: SKIN/TISSUE INTEGRITY - ADULT  Goal: Incision(s), wounds(s) or drain site(s) healing without S/S of infection  Description: INTERVENTIONS  - Assess and document dressing, incision, wound bed, drain sites and surrounding tissue  - Provide patient and family education  - Perform skin care/dressing changes every q 3 days    Outcome: Progressing

## 2025-01-13 ENCOUNTER — HOME HEALTH ADMISSION (OUTPATIENT)
Dept: HOME HEALTH SERVICES | Facility: HOME HEALTHCARE | Age: 85
End: 2025-01-13
Payer: COMMERCIAL

## 2025-01-13 VITALS
TEMPERATURE: 97.9 F | RESPIRATION RATE: 19 BRPM | BODY MASS INDEX: 28.49 KG/M2 | DIASTOLIC BLOOD PRESSURE: 66 MMHG | SYSTOLIC BLOOD PRESSURE: 143 MMHG | OXYGEN SATURATION: 94 % | HEIGHT: 68 IN | WEIGHT: 188 LBS | HEART RATE: 86 BPM

## 2025-01-13 LAB
ANION GAP SERPL CALCULATED.3IONS-SCNC: 6 MMOL/L (ref 4–13)
BACTERIA BLD CULT: NORMAL
BACTERIA BLD CULT: NORMAL
BASOPHILS # BLD AUTO: 0.02 THOUSANDS/ΜL (ref 0–0.1)
BASOPHILS NFR BLD AUTO: 0 % (ref 0–1)
BUN SERPL-MCNC: 16 MG/DL (ref 5–25)
CALCIUM SERPL-MCNC: 8.4 MG/DL (ref 8.4–10.2)
CHLORIDE SERPL-SCNC: 103 MMOL/L (ref 96–108)
CO2 SERPL-SCNC: 29 MMOL/L (ref 21–32)
CREAT SERPL-MCNC: 0.99 MG/DL (ref 0.6–1.3)
EOSINOPHIL # BLD AUTO: 0.14 THOUSAND/ΜL (ref 0–0.61)
EOSINOPHIL NFR BLD AUTO: 2 % (ref 0–6)
ERYTHROCYTE [DISTWIDTH] IN BLOOD BY AUTOMATED COUNT: 13.2 % (ref 11.6–15.1)
GFR SERPL CREATININE-BSD FRML MDRD: 68 ML/MIN/1.73SQ M
GLUCOSE SERPL-MCNC: 96 MG/DL (ref 65–140)
HCT VFR BLD AUTO: 38.8 % (ref 36.5–49.3)
HGB BLD-MCNC: 13.2 G/DL (ref 12–17)
IMM GRANULOCYTES # BLD AUTO: 0.06 THOUSAND/UL (ref 0–0.2)
IMM GRANULOCYTES NFR BLD AUTO: 1 % (ref 0–2)
LYMPHOCYTES # BLD AUTO: 1.09 THOUSANDS/ΜL (ref 0.6–4.47)
LYMPHOCYTES NFR BLD AUTO: 16 % (ref 14–44)
MCH RBC QN AUTO: 30.8 PG (ref 26.8–34.3)
MCHC RBC AUTO-ENTMCNC: 34 G/DL (ref 31.4–37.4)
MCV RBC AUTO: 90 FL (ref 82–98)
MONOCYTES # BLD AUTO: 0.79 THOUSAND/ΜL (ref 0.17–1.22)
MONOCYTES NFR BLD AUTO: 11 % (ref 4–12)
NEUTROPHILS # BLD AUTO: 4.83 THOUSANDS/ΜL (ref 1.85–7.62)
NEUTS SEG NFR BLD AUTO: 70 % (ref 43–75)
NRBC BLD AUTO-RTO: 0 /100 WBCS
PLATELET # BLD AUTO: 231 THOUSANDS/UL (ref 149–390)
PMV BLD AUTO: 10.1 FL (ref 8.9–12.7)
POTASSIUM SERPL-SCNC: 3.9 MMOL/L (ref 3.5–5.3)
PROCALCITONIN SERPL-MCNC: 0.83 NG/ML
RBC # BLD AUTO: 4.29 MILLION/UL (ref 3.88–5.62)
SODIUM SERPL-SCNC: 138 MMOL/L (ref 135–147)
WBC # BLD AUTO: 6.93 THOUSAND/UL (ref 4.31–10.16)

## 2025-01-13 PROCEDURE — 94760 N-INVAS EAR/PLS OXIMETRY 1: CPT

## 2025-01-13 PROCEDURE — 99239 HOSP IP/OBS DSCHRG MGMT >30: CPT | Performed by: INTERNAL MEDICINE

## 2025-01-13 PROCEDURE — 97116 GAIT TRAINING THERAPY: CPT

## 2025-01-13 PROCEDURE — 84145 PROCALCITONIN (PCT): CPT | Performed by: INTERNAL MEDICINE

## 2025-01-13 PROCEDURE — 80048 BASIC METABOLIC PNL TOTAL CA: CPT | Performed by: INTERNAL MEDICINE

## 2025-01-13 PROCEDURE — 94664 DEMO&/EVAL PT USE INHALER: CPT

## 2025-01-13 PROCEDURE — 94668 MNPJ CHEST WALL SBSQ: CPT

## 2025-01-13 PROCEDURE — 85025 COMPLETE CBC W/AUTO DIFF WBC: CPT | Performed by: INTERNAL MEDICINE

## 2025-01-13 PROCEDURE — 94640 AIRWAY INHALATION TREATMENT: CPT

## 2025-01-13 RX ORDER — DOCUSATE SODIUM 100 MG/1
100 CAPSULE, LIQUID FILLED ORAL 2 TIMES DAILY
Status: DISCONTINUED | OUTPATIENT
Start: 2025-01-13 | End: 2025-01-13 | Stop reason: HOSPADM

## 2025-01-13 RX ORDER — SENNOSIDES 8.6 MG
1 TABLET ORAL
Status: DISCONTINUED | OUTPATIENT
Start: 2025-01-13 | End: 2025-01-13 | Stop reason: HOSPADM

## 2025-01-13 RX ORDER — CEFDINIR 300 MG/1
300 CAPSULE ORAL EVERY 12 HOURS SCHEDULED
Qty: 3 CAPSULE | Refills: 0 | Status: SHIPPED | OUTPATIENT
Start: 2025-01-13 | End: 2025-01-16

## 2025-01-13 RX ADMIN — CEFTRIAXONE 1000 MG: 1 INJECTION, SOLUTION INTRAVENOUS at 10:47

## 2025-01-13 RX ADMIN — ASPIRIN 81 MG: 81 TABLET, COATED ORAL at 09:29

## 2025-01-13 RX ADMIN — ATORVASTATIN CALCIUM 40 MG: 40 TABLET, FILM COATED ORAL at 09:29

## 2025-01-13 RX ADMIN — LEVALBUTEROL HYDROCHLORIDE 1.25 MG: 1.25 SOLUTION RESPIRATORY (INHALATION) at 07:36

## 2025-01-13 RX ADMIN — AMLODIPINE BESYLATE 5 MG: 5 TABLET ORAL at 09:29

## 2025-01-13 RX ADMIN — ENOXAPARIN SODIUM 40 MG: 40 INJECTION SUBCUTANEOUS at 09:29

## 2025-01-13 RX ADMIN — DOCUSATE SODIUM 100 MG: 100 CAPSULE, LIQUID FILLED ORAL at 09:29

## 2025-01-13 RX ADMIN — LEVOTHYROXINE SODIUM 88 MCG: 88 TABLET ORAL at 05:28

## 2025-01-13 RX ADMIN — SENNOSIDES 8.6 MG: 8.6 TABLET, FILM COATED ORAL at 09:29

## 2025-01-13 NOTE — ASSESSMENT & PLAN NOTE
Lab Results   Component Value Date    EGFR 68 01/13/2025    EGFR 73 01/11/2025    EGFR 68 01/10/2025    CREATININE 0.99 01/13/2025    CREATININE 0.94 01/11/2025    CREATININE 0.99 01/10/2025   Creatinine improving.  Routine lab monitoring with PCP as outpatient

## 2025-01-13 NOTE — ASSESSMENT & PLAN NOTE
Secondary to influenza and suspected bacterial pneumonia with fever and tachypnea  Lactate normal  Procalcitonin improved  Strep pneumo/urine Legionella negative  Sputum culture mixed respiratory contaminants  Blood cultures negative to date  Patient completed 6 days of IV ceftriaxone in the hospital.  Will discharge on cefdinir to complete 7 days of antibiotics

## 2025-01-13 NOTE — RESPIRATORY THERAPY NOTE
"RT Protocol Note  Dexter Sharp 84 y.o. male MRN: [id number]  Unit/Bed#: -01 Encounter: [id number]    Assessment    Principal Problem:    Sepsis (HCC)  Active Problems:    Chronic kidney disease, stage 3a (HCC)    Elevated troponin    Hyponatremia    Pleural effusion on right    Acute respiratory failure with hypoxia (HCC)    Hypokalemia    Influenza A with pneumonia      Home Pulmonary Medications:         Past Medical History:   Diagnosis Date    Cancer (HCC)     Chronic kidney disease, stage 3a (HCC) 2021    Coronary artery calcification seen on CT scan 2021    COVID-19 2021    Dementia (HCC)     Disease of thyroid gland     Eczema     Generalized anxiety disorder     Hypertension     Prostate cancer (HCC)     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 Last assessed: 2013     Social History     Socioeconomic History    Marital status: /Civil Union     Spouse name: None    Number of children: None    Years of education: None    Highest education level: None   Occupational History    Occupation: self employed  floor covering   Tobacco Use    Smoking status: Former     Current packs/day: 0.00     Types: Cigarettes, Cigars     Quit date:      Years since quittin.0     Passive exposure: Past    Smokeless tobacco: Never    Tobacco comments:     smokes 1-2 cigarss/month   Vaping Use    Vaping status: Never Used   Substance and Sexual Activity    Alcohol use: Never    Drug use: No     Comment: Chronic Narcotic use noted in \"allscripts\"     Sexual activity: None   Other Topics Concern    None   Social History Narrative    Caffeine use      Social Drivers of Health     Financial Resource Strain: Low Risk  (2023)    Overall Financial Resource Strain (CARDIA)     Difficulty of Paying Living Expenses: Not hard at all   Food Insecurity: No Food Insecurity (2025)    " "Nursing - Inadequate Food Risk Classification     Worried About Running Out of Food in the Last Year: Never true     Ran Out of Food in the Last Year: Never true     Ran Out of Food in the Last Year: Never true   Transportation Needs: No Transportation Needs (2025)    Nursing - Transportation Risk Classification     Lack of Transportation: Not on file     Lack of Transportation: No   Physical Activity: Not on file   Stress: Not on file   Social Connections: Unknown (2024)    Received from [employer]    Social [employer]     How often do you feel lonely or isolated from those around you? (Adult - for ages 18 years and over): Not on file   Intimate Partner Violence: Unknown (2025)    Nursing IPS     Feels Physically and Emotionally Safe: Not on file     Physically Hurt by Someone: Not on file     Humiliated or Emotionally Abused by Someone: Not on file     Physically Hurt by Someone: No     Hurt or Threatened by Someone: No   Housing Stability: Unknown (2025)    Nursing: Inadequate Housing Risk Classification     Has Housing: Not on file     Worried About Losing Housing: Not on file     Unable to Get Utilities: Not on file     Unable to Pay for Housing in the Last Year: No     Has Housin       Subjective    Subjective Data: cont current therapy    Objective    Physical Exam:   Assessment Type: Pre-treatment  General Appearance: Alert, Awake  Respiratory Pattern: Normal  Chest Assessment: Chest expansion symmetrical  Bilateral Breath Sounds: Diminished, Clear  Cough: Non-productive  O2 Device: ra    Vitals:  Blood pressure 149/72, pulse 71, temperature 98.5 °F (36.9 °C), resp. rate (P) 16, height 5' 8\" (1.727 m), weight 85.3 kg (188 lb), SpO2 94%.          Imaging and other studies: Results Review Statement: I personally reviewed the following image studies/reports in PACS and discussed pertinent findings with Radiology: chest xray. My interpretation of the radiology images/reports is: see report " .    O2 Device: ra     Plan    Respiratory Plan: Mild Distress pathway  Airway Clearance Plan: Flutter     Resp Comments: NRD tx given

## 2025-01-13 NOTE — ASSESSMENT & PLAN NOTE
Patient is not on oxygen at home  Secondary to pneumonia, influenza, and large effusion  Pulmonology input appreciated.  Patient s/p thoracentesis  Clinically improved as patient is off oxygen  Follow-up with PCP as outpatient

## 2025-01-13 NOTE — NURSING NOTE
Patient discharged to home with [facility] referral. Discharge instructions reviewed with patient and spouse. COPD booklet provided and reviewed at time of discharge. Wound care supplies x3 changes provided per patient request. All questions/concerns addressed prior to d/c.

## 2025-01-13 NOTE — ASSESSMENT & PLAN NOTE
Likely multifactorial secondary to sepsis, dehydration  Patient received IV fluids in the ER  Prinzide discontinued  Sodium level improved to baseline

## 2025-01-13 NOTE — PLAN OF CARE
Problem: Potential for Falls  Goal: Patient will remain free of falls  Description: INTERVENTIONS:  - Educate patient/family on patient safety including physical limitations  - Instruct patient to call for assistance with activity   - Consult OT/PT to assist with strengthening/mobility   - Keep Call bell within reach  - Keep bed low and locked with side rails adjusted as appropriate  - Keep care items and personal belongings within reach  - Initiate and maintain comfort rounds  - Make Fall Risk Sign visible to staff  - Offer Toileting every 2 Hours, in advance of need  - Initiate/Maintain bed alarm  - Obtain necessary fall risk management equipment  - Apply yellow socks and bracelet for high fall risk patients  - Consider moving patient to room near nurses station  Outcome: Progressing     Problem: Prexisting or High Potential for Compromised Skin Integrity  Goal: Skin integrity is maintained or improved  Description: INTERVENTIONS:  - Identify patients at risk for skin breakdown  - Assess and monitor skin integrity  - Assess and monitor nutrition and hydration status  - Monitor labs   - Assess for incontinence   - Turn and reposition patient  - Assist with mobility/ambulation  - Relieve pressure over bony prominences  - Avoid friction and shearing  - Provide appropriate hygiene as needed including keeping skin clean and dry  - Evaluate need for skin moisturizer/barrier cream  - Collaborate with interdisciplinary team   - Patient/family teaching  - Consider wound care consult   Outcome: Progressing     Problem: PAIN - ADULT  Goal: Verbalizes/displays adequate comfort level or baseline comfort level  Description: Interventions:  - Encourage patient to monitor pain and request assistance  - Assess pain using appropriate pain scale  - Administer analgesics based on type and severity of pain and evaluate response  - Implement non-pharmacological measures as appropriate and evaluate response  - Consider cultural and  social influences on pain and pain management  - Notify physician/advanced practitioner if interventions unsuccessful or patient reports new pain  Outcome: Progressing     Problem: INFECTION - ADULT  Goal: Absence or prevention of progression during hospitalization  Description: INTERVENTIONS:  - Assess and monitor for signs and symptoms of infection  - Monitor lab/diagnostic results  - Monitor all insertion sites, i.e. indwelling lines, tubes, and drains  - Monitor endotracheal if appropriate and nasal secretions for changes in amount and color  - [place] appropriate cooling/warming therapies per order  - Administer medications as ordered  - Instruct and encourage patient and family to use good hand hygiene technique  - Identify and instruct in appropriate isolation precautions for identified infection/condition  Outcome: Progressing  Goal: Absence of fever/infection during neutropenic period  Description: INTERVENTIONS:  - Monitor WBC    Outcome: Progressing  Goal: Infection Control Precautions  Outcome: Progressing    Goal: Maintain or return to baseline ADL function  Description: INTERVENTIONS:  -  Assess patient's ability to carry out ADLs; assess patient's baseline for ADL function and identify physical deficits which impact ability to perform ADLs (bathing, care of mouth/teeth, toileting, grooming, dressing, etc.)  - Assess/evaluate cause of self-care deficits   - Assess range of motion  - Assess patient's mobility; develop plan if impaired  - Assess patient's need for assistive devices and provide as appropriate  - Encourage maximum independence but intervene and supervise when necessary  - Involve family in performance of ADLs  - Assess for home care needs following discharge   - Consider OT consult to assist with ADL evaluation and planning for discharge  - Provide patient education as appropriate  Outcome: Progressing  Goal: Maintains/Returns to pre admission functional level  Description:  INTERVENTIONS:  - Perform AM-PAC 6 Click Basic Mobility/ Daily Activity assessment daily.  - Set and communicate daily mobility goal to care team and patient/family/caregiver.   - Collaborate with rehabilitation services on mobility goals if consulted  - Perform Range of Motion 3 times a day.  - Reposition patient every 2 hours.  - Dangle patient 3 times a day  - Stand patient 3 times a day  - Ambulate patient 3 times a day  - Out of bed to chair 3 times a day   - Out of bed for meals 3 times a day  - Out of bed for toileting  - Record patient progress and toleration of activity level   Outcome: Progressing     Problem: DISCHARGE PLANNING  Goal: Discharge to home or other facility with appropriate resources  Description: INTERVENTIONS:  - Identify barriers to discharge w/patient and caregiver  - Arrange for needed discharge resources and transportation as appropriate  - Identify discharge learning needs (meds, wound care, etc.)  - Arrange for interpretive services to assist at discharge as needed  - Refer to Case Management Department for coordinating discharge planning if the patient needs post-hospital services based on physician/advanced practitioner order or complex needs related to functional status, cognitive ability, or social support system  Outcome: Progressing     Problem: Knowledge Deficit  Goal: Patient/family/caregiver demonstrates understanding of disease process, treatment plan, medications, and discharge instructions  Description: Complete learning assessment and assess knowledge base.  Interventions:  - Provide teaching at level of understanding  - Provide teaching via preferred learning methods  Outcome: Progressing     Problem: RESPIRATORY - ADULT  Goal: Achieves optimal ventilation and oxygenation  Description: INTERVENTIONS:  - Assess for changes in respiratory status  - Assess for changes in mentation and behavior  - Position to facilitate oxygenation and minimize respiratory effort  - Oxygen  administered by appropriate delivery if ordered  - Initiate smoking cessation education as indicated  - Encourage broncho-pulmonary hygiene including cough, deep breathe, Incentive Spirometry  - Assess the need for suctioning and aspirate as needed  - Assess and instruct to report SOB or any respiratory difficulty  - Respiratory Therapy support as indicated  Outcome: Progressing     Problem: METABOLIC, FLUID AND ELECTROLYTES - ADULT  Goal: Electrolytes maintained within normal limits  Description: INTERVENTIONS:  - Monitor labs and assess patient for signs and symptoms of electrolyte imbalances  - Administer electrolyte replacement as ordered  - Monitor response to electrolyte replacements, including repeat lab results as appropriate  - Instruct patient on fluid and nutrition as appropriate  Outcome: Progressing  Goal: Fluid balance maintained  Description: INTERVENTIONS:  - Monitor labs   - Monitor I/O and WT  - Instruct patient on fluid and nutrition as appropriate  - Assess for signs & symptoms of volume excess or deficit  Outcome: Progressing

## 2025-01-13 NOTE — ASSESSMENT & PLAN NOTE
Patient currently on 3 L nasal cannula  Large right pleural effusion noted on CT imaging  Pulmonology consulted.  Patient had thoracentesis performed on 1/9/2025 with approximately 1.5 L removed  Not consistent with infection  Patient did complete course of antibiotics  Recommend repeat imaging in 4 to 6 weeks to monitor for improvement and no recurrence

## 2025-01-13 NOTE — DISCHARGE INSTR - AVS FIRST PAGE
Follow-up with PCP as outpatient to schedule chest x-ray in 4 to 6 weeks to monitor for improvement of effusion/infiltrates

## 2025-01-13 NOTE — CASE MANAGEMENT
Case Management Discharge Planning Note    Patient name Dexter Sharp  Location /-01 MRN [id number]  : 1940 Date 2025       Current Admission Date: 2025  Current Admission Diagnosis:Sepsis (HCC)   Patient Active Problem List    Diagnosis Date Noted Date Diagnosed    Influenza A with pneumonia 2025     Sepsis (HCC) 2025     Pleural effusion on right 2025     Acute respiratory failure with hypoxia (HCC) 2025     Hypokalemia 2025     Hypertensive kidney disease with chronic kidney disease 2024     Prediabetes 2023     Dysuria 2023     Hyponatremia 2021     Elevated troponin 2021     Degenerative arthritis of spine 2021     Coronary artery calcification seen on CT scan 2021     Chronic kidney disease, stage 3a (HCC) 2021     History of radiation therapy 2018     Eczema 2015     Hypothyroidism 2012     Generalized anxiety disorder 2012     Hyperlipidemia 2012     Hypertension 2012     Osteoarthritis 2012     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       LOS (days): 5  Geometric Mean LOS (GMLOS) (days): 4.9  Days to GMLOS:0.2     OBJECTIVE:  Risk of Unplanned Readmission Score: 10.04         Current admission status: Inpatient   Preferred Pharmacy:   [address] [address]  Phone: [phone] Fax: [phone]    [facility] [address] [place] [address] [address]  [address]  [facility] [address]  Phone: [phone] Fax: [phone]    Primary Care Provider: Edil Mac DO    Primary Insurance: [employer]  Secondary Insurance:     DISCHARGE DETAILS:    Discharge planning discussed with:: pt, spouse  Freedom of Choice: Yes  Comments - Freedom of Choice: CM met with pt, spouse and family friend at  bedside to discuss discharge planning. Spouse and friend express concern for pt returning home due to mobility. Pt was previoulsy seen by PT/OT and minimum resource intentsity was recommended. CM will asked therapy to see pt again due to family concerns. Pt seen by PT again and was able to do steps and recommendation remains minimum resource intensity. Pt and spouse aware and in agreement. Agreeable to [facility]. Referrals sent and chocie list given. Pt and spouse chose [facility]. Reserved in [place]. Dr. Yates aware. Pt stable for discharge today. Family to transport.  CM contacted family/caregiver?: Yes  Were Treatment Team discharge recommendations reviewed with patient/caregiver?: Yes  Did patient/caregiver verbalize understanding of patient care needs?: Yes  Were patient/caregiver advised of the risks associated with not following Treatment Team discharge recommendations?: Yes         Requested Home Health Care         Is the patient interested in HHC at discharge?: Yes  Home Health Discipline requested:: Occupational Therapy, Physical Therapy, Nursing, Home Health Aide  Home Health Agency Name:: [facility]  Home Health Follow-Up Provider:: PCP  Home Health Services Needed:: Strengthening/Theraputic Exercises to Improve Function, Evaluate Functional Status and Safety, Gait/ADL Training  Homebound Criteria Met:: Requires the Assistance of Another Person for Safe Ambulation or to Leave the Home, Uses an Assist Device (i.e. cane, walker, etc)  Supporting Clincal Findings:: Limited Endurance, Fatigues Easliy in Short Distances    DME Referral Provided  Referral made for DME?: No              Treatment Team Recommendation: Home with Home Health Care  Discharge Destination Plan:: Home with Home Health Care  Transport at Discharge : Family

## 2025-01-13 NOTE — PHYSICAL THERAPY NOTE
"   PT Treatment Note    NAME:  Dexter Sharp  DATE: 01/13/25    AGE:   84 y.o.  Mrn:   [id number]  ADMIT DX:  Cough [R05.9]  Hypokalemia [E87.6]  Hypochloremia [E87.8]  Hyponatremia [E87.1]  Pneumonia [J18.9]  Influenza A [J10.1]  Elevated troponin [R79.89]  Bandemia [D72.825]  Fever [R50.9]  Pleural effusion, right [J90]  Sepsis (HCC) [A41.9]  Acute hypoxic respiratory failure (HCC) [J96.01]  Performed at least 2 patient identifiers during session: Name and Epic photo       01/13/25 0855   PT Last Visit   PT Visit Date 01/13/25   Note Type   Note Type Treatment   Pain Assessment   Pain Assessment Tool 0-10   Pain Score No Pain   Restrictions/Precautions   Weight Bearing Precautions Per Order No   Other Precautions Bed Alarm;Chair Alarm   General   Chart Reviewed Yes   Cognition   Overall Cognitive Status WFL   Arousal/Participation Alert;Responsive;Cooperative   Attention Within functional limits   Orientation Level Oriented X4   Memory Decreased recall of precautions   Following Commands Follows all commands and directions without difficulty   Subjective   Subjective \"Im feeling better except for my cough\"   Bed Mobility   Additional Comments DNT due to pt being in recliner at start and end of session   Transfers   Sit to Stand 5  Supervision   Additional items Assist x 1;Increased time required   Stand to Sit 5  Supervision   Additional items Increased time required   Ambulation/Elevation   Gait pattern WNL   Gait Assistance 5  Supervision   Additional items Assist x 1   Assistive Device Rolling walker   Distance 75 ft   Stair Management Assistance 5  Supervision   Additional items Assist x 1   Stair Management Technique One rail L   Number of Stairs 15   Balance   Static Sitting Good   Dynamic Sitting Fair +   Static Standing Fair +   Dynamic Standing Fair +   Ambulatory Fair +   Activity Tolerance   Activity Tolerance Patient limited by fatigue   Assessment   Prognosis Good   Problem List Decreased " strength;Decreased endurance   Assessment Pt seen for PT treatment session this date with interventions consisting of gait training to normalize gait pattern to decrease fall risk. Pt agreeable to PT treatment session upon arrival, pt found seated OOB in recliner, in no apparent distress, A&O x 4, and responsive. Since previous session, pt has made good progress as evidenced by increased distance ambulated, decreased assistance required with mobility, and ability to perform stairs  Barriers during this session include fatigue.  Pt continues to be functioning below baseline level, and remains limited 2* factors listed above and including decreased strength, impaired activity tolerance, and decreased endurance.  Pt prognosis for achieving goals is good, pending pt progress with hospitalization/medical status improvements, and indicated by motivated to participate in therapy, ability to follow cues, and supportive family. PT will continue to see pt during current hospitalization in order to address the deficits listed above and provide interventions consistent w/ POC in effort to achieve goals. Current goals and POC remain appropriate, pt continues to have rehab potential  Upon conclusion pt seated OOB in recliner. The patient's AM-PAC Basic Mobility Inpatient Short Form Raw Score is 21.  A Raw score of greater than 16 suggests the patient may benefit from discharge to home. Based on patient presentations and impairments, pt would most appropriately benefit from Level 3 resource intensity upon discharge.  Please also refer to the recommendation of the Physical Therapist for safe discharge planning. RN verbalized pt appropriate for PT session.   Goals   Patient Goals to walk more   LTG Expiration Date 01/19/25   PT Treatment Day 2   Plan   Treatment/Interventions Functional transfer training;LE strengthening/ROM;Elevations;Therapeutic exercise;Endurance training;Gait training   Progress Progressing toward goals   PT  Frequency 3-5x/wk   Discharge Recommendation   Rehab Resource Intensity Level, PT III (Minimum Resource Intensity)   Equipment Recommended Walker;Cane   AM-PAC Basic Mobility Inpatient   Turning in Flat Bed Without Bedrails 4   Lying on Back to Sitting on Edge of Flat Bed Without Bedrails 4   Moving Bed to Chair 4   Standing Up From Chair Using Arms 4   Walk in Room 4   Climb 3-5 Stairs With Railing 3   Basic Mobility Inpatient Raw Score 23   Basic Mobility Standardized Score 50.88   [facility] Highest Level Of Mobility   -HLM Goal 7: Walk 25 feet or more   -HLM Achieved 7: Walk 25 feet or more         Time In: 0846  Time Out: 0911  Total Treatment Minutes: 25    Liu Winkler, PT

## 2025-01-13 NOTE — PLAN OF CARE
Problem: PHYSICAL THERAPY ADULT  Goal: Performs mobility at highest level of function for planned discharge setting.  See evaluation for individualized goals.  Description: Treatment/Interventions: Functional transfer training, LE strengthening/ROM, Elevations, Therapeutic exercise, Endurance training, Gait training  Equipment Recommended: Cane       See flowsheet documentation for full assessment, interventions and recommendations.  Outcome: Progressing  Note: Prognosis: Good  Problem List: Decreased strength, Decreased endurance  Assessment: Pt seen for PT treatment session this date with interventions consisting of gait training to normalize gait pattern to decrease fall risk. Pt agreeable to PT treatment session upon arrival, pt found seated OOB in recliner, in no apparent distress, A&O x 4, and responsive. Since previous session, pt has made good progress as evidenced by increased distance ambulated, decreased assistance required with mobility, and ability to perform stairs  Barriers during this session include fatigue.  Pt continues to be functioning below baseline level, and remains limited 2* factors listed above and including decreased strength, impaired activity tolerance, and decreased endurance.  Pt prognosis for achieving goals is good, pending pt progress with hospitalization/medical status improvements, and indicated by motivated to participate in therapy, ability to follow cues, and supportive family. PT will continue to see pt during current hospitalization in order to address the deficits listed above and provide interventions consistent w/ POC in effort to achieve goals. Current goals and POC remain appropriate, pt continues to have rehab potential  Upon conclusion pt seated OOB in recliner. The patient's AM-PAC Basic Mobility Inpatient Short Form Raw Score is 21.  A Raw score of greater than 16 suggests the patient may benefit from discharge to home. Based on patient presentations and  impairments, pt would most appropriately benefit from Level 3 resource intensity upon discharge.  Please also refer to the recommendation of the Physical Therapist for safe discharge planning. RN verbalized pt appropriate for PT session.        Rehab Resource Intensity Level, PT: III (Minimum Resource Intensity)    See flowsheet documentation for full assessment.

## 2025-01-13 NOTE — DISCHARGE SUMMARY
Discharge Summary - Hospitalist   Name: Dexter Sharp 84 y.o. male I MRN: [id number]  Unit/Bed#: -01 I Date of Admission: 1/8/2025   Date of Service: 1/13/2025 I Hospital Day: 5     Assessment & Plan  Sepsis (HCC)  Secondary to influenza and suspected bacterial pneumonia with fever and tachypnea  Lactate normal  Procalcitonin improved  Strep pneumo/urine Legionella negative  Sputum culture mixed respiratory contaminants  Blood cultures negative to date  Patient completed 6 days of IV ceftriaxone in the hospital.  Will discharge on cefdinir to complete 7 days of antibiotics  Chronic kidney disease, stage 3a (HCC)  Lab Results   Component Value Date    EGFR 68 01/13/2025    EGFR 73 01/11/2025    EGFR 68 01/10/2025    CREATININE 0.99 01/13/2025    CREATININE 0.94 01/11/2025    CREATININE 0.99 01/10/2025   Creatinine improving.  Routine lab monitoring with PCP as outpatient  Elevated troponin  Likely secondary to sepsis.  ER reviewed case with cardiology who recommended admission and monitoring without heparin drip  Troponins have been flat  Reviewed case with cardiology, no acute invention needed at this time  Per cardiology, echo with EF of 60% and no wall motion normalities noted  No further workup at this time unless change in patient's clinical condition  Hyponatremia  Likely multifactorial secondary to sepsis, dehydration  Patient received IV fluids in the ER  Prinzide discontinued  Sodium level improved to baseline  Pleural effusion on right  Patient currently on 3 L nasal cannula  Large right pleural effusion noted on CT imaging  Pulmonology consulted.  Patient had thoracentesis performed on 1/9/2025 with approximately 1.5 L removed  Not consistent with infection  Patient did complete course of antibiotics  Recommend repeat imaging in 4 to 6 weeks to monitor for improvement and no recurrence  Acute respiratory failure with hypoxia (HCC)  Patient is not on oxygen at home  Secondary to pneumonia,  influenza, and large effusion  Pulmonology input appreciated.  Patient s/p thoracentesis  Clinically improved as patient is off oxygen  Follow-up with PCP as outpatient  Hypokalemia  Improved with supplementation  Influenza A with pneumonia  Completed Tamiflu     Medical Problems       Resolved Problems  Date Reviewed: 9/6/2024   None       Discharging Physician / Practitioner: Cristin Yates MD  PCP: Edil Mac DO  Admission Date:   Admission Orders (From admission, onward)       Ordered        01/08/25 1756  INPATIENT ADMISSION  Once                          Discharge Date: 01/13/25    Consultations During Hospital Stay:  Pulmonology    Procedures Performed:   Thoracentesis    Significant Findings / Test Results:   Pneumonia, influenza, respiratory failure    Incidental Findings:   None    Test Results Pending at Discharge (will require follow up):   None     Outpatient Tests Requested:  Follow-up chest imaging in 4 to 6 weeks to monitor for resolution of findings    Complications: None    Reason for Admission: Pneumonia/sepsis    Hospital Course:   Dexter Sharp is a 84 y.o. male patient who originally presented to the hospital on 1/8/2025 due to shortness of breath.  Patient found to have influenza and suspected pneumonia.  Patient treated with antibiotics and Tamiflu for sepsis secondary to flu and pneumonia.  Also found to have large effusion on CT imaging.  Pulmonology consulted and patient had thoracentesis performed.  Blood cultures remained negative.  MRSA screen negative, vancomycin discontinued.  Strep pneumo and Legionella negative.  Patient also developed diarrhea during admission, C. difficile negative.  Sputum culture with mixed respiratory grey.  Patient has clinically improved.  Was initially requiring oxygen and now titrated off, saturating well on room air.  Also with mild acute kidney injury and hyponatremia on admission.  Both have improved with IV fluids and discontinuation  "of Prinzide.  Patient was seen by PT/OT who recommended home with home care.  Case management has arranged for home care.  Patient discharged on cefdinir to complete course of therapy.  Advised to return with any worsening symptoms.    Please see above list of diagnoses and related plan for additional information.     Condition at Discharge: stable    Discharge Day Visit / Exam:   Subjective: No complaints at this time  Vitals: Blood Pressure: 143/66 (01/13/25 0834)  Pulse: 86 (01/13/25 0834)  Temperature: 97.9 °F (36.6 °C) (01/13/25 0834)  Temp Source: Oral (01/11/25 0719)  Respirations: 19 (01/13/25 0834)  Height: 5' 8\" (172.7 cm) (01/09/25 0836)  Weight - Scale: 85.3 kg (188 lb) (01/09/25 0836)  SpO2: 94 % (01/13/25 0932)  Physical Exam  Constitutional:       General: He is not in acute distress.  HENT:      Head: Normocephalic and atraumatic.      Nose: Nose normal.      Mouth/Throat:      Mouth: Mucous membranes are moist.   Eyes:      Extraocular Movements: Extraocular movements intact.      Conjunctiva/sclera: Conjunctivae normal.   Cardiovascular:      Rate and Rhythm: Normal rate and regular rhythm.   Pulmonary:      Effort: Pulmonary effort is normal. No respiratory distress.      Breath sounds: Rhonchi present.   Abdominal:      Palpations: Abdomen is soft.      Tenderness: There is no abdominal tenderness.   Musculoskeletal:         General: Normal range of motion.      Cervical back: Normal range of motion and neck supple.   Skin:     General: Skin is warm and dry.   Neurological:      General: No focal deficit present.      Mental Status: He is alert. Mental status is at baseline.      Cranial Nerves: No cranial nerve deficit.   Psychiatric:         Mood and Affect: Mood normal.         Behavior: Behavior normal.          Discussion with Family: Updated  (wife and friend) at bedside.    Discharge instructions/Information to patient and family:   See after visit summary for information " provided to patient and family.      Provisions for Follow-Up Care:  See after visit summary for information related to follow-up care and any pertinent home health orders.      Mobility at time of Discharge:   Basic Mobility Inpatient Raw Score: 23  JH-HLM Goal: 7: Walk 25 feet or more  JH-HLM Achieved: 7: Walk 25 feet or more  HLM Goal achieved. Continue to encourage appropriate mobility.     Disposition:   Home with VNA Services (Reminder: Complete face to face encounter)    Planned Readmission: no    Discharge Medications:  See after visit summary for reconciled discharge medications provided to patient and/or family.      Administrative Statements   Discharge Statement:  I have spent a total time of 35 minutes in caring for this patient on the day of the visit/encounter. >30 minutes of time was spent on: Counseling / Coordination of care, Documenting in the medical record, Reviewing / ordering tests, medicine, procedures  , and Communicating with other healthcare professionals .    **Please Note: This note may have been constructed using a voice recognition system**

## 2025-01-13 NOTE — QUICK NOTE
Received message from pathology department regarding patient's recent pleural fluid studies.  Fluid from 1/9/2025 positive for malignant cells.  Reviewed with Dr. Valdes from pulmonology.  He has scheduled patient for a pulmonary appointment tomorrow in [place] at 10 AM.  Reviewed findings with both patient and his wife at bedside.  Both understand the results of the fluid analysis.  Explained that further evaluation/staining is still pending.  They are okay with tomorrow's appointment at 10 AM and states that they will be able to make it.  Also provided patient with ambulatory referral to hematology/oncology    Patient will be discharged home, please see discharge summary

## 2025-01-14 ENCOUNTER — OFFICE VISIT (OUTPATIENT)
Dept: PULMONOLOGY | Facility: CLINIC | Age: 85
End: 2025-01-14
Payer: COMMERCIAL

## 2025-01-14 ENCOUNTER — TELEPHONE (OUTPATIENT)
Age: 85
End: 2025-01-14

## 2025-01-14 ENCOUNTER — TRANSITIONAL CARE MANAGEMENT (OUTPATIENT)
Dept: INTERNAL MEDICINE CLINIC | Facility: CLINIC | Age: 85
End: 2025-01-14

## 2025-01-14 ENCOUNTER — DOCUMENTATION (OUTPATIENT)
Dept: HEMATOLOGY ONCOLOGY | Facility: CLINIC | Age: 85
End: 2025-01-14

## 2025-01-14 ENCOUNTER — HOME CARE VISIT (OUTPATIENT)
Dept: HOME HEALTH SERVICES | Facility: HOME HEALTHCARE | Age: 85
End: 2025-01-14
Payer: COMMERCIAL

## 2025-01-14 ENCOUNTER — PATIENT OUTREACH (OUTPATIENT)
Dept: HEMATOLOGY ONCOLOGY | Facility: CLINIC | Age: 85
End: 2025-01-14

## 2025-01-14 VITALS
WEIGHT: 180 LBS | OXYGEN SATURATION: 97 % | SYSTOLIC BLOOD PRESSURE: 140 MMHG | HEART RATE: 67 BPM | RESPIRATION RATE: 18 BRPM | BODY MASS INDEX: 27.28 KG/M2 | DIASTOLIC BLOOD PRESSURE: 68 MMHG | TEMPERATURE: 98.8 F | HEIGHT: 68 IN

## 2025-01-14 VITALS
HEART RATE: 76 BPM | SYSTOLIC BLOOD PRESSURE: 132 MMHG | DIASTOLIC BLOOD PRESSURE: 70 MMHG | TEMPERATURE: 97.4 F | RESPIRATION RATE: 22 BRPM | OXYGEN SATURATION: 94 %

## 2025-01-14 DIAGNOSIS — J96.01 ACUTE RESPIRATORY FAILURE WITH HYPOXIA (HCC): ICD-10-CM

## 2025-01-14 DIAGNOSIS — J91.0 MALIGNANT PLEURAL EFFUSION: ICD-10-CM

## 2025-01-14 DIAGNOSIS — J09.X1 INFLUENZA A WITH PNEUMONIA: Primary | ICD-10-CM

## 2025-01-14 DIAGNOSIS — F41.1 GENERALIZED ANXIETY DISORDER: ICD-10-CM

## 2025-01-14 DIAGNOSIS — R91.8 OTHER NONSPECIFIC ABNORMAL FINDING OF LUNG FIELD: ICD-10-CM

## 2025-01-14 PROCEDURE — 400013 VN SOC

## 2025-01-14 PROCEDURE — G0151 HHCP-SERV OF PT,EA 15 MIN: HCPCS

## 2025-01-14 PROCEDURE — G0299 HHS/HOSPICE OF RN EA 15 MIN: HCPCS

## 2025-01-14 PROCEDURE — 99214 OFFICE O/P EST MOD 30 MIN: CPT

## 2025-01-14 RX ORDER — ALPRAZOLAM 0.5 MG
0.5 TABLET ORAL
Qty: 30 TABLET | Refills: 0 | Status: SHIPPED | OUTPATIENT
Start: 2025-01-14

## 2025-01-14 NOTE — PROGRESS NOTES
Initial outreach. Spoke to Lora - states Dexter can't really come to the phone. Introduced myself and explained the role of an Oncology Nurse Navigator to assist with coordination of cancer care, preparation for upcoming appointment, be a point of contact prior to oncology consult, provide support and connect them with available resources. Discussed medical oncology referral placed while inpatient. Explained I will be their main contact until they are established with their team and a treatment plan is established.     Introduced Leonie, and explained she will be their patient navigator, who will reach out after the first consult to introduce themselves. The PN will provide support, make sure they has a good understanding of the plan and schedule and to connect with additional resources if/when needed.     Reviewed upcoming appointments including date, time and location.  Assessed for barriers of care. My direct contact information provided. Wife is aware that they can call me should they need any further assistance. Patient was appreciative of assistance.     Lung Symptoms:   Smoking: No former smoker - quit 2 years ago cigars  Cough: Yes, describe: mostly dry cough if he has phlegm it is mostly clear.  Hemoptysis:No - not since he has been home  SOB: No  Chest pain:No  Wheezing:Yes, describe: patient reports a little to wife but she states she does hear wheezing.   Chronic lung infections:Yes, describe: bronchitis  Pain(back,pain,bone): No   Headache No  Dizziness No  Lightheadedness No  Appetite: Small frequent meals, he has lost about 15-20 lbs  Nausea/Vomiting No  Fatigue/Weakness Yes, describe: fatigued - sitting more than normal  Difficulty ambulating: Yes, describe: cane - unsteady at times       PCP: Dr. Mac      Hx of prostate cancer s/p prostatectomy then recurrence and  radiation treatment.

## 2025-01-14 NOTE — PROGRESS NOTES
All records needed are in patients chart. No records retrieval needed at this time.     Pt referred to medical oncology and should be scheduled in 12-14 days.  Please notify me if not scheduled within time frame.    Referral received/ Chart reviewed for work up completed     Imaging completed:    [x] PET/CT scheduled for  01/28/25   [x] MRI scheduled for  01/24/25   [x] CT chest wo contrast 01/08/25,    [] US   [] Mammo   [] Bone scan   [] N/A    Pathology completed: [facility]   Date: 01/09/25 pending   Location: pleural fluid    []N/A    Additional records needed:    [] Genomic report   [] Genetic testing results   [] Office Note   [] Procedure/ Operative note   [] Lab results   [] N/A      [] Radiation Oncology records retrieval needed (PN to route to rad/onc clerical pool once scheduled)  Date:  Location:        Hx of prostate cancer 20+ years ago s/p prostatectomy  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

## 2025-01-14 NOTE — ASSESSMENT & PLAN NOTE
-Resolved.  Secondary to influenza A with pneumonia and large pleural effusion.  SpO2 97% on room air today

## 2025-01-14 NOTE — TELEPHONE ENCOUNTER
I contacted the patient to get him scheduled for Med/Onc.    As per clinical review the patient should be seen within 12/14 days. The patient wanted to be seen in [place].     I scheduled the first appointment that worked for the patient. Patient is scheduled for 02/11/2025 @3:40PM with Dr. Dacosta.     Please contact the patient if a sooner appointment becomes available at [phone].

## 2025-01-14 NOTE — UTILIZATION REVIEW
NOTIFICATION OF ADMISSION DISCHARGE   This is a Notification of Discharge from [facility]. Please be advised that this patient has been discharge from our facility. Below you will find the admission and discharge date and time including the patient’s disposition.   UTILIZATION REVIEW CONTACT:  Julia Louis  Utilization   Network Utilization Review Department  Phone: [phone] x carefully listen to the prompts. All voicemails are confidential.  Email: [email or web address]     ADMISSION INFORMATION  PRESENTATION DATE: 1/8/2025  3:36 PM  OBERVATION ADMISSION DATE: N/A  INPATIENT ADMISSION DATE: 1/8/25  5:59 PM   DISCHARGE DATE: 1/13/2025 12:54 PM   DISPOSITION:Home with Home Health Care    Network Utilization Review Department  ATTENTION: Please call with any questions or concerns to [phone] and carefully listen to the prompts so that you are directed to the right person. All voicemails are confidential.   For Discharge needs, contact Care Management DC Support Team at [phone] opt. 2  Send all requests for admission clinical reviews, approved or denied determinations and any other requests to dedicated fax number below belonging to the campus where the patient is receiving treatment. List of dedicated fax numbers for the Facilities:  FACILITY NAME UR FAX NUMBER   ADMISSION DENIALS (Administrative/Medical Necessity) [phone]   DISCHARGE SUPPORT TEAM ([facility]) [phone]   PARENT CHILD HEALTH (Maternity/NICU/Pediatrics) [phone]   [facility] [phone]   [facility] [phone]   [facility] [phone]   [facility] [phone]   [facility] [phone]   [facility] [phone]   [facility] [phone]   [facility]  [phone]   [facility] [phone]   [facility] [phone]   [facility] [phone]   [facility] [phone]        Discharge Summary - Hospitalist   Name: Dexter Sharp 84 y.o. male I MRN: [id number]  Unit/Bed#: -01 I Date of Admission: 1/8/2025   Date of Service: 1/13/2025 I Hospital Day: 5      Assessment & Plan  Sepsis (HCC)  Secondary to influenza and suspected bacterial pneumonia with fever and tachypnea  Lactate normal  Procalcitonin improved  Strep pneumo/urine Legionella negative  Sputum culture mixed respiratory contaminants  Blood cultures negative to date  Patient completed 6 days of IV ceftriaxone in the hospital.  Will discharge on cefdinir to complete 7 days of antibiotics  Chronic kidney disease, stage 3a (HCC)        Lab Results   Component Value Date     EGFR 68 01/13/2025     EGFR 73 01/11/2025     EGFR 68 01/10/2025     CREATININE 0.99 01/13/2025     CREATININE 0.94 01/11/2025     CREATININE 0.99 01/10/2025   Creatinine improving.  Routine lab monitoring with PCP as outpatient  Elevated troponin  Likely secondary to sepsis.  ER reviewed case with cardiology who recommended admission and monitoring without heparin drip  Troponins have been flat  Reviewed case with cardiology, no acute invention needed at this time  Per cardiology, echo with EF of 60% and no wall motion normalities noted  No further workup at this time unless change in patient's clinical condition  Hyponatremia  Likely multifactorial secondary to sepsis, dehydration  Patient received IV fluids in the ER  Prinzide discontinued  Sodium level improved to baseline  Pleural effusion on right  Patient currently on 3 L nasal cannula  Large right pleural effusion noted on CT imaging  Pulmonology consulted.  Patient had thoracentesis performed on 1/9/2025 with approximately 1.5 L removed  Not  consistent with infection  Patient did complete course of antibiotics  Recommend repeat imaging in 4 to 6 weeks to monitor for improvement and no recurrence  Acute respiratory failure with hypoxia (HCC)  Patient is not on oxygen at home  Secondary to pneumonia, influenza, and large effusion  Pulmonology input appreciated.  Patient s/p thoracentesis  Clinically improved as patient is off oxygen  Follow-up with PCP as outpatient  Hypokalemia  Improved with supplementation  Influenza A with pneumonia  Completed Tamiflu     Medical Problems         Resolved Problems  Date Reviewed: 9/6/2024   None         Discharging Physician / Practitioner: Cristin Yates MD  PCP: Edil Mac DO  Admission Date:   Admission Orders (From admission, onward)          Ordered         01/08/25 1759   INPATIENT ADMISSION  Once                               Discharge Date: 01/13/25     Consultations During Hospital Stay:  Pulmonology     Procedures Performed:   Thoracentesis     Significant Findings / Test Results:   Pneumonia, influenza, respiratory failure     Incidental Findings:   None     Test Results Pending at Discharge (will require follow up):   None     Outpatient Tests Requested:  Follow-up chest imaging in 4 to 6 weeks to monitor for resolution of findings     Complications: None     Reason for Admission: Pneumonia/sepsis     Hospital Course:   Dexter Sharp is a 84 y.o. male patient who originally presented to the hospital on 1/8/2025 due to shortness of breath.  Patient found to have influenza and suspected pneumonia.  Patient treated with antibiotics and Tamiflu for sepsis secondary to flu and pneumonia.  Also found to have large effusion on CT imaging.  Pulmonology consulted and patient had thoracentesis performed.  Blood cultures remained negative.  MRSA screen negative, vancomycin discontinued.  Strep pneumo and Legionella negative.  Patient also developed diarrhea during admission, C. difficile negative.   "Sputum culture with mixed respiratory grey.  Patient has clinically improved.  Was initially requiring oxygen and now titrated off, saturating well on room air.  Also with mild acute kidney injury and hyponatremia on admission.  Both have improved with IV fluids and discontinuation of Prinzide.  Patient was seen by PT/OT who recommended home with home care.  Case management has arranged for home care.  Patient discharged on cefdinir to complete course of therapy.  Advised to return with any worsening symptoms.     Please see above list of diagnoses and related plan for additional information.      Condition at Discharge: stable     Discharge Day Visit / Exam:   Subjective: No complaints at this time  Vitals: Blood Pressure: 143/66 (01/13/25 0834)  Pulse: 86 (01/13/25 0834)  Temperature: 97.9 °F (36.6 °C) (01/13/25 0834)  Temp Source: Oral (01/11/25 0719)  Respirations: 19 (01/13/25 0834)  Height: 5' 8\" (172.7 cm) (01/09/25 0836)  Weight - Scale: 85.3 kg (188 lb) (01/09/25 0836)  SpO2: 94 % (01/13/25 0932)  Physical Exam  Constitutional:       General: He is not in acute distress.  HENT:      Head: Normocephalic and atraumatic.      Nose: Nose normal.      Mouth/Throat:      Mouth: Mucous membranes are moist.   Eyes:      Extraocular Movements: Extraocular movements intact.      Conjunctiva/sclera: Conjunctivae normal.   Cardiovascular:      Rate and Rhythm: Normal rate and regular rhythm.   Pulmonary:      Effort: Pulmonary effort is normal. No respiratory distress.      Breath sounds: Rhonchi present.   Abdominal:      Palpations: Abdomen is soft.      Tenderness: There is no abdominal tenderness.   Musculoskeletal:         General: Normal range of motion.      Cervical back: Normal range of motion and neck supple.   Skin:     General: Skin is warm and dry.   Neurological:      General: No focal deficit present.      Mental Status: He is alert. Mental status is at baseline.      Cranial Nerves: No cranial nerve " deficit.   Psychiatric:         Mood and Affect: Mood normal.         Behavior: Behavior normal.            Discussion with Family: Updated  (wife and friend) at bedside.     Discharge instructions/Information to patient and family:   See after visit summary for information provided to patient and family.       Provisions for Follow-Up Care:  See after visit summary for information related to follow-up care and any pertinent home health orders.       Mobility at time of Discharge:   Basic Mobility Inpatient Raw Score: 23  JH-HLM Goal: 7: Walk 25 feet or more  JH-HLM Achieved: 7: Walk 25 feet or more  HLM Goal achieved. Continue to encourage appropriate mobility.     Disposition:   Home with VNA Services (Reminder: Complete face to face encounter)     Planned Readmission: no     Discharge Medications:  See after visit summary for reconciled discharge medications provided to patient and/or family.

## 2025-01-14 NOTE — ASSESSMENT & PLAN NOTE
-Discharged from hospital yesterday for influenza A with pneumonia.  Has residual cough and congestion.  Lungs are clear on exam today  -Patient will complete cefdinir as prescribed  -May take OTC Mucinex to help with cough/congestion

## 2025-01-14 NOTE — PROGRESS NOTES
Name: Dexter Sharp      : 1940      MRN: [id number]  Encounter Provider: GAIL Coles  Encounter Date: 2025   Encounter department: [facility]  :  Assessment & Plan  Influenza A with pneumonia  -Discharged from hospital yesterday for influenza A with pneumonia.  Has residual cough and congestion.  Lungs are clear on exam today  -Patient will complete cefdinir as prescribed  -May take OTC Mucinex to help with cough/congestion       Acute respiratory failure with hypoxia (HCC)  -Resolved.  Secondary to influenza A with pneumonia and large pleural effusion.  SpO2 97% on room air today       Malignant pleural effusion  -Large right sided pleural effusion underwent thoracentesis on 2025  -Pleural fluid positive for malignant cells, stains pending for further characterization  -Unknown etiology, will need PET/CT and MRI brain for further workup.  Will likely need another biopsy depending on the PET/CT results.  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  Discussed case via [employer] secure chat with oncology as well as Dr. Valdes.  Will move forward with plan as mentioned above.  -Will follow-up with the patient on the final pathology results as well as results from PET/CT and MRI  -Patient will notify us for any worsening shortness of breath signaling reaccumulation of fluid.    Orders:    NM PET CT skull base to mid thigh; Future    MRI brain w wo contrast; Future    Other nonspecific abnormal finding of lung field    Orders:    NM PET CT skull base to mid thigh; Future        History of Present Illness   Dexter Sharp is a 84 y.o. male with a past medical history of CKD, hypertension, prostate cancer 20 + years ago with metastasis to intrapelvic lymph node appearing in  treated with radiation,  and former smoker quit 30+ years ago who is here today for a hospital follow-up visit.  He is accompanied by his wife.  He was hospitalized at [facility] 1/8/2025 - 1/13/2025 initially presenting with shortness of breath.  Found to have influenza A and suspected pneumonia.  Treated with IV antibiotics, supplemental oxygen, and Tamiflu.  Also found to have a large pleural effusion on CT imaging.  Thoracentesis was performed on 1/9/2025 with approximately 1.5 L removed. Cytology came back positive for malignant cells.    Patient was just discharged from the hospital yesterday.  Still has residual cough and congestion.  Gets short of breath if he walks longer distances.  No fevers or chills.  Taking his cefdinir antibiotics as prescribed.  Not on any inhaler therapy or supplemental oxygen.    Patient denies any prior history of asthma or COPD.  Denies any prior hospitalizations due to his breathing.  Was never on supplemental oxygen or inhaler therapy previously.  Exercise tolerance-able to walk his dog 15 blocks 3 times per day previously.  Denies any wheezing, chest pain, chest tightness, or lower extremity swelling.  Denies any unintentional weight loss, night sweats, hemoptysis.    Review of Systems   Constitutional:  Negative for activity change, chills, fever and unexpected weight change.   HENT:  Negative for congestion, postnasal drip, rhinorrhea, sore throat and trouble swallowing.    Respiratory:  Positive for cough. Negative for chest tightness, shortness of breath and wheezing.    Cardiovascular:  Negative for chest pain, palpitations and leg swelling.   Allergic/Immunologic: Negative.      Current Outpatient Medications on File Prior to Visit   Medication Sig Dispense Refill    ALPRAZolam (XANAX) 0.5 mg tablet Take 1 tablet (0.5 mg total) by mouth daily at bedtime as needed for anxiety 30 tablet 0    amLODIPine (NORVASC) 5 mg tablet Take 1 tablet (5 mg total) by mouth daily 90 tablet 1     atorvastatin (LIPITOR) 40 mg tablet Take 1 tablet (40 mg total) by mouth daily 90 tablet 1    cefdinir (OMNICEF) 300 mg capsule Take 1 capsule (300 mg total) by mouth every 12 (twelve) hours for 2 days 3 capsule 0    levothyroxine 88 mcg tablet TAKE 1 TABLET (88 MCG TOTAL) BY MOUTH DAILY 100 tablet 1    meloxicam (MOBIC) 15 mg tablet TAKE 1 TABLET (15 MG TOTAL) BY MOUTH DAILY 100 tablet 0     Current Facility-Administered Medications on File Prior to Visit   Medication Dose Route Frequency Provider Last Rate Last Admin    [DISCONTINUED] acetaminophen (TYLENOL) tablet 650 mg  650 mg Oral Q6H PRN Cristin Yates MD        [DISCONTINUED] ALPRAZolam (XANAX) tablet 0.5 mg  0.5 mg Oral HS PRN Cristin Yates MD   0.5 mg at 01/12/25 2131    [DISCONTINUED] amLODIPine (NORVASC) tablet 5 mg  5 mg Oral Daily Cristin Yates MD   5 mg at 01/13/25 0929    [DISCONTINUED] aspirin (ECOTRIN LOW STRENGTH) EC tablet 81 mg  81 mg Oral Daily Cristin Yates MD   81 mg at 01/13/25 0929    [DISCONTINUED] atorvastatin (LIPITOR) tablet 40 mg  40 mg Oral Daily Cristin Yates MD   40 mg at 01/13/25 0929    [DISCONTINUED] cefTRIAXone (ROCEPHIN) IVPB (premix in dextrose) 1,000 mg 50 mL  1,000 mg Intravenous Q24H Edil Valdes MD   Stopped at 01/13/25 1155    [DISCONTINUED] docusate sodium (COLACE) capsule 100 mg  100 mg Oral BID Cristin Yates MD   100 mg at 01/13/25 0929    [DISCONTINUED] enoxaparin (LOVENOX) subcutaneous injection 40 mg  40 mg Subcutaneous Daily Cristin Yates MD   40 mg at 01/13/25 0929    [DISCONTINUED] hydrALAZINE (APRESOLINE) injection 10 mg  10 mg Intravenous Q6H PRN Therese Fountain PA-C   10 mg at 01/08/25 2326    [DISCONTINUED] Hydrocod Elie-Chlorphe Elie ER (TUSSIONEX) ER suspension 5 mL  5 mL Oral Q12H PRN Therese Fountain PA-C   5 mL at 01/12/25 2131    [DISCONTINUED] levalbuterol (XOPENEX) inhalation solution 1.25 mg  1.25 mg  "Nebulization TID Cristin Yates MD   1.25 mg at 01/13/25 0736    [DISCONTINUED] levothyroxine tablet 88 mcg  88 mcg Oral Daily Cristin Yates MD   88 mcg at 01/13/25 0528    [DISCONTINUED] ondansetron (ZOFRAN) injection 4 mg  4 mg Intravenous Q6H PRN Cristin Yates MD        [DISCONTINUED] senna (SENOKOT) tablet 8.6 mg  1 tablet Oral HS Cristin Yates MD   8.6 mg at 01/13/25 0929         Historical Information   Tobacco History: Quit 30+ years ago  Occupational History: Retired    Objective   /68 (BP Location: Right arm, Patient Position: Sitting, Cuff Size: Standard)   Pulse 67   Temp 98.8 °F (37.1 °C) (Temporal)   Resp 18   Ht 5' 8\" (1.727 m)   Wt 81.6 kg (180 lb)   SpO2 97%   BMI 27.37 kg/m²      Physical Exam  Vitals and nursing note reviewed.   Constitutional:       General: He is not in acute distress.     Appearance: Normal appearance. He is well-developed.   Cardiovascular:      Rate and Rhythm: Normal rate and regular rhythm.      Heart sounds: Normal heart sounds. No murmur heard.  Pulmonary:      Effort: Pulmonary effort is normal. No respiratory distress.      Breath sounds: Normal breath sounds. No decreased breath sounds, wheezing, rhonchi or rales.   Musculoskeletal:         General: No swelling.      Right lower leg: No edema.      Left lower leg: No edema.   Psychiatric:         Mood and Affect: Mood and affect normal.         Behavior: Behavior normal. Behavior is cooperative.           Lab Results: I have reviewed pertinent labs.    Radiology Results Review: I have reviewed radiology reports from hospitalization including: CT chest.  Other Study Results: Other Study Results Review : Pathology reports reviewed.  PFT Results Reviewed: none prior for review      "

## 2025-01-15 ENCOUNTER — DOCUMENTATION (OUTPATIENT)
Dept: NUTRITION | Facility: CLINIC | Age: 85
End: 2025-01-15

## 2025-01-15 NOTE — PROGRESS NOTES
Received notification by RN Navigator (Jasmine GRIER) on 1/14/25 that pt is appropriate for oncology nutrition care (reason for referral: Malnutrition Screening Tool (MST) Triggers: scored a 3 indicating 14-23# (6.4-10.5 kg) recent wt loss and is eating poorly due to a decreased appetite. (Date of MST: 1/14/25)).     Chart review complete. Patient will be establishing care with Medical Oncology on 2/11/25. Will reach out when treatment plan is in place and establish care with oncology nutrition services accordingly.

## 2025-01-15 NOTE — TELEPHONE ENCOUNTER
Spoke with patient offering him 2/6 with Dr. Dacosta patient states that won't work because it's too early in the morning. Patient advised me that he has a couple of scans scheduled for the end of this month and the 2/11/25 appointment works good for him. Patient also stated he was having trouble keeping all his appointments together. I offered to print patient a calendar for this month and next month and mail it to his home address with all of his appointments on it. Patient was thankful and will come to see us on 2/11.

## 2025-01-16 ENCOUNTER — HOME CARE VISIT (OUTPATIENT)
Dept: HOME HEALTH SERVICES | Facility: HOME HEALTHCARE | Age: 85
End: 2025-01-16
Payer: COMMERCIAL

## 2025-01-16 ENCOUNTER — OFFICE VISIT (OUTPATIENT)
Dept: INTERNAL MEDICINE CLINIC | Facility: CLINIC | Age: 85
End: 2025-01-16
Payer: COMMERCIAL

## 2025-01-16 ENCOUNTER — TELEPHONE (OUTPATIENT)
Dept: INTERNAL MEDICINE CLINIC | Facility: CLINIC | Age: 85
End: 2025-01-16

## 2025-01-16 VITALS — OXYGEN SATURATION: 99 % | DIASTOLIC BLOOD PRESSURE: 70 MMHG | SYSTOLIC BLOOD PRESSURE: 120 MMHG | HEART RATE: 78 BPM

## 2025-01-16 VITALS
SYSTOLIC BLOOD PRESSURE: 142 MMHG | DIASTOLIC BLOOD PRESSURE: 74 MMHG | OXYGEN SATURATION: 95 % | TEMPERATURE: 98.2 F | HEART RATE: 70 BPM | BODY MASS INDEX: 28.01 KG/M2 | WEIGHT: 184.8 LBS | HEIGHT: 68 IN

## 2025-01-16 DIAGNOSIS — C77.5 MALIGNANT NEOPLASM OF PROSTATE METASTATIC TO INTRAPELVIC LYMPH NODE (HCC): ICD-10-CM

## 2025-01-16 DIAGNOSIS — F10.21 ALCOHOL DEPENDENCE, IN REMISSION (HCC): ICD-10-CM

## 2025-01-16 DIAGNOSIS — J90 PLEURAL EFFUSION ON RIGHT: ICD-10-CM

## 2025-01-16 DIAGNOSIS — J09.X1 INFLUENZA A WITH PNEUMONIA: Primary | ICD-10-CM

## 2025-01-16 DIAGNOSIS — R79.89 ELEVATED TROPONIN: ICD-10-CM

## 2025-01-16 DIAGNOSIS — S80.212D ABRASION OF LEFT KNEE, SUBSEQUENT ENCOUNTER: ICD-10-CM

## 2025-01-16 DIAGNOSIS — I25.10 CORONARY ARTERY CALCIFICATION SEEN ON CT SCAN: ICD-10-CM

## 2025-01-16 DIAGNOSIS — J91.0 MALIGNANT PLEURAL EFFUSION: ICD-10-CM

## 2025-01-16 DIAGNOSIS — E87.1 HYPONATREMIA: ICD-10-CM

## 2025-01-16 DIAGNOSIS — J96.01 ACUTE RESPIRATORY FAILURE WITH HYPOXIA (HCC): ICD-10-CM

## 2025-01-16 DIAGNOSIS — R26.9 GAIT ABNORMALITY: ICD-10-CM

## 2025-01-16 DIAGNOSIS — B35.1 ONYCHOMYCOSIS: ICD-10-CM

## 2025-01-16 DIAGNOSIS — C61 MALIGNANT NEOPLASM OF PROSTATE METASTATIC TO INTRAPELVIC LYMPH NODE (HCC): ICD-10-CM

## 2025-01-16 DIAGNOSIS — A41.9 SEPSIS, DUE TO UNSPECIFIED ORGANISM, UNSPECIFIED WHETHER ACUTE ORGAN DYSFUNCTION PRESENT (HCC): ICD-10-CM

## 2025-01-16 PROCEDURE — G0152 HHCP-SERV OF OT,EA 15 MIN: HCPCS

## 2025-01-16 PROCEDURE — 99496 TRANSJ CARE MGMT HIGH F2F 7D: CPT | Performed by: INTERNAL MEDICINE

## 2025-01-16 NOTE — TELEPHONE ENCOUNTER
Aden[employer] called to advise that Medicare does not cover bathroom safety products and would need to purchase out of pocket  Please advise patient   Thank you

## 2025-01-16 NOTE — CASE COMMUNICATION
OT evaluation completed 1/16/25.  Plan to continue OT 1wk1, 2wk1 to address shower transfers, adaptibe equipment training and safety.

## 2025-01-16 NOTE — PROGRESS NOTES
Transition of Care Visit  Name: Dexter Sharp      : 1940      MRN: [id number]  Encounter Provider: Edil Mac DO  Encounter Date: 2025   Encounter department: [facility]    Assessment & Plan  Influenza A with pneumonia         Acute respiratory failure with hypoxia (HCC)         Pleural effusion on right         Hyponatremia         Sepsis, due to unspecified organism, unspecified whether acute organ dysfunction present (HCC)         Coronary artery calcification seen on CT scan         Malignant pleural effusion         Elevated troponin         Abrasion of left knee, subsequent encounter         Gait abnormality    Orders:    Shower chair    Misc. 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 Has PT. Knee wound clean, but feel he is keeping it too moist. To keep open to air when at home and allow it to form a scab. Watch for secondary infection. Needs DME and will order it. Refer to DPM for the toenails. Seeing the specialist for the effusions. D/c labs ok. Continue current treatment and RTC one month for routine.     History of Present Illness     Transitional Care Management Review:   Dexter Sharp is a 84 y.o. male here for TCM follow up.     During the TCM phone call patient stated:  TCM Call       Date and time call was made  2025  7:44 AM    Hospital care reviewed  Records reviewed    Patient was hospitial[facility] at  [facility]    Date of Admission  25    Date of discharge  25    Diagnosis  Sepsis    Disposition  Home    Were the patients medications reviewed and updated  Yes    Current Symptoms  None          TCM Call       Post hospital issues  None    Should  patient be enrolled in anticoag monitoring?  No    Scheduled for follow up?  Yes    Did you obtain your prescribed medications  Yes    Do you need help managing your prescriptions or medications  No    Is transportation to your appointment needed  No    I have advised the patient to call PCP with any new or worsening symptoms  dana block          WM presents for f/u admission for weakness and fatigue. Eval in the ER and met the criteria for sepsis and felt to have CAP, influenza vs bacteria. Had elevated treponin as well. R/o for ischemia. Treated with O2, abc, and IVF's. Improved. Coarse complicated with a pleural effusion and underwent thoracocentesis and path came back positive for cancer. D/c to home. Since d/c doing better. No fever or chills. Off abx. Breathing is improving. Activity level slow to improve. Appetite coming back. Tolerating his meds. Seeing specialist for the malignant effusion. Has a left knee wound after falling prior to admission.       Review of Systems   Constitutional:  Negative for activity change, chills, diaphoresis, fatigue and fever.   HENT: Negative.     Eyes:  Negative for visual disturbance.   Respiratory:  Negative for cough, chest tightness, shortness of breath and wheezing.    Cardiovascular:  Negative for chest pain, palpitations and leg swelling.   Gastrointestinal:  Negative for abdominal pain, constipation, diarrhea, nausea and vomiting.   Endocrine: Negative for cold intolerance and heat intolerance.   Genitourinary:  Negative for difficulty urinating, dysuria and frequency.   Musculoskeletal:  Negative for arthralgias, gait problem and myalgias.   Skin:  Positive for wound.   Neurological:  Negative for dizziness, seizures, syncope, weakness, light-headedness and headaches.   Psychiatric/Behavioral:  Negative for confusion, dysphoric mood and sleep disturbance. The patient is not nervous/anxious.      Objective   /74   Pulse 70   Temp 98.2 °F (36.8 °C)   Ht  "5' 8\" (1.727 m)   Wt 83.8 kg (184 lb 12.8 oz)   SpO2 95%   BMI 28.10 kg/m²     Physical Exam  Vitals and nursing note reviewed.   Constitutional:       General: He is not in acute distress.     Appearance: Normal appearance. He is not ill-appearing.   HENT:      Head: Normocephalic and atraumatic.      Mouth/Throat:      Mouth: Mucous membranes are moist.   Eyes:      Extraocular Movements: Extraocular movements intact.      Conjunctiva/sclera: Conjunctivae normal.      Pupils: Pupils are equal, round, and reactive to light.   Neck:      Vascular: No carotid bruit.   Cardiovascular:      Rate and Rhythm: Normal rate and regular rhythm.      Heart sounds: Normal heart sounds. No murmur heard.  Pulmonary:      Effort: Pulmonary effort is normal. No respiratory distress.      Breath sounds: Normal breath sounds. No wheezing, rhonchi or rales.   Abdominal:      General: Bowel sounds are normal. There is no distension.      Palpations: Abdomen is soft.      Tenderness: There is no abdominal tenderness.   Musculoskeletal:      Cervical back: Neck supple.      Right lower leg: No edema.      Left lower leg: No edema.   Skin:     Findings: Lesion (left knee abrasion without d/c and clear base.) present.   Neurological:      General: No focal deficit present.      Mental Status: He is alert and oriented to person, place, and time. Mental status is at baseline.      Motor: Weakness present.      Gait: Gait abnormal.   Psychiatric:         Mood and Affect: Mood normal.         Behavior: Behavior normal.         Thought Content: Thought content normal.         Judgment: Judgment normal.       Medications have been reviewed by provider in current encounter      "

## 2025-01-16 NOTE — TELEPHONE ENCOUNTER
Pt was in office for TCM / follow up. Patient was evaluated by Marixa  at home and requested that pt have us send a shower chair and handrail order to [employer], fax [phone], phone [phone]. Printed out order forms from [employer], will place them for provider to sign and then will fax these. I called [employer] and when they get these forms they will send it to a DME supplier that will cover this and then outreach to patient.

## 2025-01-17 ENCOUNTER — HOME CARE VISIT (OUTPATIENT)
Dept: HOME HEALTH SERVICES | Facility: HOME HEALTHCARE | Age: 85
End: 2025-01-17
Payer: COMMERCIAL

## 2025-01-17 VITALS — DIASTOLIC BLOOD PRESSURE: 90 MMHG | HEART RATE: 65 BPM | SYSTOLIC BLOOD PRESSURE: 160 MMHG | OXYGEN SATURATION: 98 %

## 2025-01-17 PROCEDURE — G0299 HHS/HOSPICE OF RN EA 15 MIN: HCPCS

## 2025-01-17 PROCEDURE — G0180 MD CERTIFICATION HHA PATIENT: HCPCS | Performed by: INTERNAL MEDICINE

## 2025-01-17 PROCEDURE — G0151 HHCP-SERV OF PT,EA 15 MIN: HCPCS

## 2025-01-17 NOTE — CASE COMMUNICATION
Added MSW eval order & will send for MD to sign.     Patient interested in setup with transportation for him/his wife. ? if eligible for STAR.   Patient interested in Meals on Wheels.     Marisela Meehan PT

## 2025-01-19 NOTE — PATIENT INSTRUCTIONS
Chronic Hypertension   AMBULATORY CARE:   Hypertension is considered chronic  when it continues for 3 months or longer  Hypertension that continues causes your heart to work much harder than normal, which may lead to heart damage  Even if you have hypertension for years, lifestyle changes, medicines, or both may help lower your blood pressure  Call your local emergency number [phone] in the [address] [facility]) or have someone call if:   You have chest pain  You have any of the following signs of a heart attack:      Squeezing, pressure, or pain in your chest    You may  also have any of the following:     Discomfort or pain in your back, neck, jaw, stomach, or arm    Shortness of breath    Nausea or vomiting    Lightheadedness or a sudden cold sweat    You become confused or have difficulty speaking  You suddenly feel lightheaded or have trouble breathing  Seek care immediately if:   You have a severe headache or vision loss  You have weakness in an arm or leg  Call your doctor or cardiologist if:   You feel faint, dizzy, confused, or drowsy  You have been taking your blood pressure medicine but your pressure is higher than your provider says it should be  You have questions or concerns about your condition or care  Treatment for chronic hypertension  may include medicine to lower your blood pressure and cholesterol levels  A low cholesterol level helps prevent heart disease and makes it easier to control your blood pressure  Heart disease can make your blood pressure harder to control  You may also need to make lifestyle changes  What you need to know about the stages of hypertension:  Your healthcare provider will give you a blood pressure goal based on your age, health, and risk for cardiovascular disease  The following are general guidelines on the stages of hypertension:  Normal blood pressure is 119/79 or lower    Your provider may only check your blood pressure each year if it stays at a normal Continue home med seroquel 100mg QHS   level     Elevated blood pressure is 120/79 to 129/79   This is sometimes called prehypertension  Your provider may suggest lifestyle changes to help lower your blood pressure to a normal level  He or she may then check it again in 3 to 6 months  Stage 1 hypertension is 130/80  to 139/89   Your provider may recommend lifestyle changes, medication, and checks every 3 to 6 months until your blood pressure is controlled  Stage 2 hypertension is 140/90 or higher   Your provider will recommend lifestyle changes and have you take 2 kinds of hypertension medicines  You will also need to have your blood pressure checked monthly until it is controlled  Manage chronic hypertension:   Check your blood pressure at home  Do not smoke, have caffeine, or exercise for at least 30 minutes before you check your blood pressure  Sit and rest for 5 minutes before you check your blood pressure  Extend your arm and support it on a flat surface  Your arm should be at the same level as your heart  Follow the directions that came with your blood pressure monitor  Check your blood pressure 2 times, 1 minute apart, before you take your medicine in the morning  Also check your blood pressure before your evening meal  Keep a record of your readings and bring it to your follow-up visits  Your healthcare provider may use the readings to make changes to your treatment plan  Manage any other health conditions you have  Health conditions such as diabetes can increase your risk for hypertension  Follow your provider's instructions and take all your medicines as directed  Talk to your provider about any new health conditions you have recently developed  Ask about all medicines  Certain medicines can increase your blood pressure  Examples include oral birth control pills, decongestants, herbal supplements, and NSAIDs, such as ibuprofen  Your provider can tell you which medicines are safe for you to take   This includes prescription and over-the-counter medicines  Lifestyle changes you can make to lower your blood pressure: Your provider may want you to make more lifestyle changes if you are having trouble controlling your blood pressure  This may feel difficult over time, especially if you think you are making good changes but your pressure is still high  It might help to focus on one new change at a time  For example, try to add 1 more day of exercise, or exercise for an extra 10 minutes on 2 days  Small changes can make a big difference  Your healthcare provider can also refer you to specialists such as a dietitian who can help you make small changes  Your family members may be included in helping you learn to create lifestyle changes, such as the following:     Limit sodium (salt) as directed  Too much sodium can affect your fluid balance  Check labels to find low-sodium or no-salt-added foods  Some low-sodium foods use potassium salts for flavor  Too much potassium can also cause health problems  Your provider will tell you how much sodium and potassium are safe for you to have in a day  He or she may recommend that you limit sodium to 2,300 mg a day  Follow the meal plan recommended by your provider  A dietitian or your provider can give you more information on low-sodium plans or the DASH (Dietary Approaches to Stop Hypertension) eating plan  The DASH plan is low in sodium, processed sugar, unhealthy fats, and total fat  It is high in potassium, calcium, and fiber  These can be found in vegetables, fruit, and whole-grain foods  Be physically active throughout the day  Physical activity, such as exercise, can help control your blood pressure and your weight  Be physically active for at least 30 minutes per day, on most days of the week  Include aerobic activity, such as walking or riding a bicycle  Also include strength training at least 2 times each week   Your provider can help you create a physical activity plan  Decrease stress  This may help lower your blood pressure  Learn ways to relax, such as deep breathing or listening to music  Limit alcohol as directed  Alcohol can increase your blood pressure  A drink of alcohol is 12 ounces of beer, 5 ounces of wine, or 1½ ounces of liquor  Your provider can help you set daily and weekly drink limits  He or she may recommend no alcohol if your blood pressure stays higher than goal even with medicine or other measures  Ask your provider for information if you need help to quit  Do not smoke  Nicotine and other chemicals in cigarettes and cigars can increase your blood pressure and also cause lung damage  Ask your provider for information if you currently smoke and need help to quit  E-cigarettes or smokeless tobacco still contain nicotine  Talk to your provider before you use these products  Follow up with your doctor or cardiologist as directed: You will need to return to have your blood pressure checked and to have other lab tests done  Write down your questions so you remember to ask them during your visits  © Copyright Gilda Donal[employer]on 2022 Information is for End User's use only and may not be sold, redistributed or otherwise used for commercial purposes  The above information is an  only  It is not intended as medical advice for individual conditions or treatments  Talk to your doctor, nurse or pharmacist before following any medical regimen to see if it is safe and effective for you

## 2025-01-20 ENCOUNTER — HOME CARE VISIT (OUTPATIENT)
Dept: HOME HEALTH SERVICES | Facility: HOME HEALTHCARE | Age: 85
End: 2025-01-20
Payer: COMMERCIAL

## 2025-01-20 VITALS
DIASTOLIC BLOOD PRESSURE: 78 MMHG | OXYGEN SATURATION: 97 % | RESPIRATION RATE: 18 BRPM | TEMPERATURE: 97.7 F | HEART RATE: 76 BPM | SYSTOLIC BLOOD PRESSURE: 138 MMHG

## 2025-01-20 LAB
ATRIAL RATE: 73 BPM
ATRIAL RATE: 73 BPM
ATRIAL RATE: 91 BPM
P AXIS: 43 DEGREES
P AXIS: 49 DEGREES
P AXIS: 9 DEGREES
PR INTERVAL: 170 MS
PR INTERVAL: 190 MS
PR INTERVAL: 194 MS
QRS AXIS: 45 DEGREES
QRS AXIS: 56 DEGREES
QRS AXIS: 56 DEGREES
QRSD INTERVAL: 112 MS
QRSD INTERVAL: 120 MS
QRSD INTERVAL: 122 MS
QT INTERVAL: 390 MS
QT INTERVAL: 438 MS
QT INTERVAL: 442 MS
QTC INTERVAL: 479 MS
QTC INTERVAL: 482 MS
QTC INTERVAL: 486 MS
T WAVE AXIS: 40 DEGREES
T WAVE AXIS: 43 DEGREES
T WAVE AXIS: 72 DEGREES
VENTRICULAR RATE: 73 BPM
VENTRICULAR RATE: 73 BPM
VENTRICULAR RATE: 91 BPM

## 2025-01-20 PROCEDURE — 88305 TISSUE EXAM BY PATHOLOGIST: CPT | Performed by: STUDENT IN AN ORGANIZED HEALTH CARE EDUCATION/TRAINING PROGRAM

## 2025-01-20 PROCEDURE — 88312 SPECIAL STAINS GROUP 1: CPT | Performed by: STUDENT IN AN ORGANIZED HEALTH CARE EDUCATION/TRAINING PROGRAM

## 2025-01-20 PROCEDURE — G0299 HHS/HOSPICE OF RN EA 15 MIN: HCPCS

## 2025-01-20 PROCEDURE — 88342 IMHCHEM/IMCYTCHM 1ST ANTB: CPT | Performed by: STUDENT IN AN ORGANIZED HEALTH CARE EDUCATION/TRAINING PROGRAM

## 2025-01-20 PROCEDURE — 88341 IMHCHEM/IMCYTCHM EA ADD ANTB: CPT | Performed by: STUDENT IN AN ORGANIZED HEALTH CARE EDUCATION/TRAINING PROGRAM

## 2025-01-20 PROCEDURE — 93010 ELECTROCARDIOGRAM REPORT: CPT | Performed by: INTERNAL MEDICINE

## 2025-01-20 PROCEDURE — 88112 CYTOPATH CELL ENHANCE TECH: CPT | Performed by: STUDENT IN AN ORGANIZED HEALTH CARE EDUCATION/TRAINING PROGRAM

## 2025-01-21 ENCOUNTER — HOME CARE VISIT (OUTPATIENT)
Dept: HOME HEALTH SERVICES | Facility: HOME HEALTHCARE | Age: 85
End: 2025-01-21
Payer: COMMERCIAL

## 2025-01-21 VITALS — SYSTOLIC BLOOD PRESSURE: 150 MMHG | HEART RATE: 60 BPM | OXYGEN SATURATION: 96 % | DIASTOLIC BLOOD PRESSURE: 60 MMHG

## 2025-01-21 PROCEDURE — G0152 HHCP-SERV OF OT,EA 15 MIN: HCPCS

## 2025-01-21 NOTE — CASE COMMUNICATION
OT DC completed 1/21/25.  Patient showering INDly.  Recommend shower chair but patient will not purchase at this time.

## 2025-01-22 ENCOUNTER — HOME CARE VISIT (OUTPATIENT)
Dept: HOME HEALTH SERVICES | Facility: HOME HEALTHCARE | Age: 85
End: 2025-01-22
Payer: COMMERCIAL

## 2025-01-22 VITALS — SYSTOLIC BLOOD PRESSURE: 150 MMHG | OXYGEN SATURATION: 96 % | HEART RATE: 59 BPM | DIASTOLIC BLOOD PRESSURE: 80 MMHG

## 2025-01-22 PROCEDURE — G0151 HHCP-SERV OF PT,EA 15 MIN: HCPCS

## 2025-01-23 ENCOUNTER — HOME CARE VISIT (OUTPATIENT)
Dept: HOME HEALTH SERVICES | Facility: HOME HEALTHCARE | Age: 85
End: 2025-01-23
Payer: COMMERCIAL

## 2025-01-23 ENCOUNTER — TELEPHONE (OUTPATIENT)
Dept: BARIATRICS | Facility: CLINIC | Age: 85
End: 2025-01-23

## 2025-01-23 ENCOUNTER — TELEPHONE (OUTPATIENT)
Dept: PULMONOLOGY | Facility: CLINIC | Age: 85
End: 2025-01-23

## 2025-01-23 ENCOUNTER — TELEPHONE (OUTPATIENT)
Age: 85
End: 2025-01-23

## 2025-01-23 VITALS
DIASTOLIC BLOOD PRESSURE: 72 MMHG | HEART RATE: 64 BPM | SYSTOLIC BLOOD PRESSURE: 144 MMHG | OXYGEN SATURATION: 97 % | TEMPERATURE: 96.9 F | RESPIRATION RATE: 20 BRPM

## 2025-01-23 DIAGNOSIS — G47.34 NOCTURNAL HYPOXIA: Primary | ICD-10-CM

## 2025-01-23 DIAGNOSIS — J91.0 MALIGNANT PLEURAL EFFUSION: Primary | ICD-10-CM

## 2025-01-23 LAB
DME PARACHUTE DELIVERY DATE EXPECTED: NORMAL
DME PARACHUTE DELIVERY DATE REQUESTED: NORMAL
DME PARACHUTE ITEM DESCRIPTION: NORMAL
DME PARACHUTE ORDER STATUS: NORMAL
DME PARACHUTE SUPPLIER NAME: NORMAL
DME PARACHUTE SUPPLIER PHONE: NORMAL

## 2025-01-23 PROCEDURE — G0299 HHS/HOSPICE OF RN EA 15 MIN: HCPCS

## 2025-01-23 PROCEDURE — G0155 HHCP-SVS OF CSW,EA 15 MIN: HCPCS

## 2025-01-23 NOTE — TELEPHONE ENCOUNTER
Ana M from [facility] calls to inform us that they have been monitoring pt's O2 at night remotely and noticed that it is dropping at night but seems to be ok during the day. She states last night pt dropped as low as high 70's. Please advise     Cb:[phone]

## 2025-01-23 NOTE — TELEPHONE ENCOUNTER
Pt called in stating he received a call and wanted to know what it was about?    Did not see any encounters from PCP office. Relayed to pt about the call that was received from Ana M mejia [facility].     Please advise & call pt back with any further information or questions. Thank you!    Dexter: [phone]

## 2025-01-23 NOTE — CASE COMMUNICATION
I called and LVM for patient to call office back and also told patient a CXR was ordered  for him in the message.

## 2025-01-23 NOTE — Clinical Note
Patient continues to experience SOB w. exertion such as steps and distances greater than 20 feet.  O2 sats are 97 on RA.  Lungs cta.  Diminished rll.  I am discharging patient today and instructed patient and wife to notify your office for increased SOB, wheezing or O2 sats below 90.

## 2025-01-23 NOTE — TELEPHONE ENCOUNTER
Pt calling back to go over message he received from office. Spent significant time explaining to him the Overnight pulse oximeter test that was ordered and that he should be expecting a call from [employer] regarding delivery and the purpose of the test. Informed pt that [employer] was trying to reach out and do not necessarily always leave a message and call from an 800 number. Pt asked if we could call them and give him his mobile number as he has that on him always while the home number we called on he doesn't always get the message right away.       Called and spoke with [employer] and provided pt mobile number to reach to send overnight oximeter. They are going to contact the patient

## 2025-01-23 NOTE — TELEPHONE ENCOUNTER
Tried calling both numbers listed on patient's chart, however there was no answer.  I received a message from patient's visiting nurse who stated patient continues with shortness of breath if walking more than 20 feet.  She also noted clear lung sounds, but diminished in the right base.  SpO2 on room air 97%.  I left voicemail for patient explaining, would like to check a chest x-ray to evaluate for reaccumulation of right pleural fluid.

## 2025-01-24 ENCOUNTER — HOSPITAL ENCOUNTER (OUTPATIENT)
Dept: MRI IMAGING | Facility: HOSPITAL | Age: 85
End: 2025-01-24
Payer: COMMERCIAL

## 2025-01-24 ENCOUNTER — HOSPITAL ENCOUNTER (OUTPATIENT)
Dept: RADIOLOGY | Facility: HOSPITAL | Age: 85
End: 2025-01-24
Payer: COMMERCIAL

## 2025-01-24 DIAGNOSIS — J91.0 MALIGNANT PLEURAL EFFUSION: ICD-10-CM

## 2025-01-24 PROCEDURE — 70553 MRI BRAIN STEM W/O & W/DYE: CPT

## 2025-01-24 PROCEDURE — 71046 X-RAY EXAM CHEST 2 VIEWS: CPT

## 2025-01-24 PROCEDURE — A9585 GADOBUTROL INJECTION: HCPCS

## 2025-01-24 RX ORDER — GADOBUTROL 604.72 MG/ML
8 INJECTION INTRAVENOUS
Status: COMPLETED | OUTPATIENT
Start: 2025-01-24 | End: 2025-01-24

## 2025-01-24 RX ADMIN — GADOBUTROL 8 ML: 604.72 INJECTION INTRAVENOUS at 13:10

## 2025-01-27 ENCOUNTER — RESULTS FOLLOW-UP (OUTPATIENT)
Age: 85
End: 2025-01-27

## 2025-01-27 ENCOUNTER — HOME CARE VISIT (OUTPATIENT)
Dept: HOME HEALTH SERVICES | Facility: HOME HEALTHCARE | Age: 85
End: 2025-01-27
Payer: COMMERCIAL

## 2025-01-28 ENCOUNTER — HOSPITAL ENCOUNTER (OUTPATIENT)
Dept: RADIOLOGY | Age: 85
Discharge: HOME | End: 2025-01-28
Payer: COMMERCIAL

## 2025-01-28 DIAGNOSIS — J91.0 MALIGNANT PLEURAL EFFUSION: ICD-10-CM

## 2025-01-28 DIAGNOSIS — R91.8 OTHER NONSPECIFIC ABNORMAL FINDING OF LUNG FIELD: ICD-10-CM

## 2025-01-28 DIAGNOSIS — D49.1 NEOPLASM OF LUNG: ICD-10-CM

## 2025-01-28 LAB — GLUCOSE SERPL-MCNC: 88 MG/DL (ref 65–140)

## 2025-01-28 PROCEDURE — A9552 F18 FDG: HCPCS

## 2025-01-28 PROCEDURE — 82948 REAGENT STRIP/BLOOD GLUCOSE: CPT

## 2025-01-28 PROCEDURE — 78815 PET IMAGE W/CT SKULL-THIGH: CPT

## 2025-01-29 ENCOUNTER — TELEPHONE (OUTPATIENT)
Age: 85
End: 2025-01-29

## 2025-01-29 ENCOUNTER — RESULTS FOLLOW-UP (OUTPATIENT)
Dept: PULMONOLOGY | Facility: CLINIC | Age: 85
End: 2025-01-29

## 2025-01-29 LAB
DME PARACHUTE DELIVERY DATE ACTUAL: NORMAL
DME PARACHUTE DELIVERY DATE EXPECTED: NORMAL
DME PARACHUTE DELIVERY DATE REQUESTED: NORMAL
DME PARACHUTE ITEM DESCRIPTION: NORMAL
DME PARACHUTE ORDER STATUS: NORMAL
DME PARACHUTE SUPPLIER NAME: NORMAL
DME PARACHUTE SUPPLIER PHONE: NORMAL

## 2025-01-29 NOTE — TELEPHONE ENCOUNTER
Ana M, Nurse Care Manager from [facility] calling to reschedule patient's 2/4/25 appointment with Lexus Jim. Patient is having work up for Lung Malignancy and would like to postpone cardiology appointment until after pulmonary appointments. Appointment rescheduled for 2/24/25 with Dr. Alberts.   Please contact Ana M at [phone] with any further concerns.

## 2025-01-29 NOTE — TELEPHONE ENCOUNTER
JANIS Hopkins    02/05/25 Pul Rehab Appt- can he postpone until he sees you and discuss?  Please call with PET results, pt anxious for results. Call mobile #.

## 2025-01-29 NOTE — TELEPHONE ENCOUNTER
I called and LVM for patient regarding pulm rehab and when results are finalized Maye will be calling him

## 2025-02-03 ENCOUNTER — RESULTS FOLLOW-UP (OUTPATIENT)
Age: 85
End: 2025-02-03

## 2025-02-03 DIAGNOSIS — G47.34 NOCTURNAL HYPOXIA: Primary | ICD-10-CM

## 2025-02-03 NOTE — TELEPHONE ENCOUNTER
Pt calling in regards to results informed him pf providers result note and of order of 2L  O2 at night. Pt asked if we can let him know of any updates on when to expect delivery

## 2025-02-04 ENCOUNTER — TELEPHONE (OUTPATIENT)
Dept: PULMONOLOGY | Facility: CLINIC | Age: 85
End: 2025-02-04

## 2025-02-04 NOTE — TELEPHONE ENCOUNTER
Called patient to review symptoms today.  He has a large right pleural effusion and I am concerned he will need another thoracentesis.  Patient states he is breathing fine during the day.  Only has difficulty at night.  He is waiting on delivery of his recently prescribed overnight oxygen.  He is monitoring his SpO2 levels at home during the day ranging from 94-98%.  Patient does not want to undergo thoracentesis at this time.  He has appointment with me in the office next week, however I offered him a sooner appointment for tomorrow to discuss but he declined.  I will see patient in the office next week.  He will notify me if he has any new or worsening symptoms until then.

## 2025-02-04 NOTE — TELEPHONE ENCOUNTER
Ana M, [facility] nurse YASMANI, calling. She is inquiring if O2 order was sent to [facility]. Let her know it appears it was sent yesterday. She states they did not receive. She asks that we resend or fax to [phone]

## 2025-02-05 NOTE — TELEPHONE ENCOUNTER
[facility]    Please fax to [employer]  [phone]    O2 Orders needs ICD10 Dx Code  ALISIA Study - please send

## 2025-02-06 ENCOUNTER — RA CDI HCC (OUTPATIENT)
Dept: RADIOLOGY | Facility: HOSPITAL | Age: 85
End: 2025-02-06

## 2025-02-06 ENCOUNTER — TELEPHONE (OUTPATIENT)
Age: 85
End: 2025-02-06

## 2025-02-06 NOTE — TELEPHONE ENCOUNTER
Ana M, from [facility] was calling to see an update from the office in regards to his Oxygen. Informed her that it was sent over but it was missing diagnosis code and faxed over again about 10 min ago. She states she's just trying to get him oxygen and hoping by today. Informed her that yes we are aware and have been working on it. She will call back in about an hour or so if there is no update from DME.

## 2025-02-06 NOTE — TELEPHONE ENCOUNTER
[facility] is calling they received the script for the oxygen but never received a dx code please resend fax: [phone]

## 2025-02-07 ENCOUNTER — TELEPHONE (OUTPATIENT)
Age: 85
End: 2025-02-07

## 2025-02-07 PROBLEM — A41.9 SEPSIS (HCC): Status: RESOLVED | Noted: 2025-01-08 | Resolved: 2025-02-07

## 2025-02-07 LAB
DME PARACHUTE DELIVERY DATE REQUESTED: NORMAL
DME PARACHUTE ORDER STATUS: NORMAL
DME PARACHUTE SUPPLIER NAME: NORMAL
DME PARACHUTE SUPPLIER PHONE: NORMAL

## 2025-02-07 NOTE — TELEPHONE ENCOUNTER
Minesh Pappas, called to clarify is patient should complete overnight oximetry test on their first night of using O2. Mian requested a call back to clarify. Please advise.    Lindsay Yusuf  Phone: [phone]

## 2025-02-07 NOTE — TELEPHONE ENCOUNTER
Pritesh calling in need to get his recent office note sent for the authorization. Faxed to [phone]

## 2025-02-08 PROBLEM — J09.X1 INFLUENZA A WITH PNEUMONIA: Status: RESOLVED | Noted: 2025-01-09 | Resolved: 2025-02-08

## 2025-02-10 DIAGNOSIS — I25.10 CORONARY ARTERY CALCIFICATION SEEN ON CT SCAN: ICD-10-CM

## 2025-02-10 DIAGNOSIS — F41.1 GENERALIZED ANXIETY DISORDER: ICD-10-CM

## 2025-02-10 NOTE — TELEPHONE ENCOUNTER
Ana M, nurse CM with [facility], calling. States pt did receive his O2 and has been wearing overnight. His overnight sats have improved but she has noticed that he is desatting during the day now occasionally. Pt has appt tomorrow, added a note to get 6MW at time of appt.     Ana M also mentions we can contact her if we ever need anything regarding this pt. Her phone number is [phone]

## 2025-02-11 ENCOUNTER — CONSULT (OUTPATIENT)
Dept: HEMATOLOGY ONCOLOGY | Facility: CLINIC | Age: 85
End: 2025-02-11
Payer: COMMERCIAL

## 2025-02-11 ENCOUNTER — DOCUMENTATION (OUTPATIENT)
Dept: HEMATOLOGY ONCOLOGY | Facility: CLINIC | Age: 85
End: 2025-02-11

## 2025-02-11 ENCOUNTER — TELEPHONE (OUTPATIENT)
Dept: INTERNAL MEDICINE CLINIC | Facility: CLINIC | Age: 85
End: 2025-02-11

## 2025-02-11 ENCOUNTER — OFFICE VISIT (OUTPATIENT)
Dept: PULMONOLOGY | Facility: CLINIC | Age: 85
End: 2025-02-11
Payer: COMMERCIAL

## 2025-02-11 ENCOUNTER — TELEPHONE (OUTPATIENT)
Dept: PULMONOLOGY | Facility: CLINIC | Age: 85
End: 2025-02-11

## 2025-02-11 VITALS
TEMPERATURE: 98.6 F | SYSTOLIC BLOOD PRESSURE: 130 MMHG | RESPIRATION RATE: 18 BRPM | OXYGEN SATURATION: 92 % | DIASTOLIC BLOOD PRESSURE: 74 MMHG | HEART RATE: 66 BPM | WEIGHT: 183.2 LBS | BODY MASS INDEX: 27.86 KG/M2

## 2025-02-11 VITALS
WEIGHT: 183 LBS | BODY MASS INDEX: 27.74 KG/M2 | HEART RATE: 82 BPM | DIASTOLIC BLOOD PRESSURE: 72 MMHG | OXYGEN SATURATION: 93 % | HEIGHT: 68 IN | SYSTOLIC BLOOD PRESSURE: 140 MMHG | RESPIRATION RATE: 18 BRPM | TEMPERATURE: 97.8 F

## 2025-02-11 DIAGNOSIS — R06.09 DOE (DYSPNEA ON EXERTION): ICD-10-CM

## 2025-02-11 DIAGNOSIS — R09.02 HYPOXIA: Primary | ICD-10-CM

## 2025-02-11 DIAGNOSIS — J90 EXUDATIVE PLEURAL EFFUSION: ICD-10-CM

## 2025-02-11 DIAGNOSIS — J91.0 MALIGNANT PLEURAL EFFUSION: ICD-10-CM

## 2025-02-11 DIAGNOSIS — J90 PLEURAL EFFUSION ON RIGHT: ICD-10-CM

## 2025-02-11 PROCEDURE — 99214 OFFICE O/P EST MOD 30 MIN: CPT

## 2025-02-11 PROCEDURE — G2211 COMPLEX E/M VISIT ADD ON: HCPCS | Performed by: INTERNAL MEDICINE

## 2025-02-11 PROCEDURE — 94618 PULMONARY STRESS TESTING: CPT

## 2025-02-11 PROCEDURE — 99205 OFFICE O/P NEW HI 60 MIN: CPT | Performed by: INTERNAL MEDICINE

## 2025-02-11 RX ORDER — ALPRAZOLAM 0.5 MG
0.5 TABLET ORAL
Qty: 30 TABLET | Refills: 0 | Status: SHIPPED | OUTPATIENT
Start: 2025-02-11 | End: 2025-02-17 | Stop reason: SDUPTHER

## 2025-02-11 RX ORDER — ASPIRIN 81 MG/1
81 TABLET, CHEWABLE ORAL DAILY
COMMUNITY

## 2025-02-11 RX ORDER — ATORVASTATIN CALCIUM 40 MG/1
40 TABLET, FILM COATED ORAL DAILY
Qty: 30 TABLET | Refills: 0 | Status: SHIPPED | OUTPATIENT
Start: 2025-02-11

## 2025-02-11 NOTE — PROGRESS NOTES
Name: Dexter Sharp      : 1940      MRN: [id number]  Encounter Provider: GAIL Coles  Encounter Date: 2025   Encounter department: [facility]  :  Assessment & Plan  Hypoxia  -Patient had overnight pulse ox study on 2025 showing desaturations for more than 3 hours overnight.  He has been started on 2 L nocturnal oxygen  -Walk test in the office today demonstrated need for 3 L supplemental oxygen with activity  -This is likely secondary to his large right-sided pleural effusion, as he was not on home oxygen previously  -I instructed patient to use 3 L oxygen with exertion and 2 L nightly.  Orders placed for portable oxygen      Orders:    Home Oxygen with Portability    Portable Concentrators    CROSS (dyspnea on exertion)  -Mild.  Improves with use of oxygen during exertion  -Likely secondary to large right-sided pleural effusion  -He has no prior smoking history or history of lung disease  -No chronic cough, mucus, or wheezing.  Lungs are clear except for diminished on the right side today  -Order placed for IR thoracentesis for right pleural effusion.  CROSS will likely improve with fluid removal    Orders:    POCT Oxygen Titration    Malignant pleural effusion  -Right sided thoracentesis during hospitalization on 2025 with highly atypical cells in pleural fluid suspicious for malignancy  -PET/CT on 2025 showed no areas of increased FDG activity to suggest metastatic disease.  No primary lesions identified  -MRI of the brain on 2025 with no evidence of metastatic disease  -Will send patient for IR thoracentesis and new pleural fluid studies  -Patient will return for follow-up in 4 to 6 weeks to review results.  May need referral to thoracic surgery    Orders:    IR OUT-Patient Thoracentesis; Future    Body fluid white cell count with differential; Standing    Glucose, body fluid; Standing    Lactate dehydrogenase, body fluid; Standing     Leukemia/Lymphoma flow cytometry; Standing    Non-gynecologic cytology; Standing    Adenosine Deaminase Level Pleural Fluid - Right - Miscellaneous Test; Standing    Body fluid culture (Pleural Fluid Culture) and Gram stain; Standing    Exudative pleural effusion    Orders:    Body fluid white cell count with differential; Standing    Glucose, body fluid; Standing    Lactate dehydrogenase, body fluid; Standing    Leukemia/Lymphoma flow cytometry; Standing    Non-gynecologic cytology; Standing    Adenosine Deaminase Level Pleural Fluid - Right - Miscellaneous Test; Standing    Body fluid culture (Pleural Fluid Culture) and Gram stain; Standing    AFB Culture; Standing        History of Present Illness   Dexter Sharp is a 84 y.o. 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  He was hospitalized in the beginning of January for influenza A and pneumonia.  He had a large pleural effusion and underwent thoracentesis on 1/9/2025 with approximately 1.5 L removed.  Cytology came back positive for atypical cells suspicious for malignancy.  He underwent PET/CT on 1/28/2025 which showed no evidence of metastatic disease or primary lesion identified. He underwent MRI of the brain which showed no evidence of metastatic disease in the brain. He has reaccumulation of fluid with large right pleural effusion noted on recent imaging, but has been declining outpatient thoracentesis.    He had an overnight pulse ox study on 1/29/2025 that showed overnight desaturations for 3 hours and 9 minutes.  He was ordered 2 L supplemental oxygen to wear at night.  He has been noticing desaturations during the daytime now.  Likely secondary to his large pleural effusion which needs drainage.    Patient has mild CROSS, but improves with use of  oxygen.  No cough, mucus, or wheezing.  No hemoptysis, fevers, chills, night sweats, anorexia, or weight loss.  No chest pain or lower extremity swelling.    Review of Systems   Constitutional:  Negative for activity change, chills, fever and unexpected weight change.   HENT:  Negative for congestion, postnasal drip, rhinorrhea, sore throat and trouble swallowing.    Respiratory:  Positive for shortness of breath. Negative for cough, chest tightness and wheezing.    Cardiovascular:  Negative for chest pain, palpitations and leg swelling.   Allergic/Immunologic: Negative.      Current Outpatient Medications on File Prior to Visit   Medication Sig Dispense Refill    acetaminophen (TYLENOL) 500 mg tablet Take 500 mg by mouth every 4 (four) hours as needed for fever, headaches or mild pain. Indications: Fever, Pain      ALPRAZolam (XANAX) 0.5 mg tablet Take 1 tablet (0.5 mg total) by mouth daily at bedtime as needed for anxiety 30 tablet 0    amLODIPine (NORVASC) 5 mg tablet Take 1 tablet (5 mg total) by mouth daily 90 tablet 1    aspirin 81 mg chewable tablet Chew 81 mg daily      atorvastatin (LIPITOR) 40 mg tablet Take 1 tablet (40 mg total) by mouth daily 30 tablet 0    levothyroxine 88 mcg tablet TAKE 1 TABLET (88 MCG TOTAL) BY MOUTH DAILY 100 tablet 1    meloxicam (MOBIC) 15 mg tablet TAKE 1 TABLET (15 MG TOTAL) BY MOUTH DAILY 100 tablet 0    Misc. Devices (Bathtub Safety Rail) MISC Use in the morning 1 each 0    guaiFENesin (Mucinex) 600 mg 12 hr tablet Take 600 mg by mouth 2 (two) times a day as needed for congestion or cough. Indications: Cough (Patient not taking: Reported on 2/11/2025)       No current facility-administered medications on file prior to visit.          Objective   There were no vitals taken for this visit.     Physical Exam  Vitals and nursing note reviewed.   Constitutional:       General: He is not in acute distress.     Appearance: Normal appearance. He is well-developed.   Cardiovascular:       Rate and Rhythm: Normal rate and regular rhythm.      Heart sounds: Normal heart sounds. No murmur heard.  Pulmonary:      Effort: Pulmonary effort is normal. No respiratory distress.      Breath sounds: Examination of the right-middle field reveals decreased breath sounds. Examination of the right-lower field reveals decreased breath sounds. Decreased breath sounds present. No wheezing, rhonchi or rales.   Musculoskeletal:         General: No swelling.      Right lower leg: No edema.      Left lower leg: No edema.   Psychiatric:         Mood and Affect: Mood and affect normal.         Behavior: Behavior normal. Behavior is cooperative.         Lab Results: I have reviewed pertinent labs.    Radiology Results Review: I have reviewed radiology reports from 1/28/25, 1/24/25 including: PET/CT, MRI brain.  Other Study Results: Other Study Results Review : Pathology reports reviewed.  PFT Results Reviewed: n/a

## 2025-02-11 NOTE — ASSESSMENT & PLAN NOTE
I had a lengthy discussion with the patient and his wife regarding the cytology of the pleural effusion which was recently tapped during the recent hospitalization.  The cytology seems to be highly suspicious for malignancy.  However, the exact primary is not entirely clear.  We did discuss the result of the recent PET CT scan and reviewed the imaging extensively.  He continues to have large right pleural effusion which is going to be tapped in the near future.  We did discuss sending the pleural fluid for cytology again which may be helpful with the exact pathology.  We did also discussed the potential benefit of pursuing a liquid biopsy since we are most likely dealing with lung cancer primary.  The patient was told that once the lung cancer primary is confirmed we would discuss treatment options.  He does not seem to be a candidate for any type of palliative chemotherapy.  However, he might be a candidate for targeted therapy if we see any actionable mutation or immunotherapy.  We did also discuss a Pleurx catheter placement if he continues to have recurrent malignant pleural effusions.  Palliative care consultation will be considered once the diagnosis is confirmed.    Orders:    Ambulatory Referral to Hematology / Oncology

## 2025-02-11 NOTE — TELEPHONE ENCOUNTER
Patient came in stating he picked up his atorvastatin and the pharmacy only gave him 30 but charged him for a 90 day supply. He states he was supposed to be getting the 90 day supply

## 2025-02-11 NOTE — PROGRESS NOTES
Name: Dexter Sharp      : 1940      MRN: [id number]  Encounter Provider: Raul Dacosta MD  Encounter Date: 2025   Encounter department: [facility]  :  Assessment & Plan  Pleural effusion on right  I had a lengthy discussion with the patient and his wife regarding the cytology of the pleural effusion which was recently tapped during the recent hospitalization.  The cytology seems to be highly suspicious for malignancy.  However, the exact primary is not entirely clear.  We did discuss the result of the recent PET CT scan and reviewed the imaging extensively.  He continues to have large right pleural effusion which is going to be tapped in the near future.  We did discuss sending the pleural fluid for cytology again which may be helpful with the exact pathology.  We did also discussed the potential benefit of pursuing a liquid biopsy since we are most likely dealing with lung cancer primary.  The patient was told that once the lung cancer primary is confirmed we would discuss treatment options.  He does not seem to be a candidate for any type of palliative chemotherapy.  However, he might be a candidate for targeted therapy if we see any actionable mutation or immunotherapy.  We did also discuss a Pleurx catheter placement if he continues to have recurrent malignant pleural effusions.  Palliative care consultation will be considered once the diagnosis is confirmed.    Orders:    Ambulatory Referral to Hematology / Oncology        History of Present Illness   No chief complaint on file.  This is an 84-year-old male with history of chronic kidney disease, coronary artery disease, hypertension, prostate cancer, status post prostatectomy around  followed by prostate bed radiation around 2018.    The patient empirically was admitted to the hospital on 2025 for respiratory symptoms which was thought to be due to a viral infection and pneumonia.   He was treated with antibiotics.  CT scan of the chest without contrast on 1/8/2025 showed large right pleural effusion with multifocal nodular opacities.  He then had thoracentesis on 1/9/2025 of the right side where 1.5 pleural fluid was removed.  Cytology came back highly suspicious for malignancy.  The exact type of malignancy was not entirely clear.  Subsequently after hospital discharge and he had a PET CT scan on 1/28/2025 which was negative for any obvious hypermetabolic metastatic disease.  He continues to have multifocal consolidations in the left lung and large right pleural effusion without hypermetabolic activity in that area.  Brain MRI on 1/24/2025 was negative for metastatic disease.  Oncology History   Cancer Staging   No matching staging information was found for the patient.  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 Negative for chills and fever.   HENT:  Negative for ear pain and sore throat.    Eyes:  Negative for pain and visual disturbance.   Respiratory:  Positive for shortness of breath. Negative for cough.    Cardiovascular:  Negative for chest pain and palpitations.   Gastrointestinal:  Positive for constipation. Negative for abdominal pain and vomiting.   Genitourinary:  Negative for dysuria and  "hematuria.   Musculoskeletal:  Negative for arthralgias and back pain.   Skin:  Negative for color change and rash.   Neurological:  Negative for seizures and syncope.   Psychiatric/Behavioral:  Positive for sleep disturbance.    All other systems reviewed and are negative.    Medical History Reviewed by provider this encounter:     .  Current Outpatient Medications on File Prior to Visit   Medication Sig Dispense Refill    acetaminophen (TYLENOL) 500 mg tablet Take 500 mg by mouth every 4 (four) hours as needed for fever, headaches or mild pain. Indications: Fever, Pain      ALPRAZolam (XANAX) 0.5 mg tablet Take 1 tablet (0.5 mg total) by mouth daily at bedtime as needed for anxiety 30 tablet 0    amLODIPine (NORVASC) 5 mg tablet Take 1 tablet (5 mg total) by mouth daily 90 tablet 1    aspirin 81 mg chewable tablet Chew 81 mg daily      atorvastatin (LIPITOR) 40 mg tablet Take 1 tablet (40 mg total) by mouth daily 30 tablet 0    levothyroxine 88 mcg tablet TAKE 1 TABLET (88 MCG TOTAL) BY MOUTH DAILY 100 tablet 1    meloxicam (MOBIC) 15 mg tablet TAKE 1 TABLET (15 MG TOTAL) BY MOUTH DAILY 100 tablet 0    guaiFENesin (Mucinex) 600 mg 12 hr tablet Take 600 mg by mouth 2 (two) times a day as needed for congestion or cough. Indications: Cough (Patient not taking: Reported on 2025)      Misc. Devices (Bathtub Safety Rail) MISC Use in the morning 1 each 0     No current facility-administered medications on file prior to visit.      Social History     Tobacco Use    Smoking status: Former     Current packs/day: 0.00     Types: Cigarettes, Cigars     Quit date:      Years since quittin.1     Passive exposure: Past    Smokeless tobacco: Never    Tobacco comments:     smokes 1-2 cigarss/month   Vaping Use    Vaping status: Never Used   Substance and Sexual Activity    Alcohol use: Never    Drug use: No     Comment: Chronic Narcotic use noted in \"allscripts\"     Sexual activity: Not on file         Objective   BP " 130/74 (BP Location: Right arm, Patient Position: Sitting, Cuff Size: Large)   Pulse 66   Temp 98.6 °F (37 °C) (Probe)   Resp 18   Wt 83.1 kg (183 lb 3.2 oz)   SpO2 92% Comment: room air  BMI 27.86 kg/m²     Pain Screening:     ECOG   2  Physical Exam  Constitutional:       Appearance: He is well-developed.   HENT:      Head: Normocephalic and atraumatic.      Nose: Nose normal.   Eyes:      General: No scleral icterus.        Right eye: No discharge.         Left eye: No discharge.      Conjunctiva/sclera: Conjunctivae normal.      Pupils: Pupils are equal, round, and reactive to light.   Neck:      Thyroid: No thyromegaly.      Trachea: No tracheal deviation.   Cardiovascular:      Rate and Rhythm: Normal rate and regular rhythm.      Heart sounds: Normal heart sounds. No murmur heard.     No friction rub.   Pulmonary:      Effort: No respiratory distress.      Breath sounds: No wheezing or rales.      Comments: Decreased breath sounds on the right  Chest:      Chest wall: No tenderness.   Abdominal:      General: There is no distension.      Palpations: Abdomen is soft. There is no hepatomegaly or splenomegaly.      Tenderness: There is no abdominal tenderness. There is no guarding or rebound.   Musculoskeletal:         General: No tenderness or deformity. Normal range of motion.      Cervical back: Normal range of motion and neck supple.   Lymphadenopathy:      Cervical: No cervical adenopathy.   Skin:     General: Skin is warm and dry.      Coloration: Skin is not pale.      Findings: No erythema or rash.   Neurological:      Mental Status: He is alert and oriented to person, place, and time.      Cranial Nerves: No cranial nerve deficit.      Coordination: Coordination normal.      Deep Tendon Reflexes: Reflexes are normal and symmetric.   Psychiatric:         Behavior: Behavior normal.         Thought Content: Thought content normal.         Judgment: Judgment normal.         Labs: I have reviewed the  following labs:  Lab Results   Component Value Date/Time    WBC 6.93 01/13/2025 05:29 AM    RBC 4.29 01/13/2025 05:29 AM    Hemoglobin 13.2 01/13/2025 05:29 AM    Hematocrit 38.8 01/13/2025 05:29 AM    MCV 90 01/13/2025 05:29 AM    MCH 30.8 01/13/2025 05:29 AM    RDW 13.2 01/13/2025 05:29 AM    Platelets 231 01/13/2025 05:29 AM    Segmented % 70 01/13/2025 05:29 AM    Lymphocytes % 16 01/13/2025 05:29 AM    Monocytes % 11 01/13/2025 05:29 AM    Eosinophils Relative 2 01/13/2025 05:29 AM    Basophils Relative 0 01/13/2025 05:29 AM    Immature Grans % 1 01/13/2025 05:29 AM    Absolute Neutrophils 4.83 01/13/2025 05:29 AM     Lab Results   Component Value Date/Time    Potassium 3.9 01/13/2025 05:29 AM    Chloride 103 01/13/2025 05:29 AM    CO2 29 01/13/2025 05:29 AM    BUN 16 01/13/2025 05:29 AM    Creatinine 0.99 01/13/2025 05:29 AM    Glucose, Fasting 100 (H) 05/20/2024 07:20 AM    Calcium 8.4 01/13/2025 05:29 AM     (H) 01/09/2025 06:35 AM    ALT 55 (H) 01/09/2025 06:35 AM    Alkaline Phosphatase 48 01/09/2025 06:35 AM    Total Protein 6.6 01/09/2025 06:35 AM    Albumin 3.7 01/09/2025 06:35 AM    Total Bilirubin 0.70 01/09/2025 06:35 AM    eGFR 68 01/13/2025 05:29 AM     Lab Results   Component Value Date/Time    WBC 6.93 01/13/2025 05:29 AM    RBC 4.29 01/13/2025 05:29 AM    Hemoglobin 13.2 01/13/2025 05:29 AM    Hematocrit 38.8 01/13/2025 05:29 AM    MCV 90 01/13/2025 05:29 AM    MCH 30.8 01/13/2025 05:29 AM    RDW 13.2 01/13/2025 05:29 AM    Platelets 231 01/13/2025 05:29 AM    Segmented % 70 01/13/2025 05:29 AM    Bands % 18 (H) 01/08/2025 03:54 PM    Lymphocytes % 16 01/13/2025 05:29 AM    Monocytes % 11 01/13/2025 05:29 AM    Eosinophils Relative 2 01/13/2025 05:29 AM    Basophils Relative 0 01/13/2025 05:29 AM    Immature Grans % 1 01/13/2025 05:29 AM    Absolute Neutrophils 4.83 01/13/2025 05:29 AM      Lab Results   Component Value Date/Time    Sodium 138 01/13/2025 05:29 AM    Potassium 3.9  01/13/2025 05:29 AM    Chloride 103 01/13/2025 05:29 AM    CO2 29 01/13/2025 05:29 AM    ANION GAP 6 01/13/2025 05:29 AM    BUN 16 01/13/2025 05:29 AM    Creatinine 0.99 01/13/2025 05:29 AM    Glucose 96 01/13/2025 05:29 AM    Glucose, Fasting 100 (H) 05/20/2024 07:20 AM    Calcium 8.4 01/13/2025 05:29 AM     (H) 01/09/2025 06:35 AM    ALT 55 (H) 01/09/2025 06:35 AM    Alkaline Phosphatase 48 01/09/2025 06:35 AM    Total Protein 6.6 01/09/2025 06:35 AM    Albumin 3.7 01/09/2025 06:35 AM    Total Bilirubin 0.70 01/09/2025 06:35 AM    eGFR 68 01/13/2025 05:29 AM

## 2025-02-11 NOTE — ASSESSMENT & PLAN NOTE
-Patient had overnight pulse ox study on 1/29/2025 showing desaturations for more than 3 hours overnight.  He has been started on 2 L nocturnal oxygen  -Walk test in the office today demonstrated need for 3 L supplemental oxygen with activity  -This is likely secondary to his large right-sided pleural effusion, as he was not on home oxygen previously  -I instructed patient to use 3 L oxygen with exertion and 2 L nightly.  Orders placed for portable oxygen      Orders:    Home Oxygen with Portability    Portable Concentrators

## 2025-02-12 ENCOUNTER — TELEPHONE (OUTPATIENT)
Age: 85
End: 2025-02-12

## 2025-02-12 ENCOUNTER — PATIENT OUTREACH (OUTPATIENT)
Dept: HEMATOLOGY ONCOLOGY | Facility: CLINIC | Age: 85
End: 2025-02-12

## 2025-02-12 NOTE — TELEPHONE ENCOUNTER
Pt requesting a call back to schedule a thoracentesis procedure.     Advised Elizabeth will be calling him back.

## 2025-02-12 NOTE — TELEPHONE ENCOUNTER
Patient called back. Says the pharmacy refunded him for the 9- day they charged him, and only charged him for the 30 day. Says he will call back when he needs next refill. Thank you!

## 2025-02-12 NOTE — PROGRESS NOTES
I reached out and spoke with Dexter, now that consults have been completed with the oncology teams. I introduced myself and explained my role as their Patient Navigator. I reviewed for any barriers to care and offered supportive services as needed. I reviewed and updated the members assigned to the care team in [facility]. He knows the members of the care team as well as how and when to contact them with any needs.      Distress Thermometer completed at this time. Patient scored 0/10. Based on responses to DT, no indication for referral to SW needed at this time. . He said he has no distress as he has accepted the fact that this terminal.    He is currently unable to drive but is supported by family or friends and denies transportation needs.      I explained to Dexter that Palliative Care helps to manage the symptoms and side effects to treatment . I let him know that they are available for him , and he can decide on when he would like to see them. He stated he has so many doctors appointments right now. Also Dr Dacosta noted that he will put in referral in once the diagnosis is confirmed.    He states that he is struggling with eating and drinking. Malnutrition screening tool completed. He does not meet parameters for auto referral to Oncology Registered Dietician.  We will revisit this when we talk again.     Patient does not smoke.     He states he is well supported by family and friends.  Community support groups discussed including the Cancer Support Community of the [facility]. Patient declined information at this time.     He feels he has adequate insurance coverage and denies any financial concerns at this time. He mentioned he and wife have all their affairs in order in case one of them passes away.    He verbalizes managing the schedules well. He has a big calendar on his office wall.  Future Appointments   Date Time Provider Department Center   2/17/2025  3:00 PM Edil Mac DO [facility]  Practice-[facility]   2/24/2025  1:40 PM London Alberts DO [facility]   3/6/2025 10:00 AM Raul Dacosta MD [facility]   3/11/2025  1:30 PM GAIL Coles [facility]   11/6/2025  2:40 PM China Tineo PA-C [facility]        Based on individual needs I will follow up in about 3 weeks. I have provided my direct contact information and welcome Dexter and his wife to contact me if needs as discussed above change. He was appreciative for the call.

## 2025-02-12 NOTE — TELEPHONE ENCOUNTER
Called and spoke with patient advised he should be receiving a call from IR regarding scheduling Thoracentesis. Patient understands and ask if they call and he does not answer if a message can be left     Thank You

## 2025-02-12 NOTE — TELEPHONE ENCOUNTER
JOCELYNNI / Pt stating he received a call from [employer] from [place] / he asked if they were oncology / but pt stated he googled the company and came up rheumatology.     Pt is not a rheum pt/ yet he said they knew a lot about him.     Advised pt if the company calls him back just decline call.

## 2025-02-13 ENCOUNTER — TELEPHONE (OUTPATIENT)
Dept: NUTRITION | Facility: CLINIC | Age: 85
End: 2025-02-13

## 2025-02-13 ENCOUNTER — TELEPHONE (OUTPATIENT)
Age: 85
End: 2025-02-13

## 2025-02-13 NOTE — TELEPHONE ENCOUNTER
Patient is calling because they received a text message yesterday that their oxygen order was cancelled and they are wondering what that text message means can someone please advise the patient

## 2025-02-13 NOTE — TELEPHONE ENCOUNTER
Pt is calling regarding his POC. Patient stated since he has o2 with [facility]  / [facility] will not be able to supply him just POC . He would either need to cancel his home O2 with [facility] or have [facility] supply him with the POC . Patient would like to know if [facility] can supply him with a POC . Please advise.    Patient is a bit confuse and would like for clarification as well

## 2025-02-13 NOTE — TELEPHONE ENCOUNTER
Received notification by RN Navigator (Jasmine GRIER) on 1/14/25 that pt is appropriate for oncology nutrition care (reason for referral: Malnutrition Screening Tool (MST) Triggers: scored a 3 indicating 14-23# (6.4-10.5 kg) recent wt loss and is eating poorly due to a decreased appetite. (Date of MST: 1/14/25)).     Contacted Dexter today to establish care now that oncology consults have been completed. No answer.  Left voice message with the reason for today's call and this RD’s contact information asking that Dexter call back as able/desired.

## 2025-02-13 NOTE — TELEPHONE ENCOUNTER
Ana M calls from [facility] and states that all of pt's o2 orders will have to be sent to [facility]. Pt ox is being monitored and its dropping in into 80% cassandra also have not herd anything in regards to POC    P:[phone]  F:[phone]

## 2025-02-14 ENCOUNTER — TELEPHONE (OUTPATIENT)
Dept: BARIATRICS | Facility: CLINIC | Age: 85
End: 2025-02-14

## 2025-02-14 DIAGNOSIS — J96.11 CHRONIC HYPOXEMIC RESPIRATORY FAILURE (HCC): Primary | ICD-10-CM

## 2025-02-14 NOTE — TELEPHONE ENCOUNTER
I called and spoke with Estrada from [facility], she stated they received all oxygen orders.

## 2025-02-14 NOTE — TELEPHONE ENCOUNTER
I called and spoke with Mundo she asked if you could add another DX code for patient's oxygen.   Please advise

## 2025-02-14 NOTE — TELEPHONE ENCOUNTER
I called and spoke with Estrada from [facility], she stated they received all oxygen orders.         All oxygen orders re  faxed to [phone]

## 2025-02-14 NOTE — TELEPHONE ENCOUNTER
Ana M, outpatient CM with [facility] calls. States she is still awaiting portable O2 orders to be sent to [facility]. All O2 orders need to be sent to [facility] who then arranges delivery of the equipment through [facility]. She is hoping to have the portable tanks to the patient today and requests these orders be faxed [facility].     F:[phone]

## 2025-02-17 ENCOUNTER — OFFICE VISIT (OUTPATIENT)
Dept: INTERNAL MEDICINE CLINIC | Facility: CLINIC | Age: 85
End: 2025-02-17
Payer: COMMERCIAL

## 2025-02-17 VITALS
BODY MASS INDEX: 27.89 KG/M2 | TEMPERATURE: 97 F | HEART RATE: 92 BPM | OXYGEN SATURATION: 90 % | SYSTOLIC BLOOD PRESSURE: 152 MMHG | HEIGHT: 68 IN | DIASTOLIC BLOOD PRESSURE: 74 MMHG | WEIGHT: 184 LBS

## 2025-02-17 DIAGNOSIS — C61 MALIGNANT NEOPLASM OF PROSTATE METASTATIC TO INTRAPELVIC LYMPH NODE (HCC): ICD-10-CM

## 2025-02-17 DIAGNOSIS — C78.00 METASTATIC ADENOCARCINOMA TO LUNG WITH UNKNOWN PRIMARY SITE, UNSPECIFIED LATERALITY (HCC): ICD-10-CM

## 2025-02-17 DIAGNOSIS — E78.5 HYPERLIPIDEMIA, UNSPECIFIED HYPERLIPIDEMIA TYPE: ICD-10-CM

## 2025-02-17 DIAGNOSIS — N18.31 CHRONIC KIDNEY DISEASE, STAGE 3A (HCC): ICD-10-CM

## 2025-02-17 DIAGNOSIS — M16.12 PRIMARY OSTEOARTHRITIS OF LEFT HIP: ICD-10-CM

## 2025-02-17 DIAGNOSIS — F41.1 GENERALIZED ANXIETY DISORDER: ICD-10-CM

## 2025-02-17 DIAGNOSIS — G47.9 SLEEP DISTURBANCE: ICD-10-CM

## 2025-02-17 DIAGNOSIS — C80.1 METASTATIC ADENOCARCINOMA TO LUNG WITH UNKNOWN PRIMARY SITE, UNSPECIFIED LATERALITY (HCC): ICD-10-CM

## 2025-02-17 DIAGNOSIS — J96.11 CHRONIC HYPOXEMIC RESPIRATORY FAILURE (HCC): ICD-10-CM

## 2025-02-17 DIAGNOSIS — E03.9 HYPOTHYROIDISM, UNSPECIFIED TYPE: ICD-10-CM

## 2025-02-17 DIAGNOSIS — R73.03 PREDIABETES: ICD-10-CM

## 2025-02-17 DIAGNOSIS — C77.5 MALIGNANT NEOPLASM OF PROSTATE METASTATIC TO INTRAPELVIC LYMPH NODE (HCC): ICD-10-CM

## 2025-02-17 DIAGNOSIS — I10 PRIMARY HYPERTENSION: Primary | ICD-10-CM

## 2025-02-17 DIAGNOSIS — J91.0 MALIGNANT PLEURAL EFFUSION: ICD-10-CM

## 2025-02-17 PROBLEM — R09.02 HYPOXIA: Status: RESOLVED | Noted: 2025-02-11 | Resolved: 2025-02-17

## 2025-02-17 PROBLEM — J96.01 ACUTE RESPIRATORY FAILURE WITH HYPOXIA (HCC): Status: RESOLVED | Noted: 2025-01-08 | Resolved: 2025-02-17

## 2025-02-17 PROBLEM — R30.0 DYSURIA: Status: RESOLVED | Noted: 2023-02-14 | Resolved: 2025-02-17

## 2025-02-17 PROCEDURE — 99214 OFFICE O/P EST MOD 30 MIN: CPT | Performed by: INTERNAL MEDICINE

## 2025-02-17 PROCEDURE — G2211 COMPLEX E/M VISIT ADD ON: HCPCS | Performed by: INTERNAL MEDICINE

## 2025-02-17 RX ORDER — TRAZODONE HYDROCHLORIDE 50 MG/1
50 TABLET, FILM COATED ORAL
Qty: 90 TABLET | Refills: 3 | Status: SHIPPED | OUTPATIENT
Start: 2025-02-17

## 2025-02-17 RX ORDER — ALPRAZOLAM 0.5 MG
0.5 TABLET ORAL
Qty: 30 TABLET | Refills: 0 | Status: SHIPPED | OUTPATIENT
Start: 2025-02-17

## 2025-02-17 NOTE — ASSESSMENT & PLAN NOTE
Lab Results   Component Value Date    EGFR 68 01/13/2025    EGFR 73 01/11/2025    EGFR 68 01/10/2025    CREATININE 0.99 01/13/2025    CREATININE 0.94 01/11/2025    CREATININE 0.99 01/10/2025

## 2025-02-17 NOTE — ASSESSMENT & PLAN NOTE
Orders:    [facility] All Prescribed Meds and Special Instructions    Amphetamines, Methamphetamines    Butalbital    Phenobarbital    Secobarbital    Alprazolam    Clonazepam    Diazepam    Lorazepam    Gabapentin    Pregabalin    Cocaine    Heroin    Buprenorphine    Levorphanol    Meperidine    Naltrexone    Fentanyl    Methadone    Oxycodone    Tapentadol    THC    Tramadol    Codeine, Hydrocodone, Hydropmorphone, Morphine    Bath Salts    Ethyl Glucuronide/Ethyl Sulfate    Kratom    Spice    Methylphenidate    Phentermine    Validity Oxidant    Validity Creatinine    Validity pH    Validity Specific    Xylazine Definitive Test    ALPRAZolam (XANAX) 0.5 mg tablet; Take 1 tablet (0.5 mg total) by mouth daily at bedtime as needed for anxiety

## 2025-02-17 NOTE — PROGRESS NOTES
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)         Chronic kidney disease, stage 3a (HCC)  Lab Results   Component Value Date    EGFR 68 2025    EGFR 73 2025    EGFR 68 01/10/2025    CREATININE 0.99 2025    CREATININE 0.94 2025    CREATININE 0.99 01/10/2025            Generalized anxiety disorder    Orders:    [facility] All Prescribed Meds and Special Instructions    Amphetamines, Methamphetamines    Butalbital    Phenobarbital    Secobarbital    Alprazolam    Clonazepam    Diazepam    Lorazepam    Gabapentin    Pregabalin    Cocaine    Heroin    Buprenorphine    Levorphanol    Meperidine    Naltrexone    Fentanyl    Methadone    Oxycodone    Tapentadol    THC    Tramadol    Codeine, Hydrocodone, Hydropmorphone, Morphine    Bath Salts    Ethyl Glucuronide/Ethyl Sulfate    Kratom    Spice    Methylphenidate    Phentermine    Validity Oxidant    Validity Creatinine    Validity pH    Validity Specific    Xylazine Definitive Test    ALPRAZolam (XANAX) 0.5 mg tablet; Take 1 tablet (0.5 mg total) by mouth daily at bedtime as needed for anxiety    Hyperlipidemia, unspecified hyperlipidemia type    Orders:    Lipid Panel with Direct LDL reflex; Future    Prediabetes    Orders:    Hemoglobin A1C; Future    Malignant pleural effusion         Metastatic adenocarcinoma to lung with unknown primary site, unspecified laterality (HCC)         Sleep disturbance    Orders:    traZODone (DESYREL) 50 mg tablet; Take 1 tablet (50 mg total) by mouth daily at bedtime    A/P: Doing ok and will check labs, including  UDT. BP is elevated, but has been good. Will continue current treatment, but if remains elevated, adjust meds. Appreciate pulmonary and onc input. Await further eval to determine treatment course.Discussed vaccines and already had his flu vaccine. Will try trazodone for sleep, but discussed the need to not nap during the day and sleep hygiene. Discussed the need to get mor active now that he has portable O2. . Continue current treatment, including O2. Keep f/u with the specialists. RTC one month for f/u labs, BP, and sleep.         History of Present Illness   WM RTC for f/u HTN, hypothyroidism, etc. Doing ok, but continues to be w/u for metastatic lung w/o primary site known as of yet. Remains as active as hie hypoxia will allow. No falls. Using supplemental O2. Denies CP, edema, orthopnea or PND. SOB when not using the O2. Chronic pain is manageable. Prostate ca in remission. NADIA is controlled, but sleep is an issue. Due for labs and vaccines.       Review of Systems   Constitutional:  Positive for fatigue. Negative for activity change, chills, diaphoresis and fever.   HENT: Negative.     Eyes:  Negative for visual disturbance.   Respiratory:  Positive for shortness of breath. Negative for cough, chest tightness and wheezing.    Cardiovascular:  Negative for chest pain, palpitations and leg swelling.   Gastrointestinal:  Negative for abdominal pain, constipation, diarrhea, nausea and vomiting.   Endocrine: Negative for cold intolerance and heat intolerance.   Genitourinary:  Negative for difficulty urinating, dysuria and frequency.   Musculoskeletal:  Negative for arthralgias, gait problem and myalgias.   Neurological:  Negative for dizziness, seizures, syncope, weakness, light-headedness and headaches.   Psychiatric/Behavioral:  Positive for sleep disturbance. Negative for confusion and dysphoric mood. The patient is nervous/anxious.        Objective   /74   Pulse 92   Temp (!) 97 °F (36.1 °C) (Tympanic)   " Ht 5' 8\" (1.727 m)   Wt 83.5 kg (184 lb)   SpO2 90%   BMI 27.98 kg/m²      Physical Exam  Vitals and nursing note reviewed.   Constitutional:       General: He is not in acute distress.     Appearance: Normal appearance. He is not ill-appearing.   HENT:      Head: Normocephalic and atraumatic.      Mouth/Throat:      Mouth: Mucous membranes are moist.   Eyes:      Extraocular Movements: Extraocular movements intact.      Conjunctiva/sclera: Conjunctivae normal.      Pupils: Pupils are equal, round, and reactive to light.   Neck:      Vascular: No carotid bruit.   Cardiovascular:      Rate and Rhythm: Normal rate and regular rhythm.      Heart sounds: Normal heart sounds. No murmur heard.  Pulmonary:      Effort: Pulmonary effort is normal. No respiratory distress.      Breath sounds: Normal breath sounds. No wheezing, rhonchi or rales.   Abdominal:      General: Bowel sounds are normal. There is no distension.      Palpations: Abdomen is soft.      Tenderness: There is no abdominal tenderness.   Musculoskeletal:      Cervical back: Neck supple. No tenderness.      Right lower leg: No edema.      Left lower leg: No edema.   Neurological:      General: No focal deficit present.      Mental Status: He is alert and oriented to person, place, and time. Mental status is at baseline.   Psychiatric:         Mood and Affect: Mood normal.         Behavior: Behavior normal.         Thought Content: Thought content normal.         Judgment: Judgment normal.           Scheduled Medication Review:  Pt's scheduled medication use was reviewed by myself/staff via the PDMP website. Pt's use has been found to be appropriate w/o any concerns for misuse by the patient. Pt's current conditions require continued scheduled medication use at this time. Future review for continued appropriate medication use and misuse will continue.   "

## 2025-02-17 NOTE — PATIENT INSTRUCTIONS
"Patient Education     High blood pressure in adults   The Basics   Written by the doctors and editors at [facility]   What is high blood pressure? -- High blood pressure is a condition that puts you at risk for heart attack, stroke, and kidney disease. It does not usually cause symptoms. But it can be serious.  When your doctor or nurse tells you your blood pressure, they say 2 numbers. For instance, your doctor or nurse might say that your blood pressure is \"130 over 80.\" The top number is the pressure inside your arteries when your heart is meek. The bottom number is the pressure inside your arteries when your heart is relaxed.  \"Elevated blood pressure\" is a term doctors or nurses use as a warning. People with elevated blood pressure do not yet have high blood pressure. But their blood pressure is not as low as it should be for good health.  Many experts define high, elevated, and normal blood pressure as follows:   High - Top number of 130 or above and/or bottom number of 80 or above.   Elevated - Top number between 120 and 129 and bottom number of 79 or below.   Normal - Top number of 119 or below and bottom number of 79 or below.  This information is also in the table (table 1).  How can I lower my blood pressure? -- If your doctor or nurse prescribed blood pressure medicine, the most important thing you can do is to take it. If it causes side effects, do not just stop taking it. Instead, talk to your doctor or nurse about the problems it causes. They might be able to lower your dose or switch you to another medicine. If cost is a problem, mention that, too. They might be able to put you on a less expensive medicine. Taking your blood pressure medicine can keep you from having a heart attack or stroke, and it can save your life!  Can I do anything on my own? -- You have a lot of control over your blood pressure. To lower it:   Lose weight (if you are overweight).   Choose a diet low in fat and rich in " "fruits, vegetables, and low-fat dairy products.   Eat less salt.   Do something active for at least 30 minutes a day on most days of the week.   Drink less alcohol (if you drink more than 2 alcoholic drinks per day).  It's also a good idea to get a home blood pressure meter. People who check their own blood pressure at home do better at keeping it low and can sometimes even reduce the amount of medicine they take.  All topics are updated as new evidence becomes available and our peer review process is complete.  This topic retrieved from [employer] on: Feb 26, 2024.  Topic 87889 Version 23.0  Release: 32.2.4 - C32.56  © 2024 UpToDate, Inc. and/or its affiliates. All rights reserved.  table 1: Definition of normal and high blood pressure  Level  Top number  Bottom number    High 130 or above 80 or above   Elevated 120 to 129 79 or below   Normal 119 or below 79 or below   These definitions are from the American College of Cardiology/American Heart Association. Other expert groups might use slightly different definitions.  \"Elevated blood pressure\" is a term doctor or nurses use as a warning. It means you do not yet have high blood pressure, but your blood pressure is not as low as it should be for good health.  Graphic 42940 Version 6.0  Consumer Information Use and Disclaimer   Disclaimer: This generalized information is a limited summary of diagnosis, treatment, and/or medication information. It is not meant to be comprehensive and should be used as a tool to help the user understand and/or assess potential diagnostic and treatment options. It does NOT include all information about conditions, treatments, medications, side effects, or risks that may apply to a specific patient. It is not intended to be medical advice or a substitute for the medical advice, diagnosis, or treatment of a health care provider based on the health care provider's examination and assessment of a patient's specific and unique circumstances. " Patients must speak with a health care provider for complete information about their health, medical questions, and treatment options, including any risks or benefits regarding use of medications. This information does not endorse any treatments or medications as safe, effective, or approved for treating a specific patient. UpToDate, Inc. and its affiliates disclaim any warranty or liability relating to this information or the use thereof.The use of this information is governed by the Terms of Use, available at https://www.[employer]uwer.com/en/know/clinical-effectiveness-terms. 2024© UpToDate, Inc. and its affiliates and/or licensors. All rights reserved.  Copyright   © 2024 UpToDate, Inc. and/or its affiliates. All rights reserved.

## 2025-02-20 ENCOUNTER — TELEPHONE (OUTPATIENT)
Dept: HEMATOLOGY ONCOLOGY | Facility: CLINIC | Age: 85
End: 2025-02-20

## 2025-02-20 NOTE — TELEPHONE ENCOUNTER
Received fax from [facility] [employer] with additional information needed for processing specimen.  Phoned [facility].  Liquid biopsy can not be completed with out know primary.  Reviewed with [facility] representative, can possibly utilize specimen from pleural effusion for [facility] tissue testing.  New order form completed and faxed to [employer] for processing.  Fax receipt confirmation received. Order form uploaded to chart.

## 2025-02-21 ENCOUNTER — HOSPITAL ENCOUNTER (OUTPATIENT)
Dept: INTERVENTIONAL RADIOLOGY/VASCULAR | Facility: HOSPITAL | Age: 85
End: 2025-02-21
Payer: COMMERCIAL

## 2025-02-21 VITALS
RESPIRATION RATE: 18 BRPM | DIASTOLIC BLOOD PRESSURE: 65 MMHG | OXYGEN SATURATION: 93 % | HEART RATE: 73 BPM | SYSTOLIC BLOOD PRESSURE: 139 MMHG

## 2025-02-21 DIAGNOSIS — J90 EXUDATIVE PLEURAL EFFUSION: ICD-10-CM

## 2025-02-21 DIAGNOSIS — J91.0 MALIGNANT PLEURAL EFFUSION: ICD-10-CM

## 2025-02-21 LAB
4OH-XYLAZINE UR QL CFM: NEGATIVE NG/ML
6MAM UR QL CFM: NEGATIVE NG/ML
7AMINOCLONAZEPAM UR QL CFM: NEGATIVE NG/ML
A-OH ALPRAZ UR QL CFM: NORMAL NG/ML
ACCEPTABLE CREAT UR QL: NORMAL MG/DL
ACCEPTIBLE SP GR UR QL: NORMAL
AMPHET UR QL CFM: NEGATIVE NG/ML
APPEARANCE FLD: CLEAR
BUPRENORPHINE UR QL CFM: NEGATIVE NG/ML
BUTALBITAL UR QL CFM: NEGATIVE NG/ML
BZE UR QL CFM: NEGATIVE NG/ML
CODEINE UR QL CFM: NEGATIVE NG/ML
COLOR FLD: YELLOW
EDDP UR QL CFM: NEGATIVE NG/ML
ETHYL GLUCURONIDE UR QL CFM: NEGATIVE NG/ML
ETHYL SULFATE UR QL SCN: NEGATIVE NG/ML
EUTYLONE UR QL: NEGATIVE NG/ML
FENTANYL UR QL CFM: NEGATIVE NG/ML
GLIADIN IGG SER IA-ACNC: NEGATIVE NG/ML
GLUCOSE FLD-MCNC: 113 MG/DL
HISTIOCYTES NFR FLD: 64 %
HYDROCODONE UR QL CFM: NEGATIVE NG/ML
HYDROMORPHONE UR QL CFM: NEGATIVE NG/ML
LDH FLD L TO P-CCNC: 115 U/L
LORAZEPAM UR QL CFM: NEGATIVE NG/ML
LYMPHOCYTES NFR BLD AUTO: 17 %
ME-PHENIDATE UR QL CFM: NEGATIVE NG/ML
MEPERIDINE UR QL CFM: NEGATIVE NG/ML
METHADONE UR QL CFM: NEGATIVE NG/ML
METHAMPHET UR QL CFM: NEGATIVE NG/ML
MONOCYTES NFR BLD AUTO: 5 %
MORPHINE UR QL CFM: NEGATIVE NG/ML
NALTREXONE UR QL CFM: NEGATIVE NG/ML
NEUTS SEG NFR BLD AUTO: 7 %
NITRITE UR QL: NORMAL UG/ML
NORBUPRENORPHINE UR QL CFM: NEGATIVE NG/ML
NORDIAZEPAM UR QL CFM: NEGATIVE NG/ML
NORFENTANYL UR QL CFM: NEGATIVE NG/ML
NORHYDROCODONE UR QL CFM: NEGATIVE NG/ML
NORMEPERIDINE UR QL CFM: NEGATIVE NG/ML
NOROXYCODONE UR QL CFM: NEGATIVE NG/ML
OXAZEPAM UR QL CFM: NEGATIVE NG/ML
OXYCODONE UR QL CFM: NEGATIVE NG/ML
OXYMORPHONE UR QL CFM: NEGATIVE NG/ML
PARA-FLUOROFENTANYL QUANTIFICATION: NORMAL NG/ML
PHENOBARB UR QL CFM: NEGATIVE NG/ML
RESULT ALL_PRESCRIBED MEDS AND SPECIAL INSTRUCTIONS: NORMAL
SECOBARBITAL UR QL CFM: NEGATIVE NG/ML
SITE: NORMAL
SL AMB 5F-ADB-M7 METABOLITE QUANTIFICATION: NEGATIVE NG/ML
SL AMB 7-OH-MITRAGYNINE (KRATOM ALKALOID) QUANTIFICATION: NEGATIVE NG/ML
SL AMB AB-FUBINACA-M3 METABOLITE QUANTIFICATION: NEGATIVE NG/ML
SL AMB ACETYL FENTANYL QUANTIFICATION: NORMAL NG/ML
SL AMB ACETYL NORFENTANYL QUANTIFICATION: NORMAL NG/ML
SL AMB ACRYL FENTANYL QUANTIFICATION: NORMAL NG/ML
SL AMB CARFENTANIL QUANTIFICATION: NORMAL NG/ML
SL AMB CTHC (MARIJUANA METABOLITE) QUANTIFICATION: NEGATIVE NG/ML
SL AMB DEXTRORPHAN (DEXTROMETHORPHAN METABOLITE) QUANT: NEGATIVE NG/ML
SL AMB GABAPENTIN QUANTIFICATION: NEGATIVE NG/ML
SL AMB JWH018 METABOLITE QUANTIFICATION: NEGATIVE NG/ML
SL AMB JWH073 METABOLITE QUANTIFICATION: NEGATIVE NG/ML
SL AMB MDMB-FUBINACA-M1 METABOLITE QUANTIFICATION: NEGATIVE NG/ML
SL AMB METHYLONE QUANTIFICATION: NEGATIVE NG/ML
SL AMB N-DESMETHYL-TRAMADOL QUANTIFICATION: NEGATIVE NG/ML
SL AMB PHENTERMINE QUANTIFICATION: NEGATIVE NG/ML
SL AMB PREGABALIN QUANTIFICATION: NEGATIVE NG/ML
SL AMB RCS4 METABOLITE QUANTIFICATION: NEGATIVE NG/ML
SL AMB RITALINIC ACID QUANTIFICATION: NEGATIVE NG/ML
SMOOTH MUSCLE AB TITR SER IF: NEGATIVE NG/ML
SPECIMEN DRAWN SERPL: NEGATIVE NG/ML
SPECIMEN PH ACCEPTABLE UR: NORMAL
TAPENTADOL UR QL CFM: NEGATIVE NG/ML
TEMAZEPAM UR QL CFM: NEGATIVE NG/ML
TOTAL CELLS COUNTED SPEC: 100
TRAMADOL UR QL CFM: NEGATIVE NG/ML
URATE/CREAT 24H UR: NEGATIVE NG/ML
WBC # FLD MANUAL: 103 /UL
WBC OTHER NFR FLD MANUAL: 7 %
XYLAZINE UR QL CFM: NEGATIVE NG/ML

## 2025-02-21 PROCEDURE — 87116 MYCOBACTERIA CULTURE: CPT

## 2025-02-21 PROCEDURE — 88184 FLOWCYTOMETRY/ TC 1 MARKER: CPT

## 2025-02-21 PROCEDURE — 83615 LACTATE (LD) (LDH) ENZYME: CPT

## 2025-02-21 PROCEDURE — 87206 SMEAR FLUORESCENT/ACID STAI: CPT

## 2025-02-21 PROCEDURE — 88305 TISSUE EXAM BY PATHOLOGIST: CPT | Performed by: PATHOLOGY

## 2025-02-21 PROCEDURE — 88185 FLOWCYTOMETRY/TC ADD-ON: CPT

## 2025-02-21 PROCEDURE — 87205 SMEAR GRAM STAIN: CPT

## 2025-02-21 PROCEDURE — 84311 SPECTROPHOTOMETRY: CPT

## 2025-02-21 PROCEDURE — 88341 IMHCHEM/IMCYTCHM EA ADD ANTB: CPT | Performed by: PATHOLOGY

## 2025-02-21 PROCEDURE — 88112 CYTOPATH CELL ENHANCE TECH: CPT | Performed by: PATHOLOGY

## 2025-02-21 PROCEDURE — 88342 IMHCHEM/IMCYTCHM 1ST ANTB: CPT | Performed by: PATHOLOGY

## 2025-02-21 PROCEDURE — 87070 CULTURE OTHR SPECIMN AEROBIC: CPT

## 2025-02-21 PROCEDURE — 89051 BODY FLUID CELL COUNT: CPT

## 2025-02-21 PROCEDURE — 82945 GLUCOSE OTHER FLUID: CPT

## 2025-02-21 PROCEDURE — 32555 ASPIRATE PLEURA W/ IMAGING: CPT

## 2025-02-21 RX ORDER — LIDOCAINE WITH 8.4% SOD BICARB 0.9%(10ML)
SYRINGE (ML) INJECTION AS NEEDED
Status: COMPLETED | OUTPATIENT
Start: 2025-02-21 | End: 2025-02-21

## 2025-02-21 RX ADMIN — Medication 10 ML: at 11:38

## 2025-02-21 NOTE — TELEPHONE ENCOUNTER
Second attempt to contact eD[employer]ter to establish oncology nutrition care, no answer, left voice message with reason for call and RD contact info asking that Dexter call back as able/desired.

## 2025-02-21 NOTE — BRIEF OP NOTE (RAD/CATH)
IR THORACENTESIS Procedure Note    PATIENT NAME: Dexter Sharp  : 1940  MRN: [id number]    Pre-op Diagnosis:   1. Malignant pleural effusion    2. Exudative pleural effusion      Post-op Diagnosis:   1. Malignant pleural effusion    2. Exudative pleural effusion        Provider:   Josesito Subramanian PA-C    Estimated Blood Loss: none    Findings: R thoracentesis, 1650cc clear yellow    Specimens: none    Complications:  none immediate    Anesthesia: local    Josesito Subramanian PA-C     Date: 2025  Time: 1:10 PM

## 2025-02-21 NOTE — DISCHARGE INSTRUCTIONS
[facility]  Interventional Radiology  [phone]        Thoracentesis   WHAT YOU NEED TO KNOW:   A thoracentesis is a procedure to remove extra fluid or air from between your lungs and your inner chest wall. Air or fluid buildup may make it hard for you to breathe. A thoracentesis allows your lungs to expand fully so you can breathe more easily.    DISCHARGE INSTRUCTIONS:     Small amount of shoulder pain and bloody sputum is normal after a Thoracentesis.     Rest:  Rest when you feel it is needed. Slowly start to do more each day. Return to your daily activities as directed.     Resume your normal diet. Small sips of flat soda will help mild nausea.    Do not smoke:  If you smoke, it is never too late to quit. Ask for information about how to stop smoking if you need help.    Contact Interventional Radiology at [phone] ([facility]AT PATIENTS: Contact Interventional Radiology at [phone]) (VIDAL PATIENTS: Contact Interventional Radiology at [phone]) if:   You have a fever.    Your puncture site is red, warm, swollen, or draining pus.    You have questions or concerns about your procedure, medicine, or care.    Seek care immediately or call 911 if:   Severe chest pain with inspiration and shortness of breath    Large amounts of blood in your sputum    Follow up with your healthcare provider as directed.

## 2025-02-21 NOTE — SEDATION DOCUMENTATION
Patient had a right sided thoracentesis performed by [facility] Oropeza with IR. A total of 1,650 ml ml of clear yellow fluid was removed, labs sent. Patient offers no complaints at this time.

## 2025-02-22 LAB — RHODAMINE-AURAMINE STN SPEC: NORMAL

## 2025-02-23 LAB
ADENOSINE DEAMINASE PLR-CCNC: 6.9 U/L
BACTERIA SPEC BFLD CULT: NO GROWTH
GRAM STN SPEC: NORMAL
GRAM STN SPEC: NORMAL

## 2025-02-24 ENCOUNTER — TELEPHONE (OUTPATIENT)
Age: 85
End: 2025-02-24

## 2025-02-24 ENCOUNTER — OFFICE VISIT (OUTPATIENT)
Dept: CARDIOLOGY CLINIC | Facility: CLINIC | Age: 85
End: 2025-02-24
Payer: COMMERCIAL

## 2025-02-24 VITALS
HEART RATE: 76 BPM | OXYGEN SATURATION: 92 % | SYSTOLIC BLOOD PRESSURE: 142 MMHG | WEIGHT: 184 LBS | HEIGHT: 68 IN | BODY MASS INDEX: 27.89 KG/M2 | DIASTOLIC BLOOD PRESSURE: 68 MMHG

## 2025-02-24 DIAGNOSIS — E66.3 OVERWEIGHT (BMI 25.0-29.9): ICD-10-CM

## 2025-02-24 DIAGNOSIS — E78.49 OTHER HYPERLIPIDEMIA: ICD-10-CM

## 2025-02-24 DIAGNOSIS — J90 PLEURAL EFFUSION ON RIGHT: ICD-10-CM

## 2025-02-24 DIAGNOSIS — I10 PRIMARY HYPERTENSION: ICD-10-CM

## 2025-02-24 DIAGNOSIS — I25.10 CORONARY ARTERY CALCIFICATION SEEN ON CT SCAN: Primary | ICD-10-CM

## 2025-02-24 LAB
BACTERIA SPEC BFLD CULT: NO GROWTH
GRAM STN SPEC: NORMAL
GRAM STN SPEC: NORMAL

## 2025-02-24 PROCEDURE — 99204 OFFICE O/P NEW MOD 45 MIN: CPT | Performed by: INTERNAL MEDICINE

## 2025-02-24 NOTE — PROGRESS NOTES
Patient ID: Dexter Sharp is a 84 y.o. male.        Plan:      Assessment & Plan  Coronary artery calcification seen on CT scan  -With relatively flat trending troponin elevations seen during hospitalization in January 2025  -Patient understands my recommendations for additional evaluation but at this time wishes to avoid as he denies any chest pain or anginal symptoms  -He will let us know if he changes mind.  -Continue atorvastatin 40 mg daily.  Primary hypertension  -Patient notes blood pressures at home are well-controlled counseled him on sodium and fluid restricted diet to less than 1800 mg of sodium daily and less than 2000 mL of fluid daily along with DASH diet and NSAID avoidance  -Patient can continue amlodipine 5 mg daily  -Patient to monitor home blood pressure reading let our office know significantly elevated greater than 130s/80s mmHg for up titration of medical therapy.  Other hyperlipidemia  -Counseled patient on dietary and lifestyle modifications  -Continue atorvastatin 40 mg daily  -Continue to monitor  Overweight (BMI 25.0-29.9)  -Counseled on dietary and lifestyle modifications  -Continue to monitor  Pleural effusion on right  -Patient with documented malignant effusion and recent thoracentesis with repeat procedure scheduled for later this week  -Continue to monitor with recommendations per pulmonology/hematology and oncology  -I did discuss with patient about initiation of diuretic therapy given elevated BNP however at this time he wishes to hold off but will discuss this with his pulmonology team and primary care physician.      Follow up Plan/Other summary comments:  -LDL cholesterol from laboratory studies 5/20/2024 was 64  -Patient continue atorvastatin 40 mg daily   -patient currently maintained on aspirin 81 mg daily and amlodipine 5 mg daily  -Counseled patient on dietary and lifestyle modifications including following a low-salt, low-fat, heart healthy diet with sodium  restriction to less than 1800 mg of sodium daily, DASH diet, NSAID avoidance and recommendations for fluid restriction to less than 2000 mL's of fluid daily.  -Patient will continue to follow with pulmonology and oncology teams for ongoing treatment and recommendations given discovery of malignant effusion.  -After discussion with patient understanding my recommendations for stress testing he wishes to hold off at this time but will let us know if he changes his mind.  He denies any active chest pain and does not wish to initiate oral diuretic therapy but is agreeable to reducing his sodium and fluid intake to see if this improves symptoms and wishes more to follow-up with his pulmonologist and interventional radiology teams for repeat thoracentesis and lung management along with oncology for further evaluation of possible malignant effusion.  -Patient counseled if he were to have any warning or alarm type symptoms he is to seek emergency medical care immediately.    HPI:   -Patient is an 84-year-old male with CKD, hypothyroidism, hypertension, hyperlipidemia, prostate cancer s/p resection and radiation therapy along with radiation-induced proctitis along with history of COVID-pneumonia, coronary calcifications on CT imaging and malignant pleural effusion recently undergoing thoracentesis with 1.65 L removed moved from right side on 2/21/2025 who presents to the office today for evaluation.  Patient has not been seen in the office since November 2021 during which time he was recommended undergo exercise stress echocardiogram but did not have this completed.  -Patient was recently hospitalized in January 2025 with sepsis secondary influenza and suspected bacterial pneumonia and at that time was also again as previously seen with COVID-pneumonia found to have troponin elevations that were flat trending and case was reviewed by cardiology at that time who believed this more to be related to sepsis and therefore no  further workup was indicated.  -Currently in the office today patient denies any chest pain, palpitations, lightheadedness or dizziness, loss of consciousness, shortness of breath at rest, bendopnea and states that he does have some mild orthopnea although this usually improves after thoracentesis.  He states while he does not have significant lower extremity edema all of the time, there are times where his feet will swell a little but this improves with elevation.  He notes that this seemed to happen more since switching from his lisinopril/HCTZ to amlodipine.  He urinates frequently throughout the day and drinks almost 4 L of water throughout the day and notes that he does not have an overly high sodium intake.  -Patient denies any tobacco, alcohol, illicit drug use.  -Patient denies any known family history of heart disease.  -will see patient in 6 months or sooner if necessary      Most recent or relevant cardiac/vascular testing:    -Transthoracic echocardiogram 1/9/2025 showing left ventricular systolic function normal estimated LVEF 60% with normal diastolic parameters, IVC that was normal in size.    -ECG 1/8/2025 showing sinus rhythm with PACs and nonspecific interventricular conduction delay with nonspecific ST/T wave abnormalities.    Past Surgical History:   Procedure Laterality Date    CATARACT EXTRACTION      COLONOSCOPY      COLONOSCOPY  11/26/2019    EYE SURGERY      KNEE SURGERY      PROSTATE SURGERY      VASECTOMY       Review of Systems   Review of Systems   Constitutional:  Negative for chills, diaphoresis, fatigue and fever.   HENT:  Negative for trouble swallowing and voice change.    Eyes:  Negative for pain and redness.   Cardiovascular:  Negative for chest pain, palpitations and leg swelling.   Gastrointestinal:  Negative for abdominal pain, blood in stool, constipation, diarrhea, nausea and vomiting.   Genitourinary:  Negative for dysuria.   Musculoskeletal:  Positive for arthralgias.  "Negative for neck pain and neck stiffness.   Skin:  Negative for rash.   Neurological:  Negative for dizziness, syncope, light-headedness and headaches.   Psychiatric/Behavioral:  Negative for agitation and confusion.    All other systems reviewed and are negative.         Objective:     /68   Pulse 76   Ht 5' 8\" (1.727 m)   Wt 83.5 kg (184 lb)   SpO2 92% Comment: on room air  BMI 27.98 kg/m²     PHYSICAL EXAM:  Physical Exam  Vitals reviewed.   Constitutional:       General: He is not in acute distress.     Appearance: Normal appearance. He is not diaphoretic.   HENT:      Head: Normocephalic and atraumatic.   Eyes:      General:         Right eye: No discharge.         Left eye: No discharge.   Neck:      Comments: Trachea midline, minimal JVD appreciated  Cardiovascular:      Rate and Rhythm: Normal rate and regular rhythm.      Heart sounds: Murmur (JANE) heard.   Pulmonary:      Effort: No respiratory distress.      Breath sounds: No wheezing.      Comments: Decreased breath sounds right greater than left.  Chest:      Chest wall: No tenderness.   Abdominal:      General: Bowel sounds are normal.      Palpations: Abdomen is soft.      Tenderness: There is no abdominal tenderness. There is no rebound.   Musculoskeletal:      Right lower leg: Edema (trace) present.      Left lower leg: Edema (trace) present.   Skin:     General: Skin is warm and dry.   Neurological:      Mental Status: He is alert.      Comments: Awake, alert, able to answer questions appropriately, able to move extremities bilaterally.   Psychiatric:         Mood and Affect: Mood normal.         Behavior: Behavior normal.            Meds reviewed.  Current Outpatient Medications on File Prior to Visit   Medication Sig Dispense Refill    acetaminophen (TYLENOL) 500 mg tablet Take 500 mg by mouth every 4 (four) hours as needed for fever, headaches or mild pain. Indications: Fever, Pain      ALPRAZolam (XANAX) 0.5 mg tablet Take 1 tablet " (0.5 mg total) by mouth daily at bedtime as needed for anxiety 30 tablet 0    amLODIPine (NORVASC) 5 mg tablet Take 1 tablet (5 mg total) by mouth daily 90 tablet 1    aspirin 81 mg chewable tablet Chew 81 mg daily      atorvastatin (LIPITOR) 40 mg tablet Take 1 tablet (40 mg total) by mouth daily 30 tablet 0    levothyroxine 88 mcg tablet TAKE 1 TABLET (88 MCG TOTAL) BY MOUTH DAILY 100 tablet 1    meloxicam (MOBIC) 15 mg tablet TAKE 1 TABLET (15 MG TOTAL) BY MOUTH DAILY 100 tablet 0    Misc. Devices (Bathtub Safety Rail) MISC Use in the morning 1 each 0    traZODone (DESYREL) 50 mg tablet Take 1 tablet (50 mg total) by mouth daily at bedtime 90 tablet 3     No current facility-administered medications on file prior to visit.      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 Last assessed: 2013           Social History     Tobacco Use   Smoking Status Former    Current packs/day: 0.00    Types: Cigarettes, Cigars    Quit date:     Years since quittin.1    Passive exposure: Past   Smokeless Tobacco Never   Tobacco Comments    smokes 1-2 cigarss/month     Family History   Problem Relation Age of Onset    Alcohol abuse Mother     Alcohol abuse Father     Depression Father     No Known Problems Sister     Stroke Brother         syndrome     No Known Problems Maternal Grandmother     No Known Problems Maternal Grandfather     No Known Problems Paternal Grandmother      No Known Problems Paternal Grandfather     Alcohol abuse Family     Colon cancer Maternal Aunt

## 2025-02-24 NOTE — TELEPHONE ENCOUNTER
Pt called stating he went to his Cardiology appointment today and they asked him why he was taken off of his   lisinopril-hydrochlorothiazide (PRINZIDE,ZESTORETIC) 20-25 MG  and placed on Amlodipine 5mg. See he was changed 11/12/24 but no note to verify why medication was switched. Please review and let Dexter know at [phone]

## 2025-02-24 NOTE — ASSESSMENT & PLAN NOTE
-With relatively flat trending troponin elevations seen during hospitalization in January 2025  -Patient understands my recommendations for additional evaluation but at this time wishes to avoid as he denies any chest pain or anginal symptoms  -He will let us know if he changes mind.  -Continue atorvastatin 40 mg daily.

## 2025-02-24 NOTE — ASSESSMENT & PLAN NOTE
-Counseled patient on dietary and lifestyle modifications  -Continue atorvastatin 40 mg daily  -Continue to monitor

## 2025-02-24 NOTE — TELEPHONE ENCOUNTER
Nixon from [facility] called, wanting to speak with the office in regards to previous phones calls about the patient. He can be reached at [phone]

## 2025-02-24 NOTE — ASSESSMENT & PLAN NOTE
-Patient with documented malignant effusion and recent thoracentesis with repeat procedure scheduled for later this week  -Continue to monitor with recommendations per pulmonology/hematology and oncology  -I did discuss with patient about initiation of diuretic therapy given elevated BNP however at this time he wishes to hold off but will discuss this with his pulmonology team and primary care physician.

## 2025-02-24 NOTE — ASSESSMENT & PLAN NOTE
-Patient notes blood pressures at home are well-controlled counseled him on sodium and fluid restricted diet to less than 1800 mg of sodium daily and less than 2000 mL of fluid daily along with DASH diet and NSAID avoidance  -Patient can continue amlodipine 5 mg daily  -Patient to monitor home blood pressure reading let our office know significantly elevated greater than 130s/80s mmHg for up titration of medical therapy.

## 2025-02-25 DIAGNOSIS — I10 PRIMARY HYPERTENSION: Primary | ICD-10-CM

## 2025-02-25 LAB
MYCOBACTERIUM SPEC CULT: NORMAL
RHODAMINE-AURAMINE STN SPEC: NORMAL
SCAN RESULT: NORMAL

## 2025-02-25 RX ORDER — LISINOPRIL AND HYDROCHLOROTHIAZIDE 20; 25 MG/1; MG/1
1 TABLET ORAL DAILY
Qty: 100 TABLET | Refills: 3 | Status: SHIPPED | OUTPATIENT
Start: 2025-02-25

## 2025-02-25 NOTE — TELEPHONE ENCOUNTER
Phoned Nixon at [facility] and updated the [facility] form and e mailed to Nixon scanned into media, liq bx to be run as not sure there is tissue for this pt to run

## 2025-02-25 NOTE — TELEPHONE ENCOUNTER
Pt called back in following up on his message he left for Dr. Mac. States he never received an update.     Pt reports he had went to the ER back in January, in which the doctor he saw had removed the following medication: lisinopril-hydrochlorothiazide (PRINZIDE,ZESTORETIC) 20-25 MG per tablet     Pt states since that medication has been taken off & he stopped taking it, he reports that his feet have beginning to swell again.     Pt is requesting for Dr. Mac to please add the medication back onto his med list.     I did inquire to pt if he had any pills remaining, he stated he has plenty, being last script was for 90 day supply.     Please advise & call pt back with update on med list. Thank you!    Dexter: [phone]

## 2025-02-28 ENCOUNTER — HOSPITAL ENCOUNTER (OUTPATIENT)
Dept: INTERVENTIONAL RADIOLOGY/VASCULAR | Facility: HOSPITAL | Age: 85
End: 2025-02-28
Payer: COMMERCIAL

## 2025-02-28 ENCOUNTER — TELEPHONE (OUTPATIENT)
Age: 85
End: 2025-02-28

## 2025-02-28 VITALS
OXYGEN SATURATION: 94 % | SYSTOLIC BLOOD PRESSURE: 121 MMHG | HEART RATE: 69 BPM | RESPIRATION RATE: 18 BRPM | DIASTOLIC BLOOD PRESSURE: 66 MMHG

## 2025-02-28 DIAGNOSIS — J90 PLEURAL EFFUSION ON RIGHT: Primary | ICD-10-CM

## 2025-02-28 DIAGNOSIS — J90 EXUDATIVE PLEURAL EFFUSION: ICD-10-CM

## 2025-02-28 PROCEDURE — 32555 ASPIRATE PLEURA W/ IMAGING: CPT

## 2025-02-28 PROCEDURE — 32555 ASPIRATE PLEURA W/ IMAGING: CPT | Performed by: PHYSICIAN ASSISTANT

## 2025-02-28 RX ORDER — LIDOCAINE WITH 8.4% SOD BICARB 0.9%(10ML)
SYRINGE (ML) INJECTION AS NEEDED
Status: COMPLETED | OUTPATIENT
Start: 2025-02-28 | End: 2025-02-28

## 2025-02-28 RX ADMIN — Medication 10 ML: at 10:54

## 2025-02-28 NOTE — TELEPHONE ENCOUNTER
Pt calling regarding IR thoracentesis. Pt states they drained 2L. States IR told him he will need to come back weekly but when he called IR to schedule they told him to call pulmonary first. Reviewed notes, do not see that pulmonary had planned for weekly thoracentesis. Please review.

## 2025-02-28 NOTE — DISCHARGE INSTRUCTIONS
[facility]  Interventional Radiology  [phone]    Thoracentesis     WHAT YOU NEED TO KNOW:   A thoracentesis is a procedure to remove extra fluid or air from between your lungs and your inner chest wall. Air or fluid buildup may make it hard for you to breathe. A thoracentesis allows your lungs to expand fully so you can breathe more easily.    DISCHARGE INSTRUCTIONS:     Small amount of shoulder pain and bloody sputum is normal after a Thoracentesis.     Rest:  Rest when you feel it is needed. Slowly start to do more each day. Return to your daily activities as directed.     Resume your normal diet. Small sips of flat soda will help mild nausea.    Do not smoke:  If you smoke, it is never too late to quit. Ask for information about how to stop smoking if you need help.    Contact Interventional Radiology at [phone] if:     You have a fever.    Your puncture site is red, warm, swollen, or draining pus.    You have questions or concerns about your procedure, medicine, or care.    Seek care immediately or call 911 if:     Severe chest pain with inspiration and shortness of breath    Large amounts of blood in your sputum    Follow up with your healthcare provider as directed.

## 2025-03-04 ENCOUNTER — RESULTS FOLLOW-UP (OUTPATIENT)
Dept: PULMONOLOGY | Facility: CLINIC | Age: 85
End: 2025-03-04

## 2025-03-04 LAB
MYCOBACTERIUM SPEC CULT: NORMAL
RHODAMINE-AURAMINE STN SPEC: NORMAL

## 2025-03-06 ENCOUNTER — OFFICE VISIT (OUTPATIENT)
Dept: HEMATOLOGY ONCOLOGY | Facility: CLINIC | Age: 85
End: 2025-03-06
Payer: COMMERCIAL

## 2025-03-06 VITALS
RESPIRATION RATE: 18 BRPM | WEIGHT: 182 LBS | BODY MASS INDEX: 27.58 KG/M2 | OXYGEN SATURATION: 96 % | DIASTOLIC BLOOD PRESSURE: 70 MMHG | HEART RATE: 50 BPM | HEIGHT: 68 IN | SYSTOLIC BLOOD PRESSURE: 128 MMHG | TEMPERATURE: 97.5 F

## 2025-03-06 DIAGNOSIS — J90 PLEURAL EFFUSION ON RIGHT: Primary | ICD-10-CM

## 2025-03-06 PROCEDURE — 99214 OFFICE O/P EST MOD 30 MIN: CPT | Performed by: INTERNAL MEDICINE

## 2025-03-06 PROCEDURE — G2211 COMPLEX E/M VISIT ADD ON: HCPCS | Performed by: INTERNAL MEDICINE

## 2025-03-06 NOTE — ASSESSMENT & PLAN NOTE
The patient has large recurrent pleural effusion on the right side.  He is status post multiple thoracentesis.  The cytology from 2 different specimen continues to show atypical cells without obvious hint of malignant process.  He did have extensive imaging including a PET CT scan which was nonconclusive.  [id number] liquid biopsy was also obtained recently from the peripheral blood.  The result came back also nonconclusive for malignant process.    The patient was told that the exact etiology of the recurrent right pleural effusion is not entirely clear at least at this point in time.  However, malignant etiology remains on the top of the differential diagnosis.    The patient will be seen again in couple of weeks from now.  His pleural fluid after the next thoracentesis should be sent out again for cytology.    We did also briefly discussed the potential benefit of getting him seen by the thoracic surgical team for surgical intervention.  The patient stated he would not be interested in any surgical intervention.  We did discuss the fact that he has rather poor performance status and would not be a candidate for any palliative chemotherapy if he were to get the diagnosis of thoracic malignancy.      Orders:    CBC and differential; Future    Comprehensive metabolic panel; Future    Magnesium; Future

## 2025-03-06 NOTE — PROGRESS NOTES
Name: Dexter Sharp      : 1940      MRN: [id number]  Encounter Provider: Raul Dacosta MD  Encounter Date: 3/6/2025   Encounter department: [facility] [place]  :  Assessment & Plan  Pleural effusion on right  The patient has large recurrent pleural effusion on the right side.  He is status post multiple thoracentesis.  The cytology from 2 different specimen continues to show atypical cells without obvious hint of malignant process.  He did have extensive imaging including a PET CT scan which was nonconclusive.  [id number] liquid biopsy was also obtained recently from the peripheral blood.  The result came back also nonconclusive for malignant process.    The patient was told that the exact etiology of the recurrent right pleural effusion is not entirely clear at least at this point in time.  However, malignant etiology remains on the top of the differential diagnosis.    The patient will be seen again in couple of weeks from now.  His pleural fluid after the next thoracentesis should be sent out again for cytology.    We did also briefly discussed the potential benefit of getting him seen by the thoracic surgical team for surgical intervention.  The patient stated he would not be interested in any surgical intervention.  We did discuss the fact that he has rather poor performance status and would not be a candidate for any palliative chemotherapy if he were to get the diagnosis of thoracic malignancy.      Orders:    CBC and differential; Future    Comprehensive metabolic panel; Future    Magnesium; Future        Return in about 6 weeks (around 2025) for Office Visit 20 min, Labs.    History of Present Illness   Chief Complaint   Patient presents with    Follow-up   This is an 84-year-old male with history of chronic kidney disease, coronary artery disease, hypertension, prostate cancer, status post prostatectomy around  followed by prostate bed radiation  around September 2018.     The patient empirically was admitted to the hospital on 1/8/2025 for respiratory symptoms which was thought to be due to a viral infection and pneumonia.  He was treated with antibiotics.  CT scan of the chest without contrast on 1/8/2025 showed large right pleural effusion with multifocal nodular opacities.  He then had thoracentesis on 1/9/2025 of the right side where 1.5 pleural fluid was removed.  Cytology came back highly suspicious for malignancy.  The exact type of malignancy was not entirely clear.  Subsequently after hospital discharge and he had a PET CT scan on 1/28/2025 which was negative for any obvious hypermetabolic metastatic disease.  He continues to have multifocal consolidations in the left lung and large right pleural effusion without hypermetabolic activity in that area.  Brain MRI on 1/24/2025 was negative for metastatic disease.    Interval history:  The patient came today for a follow-up visit accompanied by his wife.  He apparently had a second thoracentesis on 2/21/2025 of the right side.  The cytology showed atypical cellular changes with the rare atypical epithelioid cells of uncertain origin, histiocytes and lymphocytes were present.  Flow cytometry was negative for obvious lymphoproliferative disorder.  The patient had been in mother thoracentesis on 2/28/2025.  Cytology was not sent out.  He stated that he is breathing much better right now and he is back to his usual baseline.        Oncology History   Cancer Staging   No matching staging information was found for the patient.  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 Negative for chills and fever.   HENT:  Negative for ear pain and sore throat.    Eyes:  Negative for pain and visual disturbance.   Respiratory:  Positive for shortness of breath. Negative for cough.    Cardiovascular:  Negative for chest pain and palpitations.   Gastrointestinal:  Positive for constipation. Negative for abdominal pain and vomiting.   Genitourinary:  Negative for dysuria and hematuria.   Musculoskeletal:  Negative for arthralgias and back pain.   Skin:  Positive for rash. Negative for color change.   Neurological:  Positive for dizziness. Negative for seizures and syncope.   Psychiatric/Behavioral:  Positive for sleep disturbance.    All other systems reviewed and are negative.    Medical History Reviewed by provider this encounter:  Tobacco  Allergies  Meds  Problems  Med Hx  Surg Hx  Fam Hx     .  Current Outpatient Medications on File Prior to Visit   Medication Sig Dispense Refill    acetaminophen (TYLENOL) 500 mg tablet Take 500 mg by mouth every 4 (four) hours as needed for fever, headaches or mild pain. Indications: Fever, Pain      ALPRAZolam (XANAX) 0.5 mg tablet Take 1 tablet (0.5 mg total) by mouth daily at bedtime as needed for anxiety 30 tablet 0    amLODIPine (NORVASC) 5 mg tablet Take 1 tablet (5 mg total) by mouth daily 90 tablet 1    aspirin 81 mg chewable tablet Chew 81 mg daily      atorvastatin (LIPITOR) 40 mg tablet Take 1 tablet (40 mg total) by mouth daily 30 tablet 0    levothyroxine 88 mcg tablet TAKE 1 TABLET (88 MCG TOTAL) BY MOUTH DAILY 100 tablet 1    lisinopril-hydrochlorothiazide (PRINZIDE,ZESTORETIC) 20-25 MG per tablet Take  "1 tablet by mouth daily 100 tablet 3    meloxicam (MOBIC) 15 mg tablet TAKE 1 TABLET (15 MG TOTAL) BY MOUTH DAILY 100 tablet 0    Misc. Devices (Bathtub Safety Rail) MISC Use in the morning 1 each 0    traZODone (DESYREL) 50 mg tablet Take 1 tablet (50 mg total) by mouth daily at bedtime 90 tablet 3     No current facility-administered medications on file prior to visit.      Social History     Tobacco Use    Smoking status: Former     Current packs/day: 0.00     Types: Cigarettes, Cigars     Quit date:      Years since quittin.2     Passive exposure: Past    Smokeless tobacco: Never    Tobacco comments:     smokes 1-2 cigarss/month   Vaping Use    Vaping status: Never Used   Substance and Sexual Activity    Alcohol use: Never    Drug use: No     Comment: Chronic Narcotic use noted in \"[employer]\"     Sexual activity: Not on file         Objective   /70 (BP Location: Right arm, Patient Position: Sitting, Cuff Size: Adult)   Pulse (!) 50   Temp 97.5 °F (36.4 °C)   Resp 18   Ht 5' 8\" (1.727 m)   Wt 82.6 kg (182 lb)   SpO2 96%   BMI 27.67 kg/m²     Pain Screening:  Pain Score: 10-Worst pain ever  ECOG   3  Physical Exam  Constitutional:       Appearance: He is well-developed.   HENT:      Head: Normocephalic and atraumatic.      Nose: Nose normal.   Eyes:      General: No scleral icterus.        Right eye: No discharge.         Left eye: No discharge.      Conjunctiva/sclera: Conjunctivae normal.      Pupils: Pupils are equal, round, and reactive to light.   Neck:      Thyroid: No thyromegaly.      Trachea: No tracheal deviation.   Cardiovascular:      Rate and Rhythm: Normal rate and regular rhythm.      Heart sounds: Normal heart sounds. No murmur heard.     No friction rub.   Pulmonary:      Effort: Pulmonary effort is normal. No respiratory distress.      Breath sounds: Normal breath sounds. No wheezing or rales.   Chest:      Chest wall: No tenderness.   Abdominal:      General: There is no " distension.      Palpations: Abdomen is soft. There is no hepatomegaly or splenomegaly.      Tenderness: There is no abdominal tenderness. There is no guarding or rebound.   Musculoskeletal:         General: No tenderness or deformity. Normal range of motion.      Cervical back: Normal range of motion and neck supple.   Lymphadenopathy:      Cervical: No cervical adenopathy.   Skin:     General: Skin is warm and dry.      Coloration: Skin is not pale.      Findings: No erythema or rash.   Neurological:      Mental Status: He is alert and oriented to person, place, and time.      Cranial Nerves: No cranial nerve deficit.      Coordination: Coordination normal.      Deep Tendon Reflexes: Reflexes are normal and symmetric.   Psychiatric:         Behavior: Behavior normal.         Thought Content: Thought content normal.         Judgment: Judgment normal.         Labs: I have reviewed the following labs:  Lab Results   Component Value Date/Time    WBC 6.93 01/13/2025 05:29 AM    RBC 4.29 01/13/2025 05:29 AM    Hemoglobin 13.2 01/13/2025 05:29 AM    Hematocrit 38.8 01/13/2025 05:29 AM    MCV 90 01/13/2025 05:29 AM    MCH 30.8 01/13/2025 05:29 AM    RDW 13.2 01/13/2025 05:29 AM    Platelets 231 01/13/2025 05:29 AM    Segmented % 70 01/13/2025 05:29 AM    Lymphocytes % 16 01/13/2025 05:29 AM    Monocytes % 11 01/13/2025 05:29 AM    Eosinophils Relative 2 01/13/2025 05:29 AM    Basophils Relative 0 01/13/2025 05:29 AM    Immature Grans % 1 01/13/2025 05:29 AM    Absolute Neutrophils 4.83 01/13/2025 05:29 AM     Lab Results   Component Value Date/Time    Potassium 3.9 01/13/2025 05:29 AM    Chloride 103 01/13/2025 05:29 AM    CO2 29 01/13/2025 05:29 AM    BUN 16 01/13/2025 05:29 AM    Creatinine 0.99 01/13/2025 05:29 AM    Calcium 8.4 01/13/2025 05:29 AM     (H) 01/09/2025 06:35 AM    ALT 55 (H) 01/09/2025 06:35 AM    Alkaline Phosphatase 48 01/09/2025 06:35 AM    Total Protein 6.6 01/09/2025 06:35 AM    Albumin 3.7  "01/09/2025 06:35 AM    Total Bilirubin 0.70 01/09/2025 06:35 AM    eGFR 68 01/13/2025 05:29 AM     Lab Results   Component Value Date/Time    WBC 6.93 01/13/2025 05:29 AM    RBC 4.29 01/13/2025 05:29 AM    Hemoglobin 13.2 01/13/2025 05:29 AM    Hematocrit 38.8 01/13/2025 05:29 AM    MCV 90 01/13/2025 05:29 AM    MCH 30.8 01/13/2025 05:29 AM    RDW 13.2 01/13/2025 05:29 AM    Platelets 231 01/13/2025 05:29 AM    Segmented % 70 01/13/2025 05:29 AM    Bands % 18 (H) 01/08/2025 03:54 PM    Lymphocytes % 16 01/13/2025 05:29 AM    Monocytes % 11 01/13/2025 05:29 AM    Eosinophils Relative 2 01/13/2025 05:29 AM    Basophils Relative 0 01/13/2025 05:29 AM    Immature Grans % 1 01/13/2025 05:29 AM    Absolute Neutrophils 4.83 01/13/2025 05:29 AM      Lab Results   Component Value Date/Time    Sodium 138 01/13/2025 05:29 AM    Potassium 3.9 01/13/2025 05:29 AM    Chloride 103 01/13/2025 05:29 AM    CO2 29 01/13/2025 05:29 AM    ANION GAP 6 01/13/2025 05:29 AM    BUN 16 01/13/2025 05:29 AM    Creatinine 0.99 01/13/2025 05:29 AM    Glucose 96 01/13/2025 05:29 AM    Calcium 8.4 01/13/2025 05:29 AM     (H) 01/09/2025 06:35 AM    ALT 55 (H) 01/09/2025 06:35 AM    Alkaline Phosphatase 48 01/09/2025 06:35 AM    Total Protein 6.6 01/09/2025 06:35 AM    Albumin 3.7 01/09/2025 06:35 AM    Total Bilirubin 0.70 01/09/2025 06:35 AM    eGFR 68 01/13/2025 05:29 AM      No results found for: \"IRON\", \"CONCFE\", \"FERRITIN\", \"SFSQCDVR32\", \"FOLATE\", \"COPPER\", \"EPOREFLAB\", \"ERYTHROPRO\", \"ESR\", \"CRP\", \"HIVAGAB\", \"HEPATITIS\"           "

## 2025-03-07 ENCOUNTER — DOCUMENTATION (OUTPATIENT)
Dept: HEMATOLOGY ONCOLOGY | Facility: CLINIC | Age: 85
End: 2025-03-07

## 2025-03-07 ENCOUNTER — PATIENT OUTREACH (OUTPATIENT)
Dept: HEMATOLOGY ONCOLOGY | Facility: CLINIC | Age: 85
End: 2025-03-07

## 2025-03-07 NOTE — PROGRESS NOTES
Spoke with Dexter, he said he feels great, has not been drained this week. He said he is taking deep breaths and he feels clear. He told me they forgot to test the fluid last week, but he is going to remind them to do it this Friday 3/14 when he goes back. He said he saw Dr Dacosta yesterday. He has been out walking everyday, takes his dog outside every morning at 5 am. He thanked me for calling, I told him I will be in touch again in a few weeks. He is aware of all his upcoming appointments.

## 2025-03-07 NOTE — PROGRESS NOTES
Per chart review, Dexter was recently seen by Dr Dacosta. Per Dr Dacosta's note: The patient was told the exact etiology of the recurrent right pleural effusion is not entirely clear at this point. The patient will be seen again in a couple of weeks from today. His pleural fluid after the next thoracentesis should be sent out again for cytology. Dexter was not interested in surgery. I will review in 4 weeks to see if any treatment will be done for patient. I will remain available to the patient for any barriers and support if needed.

## 2025-03-09 DIAGNOSIS — F41.1 GENERALIZED ANXIETY DISORDER: ICD-10-CM

## 2025-03-10 RX ORDER — ALPRAZOLAM 0.5 MG
0.5 TABLET ORAL
Qty: 30 TABLET | Refills: 0 | Status: SHIPPED | OUTPATIENT
Start: 2025-03-10

## 2025-03-11 ENCOUNTER — OFFICE VISIT (OUTPATIENT)
Dept: PULMONOLOGY | Facility: CLINIC | Age: 85
End: 2025-03-11
Payer: COMMERCIAL

## 2025-03-11 VITALS
BODY MASS INDEX: 28.19 KG/M2 | SYSTOLIC BLOOD PRESSURE: 110 MMHG | WEIGHT: 186 LBS | DIASTOLIC BLOOD PRESSURE: 50 MMHG | RESPIRATION RATE: 18 BRPM | HEART RATE: 58 BPM | HEIGHT: 68 IN | OXYGEN SATURATION: 95 % | TEMPERATURE: 97.8 F

## 2025-03-11 DIAGNOSIS — J90 PLEURAL EFFUSION ON RIGHT: Primary | ICD-10-CM

## 2025-03-11 DIAGNOSIS — J96.11 CHRONIC HYPOXEMIC RESPIRATORY FAILURE (HCC): ICD-10-CM

## 2025-03-11 DIAGNOSIS — I10 PRIMARY HYPERTENSION: ICD-10-CM

## 2025-03-11 LAB
MYCOBACTERIUM SPEC CULT: NORMAL
RHODAMINE-AURAMINE STN SPEC: NORMAL

## 2025-03-11 PROCEDURE — 99213 OFFICE O/P EST LOW 20 MIN: CPT

## 2025-03-11 NOTE — PROGRESS NOTES
Name: Dexter Sharp      : 1940      MRN: [id number]  Encounter Provider: GAIL Coles  Encounter Date: 3/11/2025   Encounter department: [facility] [place]  :  Assessment & Plan  Pleural effusion on right  -S/p multiple thoracenteses, most recently on  drained 2 L per patient.  Having an easier time breathing, although lungs continue to have diminished breath sounds in the RML/RLL  -Cytology continues to show atypical cells without obvious malignancy  -PET/CT was non conclusive.  MRI brain negative  -Etiology unclear    Plan:  -Discussed thoracic surgical evaluation for surgical intervention, however patient declines referral as he would not want surgical intervention  -Scheduled for next thoracentesis on Friday 3/14.  Will order pleural fluid for repeat cytology and notify IR  -Follow-up with oncology as scheduled next month    Orders:    Non-gynecologic cytology; Future    Chronic hypoxemic respiratory failure (HCC)  -Continue 3 L oxygen as needed with ambulation and 2 L nightly           History of Present Illness   Dexter Sharp is a 84 y.o. male with a past medical history of CKD, hypertension, prostate cancer 20 + years ago with metastasis to intrapelvic lymph node appearing in  treated with radiation, and former smoker quit 30+ years ago who is here today for a follow-up visit.  He was hospitalized in the beginning of January for influenza A and pneumonia.  He had a large pleural effusion and underwent thoracentesis on 2025 with approximately 1.5 L removed.  Cytology came back positive for atypical cells suspicious for malignancy.  He underwent PET/CT on 2025 which showed no evidence of metastatic disease or primary lesion identified. He underwent MRI of the brain which showed no evidence of metastatic disease in the brain. He has reaccumulation of fluid with large right pleural effusion noted on recent imaging, and underwent thoracentesis  again on 2/21 and 2/28. Pleural studies were sent on 2/21 again showing atypical cells without obvious malignancy.     He was seen in the pulmonary office 4 weeks ago. Had a walk test that showed desaturations. Placed on 3 L with ambulation and maintained on 2 L nightly. He had a recent visit with oncology. Offered thoracic surgery evaluation for possible surgical intervention for right sided pleural effusion, however, patient declined.  He has another thoracentesis scheduled on Friday 3/14.  Oncology would like repeat cytology sent.      Patient states he is doing better.  States he can take deep breaths again.  He tells me they removed 2 L of fluid from his IR thoracentesis on 2/28.  Has not used oxygen in the past 2 days.  Denies any cough, mucus, or wheezing.  No chest pain or lower extremity swelling.  No fevers, chills, night sweats, or weight loss.  He is otherwise feeling well at this time.    Review of Systems   Constitutional:  Negative for activity change, chills, fever and unexpected weight change.   HENT:  Negative for congestion, postnasal drip, rhinorrhea, sore throat and trouble swallowing.    Respiratory:  Negative for cough, chest tightness, shortness of breath and wheezing.    Cardiovascular:  Negative for chest pain, palpitations and leg swelling.   Allergic/Immunologic: Negative.      Current Outpatient Medications on File Prior to Visit   Medication Sig Dispense Refill    acetaminophen (TYLENOL) 500 mg tablet Take 500 mg by mouth every 4 (four) hours as needed for fever, headaches or mild pain. Indications: Fever, Pain      ALPRAZolam (XANAX) 0.5 mg tablet Take 1 tablet (0.5 mg total) by mouth daily at bedtime as needed for anxiety 30 tablet 0    amLODIPine (NORVASC) 5 mg tablet Take 1 tablet (5 mg total) by mouth daily 90 tablet 1    aspirin 81 mg chewable tablet Chew 81 mg daily      atorvastatin (LIPITOR) 40 mg tablet Take 1 tablet (40 mg total) by mouth daily 30 tablet 0    levothyroxine 88  mcg tablet TAKE 1 TABLET (88 MCG TOTAL) BY MOUTH DAILY 100 tablet 1    lisinopril-hydrochlorothiazide (PRINZIDE,ZESTORETIC) 20-25 MG per tablet Take 1 tablet by mouth daily 100 tablet 3    meloxicam (MOBIC) 15 mg tablet TAKE 1 TABLET (15 MG TOTAL) BY MOUTH DAILY 100 tablet 0    Misc. Devices (Bathtub Safety Rail) MISC Use in the morning 1 each 0    traZODone (DESYREL) 50 mg tablet Take 1 tablet (50 mg total) by mouth daily at bedtime 90 tablet 3     No current facility-administered medications on file prior to visit.          Objective   There were no vitals taken for this visit.     Physical Exam  Vitals and nursing note reviewed.   Constitutional:       General: He is not in acute distress.     Appearance: Normal appearance. He is well-developed.   Cardiovascular:      Rate and Rhythm: Normal rate and regular rhythm.      Heart sounds: Normal heart sounds. No murmur heard.  Pulmonary:      Effort: Pulmonary effort is normal. No respiratory distress.      Breath sounds: Examination of the right-middle field reveals decreased breath sounds. Examination of the right-lower field reveals decreased breath sounds. Decreased breath sounds present. No wheezing, rhonchi or rales.   Musculoskeletal:         General: No swelling.      Right lower leg: No edema.      Left lower leg: No edema.   Psychiatric:         Mood and Affect: Mood and affect normal.         Behavior: Behavior normal. Behavior is cooperative.         Lab Results: I have reviewed pertinent labs.    Radiology Results Review: I have reviewed radiology reports from 1/28/25, 1/24/25 including: PET/CT, MRI brain.  Other Study Results: Other Study Results Review : Pathology reports reviewed.  PFT Results Reviewed: n/a      HPI

## 2025-03-11 NOTE — ASSESSMENT & PLAN NOTE
-S/p multiple thoracenteses, most recently on 2/28 drained 2 L per patient.  Having an easier time breathing, although lungs continue to have diminished breath sounds in the RML/RLL  -Cytology continues to show atypical cells without obvious malignancy  -PET/CT was non conclusive.  MRI brain negative  -Etiology unclear    Plan:  -Discussed thoracic surgical evaluation for surgical intervention, however patient declines referral as he would not want surgical intervention  -Scheduled for next thoracentesis on Friday 3/14.  Will order pleural fluid for repeat cytology and notify IR  -Follow-up with oncology as scheduled next month    Orders:    Non-gynecologic cytology; Future

## 2025-03-12 RX ORDER — AMLODIPINE BESYLATE 5 MG/1
5 TABLET ORAL DAILY
Qty: 90 TABLET | Refills: 1 | Status: SHIPPED | OUTPATIENT
Start: 2025-03-12

## 2025-03-13 ENCOUNTER — RA CDI HCC (OUTPATIENT)
Dept: OTHER | Facility: HOSPITAL | Age: 85
End: 2025-03-13

## 2025-03-13 NOTE — PROGRESS NOTES
HCC coding opportunities    I12.9  GR     Chart Reviewed number of suggestions sent to Provider: 1     Patients Insurance     Medicare Insurance: [employer] Ad[employer]age

## 2025-03-14 ENCOUNTER — HOSPITAL ENCOUNTER (OUTPATIENT)
Dept: INTERVENTIONAL RADIOLOGY/VASCULAR | Facility: HOSPITAL | Age: 85
End: 2025-03-14
Payer: COMMERCIAL

## 2025-03-14 VITALS
SYSTOLIC BLOOD PRESSURE: 140 MMHG | HEART RATE: 64 BPM | OXYGEN SATURATION: 99 % | RESPIRATION RATE: 18 BRPM | DIASTOLIC BLOOD PRESSURE: 75 MMHG

## 2025-03-14 DIAGNOSIS — J90 PLEURAL EFFUSION ON RIGHT: ICD-10-CM

## 2025-03-14 PROCEDURE — 32555 ASPIRATE PLEURA W/ IMAGING: CPT

## 2025-03-14 PROCEDURE — 88112 CYTOPATH CELL ENHANCE TECH: CPT | Performed by: STUDENT IN AN ORGANIZED HEALTH CARE EDUCATION/TRAINING PROGRAM

## 2025-03-14 PROCEDURE — 32555 ASPIRATE PLEURA W/ IMAGING: CPT | Performed by: PHYSICIAN ASSISTANT

## 2025-03-14 PROCEDURE — 88305 TISSUE EXAM BY PATHOLOGIST: CPT | Performed by: STUDENT IN AN ORGANIZED HEALTH CARE EDUCATION/TRAINING PROGRAM

## 2025-03-14 RX ORDER — LIDOCAINE WITH 8.4% SOD BICARB 0.9%(10ML)
SYRINGE (ML) INJECTION AS NEEDED
Status: COMPLETED | OUTPATIENT
Start: 2025-03-14 | End: 2025-03-14

## 2025-03-14 RX ADMIN — Medication 10 ML: at 11:15

## 2025-03-14 NOTE — SEDATION DOCUMENTATION
Right thora 1850ml of clear yellow fluid removed; band-aid applied; labs sent as requested; pt offers no complaints.

## 2025-03-18 ENCOUNTER — OFFICE VISIT (OUTPATIENT)
Dept: INTERNAL MEDICINE CLINIC | Facility: CLINIC | Age: 85
End: 2025-03-18
Payer: COMMERCIAL

## 2025-03-18 VITALS
TEMPERATURE: 97.8 F | OXYGEN SATURATION: 96 % | HEART RATE: 58 BPM | DIASTOLIC BLOOD PRESSURE: 68 MMHG | BODY MASS INDEX: 27.58 KG/M2 | SYSTOLIC BLOOD PRESSURE: 134 MMHG | WEIGHT: 182 LBS | HEIGHT: 68 IN

## 2025-03-18 DIAGNOSIS — C78.00 METASTATIC ADENOCARCINOMA TO LUNG WITH UNKNOWN PRIMARY SITE, UNSPECIFIED LATERALITY (HCC): ICD-10-CM

## 2025-03-18 DIAGNOSIS — J90 PLEURAL EFFUSION ON RIGHT: ICD-10-CM

## 2025-03-18 DIAGNOSIS — G47.9 SLEEP DISTURBANCE: ICD-10-CM

## 2025-03-18 DIAGNOSIS — I10 PRIMARY HYPERTENSION: Primary | ICD-10-CM

## 2025-03-18 DIAGNOSIS — C80.1 METASTATIC ADENOCARCINOMA TO LUNG WITH UNKNOWN PRIMARY SITE, UNSPECIFIED LATERALITY (HCC): ICD-10-CM

## 2025-03-18 DIAGNOSIS — J96.11 CHRONIC HYPOXEMIC RESPIRATORY FAILURE (HCC): ICD-10-CM

## 2025-03-18 LAB
MYCOBACTERIUM SPEC CULT: NORMAL
RHODAMINE-AURAMINE STN SPEC: NORMAL

## 2025-03-18 PROCEDURE — 99213 OFFICE O/P EST LOW 20 MIN: CPT | Performed by: INTERNAL MEDICINE

## 2025-03-18 PROCEDURE — G2211 COMPLEX E/M VISIT ADD ON: HCPCS | Performed by: INTERNAL MEDICINE

## 2025-03-18 NOTE — PROGRESS NOTES
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 BP is better along with sleep off the meds. Appreciate Pulmonary and Onc input. Await f/u thoracocentesis and continue to monitor closely. RTC two months for routine.         History of Present Illness   WM RTC for f/u multiple issues. BP was elevated last visit and no meds were changed as his OP readings were good. Reports OP readings have remained good. No s/s at this time. Next, trazodone started for sleep issues and instructed on sleep hygiene. Only took a few doses and sleep improved with non med interventions. Finally, continues with right pleural efffusion with cytoloy abnormal, but primary cancer site unable to be found. Seen by heme and pulmonary. Pt to have repeat  multiple thoracocentesis and monitored closely. Declined surgical intervention for the effusion. No new issues and is feeling ok.       Review of Systems   Constitutional:  Negative for activity change, chills, diaphoresis, fatigue and fever.   HENT: Negative.     Eyes:  Negative for visual disturbance.   Respiratory:  Negative for cough, chest tightness, shortness of breath and wheezing.    Cardiovascular:  Negative for chest pain, palpitations and leg swelling.   Gastrointestinal:  Negative for abdominal pain, constipation, diarrhea, nausea and vomiting.   Endocrine: Negative for cold intolerance and heat intolerance.   Genitourinary:  Negative for difficulty urinating, dysuria and frequency.   Musculoskeletal:  Negative for arthralgias, gait problem and myalgias.  "  Neurological:  Negative for dizziness, seizures, syncope, weakness, light-headedness and headaches.   Psychiatric/Behavioral:  Negative for confusion, dysphoric mood and sleep disturbance. The patient is not nervous/anxious.        Objective   /68   Pulse 58   Temp 97.8 °F (36.6 °C)   Ht 5' 8\" (1.727 m)   Wt 82.6 kg (182 lb)   SpO2 96%   BMI 27.67 kg/m²      Physical Exam  Vitals and nursing note reviewed.   Constitutional:       General: He is not in acute distress.     Appearance: Normal appearance. He is not ill-appearing.   HENT:      Head: Normocephalic and atraumatic.      Mouth/Throat:      Mouth: Mucous membranes are moist.   Eyes:      Extraocular Movements: Extraocular movements intact.      Conjunctiva/sclera: Conjunctivae normal.      Pupils: Pupils are equal, round, and reactive to light.   Neck:      Vascular: No carotid bruit.   Cardiovascular:      Rate and Rhythm: Normal rate and regular rhythm.      Heart sounds: Normal heart sounds. No murmur heard.  Pulmonary:      Effort: Pulmonary effort is normal. No respiratory distress.      Breath sounds: Normal breath sounds. No wheezing, rhonchi or rales.   Abdominal:      General: Bowel sounds are normal. There is no distension.      Palpations: Abdomen is soft.      Tenderness: There is no abdominal tenderness.   Musculoskeletal:      Cervical back: Neck supple.      Right lower leg: Edema present.      Left lower leg: Edema present.      Comments: Bilat LE 1/4   Neurological:      General: No focal deficit present.      Mental Status: He is alert and oriented to person, place, and time. Mental status is at baseline.   Psychiatric:         Mood and Affect: Mood normal.         Behavior: Behavior normal.         Thought Content: Thought content normal.         Judgment: Judgment normal.         "

## 2025-03-19 LAB
HISTIOCYTES NFR FLD: 64 %
LYMPHOCYTES NFR BLD AUTO: 17 %
MONO+MESO NFR FLD MANUAL: 7 %
MONOCYTES NFR BLD AUTO: 5 %
NEUTS SEG NFR BLD AUTO: 7 %
TOTAL CELLS COUNTED SPEC: 100

## 2025-03-19 PROCEDURE — 88112 CYTOPATH CELL ENHANCE TECH: CPT | Performed by: STUDENT IN AN ORGANIZED HEALTH CARE EDUCATION/TRAINING PROGRAM

## 2025-03-19 PROCEDURE — 88305 TISSUE EXAM BY PATHOLOGIST: CPT | Performed by: STUDENT IN AN ORGANIZED HEALTH CARE EDUCATION/TRAINING PROGRAM

## 2025-03-20 ENCOUNTER — TELEPHONE (OUTPATIENT)
Age: 85
End: 2025-03-20

## 2025-03-21 ENCOUNTER — HOSPITAL ENCOUNTER (OUTPATIENT)
Dept: INTERVENTIONAL RADIOLOGY/VASCULAR | Facility: HOSPITAL | Age: 85
End: 2025-03-21
Payer: COMMERCIAL

## 2025-03-21 VITALS
RESPIRATION RATE: 18 BRPM | HEART RATE: 60 BPM | DIASTOLIC BLOOD PRESSURE: 63 MMHG | SYSTOLIC BLOOD PRESSURE: 132 MMHG | OXYGEN SATURATION: 98 %

## 2025-03-21 DIAGNOSIS — J90 PLEURAL EFFUSION ON RIGHT: ICD-10-CM

## 2025-03-21 PROCEDURE — 32555 ASPIRATE PLEURA W/ IMAGING: CPT | Performed by: PHYSICIAN ASSISTANT

## 2025-03-21 PROCEDURE — 32555 ASPIRATE PLEURA W/ IMAGING: CPT

## 2025-03-21 RX ORDER — LIDOCAINE WITH 8.4% SOD BICARB 0.9%(10ML)
SYRINGE (ML) INJECTION AS NEEDED
Status: COMPLETED | OUTPATIENT
Start: 2025-03-21 | End: 2025-03-21

## 2025-03-21 RX ADMIN — Medication 10 ML: at 11:12

## 2025-03-25 LAB
MYCOBACTERIUM SPEC CULT: NORMAL
RHODAMINE-AURAMINE STN SPEC: NORMAL

## 2025-03-28 ENCOUNTER — HOSPITAL ENCOUNTER (OUTPATIENT)
Dept: INTERVENTIONAL RADIOLOGY/VASCULAR | Facility: HOSPITAL | Age: 85
End: 2025-03-28
Payer: COMMERCIAL

## 2025-03-28 VITALS
HEART RATE: 57 BPM | RESPIRATION RATE: 18 BRPM | DIASTOLIC BLOOD PRESSURE: 63 MMHG | OXYGEN SATURATION: 98 % | SYSTOLIC BLOOD PRESSURE: 137 MMHG

## 2025-03-28 DIAGNOSIS — J90 PLEURAL EFFUSION ON RIGHT: ICD-10-CM

## 2025-03-28 PROCEDURE — 32555 ASPIRATE PLEURA W/ IMAGING: CPT | Performed by: PHYSICIAN ASSISTANT

## 2025-03-28 PROCEDURE — 32555 ASPIRATE PLEURA W/ IMAGING: CPT

## 2025-03-28 RX ORDER — LIDOCAINE WITH 8.4% SOD BICARB 0.9%(10ML)
SYRINGE (ML) INJECTION AS NEEDED
Status: COMPLETED | OUTPATIENT
Start: 2025-03-28 | End: 2025-03-28

## 2025-03-28 RX ADMIN — Medication 10 ML: at 11:11

## 2025-03-28 NOTE — DISCHARGE INSTRUCTIONS
[facility]  Interventional Radiology  [phone]        Thoracentesis   WHAT YOU NEED TO KNOW:   A thoracentesis is a procedure to remove extra fluid or air from between your lungs and your inner chest wall. Air or fluid buildup may make it hard for you to breathe. A thoracentesis allows your lungs to expand fully so you can breathe more easily.    DISCHARGE INSTRUCTIONS:     Small amount of shoulder pain and bloody sputum is normal after a Thoracentesis.     Rest:  Rest when you feel it is needed. Slowly start to do more each day. Return to your daily activities as directed.     Resume your normal diet. Small sips of flat soda will help mild nausea.    Do not smoke:  If you smoke, it is never too late to quit. Ask for information about how to stop smoking if you need help.    Contact Interventional Radiology at [phone] (LIAT PATIENTS: Contact Interventional Radiology at [phone]) (VIDAL PATIENTS: Contact Interventional Radiology at [phone]) if:   You have a fever.    Your puncture site is red, warm, swollen, or draining pus.    You have questions or concerns about your procedure, medicine, or care.    Seek care immediately or call 911 if:   Severe chest pain with inspiration and shortness of breath    Large amounts of blood in your sputum    Follow up with your healthcare provider as directed.

## 2025-03-28 NOTE — BRIEF OP NOTE (RAD/CATH)
IR THORACENTESIS Procedure Note    PATIENT NAME: Dexter Sharp  : 1940  MRN: [id number]    Pre-op Diagnosis:   1. Pleural effusion on right      Post-op Diagnosis:   1. Pleural effusion on right        Provider:   Josesito Subramanian PA-C    Estimated Blood Loss: none    Findings: R thoracentesis, 1400cc clear yellow    Specimens: none    Complications:  none immediate    Anesthesia: local    Josesito Subramanian PA-C     Date: 3/28/2025  Time: 1:00 PM

## 2025-03-28 NOTE — SEDATION DOCUMENTATION
Patient had a right sided thoracentesis performed by SCOT Oropeza with IR. A total of 1,400 ml of clear yellow fluid was removed, no labs needed. Patient offers no complaints at this time.

## 2025-04-01 LAB
MYCOBACTERIUM SPEC CULT: NORMAL
RHODAMINE-AURAMINE STN SPEC: NORMAL

## 2025-04-03 ENCOUNTER — OFFICE VISIT (OUTPATIENT)
Dept: CARDIOLOGY CLINIC | Facility: CLINIC | Age: 85
End: 2025-04-03
Payer: COMMERCIAL

## 2025-04-03 VITALS
WEIGHT: 189.2 LBS | TEMPERATURE: 98.6 F | DIASTOLIC BLOOD PRESSURE: 70 MMHG | BODY MASS INDEX: 28.67 KG/M2 | SYSTOLIC BLOOD PRESSURE: 144 MMHG | HEART RATE: 55 BPM | RESPIRATION RATE: 20 BRPM | OXYGEN SATURATION: 95 % | HEIGHT: 68 IN

## 2025-04-03 DIAGNOSIS — J90 PLEURAL EFFUSION ON RIGHT: ICD-10-CM

## 2025-04-03 DIAGNOSIS — N18.2 STAGE 2 CHRONIC KIDNEY DISEASE: ICD-10-CM

## 2025-04-03 DIAGNOSIS — E66.3 OVERWEIGHT (BMI 25.0-29.9): ICD-10-CM

## 2025-04-03 DIAGNOSIS — E78.49 OTHER HYPERLIPIDEMIA: ICD-10-CM

## 2025-04-03 DIAGNOSIS — I25.10 CORONARY ARTERY CALCIFICATION SEEN ON CT SCAN: ICD-10-CM

## 2025-04-03 DIAGNOSIS — I10 PRIMARY HYPERTENSION: Primary | ICD-10-CM

## 2025-04-03 PROCEDURE — 99214 OFFICE O/P EST MOD 30 MIN: CPT | Performed by: INTERNAL MEDICINE

## 2025-04-03 RX ORDER — TORSEMIDE 5 MG/1
5 TABLET ORAL EVERY OTHER DAY
Qty: 30 TABLET | Refills: 3 | Status: SHIPPED | OUTPATIENT
Start: 2025-04-03

## 2025-04-03 RX ORDER — POTASSIUM CHLORIDE 750 MG/1
10 CAPSULE, EXTENDED RELEASE ORAL DAILY
Qty: 30 CAPSULE | Refills: 3 | Status: SHIPPED | OUTPATIENT
Start: 2025-04-03

## 2025-04-03 NOTE — ASSESSMENT & PLAN NOTE
-Patient agreeable to diuretic therapy and will attempt addition of torsemide 5 mg every other day with potassium 10 mEq on days he takes torsemide with ability to increase to daily as needed for weight gain or worsening symptomatology

## 2025-04-03 NOTE — ASSESSMENT & PLAN NOTE
-After discussion with patient he wishes to avoid additional testing at this time as he denies any chest pain or anginal symptoms.  -Continue aspirin 81 mg daily atorvastatin 40 mg daily and amlodipine 5 mg daily.

## 2025-04-03 NOTE — ASSESSMENT & PLAN NOTE
Lab Results   Component Value Date    EGFR 68 01/13/2025    EGFR 73 01/11/2025    EGFR 68 01/10/2025    CREATININE 0.99 01/13/2025    CREATININE 0.94 01/11/2025    CREATININE 0.99 01/10/2025   -Will check BMP in 1 week to monitor renal function and electrolytes with diuretic therapy and then will recheck prior to next office visit  -Continue to monitor  -Patient counseled on the importance of increasing magnesium and potassium in diet

## 2025-04-03 NOTE — PROGRESS NOTES
Patient ID: Dexter Sharp is a 84 y.o. male.        Plan:      Assessment & Plan  Coronary artery calcification seen on CT scan  -After discussion with patient he wishes to avoid additional testing at this time as he denies any chest pain or anginal symptoms.  -Continue aspirin 81 mg daily atorvastatin 40 mg daily and amlodipine 5 mg daily.  Primary hypertension  -Patient notes blood pressures at home well-controlled  -Will continue to monitor on current medical therapy with amlodipine 5 mg daily, lisinopril 20 mg daily and hydrochlorothiazide 25 mg daily  -Continue to monitor  Pleural effusion on right  -Patient agreeable to diuretic therapy and will attempt addition of torsemide 5 mg every other day with potassium 10 mEq on days he takes torsemide with ability to increase to daily as needed for weight gain or worsening symptomatology  Stage 2 chronic kidney disease  Lab Results   Component Value Date    EGFR 68 01/13/2025    EGFR 73 01/11/2025    EGFR 68 01/10/2025    CREATININE 0.99 01/13/2025    CREATININE 0.94 01/11/2025    CREATININE 0.99 01/10/2025   -Will check BMP in 1 week to monitor renal function and electrolytes with diuretic therapy and then will recheck prior to next office visit  -Continue to monitor  -Patient counseled on the importance of increasing magnesium and potassium in diet  Overweight (BMI 25.0-29.9)  -Counseled patient on dietary and lifestyle modifications  -Continue to monitor  Other hyperlipidemia  -Continue atorvastatin 40 mg daily  -Continue to monitor      Follow up Plan/Other summary comments:  -Basic metabolic panel 1/13/2025 showing creatinine 0.99 with GFR 68.  - on laboratory studies 1/8/2025  -Counseled patient on dietary and lifestyle modifications including following a low-salt, low-fat, heart healthy diet with sodium restriction to less than 1800 mg of sodium daily, fluid restriction to less than 2000 mL of fluid daily, DASH diet, NSAID avoidance.  -After  discussion with patient today patient still wishes to hold off invasive or aggressive treatments or strategies and would not want to be overly aggressive with treatment options but is agreeable to at least trying diuretic therapy.  -Will trial addition of torsemide 5 mg every other day with 10 mEq potassium every other day and can monitor response with repeat BMP in 1 week  -Patient will continue amlodipine 5 mg daily, aspirin 81 mg daily, atorvastatin 40 mg daily, lisinopril 20 mg daily and hydrochlorothiazide 25 mg daily  -Patient will continue to follow with oncology and pulmonology teams as per oncology's last note while there was no definitive malignancy seen they are still concerned about the potential for this.  -I will see patient in 3 months or sooner if necessary  -Patient can increase his diuretic therapy to daily for weight gain greater than 3 pounds in 1 day, 5 pounds 1 week, worsening lower extremity edema or shortness of breath however if he does this more than twice in a week he will need to let our office know for monitoring and on days he takes the torsemide will take 10 mEq of potassium.  -Patient counseled if he were to have any warning or alarm type symptoms he is to seek emergency medical care immediately.    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  Patient was seen in the office originally in November 2021 and then had not been seen again until February 2025 after recurrent effusions had arisen.  During office visit in February 2025 I did discuss with patient about possible initiation of diuretic therapy however at that time he declined.  I also spoke with him about  possible invasive evaluation or additional testing for troponin elevations and he declined as he did not want any invasive or aggressive treatments or strategies. He presents to the office today for follow-up.  -Currently in the office today patient denies any chest pain, palpitations, lightness or dizziness, loss of consciousness.  He notes that shortness of breath completely resolved after thoracentesis and denies any orthopnea.  He states that even without this he has no chest pain or anginal symptom that he is aware of and after thoracentesis can walk over 15 blocks with his dog with no exertional symptoms.      Most recent or relevant cardiac/vascular testing:    -Transthoracic echocardiogram 1/9/2025 showing left ventricular systolic function normal estimated LVEF 60% with normal diastolic parameters and IVC that was normal in size    -ECG 1/8/2025 showing sinus rhythm with PACs and nonspecific interventricular conduction delay with nonspecific ST/T wave abnormalities.      Past Surgical History:   Procedure Laterality Date    CATARACT EXTRACTION      COLONOSCOPY      COLONOSCOPY  11/26/2019    EYE SURGERY      IR THORACENTESIS  2/21/2025    IR THORACENTESIS  2/28/2025    IR THORACENTESIS  3/14/2025    IR THORACENTESIS  3/21/2025    IR THORACENTESIS  3/28/2025    KNEE SURGERY      PROSTATE SURGERY      VASECTOMY         Review of Systems   Review of Systems   Constitutional:  Negative for chills, diaphoresis, fatigue and fever.   HENT:  Negative for trouble swallowing and voice change.    Eyes:  Negative for pain and redness.   Respiratory:  Negative for shortness of breath and wheezing.    Cardiovascular:  Negative for chest pain, palpitations and leg swelling.   Gastrointestinal:  Negative for abdominal pain, blood in stool, constipation, diarrhea, nausea and vomiting.   Genitourinary:  Negative for dysuria.   Musculoskeletal:  Positive for arthralgias. Negative for neck pain and neck stiffness.   Skin:   "Negative for rash.   Neurological:  Negative for dizziness, syncope, light-headedness and headaches.   Psychiatric/Behavioral:  Negative for agitation and confusion.    All other systems reviewed and are negative.       Objective:     /70 (BP Location: Left arm, Patient Position: Sitting, Cuff Size: Standard)   Pulse 55   Temp 98.6 °F (37 °C) (Temporal)   Resp 20   Ht 5' 8\" (1.727 m) Comment: per patient  Wt 85.8 kg (189 lb 3.2 oz)   SpO2 95%   BMI 28.77 kg/m²     PHYSICAL EXAM:  Physical Exam  Vitals reviewed.   Constitutional:       General: He is not in acute distress.     Appearance: Normal appearance. He is not diaphoretic.   HENT:      Head: Normocephalic and atraumatic.   Eyes:      General:         Right eye: No discharge.         Left eye: No discharge.   Neck:      Comments: Trachea midline, mild JVD appreciated  Cardiovascular:      Rate and Rhythm: Normal rate and regular rhythm.      Heart sounds:      No friction rub.   Pulmonary:      Effort: No respiratory distress.      Breath sounds: No wheezing.   Chest:      Chest wall: No tenderness.   Abdominal:      General: Bowel sounds are normal.      Palpations: Abdomen is soft.      Tenderness: There is no abdominal tenderness. There is no rebound.   Musculoskeletal:      Right lower leg: Edema (trace) present.      Left lower leg: Edema (trace) present.   Skin:     General: Skin is warm and dry.   Neurological:      Mental Status: He is alert.      Comments: Awake, alert, able to answer questions appropriately, able to move extremities bilaterally.   Psychiatric:         Mood and Affect: Mood normal.         Behavior: Behavior normal.            Meds reviewed.  Current Outpatient Medications on File Prior to Visit   Medication Sig Dispense Refill    acetaminophen (TYLENOL) 500 mg tablet Take 500 mg by mouth every 4 (four) hours as needed for fever, headaches or mild pain. Indications: Fever, Pain      ALPRAZolam (XANAX) 0.5 mg tablet Take 1 " tablet (0.5 mg total) by mouth daily at bedtime as needed for anxiety 30 tablet 0    amLODIPine (NORVASC) 5 mg tablet Take 1 tablet (5 mg total) by mouth daily 90 tablet 1    aspirin 81 mg chewable tablet Chew 81 mg daily      atorvastatin (LIPITOR) 40 mg tablet Take 1 tablet (40 mg total) by mouth daily 30 tablet 0    levothyroxine 88 mcg tablet TAKE 1 TABLET (88 MCG TOTAL) BY MOUTH DAILY 100 tablet 1    lisinopril-hydrochlorothiazide (PRINZIDE,ZESTORETIC) 20-25 MG per tablet Take 1 tablet by mouth daily 100 tablet 3    meloxicam (MOBIC) 15 mg tablet TAKE 1 TABLET (15 MG TOTAL) BY MOUTH DAILY 100 tablet 0    Misc. Devices (Bathtub Safety Rail) MISC Use in the morning 1 each 0     No current facility-administered medications on file prior to visit.      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 Last assessed: 2013           Social History     Tobacco Use   Smoking Status Former    Current packs/day: 0.00    Types: Cigarettes, Cigars    Quit date:     Years since quittin.2    Passive exposure: Current   Smokeless Tobacco Never   Tobacco Comments    smokes 1-2 cigarss/month     Family History   Problem Relation Age of Onset    Alcohol abuse Mother     Alcohol abuse Father     Depression Father     No Known Problems Sister     Stroke Brother         syndrome     No Known Problems Maternal Grandmother     No Known Problems Maternal Grandfather     No Known Problems  Paternal Grandmother     No Known Problems Paternal Grandfather     Alcohol abuse Family     Colon cancer Maternal Aunt

## 2025-04-04 ENCOUNTER — HOSPITAL ENCOUNTER (OUTPATIENT)
Dept: INTERVENTIONAL RADIOLOGY/VASCULAR | Facility: HOSPITAL | Age: 85
End: 2025-04-04
Payer: COMMERCIAL

## 2025-04-04 VITALS
HEART RATE: 67 BPM | OXYGEN SATURATION: 97 % | RESPIRATION RATE: 18 BRPM | SYSTOLIC BLOOD PRESSURE: 129 MMHG | DIASTOLIC BLOOD PRESSURE: 63 MMHG

## 2025-04-04 DIAGNOSIS — J90 PLEURAL EFFUSION ON RIGHT: ICD-10-CM

## 2025-04-04 PROCEDURE — 32555 ASPIRATE PLEURA W/ IMAGING: CPT

## 2025-04-04 PROCEDURE — 32555 ASPIRATE PLEURA W/ IMAGING: CPT | Performed by: PHYSICIAN ASSISTANT

## 2025-04-04 RX ORDER — LIDOCAINE WITH 8.4% SOD BICARB 0.9%(10ML)
SYRINGE (ML) INJECTION AS NEEDED
Status: COMPLETED | OUTPATIENT
Start: 2025-04-04 | End: 2025-04-04

## 2025-04-04 RX ADMIN — Medication 10 ML: at 11:22

## 2025-04-04 NOTE — DISCHARGE INSTRUCTIONS
[facility]  Interventional Radiology  [phone]    Thoracentesis     WHAT YOU NEED TO KNOW:   A thoracentesis is a procedure to remove extra fluid or air from between your lungs and your inner chest wall. Air or fluid buildup may make it hard for you to breathe. A thoracentesis allows your lungs to expand fully so you can breathe more easily.    DISCHARGE INSTRUCTIONS:     Small amount of shoulder pain and bloody sputum is normal after a Thoracentesis.     Rest:  Rest when you feel it is needed. Slowly start to do more each day. Return to your daily activities as directed.     Resume your normal diet. Small sips of flat soda will help mild nausea.    Do not smoke:  If you smoke, it is never too late to quit. Ask for information about how to stop smoking if you need help.    Contact Interventional Radiology at [phone] if:     You have a fever.    Your puncture site is red, warm, swollen, or draining pus.    You have questions or concerns about your procedure, medicine, or care.    Seek care immediately or call 911 if:     Severe chest pain with inspiration and shortness of breath    Large amounts of blood in your sputum    Follow up with your healthcare provider as directed.

## 2025-04-08 DIAGNOSIS — R35.0 URINARY FREQUENCY: ICD-10-CM

## 2025-04-08 DIAGNOSIS — M79.652 LEFT THIGH PAIN: ICD-10-CM

## 2025-04-08 DIAGNOSIS — E03.9 HYPOTHYROIDISM, UNSPECIFIED TYPE: ICD-10-CM

## 2025-04-08 DIAGNOSIS — F41.1 GENERALIZED ANXIETY DISORDER: ICD-10-CM

## 2025-04-08 DIAGNOSIS — I25.10 CORONARY ARTERY CALCIFICATION SEEN ON CT SCAN: ICD-10-CM

## 2025-04-08 DIAGNOSIS — R31.29 OTHER MICROSCOPIC HEMATURIA: ICD-10-CM

## 2025-04-08 DIAGNOSIS — R39.15 URGENCY OF URINATION: ICD-10-CM

## 2025-04-08 DIAGNOSIS — R10.32 LEFT GROIN PAIN: ICD-10-CM

## 2025-04-08 DIAGNOSIS — C61 MALIGNANT NEOPLASM OF PROSTATE METASTATIC TO INTRAPELVIC LYMPH NODE (HCC): ICD-10-CM

## 2025-04-08 DIAGNOSIS — C77.5 MALIGNANT NEOPLASM OF PROSTATE METASTATIC TO INTRAPELVIC LYMPH NODE (HCC): ICD-10-CM

## 2025-04-08 DIAGNOSIS — Z92.3 HISTORY OF RADIATION THERAPY: ICD-10-CM

## 2025-04-08 LAB
MYCOBACTERIUM SPEC CULT: NORMAL
RHODAMINE-AURAMINE STN SPEC: NORMAL

## 2025-04-09 RX ORDER — MELOXICAM 15 MG/1
15 TABLET ORAL DAILY
Qty: 100 TABLET | Refills: 1 | Status: SHIPPED | OUTPATIENT
Start: 2025-04-09

## 2025-04-09 RX ORDER — LEVOTHYROXINE SODIUM 88 UG/1
88 TABLET ORAL DAILY
Qty: 100 TABLET | Refills: 1 | Status: SHIPPED | OUTPATIENT
Start: 2025-04-09

## 2025-04-09 RX ORDER — ALPRAZOLAM 0.5 MG
0.5 TABLET ORAL
Qty: 30 TABLET | Refills: 0 | Status: SHIPPED | OUTPATIENT
Start: 2025-04-09

## 2025-04-09 RX ORDER — ATORVASTATIN CALCIUM 40 MG/1
40 TABLET, FILM COATED ORAL DAILY
Qty: 30 TABLET | Refills: 0 | Status: SHIPPED | OUTPATIENT
Start: 2025-04-09

## 2025-04-11 ENCOUNTER — PATIENT OUTREACH (OUTPATIENT)
Dept: HEMATOLOGY ONCOLOGY | Facility: CLINIC | Age: 85
End: 2025-04-11

## 2025-04-11 ENCOUNTER — HOSPITAL ENCOUNTER (OUTPATIENT)
Dept: INTERVENTIONAL RADIOLOGY/VASCULAR | Facility: HOSPITAL | Age: 85
End: 2025-04-11
Payer: COMMERCIAL

## 2025-04-11 VITALS
HEART RATE: 64 BPM | OXYGEN SATURATION: 99 % | SYSTOLIC BLOOD PRESSURE: 156 MMHG | RESPIRATION RATE: 18 BRPM | DIASTOLIC BLOOD PRESSURE: 72 MMHG

## 2025-04-11 DIAGNOSIS — J90 PLEURAL EFFUSION ON RIGHT: ICD-10-CM

## 2025-04-11 PROCEDURE — 32555 ASPIRATE PLEURA W/ IMAGING: CPT | Performed by: PHYSICIAN ASSISTANT

## 2025-04-11 PROCEDURE — 32555 ASPIRATE PLEURA W/ IMAGING: CPT

## 2025-04-11 RX ORDER — LIDOCAINE WITH 8.4% SOD BICARB 0.9%(10ML)
SYRINGE (ML) INJECTION AS NEEDED
Status: COMPLETED | OUTPATIENT
Start: 2025-04-11 | End: 2025-04-11

## 2025-04-11 RX ADMIN — Medication 10 ML: at 11:11

## 2025-04-11 NOTE — SEDATION DOCUMENTATION
Patient had a right sided thoracentesis performed by SCOT Oropeza with IR. A total of 960 ml of clear yellow fluid was removed, no labs needed. Patient offers no complaints at this time.

## 2025-04-11 NOTE — DISCHARGE INSTRUCTIONS
[facility]  Interventional Radiology  [phone]          Thoracentesis   WHAT YOU NEED TO KNOW:   A thoracentesis is a procedure to remove extra fluid or air from between your lungs and your inner chest wall. Air or fluid buildup may make it hard for you to breathe. A thoracentesis allows your lungs to expand fully so you can breathe more easily.    DISCHARGE INSTRUCTIONS:     Small amount of shoulder pain and bloody sputum is normal after a Thoracentesis.     Rest:  Rest when you feel it is needed. Slowly start to do more each day. Return to your daily activities as directed.     Resume your normal diet. Small sips of flat soda will help mild nausea.    Do not smoke:  If you smoke, it is never too late to quit. Ask for information about how to stop smoking if you need help.    Contact Interventional Radiology at [phone] ([facility] PATIENTS: Contact Interventional Radiology at [phone]) (VIDAL PATIENTS: Contact Interventional Radiology at [phone]) if:   You have a fever.    Your puncture site is red, warm, swollen, or draining pus.    You have questions or concerns about your procedure, medicine, or care.    Seek care immediately or call 911 if:   Severe chest pain with inspiration and shortness of breath    Large amounts of blood in your sputum    Follow up with your healthcare provider as directed.

## 2025-04-11 NOTE — PROGRESS NOTES
I reached out to Mark now that he is on Tx to reassess for any barriers to care and offer any supportive services that may be needed. I left  with reason for my call including my direct phone number [phone]

## 2025-04-14 NOTE — TELEPHONE ENCOUNTER
Patient called to ask why he only got 30 pills instead of his regular 90. Advised blood work is needed, refill sent was a courtesy. Patient expressed understanding, states he will go get the blood work done tomorrow.

## 2025-04-15 ENCOUNTER — APPOINTMENT (OUTPATIENT)
Age: 85
End: 2025-04-15
Attending: INTERNAL MEDICINE
Payer: COMMERCIAL

## 2025-04-15 DIAGNOSIS — E78.5 HYPERLIPIDEMIA, UNSPECIFIED HYPERLIPIDEMIA TYPE: ICD-10-CM

## 2025-04-15 DIAGNOSIS — R73.03 PREDIABETES: ICD-10-CM

## 2025-04-15 DIAGNOSIS — J90 PLEURAL EFFUSION ON RIGHT: ICD-10-CM

## 2025-04-15 LAB
ALBUMIN SERPL BCG-MCNC: 3.9 G/DL (ref 3.5–5)
ALP SERPL-CCNC: 82 U/L (ref 34–104)
ALT SERPL W P-5'-P-CCNC: 17 U/L (ref 7–52)
ANION GAP SERPL CALCULATED.3IONS-SCNC: 9 MMOL/L (ref 4–13)
AST SERPL W P-5'-P-CCNC: 21 U/L (ref 13–39)
BASOPHILS # BLD AUTO: 0.04 THOUSANDS/ÂΜL (ref 0–0.1)
BASOPHILS NFR BLD AUTO: 1 % (ref 0–1)
BILIRUB SERPL-MCNC: 0.58 MG/DL (ref 0.2–1)
BUN SERPL-MCNC: 24 MG/DL (ref 5–25)
CALCIUM SERPL-MCNC: 8.8 MG/DL (ref 8.4–10.2)
CHLORIDE SERPL-SCNC: 106 MMOL/L (ref 96–108)
CHOLEST SERPL-MCNC: 114 MG/DL (ref ?–200)
CO2 SERPL-SCNC: 25 MMOL/L (ref 21–32)
CREAT SERPL-MCNC: 1.1 MG/DL (ref 0.6–1.3)
EOSINOPHIL # BLD AUTO: 0.27 THOUSAND/ÂΜL (ref 0–0.61)
EOSINOPHIL NFR BLD AUTO: 4 % (ref 0–6)
ERYTHROCYTE [DISTWIDTH] IN BLOOD BY AUTOMATED COUNT: 14.1 % (ref 11.6–15.1)
EST. AVERAGE GLUCOSE BLD GHB EST-MCNC: 134 MG/DL
GFR SERPL CREATININE-BSD FRML MDRD: 60 ML/MIN/1.73SQ M
GLUCOSE P FAST SERPL-MCNC: 91 MG/DL (ref 65–99)
HBA1C MFR BLD: 6.3 %
HCT VFR BLD AUTO: 40.8 % (ref 36.5–49.3)
HDLC SERPL-MCNC: 50 MG/DL
HGB BLD-MCNC: 13.8 G/DL (ref 12–17)
IMM GRANULOCYTES # BLD AUTO: 0.02 THOUSAND/UL (ref 0–0.2)
IMM GRANULOCYTES NFR BLD AUTO: 0 % (ref 0–2)
LDLC SERPL CALC-MCNC: 54 MG/DL (ref 0–100)
LYMPHOCYTES # BLD AUTO: 1.18 THOUSANDS/ÂΜL (ref 0.6–4.47)
LYMPHOCYTES NFR BLD AUTO: 18 % (ref 14–44)
MAGNESIUM SERPL-MCNC: 2.1 MG/DL (ref 1.9–2.7)
MCH RBC QN AUTO: 31.4 PG (ref 26.8–34.3)
MCHC RBC AUTO-ENTMCNC: 33.8 G/DL (ref 31.4–37.4)
MCV RBC AUTO: 93 FL (ref 82–98)
MONOCYTES # BLD AUTO: 0.68 THOUSAND/ÂΜL (ref 0.17–1.22)
MONOCYTES NFR BLD AUTO: 11 % (ref 4–12)
NEUTROPHILS # BLD AUTO: 4.27 THOUSANDS/ÂΜL (ref 1.85–7.62)
NEUTS SEG NFR BLD AUTO: 66 % (ref 43–75)
NRBC BLD AUTO-RTO: 0 /100 WBCS
PLATELET # BLD AUTO: 215 THOUSANDS/UL (ref 149–390)
PMV BLD AUTO: 10.3 FL (ref 8.9–12.7)
POTASSIUM SERPL-SCNC: 4.2 MMOL/L (ref 3.5–5.3)
PROT SERPL-MCNC: 6.3 G/DL (ref 6.4–8.4)
RBC # BLD AUTO: 4.39 MILLION/UL (ref 3.88–5.62)
SODIUM SERPL-SCNC: 140 MMOL/L (ref 135–147)
TRIGL SERPL-MCNC: 52 MG/DL (ref ?–150)
WBC # BLD AUTO: 6.46 THOUSAND/UL (ref 4.31–10.16)

## 2025-04-15 PROCEDURE — 80053 COMPREHEN METABOLIC PANEL: CPT

## 2025-04-15 PROCEDURE — 83036 HEMOGLOBIN GLYCOSYLATED A1C: CPT

## 2025-04-15 PROCEDURE — 85025 COMPLETE CBC W/AUTO DIFF WBC: CPT

## 2025-04-15 PROCEDURE — 83735 ASSAY OF MAGNESIUM: CPT

## 2025-04-15 PROCEDURE — 36415 COLL VENOUS BLD VENIPUNCTURE: CPT

## 2025-04-15 PROCEDURE — 80061 LIPID PANEL: CPT

## 2025-04-16 ENCOUNTER — RESULTS FOLLOW-UP (OUTPATIENT)
Dept: INTERNAL MEDICINE CLINIC | Facility: CLINIC | Age: 85
End: 2025-04-16

## 2025-04-17 ENCOUNTER — OFFICE VISIT (OUTPATIENT)
Dept: HEMATOLOGY ONCOLOGY | Facility: CLINIC | Age: 85
End: 2025-04-17
Payer: COMMERCIAL

## 2025-04-17 ENCOUNTER — PATIENT OUTREACH (OUTPATIENT)
Dept: HEMATOLOGY ONCOLOGY | Facility: CLINIC | Age: 85
End: 2025-04-17

## 2025-04-17 VITALS
HEART RATE: 54 BPM | TEMPERATURE: 98.3 F | HEIGHT: 68 IN | SYSTOLIC BLOOD PRESSURE: 136 MMHG | DIASTOLIC BLOOD PRESSURE: 80 MMHG | RESPIRATION RATE: 18 BRPM | BODY MASS INDEX: 29.1 KG/M2 | OXYGEN SATURATION: 95 % | WEIGHT: 192 LBS

## 2025-04-17 DIAGNOSIS — J90 PLEURAL EFFUSION ON RIGHT: Primary | ICD-10-CM

## 2025-04-17 PROCEDURE — 99213 OFFICE O/P EST LOW 20 MIN: CPT | Performed by: INTERNAL MEDICINE

## 2025-04-17 PROCEDURE — G2211 COMPLEX E/M VISIT ADD ON: HCPCS | Performed by: INTERNAL MEDICINE

## 2025-04-17 NOTE — PROGRESS NOTES
I reached out to Dexter now that he is on Tx to reassess for any barriers to care and offer any supportive services that may be needed. I left  with reason for my call including my direct phone number [phone]

## 2025-04-17 NOTE — ASSESSMENT & PLAN NOTE
The patient has large recurrent pleural effusion on the right side.  He is status post multiple thoracentesis.  The cytology from 2 different specimen continues to show atypical cells without obvious hint of malignant process.  He did have extensive imaging including a PET CT scan which was nonconclusive.  [id number] liquid biopsy was also obtained recently from the peripheral blood.  The result came back also nonconclusive for malignant process.  The patient continues to need the right thoracentesis on a weekly basis.  However, the amount of pleural pleural fluid is declining gradually.  The cytology from 3/14/2025 was negative for any obvious symptomatic process.    The patient was told that his persistent right pleural effusion is most likely reactive in nature.  However, malignant process is still in the differential diagnosis of persistent/recurrent pleural effusion.  He was told that he is unlikely to be a candidate for treatment if he would get diagnosed with malignant process.  The patient was asked to come back to see us on as-needed basis.

## 2025-04-17 NOTE — PROGRESS NOTES
Name: Dexter Sharp      : 1940      MRN: [id number]  Encounter Provider: Raul Dacosta MD  Encounter Date: 2025   Encounter department: [facility] [place]  :  Assessment & Plan  Pleural effusion on right  The patient has large recurrent pleural effusion on the right side.  He is status post multiple thoracentesis.  The cytology from 2 different specimen continues to show atypical cells without obvious hint of malignant process.  He did have extensive imaging including a PET CT scan which was nonconclusive.  [id number] liquid biopsy was also obtained recently from the peripheral blood.  The result came back also nonconclusive for malignant process.  The patient continues to need the right thoracentesis on a weekly basis.  However, the amount of pleural pleural fluid is declining gradually.  The cytology from 3/14/2025 was negative for any obvious symptomatic process.    The patient was told that his persistent right pleural effusion is most likely reactive in nature.  However, malignant process is still in the differential diagnosis of persistent/recurrent pleural effusion.  He was told that he is unlikely to be a candidate for treatment if he would get diagnosed with malignant process.  The patient was asked to come back to see us on as-needed basis.           Return if symptoms worsen or fail to improve.    History of Present Illness   Chief Complaint   Patient presents with    Follow-up   This is an 84-year-old male with history of chronic kidney disease, coronary artery disease, hypertension, prostate cancer, status post prostatectomy around  followed by prostate bed radiation around 2018.     The patient empirically was admitted to the hospital on 2025 for respiratory symptoms which was thought to be due to a viral infection and pneumonia.  He was treated with antibiotics.  CT scan of the chest without contrast on 2025 showed large right  pleural effusion with multifocal nodular opacities.  He then had thoracentesis on 1/9/2025 of the right side where 1.5 pleural fluid was removed.  Cytology came back highly suspicious for malignancy.  The exact type of malignancy was not entirely clear.  Subsequently after hospital discharge and he had a PET CT scan on 1/28/2025 which was negative for any obvious hypermetabolic metastatic disease.  He continues to have multifocal consolidations in the left lung and large right pleural effusion without hypermetabolic activity in that area.  Brain MRI on 1/24/2025 was negative for metastatic disease.     Interval history:  The patient came today for follow-up visit accompanied by his wife.  He stated that he continues to need thoracentesis on the right side on a weekly basis.  However, the amount of pleural effusion is declining gradually.  Cytology from 3/14/2025 from the right pleural fluid came back negative for malignancy.  Blood work from 4/15/2025 was reviewed with the patient which showed normal CBC and CMP.     Review of Systems   Constitutional:  Negative for chills and fever.   HENT:  Negative for ear pain and sore throat.    Eyes:  Negative for pain and visual disturbance.   Respiratory:  Positive for shortness of breath. Negative for cough.    Cardiovascular:  Negative for chest pain and palpitations.   Gastrointestinal:  Negative for abdominal pain and vomiting.   Genitourinary:  Negative for dysuria and hematuria.   Musculoskeletal:  Negative for arthralgias and back pain.   Skin:  Negative for color change and rash.   Neurological:  Positive for numbness. Negative for seizures and syncope.   All other systems reviewed and are negative.    Medical History Reviewed by provider this encounter:  Tobacco  Allergies  Meds  Problems  Med Hx  Surg Hx  Fam Hx     .  Current Outpatient Medications on File Prior to Visit   Medication Sig Dispense Refill    acetaminophen (TYLENOL) 500 mg tablet Take 500 mg  "by mouth every 4 (four) hours as needed for fever, headaches or mild pain. Indications: Fever, Pain      ALPRAZolam (XANAX) 0.5 mg tablet Take 1 tablet (0.5 mg total) by mouth daily at bedtime as needed for anxiety 30 tablet 0    amLODIPine (NORVASC) 5 mg tablet Take 1 tablet (5 mg total) by mouth daily 90 tablet 1    aspirin 81 mg chewable tablet Chew 81 mg daily      atorvastatin (LIPITOR) 40 mg tablet Take 1 tablet (40 mg total) by mouth daily 30 tablet 0    levothyroxine 88 mcg tablet Take 1 tablet (88 mcg total) by mouth daily 100 tablet 1    lisinopril-hydrochlorothiazide (PRINZIDE,ZESTORETIC) 20-25 MG per tablet Take 1 tablet by mouth daily 100 tablet 3    meloxicam (MOBIC) 15 mg tablet Take 1 tablet (15 mg total) by mouth daily 100 tablet 1    Misc. Devices (Bathtub Safety Rail) MISC Use in the morning 1 each 0    potassium chloride (MICRO-K) 10 MEQ CR capsule Take 1 capsule (10 mEq total) by mouth daily 30 capsule 3    torsemide (DEMADEX) 5 MG tablet Take 1 tablet (5 mg total) by mouth every other day 30 tablet 3     No current facility-administered medications on file prior to visit.      Social History     Tobacco Use    Smoking status: Former     Current packs/day: 0.00     Types: Cigarettes, Cigars     Quit date:      Years since quittin.3     Passive exposure: Current    Smokeless tobacco: Never    Tobacco comments:     smokes 1-2 cigarss/month   Vaping Use    Vaping status: Never Used   Substance and Sexual Activity    Alcohol use: Never    Drug use: No     Comment: Chronic Narcotic use noted in \"allscripts\"     Sexual activity: Not on file         Objective   /80 (BP Location: Right arm, Patient Position: Sitting, Cuff Size: Adult)   Pulse (!) 54   Temp 98.3 °F (36.8 °C)   Resp 18   Ht 5' 8\" (1.727 m)   Wt 87.1 kg (192 lb)   SpO2 95%   BMI 29.19 kg/m²     Physical Exam  Constitutional:       Appearance: He is well-developed.   HENT:      Head: Normocephalic and atraumatic.      " Nose: Nose normal.   Eyes:      General: No scleral icterus.        Right eye: No discharge.         Left eye: No discharge.      Conjunctiva/sclera: Conjunctivae normal.      Pupils: Pupils are equal, round, and reactive to light.   Neck:      Thyroid: No thyromegaly.      Trachea: No tracheal deviation.   Cardiovascular:      Rate and Rhythm: Normal rate and regular rhythm.      Heart sounds: Normal heart sounds. No murmur heard.     No friction rub.   Pulmonary:      Effort: Pulmonary effort is normal. No respiratory distress.      Breath sounds: Normal breath sounds. No wheezing or rales.   Chest:      Chest wall: No tenderness.   Abdominal:      General: There is no distension.      Palpations: Abdomen is soft. There is no hepatomegaly or splenomegaly.      Tenderness: There is no abdominal tenderness. There is no guarding or rebound.   Musculoskeletal:         General: No tenderness or deformity. Normal range of motion.      Cervical back: Normal range of motion and neck supple.   Lymphadenopathy:      Cervical: No cervical adenopathy.   Skin:     General: Skin is warm and dry.      Coloration: Skin is not pale.      Findings: No erythema or rash.   Neurological:      Mental Status: He is alert and oriented to person, place, and time.      Cranial Nerves: No cranial nerve deficit.      Coordination: Coordination normal.      Deep Tendon Reflexes: Reflexes are normal and symmetric.   Psychiatric:         Behavior: Behavior normal.         Thought Content: Thought content normal.         Judgment: Judgment normal.         Labs: I have reviewed the following labs:  Results for orders placed or performed in visit on 04/15/25   Lipid Panel with Direct LDL reflex   Result Value Ref Range    Cholesterol 114 See Comment mg/dL    Triglycerides 52 See Comment mg/dL    HDL, Direct 50 >=40 mg/dL    LDL Calculated 54 0 - 100 mg/dL   Hemoglobin A1C   Result Value Ref Range    Hemoglobin A1C 6.3 (H) Normal 4.0-5.6%;  PreDiabetic 5.7-6.4%; Diabetic >=6.5%; Glycemic control for adults with diabetes <7.0% %     mg/dl   CBC and differential   Result Value Ref Range    WBC 6.46 4.31 - 10.16 Thousand/uL    RBC 4.39 3.88 - 5.62 Million/uL    Hemoglobin 13.8 12.0 - 17.0 g/dL    Hematocrit 40.8 36.5 - 49.3 %    MCV 93 82 - 98 fL    MCH 31.4 26.8 - 34.3 pg    MCHC 33.8 31.4 - 37.4 g/dL    RDW 14.1 11.6 - 15.1 %    MPV 10.3 8.9 - 12.7 fL    Platelets 215 149 - 390 Thousands/uL    nRBC 0 /100 WBCs    Segmented % 66 43 - 75 %    Immature Grans % 0 0 - 2 %    Lymphocytes % 18 14 - 44 %    Monocytes % 11 4 - 12 %    Eosinophils Relative 4 0 - 6 %    Basophils Relative 1 0 - 1 %    Absolute Neutrophils 4.27 1.85 - 7.62 Thousands/µL    Absolute Immature Grans 0.02 0.00 - 0.20 Thousand/uL    Absolute Lymphocytes 1.18 0.60 - 4.47 Thousands/µL    Absolute Monocytes 0.68 0.17 - 1.22 Thousand/µL    Eosinophils Absolute 0.27 0.00 - 0.61 Thousand/µL    Basophils Absolute 0.04 0.00 - 0.10 Thousands/µL   Comprehensive metabolic panel   Result Value Ref Range    Sodium 140 135 - 147 mmol/L    Potassium 4.2 3.5 - 5.3 mmol/L    Chloride 106 96 - 108 mmol/L    CO2 25 21 - 32 mmol/L    ANION GAP 9 4 - 13 mmol/L    BUN 24 5 - 25 mg/dL    Creatinine 1.10 0.60 - 1.30 mg/dL    Glucose, Fasting 91 65 - 99 mg/dL    Calcium 8.8 8.4 - 10.2 mg/dL    AST 21 13 - 39 U/L    ALT 17 7 - 52 U/L    Alkaline Phosphatase 82 34 - 104 U/L    Total Protein 6.3 (L) 6.4 - 8.4 g/dL    Albumin 3.9 3.5 - 5.0 g/dL    Total Bilirubin 0.58 0.20 - 1.00 mg/dL    eGFR 60 ml/min/1.73sq m   Result Value Ref Range    Magnesium 2.1 1.9 - 2.7 mg/dL     *Note: Due to a large number of results and/or encounters for the requested time period, some results have not been displayed. A complete set of results can be found in Results Review.     Lab Results   Component Value Date/Time    WBC 6.46 04/15/2025 07:21 AM    RBC 4.39 04/15/2025 07:21 AM    Hemoglobin 13.8 04/15/2025 07:21 AM     "Hematocrit 40.8 04/15/2025 07:21 AM    MCV 93 04/15/2025 07:21 AM    MCH 31.4 04/15/2025 07:21 AM    RDW 14.1 04/15/2025 07:21 AM    Platelets 215 04/15/2025 07:21 AM    Segmented % 66 04/15/2025 07:21 AM    Bands % 18 (H) 01/08/2025 03:54 PM    Lymphocytes % 18 04/15/2025 07:21 AM    Monocytes % 11 04/15/2025 07:21 AM    Eosinophils Relative 4 04/15/2025 07:21 AM    Basophils Relative 1 04/15/2025 07:21 AM    Immature Grans % 0 04/15/2025 07:21 AM    Absolute Neutrophils 4.27 04/15/2025 07:21 AM      Lab Results   Component Value Date/Time    Sodium 140 04/15/2025 07:21 AM    Potassium 4.2 04/15/2025 07:21 AM    Chloride 106 04/15/2025 07:21 AM    CO2 25 04/15/2025 07:21 AM    ANION GAP 9 04/15/2025 07:21 AM    BUN 24 04/15/2025 07:21 AM    Creatinine 1.10 04/15/2025 07:21 AM    Glucose 96 01/13/2025 05:29 AM    Glucose, Fasting 91 04/15/2025 07:21 AM    Calcium 8.8 04/15/2025 07:21 AM    AST 21 04/15/2025 07:21 AM    ALT 17 04/15/2025 07:21 AM    Alkaline Phosphatase 82 04/15/2025 07:21 AM    Total Protein 6.3 (L) 04/15/2025 07:21 AM    Albumin 3.9 04/15/2025 07:21 AM    Total Bilirubin 0.58 04/15/2025 07:21 AM    eGFR 60 04/15/2025 07:21 AM      No results found for: \"IRON\", \"CONCFE\", \"FERRITIN\", \"BILWIABG88\", \"FOLATE\", \"COPPER\", \"EPOREFLAB\", \"ERYTHROPRO\", \"ESR\", \"CRP\", \"HIVAGAB\", \"HEPATITIS\"   Results for orders placed or performed in visit on 04/15/25   Lipid Panel with Direct LDL reflex   Result Value Ref Range    Cholesterol 114 See Comment mg/dL    Triglycerides 52 See Comment mg/dL    HDL, Direct 50 >=40 mg/dL    LDL Calculated 54 0 - 100 mg/dL   Hemoglobin A1C   Result Value Ref Range    Hemoglobin A1C 6.3 (H) Normal 4.0-5.6%; PreDiabetic 5.7-6.4%; Diabetic >=6.5%; Glycemic control for adults with diabetes <7.0% %     mg/dl   CBC and differential   Result Value Ref Range    WBC 6.46 4.31 - 10.16 Thousand/uL    RBC 4.39 3.88 - 5.62 Million/uL    Hemoglobin 13.8 12.0 - 17.0 g/dL    Hematocrit 40.8 " 36.5 - 49.3 %    MCV 93 82 - 98 fL    MCH 31.4 26.8 - 34.3 pg    MCHC 33.8 31.4 - 37.4 g/dL    RDW 14.1 11.6 - 15.1 %    MPV 10.3 8.9 - 12.7 fL    Platelets 215 149 - 390 Thousands/uL    nRBC 0 /100 WBCs    Segmented % 66 43 - 75 %    Immature Grans % 0 0 - 2 %    Lymphocytes % 18 14 - 44 %    Monocytes % 11 4 - 12 %    Eosinophils Relative 4 0 - 6 %    Basophils Relative 1 0 - 1 %    Absolute Neutrophils 4.27 1.85 - 7.62 Thousands/µL    Absolute Immature Grans 0.02 0.00 - 0.20 Thousand/uL    Absolute Lymphocytes 1.18 0.60 - 4.47 Thousands/µL    Absolute Monocytes 0.68 0.17 - 1.22 Thousand/µL    Eosinophils Absolute 0.27 0.00 - 0.61 Thousand/µL    Basophils Absolute 0.04 0.00 - 0.10 Thousands/µL   Comprehensive metabolic panel   Result Value Ref Range    Sodium 140 135 - 147 mmol/L    Potassium 4.2 3.5 - 5.3 mmol/L    Chloride 106 96 - 108 mmol/L    CO2 25 21 - 32 mmol/L    ANION GAP 9 4 - 13 mmol/L    BUN 24 5 - 25 mg/dL    Creatinine 1.10 0.60 - 1.30 mg/dL    Glucose, Fasting 91 65 - 99 mg/dL    Calcium 8.8 8.4 - 10.2 mg/dL    AST 21 13 - 39 U/L    ALT 17 7 - 52 U/L    Alkaline Phosphatase 82 34 - 104 U/L    Total Protein 6.3 (L) 6.4 - 8.4 g/dL    Albumin 3.9 3.5 - 5.0 g/dL    Total Bilirubin 0.58 0.20 - 1.00 mg/dL    eGFR 60 ml/min/1.73sq m   Result Value Ref Range    Magnesium 2.1 1.9 - 2.7 mg/dL

## 2025-04-18 ENCOUNTER — HOSPITAL ENCOUNTER (OUTPATIENT)
Dept: INTERVENTIONAL RADIOLOGY/VASCULAR | Facility: HOSPITAL | Age: 85
Discharge: HOME | End: 2025-04-18
Payer: COMMERCIAL

## 2025-04-18 VITALS
OXYGEN SATURATION: 97 % | DIASTOLIC BLOOD PRESSURE: 64 MMHG | RESPIRATION RATE: 18 BRPM | SYSTOLIC BLOOD PRESSURE: 134 MMHG | HEART RATE: 70 BPM

## 2025-04-18 DIAGNOSIS — J90 PLEURAL EFFUSION ON RIGHT: ICD-10-CM

## 2025-04-18 PROCEDURE — 32555 ASPIRATE PLEURA W/ IMAGING: CPT | Performed by: PHYSICIAN ASSISTANT

## 2025-04-18 PROCEDURE — 32555 ASPIRATE PLEURA W/ IMAGING: CPT

## 2025-04-18 RX ORDER — LIDOCAINE WITH 8.4% SOD BICARB 0.9%(10ML)
SYRINGE (ML) INJECTION AS NEEDED
Status: COMPLETED | OUTPATIENT
Start: 2025-04-18 | End: 2025-04-18

## 2025-04-18 RX ADMIN — Medication 10 ML: at 11:44

## 2025-04-18 NOTE — DISCHARGE INSTRUCTIONS
[facility]  Interventional Radiology  [phone]        Thoracentesis   WHAT YOU NEED TO KNOW:   A thoracentesis is a procedure to remove extra fluid or air from between your lungs and your inner chest wall. Air or fluid buildup may make it hard for you to breathe. A thoracentesis allows your lungs to expand fully so you can breathe more easily.    DISCHARGE INSTRUCTIONS:     Small amount of shoulder pain and bloody sputum is normal after a Thoracentesis.     Rest:  Rest when you feel it is needed. Slowly start to do more each day. Return to your daily activities as directed.     Resume your normal diet. Small sips of flat soda will help mild nausea.    Do not smoke:  If you smoke, it is never too late to quit. Ask for information about how to stop smoking if you need help.    Contact Interventional Radiology at [phone] (LIAT PATIENTS: Contact Interventional Radiology at [phone]) (VIDAL PATIENTS: Contact Interventional Radiology at [phone]) if:   You have a fever.    Your puncture site is red, warm, swollen, or draining pus.    You have questions or concerns about your procedure, medicine, or care.    Seek care immediately or call 911 if:   Severe chest pain with inspiration and shortness of breath    Large amounts of blood in your sputum    Follow up with your healthcare provider as directed.

## 2025-04-18 NOTE — SEDATION DOCUMENTATION
Patient had a thoracentesis performed by SCOT Oropeza with IR. A total of 1,200 ml of clear brayden fluid was removed, no labs needed. Patient offers no complaints at this time.

## 2025-04-23 ENCOUNTER — OFFICE VISIT (OUTPATIENT)
Dept: PULMONOLOGY | Facility: CLINIC | Age: 85
End: 2025-04-23
Payer: COMMERCIAL

## 2025-04-23 VITALS
WEIGHT: 189.8 LBS | HEART RATE: 51 BPM | RESPIRATION RATE: 18 BRPM | HEIGHT: 68 IN | BODY MASS INDEX: 28.76 KG/M2 | SYSTOLIC BLOOD PRESSURE: 120 MMHG | OXYGEN SATURATION: 96 % | TEMPERATURE: 98.7 F | DIASTOLIC BLOOD PRESSURE: 60 MMHG

## 2025-04-23 DIAGNOSIS — J96.11 CHRONIC HYPOXEMIC RESPIRATORY FAILURE (HCC): ICD-10-CM

## 2025-04-23 DIAGNOSIS — J90 PLEURAL EFFUSION ON RIGHT: Primary | ICD-10-CM

## 2025-04-23 PROCEDURE — 99213 OFFICE O/P EST LOW 20 MIN: CPT

## 2025-04-23 NOTE — PROGRESS NOTES
Follow-up  Visit - Pulmonary Medicine   Name: Dexter Sharp      : 1940      MRN: [id number]  Encounter Provider: GAIL Coles  Encounter Date: 2025   Encounter department: [facility] [place]  :  Assessment & Plan  Pleural effusion on right  - Continues to require weekly thoracentesis, most recently drained on  for 1200 mL of fluid  -Pleural fluid for cytology x 2 sent with atypical cells, not obvious signs of malignant process  -PET/CT with no evidence of FDG avid metastatic disease  -Seen by oncology, [id number] liquid biopsy also nonconclusive for malignant process  -Unclear etiology of recurrent pleural effusion.  Differential diagnosis still includes malignant process vs reactive, vs volume overload    -I discussed with the patient, given he is still requiring weekly thoracentesis, can consider inserting Asept catheter for drainage at home. He is weary about the risk of infection and is not bothered by going to IR for weekly drainage at this time. He declined Asept catheter placement for now.   -Also discussed with patient again about thoracic surgery evaluation for intervention such as VATS/pleurodesis. He prefers to hold off on this as well.   -Will continue with weekly IR thoracentesis, scheduled every Friday  -Continue diuretics per Cardiology  -Follow-up in 3 months or sooner if needed       Chronic hypoxemic respiratory failure (HCC)  -Continue 3 L supplemental oxygen as needed with exertion to maintain SPO2 above 88%.   -Consider updating walk test at future visit if pleural effusion improves/resolves         No follow-ups on file.    History of Present Illness   Dexter Sharp is a 84 y.o. male with a past medical history of CKD, hypertension, prostate cancer 20 + years ago with metastasis to intrapelvic lymph node appearing in  treated with radiation, and former smoker quit 30+ years ago who is here today for a follow-up visit for  "recurrent right sided pleural effusion.  He has been requiring weekly thoracentesis for this. Most recently drained on  for 1200 mL. Cytology from 2 different specimen collections showed atypical cells without obvious malignancy.  He underwent PET/CT which was also nonconclusive for malignance.  He saw oncology who checked [id number] liquid biopsy which was also nonconclusive for malignancy.  He was recently seen by cardiology on 4/3 who added torsemide 5 mg every other day.      Patient states he has noted improvement with starting the diuretics.  He denies any shortness of breath even on the days leading up to his weekly thoracentesis.  He is not bothered by going for IR thoracentesis every week.  He is scheduled every Friday.  He denies any cough, mucus, or wheezing.  Still using 3 L supplemental oxygen as needed with activities, but not as often.  No longer wearing oxygen at night.       Review of Systems    Aside from what is mentioned in the HPI, ROS is otherwise negative         Medical History Reviewed by provider this encounter:     .    Objective   /60 (BP Location: Right arm, Patient Position: Sitting, Cuff Size: Standard)   Pulse (!) 51   Temp 98.7 °F (37.1 °C) (Temporal)   Resp 18   Ht 5' 8\" (1.727 m)   Wt 86.1 kg (189 lb 12.8 oz)   SpO2 96%   BMI 28.86 kg/m²     Physical Exam  Vitals and nursing note reviewed.   Constitutional:       General: He is not in acute distress.     Appearance: Normal appearance. He is well-developed.   Cardiovascular:      Rate and Rhythm: Normal rate and regular rhythm.      Heart sounds: Normal heart sounds. No murmur heard.  Pulmonary:      Effort: Pulmonary effort is normal. No respiratory distress.      Breath sounds: Examination of the right-lower field reveals decreased breath sounds. Decreased breath sounds present. No wheezing, rhonchi or rales.   Musculoskeletal:         General: No swelling.      Right lower le+ Pitting Edema present.      " "Left lower le+ Pitting Edema present.   Psychiatric:         Mood and Affect: Mood and affect normal.         Behavior: Behavior normal. Behavior is cooperative.           Diagnostic Data:  Labs: I personally reviewed the most recent laboratory data pertinent to today's visit.      Radiology results:  Radiology Results Review: I have reviewed radiology reports from 25 including: PET/CT.      PFT/spirometry results:  No results found for: \"FEV1\", \"FVC\", \"ESI0RSS\", \"TLC\", \"DLCO\"       Oximetry testing:      Other studies:      GAIL Coles      "

## 2025-04-23 NOTE — ASSESSMENT & PLAN NOTE
- Continues to require weekly thoracentesis, most recently drained on 4/18 for 1200 mL of fluid  -Pleural fluid for cytology x 2 sent with atypical cells, not obvious signs of malignant process  -PET/CT with no evidence of FDG avid metastatic disease  -Seen by oncology, [id number] liquid biopsy also nonconclusive for malignant process  -Unclear etiology of recurrent pleural effusion.  Differential diagnosis still includes malignant process vs reactive, vs volume overload    -I discussed with the patient, given he is still requiring weekly thoracentesis, can consider inserting Asept catheter for drainage at home. He is weary about the risk of infection and is not bothered by going to IR for weekly drainage at this time. He declined Asept catheter placement for now.   -Also discussed with patient again about thoracic surgery evaluation for intervention such as VATS/pleurodesis. He prefers to hold off on this as well.   -Will continue with weekly IR thoracentesis, scheduled every Friday  -Continue diuretics per Cardiology  -Follow-up in 3 months or sooner if needed

## 2025-04-23 NOTE — ASSESSMENT & PLAN NOTE
-Continue 3 L supplemental oxygen as needed with exertion to maintain SPO2 above 88%.   -Consider updating walk test at future visit if pleural effusion improves/resolves

## 2025-04-25 ENCOUNTER — HOSPITAL ENCOUNTER (OUTPATIENT)
Dept: INTERVENTIONAL RADIOLOGY/VASCULAR | Facility: HOSPITAL | Age: 85
End: 2025-04-25
Attending: PHYSICIAN ASSISTANT
Payer: COMMERCIAL

## 2025-04-25 VITALS
OXYGEN SATURATION: 97 % | HEART RATE: 54 BPM | DIASTOLIC BLOOD PRESSURE: 70 MMHG | SYSTOLIC BLOOD PRESSURE: 153 MMHG | RESPIRATION RATE: 18 BRPM

## 2025-04-25 DIAGNOSIS — J90 PLEURAL EFFUSION ON RIGHT: ICD-10-CM

## 2025-04-25 PROCEDURE — 32555 ASPIRATE PLEURA W/ IMAGING: CPT

## 2025-04-25 PROCEDURE — 32555 ASPIRATE PLEURA W/ IMAGING: CPT | Performed by: PHYSICIAN ASSISTANT

## 2025-04-25 RX ORDER — LIDOCAINE WITH 8.4% SOD BICARB 0.9%(10ML)
SYRINGE (ML) INJECTION AS NEEDED
Status: COMPLETED | OUTPATIENT
Start: 2025-04-25 | End: 2025-04-25

## 2025-04-25 RX ADMIN — Medication 10 ML: at 11:13

## 2025-04-25 NOTE — DISCHARGE INSTRUCTIONS
[facility]  Interventional Radiology  [phone]    Thoracentesis     WHAT YOU NEED TO KNOW:   A thoracentesis is a procedure to remove extra fluid or air from between your lungs and your inner chest wall. Air or fluid buildup may make it hard for you to breathe. A thoracentesis allows your lungs to expand fully so you can breathe more easily.    DISCHARGE INSTRUCTIONS:     Small amount of shoulder pain and bloody sputum is normal after a Thoracentesis.     Rest:  Rest when you feel it is needed. Slowly start to do more each day. Return to your daily activities as directed.     Resume your normal diet. Small sips of flat soda will help mild nausea.    Do not smoke:  If you smoke, it is never too late to quit. Ask for information about how to stop smoking if you need help.    Contact Interventional Radiology at [phone] if:     You have a fever.    Your puncture site is red, warm, swollen, or draining pus.    You have questions or concerns about your procedure, medicine, or care.    Seek care immediately or call 911 if:     Severe chest pain with inspiration and shortness of breath    Large amounts of blood in your sputum    Follow up with your healthcare provider as directed.

## 2025-04-25 NOTE — SEDATION DOCUMENTATION
Right side thora completed. 1060 cc clear yellow fluid removed. Pt tolerated well. Band aid applied.

## 2025-04-29 ENCOUNTER — TELEPHONE (OUTPATIENT)
Dept: INTERNAL MEDICINE CLINIC | Facility: CLINIC | Age: 85
End: 2025-04-29

## 2025-04-29 DIAGNOSIS — I25.10 CORONARY ARTERY CALCIFICATION SEEN ON CT SCAN: ICD-10-CM

## 2025-04-29 RX ORDER — ATORVASTATIN CALCIUM 40 MG/1
40 TABLET, FILM COATED ORAL DAILY
Qty: 30 TABLET | Refills: 0 | Status: SHIPPED | OUTPATIENT
Start: 2025-04-29

## 2025-04-29 NOTE — TELEPHONE ENCOUNTER
Patient states he got the lab work done, requests 90 day script.      Medication: atorvastatin (LIPITOR) 40 mg tablet     Dose/Frequency: Take 1 tablet (40 mg total) by mouth daily     Quantity: 90 tablet    [facility] [address] [place] [address]     Office:   [x] PCP/Provider -   [] Speciality/Provider -     Does the patient have enough for 3 days?   [x] Yes   [] No - Send as HP to POD

## 2025-05-01 ENCOUNTER — TRANSCRIBE ORDERS (OUTPATIENT)
Dept: RADIOLOGY | Facility: HOSPITAL | Age: 85
End: 2025-05-01

## 2025-05-01 DIAGNOSIS — J90 PLEURAL EFFUSION ON RIGHT: Primary | ICD-10-CM

## 2025-05-02 ENCOUNTER — HOSPITAL ENCOUNTER (OUTPATIENT)
Dept: INTERVENTIONAL RADIOLOGY/VASCULAR | Facility: HOSPITAL | Age: 85
End: 2025-05-02
Payer: COMMERCIAL

## 2025-05-02 VITALS
SYSTOLIC BLOOD PRESSURE: 155 MMHG | OXYGEN SATURATION: 93 % | DIASTOLIC BLOOD PRESSURE: 65 MMHG | HEART RATE: 55 BPM | RESPIRATION RATE: 18 BRPM

## 2025-05-02 DIAGNOSIS — J90 PLEURAL EFFUSION ON RIGHT: ICD-10-CM

## 2025-05-02 PROCEDURE — 32555 ASPIRATE PLEURA W/ IMAGING: CPT

## 2025-05-02 PROCEDURE — 32555 ASPIRATE PLEURA W/ IMAGING: CPT | Performed by: PHYSICIAN ASSISTANT

## 2025-05-02 RX ORDER — LIDOCAINE WITH 8.4% SOD BICARB 0.9%(10ML)
SYRINGE (ML) INJECTION AS NEEDED
Status: COMPLETED | OUTPATIENT
Start: 2025-05-02 | End: 2025-05-02

## 2025-05-02 RX ADMIN — Medication 10 ML: at 11:08

## 2025-05-02 NOTE — DISCHARGE INSTRUCTIONS
Thoracentesis   WHAT YOU NEED TO KNOW:   A thoracentesis is a procedure to remove extra fluid or air from between your lungs and your inner chest wall. Air or fluid buildup may make it hard for you to breathe. A thoracentesis allows your lungs to expand fully so you can breathe more easily.    DISCHARGE INSTRUCTIONS:     Small amount of shoulder pain and bloody sputum is normal after a Thoracentesis.     Rest:  Rest when you feel it is needed. Slowly start to do more each day. Return to your daily activities as directed.     Resume your normal diet. Small sips of flat soda will help mild nausea.    Do not smoke:  If you smoke, it is never too late to quit. Ask for information about how to stop smoking if you need help.    Contact Interventional Radiology at [phone] (LIAT PATIENTS: Contact Interventional Radiology at [phone]) (VIDAL PATIENTS: Contact Interventional Radiology at [phone]) if:   You have a fever.    Your puncture site is red, warm, swollen, or draining pus.    You have questions or concerns about your procedure, medicine, or care.    Seek care immediately or call 911 if:   Severe chest pain with inspiration and shortness of breath    Large amounts of blood in your sputum    Follow up with your healthcare provider as directed.

## 2025-05-02 NOTE — SEDATION DOCUMENTATION
Thoracentesis completed by Joshua Subramanian PA-C. 1040 ml clear yellow fluid removed. Pt tolerated well. Band aid applied to site.

## 2025-05-07 ENCOUNTER — RA CDI HCC (OUTPATIENT)
Dept: RADIOLOGY | Facility: HOSPITAL | Age: 85
End: 2025-05-07

## 2025-05-08 DIAGNOSIS — F41.1 GENERALIZED ANXIETY DISORDER: ICD-10-CM

## 2025-05-09 RX ORDER — ALPRAZOLAM 0.5 MG
0.5 TABLET ORAL
Qty: 30 TABLET | Refills: 0 | Status: SHIPPED | OUTPATIENT
Start: 2025-05-09

## 2025-05-12 ENCOUNTER — OFFICE VISIT (OUTPATIENT)
Dept: CARDIOLOGY CLINIC | Facility: CLINIC | Age: 85
End: 2025-05-12
Payer: COMMERCIAL

## 2025-05-12 VITALS
BODY MASS INDEX: 29.16 KG/M2 | WEIGHT: 192.4 LBS | DIASTOLIC BLOOD PRESSURE: 80 MMHG | HEIGHT: 68 IN | HEART RATE: 60 BPM | SYSTOLIC BLOOD PRESSURE: 146 MMHG

## 2025-05-12 DIAGNOSIS — N18.2 STAGE 2 CHRONIC KIDNEY DISEASE: ICD-10-CM

## 2025-05-12 DIAGNOSIS — E66.3 OVERWEIGHT (BMI 25.0-29.9): ICD-10-CM

## 2025-05-12 DIAGNOSIS — Z01.810 PREOP CARDIOVASCULAR EXAM: ICD-10-CM

## 2025-05-12 DIAGNOSIS — I10 PRIMARY HYPERTENSION: Primary | ICD-10-CM

## 2025-05-12 DIAGNOSIS — J90 PLEURAL EFFUSION ON RIGHT: ICD-10-CM

## 2025-05-12 DIAGNOSIS — I25.10 CORONARY ARTERY CALCIFICATION SEEN ON CT SCAN: ICD-10-CM

## 2025-05-12 DIAGNOSIS — E78.49 OTHER HYPERLIPIDEMIA: ICD-10-CM

## 2025-05-12 PROCEDURE — 99214 OFFICE O/P EST MOD 30 MIN: CPT | Performed by: INTERNAL MEDICINE

## 2025-05-12 RX ORDER — TORSEMIDE 5 MG/1
5 TABLET ORAL DAILY
Qty: 30 TABLET | Refills: 6 | Status: SHIPPED | OUTPATIENT
Start: 2025-05-12

## 2025-05-12 NOTE — ASSESSMENT & PLAN NOTE
- Patient will continue amlodipine 5 mg daily, lisinopril 20 mg daily, hydrochlorothiazide 25 mg daily  -Continue to monitor

## 2025-05-12 NOTE — PROGRESS NOTES
Patient ID: Dexter Sharp is a 84 y.o. male.        Plan:      Assessment & Plan  Primary hypertension  - Patient will continue amlodipine 5 mg daily, lisinopril 20 mg daily, hydrochlorothiazide 25 mg daily  -Continue to monitor  Coronary artery calcification seen on CT scan  - Will continue aspirin 81 mg daily and atorvastatin 40 mg daily  -Will have patient undergo pharmacologic nuclear stress testing  Pleural effusion on right  - Patient will continue to follow with pulmonology and will be undergoing thoracentesis and possible VATS  -Will increase torsemide to 5 mg daily  -Continue to monitor  Stage 2 chronic kidney disease  Lab Results   Component Value Date    EGFR 60 04/15/2025    EGFR 68 01/13/2025    EGFR 73 01/11/2025    CREATININE 1.10 04/15/2025    CREATININE 0.99 01/13/2025    CREATININE 0.94 01/11/2025   - Will need to monitor closely with increase in torsemide and if renal function worsens will recommend nephrology evaluation at that time  -Continue to monitor patient clinically  Other hyperlipidemia  - Counseled patient on dietary and lifestyle modifications  -Continue atorvastatin 40 mg daily  Overweight (BMI 25.0-29.9)  - Counseled patient on dietary lifestyle modifications  -Continue to monitor  Preop cardiovascular exam  - Patient may be potentially undergoing VATS procedure but is unsure of at this time  -To assist with risk stratification will also have patient undergo pharmacologic nuclear stress testing which should be timed next available opportunity after thoracentesis.      Follow up Plan/Other summary comments:  -Lipid panel 4/15/2025 showing total cholesterol 114, triglyceride 52, HDL 50, LDL 54  - We will check BNP and CMP in 1 week for monitoring of renal function and electrolytes with up titration of medical therapy  - Patient counseled on dietary and lifestyle modifications including following a low-salt, low-fat, heart healthy diet with sodium restriction to less than 1800 mg of  sodium daily, DASH diet, NSAID avoidance and need for fluid restriction to less than 1800 mL of fluid daily  - Will increase torsemide to 5 mg daily and will continue 10 mEq potassium daily and repeat testing as above to monitor and patient will increase dietary potassium intake as well  - Patient does understand amlodipine can be contributing to lower extremity edema however given recurrent effusions will attempt up titration and diuretic regimen to assist as well  - Patient will continue amlodipine 5 mg daily, aspirin 81 mg daily, atorvastatin 40 mg daily, lisinopril 20 mg daily and hydrochlorothiazide 25 mg daily  - Patient will continue to follow with pulmonology as he no longer will be following with oncology as they had noted even if he were to have cancer he would not be a candidate for treatment  - Will have patient undergo pharmacologic stress testing as would need this to assist with risk stratification prior to any potential VATS procedure and to assist with other risk stratification  - Patient can add an additional dose of his torsemide in the evening with an additional dose of potassium for weight gain greater than 3 pounds in 1 day, 5 pounds 1 week, worsening lower extremity edema or shortness of breath however if he does this more than twice a week will let our office know for monitoring  - Patient will be seen in 1 month or sooner if necessary  - Patient counseled if he were to have any warning or alarm type symptoms he is to seek emergency medical care immediately.    HPI:   - Patient is an 84-year-old male with CKD, hypothyroidism, hypertension, hyperlipidemia, prostate cancer s/p resection and radiation therapy along with radiation-induced proctitis, history of COVID-pneumonia, coronary calcifications on CT imaging and recurrent pleural effusions still of unclear etiology given atypia found however per oncology's note would not be candidate for treatment who was seen in the office originally  November 2021 then had not been seen again until February 2025.  During office visit in February 2025 I did discuss with patient about possible initiation of diuretic therapy however at that time he declined but at last office visit was agreeable and initiated torsemide 5 mg every other day with 10 mEq potassium daily.  Patient has continued to undergo weekly thoracentesis although canceled his appointment last week due to issues with co-pay.  He presents to the office today for follow-up.  - Currently in the office today he denies any active chest pain, palpitations, lightheadedness or dizziness, loss of consciousness, shortness of breath at rest.  He states his lower extremity edema has been unchanged despite initiation of diuretic therapy and notes his shortness of breath on exertion is slightly worse than his baseline but attributes this to not having his thoracentesis last Friday as scheduled.  - Patient notes significant urine output with torsemide 5 mg every other day dosing where he has very large urine output's at least once or twice an hour on the days he takes this for several hours in a row although has not noticed any significant change in his symptomatology.  - Patient has been approached about potential VATS procedure but is unsure if he wishes to undergo this.      Most recent or relevant cardiac/vascular testing:    -Transthoracic echocardiogram 1/9/2025 showing left ventricular systolic function normal estimated LVEF 60% with normal diastolic parameters, IVC that was normal in size      Past Surgical History:   Procedure Laterality Date    CATARACT EXTRACTION      COLONOSCOPY      COLONOSCOPY  11/26/2019    EYE SURGERY      IR THORACENTESIS  2/21/2025    IR THORACENTESIS  2/28/2025    IR THORACENTESIS  3/14/2025    IR THORACENTESIS  3/21/2025    IR THORACENTESIS  3/28/2025    IR THORACENTESIS  4/4/2025    IR THORACENTESIS  4/11/2025    IR THORACENTESIS  4/18/2025    IR THORACENTESIS  4/25/2025     "IR THORACENTESIS  5/2/2025    KNEE SURGERY      PROSTATE SURGERY      VASECTOMY         Review of Systems   Review of Systems   Constitutional:  Negative for chills, diaphoresis, fatigue and fever.   HENT:  Negative for trouble swallowing and voice change.    Eyes:  Negative for pain and redness.   Respiratory:  Positive for shortness of breath.    Cardiovascular:  Positive for leg swelling. Negative for chest pain and palpitations.   Gastrointestinal:  Negative for abdominal pain, blood in stool, constipation, diarrhea, nausea and vomiting.   Genitourinary:  Negative for dysuria.   Musculoskeletal:  Positive for arthralgias. Negative for neck pain and neck stiffness.   Skin:  Negative for rash.   Neurological:  Negative for dizziness, syncope, light-headedness and headaches.   Psychiatric/Behavioral:  Negative for agitation and confusion.    All other systems reviewed and are negative.         Objective:     /80   Pulse 60   Ht 5' 8\" (1.727 m)   Wt 87.3 kg (192 lb 6.4 oz)   BMI 29.25 kg/m²     PHYSICAL EXAM:  Physical Exam  Vitals reviewed.   Constitutional:       General: He is not in acute distress.     Appearance: Normal appearance. He is not diaphoretic.   HENT:      Head: Normocephalic and atraumatic.   Eyes:      General:         Right eye: No discharge.         Left eye: No discharge.   Neck:      Comments: Trachea midline, mild JVD appreciated  Cardiovascular:      Rate and Rhythm: Normal rate and regular rhythm.      Heart sounds:      No friction rub.   Pulmonary:      Effort: No respiratory distress.      Breath sounds: No wheezing.      Comments: Decreased breath sounds right greater than left  Chest:      Chest wall: No tenderness.   Abdominal:      General: Bowel sounds are normal.      Palpations: Abdomen is soft.      Tenderness: There is no abdominal tenderness. There is no rebound.   Musculoskeletal:      Right lower leg: Edema (1+) present.      Left lower leg: Edema (1+) present. "   Skin:     General: Skin is warm and dry.   Neurological:      Mental Status: He is alert.      Comments: Awake, alert, able to answer questions appropriately.   Psychiatric:         Mood and Affect: Mood normal.         Behavior: Behavior normal.          Meds reviewed.  Current Outpatient Medications on File Prior to Visit   Medication Sig Dispense Refill    acetaminophen (TYLENOL) 500 mg tablet Take 500 mg by mouth every 4 (four) hours as needed for fever, headaches or mild pain. Indications: Fever, Pain      ALPRAZolam (XANAX) 0.5 mg tablet Take 1 tablet (0.5 mg total) by mouth daily at bedtime as needed for anxiety 30 tablet 0    amLODIPine (NORVASC) 5 mg tablet Take 1 tablet (5 mg total) by mouth daily 90 tablet 1    aspirin 81 mg chewable tablet Chew 81 mg daily      atorvastatin (LIPITOR) 40 mg tablet Take 1 tablet (40 mg total) by mouth daily 30 tablet 0    levothyroxine 88 mcg tablet Take 1 tablet (88 mcg total) by mouth daily 100 tablet 1    lisinopril-hydrochlorothiazide (PRINZIDE,ZESTORETIC) 20-25 MG per tablet Take 1 tablet by mouth daily 100 tablet 3    meloxicam (MOBIC) 15 mg tablet Take 1 tablet (15 mg total) by mouth daily 100 tablet 1    Misc. Devices (Bathtub Safety Rail) MISC Use in the morning 1 each 0    potassium chloride (MICRO-K) 10 MEQ CR capsule Take 1 capsule (10 mEq total) by mouth daily 30 capsule 3    torsemide (DEMADEX) 5 MG tablet Take 1 tablet (5 mg total) by mouth every other day 30 tablet 3     No current facility-administered medications on file prior to visit.      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 Last assessed: 2013       Social History     Tobacco Use   Smoking Status Former    Current packs/day: 0.00    Types: Cigarettes, Cigars    Quit date:     Years since quittin.3    Passive exposure: Current   Smokeless Tobacco Never   Tobacco Comments    smokes 1-2 cigarss/month     Family History   Problem Relation Age of Onset    Alcohol abuse Mother     Alcohol abuse Father     Depression Father     No Known Problems Sister     Stroke Brother         syndrome     No Known Problems Maternal Grandmother     No Known Problems Maternal Grandfather     No Known Problems Paternal Grandmother     No Known Problems Paternal Grandfather     Alcohol abuse Family     Colon cancer Maternal Aunt

## 2025-05-12 NOTE — ASSESSMENT & PLAN NOTE
Lab Results   Component Value Date    EGFR 60 04/15/2025    EGFR 68 01/13/2025    EGFR 73 01/11/2025    CREATININE 1.10 04/15/2025    CREATININE 0.99 01/13/2025    CREATININE 0.94 01/11/2025   - Will need to monitor closely with increase in torsemide and if renal function worsens will recommend nephrology evaluation at that time  -Continue to monitor patient clinically

## 2025-05-12 NOTE — ASSESSMENT & PLAN NOTE
- Patient will continue to follow with pulmonology and will be undergoing thoracentesis and possible VATS  -Will increase torsemide to 5 mg daily  -Continue to monitor

## 2025-05-12 NOTE — ASSESSMENT & PLAN NOTE
- Will continue aspirin 81 mg daily and atorvastatin 40 mg daily  -Will have patient undergo pharmacologic nuclear stress testing

## 2025-05-12 NOTE — ASSESSMENT & PLAN NOTE
- Patient may be potentially undergoing VATS procedure but is unsure of at this time  -To assist with risk stratification will also have patient undergo pharmacologic nuclear stress testing which should be timed next available opportunity after thoracentesis.

## 2025-05-15 ENCOUNTER — OFFICE VISIT (OUTPATIENT)
Dept: INTERNAL MEDICINE CLINIC | Facility: CLINIC | Age: 85
End: 2025-05-15
Payer: COMMERCIAL

## 2025-05-15 VITALS
DIASTOLIC BLOOD PRESSURE: 70 MMHG | HEART RATE: 62 BPM | HEIGHT: 68 IN | WEIGHT: 191 LBS | SYSTOLIC BLOOD PRESSURE: 132 MMHG | OXYGEN SATURATION: 94 % | BODY MASS INDEX: 28.95 KG/M2

## 2025-05-15 DIAGNOSIS — I25.10 CORONARY ARTERY CALCIFICATION SEEN ON CT SCAN: ICD-10-CM

## 2025-05-15 DIAGNOSIS — I10 PRIMARY HYPERTENSION: Primary | ICD-10-CM

## 2025-05-15 DIAGNOSIS — C77.5 MALIGNANT NEOPLASM OF PROSTATE METASTATIC TO INTRAPELVIC LYMPH NODE (HCC): ICD-10-CM

## 2025-05-15 DIAGNOSIS — E66.3 OVERWEIGHT (BMI 25.0-29.9): ICD-10-CM

## 2025-05-15 DIAGNOSIS — E03.9 HYPOTHYROIDISM, UNSPECIFIED TYPE: ICD-10-CM

## 2025-05-15 DIAGNOSIS — J90 PLEURAL EFFUSION ON RIGHT: ICD-10-CM

## 2025-05-15 DIAGNOSIS — E78.49 OTHER HYPERLIPIDEMIA: ICD-10-CM

## 2025-05-15 DIAGNOSIS — C61 MALIGNANT NEOPLASM OF PROSTATE METASTATIC TO INTRAPELVIC LYMPH NODE (HCC): ICD-10-CM

## 2025-05-15 DIAGNOSIS — F41.1 GENERALIZED ANXIETY DISORDER: ICD-10-CM

## 2025-05-15 DIAGNOSIS — J96.11 CHRONIC HYPOXEMIC RESPIRATORY FAILURE (HCC): ICD-10-CM

## 2025-05-15 DIAGNOSIS — I12.9 HYPERTENSIVE RENAL DISEASE, STAGE 1 THROUGH STAGE 4 OR UNSPECIFIED CHRONIC KIDNEY DISEASE: ICD-10-CM

## 2025-05-15 DIAGNOSIS — R73.03 PREDIABETES: ICD-10-CM

## 2025-05-15 DIAGNOSIS — M16.12 PRIMARY OSTEOARTHRITIS OF LEFT HIP: ICD-10-CM

## 2025-05-15 PROBLEM — Z01.810 PREOP CARDIOVASCULAR EXAM: Status: RESOLVED | Noted: 2025-05-12 | Resolved: 2025-05-15

## 2025-05-15 PROCEDURE — G2211 COMPLEX E/M VISIT ADD ON: HCPCS | Performed by: INTERNAL MEDICINE

## 2025-05-15 PROCEDURE — 99214 OFFICE O/P EST MOD 30 MIN: CPT | Performed by: INTERNAL MEDICINE

## 2025-05-15 NOTE — ASSESSMENT & PLAN NOTE
Lab Results   Component Value Date    EGFR 60 04/15/2025    EGFR 68 01/13/2025    EGFR 73 01/11/2025    CREATININE 1.10 04/15/2025    CREATININE 0.99 01/13/2025    CREATININE 0.94 01/11/2025

## 2025-05-15 NOTE — PROGRESS NOTES
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 Appreciate pulmonary and Heme input. Keep f/u with cards and for thoracocentesis  Continue current treatment and RTC four months for routine.        History of Present Illness   WM RTC for f/u HTN, Hypothyroidism, etc. Doing ok and no new issues. Continues to see Onc and Pulmonary due to recurrent pleural effusion with atypical cells, but no official sx of cancer. Due for another thoracocentesis. . Remains active w/o difficulty and no falls. Prostate ca in remission. Denies CP, SOB, edema, palpitations. Orthopnea or PND. NADIA/MDD are controlled. Chronic pain is manageable. Labs are up to date.       Review of Systems   Constitutional:  Negative for activity change, chills, diaphoresis, fatigue and fever.   HENT: Negative.     Eyes:  Negative for visual disturbance.   Respiratory:  Negative for cough, chest tightness, shortness of breath and wheezing.    Cardiovascular:  Negative for chest pain, palpitations and leg swelling.  "  Gastrointestinal:  Negative for abdominal pain, constipation, diarrhea, nausea and vomiting.   Endocrine: Negative for cold intolerance and heat intolerance.   Genitourinary:  Negative for difficulty urinating, dysuria and frequency.   Musculoskeletal:  Negative for arthralgias, gait problem and myalgias.   Neurological:  Negative for dizziness, seizures, syncope, weakness, light-headedness and headaches.   Psychiatric/Behavioral:  Negative for confusion, dysphoric mood and sleep disturbance. The patient is not nervous/anxious.        Objective   /70   Pulse 62   Ht 5' 8\" (1.727 m)   Wt 86.6 kg (191 lb)   SpO2 94%   BMI 29.04 kg/m²      Physical Exam  Vitals and nursing note reviewed.   Constitutional:       General: He is not in acute distress.     Appearance: Normal appearance. He is not ill-appearing.   HENT:      Head: Normocephalic and atraumatic.      Mouth/Throat:      Mouth: Mucous membranes are moist.     Eyes:      Extraocular Movements: Extraocular movements intact.      Conjunctiva/sclera: Conjunctivae normal.      Pupils: Pupils are equal, round, and reactive to light.     Neck:      Vascular: No carotid bruit.     Cardiovascular:      Rate and Rhythm: Normal rate and regular rhythm.      Heart sounds: Normal heart sounds. No murmur heard.  Pulmonary:      Effort: Pulmonary effort is normal. No respiratory distress.      Breath sounds: Normal breath sounds. No wheezing, rhonchi or rales.   Abdominal:      General: There is no distension.      Palpations: Abdomen is soft.      Tenderness: There is no abdominal tenderness.     Musculoskeletal:      Cervical back: Neck supple.      Right lower leg: No edema.      Left lower leg: No edema.     Neurological:      General: No focal deficit present.      Mental Status: He is alert and oriented to person, place, and time. Mental status is at baseline.     Psychiatric:         Mood and Affect: Mood normal.         Behavior: Behavior normal.         " Thought Content: Thought content normal.         Judgment: Judgment normal.

## 2025-05-16 ENCOUNTER — HOSPITAL ENCOUNTER (OUTPATIENT)
Dept: INTERVENTIONAL RADIOLOGY/VASCULAR | Facility: HOSPITAL | Age: 85
End: 2025-05-16
Payer: COMMERCIAL

## 2025-05-16 VITALS
SYSTOLIC BLOOD PRESSURE: 162 MMHG | OXYGEN SATURATION: 98 % | HEART RATE: 64 BPM | RESPIRATION RATE: 15 BRPM | DIASTOLIC BLOOD PRESSURE: 81 MMHG

## 2025-05-16 DIAGNOSIS — J90 PLEURAL EFFUSION ON RIGHT: ICD-10-CM

## 2025-05-16 PROCEDURE — 32555 ASPIRATE PLEURA W/ IMAGING: CPT

## 2025-05-16 PROCEDURE — 32555 ASPIRATE PLEURA W/ IMAGING: CPT | Performed by: PHYSICIAN ASSISTANT

## 2025-05-16 RX ORDER — LIDOCAINE WITH 8.4% SOD BICARB 0.9%(10ML)
SYRINGE (ML) INJECTION AS NEEDED
Status: COMPLETED | OUTPATIENT
Start: 2025-05-16 | End: 2025-05-16

## 2025-05-16 RX ADMIN — Medication 10 ML: at 11:31

## 2025-05-19 ENCOUNTER — APPOINTMENT (OUTPATIENT)
Age: 85
End: 2025-05-19
Attending: INTERNAL MEDICINE
Payer: COMMERCIAL

## 2025-05-19 DIAGNOSIS — I25.10 CORONARY ARTERY CALCIFICATION SEEN ON CT SCAN: ICD-10-CM

## 2025-05-19 DIAGNOSIS — N18.2 STAGE 2 CHRONIC KIDNEY DISEASE: ICD-10-CM

## 2025-05-19 DIAGNOSIS — I10 PRIMARY HYPERTENSION: ICD-10-CM

## 2025-05-19 DIAGNOSIS — J90 PLEURAL EFFUSION ON RIGHT: ICD-10-CM

## 2025-05-19 DIAGNOSIS — E78.49 OTHER HYPERLIPIDEMIA: ICD-10-CM

## 2025-05-19 LAB
ALBUMIN SERPL BCG-MCNC: 3.9 G/DL (ref 3.5–5)
ALP SERPL-CCNC: 84 U/L (ref 34–104)
ALT SERPL W P-5'-P-CCNC: 19 U/L (ref 7–52)
ANION GAP SERPL CALCULATED.3IONS-SCNC: 7 MMOL/L (ref 4–13)
AST SERPL W P-5'-P-CCNC: 20 U/L (ref 13–39)
BILIRUB SERPL-MCNC: 0.75 MG/DL (ref 0.2–1)
BNP SERPL-MCNC: 329 PG/ML (ref 0–100)
BUN SERPL-MCNC: 30 MG/DL (ref 5–25)
CALCIUM SERPL-MCNC: 8.9 MG/DL (ref 8.4–10.2)
CHLORIDE SERPL-SCNC: 106 MMOL/L (ref 96–108)
CO2 SERPL-SCNC: 26 MMOL/L (ref 21–32)
CREAT SERPL-MCNC: 1.17 MG/DL (ref 0.6–1.3)
GFR SERPL CREATININE-BSD FRML MDRD: 56 ML/MIN/1.73SQ M
GLUCOSE SERPL-MCNC: 119 MG/DL (ref 65–140)
POTASSIUM SERPL-SCNC: 3.9 MMOL/L (ref 3.5–5.3)
PROT SERPL-MCNC: 6.4 G/DL (ref 6.4–8.4)
SODIUM SERPL-SCNC: 139 MMOL/L (ref 135–147)

## 2025-05-19 PROCEDURE — 36415 COLL VENOUS BLD VENIPUNCTURE: CPT

## 2025-05-19 PROCEDURE — 80053 COMPREHEN METABOLIC PANEL: CPT

## 2025-05-19 PROCEDURE — 83880 ASSAY OF NATRIURETIC PEPTIDE: CPT

## 2025-05-22 ENCOUNTER — HOSPITAL ENCOUNTER (OUTPATIENT)
Dept: INTERVENTIONAL RADIOLOGY/VASCULAR | Facility: HOSPITAL | Age: 85
End: 2025-05-22
Attending: PHYSICIAN ASSISTANT
Payer: COMMERCIAL

## 2025-05-22 ENCOUNTER — OFFICE VISIT (OUTPATIENT)
Dept: INTERNAL MEDICINE CLINIC | Facility: CLINIC | Age: 85
End: 2025-05-22
Payer: COMMERCIAL

## 2025-05-22 ENCOUNTER — NURSE TRIAGE (OUTPATIENT)
Age: 85
End: 2025-05-22

## 2025-05-22 ENCOUNTER — APPOINTMENT (OUTPATIENT)
Dept: RADIOLOGY | Facility: CLINIC | Age: 85
End: 2025-05-22
Attending: INTERNAL MEDICINE
Payer: COMMERCIAL

## 2025-05-22 ENCOUNTER — RESULTS FOLLOW-UP (OUTPATIENT)
Dept: INTERNAL MEDICINE CLINIC | Facility: CLINIC | Age: 85
End: 2025-05-22

## 2025-05-22 ENCOUNTER — RESULTS FOLLOW-UP (OUTPATIENT)
Dept: CARDIOLOGY CLINIC | Facility: CLINIC | Age: 85
End: 2025-05-22

## 2025-05-22 ENCOUNTER — TELEPHONE (OUTPATIENT)
Age: 85
End: 2025-05-22

## 2025-05-22 VITALS
DIASTOLIC BLOOD PRESSURE: 56 MMHG | TEMPERATURE: 97.9 F | OXYGEN SATURATION: 96 % | HEART RATE: 73 BPM | SYSTOLIC BLOOD PRESSURE: 148 MMHG | HEIGHT: 68 IN | WEIGHT: 188 LBS | BODY MASS INDEX: 28.49 KG/M2

## 2025-05-22 VITALS
OXYGEN SATURATION: 96 % | DIASTOLIC BLOOD PRESSURE: 75 MMHG | HEART RATE: 55 BPM | RESPIRATION RATE: 18 BRPM | SYSTOLIC BLOOD PRESSURE: 180 MMHG

## 2025-05-22 DIAGNOSIS — C61 MALIGNANT NEOPLASM OF PROSTATE METASTATIC TO INTRAPELVIC LYMPH NODE (HCC): ICD-10-CM

## 2025-05-22 DIAGNOSIS — J90 PLEURAL EFFUSION ON RIGHT: ICD-10-CM

## 2025-05-22 DIAGNOSIS — R10.32 LEFT GROIN PAIN: ICD-10-CM

## 2025-05-22 DIAGNOSIS — M25.511 ACUTE PAIN OF RIGHT SHOULDER: ICD-10-CM

## 2025-05-22 DIAGNOSIS — M25.511 ACUTE PAIN OF RIGHT SHOULDER: Primary | ICD-10-CM

## 2025-05-22 DIAGNOSIS — Z92.3 HISTORY OF RADIATION THERAPY: ICD-10-CM

## 2025-05-22 DIAGNOSIS — R31.29 OTHER MICROSCOPIC HEMATURIA: ICD-10-CM

## 2025-05-22 DIAGNOSIS — R35.0 URINARY FREQUENCY: ICD-10-CM

## 2025-05-22 DIAGNOSIS — S46.811A TRAPEZIUS MUSCLE STRAIN, RIGHT, INITIAL ENCOUNTER: ICD-10-CM

## 2025-05-22 DIAGNOSIS — R39.15 URGENCY OF URINATION: ICD-10-CM

## 2025-05-22 DIAGNOSIS — C77.5 MALIGNANT NEOPLASM OF PROSTATE METASTATIC TO INTRAPELVIC LYMPH NODE (HCC): ICD-10-CM

## 2025-05-22 DIAGNOSIS — M79.652 LEFT THIGH PAIN: ICD-10-CM

## 2025-05-22 PROCEDURE — 49083 ABD PARACENTESIS W/IMAGING: CPT

## 2025-05-22 PROCEDURE — 73030 X-RAY EXAM OF SHOULDER: CPT

## 2025-05-22 PROCEDURE — 99213 OFFICE O/P EST LOW 20 MIN: CPT | Performed by: INTERNAL MEDICINE

## 2025-05-22 PROCEDURE — 32555 ASPIRATE PLEURA W/ IMAGING: CPT | Performed by: PHYSICIAN ASSISTANT

## 2025-05-22 PROCEDURE — G2211 COMPLEX E/M VISIT ADD ON: HCPCS | Performed by: INTERNAL MEDICINE

## 2025-05-22 RX ORDER — MELOXICAM 15 MG/1
15 TABLET ORAL DAILY
Start: 2025-05-22

## 2025-05-22 RX ORDER — METHOCARBAMOL 500 MG/1
500 TABLET, FILM COATED ORAL 4 TIMES DAILY
Qty: 90 TABLET | Refills: 0 | Status: SHIPPED | OUTPATIENT
Start: 2025-05-22

## 2025-05-22 RX ORDER — LIDOCAINE WITH 8.4% SOD BICARB 0.9%(10ML)
SYRINGE (ML) INJECTION AS NEEDED
Status: COMPLETED | OUTPATIENT
Start: 2025-05-22 | End: 2025-05-22

## 2025-05-22 RX ADMIN — Medication 10 ML: at 11:59

## 2025-05-22 NOTE — TELEPHONE ENCOUNTER
- I was able to call and speak with patient about recent laboratory study results.  I did discuss with patient about up titration and diuretic therapy but he states overall he is doing much better and his lower extremity edema has significantly improved.  He also notes significant urine output and therefore wishes to hold off further up titration.  We will continue to monitor at this time.

## 2025-05-22 NOTE — PROGRESS NOTES
Name: Dexter Sharp      : 1940      MRN: [id number]  Encounter Provider: Edil Mac DO  Encounter Date: 2025   Encounter department: [facility]  :  Assessment & Plan  Acute pain of right shoulder    Orders:    XR shoulder 2+ vw right; Future    methocarbamol (ROBAXIN) 500 mg tablet; Take 1 tablet (500 mg total) by mouth 4 (four) times a day    Trapezius muscle strain, right, initial encounter    Orders:    XR shoulder 2+ vw right; Future    methocarbamol (ROBAXIN) 500 mg tablet; Take 1 tablet (500 mg total) by mouth 4 (four) times a day    Urinary frequency    Orders:    meloxicam (MOBIC) 15 mg tablet; Take 1 tablet (15 mg total) by mouth daily    Urgency of urination    Orders:    meloxicam (MOBIC) 15 mg tablet; Take 1 tablet (15 mg total) by mouth daily    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 Doubt fx and suspect soft tissue. Thermal therapy, continue NSAID, and will add robaxin. Check an xray. RTC one week and may need PT.      History of Present Illness   RHD WM presents with a two day h/o right shoulder pain No trauma, but came on acutely while compacting some trash in a can and pt felt acute pop and pain when becoming erect. Pain a 10/10. Takes mobic. No better, but now a 7/10 and still with pain upon ROM. NO prior issues.       Review of Systems  "  Constitutional:  Positive for activity change. Negative for chills, diaphoresis, fatigue and fever.   Respiratory:  Negative for cough, chest tightness, shortness of breath and wheezing.    Cardiovascular:  Negative for chest pain, palpitations and leg swelling.   Gastrointestinal:  Negative for abdominal pain, constipation, diarrhea, nausea and vomiting.   Genitourinary:  Negative for difficulty urinating, dysuria and frequency.   Musculoskeletal:  Positive for arthralgias, neck pain and neck stiffness. Negative for gait problem, joint swelling and myalgias.   Neurological:  Negative for weakness, light-headedness, numbness and headaches.   Psychiatric/Behavioral:  Negative for confusion. The patient is not nervous/anxious.        Objective   /56   Pulse 73   Temp 97.9 °F (36.6 °C)   Ht 5' 8\" (1.727 m)   Wt 85.3 kg (188 lb)   SpO2 96%   BMI 28.59 kg/m²      Physical Exam  Nursing note reviewed.   Constitutional:       General: He is not in acute distress.     Appearance: Normal appearance. He is not ill-appearing.   HENT:      Head: Normocephalic and atraumatic.      Mouth/Throat:      Mouth: Mucous membranes are moist.     Eyes:      Extraocular Movements: Extraocular movements intact.      Conjunctiva/sclera: Conjunctivae normal.      Pupils: Pupils are equal, round, and reactive to light.       Musculoskeletal:         General: Tenderness and signs of injury present. No swelling or deformity.      Comments: C Spine w/o gross deformities, increase temp, erythema, swelling, or lesions. Tenderness along the right medial lateral trap. ROM wnl except ext decreased 50%. Spasms noted. UE strength 5/5 with tone/ROM WNL,but ROM right UE with ABduction decreased 30%. . DTR 2/4.Grasp wnl.      Neurological:      General: No focal deficit present.      Mental Status: He is alert and oriented to person, place, and time. Mental status is at baseline.     Psychiatric:         Mood and Affect: Mood normal.         " Behavior: Behavior normal.         Thought Content: Thought content normal.         Judgment: Judgment normal.

## 2025-05-22 NOTE — SEDATION DOCUMENTATION
Right side thoracentesis completed by Joshua Subramanian PA-C.650 ml clear yellow fluid removed. Pt tolerated well. Band aid applied to site.

## 2025-05-22 NOTE — TELEPHONE ENCOUNTER
"FOLLOW UP: Patient is scheduled for an appointment with Dr Mac at 2:00 PM on 5/22/25    REASON FOR CONVERSATION: Shoulder Pain    SYMPTOMS: right shoulder pain x 2 days, 10/10 pain rating, hurts when moving arm    OTHER: Patient reports he thinks he may have twisted his arm and now experiencing severe pain in the right shoulder. Patient reports it's very painful that there is tears in his eyes. Patient initially called for a xray but call back to schedule an appointment to be be evaluated.    DISPOSITION: See Today in Office  Reason for Disposition   Unable to use arm at all and because of shoulder pain or stiffness    Answer Assessment - Initial Assessment Questions  1. ONSET: \"When did the pain start?\"      Started 2 days    2. LOCATION: \"Where is the pain located?\"      Right shoulder, the bone hurts    3. PAIN: \"How bad is the pain?\" (Scale 1-10; or mild, moderate, severe)      10/10 tears come to his eyes when the pain starts    5. CAUSE: \"What do you think is causing the shoulder pain?\"      Patient thinks he may have twist it    6. OTHER SYMPTOMS: \"Do you have any other symptoms?\" (e.g., neck pain, swelling, rash, fever, numbness, weakness)      When he touch the shoulder it hurts    Protocols used: Shoulder Pain-Adult-[place]    "

## 2025-05-22 NOTE — TELEPHONE ENCOUNTER
Patient called stating that for the past 2 days his right shoulder has been giving him terrible pain.     He has to go to the hospital today to get his lung drained.     Asking if Dr. Mac can order an x-ray so he can have it done while he is at the hospital.    Please notify patient

## 2025-05-22 NOTE — DISCHARGE INSTRUCTIONS
[facility]  Interventional Radiology  [phone]    Thoracentesis     WHAT YOU NEED TO KNOW:   A thoracentesis is a procedure to remove extra fluid or air from between your lungs and your inner chest wall. Air or fluid buildup may make it hard for you to breathe. A thoracentesis allows your lungs to expand fully so you can breathe more easily.    DISCHARGE INSTRUCTIONS:     Small amount of shoulder pain and bloody sputum is normal after a Thoracentesis.     Rest:  Rest when you feel it is needed. Slowly start to do more each day. Return to your daily activities as directed.     Resume your normal diet. Small sips of flat soda will help mild nausea.    Do not smoke:  If you smoke, it is never too late to quit. Ask for information about how to stop smoking if you need help.    Contact Interventional Radiology at [phone] if:     You have a fever.    Your puncture site is red, warm, swollen, or draining pus.    You have questions or concerns about your procedure, medicine, or care.    Seek care immediately or call 911 if:     Severe chest pain with inspiration and shortness of breath    Large amounts of blood in your sputum    Follow up with your healthcare provider as directed.

## 2025-05-24 DIAGNOSIS — I25.10 CORONARY ARTERY CALCIFICATION SEEN ON CT SCAN: ICD-10-CM

## 2025-05-25 RX ORDER — ATORVASTATIN CALCIUM 40 MG/1
40 TABLET, FILM COATED ORAL DAILY
Qty: 30 TABLET | Refills: 0 | Status: SHIPPED | OUTPATIENT
Start: 2025-05-25

## 2025-05-27 ENCOUNTER — HOSPITAL ENCOUNTER (OUTPATIENT)
Dept: NON INVASIVE DIAGNOSTICS | Facility: HOSPITAL | Age: 85
Discharge: HOME | End: 2025-05-27
Attending: INTERNAL MEDICINE
Payer: COMMERCIAL

## 2025-05-27 ENCOUNTER — HOSPITAL ENCOUNTER (OUTPATIENT)
Dept: NUCLEAR MEDICINE | Facility: HOSPITAL | Age: 85
Discharge: HOME | End: 2025-05-27
Attending: INTERNAL MEDICINE
Payer: COMMERCIAL

## 2025-05-27 ENCOUNTER — TELEPHONE (OUTPATIENT)
Dept: CARDIOLOGY CLINIC | Facility: CLINIC | Age: 85
End: 2025-05-27

## 2025-05-27 VITALS
HEIGHT: 68 IN | OXYGEN SATURATION: 95 % | HEART RATE: 64 BPM | WEIGHT: 188 LBS | BODY MASS INDEX: 28.49 KG/M2 | DIASTOLIC BLOOD PRESSURE: 60 MMHG | SYSTOLIC BLOOD PRESSURE: 142 MMHG

## 2025-05-27 DIAGNOSIS — Z01.810 PREOP CARDIOVASCULAR EXAM: ICD-10-CM

## 2025-05-27 DIAGNOSIS — I25.10 CORONARY ARTERY CALCIFICATION SEEN ON CT SCAN: ICD-10-CM

## 2025-05-27 LAB
CHEST PAIN STATEMENT: NORMAL
MAX DIASTOLIC BP: 60 MMHG
MAX HR PERCENT: 81 %
MAX HR: 109 BPM
MAX PREDICTED HEART RATE: 134 BPM
PROTOCOL NAME: NORMAL
RATE PRESSURE PRODUCT: NORMAL
REASON FOR TERMINATION: NORMAL
SL CV REST NUCLEAR ISOTOPE DOSE: 10.6 MCI
SL CV STRESS NUCLEAR ISOTOPE DOSE: 32.6 MCI
SL CV STRESS RECOVERY BP: NORMAL MMHG
SL CV STRESS RECOVERY HR: 71 BPM
SL CV STRESS RECOVERY O2 SAT: 95 %
SPECT HRT GATED+EF W RNC IV: 50 %
STRESS ANGINA INDEX: 0
STRESS BASELINE BP: NORMAL MMHG
STRESS BASELINE HR: 64 BPM
STRESS O2 SAT REST: 95 %
STRESS PEAK HR: 109 BPM
STRESS POST EXERCISE DUR MIN: 3 MIN
STRESS POST EXERCISE DUR MIN: 3 MIN
STRESS POST EXERCISE DUR SEC: 0 SEC
STRESS POST EXERCISE DUR SEC: 0 SEC
STRESS POST O2 SAT PEAK: 98 %
STRESS POST PEAK BP: 150 MMHG
STRESS POST PEAK HR: 109 BPM
STRESS POST PEAK SYSTOLIC BP: 160 MMHG
TARGET HR FORMULA: NORMAL
TEST INDICATION: NORMAL

## 2025-05-27 PROCEDURE — 93016 CV STRESS TEST SUPVJ ONLY: CPT | Performed by: INTERNAL MEDICINE

## 2025-05-27 PROCEDURE — 78452 HT MUSCLE IMAGE SPECT MULT: CPT

## 2025-05-27 PROCEDURE — 93018 CV STRESS TEST I&R ONLY: CPT | Performed by: INTERNAL MEDICINE

## 2025-05-27 PROCEDURE — 93017 CV STRESS TEST TRACING ONLY: CPT

## 2025-05-27 PROCEDURE — 78452 HT MUSCLE IMAGE SPECT MULT: CPT | Performed by: INTERNAL MEDICINE

## 2025-05-27 PROCEDURE — A9502 TC99M TETROFOSMIN: HCPCS

## 2025-05-27 RX ORDER — REGADENOSON 0.08 MG/ML
0.4 INJECTION, SOLUTION INTRAVENOUS ONCE
Status: COMPLETED | OUTPATIENT
Start: 2025-05-27 | End: 2025-05-27

## 2025-05-27 RX ADMIN — REGADENOSON 0.4 MG: 0.08 INJECTION, SOLUTION INTRAVENOUS at 10:59

## 2025-05-27 NOTE — TELEPHONE ENCOUNTER
Pt called back, he has some trouble answering his phone but will try to answer if you are able to call him back. Thank you.

## 2025-05-27 NOTE — TELEPHONE ENCOUNTER
- Attempted to call patient to go over nuclear stress testing results.  Unfortunately I was not able to reach the patient but I did leave a message on his phone with my name and office number to call back for a better time to speak.

## 2025-05-28 ENCOUNTER — TELEPHONE (OUTPATIENT)
Dept: CARDIOLOGY CLINIC | Facility: CLINIC | Age: 85
End: 2025-05-28

## 2025-05-28 NOTE — TELEPHONE ENCOUNTER
- I was able to call and speak with patient about nuclear stress results.  After discussion with patient speaking with him about potential for artifact versus very small defect he wished to avoid invasive procedure at this time for final determination.  He states overall he feels well and therefore wishes to hold off but will let us know if he has any issues.  Will continue current medical regimen and continue to monitor.

## 2025-06-06 ENCOUNTER — HOSPITAL ENCOUNTER (OUTPATIENT)
Dept: INTERVENTIONAL RADIOLOGY/VASCULAR | Facility: HOSPITAL | Age: 85
End: 2025-06-06
Attending: PHYSICIAN ASSISTANT
Payer: COMMERCIAL

## 2025-06-06 VITALS
RESPIRATION RATE: 18 BRPM | SYSTOLIC BLOOD PRESSURE: 149 MMHG | HEART RATE: 66 BPM | DIASTOLIC BLOOD PRESSURE: 79 MMHG | OXYGEN SATURATION: 98 %

## 2025-06-06 DIAGNOSIS — J90 PLEURAL EFFUSION ON RIGHT: ICD-10-CM

## 2025-06-06 PROCEDURE — 32555 ASPIRATE PLEURA W/ IMAGING: CPT

## 2025-06-06 PROCEDURE — 32555 ASPIRATE PLEURA W/ IMAGING: CPT | Performed by: RADIOLOGY

## 2025-06-06 RX ORDER — LIDOCAINE WITH 8.4% SOD BICARB 0.9%(10ML)
SYRINGE (ML) INJECTION AS NEEDED
Status: COMPLETED | OUTPATIENT
Start: 2025-06-06 | End: 2025-06-06

## 2025-06-06 RX ORDER — LIDOCAINE HYDROCHLORIDE 10 MG/ML
INJECTION, SOLUTION EPIDURAL; INFILTRATION; INTRACAUDAL; PERINEURAL AS NEEDED
Status: COMPLETED | OUTPATIENT
Start: 2025-06-06 | End: 2025-06-06

## 2025-06-06 RX ADMIN — Medication 20 ML: at 13:26

## 2025-06-06 RX ADMIN — LIDOCAINE HYDROCHLORIDE 10 ML: 10 INJECTION, SOLUTION EPIDURAL; INFILTRATION; INTRACAUDAL; PERINEURAL at 13:32

## 2025-06-06 NOTE — DISCHARGE INSTRUCTIONS
[facility]  Interventional Radiology  [phone]    Thoracentesis     WHAT YOU NEED TO KNOW:   A thoracentesis is a procedure to remove extra fluid or air from between your lungs and your inner chest wall. Air or fluid buildup may make it hard for you to breathe. A thoracentesis allows your lungs to expand fully so you can breathe more easily.    DISCHARGE INSTRUCTIONS:     Small amount of shoulder pain and bloody sputum is normal after a Thoracentesis.     Rest:  Rest when you feel it is needed. Slowly start to do more each day. Return to your daily activities as directed.     Resume your normal diet. Small sips of flat soda will help mild nausea.    Do not smoke:  If you smoke, it is never too late to quit. Ask for information about how to stop smoking if you need help.    Contact Interventional Radiology at [phone] if:     You have a fever.    Your puncture site is red, warm, swollen, or draining pus.    You have questions or concerns about your procedure, medicine, or care.    Seek care immediately or call 911 if:     Severe chest pain with inspiration and shortness of breath    Large amounts of blood in your sputum    Follow up with your healthcare provider as directed.

## 2025-06-06 NOTE — SEDATION DOCUMENTATION
Right side thoracentesis completed by Dr Buckner. 1680 cc clear yellow fluid removed. Band aid applied to site. Pt tolerated well.

## 2025-06-08 DIAGNOSIS — I10 PRIMARY HYPERTENSION: ICD-10-CM

## 2025-06-08 DIAGNOSIS — F41.1 GENERALIZED ANXIETY DISORDER: ICD-10-CM

## 2025-06-09 RX ORDER — AMLODIPINE BESYLATE 5 MG/1
5 TABLET ORAL DAILY
Qty: 90 TABLET | Refills: 0 | Status: SHIPPED | OUTPATIENT
Start: 2025-06-09

## 2025-06-09 RX ORDER — ALPRAZOLAM 0.5 MG
0.5 TABLET ORAL
Qty: 30 TABLET | Refills: 0 | Status: SHIPPED | OUTPATIENT
Start: 2025-06-09

## 2025-06-09 RX ORDER — LISINOPRIL AND HYDROCHLOROTHIAZIDE 20; 25 MG/1; MG/1
1 TABLET ORAL DAILY
Qty: 100 TABLET | Refills: 0 | Status: SHIPPED | OUTPATIENT
Start: 2025-06-09

## 2025-06-10 DIAGNOSIS — I10 PRIMARY HYPERTENSION: ICD-10-CM

## 2025-06-10 DIAGNOSIS — J90 PLEURAL EFFUSION ON RIGHT: ICD-10-CM

## 2025-06-10 DIAGNOSIS — I25.10 CORONARY ARTERY CALCIFICATION SEEN ON CT SCAN: ICD-10-CM

## 2025-06-11 RX ORDER — POTASSIUM CHLORIDE 750 MG/1
10 CAPSULE, EXTENDED RELEASE ORAL DAILY
Qty: 30 CAPSULE | Refills: 5 | Status: SHIPPED | OUTPATIENT
Start: 2025-06-11

## 2025-06-13 ENCOUNTER — HOSPITAL ENCOUNTER (OUTPATIENT)
Dept: INTERVENTIONAL RADIOLOGY/VASCULAR | Facility: HOSPITAL | Age: 85
Discharge: HOME | End: 2025-06-13
Attending: PHYSICIAN ASSISTANT
Payer: COMMERCIAL

## 2025-06-13 VITALS
DIASTOLIC BLOOD PRESSURE: 64 MMHG | SYSTOLIC BLOOD PRESSURE: 136 MMHG | HEART RATE: 50 BPM | RESPIRATION RATE: 18 BRPM | OXYGEN SATURATION: 98 %

## 2025-06-13 DIAGNOSIS — J90 PLEURAL EFFUSION ON RIGHT: ICD-10-CM

## 2025-06-13 PROCEDURE — 32555 ASPIRATE PLEURA W/ IMAGING: CPT

## 2025-06-13 PROCEDURE — 32555 ASPIRATE PLEURA W/ IMAGING: CPT | Performed by: PHYSICIAN ASSISTANT

## 2025-06-13 RX ORDER — LIDOCAINE WITH 8.4% SOD BICARB 0.9%(10ML)
SYRINGE (ML) INJECTION AS NEEDED
Status: COMPLETED | OUTPATIENT
Start: 2025-06-13 | End: 2025-06-13

## 2025-06-13 RX ADMIN — Medication 10 ML: at 11:19

## 2025-06-13 NOTE — DISCHARGE INSTRUCTIONS
[facility]  Interventional Radiology  [phone]      Thoracentesis   WHAT YOU NEED TO KNOW:   A thoracentesis is a procedure to remove extra fluid or air from between your lungs and your inner chest wall. Air or fluid buildup may make it hard for you to breathe. A thoracentesis allows your lungs to expand fully so you can breathe more easily.    DISCHARGE INSTRUCTIONS:     Small amount of shoulder pain and bloody sputum is normal after a Thoracentesis.     Rest:  Rest when you feel it is needed. Slowly start to do more each day. Return to your daily activities as directed.     Resume your normal diet. Small sips of flat soda will help mild nausea.    Do not smoke:  If you smoke, it is never too late to quit. Ask for information about how to stop smoking if you need help.    Contact Interventional Radiology at [phone] ([facility] PATIENTS: Contact Interventional Radiology at [phone]) ([facility] PATIENTS: Contact Interventional Radiology at [phone]) if:   You have a fever.    Your puncture site is red, warm, swollen, or draining pus.    You have questions or concerns about your procedure, medicine, or care.    Seek care immediately or call 911 if:   Severe chest pain with inspiration and shortness of breath    Large amounts of blood in your sputum    Follow up with your healthcare provider as directed.

## 2025-06-13 NOTE — SEDATION DOCUMENTATION
Patient had a right sided thoracentesis performed by SCOT Oropeza with IR. A total of 590 ml of clear yellow fluid was removed, no labs needed. Patient offers no complaints at this time.

## 2025-06-20 ENCOUNTER — HOSPITAL ENCOUNTER (OUTPATIENT)
Dept: INTERVENTIONAL RADIOLOGY/VASCULAR | Facility: HOSPITAL | Age: 85
Discharge: HOME | End: 2025-06-20
Attending: PHYSICIAN ASSISTANT
Payer: COMMERCIAL

## 2025-06-20 VITALS
RESPIRATION RATE: 17 BRPM | OXYGEN SATURATION: 99 % | DIASTOLIC BLOOD PRESSURE: 68 MMHG | HEART RATE: 60 BPM | SYSTOLIC BLOOD PRESSURE: 136 MMHG

## 2025-06-20 DIAGNOSIS — J90 PLEURAL EFFUSION ON RIGHT: ICD-10-CM

## 2025-06-20 PROCEDURE — 32555 ASPIRATE PLEURA W/ IMAGING: CPT | Performed by: RADIOLOGY

## 2025-06-20 PROCEDURE — 32555 ASPIRATE PLEURA W/ IMAGING: CPT

## 2025-06-20 RX ORDER — LIDOCAINE WITH 8.4% SOD BICARB 0.9%(10ML)
SYRINGE (ML) INJECTION AS NEEDED
Status: COMPLETED | OUTPATIENT
Start: 2025-06-20 | End: 2025-06-20

## 2025-06-20 RX ADMIN — Medication 10 ML: at 11:38

## 2025-06-20 NOTE — SEDATION DOCUMENTATION
Patient had a right sided thoracentesis performed by Dr. Prather with IR. A total of 1,300 ml of clear brayden fluid was removed, no labs needed. Patient offers no complaints at this time.

## 2025-06-20 NOTE — DISCHARGE INSTRUCTIONS
[facility]  Interventional Radiology  [phone]      Thoracentesis   WHAT YOU NEED TO KNOW:   A thoracentesis is a procedure to remove extra fluid or air from between your lungs and your inner chest wall. Air or fluid buildup may make it hard for you to breathe. A thoracentesis allows your lungs to expand fully so you can breathe more easily.    DISCHARGE INSTRUCTIONS:     Small amount of shoulder pain and bloody sputum is normal after a Thoracentesis.     Rest:  Rest when you feel it is needed. Slowly start to do more each day. Return to your daily activities as directed.     Resume your normal diet. Small sips of flat soda will help mild nausea.    Do not smoke:  If you smoke, it is never too late to quit. Ask for information about how to stop smoking if you need help.    Contact Interventional Radiology at [phone] ([facility] PATIENTS: Contact Interventional Radiology at [phone]) ([facility] PATIENTS: Contact Interventional Radiology at [phone]) if:   You have a fever.    Your puncture site is red, warm, swollen, or draining pus.    You have questions or concerns about your procedure, medicine, or care.    Seek care immediately or call 911 if:   Severe chest pain with inspiration and shortness of breath    Large amounts of blood in your sputum    Follow up with your healthcare provider as directed.

## 2025-07-03 ENCOUNTER — HOSPITAL ENCOUNTER (OUTPATIENT)
Dept: INTERVENTIONAL RADIOLOGY/VASCULAR | Facility: HOSPITAL | Age: 85
End: 2025-07-03
Attending: PHYSICIAN ASSISTANT
Payer: COMMERCIAL

## 2025-07-03 VITALS
RESPIRATION RATE: 18 BRPM | OXYGEN SATURATION: 96 % | HEART RATE: 60 BPM | DIASTOLIC BLOOD PRESSURE: 65 MMHG | SYSTOLIC BLOOD PRESSURE: 129 MMHG

## 2025-07-03 DIAGNOSIS — J90 PLEURAL EFFUSION ON RIGHT: ICD-10-CM

## 2025-07-03 PROCEDURE — 32555 ASPIRATE PLEURA W/ IMAGING: CPT

## 2025-07-03 RX ORDER — LIDOCAINE WITH 8.4% SOD BICARB 0.9%(10ML)
SYRINGE (ML) INJECTION AS NEEDED
Status: COMPLETED | OUTPATIENT
Start: 2025-07-03 | End: 2025-07-03

## 2025-07-03 RX ADMIN — Medication 10 ML: at 09:31

## 2025-07-03 NOTE — DISCHARGE INSTRUCTIONS
[facility]  Interventional Radiology  [phone]      Thoracentesis   WHAT YOU NEED TO KNOW:   A thoracentesis is a procedure to remove extra fluid or air from between your lungs and your inner chest wall. Air or fluid buildup may make it hard for you to breathe. A thoracentesis allows your lungs to expand fully so you can breathe more easily.    DISCHARGE INSTRUCTIONS:     Small amount of shoulder pain and bloody sputum is normal after a Thoracentesis.     Rest:  Rest when you feel it is needed. Slowly start to do more each day. Return to your daily activities as directed.     Resume your normal diet. Small sips of flat soda will help mild nausea.    Do not smoke:  If you smoke, it is never too late to quit. Ask for information about how to stop smoking if you need help.    Contact Interventional Radiology at [phone] ([facility] PATIENTS: Contact Interventional Radiology at [phone]) ([facility] PATIENTS: Contact Interventional Radiology at [phone]) if:   You have a fever.    Your puncture site is red, warm, swollen, or draining pus.    You have questions or concerns about your procedure, medicine, or care.    Seek care immediately or call 911 if:   Severe chest pain with inspiration and shortness of breath    Large amounts of blood in your sputum    Follow up with your healthcare provider as directed.

## 2025-07-03 NOTE — SEDATION DOCUMENTATION
Patient had a right sided thoracentesis performed by SCOT Oropeza with IR. A total of 1,280 ml of clear yellow fluid was removed, no labs needed. Patient offers no complaints at this time.

## 2025-07-09 DIAGNOSIS — F41.1 GENERALIZED ANXIETY DISORDER: ICD-10-CM

## 2025-07-09 DIAGNOSIS — C61 MALIGNANT NEOPLASM OF PROSTATE METASTATIC TO INTRAPELVIC LYMPH NODE (HCC): ICD-10-CM

## 2025-07-09 DIAGNOSIS — I25.10 CORONARY ARTERY CALCIFICATION SEEN ON CT SCAN: ICD-10-CM

## 2025-07-09 DIAGNOSIS — C77.5 MALIGNANT NEOPLASM OF PROSTATE METASTATIC TO INTRAPELVIC LYMPH NODE (HCC): ICD-10-CM

## 2025-07-09 DIAGNOSIS — R35.0 URINARY FREQUENCY: ICD-10-CM

## 2025-07-09 DIAGNOSIS — R10.32 LEFT GROIN PAIN: ICD-10-CM

## 2025-07-09 DIAGNOSIS — M79.652 LEFT THIGH PAIN: ICD-10-CM

## 2025-07-09 DIAGNOSIS — R31.29 OTHER MICROSCOPIC HEMATURIA: ICD-10-CM

## 2025-07-09 DIAGNOSIS — R39.15 URGENCY OF URINATION: ICD-10-CM

## 2025-07-09 DIAGNOSIS — Z92.3 HISTORY OF RADIATION THERAPY: ICD-10-CM

## 2025-07-09 RX ORDER — MELOXICAM 15 MG/1
15 TABLET ORAL DAILY
Qty: 30 TABLET | Refills: 5 | Status: SHIPPED | OUTPATIENT
Start: 2025-07-09 | End: 2025-07-15 | Stop reason: SDUPTHER

## 2025-07-09 RX ORDER — ATORVASTATIN CALCIUM 40 MG/1
40 TABLET, FILM COATED ORAL DAILY
Qty: 30 TABLET | Refills: 5 | Status: SHIPPED | OUTPATIENT
Start: 2025-07-09 | End: 2025-07-15 | Stop reason: SDUPTHER

## 2025-07-10 RX ORDER — ALPRAZOLAM 0.5 MG
0.5 TABLET ORAL
Qty: 30 TABLET | Refills: 0 | Status: SHIPPED | OUTPATIENT
Start: 2025-07-10

## 2025-07-11 NOTE — TELEPHONE ENCOUNTER
Patient called the RX Refill Line. Message is being forwarded to the office.     Patient is requesting his two medications, Meloxicam 15mg and Atorvastatin 40mg that were refilled 7/9/25 to be changed to a 100 days supply with additional refills. Patient states he has no insurance and the cost for 100 qty is much less to pay out of pocket. Patient states he used to get 100 days supply and not sure why they are only refilled for a 30 days.     Please contact patient at [phone] if unable to refill for 100 qty for both medications.        Pharmacy: [facility]

## 2025-07-15 ENCOUNTER — OFFICE VISIT (OUTPATIENT)
Dept: INTERNAL MEDICINE CLINIC | Facility: CLINIC | Age: 85
End: 2025-07-15
Payer: COMMERCIAL

## 2025-07-15 VITALS
OXYGEN SATURATION: 94 % | HEIGHT: 68 IN | BODY MASS INDEX: 28.64 KG/M2 | DIASTOLIC BLOOD PRESSURE: 60 MMHG | HEART RATE: 68 BPM | SYSTOLIC BLOOD PRESSURE: 124 MMHG | WEIGHT: 189 LBS | TEMPERATURE: 98.8 F

## 2025-07-15 DIAGNOSIS — S46.811A TRAPEZIUS MUSCLE STRAIN, RIGHT, INITIAL ENCOUNTER: ICD-10-CM

## 2025-07-15 DIAGNOSIS — E78.49 OTHER HYPERLIPIDEMIA: ICD-10-CM

## 2025-07-15 DIAGNOSIS — F41.1 GENERALIZED ANXIETY DISORDER: ICD-10-CM

## 2025-07-15 DIAGNOSIS — J90 PLEURAL EFFUSION ON RIGHT: ICD-10-CM

## 2025-07-15 DIAGNOSIS — E03.9 HYPOTHYROIDISM, UNSPECIFIED TYPE: ICD-10-CM

## 2025-07-15 DIAGNOSIS — C77.5 MALIGNANT NEOPLASM OF PROSTATE METASTATIC TO INTRAPELVIC LYMPH NODE (HCC): ICD-10-CM

## 2025-07-15 DIAGNOSIS — R35.0 URINARY FREQUENCY: ICD-10-CM

## 2025-07-15 DIAGNOSIS — E66.3 OVERWEIGHT (BMI 25.0-29.9): ICD-10-CM

## 2025-07-15 DIAGNOSIS — Z00.00 MEDICARE ANNUAL WELLNESS VISIT, SUBSEQUENT: Primary | ICD-10-CM

## 2025-07-15 DIAGNOSIS — I25.10 CORONARY ARTERY CALCIFICATION SEEN ON CT SCAN: ICD-10-CM

## 2025-07-15 DIAGNOSIS — I10 PRIMARY HYPERTENSION: ICD-10-CM

## 2025-07-15 DIAGNOSIS — R39.15 URGENCY OF URINATION: ICD-10-CM

## 2025-07-15 DIAGNOSIS — R10.32 LEFT GROIN PAIN: ICD-10-CM

## 2025-07-15 DIAGNOSIS — C61 MALIGNANT NEOPLASM OF PROSTATE METASTATIC TO INTRAPELVIC LYMPH NODE (HCC): ICD-10-CM

## 2025-07-15 DIAGNOSIS — J90 RECURRENT PLEURAL EFFUSION: ICD-10-CM

## 2025-07-15 DIAGNOSIS — R31.29 OTHER MICROSCOPIC HEMATURIA: ICD-10-CM

## 2025-07-15 DIAGNOSIS — I12.9 HYPERTENSIVE RENAL DISEASE, STAGE 1 THROUGH STAGE 4 OR UNSPECIFIED CHRONIC KIDNEY DISEASE: ICD-10-CM

## 2025-07-15 DIAGNOSIS — J96.11 CHRONIC HYPOXEMIC RESPIRATORY FAILURE (HCC): ICD-10-CM

## 2025-07-15 DIAGNOSIS — Z92.3 HISTORY OF RADIATION THERAPY: ICD-10-CM

## 2025-07-15 DIAGNOSIS — M25.511 ACUTE PAIN OF RIGHT SHOULDER: ICD-10-CM

## 2025-07-15 DIAGNOSIS — R73.03 PREDIABETES: ICD-10-CM

## 2025-07-15 DIAGNOSIS — M16.12 PRIMARY OSTEOARTHRITIS OF LEFT HIP: ICD-10-CM

## 2025-07-15 DIAGNOSIS — M79.652 LEFT THIGH PAIN: ICD-10-CM

## 2025-07-15 LAB
CREAT UR-MCNC: 22.7 MG/DL
MICROALBUMIN UR-MCNC: <7 MG/L
SL AMB POCT HEMOGLOBIN AIC: 6 (ref ?–6.5)

## 2025-07-15 PROCEDURE — 99214 OFFICE O/P EST MOD 30 MIN: CPT | Performed by: INTERNAL MEDICINE

## 2025-07-15 PROCEDURE — 83036 HEMOGLOBIN GLYCOSYLATED A1C: CPT | Performed by: INTERNAL MEDICINE

## 2025-07-15 PROCEDURE — 82570 ASSAY OF URINE CREATININE: CPT | Performed by: INTERNAL MEDICINE

## 2025-07-15 PROCEDURE — G0439 PPPS, SUBSEQ VISIT: HCPCS | Performed by: INTERNAL MEDICINE

## 2025-07-15 PROCEDURE — 82043 UR ALBUMIN QUANTITATIVE: CPT | Performed by: INTERNAL MEDICINE

## 2025-07-15 RX ORDER — METHOCARBAMOL 500 MG/1
500 TABLET, FILM COATED ORAL 4 TIMES DAILY
Qty: 120 TABLET | Refills: 2 | Status: SHIPPED | OUTPATIENT
Start: 2025-07-15 | End: 2025-07-31

## 2025-07-15 RX ORDER — ATORVASTATIN CALCIUM 40 MG/1
40 TABLET, FILM COATED ORAL DAILY
Qty: 90 TABLET | Refills: 1 | Status: SHIPPED | OUTPATIENT
Start: 2025-07-15

## 2025-07-15 RX ORDER — TORSEMIDE 5 MG/1
5 TABLET ORAL DAILY
Qty: 90 TABLET | Refills: 1 | Status: SHIPPED | OUTPATIENT
Start: 2025-07-15

## 2025-07-15 RX ORDER — MELOXICAM 15 MG/1
15 TABLET ORAL DAILY
Qty: 90 TABLET | Refills: 1 | Status: SHIPPED | OUTPATIENT
Start: 2025-07-15

## 2025-07-15 RX ORDER — POTASSIUM CHLORIDE 750 MG/1
10 CAPSULE, EXTENDED RELEASE ORAL DAILY
Qty: 90 CAPSULE | Refills: 1 | Status: SHIPPED | OUTPATIENT
Start: 2025-07-15 | End: 2025-07-31

## 2025-07-15 RX ORDER — LISINOPRIL AND HYDROCHLOROTHIAZIDE 20; 25 MG/1; MG/1
1 TABLET ORAL DAILY
Qty: 100 TABLET | Refills: 2 | Status: SHIPPED | OUTPATIENT
Start: 2025-07-15

## 2025-07-18 ENCOUNTER — HOSPITAL ENCOUNTER (OUTPATIENT)
Dept: INTERVENTIONAL RADIOLOGY/VASCULAR | Facility: HOSPITAL | Age: 85
End: 2025-07-18
Attending: PHYSICIAN ASSISTANT
Payer: COMMERCIAL

## 2025-07-18 VITALS
HEART RATE: 60 BPM | DIASTOLIC BLOOD PRESSURE: 75 MMHG | RESPIRATION RATE: 18 BRPM | OXYGEN SATURATION: 97 % | SYSTOLIC BLOOD PRESSURE: 155 MMHG

## 2025-07-18 DIAGNOSIS — J90 PLEURAL EFFUSION ON RIGHT: ICD-10-CM

## 2025-07-18 PROCEDURE — 32555 ASPIRATE PLEURA W/ IMAGING: CPT

## 2025-07-18 PROCEDURE — 32555 ASPIRATE PLEURA W/ IMAGING: CPT | Performed by: RADIOLOGY

## 2025-07-18 RX ORDER — LIDOCAINE WITH 8.4% SOD BICARB 0.9%(10ML)
SYRINGE (ML) INJECTION AS NEEDED
Status: COMPLETED | OUTPATIENT
Start: 2025-07-18 | End: 2025-07-18

## 2025-07-18 RX ADMIN — Medication 10 ML: at 10:21

## 2025-07-18 NOTE — SEDATION DOCUMENTATION
Right sided thoracentesis completed by Dr. Prather. 1390 ml clear brayden fluid removed. Pt tolerated well. Band aid applied to site.

## 2025-07-31 ENCOUNTER — OFFICE VISIT (OUTPATIENT)
Dept: INTERNAL MEDICINE CLINIC | Facility: CLINIC | Age: 85
End: 2025-07-31
Payer: COMMERCIAL

## 2025-07-31 VITALS
HEART RATE: 78 BPM | OXYGEN SATURATION: 96 % | SYSTOLIC BLOOD PRESSURE: 122 MMHG | BODY MASS INDEX: 28.79 KG/M2 | DIASTOLIC BLOOD PRESSURE: 72 MMHG | HEIGHT: 68 IN | WEIGHT: 190 LBS | TEMPERATURE: 97.9 F

## 2025-07-31 DIAGNOSIS — L08.9 TRAUMATIC ULCER OF RIGHT LOWER EXTREMITY, LIMITED TO BREAKDOWN OF SKIN WITH INFECTION (HCC): Primary | ICD-10-CM

## 2025-07-31 DIAGNOSIS — L97.911 TRAUMATIC ULCER OF RIGHT LOWER EXTREMITY, LIMITED TO BREAKDOWN OF SKIN WITH INFECTION (HCC): Primary | ICD-10-CM

## 2025-07-31 DIAGNOSIS — B35.1 ONYCHOMYCOSIS: ICD-10-CM

## 2025-07-31 DIAGNOSIS — S80.811A ABRASION, RIGHT LOWER LEG, INITIAL ENCOUNTER: ICD-10-CM

## 2025-07-31 DIAGNOSIS — Z23 ENCOUNTER FOR IMMUNIZATION: ICD-10-CM

## 2025-07-31 PROCEDURE — 87070 CULTURE OTHR SPECIMN AEROBIC: CPT | Performed by: INTERNAL MEDICINE

## 2025-07-31 PROCEDURE — 87186 SC STD MICRODIL/AGAR DIL: CPT | Performed by: INTERNAL MEDICINE

## 2025-07-31 PROCEDURE — 87077 CULTURE AEROBIC IDENTIFY: CPT | Performed by: INTERNAL MEDICINE

## 2025-07-31 PROCEDURE — G2211 COMPLEX E/M VISIT ADD ON: HCPCS | Performed by: INTERNAL MEDICINE

## 2025-07-31 PROCEDURE — 99213 OFFICE O/P EST LOW 20 MIN: CPT | Performed by: INTERNAL MEDICINE

## 2025-07-31 PROCEDURE — 90471 IMMUNIZATION ADMIN: CPT | Performed by: INTERNAL MEDICINE

## 2025-07-31 PROCEDURE — 87205 SMEAR GRAM STAIN: CPT | Performed by: INTERNAL MEDICINE

## 2025-07-31 PROCEDURE — 90715 TDAP VACCINE 7 YRS/> IM: CPT | Performed by: INTERNAL MEDICINE

## 2025-07-31 RX ORDER — CEPHALEXIN 500 MG/1
500 CAPSULE ORAL EVERY 8 HOURS SCHEDULED
Qty: 21 CAPSULE | Refills: 0 | Status: SHIPPED | OUTPATIENT
Start: 2025-07-31 | End: 2025-08-07

## 2025-07-31 RX ORDER — TERBINAFINE HYDROCHLORIDE 250 MG/1
250 TABLET ORAL DAILY
Qty: 90 TABLET | Refills: 0 | Status: SHIPPED | OUTPATIENT
Start: 2025-07-31 | End: 2025-10-29

## 2025-08-01 ENCOUNTER — RESULTS FOLLOW-UP (OUTPATIENT)
Dept: INTERNAL MEDICINE CLINIC | Facility: CLINIC | Age: 85
End: 2025-08-01

## 2025-08-01 ENCOUNTER — HOSPITAL ENCOUNTER (OUTPATIENT)
Dept: INTERVENTIONAL RADIOLOGY/VASCULAR | Facility: HOSPITAL | Age: 85
Discharge: HOME | End: 2025-08-01
Attending: PHYSICIAN ASSISTANT | Admitting: RADIOLOGY
Payer: COMMERCIAL

## 2025-08-01 VITALS
HEART RATE: 51 BPM | RESPIRATION RATE: 18 BRPM | SYSTOLIC BLOOD PRESSURE: 156 MMHG | OXYGEN SATURATION: 98 % | DIASTOLIC BLOOD PRESSURE: 71 MMHG

## 2025-08-01 DIAGNOSIS — J90 PLEURAL EFFUSION ON RIGHT: ICD-10-CM

## 2025-08-01 PROCEDURE — 32555 ASPIRATE PLEURA W/ IMAGING: CPT | Performed by: PHYSICIAN ASSISTANT

## 2025-08-01 PROCEDURE — 32555 ASPIRATE PLEURA W/ IMAGING: CPT

## 2025-08-01 RX ORDER — LIDOCAINE WITH 8.4% SOD BICARB 0.9%(10ML)
SYRINGE (ML) INJECTION AS NEEDED
Status: COMPLETED | OUTPATIENT
Start: 2025-08-01 | End: 2025-08-01

## 2025-08-01 RX ADMIN — Medication 10 ML: at 11:24

## 2025-08-03 LAB
BACTERIA WND AEROBE CULT: ABNORMAL
GRAM STN SPEC: ABNORMAL

## 2025-08-05 ENCOUNTER — TELEPHONE (OUTPATIENT)
Dept: PULMONOLOGY | Facility: CLINIC | Age: 85
End: 2025-08-05

## 2025-08-08 ENCOUNTER — TELEPHONE (OUTPATIENT)
Dept: INTERNAL MEDICINE CLINIC | Facility: CLINIC | Age: 85
End: 2025-08-08

## 2025-08-15 ENCOUNTER — HOSPITAL ENCOUNTER (OUTPATIENT)
Dept: INTERVENTIONAL RADIOLOGY/VASCULAR | Facility: HOSPITAL | Age: 85
Discharge: HOME | End: 2025-08-15
Attending: PHYSICIAN ASSISTANT
Payer: COMMERCIAL

## 2025-08-15 VITALS
HEART RATE: 54 BPM | SYSTOLIC BLOOD PRESSURE: 123 MMHG | OXYGEN SATURATION: 97 % | RESPIRATION RATE: 16 BRPM | DIASTOLIC BLOOD PRESSURE: 63 MMHG

## 2025-08-15 DIAGNOSIS — J90 PLEURAL EFFUSION ON RIGHT: ICD-10-CM

## 2025-08-15 PROCEDURE — 32555 ASPIRATE PLEURA W/ IMAGING: CPT | Performed by: PHYSICIAN ASSISTANT

## 2025-08-15 PROCEDURE — 32555 ASPIRATE PLEURA W/ IMAGING: CPT

## 2025-08-15 RX ORDER — LIDOCAINE WITH 8.4% SOD BICARB 0.9%(10ML)
SYRINGE (ML) INJECTION AS NEEDED
Status: COMPLETED | OUTPATIENT
Start: 2025-08-15 | End: 2025-08-15

## 2025-08-15 RX ADMIN — Medication 10 ML: at 11:20
